# Patient Record
Sex: FEMALE | Race: WHITE | NOT HISPANIC OR LATINO | Employment: OTHER | URBAN - METROPOLITAN AREA
[De-identification: names, ages, dates, MRNs, and addresses within clinical notes are randomized per-mention and may not be internally consistent; named-entity substitution may affect disease eponyms.]

---

## 2017-07-22 ENCOUNTER — OFFICE VISIT (OUTPATIENT)
Dept: URGENT CARE | Facility: CLINIC | Age: 57
End: 2017-07-22
Payer: MEDICARE

## 2017-07-22 VITALS
WEIGHT: 195 LBS | HEIGHT: 64 IN | OXYGEN SATURATION: 96 % | DIASTOLIC BLOOD PRESSURE: 72 MMHG | SYSTOLIC BLOOD PRESSURE: 90 MMHG | TEMPERATURE: 99 F | BODY MASS INDEX: 33.29 KG/M2 | HEART RATE: 89 BPM

## 2017-07-22 DIAGNOSIS — M46.1 SACROILIITIS: Primary | ICD-10-CM

## 2017-07-22 PROCEDURE — 99203 OFFICE O/P NEW LOW 30 MIN: CPT | Mod: 25,S$GLB,, | Performed by: INTERNAL MEDICINE

## 2017-07-22 PROCEDURE — 96372 THER/PROPH/DIAG INJ SC/IM: CPT | Mod: S$GLB,,, | Performed by: INTERNAL MEDICINE

## 2017-07-22 RX ORDER — METOPROLOL SUCCINATE 50 MG/1
50 TABLET, EXTENDED RELEASE ORAL DAILY
COMMUNITY
End: 2019-08-29

## 2017-07-22 RX ORDER — AMLODIPINE BESYLATE 5 MG/1
5 TABLET ORAL DAILY
COMMUNITY
End: 2019-08-29

## 2017-07-22 RX ORDER — KETOROLAC TROMETHAMINE 30 MG/ML
30 INJECTION, SOLUTION INTRAMUSCULAR; INTRAVENOUS
Status: COMPLETED | OUTPATIENT
Start: 2017-07-22 | End: 2017-07-22

## 2017-07-22 RX ORDER — METHYLPREDNISOLONE 4 MG/1
TABLET ORAL
Qty: 1 PACKAGE | Refills: 0 | Status: SHIPPED | OUTPATIENT
Start: 2017-07-22 | End: 2019-08-29

## 2017-07-22 RX ORDER — TRAZODONE HYDROCHLORIDE 50 MG/1
50 TABLET ORAL NIGHTLY
COMMUNITY
End: 2019-08-29

## 2017-07-22 RX ORDER — VENLAFAXINE HYDROCHLORIDE 75 MG/1
75 CAPSULE, EXTENDED RELEASE ORAL DAILY
Status: ON HOLD | COMMUNITY
End: 2022-08-19 | Stop reason: HOSPADM

## 2017-07-22 RX ORDER — HYDROCODONE BITARTRATE AND ACETAMINOPHEN 7.5; 325 MG/1; MG/1
1 TABLET ORAL EVERY 6 HOURS PRN
Qty: 20 TABLET | Refills: 0 | Status: SHIPPED | OUTPATIENT
Start: 2017-07-22 | End: 2019-08-29

## 2017-07-22 RX ORDER — BUPROPION HYDROCHLORIDE 300 MG/1
300 TABLET ORAL DAILY
COMMUNITY
End: 2020-05-14

## 2017-07-22 RX ADMIN — KETOROLAC TROMETHAMINE 30 MG: 30 INJECTION, SOLUTION INTRAMUSCULAR; INTRAVENOUS at 05:07

## 2017-07-22 NOTE — PROGRESS NOTES
"Subjective:       Patient ID: Vale Gutierrez is a 56 y.o. female.    Vitals:  height is 5' 4" (1.626 m) and weight is 88.5 kg (195 lb). Her temperature is 98.7 °F (37.1 °C). Her blood pressure is 90/72 and her pulse is 89. Her oxygen saturation is 96%.     Chief Complaint: Back Pain (radiating into rt foot and ankle. ) and Sciatica    Back Pain   This is a chronic problem. The current episode started in the past 7 days. The problem occurs constantly. The problem has been gradually worsening since onset. The pain is present in the gluteal and lumbar spine. The quality of the pain is described as shooting, stabbing and burning. The pain radiates to the right foot and left foot. The pain is at a severity of 9/10. The pain is moderate. The pain is the same all the time. The symptoms are aggravated by bending, lying down, sitting, standing and position. Stiffness is present all day. Associated symptoms include leg pain. Pertinent negatives include no abdominal pain, chest pain, fever or headaches. She has tried nothing for the symptoms.     Review of Systems   Constitution: Negative for chills and fever.   HENT: Negative for headaches and sore throat.    Eyes: Negative for blurred vision.   Cardiovascular: Negative for chest pain.   Respiratory: Negative for shortness of breath.    Skin: Negative for rash.   Musculoskeletal: Positive for back pain, joint pain and stiffness.        Lbp radiating into rt buttock, groin rt lower extremity. History of sciatica   Gastrointestinal: Negative for abdominal pain, diarrhea, nausea and vomiting.   Psychiatric/Behavioral: The patient is not nervous/anxious.        Objective:      Physical Exam   Constitutional: She is oriented to person, place, and time.   Neck: Normal range of motion. Neck supple.   Cardiovascular: Intact distal pulses.    Musculoskeletal:   Tenderness R sacroiliiac jt; pain radiates to lat and ant thigh with movement   Neurological: She is alert and oriented to " person, place, and time. She has normal reflexes.       Assessment:       1. Sacroiliitis        Plan:         Sacroiliitis  -     ketorolac injection 30 mg; Inject 30 mg into the muscle one time.  -     methylPREDNISolone (MEDROL DOSEPACK) 4 mg tablet; use as directed  Dispense: 1 Package; Refill: 0  -     hydrocodone-acetaminophen 7.5-325mg (NORCO) 7.5-325 mg per tablet; Take 1 tablet by mouth every 6 (six) hours as needed for Pain.  Dispense: 20 tablet; Refill: 0

## 2017-08-01 ENCOUNTER — OFFICE VISIT (OUTPATIENT)
Dept: URGENT CARE | Facility: CLINIC | Age: 57
End: 2017-08-01
Payer: MEDICARE

## 2017-08-01 VITALS
DIASTOLIC BLOOD PRESSURE: 65 MMHG | WEIGHT: 195 LBS | TEMPERATURE: 97 F | RESPIRATION RATE: 14 BRPM | BODY MASS INDEX: 33.29 KG/M2 | HEIGHT: 64 IN | OXYGEN SATURATION: 95 % | HEART RATE: 95 BPM | SYSTOLIC BLOOD PRESSURE: 85 MMHG

## 2017-08-01 DIAGNOSIS — M79.641 BILATERAL HAND PAIN: Primary | ICD-10-CM

## 2017-08-01 DIAGNOSIS — M54.12 CERVICAL RADICULOPATHY: ICD-10-CM

## 2017-08-01 DIAGNOSIS — M79.642 BILATERAL HAND PAIN: Primary | ICD-10-CM

## 2017-08-01 PROCEDURE — 96372 THER/PROPH/DIAG INJ SC/IM: CPT | Mod: S$GLB,,, | Performed by: EMERGENCY MEDICINE

## 2017-08-01 PROCEDURE — 99214 OFFICE O/P EST MOD 30 MIN: CPT | Mod: 25,S$GLB,, | Performed by: EMERGENCY MEDICINE

## 2017-08-01 RX ORDER — TRAMADOL HYDROCHLORIDE 50 MG/1
50 TABLET ORAL EVERY 8 HOURS PRN
Qty: 30 TABLET | Refills: 0 | Status: SHIPPED | OUTPATIENT
Start: 2017-08-01 | End: 2017-08-11

## 2017-08-01 RX ORDER — BETAMETHASONE SODIUM PHOSPHATE AND BETAMETHASONE ACETATE 3; 3 MG/ML; MG/ML
9 INJECTION, SUSPENSION INTRA-ARTICULAR; INTRALESIONAL; INTRAMUSCULAR; SOFT TISSUE
Status: COMPLETED | OUTPATIENT
Start: 2017-08-01 | End: 2017-08-01

## 2017-08-01 RX ORDER — CYCLOBENZAPRINE HCL 5 MG
5 TABLET ORAL 3 TIMES DAILY
Qty: 21 TABLET | Refills: 0 | Status: SHIPPED | OUTPATIENT
Start: 2017-08-01 | End: 2017-08-08

## 2017-08-01 RX ADMIN — BETAMETHASONE SODIUM PHOSPHATE AND BETAMETHASONE ACETATE 9 MG: 3; 3 INJECTION, SUSPENSION INTRA-ARTICULAR; INTRALESIONAL; INTRAMUSCULAR; SOFT TISSUE at 03:08

## 2017-08-01 NOTE — PROGRESS NOTES
"Subjective:       Patient ID: Vale Gutierrez is a 56 y.o. female.    Vitals:  height is 5' 4" (1.626 m) and weight is 88.5 kg (195 lb). Her temperature is 96.6 °F (35.9 °C). Her blood pressure is 85/65 (abnormal) and her pulse is 95. Her respiration is 14 and oxygen saturation is 95%.     Chief Complaint: Hand Pain    REPORTS SACROILIITIS IMPROVED HOWEVER NOW DEALING WITH ALTERANTING LEFT AND RIGHT HAND PAIN AND FOREARM PAIN WITH ASSOCIATED LEFT NECK PAIN WHEN LEFT HAND AND FOREARM HURTS AND STATES IT SOMETIMES SHIFTS OVER TO THE RIGHT SIDE OF HER NECK AND RADIATES SHARP AND LANCINATING PAINS TO THE RIGHT HAND AND FOREARM. SHE IS OUT OF HER PAIN MEDS AND STATES SHE HAS APPOINTMENT WITH RHEUMATOLOGY, BUT NOT UNTIL LATE September. NO CHEST PAIN, NO FEVERS, NO SOB, NO LEG EDEMA, NO HTN, NO HEADACHE, NO BLURRED VISION. HAS CHRONIC HYPOTENSION ACTUALLY PER LAST FEW BP READS. SHE HAS WRIST BRACES AT HOME FROM SISTER. THERE HAS BEEN NO ACCIDENTS NOR FALLS, HOWEVER HAD RECENT ABDOMINAL SURGERY AND MANIPULATION OF THE NECK DURING INTUBATION AS POSSIBLE INCITING AGENT OF CHRONIC PROBLEM.      Hand Pain    The incident occurred 2 days ago. The pain is present in the right hand, right wrist, left wrist, left hand, left fingers and right fingers. The quality of the pain is described as aching, burning, stabbing, shooting and cramping. Pertinent negatives include no chest pain. She has tried heat and ice for the symptoms. The treatment provided no relief.     Review of Systems   Constitution: Negative for chills and fever.   HENT: Negative for headaches and sore throat.    Eyes: Negative for blurred vision.   Cardiovascular: Negative for chest pain.   Respiratory: Negative for shortness of breath.    Skin: Negative for rash.   Musculoskeletal: Positive for joint pain (ALTERNATING LEFT SIDED NECK AND HAND TO THE RIGHT NECK AND HAND) and joint swelling. Negative for back pain.   Gastrointestinal: Positive for nausea. Negative for " abdominal pain, diarrhea and vomiting.   Psychiatric/Behavioral: The patient is not nervous/anxious.        Objective:      Physical Exam   Constitutional: She is oriented to person, place, and time. She appears well-developed and well-nourished. No distress.   HENT:   Head: Normocephalic and atraumatic.   Right Ear: External ear normal.   Left Ear: External ear normal.   Mouth/Throat: Oropharynx is clear and moist. No oropharyngeal exudate.   Eyes: Conjunctivae and EOM are normal. Pupils are equal, round, and reactive to light.   Neck: Normal range of motion. Neck supple.   Cardiovascular: Normal rate, regular rhythm and normal heart sounds.  Exam reveals no gallop and no friction rub.    No murmur heard.  Pulmonary/Chest: Breath sounds normal. No respiratory distress. She has no wheezes. She has no rales. She exhibits no tenderness.   Abdominal: Soft. Bowel sounds are normal. There is no tenderness. There is no rebound. No hernia.   Musculoskeletal: Normal range of motion. She exhibits tenderness (TTP OF THE RIGHT FOREARM AND HAND, MILDLY RIGHT PARASPINOUS NECK AND TRAPEZIUS REGION, WORSE ON HEAD TILD. MOTOR FIUNCTION NORMAL AND SENSATION INTACT., NORMAL LUE EXAM). She exhibits no edema or deformity.   Lymphadenopathy:     She has no cervical adenopathy.   Neurological: She is alert and oriented to person, place, and time. She has normal reflexes. She displays normal reflexes. No cranial nerve deficit. Coordination normal.   Skin: Skin is warm and dry. Capillary refill takes less than 2 seconds. No rash noted. She is not diaphoretic. No erythema. No pallor.   Psychiatric: She has a normal mood and affect. Her behavior is normal.   Nursing note and vitals reviewed.      Assessment:       1. Bilateral hand pain    2. Cervical radiculopathy        Plan:         Bilateral hand pain  -     betamethasone acetate-betamethasone sodium phosphate injection 9 mg; Inject 1.5 mLs (9 mg total) into the muscle one time.  -      tramadol (ULTRAM) 50 mg tablet; Take 1 tablet (50 mg total) by mouth every 8 (eight) hours as needed for Pain.  Dispense: 30 tablet; Refill: 0    Cervical radiculopathy  -     betamethasone acetate-betamethasone sodium phosphate injection 9 mg; Inject 1.5 mLs (9 mg total) into the muscle one time.  -     cyclobenzaprine (FLEXERIL) 5 MG tablet; Take 1 tablet (5 mg total) by mouth 3 (three) times daily.  Dispense: 21 tablet; Refill: 0  -     Ambulatory referral to Orthopedics      REFERRAL ORTHO AND RHEUMATOLOGY

## 2018-01-15 NOTE — PATIENT INSTRUCTIONS
REST AND ICE SORE AREAS  1.5 CC CELESTONE GIVEN IN CLINIC  ALEVE TWICE DAILY OR MOTRIN 600 MG EVERY 6-8 HOURS FOR PAIN/INFLAMMATION  FLEXERIL RX FOR MUSCLE RELAXANT  TRAMADOL RX FOR PAIN  FOLLOW UP WITH ORTHOPEDICS (REFERRED TODAY) AND RHEUMATOLOGY AS PLANNED  MAY NEED OUTPATIENT MRI OF THE C-SPINE/NECK  SEE CERVICAL RADICULOPATHY AND HAND PAIN INFO    Understanding Cervical Radiculopathy    Cervical radiculopathy is irritation or inflammation of a nerve root in the neck. It causes neck pain and other symptoms that may spread into the chest or down the arm. To understand this condition, it helps to understand the parts of the spine:  · Vertebrae. These are bones that stack to form the spine. The cervical spine contains the 7 vertebrae in the neck.  · Disks. These are soft pads of tissue between the vertebrae. They act as shock absorbers for the spine.  · The spinal canal. This is a tunnel formed within the stacked vertebrae. The spinal cord runs through this canal.  · Nerves. These branch off the spinal cord. As they leave the spinal canal, nerves pass through openings between the vertebrae. The nerve root is the part of the nerve that is closest to the spinal cord.   With cervical radiculopathy, nerve roots in the neck become irritated. This leads to pain and symptoms that can travel to the nerves that go from the spinal cord down the arms and into the torso.  What causes cervical radiculopathy?  Aging, injury, poor posture, and other issues can lead to problems in the neck. These problems may then irritate nerve roots. These include:  · Damage to a disk in the cervical spine. The damaged disk may then press on nearby nerve roots.  · Degeneration from wear and tear, and aging. This can lead to narrowing (stenosis) of the openings between the vertebrae. The narrowed openings press on nerve roots as they leave the spinal canal.  · An unstable spine. This is when a vertebra slips forward. It can then press on a nerve  root.  There are other, less common causes of pressure on nerves in the neck. These include infection, cysts, and tumors.  Symptoms of cervical radiculopathy  These include:  · Neck pain  · Pain, numbness, tingling, or weakness that travels down the arm  · Loss of neck movement  · Muscle spasms  Treatment for cervical radiculopathy  In most cases, your healthcare provider will first try treatments that help relieve symptoms. These may include:  · Prescription or over-the-counter pain medicines. These help relieve pain and swelling.  · Cold packs. These help reduce pain.  · Resting. This involves avoiding positions and activities that increase pain.  · Neck brace (cervical collar). This can help relieve inflammation and pain.  · Physical therapy, including exercises and stretches. This can help decrease pain and increase movement and function.  · Shots of medicinesaround the nerve roots. This is done to help relieve symptoms for a time.  In some cases, your healthcare provider may advise surgery to fix the underlying problem. This depends on the cause, the symptoms, and how long the pain has lasted.  Possible complications  Over time, an irritated and inflamed nerve may become damaged. This may lead to long-lasting (permanent) numbness or weakness. If symptoms change suddenly or get worse, be sure to let your healthcare provider know.     When to call your healthcare provider  Call your healthcare provider right away if you have any of these:  · New pain or pain that gets worse  · New or increasing weakness, numbness, or tingling in your arm or hand  · Bowel or bladder changes   Date Last Reviewed: 3/10/2016  © 9327-8702 [employer]. [address] [place] [address], [place] [address]. All rights reserved. This information is not intended as a substitute for professional medical care. Always follow your healthcare professional's instructions.        Paraesthesias  Paraesthesia is a burning or prickling sensation  "that is sometimes felt in the hands, arms, legs or feet. It can also occur in other parts of the body. It can also feel like tingling or numbness, skin crawling, or itching. The feeling is not comfortable, but it is not painful. (The "pins and needles" feeling that happens when a foot or hand "falls asleep" is a temporary paraesthesia.)  Paraesthesias that last or come and go may be caused by medical issues that need to be treated. These include stroke, a bulging disk pressing on a nerve, a trapped nerve, vitamin deficiencies, or even certain medicines.  Tests are often done. These tests may include blood tests, X-ray, CT (computerized tomography) scan, or a muscle test (electromyography). Depending on the cause, treatment may include physical therapy.  Home care  · Tell the healthcare provider about all medicines you take. This includes prescription and over-the-counter medicines, vitamins, and herbs. Ask if any of the medicines may be causing your problems. Do not make any changes to prescription medicines without talking to your healthcare provider first.  · You may be prescribed medicines to help relieve the tingling feeling or for pain. Take all medicines as directed.  · A numb hand or foot may be more prone to injury. To help protect it:  ¨ Always use oven mitts.  ¨ Test water with an unaffected hand or foot.  ¨ Use caution when trimming nails. File sharp areas.  ¨ Wear shoes that fit well to avoid pressure points, blisters, and ulcers.  ¨ Inspect your hands and feet carefully (including the soles of your feet and between your toes) at least once a week. If you see red areas, sores, or other problems, tell your healthcare provider.  Follow-up care  Follow up with your doctor or as advised by our staff. You may need further testing or evaluation.  When to seek medical advice  Call your healthcare provider right away if any of the following occur:  · Numbness or weakness of the face, one arm, or one " reactive and equally round. Sclerae and conjunctivae clear, normocephalic and atraumatic. RESPIRATORY:  Clear and equal breath sounds with no acute respiratory distress. HEART: Regular rhythm without murmur, rub or gallop. ABDOMEN:  soft, nontender. No masses, guarding or rebound. Normoactive bowel sounds. LOW BACK: No tenderness to palpation of inferior lumbar spine or either sacroiliac joint area. Assessment:     1. Cough  POCT rapid strep A    POCT Influenza A/B   2. Bronchitis  oseltamivir (TAMIFLU) 75 MG capsule    predniSONE (DELTASONE) 10 MG tablet    cefTRIAXone (ROCEPHIN) injection 1 g   3. Subacute maxillary sinusitis  predniSONE (DELTASONE) 10 MG tablet    cefdinir (OMNICEF) 300 MG capsule   4. Essential hypertension     5. Type 2 diabetes mellitus with stage 3 chronic kidney disease, without long-term current use of insulin ([facility]         Plan:      Orders Placed This Encounter   Procedures    POCT rapid strep A    POCT Influenza A/B     Orders Placed This Encounter   Medications    oseltamivir (TAMIFLU) 75 MG capsule     Sig: Take 1 capsule by mouth 2 times daily for 5 days     Dispense:  10 capsule     Refill:  0    predniSONE (DELTASONE) 10 MG tablet     Sig: Take one tablet 4 times a day for 3 days, then one tablet 3 times a day for 3 days, then take one tablet 2 times a day for 3 days, and finally take one tablet 1 times a day for three days. ([id number],5V1,7J9)     Dispense:  30 tablet     Refill:  0    cefTRIAXone (ROCEPHIN) injection 1 g    cefdinir (OMNICEF) 300 MG capsule     Sig: Take 1 capsule by mouth 2 times daily for 10 days     Dispense:  20 capsule     Refill:  0     Symptoms and complaints should be improving over 48 hours and entirely resolved in two weeks, if not, patient should call the office for a follow-up appointment. Controlled Substances Monitoring:      Return in about 2 months (around 3/15/2018). HAYLEY Monreal   , am scribing for and in the presence of Ayesha Whittington MD. Electronically signed by :  Bolivar Flowers. Mary Elias, Ayesha Whittington MD, personally performed the services described in this documentation, as scribed by Bolivar Flowers. HAYLEY Acosta    in my presence, and it is both accurate and complete.  Electronically signed by: Ayesha Whittington MD    1/15/18 12:15 PM    Ayesha Whittington MD leg  · Slurred speech, confusion, trouble speaking, walking, or seeing  · Severe headache, fainting spell, dizziness, or seizure  · Chest, arm, neck, or upper back pain  · Loss of bladder or bowel control  · Open wound with redness, swelling, or pus  Date Last Reviewed: 9/25/2015  © 4554-1429 [employer]. [address] [place] [address]. All rights reserved. This information is not intended as a substitute for professional medical care. Always follow your healthcare professional's instructions.

## 2018-09-30 ENCOUNTER — OFFICE VISIT (OUTPATIENT)
Dept: URGENT CARE | Facility: CLINIC | Age: 58
End: 2018-09-30
Payer: MEDICARE

## 2018-09-30 VITALS
TEMPERATURE: 98 F | HEIGHT: 64 IN | WEIGHT: 195 LBS | SYSTOLIC BLOOD PRESSURE: 94 MMHG | BODY MASS INDEX: 33.29 KG/M2 | HEART RATE: 81 BPM | DIASTOLIC BLOOD PRESSURE: 71 MMHG | OXYGEN SATURATION: 95 %

## 2018-09-30 DIAGNOSIS — M25.511 ACUTE PAIN OF RIGHT SHOULDER: Primary | ICD-10-CM

## 2018-09-30 PROCEDURE — 96372 THER/PROPH/DIAG INJ SC/IM: CPT | Mod: S$GLB,,, | Performed by: NURSE PRACTITIONER

## 2018-09-30 PROCEDURE — 3008F BODY MASS INDEX DOCD: CPT | Mod: CPTII,S$GLB,, | Performed by: NURSE PRACTITIONER

## 2018-09-30 PROCEDURE — 99214 OFFICE O/P EST MOD 30 MIN: CPT | Mod: 25,S$GLB,, | Performed by: NURSE PRACTITIONER

## 2018-09-30 RX ORDER — CYCLOBENZAPRINE HCL 10 MG
10 TABLET ORAL 3 TIMES DAILY PRN
Qty: 30 TABLET | Refills: 0 | Status: SHIPPED | OUTPATIENT
Start: 2018-09-30 | End: 2018-10-10

## 2018-09-30 RX ORDER — BETAMETHASONE SODIUM PHOSPHATE AND BETAMETHASONE ACETATE 3; 3 MG/ML; MG/ML
9 INJECTION, SUSPENSION INTRA-ARTICULAR; INTRALESIONAL; INTRAMUSCULAR; SOFT TISSUE
Status: COMPLETED | OUTPATIENT
Start: 2018-09-30 | End: 2018-09-30

## 2018-09-30 RX ADMIN — BETAMETHASONE SODIUM PHOSPHATE AND BETAMETHASONE ACETATE 9 MG: 3; 3 INJECTION, SUSPENSION INTRA-ARTICULAR; INTRALESIONAL; INTRAMUSCULAR; SOFT TISSUE at 06:09

## 2018-09-30 NOTE — PROGRESS NOTES
"Subjective:       Patient ID: Vale Gutierrez is a 57 y.o. female.    Vitals:  height is 5' 4" (1.626 m) and weight is 88.5 kg (195 lb). Her temperature is 97.8 °F (36.6 °C). Her blood pressure is 94/71 and her pulse is 81. Her oxygen saturation is 95%.     Chief Complaint: Shoulder Pain (right)    Pt is here for right shoulder pain and spasms for 4 days. Pt denies radiculopathy or trauma.       Shoulder Pain    The pain is present in the right shoulder. This is a new problem. Episode onset: 4 days pain started. no trauma. The problem occurs intermittently. The problem has been gradually worsening. The quality of the pain is described as aching. The pain is at a severity of 7/10. The pain is moderate. Associated symptoms include a limited range of motion and stiffness. Pertinent negatives include no fever or headaches. The symptoms are aggravated by activity. She has tried nothing for the symptoms. The treatment provided no relief. Family history includes arthritis.     Review of Systems   Constitution: Negative for chills and fever.   HENT: Negative for sore throat.    Eyes: Negative for blurred vision.   Cardiovascular: Negative for chest pain.   Respiratory: Negative for shortness of breath.    Skin: Negative for rash.   Musculoskeletal: Positive for joint pain and stiffness. Negative for back pain.   Gastrointestinal: Negative for abdominal pain, diarrhea, nausea and vomiting.   Neurological: Negative for headaches.   Psychiatric/Behavioral: The patient is not nervous/anxious.        Objective:      Physical Exam   Constitutional: She is oriented to person, place, and time. Vital signs are normal. She appears well-developed and well-nourished. She is active and cooperative. No distress.   HENT:   Head: Normocephalic and atraumatic.   Nose: Nose normal.   Mouth/Throat: Oropharynx is clear and moist and mucous membranes are normal.   Eyes: Conjunctivae and lids are normal.   Neck: Trachea normal, normal range of " motion, full passive range of motion without pain and phonation normal. Neck supple.   Cardiovascular: Normal rate, regular rhythm, normal heart sounds, intact distal pulses and normal pulses.   Pulmonary/Chest: Effort normal and breath sounds normal.   Abdominal: Soft. Normal appearance and bowel sounds are normal. She exhibits no abdominal bruit, no pulsatile midline mass and no mass.   Musculoskeletal: She exhibits no edema or deformity.        Right shoulder: She exhibits tenderness, pain and spasm. She exhibits normal range of motion, no bony tenderness, no swelling, no effusion, no crepitus, no deformity, no laceration, normal pulse and normal strength.   Neurological: She is alert and oriented to person, place, and time. She has normal strength and normal reflexes. No sensory deficit.   Skin: Skin is warm, dry and intact. She is not diaphoretic.   Psychiatric: She has a normal mood and affect. Her speech is normal and behavior is normal. Judgment and thought content normal. Cognition and memory are normal.   Nursing note and vitals reviewed.      Assessment:       1. Acute pain of right shoulder        Plan:         Acute pain of right shoulder  -     betamethasone acetate-betamethasone sodium phosphate injection 9 mg; Inject 1.5 mLs (9 mg total) into the muscle one time.  -     cyclobenzaprine (FLEXERIL) 10 MG tablet; Take 1 tablet (10 mg total) by mouth 3 (three) times daily as needed for Muscle spasms.  Dispense: 30 tablet; Refill: 0      Patient Instructions       FOLLOW UP WITH PCP IF SYMPTOMS PERSIST    REST    PAIN IS YOUR LIMITATION    Arthralgia    Arthralgia is the term for pain in or around the joint. It is a symptom, not a disease. This pain may involve one or more joints. In some cases, the pain moves from joint to joint.  There are many causes for joint pain. These include:  · Injury  · Osteoarthritis (wearing out of the joint surface)  · Gout (inflammation of the joint due to crystals in the  joint fluid)  · Infection inside the joint    · Bursitis (inflammation of the fluid-filled sacs around the joint)  · Autoimmune disorders such as rheumatoid arthritis or lupus  · Tendonitis (inflammation of chords that attach muscle to bone)  Home care  · Rest the involved joint(s) until your symptoms improve.   · You may be prescribed pain medicine. If none is prescribed, you may use acetaminophen or ibuprofen to control pain and inflammation.  Follow-up care  Follow up with your healthcare provider or as advised.  When to seek medical advice  Contact your healthcare provider right away if any of the following occurs:  · Pain, swelling, or redness of joint increases  · Pain worsens or recurs after a period of improvement  · Pain moves to other joints  · You cannot bear weight on the affected joint   · You cannot move the affected joint  · Joint appears deformed  · New rash appears  · Fever of 100.4ºF (38ºC) or higher, or as directed by your healthcare provider  Date Last Reviewed: 3/1/2017  © 3949-6551 [employer]. [address] [place] [address], [place] [address]. All rights reserved. This information is not intended as a substitute for professional medical care. Always follow your healthcare professional's instructions.

## 2018-09-30 NOTE — PATIENT INSTRUCTIONS
FOLLOW UP WITH PCP IF SYMPTOMS PERSIST    REST    PAIN IS YOUR LIMITATION    Arthralgia    Arthralgia is the term for pain in or around the joint. It is a symptom, not a disease. This pain may involve one or more joints. In some cases, the pain moves from joint to joint.  There are many causes for joint pain. These include:  · Injury  · Osteoarthritis (wearing out of the joint surface)  · Gout (inflammation of the joint due to crystals in the joint fluid)  · Infection inside the joint    · Bursitis (inflammation of the fluid-filled sacs around the joint)  · Autoimmune disorders such as rheumatoid arthritis or lupus  · Tendonitis (inflammation of chords that attach muscle to bone)  Home care  · Rest the involved joint(s) until your symptoms improve.   · You may be prescribed pain medicine. If none is prescribed, you may use acetaminophen or ibuprofen to control pain and inflammation.  Follow-up care  Follow up with your healthcare provider or as advised.  When to seek medical advice  Contact your healthcare provider right away if any of the following occurs:  · Pain, swelling, or redness of joint increases  · Pain worsens or recurs after a period of improvement  · Pain moves to other joints  · You cannot bear weight on the affected joint   · You cannot move the affected joint  · Joint appears deformed  · New rash appears  · Fever of 100.4ºF (38ºC) or higher, or as directed by your healthcare provider  Date Last Reviewed: 3/1/2017  © 4110-7344 [employer]. [address] [place] [address]. All rights reserved. This information is not intended as a substitute for professional medical care. Always follow your healthcare professional's instructions.

## 2019-05-26 ENCOUNTER — OFFICE VISIT (OUTPATIENT)
Dept: URGENT CARE | Facility: CLINIC | Age: 59
End: 2019-05-26
Payer: MEDICARE

## 2019-05-26 VITALS
DIASTOLIC BLOOD PRESSURE: 86 MMHG | TEMPERATURE: 97 F | HEIGHT: 64 IN | HEART RATE: 81 BPM | SYSTOLIC BLOOD PRESSURE: 112 MMHG | OXYGEN SATURATION: 95 % | BODY MASS INDEX: 33.29 KG/M2 | RESPIRATION RATE: 19 BRPM | WEIGHT: 195 LBS

## 2019-05-26 DIAGNOSIS — M25.541 PAIN IN JOINTS OF RIGHT HAND: Primary | ICD-10-CM

## 2019-05-26 PROCEDURE — 96372 THER/PROPH/DIAG INJ SC/IM: CPT | Mod: S$GLB,,, | Performed by: FAMILY MEDICINE

## 2019-05-26 PROCEDURE — 99214 PR OFFICE/OUTPT VISIT, EST, LEVL IV, 30-39 MIN: ICD-10-PCS | Mod: 25,S$GLB,, | Performed by: FAMILY MEDICINE

## 2019-05-26 PROCEDURE — 3008F BODY MASS INDEX DOCD: CPT | Mod: CPTII,S$GLB,, | Performed by: FAMILY MEDICINE

## 2019-05-26 PROCEDURE — 96372 PR INJECTION,THERAP/PROPH/DIAG2ST, IM OR SUBCUT: ICD-10-PCS | Mod: S$GLB,,, | Performed by: FAMILY MEDICINE

## 2019-05-26 PROCEDURE — 99214 OFFICE O/P EST MOD 30 MIN: CPT | Mod: 25,S$GLB,, | Performed by: FAMILY MEDICINE

## 2019-05-26 PROCEDURE — 3008F PR BODY MASS INDEX (BMI) DOCUMENTED: ICD-10-PCS | Mod: CPTII,S$GLB,, | Performed by: FAMILY MEDICINE

## 2019-05-26 RX ORDER — DICLOFENAC SODIUM 75 MG/1
75 TABLET, DELAYED RELEASE ORAL 2 TIMES DAILY
Qty: 30 TABLET | Refills: 0 | Status: SHIPPED | OUTPATIENT
Start: 2019-05-26 | End: 2019-06-10

## 2019-05-26 RX ORDER — KETOROLAC TROMETHAMINE 30 MG/ML
30 INJECTION, SOLUTION INTRAMUSCULAR; INTRAVENOUS
Status: COMPLETED | OUTPATIENT
Start: 2019-05-26 | End: 2019-05-26

## 2019-05-26 RX ORDER — BETAMETHASONE SODIUM PHOSPHATE AND BETAMETHASONE ACETATE 3; 3 MG/ML; MG/ML
9 INJECTION, SUSPENSION INTRA-ARTICULAR; INTRALESIONAL; INTRAMUSCULAR; SOFT TISSUE
Status: COMPLETED | OUTPATIENT
Start: 2019-05-26 | End: 2019-05-26

## 2019-05-26 RX ORDER — METHYLPREDNISOLONE 4 MG/1
TABLET ORAL
Qty: 1 PACKAGE | Refills: 0 | Status: SHIPPED | OUTPATIENT
Start: 2019-05-26 | End: 2019-06-01

## 2019-05-26 RX ADMIN — BETAMETHASONE SODIUM PHOSPHATE AND BETAMETHASONE ACETATE 9 MG: 3; 3 INJECTION, SUSPENSION INTRA-ARTICULAR; INTRALESIONAL; INTRAMUSCULAR; SOFT TISSUE at 06:05

## 2019-05-26 RX ADMIN — KETOROLAC TROMETHAMINE 30 MG: 30 INJECTION, SOLUTION INTRAMUSCULAR; INTRAVENOUS at 06:05

## 2019-05-26 NOTE — PATIENT INSTRUCTIONS
Follow up with your rheumatologist in a few days.  Return to the urgent care or go to the ER if symptoms get worse.    Mike Lowery MD

## 2019-05-26 NOTE — PROGRESS NOTES
"Subjective:       Patient ID: Vale Gutierrez is a 58 y.o. female.    Vitals:  height is 5' 4" (1.626 m) and weight is 88.5 kg (195 lb). Her oral temperature is 96.9 °F (36.1 °C). Her blood pressure is 112/86 and her pulse is 81. Her respiration is 19 and oxygen saturation is 95%.     Chief Complaint: Edema (right hand)    Edema   This is a new problem. The current episode started in the past 7 days (3 days ). The problem occurs constantly. The problem has been gradually worsening. Pertinent negatives include no abdominal pain, anorexia, arthralgias, change in bowel habit, chest pain, chills, congestion, coughing, diaphoresis, fatigue, fever, headaches, joint swelling, myalgias, nausea, neck pain, numbness, rash, sore throat, swollen glands, urinary symptoms, vertigo, visual change, vomiting or weakness. Exacerbated by: movement. She has tried NSAIDs for the symptoms. The treatment provided mild relief.       Constitution: Negative for chills, sweating, fatigue and fever.   HENT: Negative for congestion and sore throat.    Neck: Negative for neck pain and painful lymph nodes.   Cardiovascular: Negative for chest pain and leg swelling.   Eyes: Negative for double vision and blurred vision.   Respiratory: Negative for cough and shortness of breath.    Gastrointestinal: Negative for abdominal pain, nausea, vomiting and diarrhea.   Genitourinary: Negative for dysuria, frequency, urgency and history of kidney stones.   Musculoskeletal: Negative for joint pain, joint swelling, muscle cramps and muscle ache.   Skin: Negative for color change, pale, rash, erythema and bruising.   Allergic/Immunologic: Negative for seasonal allergies.   Neurological: Negative for dizziness, history of vertigo, light-headedness, passing out, headaches and numbness.   Hematologic/Lymphatic: Negative for swollen lymph nodes.   Psychiatric/Behavioral: Negative for nervous/anxious, sleep disturbance and depression. The patient is not " nervous/anxious.        Objective:      Physical Exam   Constitutional: She is oriented to person, place, and time. She appears well-developed and well-nourished.   HENT:   Head: Normocephalic and atraumatic.   Eyes: Pupils are equal, round, and reactive to light. EOM are normal.   Neck: Normal range of motion. Neck supple.   Cardiovascular: Normal rate, regular rhythm and normal heart sounds.   No murmur heard.  Pulmonary/Chest: Breath sounds normal. No respiratory distress. She has no wheezes. She has no rales.   Abdominal: Soft. Bowel sounds are normal. She exhibits no distension. There is no tenderness.   Musculoskeletal:        Hands:  Right hand on the dorsal side has multiple joints tenderness, swelling, redness and warmth.   Lymphadenopathy:     She has no cervical adenopathy.   Neurological: She is alert and oriented to person, place, and time. No cranial nerve deficit. She exhibits normal muscle tone. Coordination normal.   Skin: Skin is warm. Capillary refill takes less than 2 seconds. No rash noted. No erythema. No pallor.   Psychiatric: She has a normal mood and affect. Her behavior is normal. Judgment and thought content normal.   Vitals reviewed.      Assessment:       1. Pain in joints of right hand        Plan:         Pain in joints of right hand  -     ketorolac injection 30 mg  -     betamethasone acetate-betamethasone sodium phosphate injection 9 mg  -     methylPREDNISolone (MEDROL DOSEPACK) 4 mg tablet; use as directed  Dispense: 1 Package; Refill: 0  -     diclofenac (VOLTAREN) 75 MG EC tablet; Take 1 tablet (75 mg total) by mouth 2 (two) times daily. for 15 days  Dispense: 30 tablet; Refill: 0          Patient Instructions   Follow up with your rheumatologist in a few days.  Return to the urgent care or go to the ER if symptoms get worse.    Mike Lowery MD

## 2019-07-12 ENCOUNTER — NURSE TRIAGE (OUTPATIENT)
Dept: ADMINISTRATIVE | Facility: CLINIC | Age: 59
End: 2019-07-12

## 2019-07-12 NOTE — TELEPHONE ENCOUNTER
Reason for Disposition   General information question, no triage required and triager able to answer question    Protocols used: INFORMATION ONLY CALL-A-AH    Patient was calling to establish care. Not a triage call. Call transferred to the appt desk for assistance.

## 2019-08-16 ENCOUNTER — TELEPHONE (OUTPATIENT)
Dept: INTERNAL MEDICINE | Facility: CLINIC | Age: 59
End: 2019-08-16

## 2019-08-16 NOTE — TELEPHONE ENCOUNTER
----- Message from Cyndee Laguerre sent at 8/16/2019  9:34 AM CDT -----  Contact: Self  call  [phone]  Patient called with compliant of weakness and leg weakness to the point she can walk. Called On  Call but was not able to get through.

## 2019-08-16 NOTE — TELEPHONE ENCOUNTER
9:40 am Received call from phone staff, who stated they were unable to reach the on call nurse.  Spoke with patient. Patient reports being at home in bed and feeling weak. States she cannot walk to kitchen because me legs feel so weak. Stated she started feeling bad a few days ago with stomach issues. The last 2 weeks, she reports weakness in her legs only. Today she states fi she tries to walk her legs will give out. Instructed patient to call 911 to take her to ED for evaluation. Patient unsure she wants to call. Informed patient the need to call 911 due to being unable to walk safely, that she could fall and injure herself. Patient states she did fall and hit her head. Stressed calling 911 now to seek help and treatment. Patient verbalized understanding, states she will call now and will go to [facility] ED because that is where all of her medical records are.

## 2019-08-29 ENCOUNTER — OFFICE VISIT (OUTPATIENT)
Dept: INTERNAL MEDICINE | Facility: CLINIC | Age: 59
End: 2019-08-29
Payer: MEDICARE

## 2019-08-29 ENCOUNTER — LAB VISIT (OUTPATIENT)
Dept: LAB | Facility: HOSPITAL | Age: 59
End: 2019-08-29
Attending: HOSPITALIST
Payer: MEDICARE

## 2019-08-29 VITALS
TEMPERATURE: 98 F | HEIGHT: 64 IN | SYSTOLIC BLOOD PRESSURE: 108 MMHG | RESPIRATION RATE: 16 BRPM | WEIGHT: 150.56 LBS | HEART RATE: 89 BPM | DIASTOLIC BLOOD PRESSURE: 84 MMHG | BODY MASS INDEX: 25.7 KG/M2

## 2019-08-29 DIAGNOSIS — R41.3 MEMORY DEFICITS: ICD-10-CM

## 2019-08-29 DIAGNOSIS — R73.9 ELEVATED BLOOD SUGAR: ICD-10-CM

## 2019-08-29 DIAGNOSIS — F10.29 ALCOHOL DEPENDENCE WITH UNSPECIFIED ALCOHOL-INDUCED DISORDER: ICD-10-CM

## 2019-08-29 DIAGNOSIS — R42 ORTHOSTATIC DIZZINESS: ICD-10-CM

## 2019-08-29 DIAGNOSIS — M48.00 SPINAL STENOSIS, UNSPECIFIED SPINAL REGION: ICD-10-CM

## 2019-08-29 DIAGNOSIS — Z00.00 ENCOUNTER FOR MEDICAL EXAMINATION TO ESTABLISH CARE: Primary | ICD-10-CM

## 2019-08-29 DIAGNOSIS — R29.6 FALLS FREQUENTLY: ICD-10-CM

## 2019-08-29 DIAGNOSIS — I10 ESSENTIAL HYPERTENSION: ICD-10-CM

## 2019-08-29 DIAGNOSIS — Z12.39 BREAST CANCER SCREENING: ICD-10-CM

## 2019-08-29 DIAGNOSIS — R63.4 UNINTENTIONAL WEIGHT LOSS: ICD-10-CM

## 2019-08-29 PROBLEM — F32.A DEPRESSION: Status: ACTIVE | Noted: 2019-08-29

## 2019-08-29 LAB
ALBUMIN SERPL BCP-MCNC: 3.4 G/DL (ref 3.5–5.2)
ALP SERPL-CCNC: 394 U/L (ref 55–135)
ALT SERPL W/O P-5'-P-CCNC: 51 U/L (ref 10–44)
ANION GAP SERPL CALC-SCNC: 13 MMOL/L (ref 8–16)
AST SERPL-CCNC: 41 U/L (ref 10–40)
BASOPHILS # BLD AUTO: 0.09 K/UL (ref 0–0.2)
BASOPHILS NFR BLD: 1.2 % (ref 0–1.9)
BILIRUB SERPL-MCNC: 0.4 MG/DL (ref 0.1–1)
BUN SERPL-MCNC: 12 MG/DL (ref 6–20)
CALCIUM SERPL-MCNC: 9.9 MG/DL (ref 8.7–10.5)
CHLORIDE SERPL-SCNC: 96 MMOL/L (ref 95–110)
CO2 SERPL-SCNC: 33 MMOL/L (ref 23–29)
CREAT SERPL-MCNC: 1 MG/DL (ref 0.5–1.4)
DIFFERENTIAL METHOD: ABNORMAL
EOSINOPHIL # BLD AUTO: 0.1 K/UL (ref 0–0.5)
EOSINOPHIL NFR BLD: 0.9 % (ref 0–8)
ERYTHROCYTE [DISTWIDTH] IN BLOOD BY AUTOMATED COUNT: 14.2 % (ref 11.5–14.5)
EST. GFR  (AFRICAN AMERICAN): >60 ML/MIN/1.73 M^2
EST. GFR  (NON AFRICAN AMERICAN): >60 ML/MIN/1.73 M^2
GLUCOSE SERPL-MCNC: 125 MG/DL (ref 70–110)
HCT VFR BLD AUTO: 44 % (ref 37–48.5)
HGB BLD-MCNC: 14.1 G/DL (ref 12–16)
IMM GRANULOCYTES # BLD AUTO: 0.02 K/UL (ref 0–0.04)
IMM GRANULOCYTES NFR BLD AUTO: 0.3 % (ref 0–0.5)
LYMPHOCYTES # BLD AUTO: 1.4 K/UL (ref 1–4.8)
LYMPHOCYTES NFR BLD: 17.4 % (ref 18–48)
MCH RBC QN AUTO: 31 PG (ref 27–31)
MCHC RBC AUTO-ENTMCNC: 32 G/DL (ref 32–36)
MCV RBC AUTO: 97 FL (ref 82–98)
MONOCYTES # BLD AUTO: 0.6 K/UL (ref 0.3–1)
MONOCYTES NFR BLD: 7 % (ref 4–15)
NEUTROPHILS # BLD AUTO: 5.7 K/UL (ref 1.8–7.7)
NEUTROPHILS NFR BLD: 73.2 % (ref 38–73)
NRBC BLD-RTO: 0 /100 WBC
PLATELET # BLD AUTO: 318 K/UL (ref 150–350)
PMV BLD AUTO: 10.4 FL (ref 9.2–12.9)
POTASSIUM SERPL-SCNC: 3.1 MMOL/L (ref 3.5–5.1)
PROT SERPL-MCNC: 7 G/DL (ref 6–8.4)
RBC # BLD AUTO: 4.55 M/UL (ref 4–5.4)
SODIUM SERPL-SCNC: 142 MMOL/L (ref 136–145)
TSH SERPL DL<=0.005 MIU/L-ACNC: 1.7 UIU/ML (ref 0.4–4)
WBC # BLD AUTO: 7.82 K/UL (ref 3.9–12.7)

## 2019-08-29 PROCEDURE — 82607 VITAMIN B-12: CPT

## 2019-08-29 PROCEDURE — 84443 ASSAY THYROID STIM HORMONE: CPT

## 2019-08-29 PROCEDURE — 80053 COMPREHEN METABOLIC PANEL: CPT

## 2019-08-29 PROCEDURE — 36415 COLL VENOUS BLD VENIPUNCTURE: CPT | Mod: PO

## 2019-08-29 PROCEDURE — 99999 PR PBB SHADOW E&M-EST. PATIENT-LVL IV: CPT | Mod: PBBFAC,,, | Performed by: HOSPITALIST

## 2019-08-29 PROCEDURE — 99204 PR OFFICE/OUTPT VISIT, NEW, LEVL IV, 45-59 MIN: ICD-10-PCS | Mod: S$GLB,,, | Performed by: HOSPITALIST

## 2019-08-29 PROCEDURE — 99204 OFFICE O/P NEW MOD 45 MIN: CPT | Mod: S$GLB,,, | Performed by: HOSPITALIST

## 2019-08-29 PROCEDURE — 3008F PR BODY MASS INDEX (BMI) DOCUMENTED: ICD-10-PCS | Mod: CPTII,S$GLB,, | Performed by: HOSPITALIST

## 2019-08-29 PROCEDURE — 99999 PR PBB SHADOW E&M-EST. PATIENT-LVL IV: ICD-10-PCS | Mod: PBBFAC,,, | Performed by: HOSPITALIST

## 2019-08-29 PROCEDURE — 85025 COMPLETE CBC W/AUTO DIFF WBC: CPT

## 2019-08-29 PROCEDURE — 84425 ASSAY OF VITAMIN B-1: CPT

## 2019-08-29 PROCEDURE — 83036 HEMOGLOBIN GLYCOSYLATED A1C: CPT

## 2019-08-29 PROCEDURE — 82746 ASSAY OF FOLIC ACID SERUM: CPT

## 2019-08-29 PROCEDURE — 3008F BODY MASS INDEX DOCD: CPT | Mod: CPTII,S$GLB,, | Performed by: HOSPITALIST

## 2019-08-29 RX ORDER — LANOLIN ALCOHOL/MO/W.PET/CERES
100 CREAM (GRAM) TOPICAL DAILY
COMMUNITY
Start: 2019-08-29 | End: 2020-01-16

## 2019-08-29 RX ORDER — CHOLECALCIFEROL (VITAMIN D3) 25 MCG
5000 TABLET ORAL
Status: ON HOLD | COMMUNITY
Start: 2017-06-11 | End: 2022-09-07 | Stop reason: HOSPADM

## 2019-08-29 RX ORDER — FOLIC ACID 1 MG/1
1 TABLET ORAL DAILY
Qty: 30 TABLET | Refills: 3 | Status: SHIPPED | OUTPATIENT
Start: 2019-08-29 | End: 2019-08-30

## 2019-08-29 RX ORDER — METOPROLOL TARTRATE 50 MG/1
25 TABLET ORAL DAILY
Refills: 0 | COMMUNITY
Start: 2019-08-15 | End: 2019-11-19 | Stop reason: SDUPTHER

## 2019-08-29 NOTE — PROGRESS NOTES
Subjective:     @Patient ID: Vale Gutierrez is a 58 y.o. female.    Chief Complaint: Establish Care    HPI    59 yo F presents to establish care.  Patient is new to me.  Patient is accompanied by her sister. Pt's former PCP is Dr Charanjit Fatima.  Patient reports heavy alcohol use.  Per her sister she drinks at least 3 12-14 oz cocktail drinks daily and has been doing this for a long time.  Patient reports that she has only been doing it for a few years.  She reports last rehab was at [facility] for alcohol abuse.  She states that she would like to quit.  Denies history of DUI.  Patient and family reports that she has been having memory issues. States worse since cholecystectomy. Has lost 30-40 lbs? In the past few months per family.  Family reports that she has an outside psychiatrist and neurologist.  However they report that they would like more information on how to do with addiction.  Patient also reports that she has been feeling weak and tired and having multiple falls lately.  Reports sometimes she feels dizzy when standing.  She has now been using a rolling walker for stability due to her falls.    Dr Juan Daniel Kennedy - psychiatrist   Dr Marvel Crenshaw- neurologist    Review of Systems   Constitutional: Positive for fatigue and unexpected weight change. Negative for chills and fever.   HENT: Negative for congestion and sore throat.    Eyes: Negative for pain and visual disturbance.   Respiratory: Negative for cough and shortness of breath.    Cardiovascular: Negative for chest pain and leg swelling.   Gastrointestinal: Negative for abdominal pain, nausea and vomiting.   Endocrine: Negative for polydipsia and polyuria.   Genitourinary: Negative for difficulty urinating and dysuria.   Musculoskeletal: Negative for arthralgias and back pain.   Skin: Negative for rash and wound.   Neurological: Positive for tremors and weakness. Negative for dizziness and headaches.   Psychiatric/Behavioral: Negative for  "agitation and confusion.        + memory impairment      Past medical history, surgical history, and family medical history reviewed and updated as appropriate.    Medications and allergies reviewed.     Objective:     Vitals:    08/29/19 1344   BP: 108/84   Pulse: 89   Resp: 16   Temp: 97.9 °F (36.6 °C)   TempSrc: Oral   Weight: 68.3 kg (150 lb 9.2 oz)   Height: 5' 4" (1.626 m)     Body mass index is 25.85 kg/m².  Physical Exam   Constitutional: She is oriented to person, place, and time. She appears well-developed and well-nourished. No distress.   HENT:   Head: Normocephalic and atraumatic.   Mouth/Throat: Oropharynx is clear and moist. No oropharyngeal exudate.   Eyes: Conjunctivae are normal. Right eye exhibits no discharge. Left eye exhibits no discharge.   Neck: Normal range of motion. Neck supple.   Cardiovascular: Normal rate, regular rhythm and intact distal pulses. Exam reveals no friction rub.   No murmur heard.  Pulmonary/Chest: Effort normal and breath sounds normal.   Abdominal: Soft. Bowel sounds are normal. She exhibits no distension. There is no tenderness. There is no guarding.   Musculoskeletal: She exhibits no edema.   Neurological: She is alert and oriented to person, place, and time.   Skin: Skin is warm and dry.   Psychiatric: Her speech is delayed. She is slowed.   Vitals reviewed.    Orthostatic BP  Laying /72 and HR 70  Sitting BP   94/74 and HR 79  Standing- pt unable to stand long enough for BP check    No results found for: WBC, HGB, HCT, PLT, CHOL, TRIG, HDL, LDLDIRECT, ALT, AST, NA, K, CL, CREATININE, BUN, CO2, TSH, PSA, INR, GLUF, HGBA1C, MICROALBUR    Assessment:     1. Encounter for medical examination to establish care    2. Alcohol dependence with unspecified alcohol-induced disorder    3. Orthostatic dizziness    4. Elevated blood sugar    5. Memory deficits    6. Falls frequently    7. Essential hypertension    8. Breast cancer screening    9. Unintentional weight loss  "     Plan:   Vale was seen today for establish care.    Diagnoses and all orders for this visit:    Encounter for medical examination to establish care    Alcohol dependence with unspecified alcohol-induced disorder  - Counseled pt on alcohol cessation. Recommend to patient not to abruptly quit ETOH as can cause DTs. Recommend supervision in a facility when ready  -     Ambulatory Referral to Addiction Specialist  -     Vitamin B12; Future  -     VITAMIN B1; Future  -     Folate; Future  -     thiamine 100 MG tablet; Take 1 tablet (100 mg total) by mouth once daily.  -     folic acid (FOLVITE) 1 MG tablet; Take 1 tablet (1 mg total) by mouth once daily.    Orthostatic dizziness       - Suspect pt is orthostatic. Recommend to stop amlodipine for now. Ok to continue metoprolol.  Orthostatic vitals partially done in clinic as pt unable to stand for long. Also encouraged oral hydration    Elevated blood sugar  -     HEMOGLOBIN A1C; Future    Memory deficits  - Likely due to patient's alcohol use.   -     Vitamin B12; Future  -     VITAMIN B1; Future  -     Folate; Future    Falls frequently       - Likely due to alcohol use. Possibly has neuropathy     Essential hypertension  -     Comprehensive metabolic panel; Future  -     CBC auto differential; Future  -     TSH; Future    Breast cancer screening  -     Mammo Digital Screening Bilat w/ [facility] De Jesus; Future    Unintentional weight loss       - Suspect due to alcohol use. Will continue to monitor at next visit         Follow up in about 3 months (around 11/29/2019).    Estela Mcdaniel MD  Internal Medicine    8/29/2019

## 2019-08-30 DIAGNOSIS — R79.89 ELEVATED LFTS: ICD-10-CM

## 2019-08-30 LAB
ESTIMATED AVG GLUCOSE: 97 MG/DL (ref 68–131)
FOLATE SERPL-MCNC: >40 NG/ML (ref 4–24)
HBA1C MFR BLD HPLC: 5 % (ref 4–5.6)
VIT B12 SERPL-MCNC: 782 PG/ML (ref 210–950)

## 2019-08-30 RX ORDER — POTASSIUM CHLORIDE 750 MG/1
10 CAPSULE, EXTENDED RELEASE ORAL DAILY
Qty: 30 CAPSULE | Refills: 0 | Status: SHIPPED | OUTPATIENT
Start: 2019-08-30 | End: 2019-10-14 | Stop reason: SDUPTHER

## 2019-08-30 RX ORDER — POTASSIUM CHLORIDE 750 MG/1
10 CAPSULE, EXTENDED RELEASE ORAL DAILY
Qty: 30 CAPSULE | Refills: 0 | Status: SHIPPED | OUTPATIENT
Start: 2019-08-30 | End: 2019-08-30 | Stop reason: SDUPTHER

## 2019-08-30 NOTE — TELEPHONE ENCOUNTER
Patient informed of results and verbalized understanding.  Please resend potassium to [facility].

## 2019-08-30 NOTE — TELEPHONE ENCOUNTER
----- Message from Estela Mcdaniel MD sent at 8/30/2019 12:41 PM CDT -----  Please notify pt:     - Potasium is low. To eat foods rich with potassium also sent a supplement for potassium to [facility]. Liver tests are elevated. Likely due to alcohol use. Will continue to monitor this at next visit.   - renal function is normal   - B12 is normal  - folate level is high. Can stop taking prescription folic acid I sent yesterday  - CBC is normal/acceptable range; no signs of anemia  - TSH (thyroid) level is normal  - Hemoglobin A1c is normal. No signs of diabetes

## 2019-09-05 ENCOUNTER — TELEPHONE (OUTPATIENT)
Dept: INTERNAL MEDICINE | Facility: CLINIC | Age: 59
End: 2019-09-05

## 2019-09-05 NOTE — TELEPHONE ENCOUNTER
----- Message from Roslyn Sherron sent at 9/5/2019  3:43 PM CDT -----  Contact: Pt [phone]  Telephone consult    She wants a call back to clarify if she should stop taking these medicines because her paperwork is stating something different from what she remembers you telling her:  metoprolol tartrate (LOPRESSOR) 50 MG tablet, amlodipine (NORVASC) 5 MG tablet and folic acid (FOLVITE) 1 MG tablet.    Thank you

## 2019-09-05 NOTE — TELEPHONE ENCOUNTER
Patient informed that looking at the notes and the AVS she is to stop b/p meds and monitor blood pressure.  Patient will call back next week with update or sooner if needed. Dr. Young in agreement.

## 2019-09-09 LAB — VIT B1 BLD-MCNC: 148 UG/L (ref 38–122)

## 2019-09-10 ENCOUNTER — TELEPHONE (OUTPATIENT)
Dept: INTERNAL MEDICINE | Facility: CLINIC | Age: 59
End: 2019-09-10

## 2019-09-10 DIAGNOSIS — K29.70 GASTRITIS, PRESENCE OF BLEEDING UNSPECIFIED, UNSPECIFIED CHRONICITY, UNSPECIFIED GASTRITIS TYPE: Primary | ICD-10-CM

## 2019-09-10 NOTE — TELEPHONE ENCOUNTER
Patient informed of results and verbalized understanding.  Patient informed to continue the Metoprolol.

## 2019-09-10 NOTE — TELEPHONE ENCOUNTER
----- Message from Estela Mcdaniel MD sent at 9/10/2019 12:44 PM CDT -----  Please notify pt that thiamine level is mildly high. Ok to still continue thiamine supplement

## 2019-09-13 ENCOUNTER — TELEPHONE (OUTPATIENT)
Dept: INTERNAL MEDICINE | Facility: CLINIC | Age: 59
End: 2019-09-13

## 2019-09-13 NOTE — TELEPHONE ENCOUNTER
----- Message from Cyndee Laguerre sent at 9/13/2019  3:20 PM CDT -----  Contact: Self Call  [phone]  Patient want to speak with Vale about her medications and changes.

## 2019-09-13 NOTE — TELEPHONE ENCOUNTER
Patient called but she was busy. Patient requesting a call back in 10 minutes. I will try to call back at that time.

## 2019-09-16 NOTE — TELEPHONE ENCOUNTER
Patient don't have any more questions regarding medication.  Patient encouraged to call [facility] to schedule an appointment.

## 2019-09-25 NOTE — TELEPHONE ENCOUNTER
----- Message from Cyndee Laguerre sent at 9/25/2019  2:02 PM CDT -----  Contact:  Self Call  [phone]  Patient would like you to start refilling an Rx she got from . Omeprazole  20 mg for her stomach issues. Take one by mouth twice daily. Vale please call her one way or another.        [facility] [phone]

## 2019-09-26 RX ORDER — OMEPRAZOLE 20 MG/1
20 CAPSULE, DELAYED RELEASE ORAL DAILY
Qty: 30 CAPSULE | Refills: 11 | Status: SHIPPED | OUTPATIENT
Start: 2019-09-26 | End: 2019-09-26

## 2019-09-26 RX ORDER — OMEPRAZOLE 20 MG/1
20 CAPSULE, DELAYED RELEASE ORAL 2 TIMES DAILY
Qty: 60 CAPSULE | Refills: 11 | Status: SHIPPED | OUTPATIENT
Start: 2019-09-26 | End: 2019-09-26 | Stop reason: SDUPTHER

## 2019-09-26 RX ORDER — OMEPRAZOLE 20 MG/1
20 CAPSULE, DELAYED RELEASE ORAL 2 TIMES DAILY
Qty: 60 CAPSULE | Refills: 11 | Status: SHIPPED | OUTPATIENT
Start: 2019-09-26 | End: 2020-10-09 | Stop reason: SDUPTHER

## 2019-09-30 ENCOUNTER — TELEPHONE (OUTPATIENT)
Dept: INTERNAL MEDICINE | Facility: CLINIC | Age: 59
End: 2019-09-30

## 2019-09-30 NOTE — TELEPHONE ENCOUNTER
----- Message from Lena S Alessandro sent at 9/27/2019  4:49 PM CDT -----  Contact: Pt self Mobile/Home [phone]   Patient would like to get a referral.  Referral to what specialty:    Does the patient want the referral with a specific physician:  Psychiatry  Is the specialist an [facility]:    Reason (be specific):  Patient said that she have a very bad drinking problem, and she's been having a lot of falls.  Does the patient already have the specialty clinic appointment scheduled:  No  If yes, what date is the appointment scheduled:   N/A  Is the insurance listed in Epic correct? Yes  Comments:  Patient would like to get a referral for a in patient psychiatry she said that she's a heavy drinker and she also have a lot of other issues.

## 2019-10-01 ENCOUNTER — TELEPHONE (OUTPATIENT)
Dept: INTERNAL MEDICINE | Facility: CLINIC | Age: 59
End: 2019-10-01

## 2019-10-01 NOTE — TELEPHONE ENCOUNTER
----- Message from Lena Francois sent at 9/30/2019  2:16 PM CDT -----  Contact: Pt self Mobile/Home [phone]    Patient is returning a phone call.  Who left a message for the patient: Vale Cmapos LPN    Does patient know what this is regarding: A message from  Vale Campos LPN

## 2019-10-14 RX ORDER — POTASSIUM CHLORIDE 750 MG/1
10 CAPSULE, EXTENDED RELEASE ORAL DAILY
Qty: 30 CAPSULE | Refills: 6 | Status: SHIPPED | OUTPATIENT
Start: 2019-10-14 | End: 2020-07-16 | Stop reason: SDUPTHER

## 2019-10-18 DIAGNOSIS — Z12.11 COLON CANCER SCREENING: ICD-10-CM

## 2019-10-29 ENCOUNTER — OFFICE VISIT (OUTPATIENT)
Dept: OPTOMETRY | Facility: CLINIC | Age: 59
End: 2019-10-29
Payer: MEDICARE

## 2019-10-29 ENCOUNTER — TELEPHONE (OUTPATIENT)
Dept: INTERNAL MEDICINE | Facility: CLINIC | Age: 59
End: 2019-10-29

## 2019-10-29 DIAGNOSIS — H52.203 MYOPIA OF BOTH EYES WITH ASTIGMATISM AND PRESBYOPIA: ICD-10-CM

## 2019-10-29 DIAGNOSIS — H25.13 NUCLEAR SCLEROSIS, BILATERAL: Primary | ICD-10-CM

## 2019-10-29 DIAGNOSIS — H52.4 MYOPIA OF BOTH EYES WITH ASTIGMATISM AND PRESBYOPIA: ICD-10-CM

## 2019-10-29 DIAGNOSIS — H52.13 MYOPIA OF BOTH EYES WITH ASTIGMATISM AND PRESBYOPIA: ICD-10-CM

## 2019-10-29 DIAGNOSIS — Z13.5 GLAUCOMA SCREENING: ICD-10-CM

## 2019-10-29 PROCEDURE — 92004 COMPRE OPH EXAM NEW PT 1/>: CPT | Mod: S$GLB,,, | Performed by: OPTOMETRIST

## 2019-10-29 PROCEDURE — 99999 PR PBB SHADOW E&M-EST. PATIENT-LVL II: ICD-10-PCS | Mod: PBBFAC,,, | Performed by: OPTOMETRIST

## 2019-10-29 PROCEDURE — 92004 PR EYE EXAM, NEW PATIENT,COMPREHESV: ICD-10-PCS | Mod: S$GLB,,, | Performed by: OPTOMETRIST

## 2019-10-29 PROCEDURE — 92015 PR REFRACTION: ICD-10-PCS | Mod: S$GLB,,, | Performed by: OPTOMETRIST

## 2019-10-29 PROCEDURE — 92015 DETERMINE REFRACTIVE STATE: CPT | Mod: S$GLB,,, | Performed by: OPTOMETRIST

## 2019-10-29 PROCEDURE — 99999 PR PBB SHADOW E&M-EST. PATIENT-LVL II: CPT | Mod: PBBFAC,,, | Performed by: OPTOMETRIST

## 2019-10-29 NOTE — TELEPHONE ENCOUNTER
----- Message from Lynn Payan sent at 10/29/2019  4:56 PM CDT -----  Contact: Patient [phone]  Patient is requesting a call from the office. Because she fell and lightly hit her head. The patient is refusing to go to the ER, and refused to speak with the ON Call Nurse. The next available appt was 11/04/2019. Requesting a call to speak with you on what she can do.    Please call and advise.    Thank You

## 2019-10-30 ENCOUNTER — HOSPITAL ENCOUNTER (EMERGENCY)
Facility: HOSPITAL | Age: 59
Discharge: HOME | End: 2019-10-31
Attending: EMERGENCY MEDICINE
Payer: MEDICARE

## 2019-10-30 VITALS
TEMPERATURE: 97 F | WEIGHT: 145 LBS | SYSTOLIC BLOOD PRESSURE: 125 MMHG | OXYGEN SATURATION: 95 % | RESPIRATION RATE: 18 BRPM | DIASTOLIC BLOOD PRESSURE: 78 MMHG | HEART RATE: 84 BPM | BODY MASS INDEX: 24.75 KG/M2 | HEIGHT: 64 IN

## 2019-10-30 DIAGNOSIS — S09.90XA INJURY OF HEAD, INITIAL ENCOUNTER: ICD-10-CM

## 2019-10-30 DIAGNOSIS — R42 POSITIONAL LIGHTHEADEDNESS: Primary | ICD-10-CM

## 2019-10-30 LAB
ALBUMIN SERPL BCP-MCNC: 4.1 G/DL (ref 3.5–5.2)
ALP SERPL-CCNC: 250 U/L (ref 55–135)
ALT SERPL W/O P-5'-P-CCNC: 40 U/L (ref 10–44)
ANION GAP SERPL CALC-SCNC: 11 MMOL/L (ref 8–16)
AST SERPL-CCNC: 18 U/L (ref 10–40)
BASOPHILS # BLD AUTO: 0.07 K/UL (ref 0–0.2)
BASOPHILS NFR BLD: 0.7 % (ref 0–1.9)
BILIRUB SERPL-MCNC: 0.3 MG/DL (ref 0.1–1)
BUN SERPL-MCNC: 28 MG/DL (ref 6–20)
CALCIUM SERPL-MCNC: 9.9 MG/DL (ref 8.7–10.5)
CHLORIDE SERPL-SCNC: 98 MMOL/L (ref 95–110)
CO2 SERPL-SCNC: 25 MMOL/L (ref 23–29)
CREAT SERPL-MCNC: 1.3 MG/DL (ref 0.5–1.4)
DIFFERENTIAL METHOD: ABNORMAL
EOSINOPHIL # BLD AUTO: 0.1 K/UL (ref 0–0.5)
EOSINOPHIL NFR BLD: 1.2 % (ref 0–8)
ERYTHROCYTE [DISTWIDTH] IN BLOOD BY AUTOMATED COUNT: 13.6 % (ref 11.5–14.5)
EST. GFR  (AFRICAN AMERICAN): 51.5 ML/MIN/1.73 M^2
EST. GFR  (NON AFRICAN AMERICAN): 44.7 ML/MIN/1.73 M^2
GLUCOSE SERPL-MCNC: 86 MG/DL (ref 70–110)
HCT VFR BLD AUTO: 41.5 % (ref 37–48.5)
HGB BLD-MCNC: 13.3 G/DL (ref 12–16)
IMM GRANULOCYTES # BLD AUTO: 0.03 K/UL (ref 0–0.04)
IMM GRANULOCYTES NFR BLD AUTO: 0.3 % (ref 0–0.5)
LYMPHOCYTES # BLD AUTO: 2.5 K/UL (ref 1–4.8)
LYMPHOCYTES NFR BLD: 25 % (ref 18–48)
MCH RBC QN AUTO: 32 PG (ref 27–31)
MCHC RBC AUTO-ENTMCNC: 32 G/DL (ref 32–36)
MCV RBC AUTO: 100 FL (ref 82–98)
MONOCYTES # BLD AUTO: 0.8 K/UL (ref 0.3–1)
MONOCYTES NFR BLD: 7.8 % (ref 4–15)
NEUTROPHILS # BLD AUTO: 6.4 K/UL (ref 1.8–7.7)
NEUTROPHILS NFR BLD: 65 % (ref 38–73)
NRBC BLD-RTO: 0 /100 WBC
PLATELET # BLD AUTO: 324 K/UL (ref 150–350)
PMV BLD AUTO: 9.8 FL (ref 9.2–12.9)
POTASSIUM SERPL-SCNC: 4.9 MMOL/L (ref 3.5–5.1)
PROT SERPL-MCNC: 7.6 G/DL (ref 6–8.4)
RBC # BLD AUTO: 4.15 M/UL (ref 4–5.4)
SODIUM SERPL-SCNC: 134 MMOL/L (ref 136–145)
WBC # BLD AUTO: 9.89 K/UL (ref 3.9–12.7)

## 2019-10-30 PROCEDURE — 85025 COMPLETE CBC W/AUTO DIFF WBC: CPT

## 2019-10-30 PROCEDURE — 96361 HYDRATE IV INFUSION ADD-ON: CPT

## 2019-10-30 PROCEDURE — 80053 COMPREHEN METABOLIC PANEL: CPT

## 2019-10-30 PROCEDURE — 63600175 PHARM REV CODE 636 W HCPCS: Performed by: PHYSICIAN ASSISTANT

## 2019-10-30 PROCEDURE — 99284 PR EMERGENCY DEPT VISIT,LEVEL IV: ICD-10-PCS | Mod: ,,, | Performed by: PHYSICIAN ASSISTANT

## 2019-10-30 PROCEDURE — 96360 HYDRATION IV INFUSION INIT: CPT

## 2019-10-30 PROCEDURE — 99284 EMERGENCY DEPT VISIT MOD MDM: CPT | Mod: 25

## 2019-10-30 PROCEDURE — 99284 EMERGENCY DEPT VISIT MOD MDM: CPT | Mod: ,,, | Performed by: PHYSICIAN ASSISTANT

## 2019-10-30 RX ADMIN — SODIUM CHLORIDE 1000 ML: 0.9 INJECTION, SOLUTION INTRAVENOUS at 09:10

## 2019-10-31 NOTE — ED TRIAGE NOTES
Vale Gutierrez, a 58 y.o. female presents to the ED w/ complaint of fall s/t having a few drinks x 6 days ago. Pt states she fell and hit her left back side of her head. Denies LOC/blood thinners. Pt only complaint is getting dizzy when she wakes up. No other complaints at this time. No swelling noted.    Triage note:  Chief Complaint   Patient presents with    Fall     slip and fell about a week ago; states she gets dizzy when she stand up     Review of patient's allergies indicates:  No Known Allergies  Past Medical History:   Diagnosis Date    Anxiety     Depression     Hyperlipidemia     Hypertension

## 2019-10-31 NOTE — ED PROVIDER NOTES
Encounter Date: 10/30/2019       History     Chief Complaint   Patient presents with    Fall     slip and fell about a week ago; states she gets dizzy when she stand up     58-year-old female with a history of alcohol dependence, HTN, HLD, depression, anxiety presents to the ED for evaluation of a fall with dizziness.  Patient states that she was drinking alcohol and fell about 6 days ago.  Patient is unsure if she hit her head on the floor or on wooden furniture.  She does not believe that she lost consciousness.  Since that, she reports lightheadedness with position changes specifically from sitting to standing.  Patient has been using a cane and a walker to get around her home because she feels unsteady.  She denies significant headache, vomiting, neck pain, dark or bloody stool, diarrhea.  She does feel mildly generally weak.         Review of patient's allergies indicates:  No Known Allergies  Past Medical History:   Diagnosis Date    Anxiety     Depression     Hyperlipidemia     Hypertension      Past Surgical History:   Procedure Laterality Date    CHOLECYSTECTOMY      complicated with bile leak, sepsis     Family History   Problem Relation Age of Onset    Atrial fibrillation Mother     Heart failure Mother     Arthritis Mother     Macular degeneration Mother     Stroke Father     Cancer Maternal Uncle         lung    Cataracts Paternal Grandfather     Glaucoma Paternal Grandfather      Social History     Tobacco Use    Smoking status: Former Smoker    Smokeless tobacco: Never Used   Substance Use Topics    Alcohol use: Yes     Alcohol/week: 12.0 standard drinks     Types: 12 Shots of liquor per week     Comment: three 12-14 oz cocktail drinks.     Drug use: No     Review of Systems   Constitutional: Negative for fever.   HENT: Negative for sore throat.    Respiratory: Negative for shortness of breath.    Cardiovascular: Negative for chest pain.   Gastrointestinal: Negative for blood in  stool, diarrhea, nausea and vomiting.   Genitourinary: Negative for dysuria.   Musculoskeletal: Negative for back pain.   Skin: Negative for rash.   Neurological: Positive for weakness and light-headedness. Negative for headaches.   Hematological: Does not bruise/bleed easily.       Physical Exam     Initial Vitals [10/30/19 1745]   BP Pulse Resp Temp SpO2   (!) 87/65 84 20 97.6 °F (36.4 °C) 97 %      MAP       --         Physical Exam    Nursing note and vitals reviewed.  Constitutional: She appears well-developed and well-nourished. She is not diaphoretic.  Non-toxic appearance. She does not appear ill. No distress.   HENT:   Head: Normocephalic and atraumatic.   Eyes: Conjunctivae and EOM are normal. Pupils are equal, round, and reactive to light.   Neck: Neck supple.   Cardiovascular: Normal rate and regular rhythm. Exam reveals no gallop and no friction rub.    No murmur heard.  Pulmonary/Chest: Effort normal and breath sounds normal. No accessory muscle usage. No tachypnea. No respiratory distress. She has no decreased breath sounds. She has no wheezes. She has no rhonchi. She has no rales.   Abdominal: She exhibits no distension.   Neurological: She is alert.   4/5 strength to all 4 extremities. No facial asymmetry.  Speech is clear.   Skin: No rash noted.   Psychiatric: She has a normal mood and affect. Her behavior is normal.         ED Course   Procedures  Labs Reviewed   COMPREHENSIVE METABOLIC PANEL - Abnormal; Notable for the following components:       Result Value    Sodium 134 (*)     BUN, Bld 28 (*)     Alkaline Phosphatase 250 (*)     eGFR if  51.5 (*)     eGFR if non  44.7 (*)     All other components within normal limits   CBC W/ AUTO DIFFERENTIAL - Abnormal; Notable for the following components:    Mean Corpuscular Volume 100 (*)     Mean Corpuscular Hemoglobin 32.0 (*)     All other components within normal limits          Imaging Results          CT Head Without  Contrast (Final result)  Result time 10/30/19 23:45:39    Final result by Doug Allen MD (10/30/19 23:45:39)                 Impression:      No evidence of major vascular distribution infarct or hemorrhage.  No acute fracture.    Marked age advanced cerebral volume loss.    Electronically signed by resident: Paty Blackmon  Date:    10/30/2019  Time:    22:47    Electronically signed by: Doug Allen MD  Date:    10/30/2019  Time:    23:45             Narrative:    EXAMINATION:  CT HEAD WITHOUT CONTRAST    CLINICAL HISTORY:  Head trauma, minor, GCS>=13, NOC/NEXUS/CCR positive, first study;    TECHNIQUE:  Low dose axial CT images obtained throughout the head without intravenous contrast. Sagittal and coronal reconstructions were performed.    COMPARISON:  None.    FINDINGS:  Intracranial compartment:    Scattered periventricular white matter hypoattenuation, compatible with chronic microvascular ischemic changes.    Marked cerebral volume loss, advanced for age, with compensatory enlargement of ventricles and sulci without evidence of hydrocephalus.No extra-axial blood or fluid collections.    Skull/extracranial contents (limited evaluation): No fracture. Mastoid air cells and paranasal sinuses are essentially clear.  The calvarium is intact.  There is an incomplete posterior arch of C1.                                 Medical Decision Making:   History:   Old Medical Records: I decided to obtain old medical records.  Differential Diagnosis:   My differential diagnosis includes but is not limited to:  Orthostatic hypotension, anemia, dehydration, LYLE, contusion, concussion, SDH  Clinical Tests:   Lab Tests: Ordered  Radiological Study: Ordered  ED Management:  58-year-old female with a history of alcohol dependence presents to the ED for evaluation of a fall with head trauma 6 days ago while drinking and subsequent persistent lightheadedness.  BP is low at 91/64.  It appears that patient's BP typically runs low and  she has had orthostatic hypotension in the past.     Orthostatic vital signs were negative. CT head negative. No acute abnormalities noted on blood work.    I will discharge patient in stable condition.  Advised close PCP follow-up if her symptoms continue. I have reviewed the patient's records and discussed this case with my supervising physician.    Please be advised that this text was dictated with [employer]enyo software and may contain dictation errors.                         Clinical Impression:       ICD-10-CM ICD-9-CM   1. Positional lightheadedness R42 780.4   2. Injury of head, initial encounter S09.90XA 959.01         Disposition:   Disposition: Discharged  Condition: Stable                        Brianna Summers PA-C  10/31/19 0136

## 2019-11-01 ENCOUNTER — TELEPHONE (OUTPATIENT)
Dept: INTERNAL MEDICINE | Facility: CLINIC | Age: 59
End: 2019-11-01

## 2019-11-01 NOTE — TELEPHONE ENCOUNTER
----- Message from Belkys Caballero sent at 11/1/2019  1:50 PM CDT -----  Contact: self/[phone]  Patient called in regards needing to talk with Dr Mcdaniel nurse about patient blood pressure, and her visit to the emergency on 10/31/19. Please call and advise. thank you

## 2019-11-01 NOTE — TELEPHONE ENCOUNTER
Patient b/p was low in ER on 10/30. Which may have been the cause of her feeling dizzy.  Patient will monitor b/p over the weekend and will call use back with the readings.

## 2019-11-07 ENCOUNTER — TELEPHONE (OUTPATIENT)
Dept: INTERNAL MEDICINE | Facility: CLINIC | Age: 59
End: 2019-11-07

## 2019-11-07 NOTE — TELEPHONE ENCOUNTER
Patient still with c/o dizziness when standing.  11/3/19----B/P 83/63 and 116/84.  11/5/19----B/P 108/83  11/7/19----B/P 116/86  Please advise.

## 2019-11-07 NOTE — TELEPHONE ENCOUNTER
----- Message from Tameka Piper sent at 11/7/2019  4:28 PM CST -----  Contact: self   Patient states the nurse is expecting a call from her. Patient states this is in regards to keeping records of blood pressure readings.   Please call and advise

## 2019-11-12 ENCOUNTER — TELEPHONE (OUTPATIENT)
Dept: INTERNAL MEDICINE | Facility: CLINIC | Age: 59
End: 2019-11-12

## 2019-11-12 DIAGNOSIS — M54.2 NECK PAIN: Primary | ICD-10-CM

## 2019-11-12 NOTE — TELEPHONE ENCOUNTER
----- Message from Cyndee Laguerre sent at 11/12/2019  2:42 PM CST -----  Contact: Self [phone]  David patient want to speak with you again.

## 2019-11-12 NOTE — TELEPHONE ENCOUNTER
Patient with c/o neck pain starting a few days ago.  Can we place an xray?  Patient scheduled to be seen Monday.

## 2019-11-12 NOTE — TELEPHONE ENCOUNTER
----- Message from Ana Griffin sent at 11/12/2019  2:03 PM CST -----  Contact: suleimanlatriciane [phone]  Patient asked to speak with Vale about what she should do about neck pain. She said that you are aware of the situation with her falling a couple of weeks ago. The neck pain just started a few days ago and also is radiating into her shoulder. Patient would like advice from Vale.    Should she go straight to an orthopedist or see you first ?

## 2019-11-15 ENCOUNTER — HOSPITAL ENCOUNTER (OUTPATIENT)
Dept: RADIOLOGY | Facility: HOSPITAL | Age: 59
Discharge: HOME | End: 2019-11-15
Attending: HOSPITALIST
Payer: MEDICARE

## 2019-11-15 DIAGNOSIS — M54.2 NECK PAIN: ICD-10-CM

## 2019-11-15 PROCEDURE — 72050 X-RAY EXAM NECK SPINE 4/5VWS: CPT | Mod: 26,,, | Performed by: RADIOLOGY

## 2019-11-15 PROCEDURE — 72050 X-RAY EXAM NECK SPINE 4/5VWS: CPT | Mod: TC,PO

## 2019-11-15 PROCEDURE — 72050 XR CERVICAL SPINE COMPLETE 5 VIEW: ICD-10-PCS | Mod: 26,,, | Performed by: RADIOLOGY

## 2019-11-18 ENCOUNTER — OFFICE VISIT (OUTPATIENT)
Dept: INTERNAL MEDICINE | Facility: CLINIC | Age: 59
End: 2019-11-18
Payer: MEDICARE

## 2019-11-18 ENCOUNTER — TELEPHONE (OUTPATIENT)
Dept: INTERNAL MEDICINE | Facility: CLINIC | Age: 59
End: 2019-11-18

## 2019-11-18 VITALS
DIASTOLIC BLOOD PRESSURE: 88 MMHG | WEIGHT: 156.75 LBS | OXYGEN SATURATION: 97 % | HEART RATE: 82 BPM | SYSTOLIC BLOOD PRESSURE: 136 MMHG | HEIGHT: 64 IN | BODY MASS INDEX: 26.76 KG/M2

## 2019-11-18 DIAGNOSIS — I10 ESSENTIAL HYPERTENSION: Primary | ICD-10-CM

## 2019-11-18 DIAGNOSIS — R41.3 MEMORY DEFICITS: ICD-10-CM

## 2019-11-18 DIAGNOSIS — F10.29 ALCOHOL DEPENDENCE WITH UNSPECIFIED ALCOHOL-INDUCED DISORDER: ICD-10-CM

## 2019-11-18 DIAGNOSIS — Z23 NEED FOR STREPTOCOCCUS PNEUMONIAE VACCINATION: ICD-10-CM

## 2019-11-18 DIAGNOSIS — R42 DIZZINESS: ICD-10-CM

## 2019-11-18 DIAGNOSIS — Z23 NEEDS FLU SHOT: ICD-10-CM

## 2019-11-18 DIAGNOSIS — M47.812 OSTEOARTHRITIS OF CERVICAL SPINE, UNSPECIFIED SPINAL OSTEOARTHRITIS COMPLICATION STATUS: ICD-10-CM

## 2019-11-18 PROCEDURE — 90732 PNEUMOCOCCAL POLYSACCHARIDE VACCINE 23-VALENT =>2YO SQ IM: ICD-10-PCS | Mod: S$GLB,,, | Performed by: HOSPITALIST

## 2019-11-18 PROCEDURE — G0009 PNEUMOCOCCAL POLYSACCHARIDE VACCINE 23-VALENT =>2YO SQ IM: ICD-10-PCS | Mod: S$GLB,,, | Performed by: HOSPITALIST

## 2019-11-18 PROCEDURE — G0008 ADMIN INFLUENZA VIRUS VAC: HCPCS | Mod: S$GLB,,, | Performed by: HOSPITALIST

## 2019-11-18 PROCEDURE — 99999 PR PBB SHADOW E&M-EST. PATIENT-LVL IV: ICD-10-PCS | Mod: PBBFAC,,, | Performed by: HOSPITALIST

## 2019-11-18 PROCEDURE — 99999 PR PBB SHADOW E&M-EST. PATIENT-LVL IV: CPT | Mod: PBBFAC,,, | Performed by: HOSPITALIST

## 2019-11-18 PROCEDURE — 99499 RISK ADDL DX/OHS AUDIT: ICD-10-PCS | Mod: S$GLB,,, | Performed by: HOSPITALIST

## 2019-11-18 PROCEDURE — G0008 FLU VACCINE (QUAD) GREATER THAN OR EQUAL TO 3YO PRESERVATIVE FREE IM: ICD-10-PCS | Mod: S$GLB,,, | Performed by: HOSPITALIST

## 2019-11-18 PROCEDURE — 99214 OFFICE O/P EST MOD 30 MIN: CPT | Mod: 25,S$GLB,, | Performed by: HOSPITALIST

## 2019-11-18 PROCEDURE — 99499 UNLISTED E&M SERVICE: CPT | Mod: S$GLB,,, | Performed by: HOSPITALIST

## 2019-11-18 PROCEDURE — 99214 PR OFFICE/OUTPT VISIT, EST, LEVL IV, 30-39 MIN: ICD-10-PCS | Mod: 25,S$GLB,, | Performed by: HOSPITALIST

## 2019-11-18 PROCEDURE — 90686 IIV4 VACC NO PRSV 0.5 ML IM: CPT | Mod: S$GLB,,, | Performed by: HOSPITALIST

## 2019-11-18 PROCEDURE — 90732 PPSV23 VACC 2 YRS+ SUBQ/IM: CPT | Mod: S$GLB,,, | Performed by: HOSPITALIST

## 2019-11-18 PROCEDURE — G0009 ADMIN PNEUMOCOCCAL VACCINE: HCPCS | Mod: S$GLB,,, | Performed by: HOSPITALIST

## 2019-11-18 PROCEDURE — 3008F BODY MASS INDEX DOCD: CPT | Mod: CPTII,S$GLB,, | Performed by: HOSPITALIST

## 2019-11-18 PROCEDURE — 3008F PR BODY MASS INDEX (BMI) DOCUMENTED: ICD-10-PCS | Mod: CPTII,S$GLB,, | Performed by: HOSPITALIST

## 2019-11-18 PROCEDURE — 90686 FLU VACCINE (QUAD) GREATER THAN OR EQUAL TO 3YO PRESERVATIVE FREE IM: ICD-10-PCS | Mod: S$GLB,,, | Performed by: HOSPITALIST

## 2019-11-18 RX ORDER — MECLIZINE HCL 12.5 MG 12.5 MG/1
12.5 TABLET ORAL 3 TIMES DAILY PRN
Qty: 30 TABLET | Refills: 1 | Status: SHIPPED | OUTPATIENT
Start: 2019-11-18 | End: 2020-01-16

## 2019-11-18 NOTE — TELEPHONE ENCOUNTER
----- Message from Estela Mcdaniel MD sent at 11/16/2019 11:21 AM CST -----  Please notify pt that xray of neck confirms arthritis and narrowing of the nerve canal at multiple levels

## 2019-11-18 NOTE — PROGRESS NOTES
"Subjective:     @Patient ID: Vale Gutierrez is a 59 y.o. female.    Chief Complaint: Follow-up    HPI  58 yo F presents for f/u of chronic conditions. Has had continued dizziness, alcohol use. Recently had fall 1 month ago and seen in the ER. CT head negative but did show microvascular disease and brain atrophy. She reports she continues to have dizziness with certain movements, leaning back makes her symptoms worse. Her sister is present reports pt continues to drink ETOH. Not sure if that is also contributing to symptoms.       Psychiatrist: Dr Juan Daniel Ely in [facility] [place], [place] treating her for Depression. Was started on abilify but has not started as she is concerned. She will ask her psychiatrist about this medication.   Old Neurologist: Dr Marvel Crenshaw at [facility]    Review of Systems   Constitutional: Negative for chills and fever.   HENT: Negative for congestion and sore throat.    Eyes: Negative for pain and visual disturbance.   Respiratory: Negative for cough and shortness of breath.    Cardiovascular: Negative for chest pain and leg swelling.   Gastrointestinal: Negative for abdominal pain, nausea and vomiting.   Endocrine: Negative for polydipsia and polyuria.   Genitourinary: Negative for difficulty urinating and dysuria.   Musculoskeletal: Positive for arthralgias and gait problem.   Skin: Negative for rash and wound.   Neurological: Positive for dizziness and weakness. Negative for headaches.   Psychiatric/Behavioral: Positive for confusion. Negative for agitation.     Past medical history, surgical history, and family medical history reviewed and updated as appropriate.    Medications and allergies reviewed.     Objective:     Vitals:    11/18/19 1409   BP: 136/88   BP Location: Right arm   Patient Position: Sitting   BP Method: Medium (Manual)   Pulse: 82   SpO2: 97%   Weight: 71.1 kg (156 lb 12 oz)   Height: 5' 4" (1.626 m)     Body mass index is 26.91 " kg/m².  Physical Exam   Constitutional: She is oriented to person, place, and time. She appears well-developed and well-nourished. No distress.   HENT:   Head: Normocephalic and atraumatic.   Mouth/Throat: Oropharynx is clear and moist. No oropharyngeal exudate.   Eyes: Conjunctivae are normal. Right eye exhibits no discharge. Left eye exhibits no discharge.   Neck: Normal range of motion. Neck supple.   Cardiovascular: Normal rate, regular rhythm and intact distal pulses. Exam reveals no friction rub.   No murmur heard.  Pulmonary/Chest: Effort normal and breath sounds normal.   Abdominal: Soft. Bowel sounds are normal. She exhibits no distension. There is no tenderness. There is no guarding.   Musculoskeletal: She exhibits no edema.   Decreased ROM of lower extremities   Neurological: She is alert and oriented to person, place, and time.   Skin: Skin is warm and dry.   Psychiatric: Thought content normal.   Vitals reviewed.      Lab Results   Component Value Date    WBC 9.89 10/30/2019    HGB 13.3 10/30/2019    HCT 41.5 10/30/2019     10/30/2019    ALT 40 10/30/2019    AST 18 10/30/2019     (L) 10/30/2019    K 4.9 10/30/2019    CL 98 10/30/2019    CREATININE 1.3 10/30/2019    BUN 28 (H) 10/30/2019    CO2 25 10/30/2019    TSH 1.703 08/29/2019    HGBA1C 5.0 08/29/2019       Assessment:     1. Essential hypertension    2. Osteoarthritis of cervical spine, unspecified spinal osteoarthritis complication status    3. Alcohol dependence with unspecified alcohol-induced disorder    4. Needs flu shot    5. Need for Streptococcus pneumoniae vaccination    6. Memory deficits    7. Encounter for hepatitis C screening test for low risk patient    8. Dizziness      Plan:   Vale was seen today for follow-up.    Diagnoses and all orders for this visit:    Essential hypertension       - BP controlled. Continue toprol-xl 25 mg qday.      Osteoarthritis of cervical spine, unspecified spinal osteoarthritis complication  status       - Supportive care prn. Seen on neck xray    Alcohol dependence with unspecified alcohol-induced disorder  - Encourage alcohol cessation. Pt would like referral to addiction psych   -     Ambulatory Referral to Addiction Specialist; Future  -     Ambulatory Referral to Neurology    Needs flu shot  -     Influenza - Quadrivalent (PF)    Need for Streptococcus pneumoniae vaccination  -     (In Office Administered) Pneumococcal Polysaccharide Vaccine (23 Valent) (SQ/IM)    Memory deficits  -     Ambulatory Referral to Neurology    Dizziness  - May possibly have BPPV. Will give trial of meclizine  -     meclizine (ANTIVERT) 12.5 mg tablet; Take 1 tablet (12.5 mg total) by mouth 3 (three) times daily as needed for Dizziness.  -     Ambulatory Referral to ENT      Follow up in about 3 months (around 2/18/2020), or if symptoms worsen or fail to improve.    Estela Mcdaneil MD  Internal Medicine    11/18/2019

## 2019-11-19 RX ORDER — VENLAFAXINE HYDROCHLORIDE 150 MG/1
150 CAPSULE, EXTENDED RELEASE ORAL DAILY
Status: ON HOLD | COMMUNITY
End: 2022-09-07 | Stop reason: HOSPADM

## 2019-11-19 RX ORDER — METOPROLOL TARTRATE 50 MG/1
25 TABLET ORAL DAILY
Qty: 15 TABLET | Refills: 5 | Status: SHIPPED | OUTPATIENT
Start: 2019-11-19 | End: 2019-11-22 | Stop reason: SDUPTHER

## 2019-11-19 NOTE — TELEPHONE ENCOUNTER
Pt just wanted to add venlafaxine 150 to her medication chart in addition to venlafaxine 75. States her psychiatrist has her on both just FYI

## 2019-11-19 NOTE — TELEPHONE ENCOUNTER
----- Message from Cyndee Shade sent at 11/19/2019  3:52 PM CST -----  Contact: Self Call [phone]  Calling to add something to her Medication List and she want to speak doctor about it.      Venlafaxine (EFFEXOR-XR) 150 mg.  Please call her

## 2019-11-19 NOTE — TELEPHONE ENCOUNTER
----- Message from Jaylene Marroquin sent at 11/19/2019  2:59 PM CST -----  Contact: self/ [phone]  Patient is calling for an RX refill or new RX.  Is this a refill or new RX:    RX name and strength: metoprolol tartrate (LOPRESSOR) 25 MG tablet  Directions (copy/paste from chart): Take 25 mg by mouth once daily   Is this a 30 day or 90 day RX:  90  Local pharmacy or mail order pharmacy:    Pharmacy name and phone # (copy/paste from chart):   [facility] [place] [facility] [address] - [address] [place] [phone] (Phone)  [phone] (Fax)  Comments:

## 2019-11-21 ENCOUNTER — HOSPITAL ENCOUNTER (OUTPATIENT)
Dept: RADIOLOGY | Facility: HOSPITAL | Age: 59
Discharge: HOME | End: 2019-11-21
Attending: HOSPITALIST
Payer: MEDICARE

## 2019-11-21 DIAGNOSIS — Z12.39 BREAST CANCER SCREENING: ICD-10-CM

## 2019-11-21 PROCEDURE — 77063 MAMMO DIGITAL SCREENING BILAT WITH TOMOSYNTHESIS_CAD: ICD-10-PCS | Mod: 26,,, | Performed by: RADIOLOGY

## 2019-11-21 PROCEDURE — 77067 SCR MAMMO BI INCL CAD: CPT | Mod: TC,PO

## 2019-11-21 PROCEDURE — 77063 BREAST TOMOSYNTHESIS BI: CPT | Mod: 26,,, | Performed by: RADIOLOGY

## 2019-11-21 PROCEDURE — 77067 SCR MAMMO BI INCL CAD: CPT | Mod: 26,,, | Performed by: RADIOLOGY

## 2019-11-21 PROCEDURE — 77067 MAMMO DIGITAL SCREENING BILAT WITH TOMOSYNTHESIS_CAD: ICD-10-PCS | Mod: 26,,, | Performed by: RADIOLOGY

## 2019-11-22 RX ORDER — METOPROLOL TARTRATE 50 MG/1
25 TABLET ORAL DAILY
Qty: 45 TABLET | Refills: 3 | Status: SHIPPED | OUTPATIENT
Start: 2019-11-22 | End: 2020-03-30

## 2019-11-25 ENCOUNTER — PATIENT OUTREACH (OUTPATIENT)
Dept: ADMINISTRATIVE | Facility: OTHER | Age: 59
End: 2019-11-25

## 2019-11-26 ENCOUNTER — OFFICE VISIT (OUTPATIENT)
Dept: NEUROLOGY | Facility: CLINIC | Age: 59
End: 2019-11-26
Payer: MEDICARE

## 2019-11-26 ENCOUNTER — LAB VISIT (OUTPATIENT)
Dept: LAB | Facility: HOSPITAL | Age: 59
End: 2019-11-26
Attending: PSYCHIATRY & NEUROLOGY
Payer: MEDICARE

## 2019-11-26 VITALS
HEART RATE: 85 BPM | BODY MASS INDEX: 26.57 KG/M2 | WEIGHT: 155.63 LBS | DIASTOLIC BLOOD PRESSURE: 97 MMHG | HEIGHT: 64 IN | SYSTOLIC BLOOD PRESSURE: 133 MMHG

## 2019-11-26 DIAGNOSIS — R29.2 HYPERREFLEXIA: ICD-10-CM

## 2019-11-26 DIAGNOSIS — M54.2 NECK PAIN: Primary | ICD-10-CM

## 2019-11-26 DIAGNOSIS — G62.9 PERIPHERAL POLYNEUROPATHY: ICD-10-CM

## 2019-11-26 DIAGNOSIS — F10.20 ALCOHOL USE DISORDER, SEVERE, DEPENDENCE: ICD-10-CM

## 2019-11-26 DIAGNOSIS — R41.3 MEMORY PROBLEM: ICD-10-CM

## 2019-11-26 PROCEDURE — 99999 PR PBB SHADOW E&M-EST. PATIENT-LVL V: CPT | Mod: PBBFAC,,, | Performed by: PSYCHIATRY & NEUROLOGY

## 2019-11-26 PROCEDURE — 99204 OFFICE O/P NEW MOD 45 MIN: CPT | Mod: S$GLB,,, | Performed by: PSYCHIATRY & NEUROLOGY

## 2019-11-26 PROCEDURE — 3008F PR BODY MASS INDEX (BMI) DOCUMENTED: ICD-10-PCS | Mod: CPTII,S$GLB,, | Performed by: PSYCHIATRY & NEUROLOGY

## 2019-11-26 PROCEDURE — 99999 PR PBB SHADOW E&M-EST. PATIENT-LVL V: ICD-10-PCS | Mod: PBBFAC,,, | Performed by: PSYCHIATRY & NEUROLOGY

## 2019-11-26 PROCEDURE — 86334 IMMUNOFIX E-PHORESIS SERUM: CPT

## 2019-11-26 PROCEDURE — 3008F BODY MASS INDEX DOCD: CPT | Mod: CPTII,S$GLB,, | Performed by: PSYCHIATRY & NEUROLOGY

## 2019-11-26 PROCEDURE — 84165 PROTEIN E-PHORESIS SERUM: CPT | Mod: 26,,, | Performed by: PATHOLOGY

## 2019-11-26 PROCEDURE — 99204 PR OFFICE/OUTPT VISIT, NEW, LEVL IV, 45-59 MIN: ICD-10-PCS | Mod: S$GLB,,, | Performed by: PSYCHIATRY & NEUROLOGY

## 2019-11-26 PROCEDURE — 86334 IMMUNOFIX E-PHORESIS SERUM: CPT | Mod: 26,,, | Performed by: PATHOLOGY

## 2019-11-26 PROCEDURE — 36415 COLL VENOUS BLD VENIPUNCTURE: CPT

## 2019-11-26 PROCEDURE — 86334 PATHOLOGIST INTERPRETATION IFE: ICD-10-PCS | Mod: 26,,, | Performed by: PATHOLOGY

## 2019-11-26 PROCEDURE — 84165 PROTEIN E-PHORESIS SERUM: CPT

## 2019-11-26 PROCEDURE — 84165 PATHOLOGIST INTERPRETATION SPE: ICD-10-PCS | Mod: 26,,, | Performed by: PATHOLOGY

## 2019-11-26 NOTE — LETTER
December 10, 2019      Estela Mcdaniel MD  [address] [place] [address]           [facility]  [address], [address]  [facility] [address]  Phone: [phone]  Fax: [phone]          Patient: Vale Gutierrez   MR Number: [id number]   YOB: 1960   Date of Visit: 11/26/2019       Dear Dr. Estela Mcdaniel:    Thank you for referring Vale Gutierrez to me for evaluation. Attached you will find relevant portions of my assessment and plan of care.    If you have questions, please do not hesitate to call me. I look forward to following Vale Gutierrez along with you.    Sincerely,    Casey Diop MD    Enclosure  CC:  No Recipients    If you would like to receive this communication electronically, please contact [email or web address] or [phone] to request more information on [facility] Link access.    For providers and/or their staff who would like to refer a patient to [facility], please contact us through our one-stop-shop provider referral line, [facility], at [phone].    If you feel you have received this communication in error or would no longer like to receive these types of communications, please e-mail [email or web address]

## 2019-11-27 LAB
ALBUMIN SERPL ELPH-MCNC: 4.34 G/DL (ref 3.35–5.55)
ALPHA1 GLOB SERPL ELPH-MCNC: 0.36 G/DL (ref 0.17–0.41)
ALPHA2 GLOB SERPL ELPH-MCNC: 0.79 G/DL (ref 0.43–0.99)
B-GLOBULIN SERPL ELPH-MCNC: 1.02 G/DL (ref 0.5–1.1)
GAMMA GLOB SERPL ELPH-MCNC: 0.9 G/DL (ref 0.67–1.58)
INTERPRETATION SERPL IFE-IMP: NORMAL
PATHOLOGIST INTERPRETATION IFE: NORMAL
PATHOLOGIST INTERPRETATION SPE: NORMAL
PROT SERPL-MCNC: 7.4 G/DL (ref 6–8.4)

## 2019-12-02 ENCOUNTER — TELEPHONE (OUTPATIENT)
Dept: INTERNAL MEDICINE | Facility: CLINIC | Age: 59
End: 2019-12-02

## 2019-12-02 NOTE — TELEPHONE ENCOUNTER
----- Message from Liu Loredo sent at 12/2/2019  2:49 PM CST -----  Contact: [phone]  Patient requesting a call from the office in regards to her most recent lab she had done . Please call and advise, Thanks

## 2019-12-10 ENCOUNTER — HOSPITAL ENCOUNTER (OUTPATIENT)
Dept: RADIOLOGY | Facility: HOSPITAL | Age: 59
Discharge: HOME | End: 2019-12-10
Attending: PSYCHIATRY & NEUROLOGY
Payer: MEDICARE

## 2019-12-10 DIAGNOSIS — R41.3 MEMORY PROBLEM: ICD-10-CM

## 2019-12-10 DIAGNOSIS — M54.2 NECK PAIN: ICD-10-CM

## 2019-12-10 DIAGNOSIS — R29.2 HYPERREFLEXIA: ICD-10-CM

## 2019-12-10 PROCEDURE — 70551 MRI BRAIN WITHOUT CONTRAST: ICD-10-PCS | Mod: 26,,, | Performed by: RADIOLOGY

## 2019-12-10 PROCEDURE — 70551 MRI BRAIN STEM W/O DYE: CPT | Mod: TC

## 2019-12-10 PROCEDURE — 70551 MRI BRAIN STEM W/O DYE: CPT | Mod: 26,,, | Performed by: RADIOLOGY

## 2019-12-10 PROCEDURE — 72141 MRI NECK SPINE W/O DYE: CPT | Mod: TC

## 2019-12-10 PROCEDURE — 72141 MRI NECK SPINE W/O DYE: CPT | Mod: 26,,, | Performed by: RADIOLOGY

## 2019-12-10 PROCEDURE — 72141 MRI CERVICAL SPINE WITHOUT CONTRAST: ICD-10-PCS | Mod: 26,,, | Performed by: RADIOLOGY

## 2019-12-10 NOTE — PROGRESS NOTES
Neurology Clinic Visit  Primary Care Provider: Estela Mcdaniel MD   Referring Provider: Estela Mcdaniel MD   Date of Visit:11/26/2019     chief complaint:   Chief Complaint   Patient presents with    Memory Loss       History of Present Illness  Vale Gutierrez is a 59 y.o. right handed female I have been asked to consult for evaluation of dizziness.   the patient reports dizziness for the past 2-3 months.  She reports dizziness primarily when she is on her feet when she initially stands up.  She does report lower back pain since 1996 however she denies any tingling or numbness in her lower extremities associated with lower back pain.  She reports chronic neck pain for the tap for the past 3 months.  She does report a long history of alcohol abuse.  She currently drinks 20 oz of vodka daily.  She reports she has been drinking heavily since a teenager.  She previously had opioid use disorder.  She also has noticed difficulty with her memory since 2017.  On her sister manages her medications.  The patient does not drive.  She is able to cook but she does very limited cooking.  On the sister reports that she forgets conversations that they recently had.  She denies history of diabetes.  She Reports a long history of uncontrolled depression.        Patient Active Problem List    Diagnosis Date Noted    Osteoarthritis of cervical spine 11/18/2019    Elevated LFTs 08/30/2019    Alcohol dependence with unspecified alcohol-induced disorder 08/29/2019    Depression 08/29/2019    Memory deficits 08/29/2019    Falls frequently 08/29/2019    Essential hypertension 08/29/2019    Spinal stenosis 08/29/2019     Past Medical History:   Diagnosis Date    Alcohol abuse     Anxiety     Depression     Hyperlipidemia     Hypertension      Past Surgical History:   Procedure Laterality Date    BREAST CYST ASPIRATION      CHOLECYSTECTOMY      complicated with bile leak, sepsis     Family History   Problem Relation Age  of Onset    Atrial fibrillation Mother     Heart failure Mother     Arthritis Mother     Macular degeneration Mother     Stroke Father     Cancer Maternal Uncle         lung    Cataracts Paternal Grandfather     Glaucoma Paternal Grandfather          Current Outpatient Medications   Medication Sig    buPROPion (WELLBUTRIN XL) 300 MG 24 hr tablet Take 300 mg by mouth once daily.    meclizine (ANTIVERT) 12.5 mg tablet Take 1 tablet (12.5 mg total) by mouth 3 (three) times daily as needed for Dizziness.    metoprolol tartrate (LOPRESSOR) 50 MG tablet Take 0.5 tablets (25 mg total) by mouth once daily.    multivit-min-ferrous gluconate (MULTIVITAMIN WITH MINERALS) 9 mg iron/15 mL Liqd Take by mouth.    omeprazole (PRILOSEC) 20 MG capsule Take 1 capsule (20 mg total) by mouth 2 (two) times daily.    potassium chloride (MICRO-K) 10 MEQ CpSR Take 1 capsule (10 mEq total) by mouth once daily.    thiamine 100 MG tablet Take 1 tablet (100 mg total) by mouth once daily.    venlafaxine (EFFEXOR-XR) 150 MG Cp24 Take 150 mg by mouth once daily.    venlafaxine (EFFEXOR-XR) 75 MG 24 hr capsule Take 75 mg by mouth once daily.    vitamin D (VITAMIN D3) 1000 units Tab Take 5,000 Int'l Units by mouth.     No current facility-administered medications for this visit.          Review of patient's allergies indicates:  No Known Allergies  Social History     Socioeconomic History    Marital status: Single     Spouse name: Not on file    Number of children: Not on file    Years of education: Not on file    Highest education level: Not on file   Occupational History    Not on file   Social Needs    Financial resource strain: Not on file    Food insecurity:     Worry: Not on file     Inability: Not on file    Transportation needs:     Medical: Not on file     Non-medical: Not on file   Tobacco Use    Smoking status: Former Smoker    Smokeless tobacco: Never Used   Substance and Sexual Activity    Alcohol use: Yes  "    Alcohol/week: 12.0 standard drinks     Types: 12 Shots of liquor per week     Comment: three 12-14 oz cocktail drinks.     Drug use: No    Sexual activity: Never   Lifestyle    Physical activity:     Days per week: Not on file     Minutes per session: Not on file    Stress: Not on file   Relationships    Social connections:     Talks on phone: Not on file     Gets together: Not on file     Attends [employer] service: Not on file     Active member of club or organization: Not on file     Attends meetings of clubs or organizations: Not on file     Relationship status: Not on file   Other Topics Concern    Not on file   Social History Narrative    Not on file       Review of Systems    Constitutional: negative  Eyes: negative  Ears, nose, mouth, throat, and face: negative  Respiratory: negative  Cardiovascular: negative  Gastrointestinal: negative  Genitourinary:negative  Integument/breast: negative  Hematologic/lymphatic: negative  Musculoskeletal:negative  Neurological: negative  Behavioral/Psych: negative  Endocrine: negative  Allergic/Immunologic: negative    Objective:  Vital signs in last 24 hours:    Vitals:    11/26/19 1407   BP: (!) 133/97   Pulse: 85   Weight: 70.6 kg (155 lb 10.3 oz)   Height: 5' 4" (1.626 m)       Body mass index is 26.72 kg/m².     General: no acute distress, well nourished, well-groomed  CVS: RRR, no murmur, gallops or rubs  Respiratory: Clear to ausculation  Extremities: no edema    Neurological Examination:    MMSE 27/30:  -1 for season, minus 2 for delayed recall.    HIGHER INTEGRATIVE FUNCTIONS:  -Normal attention span and concentration; immediately responds to questions and commands  -Oriented to time, place and person  -Recent and remote memory intact  -Language normal (no aphasia or dysarthria)  -Normal fund of knowledge    CN:  -PERRLA, visual fields full, unable to visualize optic discs due to small pupils on fundus exam   -EOMI with normal saccades and smooth " pursuit  -Facial sensation intact bilaterally  -Facial strength/movement intact bilaterally  -Hearing intact to voice  -Palate elevates symmetrically  -Normal shoulder shrug and head turn  -Tongue protrudes midline    MOTOR: (left/right graded 1-5)  -UE: 5/5 deltoids; 5/5 biceps, triceps; 5/5 wrist flexors, extensors; 5/5 interosseous; 5/5   -LEs: 5/5 hip flexion, extension; 5/5 knee flexion, extension; 5/5 ankle flexion, extension  -Tone: normal  -No pronator drift, no orbiting    SENSORY:  -Light touch, temperature sensation intact bilaterally; decreased vibration sense in the feet distally.    REFLEXES:  -3+ upper and lower bilaterally  -Flexor plantar reflex bilaterally  -No clonus    COORDINATION:  -FNF, HKS, JULIO intact bilaterally    GAIT:  -Normal casual gait       Assessment/Plan:    1.  Dizziness:  She describes a sense of disequilibrium.  I suspect this is secondary to peripheral neuropathy likely from prior alcohol use.  2.  Hyper reflexia  3.  Neck pain:  Rule out cervical myelopathy  4. Memory problems:  Suspect secondary to alcohol use.  5.  Alcohol use disorder, severe, dependence.  6.  Peripheral neuropathy:  Suspect secondary to alcohol    Plan:  Obtain mRi brain, MRI cervical spine  Obtain neuropathy labs  Referral to addiction Medicine.  Referral to Neurology has been placed to transition care.  Referral to neuropsychology for detailed      Side effects discussed with patient. He understood.  I discussed assessment and plan with patient and answered the questions that he had.     Part of patient note might have been created using Dragon Dictation.  Any errors in syntax or even information may not have been identified and edited on initial review prior to signing this note.

## 2019-12-13 ENCOUNTER — NURSE TRIAGE (OUTPATIENT)
Dept: ADMINISTRATIVE | Facility: CLINIC | Age: 59
End: 2019-12-13

## 2019-12-13 NOTE — TELEPHONE ENCOUNTER
Reason for Disposition   [1] Other NON-URGENT information for PCP AND [2] does not require PCP response    Protocols used: PCP CALL - NO TRIAGE-A-    Patient states her bp is running low lately. Right now 119/93 HR 87. No symptoms. Yesterday she states she was running low. She was advised to monitor and call back if she has more questions.

## 2019-12-15 ENCOUNTER — PATIENT OUTREACH (OUTPATIENT)
Dept: ADMINISTRATIVE | Facility: OTHER | Age: 59
End: 2019-12-15

## 2019-12-16 ENCOUNTER — TELEPHONE (OUTPATIENT)
Dept: INTERNAL MEDICINE | Facility: CLINIC | Age: 59
End: 2019-12-16

## 2019-12-16 NOTE — TELEPHONE ENCOUNTER
----- Message from Lynn Payan sent at 12/16/2019 12:38 PM CST -----  Contact: Patient [phone]  Patient is returning a phone call.  Who left a message for the patient: Vale  Does patient know what this is regarding:    Comments:

## 2019-12-23 ENCOUNTER — TELEPHONE (OUTPATIENT)
Dept: INTERNAL MEDICINE | Facility: CLINIC | Age: 59
End: 2019-12-23

## 2019-12-23 NOTE — TELEPHONE ENCOUNTER
----- Message from Roslyn Siu sent at 12/23/2019  2:13 PM CST -----  Contact: Pt [phone]  She wants to talk to you about her blood pressure, it's been up and down. She also wants to talk to you more about her dizziness when she stands.    Thank you

## 2019-12-24 ENCOUNTER — TELEPHONE (OUTPATIENT)
Dept: NEUROLOGY | Facility: CLINIC | Age: 59
End: 2019-12-24

## 2019-12-24 NOTE — TELEPHONE ENCOUNTER
I called patient in regards to scheduling an appointment with the neurology department. I asked her if she remembered that Dr. Diop was no longer seeing patients. She stated she did. Dr. Santana does not have any available appointment. I will send a message to other clinics to see if there is an opening so she can get with someone to establish care. She understood.

## 2020-01-03 ENCOUNTER — TELEPHONE (OUTPATIENT)
Dept: NEUROLOGY | Facility: CLINIC | Age: 60
End: 2020-01-03

## 2020-01-06 ENCOUNTER — TELEPHONE (OUTPATIENT)
Dept: NEUROLOGY | Facility: CLINIC | Age: 60
End: 2020-01-06

## 2020-01-06 ENCOUNTER — PATIENT OUTREACH (OUTPATIENT)
Dept: ADMINISTRATIVE | Facility: OTHER | Age: 60
End: 2020-01-06

## 2020-01-06 DIAGNOSIS — Z12.11 ENCOUNTER FOR FIT (FECAL IMMUNOCHEMICAL TEST) SCREENING: Primary | ICD-10-CM

## 2020-01-06 NOTE — TELEPHONE ENCOUNTER
----- Message from [facility] sent at 1/3/2020  4:43 PM CST -----  Contact: MICAH MANCINI  Type:  Patient Returning Call    Who Called: MICAH MANCINI    Who Left Message for Patient: Lucia    Does the patient know what this is regarding?: Yes    Best Call Back Number: [phone]    Additional Information:

## 2020-01-14 ENCOUNTER — PATIENT OUTREACH (OUTPATIENT)
Dept: ADMINISTRATIVE | Facility: OTHER | Age: 60
End: 2020-01-14

## 2020-01-16 ENCOUNTER — OFFICE VISIT (OUTPATIENT)
Dept: NEUROLOGY | Facility: CLINIC | Age: 60
End: 2020-01-16
Payer: MEDICARE

## 2020-01-16 VITALS
BODY MASS INDEX: 25.95 KG/M2 | TEMPERATURE: 98 F | DIASTOLIC BLOOD PRESSURE: 91 MMHG | HEART RATE: 97 BPM | HEIGHT: 64 IN | SYSTOLIC BLOOD PRESSURE: 124 MMHG | WEIGHT: 152 LBS

## 2020-01-16 DIAGNOSIS — R27.0 ATAXIA: ICD-10-CM

## 2020-01-16 DIAGNOSIS — G60.3 IDIOPATHIC PROGRESSIVE POLYNEUROPATHY: ICD-10-CM

## 2020-01-16 DIAGNOSIS — H81.10 BENIGN PAROXYSMAL POSITIONAL VERTIGO, UNSPECIFIED LATERALITY: ICD-10-CM

## 2020-01-16 DIAGNOSIS — R42 DIZZINESS ON STANDING: Primary | ICD-10-CM

## 2020-01-16 PROCEDURE — 99999 PR PBB SHADOW E&M-EST. PATIENT-LVL V: ICD-10-PCS | Mod: PBBFAC,,, | Performed by: PSYCHIATRY & NEUROLOGY

## 2020-01-16 PROCEDURE — 99999 PR PBB SHADOW E&M-EST. PATIENT-LVL V: CPT | Mod: PBBFAC,,, | Performed by: PSYCHIATRY & NEUROLOGY

## 2020-01-16 PROCEDURE — 3008F BODY MASS INDEX DOCD: CPT | Mod: CPTII,S$GLB,, | Performed by: PSYCHIATRY & NEUROLOGY

## 2020-01-16 PROCEDURE — 99214 PR OFFICE/OUTPT VISIT, EST, LEVL IV, 30-39 MIN: ICD-10-PCS | Mod: S$GLB,,, | Performed by: PSYCHIATRY & NEUROLOGY

## 2020-01-16 PROCEDURE — 3080F DIAST BP >= 90 MM HG: CPT | Mod: CPTII,S$GLB,, | Performed by: PSYCHIATRY & NEUROLOGY

## 2020-01-16 PROCEDURE — 3008F PR BODY MASS INDEX (BMI) DOCUMENTED: ICD-10-PCS | Mod: CPTII,S$GLB,, | Performed by: PSYCHIATRY & NEUROLOGY

## 2020-01-16 PROCEDURE — 3074F SYST BP LT 130 MM HG: CPT | Mod: CPTII,S$GLB,, | Performed by: PSYCHIATRY & NEUROLOGY

## 2020-01-16 PROCEDURE — 3080F PR MOST RECENT DIASTOLIC BLOOD PRESSURE >= 90 MM HG: ICD-10-PCS | Mod: CPTII,S$GLB,, | Performed by: PSYCHIATRY & NEUROLOGY

## 2020-01-16 PROCEDURE — 3074F PR MOST RECENT SYSTOLIC BLOOD PRESSURE < 130 MM HG: ICD-10-PCS | Mod: CPTII,S$GLB,, | Performed by: PSYCHIATRY & NEUROLOGY

## 2020-01-16 PROCEDURE — 99214 OFFICE O/P EST MOD 30 MIN: CPT | Mod: S$GLB,,, | Performed by: PSYCHIATRY & NEUROLOGY

## 2020-01-16 NOTE — LETTER
January 17, 2020      Casey Diop MD  [address]  [facility] [address]           [facility] [place] [address]  [place] [address]  Phone: [phone]  Fax: [phone]          Patient: Vale Gutierrez   MR Number: [id number]   YOB: 1960   Date of Visit: 1/16/2020       Dear Dr. Casey Diop:    Thank you for referring Vale Gutierrez to me for evaluation. Attached you will find relevant portions of my assessment and plan of care.    If you have questions, please do not hesitate to call me. I look forward to following Vale Gutierrez along with you.    Sincerely,    Hemalatha Oliver MD    Enclosure  CC:  No Recipients    If you would like to receive this communication electronically, please contact [email or web address] or [phone] to request more information on [facility] Link access.    For providers and/or their staff who would like to refer a patient to [facility], please contact us through our one-stop-shop provider referral line, [facility], at [phone].    If you feel you have received this communication in error or would no longer like to receive these types of communications, please e-mail [email or web address]

## 2020-01-16 NOTE — PATIENT INSTRUCTIONS
Thank you for coming:  - Dizziness - check BP  Lying down and standing up, take time sitting up and standing before you walking  - Gait and safety evaluation   - Memory loss - Neuropsychological testing, MOCA score -28/30  - Neuropathy - 2/2 alcohol intake, addiction medicine referral given previously given   -Follow up in 6 months

## 2020-01-16 NOTE — PROGRESS NOTES
"  NEUROLOGY  Outpatient Follow Up    [address] [address]  [phone] (office) / [phone] ( (fax)    Patient Name:  Vale Gutierrez  :  1960  MR #:  [id number]  Acct #:  [id number]    Date of Neurology Visit: 2020  Name of Neurologist: Hemalatha Oliver MD    Other Physicians:  Estela Mcdaniel MD (Primary Care Physician); Casey Diop MD (Referring)      Chief Complaint: "Dizziness" and memory loss    History of Present Illness (HPI):  Vale Gutierrez is a 59 y.o. female presents for evaluation of "dizziness" and "memory loss.    The patient was evaluated by Casey Diop on 12/10/2019 for the above mentioned complaints. He suspected that the "dizziness" described as a sense of disequilibrium is likely secondary to peripheral neuropathy related to her long standing alcohol use. He recommended a MRI brain, C spine, referral to addiction medicine and referral to Neuropsychology. MMSE- 27    MRI C spine revealed mild spinal canal stenosis at C5-6 and C6-7 with moderate to severe bilateral neural formaminal narrowing. MRI brain - Ventriculomegaly.     The patient had a fall in 2019 when she walked by her closet, she fell backwards, hit her head on the door frame, she had a hallucination just after the fall which she describes as seeing a rehab facility and she was not talked to, she is unsure how long this lasted. She had been drinking that day. Has neck pain which stays in her neck, sometimes this is very pain, 8-9/10.     She states since that fall she feels "dizzy" when she stands up and this lasts about a minute.    She walks using a cane, she started doing this 2 years ago. She had sepsis at that time. She had weaned herself off the cane but she started using the cane since the fall, Oct 2019    Memory loss started about 2 years ago, it was mild then and since 2019 the symptoms got worse.     She has significant memory loss per " "sister and this is worse in the evenings. She forgets conversations. She forgets that her sister is at home sometimes and starts calling out for her. It is worse when she drinks. She does not drive. Has not forgotten names of people she knows well.     She drinks 3 [place] Gras cups of Vodka. She does not drink when she gets up but starts drinking about an hour after she wakes up. She states she can go into withdrawal if she does not drink about 24 hours after she has a drink. 2 bottles of Vodka lasts her a week or so. Has not tried being a member of Alcoholics Anonymous.     Duration: 3 months  Location: "dizziness"   Intensity: severe   Aggravating factors: standing up   Relieving factors:sitting down   Frequency: several times/ week   Timing: when awake and when she stands   Associated symptoms: falls         Treatment to date:  None for "dizziness"         Review of Systems:    General: Weight gain: Yes, Weight Loss: No, Fatigue: Yes,   Fever: No, Chills: No, Night Sweats: Yes, Insomnia: Yes, Excessive sleeping: Yes   Respiratory:  Cough: No, Shortness of Breath: Yes,   Wheezing: No, Excessive Snoring: No, Coughing up blood: No  Endocrine: Heat Intolerance: No, Cold Intolerance: No,   Excessive Thirst: No, Excessive Hunger: No,   Eyes:  Blurred Vision: No, Double Vision: No,   Light Sensitivity: No, Eye pain: No  Musculoskeletal: Muscle Aches/Pain: Yes, Joint Pain/Swelling: Yes, Muscle Cramps: No, Muscle Weakness: Yes, Neck Pain: Yes, Back Pain: Yes   Neurological: Difficulty Walking/Falls: Yes, Headache Migraine: Yes, Dizziness/Vertigo: Yes, Fainting: No, Difficulty with Speech: No, Weakness: Yes, Tingling/Numbness: No, Tremors: Yes, Memory Problems: Yes, Seizures: No, Difficulty Swallowing: No, Altered Taste: No.  Cardiovascular: Chest Pain: No, Shortness of Breath: Yes,   Palpitations: No,  Gastrointestinal: Nausea/Vomiting: No, Constipation: No, Diarrhea: No, Bloody Stools: No   Psych/Cog:  Depression: Yes, " "Anxiety: Yes, Hallucinations: No, Problems Concentrating: Yes  : Frequent Urination: No, Incontinence: No, Blood of Urine: No, Urinary Infections: No, Changes in Sex Drive: No   ENT:Hearing Loss: No, Earache: No, Ringing in Ears: No,   Facial Pain: No, Chronic Congestion: No   Immune: Seasonal Allergies: Yes, Hives and/or Rashes: No  The remainder of the review of twelve body systems was reviewed and normal.    Past Medical, Surgical, Family & Social History:   Reviewed and updated.    Home Medications:     Current Outpatient Medications:     buPROPion (WELLBUTRIN XL) 300 MG 24 hr tablet, Take 300 mg by mouth once daily., Disp: , Rfl:     metoprolol tartrate (LOPRESSOR) 50 MG tablet, Take 0.5 tablets (25 mg total) by mouth once daily., Disp: 45 tablet, Rfl: 3    omeprazole (PRILOSEC) 20 MG capsule, Take 1 capsule (20 mg total) by mouth 2 (two) times daily., Disp: 60 capsule, Rfl: 11    potassium chloride (MICRO-K) 10 MEQ CpSR, Take 1 capsule (10 mEq total) by mouth once daily., Disp: 30 capsule, Rfl: 6    venlafaxine (EFFEXOR-XR) 150 MG Cp24, Take 150 mg by mouth once daily., Disp: , Rfl:     venlafaxine (EFFEXOR-XR) 75 MG 24 hr capsule, Take 75 mg by mouth once daily., Disp: , Rfl:     vitamin D (VITAMIN D3) 1000 units Tab, Take 5,000 Int'l Units by mouth., Disp: , Rfl:     Physical Examination:  BP (!) 124/91   Pulse 97   Temp 98 °F (36.7 °C)   Ht 5' 4" (1.626 m)   Wt 68.9 kg (152 lb)   BMI 26.09 kg/m²     GENERAL:  General appearance: Well, non-toxic appearing.  No apparent distress.  Fundi exam: normal.  Neck: supple.  Extremities: normal.    MENTAL STATUS:  Alertness, attention span & concentration: normal.  Language: normal.  Orientation to self, place & time:  normal.  Memory, recent & remote: normal.  Fund of knowledge: normal.  MOCA: 28/30     SPEECH:  Clear and fluent.  Follows complex commands.    CRANIAL NERVES:  Cranial Nerves II-XII were examined.  II - Visual fields: normal.  III, IV, " VI: PERRL, EOMI, No ptosis, No nystagmus.  V - Facial sensation: normal.  VII - Face symmetry & mobility: normal.  VIII - Hearing: normal.  IX, X - Palate: mobile & midline.  XI - Shoulder shrug: normal.  XII - Tongue protrusion: normal.    GROSS MOTOR:  Gait & station: Ataxic wide based gait   Tone: Increased tone in left lower extremity   Abnormal movements: none.  Finger-nose & Heel-knee-shin: normal.  Romberg: present   MUSCLE STRENGTH:     Fascics Atrophy RIGHT    LEFT Atrophy Fascics     5 Neck Ext. 5       5 Neck Flex 5       5 Deltoids 5       5 Sh.Ext.Rot. 5       5 Sh.Int.Rot. 5       5 Biceps 5       5 Triceps 5       5 Forearm.Pr. 5       5 Wrist Ext. 5       5 Wrist Flex 5       5 Finger Ext. 5       5 Finger Flex 5       5 FPL 5       5 Inteross. 5                         5 Iliopsoas 5       5 Hip Abduct 5       5 Hip Adduct 5       5 Quads 5       5 Hams 5       5 Dorsiflex 5       5 Plantar Flex 5       5 Ankle Larry 5       5 Ankle Invert 5       5 Toe Ext. 5       5 Toe Flex 5                         REFLEXES:    RIGHT Reflex   LEFT   2+ Biceps 2+   3+ Brachiorad. 3+   2+ Triceps 2+        3+ Patellar 3+   2 beat clonus Ankle 2 beat clonus         Down PLANTAR Down     SENSORY:  Light touch: Normal throughout.  Sharp touch: Gradient to level of knees   Vibration:  6 seconds at the great toe MTPs   Temperature: Gradient to level of knees   Joint Position: Normal throughout.    Diagnostic Data Reviewed:    MRI C spine 12/10/2019:    Impression       1. Cervical degenerative changes most pronounced at C5-C6 and C6-C7 noting mild spinal canal stenosis and moderate to severe bilateral neural foraminal narrowing.     MRI brain without contrast:    Impression       Ventriculomegaly out of proportion of degree of sulcal enlargement.  Favor central predominant cerebral volume loss, but configuration does raise the possibility of normal pressure hydrocephalus.  Clinical correlation required.    Mild chronic  "microvascular ischemic disease.     8/29/2019:    Vitamin B12- 782  Hemoglobin A1c- 5  Vitamin B1-148      Assessment and Plan:    Vale Gutierrez is a 59 y.o. female presents for evaluation of "dizziness" and "memory loss.    The patient was evaluated by Casey Diop on 12/10/2019 for the above mentioned complaints. He suspected that the "dizziness" described as a sense of disequilibrium is likely secondary to peripheral neuropathy related to her long standing alcohol use. He recommended a MRI brain, C spine, referral to addiction medicine and referral to Neuropsychology. MMSE- 27.    Today she presents for follow-up and reports that she has persistent dizziness on standing. She had subjective vertigo and loss of memory.  On [place] Hallpike testing of the left ear but no nystagmus and no vertigo when tested on the left. She has hyperreflexia but normal strength throughout.  Symptoms of dizziness are s/o orthostatic changes ( BP checked 120/80 --> 103/70) and drinking excessive alcohol.     Plan:  - Check orthostatic vital signs during every visit  -Patient asked to wear compression stockings  - recommended that she join an alcohol de-addiction program, phone number given  - Gait and safety evaluation from PT for recurrent falls   - Neuropsychological testing, MOCA score today -28/30  - Previously checked Neuropathy labs, wnl, would additionally check light chains. If negative would attribute symptoms to chronic alcohol intake. Denies numbness, tingling and burning.   -Vestibular rehab given subjective feeling of vertigo with Ko Hallpike testing on the left ear   - The MRI brain was read as possible NPH. Patient has an ataxic gait as opposed to narrow magnetic gait seen in NPH. No incontinence. Continue to monitor. Discussed that this is an unlikely possibility given the current examination.       Problem List Items Addressed This Visit     None      Visit Diagnoses     Dizziness on standing    -  Primary    Ataxia   "      Idiopathic progressive polyneuropathy        Benign paroxysmal positional vertigo, unspecified laterality                The patient will return to clinic in  6 months.    Important to note, also  has a past medical history of Alcohol abuse, Anxiety, Depression, Hyperlipidemia, and Hypertension.    Time Spent: 60 minutes spent face-to-face, >50% spent advising about: counseling and/or coordination of care    This note was created with voice recognition software.  Grammatical, syntax and spelling errors may be inevitable.

## 2020-01-17 PROBLEM — R42 DIZZINESS ON STANDING: Status: ACTIVE | Noted: 2020-01-17

## 2020-01-17 PROBLEM — G60.3 IDIOPATHIC PROGRESSIVE POLYNEUROPATHY: Status: ACTIVE | Noted: 2020-01-17

## 2020-01-17 PROBLEM — H81.10 BENIGN PAROXYSMAL POSITIONAL VERTIGO: Status: ACTIVE | Noted: 2020-01-17

## 2020-01-17 PROBLEM — R27.0 ATAXIA: Status: ACTIVE | Noted: 2020-01-17

## 2020-01-22 ENCOUNTER — DOCUMENTATION ONLY (OUTPATIENT)
Dept: PSYCHIATRY | Facility: HOSPITAL | Age: 60
End: 2020-01-22

## 2020-01-22 NOTE — PROGRESS NOTES
Мария received a referral from Dr. Newman in regards to ABU program. Мария placed a referral to the  to verify ins benefits. Мария placed a call to pt to discuss program requirements and provide benefit info. No answer sw left a vm for immediate call back.

## 2020-01-22 NOTE — PROGRESS NOTES
"Мария received a call back from pt in regards to ABU program. Мария explained program requirements to pt. Pt voiced verbal understanding and reported "I have been told by several 's I need an inpt program. I cant drive bc of dizziness and don't have reliable transportation on a daily basis. I would like to detox where im monitored all day bc I am home alone most of the time." Мария voiced verbal understanding and provided pt with resources for inpt.   "

## 2020-02-14 ENCOUNTER — TELEPHONE (OUTPATIENT)
Dept: OTOLARYNGOLOGY | Facility: CLINIC | Age: 60
End: 2020-02-14

## 2020-02-17 ENCOUNTER — TELEPHONE (OUTPATIENT)
Dept: INTERNAL MEDICINE | Facility: CLINIC | Age: 60
End: 2020-02-17

## 2020-02-17 ENCOUNTER — TELEPHONE (OUTPATIENT)
Dept: OTOLARYNGOLOGY | Facility: CLINIC | Age: 60
End: 2020-02-17

## 2020-02-17 NOTE — TELEPHONE ENCOUNTER
"----- Message from Hyun Valentin sent at 2/17/2020  9:42 AM CST -----  Contact: Sister Aggie [phone]  Patient's sister would like to speak to the nurse in regards to "Skilled nursing unit".Please call and advise.  "

## 2020-02-17 NOTE — TELEPHONE ENCOUNTER
Spoke to the patient's sister Magda. Magda states that the patient went through detox and will be discharged today home. The family states that the patient can barely walk. The family is requesting orders for the patient to go directly into a rehab facility. Magda informed to talk with the physician at the detox center and see if they can give orders. If not after discharge they may need to take the patient to the ER for evaluation. The family informed that if neither option works we can write for home health PT. The sister, Magda, will call back to update us.

## 2020-02-17 NOTE — TELEPHONE ENCOUNTER
----- Message from Sonya Massey sent at 2/17/2020  8:11 AM CST -----  Contact:  [phone]  Requesting call back. Patient is being release from rehab today and would like to discuss next steps because she is unable to go to [facility] since she is unable to walk.  Requesting referral to Rehab placement.

## 2020-02-19 ENCOUNTER — TELEPHONE (OUTPATIENT)
Dept: INTERNAL MEDICINE | Facility: CLINIC | Age: 60
End: 2020-02-19

## 2020-02-19 NOTE — TELEPHONE ENCOUNTER
----- Message from Soledad Fischer sent at 2/19/2020 11:15 AM CST -----  Contact: Aggie(sister) [phone]  Pt's sister was requesting call from nurse to discuss pt's well being. She states that after detox pt is having troubles. Please call and advise.

## 2020-02-20 ENCOUNTER — TELEPHONE (OUTPATIENT)
Dept: INTERNAL MEDICINE | Facility: CLINIC | Age: 60
End: 2020-02-20

## 2020-02-20 PROCEDURE — G0180 PR HOME HEALTH MD CERTIFICATION: ICD-10-PCS | Mod: ,,, | Performed by: HOSPITALIST

## 2020-02-20 PROCEDURE — G0180 MD CERTIFICATION HHA PATIENT: HCPCS | Mod: ,,, | Performed by: HOSPITALIST

## 2020-02-20 NOTE — TELEPHONE ENCOUNTER
----- Message from Lynn Payan sent at 2/20/2020 12:52 PM CST -----  Contact: Pilar/ [phone]  Caller is requesting an earlier appt than we can schedule.  Caller declined first available appointment listed below. Caller will not accept being placed on the wait list and is requesting a message be sent to the provider.  When is the next available appointment:  03/03/2020  Did you offer to schedule the next available appt and put the patient on the wait list?:   Yes/no  What visit type: ep  Symptoms:    Patient preference of timeframe to be scheduled:  02/26/2020  What is the reason the patient is requesting a sooner appointment? (insurance terminating, changing jobs):    Would the patient rather a call back or a response via [facility]?:  Call back  Comments:

## 2020-02-20 NOTE — TELEPHONE ENCOUNTER
I spoke to the patient's sister, Pilar, and she is requesting an anti anxiety medication for the patient. Please advise.    The patient is scheduled to be seen 3/11/20.    Gabapentin 100mg TID,  Mobic 15mg QD, and Metoprolol 50mg QD have been prescribed for the patient.

## 2020-02-20 NOTE — TELEPHONE ENCOUNTER
I spoke to the patient's sister , Magda, on yesterday and she updated me the the patient's progress. Home health PT will come for patient evaluation.

## 2020-02-20 NOTE — TELEPHONE ENCOUNTER
----- Message from Soledad Fischer sent at 2/20/2020  1:35 PM CST -----  Contact: Lindsay wilcox/ [facility]   Lindsay is requesting a call back from nurse regarding home health for pt. Please call and advise.

## 2020-02-27 ENCOUNTER — TELEPHONE (OUTPATIENT)
Dept: INTERNAL MEDICINE | Facility: CLINIC | Age: 60
End: 2020-02-27

## 2020-02-27 NOTE — TELEPHONE ENCOUNTER
The patient called to see if we had received any results from her stay at [facility]. Patient informed that we did not. Informed that she can sign a release with them to get her records sent over. The patient verbalized understanding.

## 2020-02-27 NOTE — TELEPHONE ENCOUNTER
----- Message from Belkys Caballero sent at 2/27/2020 10:47 AM CST -----  Contact: self/[phone]  Patient called in regards needing to talk with Dr Mcdaniel medical assistant about if records. Please call and advise. Thank you   minimum assist (75% patients effort)

## 2020-03-03 ENCOUNTER — TELEPHONE (OUTPATIENT)
Dept: HOME HEALTH SERVICES | Facility: HOSPITAL | Age: 60
End: 2020-03-03

## 2020-03-11 ENCOUNTER — HOSPITAL ENCOUNTER (OUTPATIENT)
Dept: RADIOLOGY | Facility: HOSPITAL | Age: 60
Discharge: HOME | End: 2020-03-11
Attending: HOSPITALIST
Payer: MEDICARE

## 2020-03-11 ENCOUNTER — OFFICE VISIT (OUTPATIENT)
Dept: INTERNAL MEDICINE | Facility: CLINIC | Age: 60
End: 2020-03-11
Payer: MEDICARE

## 2020-03-11 VITALS
RESPIRATION RATE: 16 BRPM | SYSTOLIC BLOOD PRESSURE: 92 MMHG | HEIGHT: 64 IN | TEMPERATURE: 98 F | HEART RATE: 88 BPM | DIASTOLIC BLOOD PRESSURE: 64 MMHG | BODY MASS INDEX: 27.7 KG/M2 | WEIGHT: 162.25 LBS

## 2020-03-11 DIAGNOSIS — Z11.59 ENCOUNTER FOR HEPATITIS C SCREENING TEST FOR LOW RISK PATIENT: ICD-10-CM

## 2020-03-11 DIAGNOSIS — Z12.4 CERVICAL CANCER SCREENING: ICD-10-CM

## 2020-03-11 DIAGNOSIS — Z12.11 SCREENING FOR COLORECTAL CANCER: ICD-10-CM

## 2020-03-11 DIAGNOSIS — Z12.12 SCREENING FOR COLORECTAL CANCER: ICD-10-CM

## 2020-03-11 DIAGNOSIS — M54.50 CHRONIC MIDLINE LOW BACK PAIN, UNSPECIFIED WHETHER SCIATICA PRESENT: ICD-10-CM

## 2020-03-11 DIAGNOSIS — G89.29 CHRONIC MIDLINE LOW BACK PAIN, UNSPECIFIED WHETHER SCIATICA PRESENT: ICD-10-CM

## 2020-03-11 DIAGNOSIS — K52.9 CHRONIC DIARRHEA: ICD-10-CM

## 2020-03-11 DIAGNOSIS — I10 ESSENTIAL HYPERTENSION: Primary | ICD-10-CM

## 2020-03-11 PROCEDURE — 99999 PR PBB SHADOW E&M-EST. PATIENT-LVL V: ICD-10-PCS | Mod: PBBFAC,,, | Performed by: HOSPITALIST

## 2020-03-11 PROCEDURE — 3074F PR MOST RECENT SYSTOLIC BLOOD PRESSURE < 130 MM HG: ICD-10-PCS | Mod: CPTII,S$GLB,, | Performed by: HOSPITALIST

## 2020-03-11 PROCEDURE — 3074F SYST BP LT 130 MM HG: CPT | Mod: CPTII,S$GLB,, | Performed by: HOSPITALIST

## 2020-03-11 PROCEDURE — 72110 XR LUMBAR SPINE COMPLETE 5 VIEW: ICD-10-PCS | Mod: 26,,, | Performed by: RADIOLOGY

## 2020-03-11 PROCEDURE — 72110 X-RAY EXAM L-2 SPINE 4/>VWS: CPT | Mod: 26,,, | Performed by: RADIOLOGY

## 2020-03-11 PROCEDURE — 99214 PR OFFICE/OUTPT VISIT, EST, LEVL IV, 30-39 MIN: ICD-10-PCS | Mod: S$GLB,,, | Performed by: HOSPITALIST

## 2020-03-11 PROCEDURE — 99214 OFFICE O/P EST MOD 30 MIN: CPT | Mod: S$GLB,,, | Performed by: HOSPITALIST

## 2020-03-11 PROCEDURE — 99999 PR PBB SHADOW E&M-EST. PATIENT-LVL V: CPT | Mod: PBBFAC,,, | Performed by: HOSPITALIST

## 2020-03-11 PROCEDURE — 3078F PR MOST RECENT DIASTOLIC BLOOD PRESSURE < 80 MM HG: ICD-10-PCS | Mod: CPTII,S$GLB,, | Performed by: HOSPITALIST

## 2020-03-11 PROCEDURE — 3008F PR BODY MASS INDEX (BMI) DOCUMENTED: ICD-10-PCS | Mod: CPTII,S$GLB,, | Performed by: HOSPITALIST

## 2020-03-11 PROCEDURE — 72110 X-RAY EXAM L-2 SPINE 4/>VWS: CPT | Mod: TC,PO

## 2020-03-11 PROCEDURE — 3078F DIAST BP <80 MM HG: CPT | Mod: CPTII,S$GLB,, | Performed by: HOSPITALIST

## 2020-03-11 PROCEDURE — 3008F BODY MASS INDEX DOCD: CPT | Mod: CPTII,S$GLB,, | Performed by: HOSPITALIST

## 2020-03-11 RX ORDER — MELOXICAM 15 MG/1
15 TABLET ORAL DAILY
COMMUNITY
Start: 2020-02-17 | End: 2020-03-11 | Stop reason: SDUPTHER

## 2020-03-11 RX ORDER — MELOXICAM 15 MG/1
15 TABLET ORAL DAILY
Qty: 30 TABLET | Refills: 5 | Status: SHIPPED | OUTPATIENT
Start: 2020-03-11 | End: 2020-04-21

## 2020-03-11 RX ORDER — GABAPENTIN 100 MG/1
100 CAPSULE ORAL 3 TIMES DAILY
Qty: 90 CAPSULE | Refills: 5 | Status: SHIPPED | OUTPATIENT
Start: 2020-03-11 | End: 2020-05-02 | Stop reason: ALTCHOICE

## 2020-03-11 RX ORDER — GABAPENTIN 100 MG/1
100 CAPSULE ORAL 3 TIMES DAILY
COMMUNITY
Start: 2020-02-17 | End: 2020-03-11 | Stop reason: SDUPTHER

## 2020-03-11 NOTE — PROGRESS NOTES
Subjective:     @Patient ID: Vale Gutierrez is a 59 y.o. female.    Chief Complaint: Follow-up (detox)    HPI    60 yo F presents for f/u:   Pt was in alcohol rehab/detox over the past few months. States she has been going to [facility] currently doing an 8 week program. Has completely quit ETOH. Reports feeling better. She reports she does not miss it.   Plans to go AA meetings when she gets done with the program. She is currently living with her older sister in [facility] currently.  Completed home health pt/ot.   Still using the cane.   Reports she has been having chronic diarrhea which concerns her. Describes as loose, watery. States certain foods ie spicy make it worse.   She also reports of lower back pain and would like referral to spine clinic. Would like refill of her mobic and gabapentin      Review of Systems   Constitutional: Negative for chills and fever.   HENT: Negative for congestion and sore throat.    Eyes: Negative for pain and visual disturbance.   Respiratory: Negative for cough and shortness of breath.    Cardiovascular: Negative for chest pain and leg swelling.   Gastrointestinal: Positive for diarrhea. Negative for abdominal pain, nausea and vomiting.   Endocrine: Negative for polydipsia and polyuria.   Genitourinary: Negative for difficulty urinating and dysuria.   Musculoskeletal: Positive for arthralgias, back pain and gait problem.   Skin: Negative for rash and wound.   Neurological: Negative for dizziness, weakness and headaches.   Psychiatric/Behavioral: Negative for agitation and confusion.     Past medical history, surgical history, and family medical history reviewed and updated as appropriate.    Medications and allergies reviewed.     Objective:     Vitals:    03/11/20 1333   BP: 92/64   BP Location: Right arm   Patient Position: Sitting   BP Method: Medium (Manual)   Pulse: 88   Resp: 16   Temp: 98 °F (36.7 °C)   TempSrc: Oral   Weight: 73.6 kg (162 lb 4.1 oz)  "  Height: 5' 4" (1.626 m)     Body mass index is 27.85 kg/m².     Physical Exam   Constitutional: She is oriented to person, place, and time. She appears well-developed and well-nourished. No distress.   HENT:   Head: Normocephalic and atraumatic.   Mouth/Throat: Oropharynx is clear and moist. No oropharyngeal exudate.   Eyes: Conjunctivae are normal. Right eye exhibits no discharge. Left eye exhibits no discharge.   Neck: Normal range of motion. Neck supple.   Cardiovascular: Normal rate, regular rhythm and intact distal pulses. Exam reveals no friction rub.   No murmur heard.  Pulmonary/Chest: Effort normal and breath sounds normal.   Abdominal: Soft. Bowel sounds are normal. She exhibits no distension. There is no tenderness. There is no guarding.   Musculoskeletal: She exhibits no edema.   Decreased ROM of lower extremities   Neurological: She is alert and oriented to person, place, and time.   Skin: Skin is warm and dry.   Psychiatric: She has a normal mood and affect. Thought content normal.   Vitals reviewed.      Lab Results   Component Value Date    WBC 7.64 02/18/2020    HGB 12.4 02/18/2020    HCT 38.5 02/18/2020     02/18/2020    ALT 22 02/18/2020    AST 37 (H) 02/18/2020     02/18/2020    K 5.1 02/18/2020    CL 99 02/18/2020    CREATININE 0.86 02/18/2020    BUN 20 (H) 02/18/2020    CO2 31 02/18/2020    TSH 1.703 08/29/2019    HGBA1C 5.0 08/29/2019       Assessment:     1. Essential hypertension    2. Encounter for hepatitis C screening test for low risk patient    3. Screening for colorectal cancer    4. Chronic diarrhea    5. Cervical cancer screening    6. Chronic midline low back pain, unspecified whether sciatica present      Plan:   Vale was seen today for follow-up.    Diagnoses and all orders for this visit:    Essential hypertension  -     CBC W/ AUTO DIFFERENTIAL; Future  -     Comprehensive metabolic panel; Future    Encounter for hepatitis C screening test for low risk patient  - "     Hepatitis C Antibody; Future    Screening for colorectal cancer  -     Ambulatory referral/consult to Gastroenterology; Future    Chronic diarrhea  -     Ambulatory referral/consult to Gastroenterology; Future    Cervical cancer screening  -     Ambulatory referral/consult to Obstetrics / Gynecology; Future    Chronic midline low back pain, unspecified whether sciatica present  -     X-Ray Lumbar Spine Complete 5 View; Future  -     Ambulatory referral/consult to Back & Spine Clinic; Future    Other orders  -     meloxicam (MOBIC) 15 MG tablet; Take 1 tablet (15 mg total) by mouth once daily.  -     gabapentin (NEURONTIN) 100 MG capsule; Take 1 capsule (100 mg total) by mouth 3 (three) times daily.          No follow-ups on file.    Estela Mcdaniel MD  Internal Medicine    3/11/2020

## 2020-03-13 ENCOUNTER — TELEPHONE (OUTPATIENT)
Dept: INTERNAL MEDICINE | Facility: CLINIC | Age: 60
End: 2020-03-13

## 2020-03-13 NOTE — TELEPHONE ENCOUNTER
----- Message from Estela Mcdaniel MD sent at 3/12/2020  5:37 PM CDT -----  Please notify pt that electrolytes and liver tests are normal. Kidney function is stable. Recommend hydration. Blood count is normal

## 2020-03-13 NOTE — TELEPHONE ENCOUNTER
----- Message from Estela Mcdaniel MD sent at 3/12/2020  5:38 PM CDT -----  Please notify pt that xray of back shows mild to moderate arthritis

## 2020-03-23 ENCOUNTER — TELEPHONE (OUTPATIENT)
Dept: INTERNAL MEDICINE | Facility: CLINIC | Age: 60
End: 2020-03-23

## 2020-03-23 NOTE — TELEPHONE ENCOUNTER
----- Message from Belkys Caballero sent at 3/23/2020 12:14 PM CDT -----  Contact: self/[phone]  Patient called in regards having questions about xray that patient had done in 02/20. Please call. Thank you

## 2020-03-23 NOTE — TELEPHONE ENCOUNTER
Spoke to pt and she  Would like you to look at her xray of her foot from 2/18, because she still having pain in her foot

## 2020-03-23 NOTE — TELEPHONE ENCOUNTER
Yes she would like you to look at it and see if their  is any reason that would explain why she still have pain in her foot

## 2020-03-24 NOTE — TELEPHONE ENCOUNTER
I discussed the xray results with the patient . I encouraged her to stay off the foot as much as possible and to elevate until feeling better. The patient will call back if symptoms worsens or does not get better.

## 2020-03-29 ENCOUNTER — NURSE TRIAGE (OUTPATIENT)
Dept: ADMINISTRATIVE | Facility: CLINIC | Age: 60
End: 2020-03-29

## 2020-03-29 NOTE — TELEPHONE ENCOUNTER
Reason for Disposition   Message left on identified voice mail    Additional Information   Negative: Caller is angry or rude (e.g., hangs up, verbally abusive, yelling)   Negative: Caller hangs up   Negative: Caller has already spoken with the PCP and has no further questions.   Negative: Caller has already spoken with another triager and has no further questions.   Negative: Caller has already spoken with another triager or PCP AND has further questions AND triager able to answer questions.   Negative: No answer.  First attempt to contact caller.  Follow-up call scheduled within 15 minutes.   Negative: Busy signal.  First attempt to contact caller.  Follow-up call scheduled within 15 minutes.    Protocols used: NO CONTACT OR DUPLICATE CONTACT CALL-APOLLO-  LM x2 at 454pm at [phone]

## 2020-03-30 ENCOUNTER — TELEPHONE (OUTPATIENT)
Dept: INTERNAL MEDICINE | Facility: CLINIC | Age: 60
End: 2020-03-30

## 2020-03-30 RX ORDER — METOPROLOL TARTRATE 50 MG/1
50 TABLET ORAL DAILY
Qty: 90 TABLET | Refills: 3 | Status: SHIPPED | OUTPATIENT
Start: 2020-03-30 | End: 2020-04-29 | Stop reason: SDUPTHER

## 2020-03-30 NOTE — TELEPHONE ENCOUNTER
----- Message from Roslyn Siu sent at 3/30/2020  9:43 AM CDT -----  Contact: Pt [phone]  Is this a refill or new RX:  New/ Change of the milligrams    RX name and strength: metoprolol tartrate (LOPRESSOR) 50 MG tablet     Is this a 30 day or 90 day RX:  90     Pharmacy name and phone # [facility] - [place], [place] - [address] [place] [phone] (Phone) [phone] (Fax)    Comments:  She said you change her dosage from 1/2 a pill to a whole pill therefore, she ran out early.

## 2020-03-30 NOTE — TELEPHONE ENCOUNTER
Pt c/o diarrhea x 1 day. Pt denies n/v. Pt states symptoms have improved. Pt advise per protocol and verbalized understanding.     Reason for Disposition   [1] SEVERE diarrhea (e.g., 7 or more times / day more than normal) AND [2] present > 24 hours (1 day)    Additional Information   Negative: Shock suspected (e.g., cold/pale/clammy skin, too weak to stand, low BP, rapid pulse)   Negative: Difficult to awaken or acting confused (e.g., disoriented, slurred speech)   Negative: Sounds like a life-threatening emergency to the triager   Negative: [1] SEVERE abdominal pain (e.g., excruciating) AND [2] present > 1 hour   Negative: [1] SEVERE abdominal pain AND [2] age > 60   Negative: [1] Blood in the stool AND [2] moderate or large amount of blood   Negative: Black or tarry bowel movements  (Exception: chronic-unchanged  black-grey bowel movements AND is taking iron pills or Pepto-bismol)   Negative: [1] Drinking very little AND [2] dehydration suspected (e.g., no urine > 12 hours, very dry mouth, very lightheaded)   Negative: Patient sounds very sick or weak to the triager   Negative: [1] SEVERE diarrhea (e.g., 7 or more times / day more than normal) AND [2]  age > 60 years   Negative: [1] Constant abdominal pain AND [2] present > 2 hours   Negative: [1] Fever > 103 F (39.4 C) AND [2] not able to get the fever down using Fever Care Advice    Protocols used: DIARRHEA-A-AH

## 2020-04-20 ENCOUNTER — TELEPHONE (OUTPATIENT)
Dept: INTERNAL MEDICINE | Facility: CLINIC | Age: 60
End: 2020-04-20

## 2020-04-20 NOTE — TELEPHONE ENCOUNTER
"----- Message from Omar Carter sent at 4/20/2020 11:26 AM CDT -----  Contact: Patient [phone]  Patient would like to get medical advice.    Comments: Patient calling has concerns about the bottom number on BP reading, call to discuss further. Last reading "130/100" hour ago    Please call an advise  Thank you      "

## 2020-04-20 NOTE — TELEPHONE ENCOUNTER
----- Message from Roslyn Sui sent at 4/20/2020 12:59 PM CDT -----  Contact: Pt [phone]  Telephone consult    To add to the previous she said she has a sharp pain in her upper back. It's not hurting right now but, it's a level 8 if she moves a certain way and it goes away when she changes position.    Thank you

## 2020-04-21 ENCOUNTER — TELEPHONE (OUTPATIENT)
Dept: NEUROLOGY | Facility: CLINIC | Age: 60
End: 2020-04-21

## 2020-04-21 ENCOUNTER — OFFICE VISIT (OUTPATIENT)
Dept: INTERNAL MEDICINE | Facility: CLINIC | Age: 60
End: 2020-04-21
Payer: MEDICARE

## 2020-04-21 DIAGNOSIS — I10 ESSENTIAL HYPERTENSION: Primary | ICD-10-CM

## 2020-04-21 DIAGNOSIS — M47.812 OSTEOARTHRITIS OF CERVICAL SPINE, UNSPECIFIED SPINAL OSTEOARTHRITIS COMPLICATION STATUS: ICD-10-CM

## 2020-04-21 DIAGNOSIS — M25.512 ACUTE PAIN OF LEFT SHOULDER: ICD-10-CM

## 2020-04-21 PROCEDURE — 99213 PR OFFICE/OUTPT VISIT, EST, LEVL III, 20-29 MIN: ICD-10-PCS | Mod: 95,,, | Performed by: HOSPITALIST

## 2020-04-21 PROCEDURE — 99213 OFFICE O/P EST LOW 20 MIN: CPT | Mod: 95,,, | Performed by: HOSPITALIST

## 2020-04-21 RX ORDER — METHYLPREDNISOLONE 4 MG/1
TABLET ORAL
Qty: 1 PACKAGE | Refills: 0 | Status: SHIPPED | OUTPATIENT
Start: 2020-04-21 | End: 2020-05-05

## 2020-04-21 NOTE — PROGRESS NOTES
Virtual Visit  The patient location is: [place]   The chief complaint leading to consultation is: HTN,  Visit type: Virtual visit with synchronous audio and video  Total time spent with patient: 15 min  Each patient to whom he or she provides medical services by telemedicine is:  (1) informed of the relationship between the physician and patient and the respective role of any other health care provider with respect to management of the patient; and (2) notified that he or she may decline to receive medical services by telemedicine and may withdraw from such care at any time.    Subjective:     @Patient ID: Vale Gutierrez is a 59 y.o. female.    Chief Complaint: Hypertension    HPI    58 yo F presents for virtual visit     1. HTN: On lopressor 50 mg daily. Reports -130/  just yesterday. Reports concern about DBP.  Has not checked it prior to that regularly. Reports since pandemic she is more thirsty and hungry as she is home all day Has been drinking more coke and gabby D. Has been eating a lot of salty and sweet snacks. States she knows she has put on more weight.   2. OA: on meloxicam and gabapentin for neuropathy. Reports does not feel like medication is helping.   3. L shoulder pain: Reports having L shoulder pain around scalpular area. States started a few days ago. Feels like a pinched nerve. Reports medrol dose pack helepd in the past for this.     Review of Systems   Constitutional: Negative for chills and fever.   Respiratory: Negative for cough and shortness of breath.    Cardiovascular: Negative for chest pain.   Gastrointestinal: Negative for nausea and vomiting.   Musculoskeletal: Positive for back pain and gait problem.        Chronic   Neurological: Negative for dizziness and headaches.   Psychiatric/Behavioral: Negative for agitation and behavioral problems.     Past medical history, surgical history, and family medical history reviewed and updated as appropriate.    Medications and  allergies reviewed.     Objective:     No vitals and limited physical exam due to virtual visit     Physical Exam   Constitutional: She is oriented to person, place, and time. She appears well-developed. No distress.   HENT:   Head: Normocephalic and atraumatic.   Eyes: Right eye exhibits no discharge. Left eye exhibits no discharge.   Neck: Normal range of motion. Neck supple.   Pulmonary/Chest: Effort normal.   Neurological: She is alert and oriented to person, place, and time.   Psychiatric: She has a normal mood and affect.       Lab Results   Component Value Date    WBC 8.43 03/11/2020    HGB 13.7 03/11/2020    HCT 44.4 03/11/2020     03/11/2020    ALT 14 03/11/2020    AST 16 03/11/2020     03/11/2020    K 4.7 03/11/2020     03/11/2020    CREATININE 1.2 03/11/2020    BUN 23 03/11/2020    CO2 21 (L) 03/11/2020    TSH 1.703 08/29/2019    HGBA1C 5.0 08/29/2019       Assessment:     1. Essential hypertension    2. Osteoarthritis of cervical spine, unspecified spinal osteoarthritis complication status    3. Acute pain of left shoulder      Plan:   Diagnoses and all orders for this visit:    Essential hypertension  - counseled pt on low salt diet. Increase fresh fruits/veggies. Recommend to keep BP log x 2 weeks. Will f/u in 2 weeks prior to adjusting bp regimen. For now, continue lopressor 50 mg qday    Osteoarthritis of cervical spine, unspecified spinal osteoarthritis complication status  - pt prefers to stop meloxicam and will finish off gabapentin.     Acute pain of L shoulder  - trial of medrol       Follow up in about 2 weeks (around 5/5/2020). bp check    Estela Mcdaniel MD  Internal Medicine    4/21/2020

## 2020-04-27 ENCOUNTER — TELEPHONE (OUTPATIENT)
Dept: INTERNAL MEDICINE | Facility: CLINIC | Age: 60
End: 2020-04-27

## 2020-04-27 ENCOUNTER — OFFICE VISIT (OUTPATIENT)
Dept: NEUROLOGY | Facility: CLINIC | Age: 60
End: 2020-04-27
Payer: MEDICARE

## 2020-04-27 DIAGNOSIS — M48.02 NEURAL FORAMINAL STENOSIS OF CERVICAL SPINE: ICD-10-CM

## 2020-04-27 DIAGNOSIS — M47.812 OSTEOARTHRITIS OF CERVICAL SPINE, UNSPECIFIED SPINAL OSTEOARTHRITIS COMPLICATION STATUS: ICD-10-CM

## 2020-04-27 DIAGNOSIS — I10 ESSENTIAL HYPERTENSION: ICD-10-CM

## 2020-04-27 DIAGNOSIS — F10.29 ALCOHOL DEPENDENCE WITH UNSPECIFIED ALCOHOL-INDUCED DISORDER: Primary | ICD-10-CM

## 2020-04-27 DIAGNOSIS — F32.A DEPRESSION, UNSPECIFIED DEPRESSION TYPE: ICD-10-CM

## 2020-04-27 DIAGNOSIS — I10 ESSENTIAL HYPERTENSION: Primary | ICD-10-CM

## 2020-04-27 PROCEDURE — 90791 PSYCH DIAGNOSTIC EVALUATION: CPT | Mod: 95,,, | Performed by: PSYCHIATRY & NEUROLOGY

## 2020-04-27 PROCEDURE — 90791 PR PSYCHIATRIC DIAGNOSTIC EVALUATION: ICD-10-PCS | Mod: 95,,, | Performed by: PSYCHIATRY & NEUROLOGY

## 2020-04-27 NOTE — TELEPHONE ENCOUNTER
I spoke to pt, she is till having back and shoulder pain.  She finished the steriod Rx. She have taken 2 doses of gabapentin. The pain is waking her every hour at night.  She asking for pain injection or steroid injection.    Please advise    Thanks

## 2020-04-27 NOTE — TELEPHONE ENCOUNTER
----- Message from Ana Griffin sent at 4/27/2020  8:40 AM CDT -----  Contact: patient [phone]  Patient spoke with you last week about back pain and it is still hurting. Patient would like advice about this. She finished the steroid pack . Patient states the pain keeps her up all night.     [facility] - [place] - [address] [phone]
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
## 2020-04-28 ENCOUNTER — TELEPHONE (OUTPATIENT)
Dept: INTERNAL MEDICINE | Facility: CLINIC | Age: 60
End: 2020-04-28

## 2020-04-28 RX ORDER — BUPRENORPHINE HYDROCHLORIDE AND NALOXONE HYDROCHLORIDE DIHYDRATE 8; 2 MG/1; MG/1
TABLET SUBLINGUAL
Status: ON HOLD | COMMUNITY
Start: 2020-04-07 | End: 2020-06-03 | Stop reason: CLARIF

## 2020-04-28 RX ORDER — NAPROXEN 500 MG/1
500 TABLET ORAL 2 TIMES DAILY PRN
Qty: 20 TABLET | Refills: 1 | Status: SHIPPED | OUTPATIENT
Start: 2020-04-28 | End: 2020-04-28

## 2020-04-28 RX ORDER — DICLOFENAC SODIUM 50 MG/1
50 TABLET, DELAYED RELEASE ORAL 3 TIMES DAILY PRN
Qty: 30 TABLET | Refills: 1 | Status: SHIPPED | OUTPATIENT
Start: 2020-04-28 | End: 2020-07-28

## 2020-04-28 NOTE — TELEPHONE ENCOUNTER
----- Message from Barbie Brewster sent at 4/28/2020  2:41 PM CDT -----  Contact: Self [phone]  Patient is returning a phone call.  Who left a message for the patient: Vale  Does patient know what this is regarding:  Missed CAll  Comments:

## 2020-04-28 NOTE — TELEPHONE ENCOUNTER
The patient informed about the medication change . The patient stated that she is no longer taking the Suboxone.

## 2020-04-28 NOTE — PATIENT INSTRUCTIONS
Anxiety Coping Strategies: Try these when you're feeling anxious or stressed:   Stress management: Limit potential triggers by managing stress levels. Keep an eye on pressures and deadlines, organize daunting tasks in to-do lists, and take enough time off from professional or educational obligations.   Take a time-out. Practice yoga, listen to music, meditate, get a massage, or learn relaxation techniques. Stepping back from the problem helps clear your head.   Eat well-balanced meals. Do not skip any meals. Do keep healthful, energy-boosting snacks on hand.   Limit alcohol and caffeine, which can aggravate anxiety and trigger panic attacks.   Get enough sleep. When stressed, your body needs additional sleep and rest.   Exercise daily: Physical exertion and an active lifestyle can improve self-image and trigger the release of chemicals in the brain that stimulate positive emotions.   Welcome humor. A good laugh goes a long way.   Maintain a positive attitude. Make an effort to replace negative thoughts with positive ones.   Get involved. Volunteer or find another way to be active in your community, which creates a support network and gives you a break from everyday stress.   Learn what triggers your anxiety. Is it work, family, school, or something else you can identify? Write in a journal when youre feeling stressed or anxious, and look for a pattern.   Support network: Talk to a person who is supportive, such as a family member or friend. Avoid storing up and suppressing anxious feelings as this can worsen anxiety disorders.   Relaxation techniques: Certain measures can help reduce signs of anxiety, including deep-breathing exercises, long baths, meditation, yoga, and resting in the dark.   Exercises to replace negative thoughts with positive ones: Write down a list of any negative thoughts, and make another list of positive thoughts to replace them. Picturing yourself successfully facing and  conquering a specific fear can also provide benefits if the anxiety symptoms link to a specific stressor.   Slow breathing. When youre anxious, your breathing becomes faster and shallower. Try deliberately slowing down your breathing. Count to three as you breathe in slowly - then count to three as you breathe out slowly.   Progressive muscle relaxation. Find a quiet location. Close your eyes and slowly tense and then relax each of your muscle groups from your toes to your head. Hold the tension for three seconds and then release quickly. This can help reduce the feelings of muscle tension that often comes with anxiety.   Stay in the present moment. Anxiety can make your thoughts live in a terrible future that hasnt happened yet. Try to bring yourself back to where you are. Practice a grounding technique: Name 5 things you can see, 4 things you can hear, 3 things you can feel, 2 things you can smell, and 1 thing you can taste.   Healthy lifestyle. Keeping active, eating well, going out into nature, spending time with family and friends, reducing stress and doing the activities you enjoy are all effective in reducing anxiety and improving your wellbeing.      Take small acts of bravery. Avoiding what makes you anxious provides some relief in the short term, but can make you more anxious in the long term. Try approaching something that makes you anxious - even in a small way. The way through anxiety is by learning that what you fear isnt likely to happen - and if it does, youll be able to cope with it.   Challenge your self-talk. How you think affects how you feel. Anxiety can make you overestimate the danger in a situation and underestimate your ability to handle it. Try to think of different interpretations to a situation thats making you anxious, rather than jumping to the worst-case scenario. Look at the facts for and against your thought being true.   Plan worry time. Its hard to stop worrying  entirely so set aside some time to indulge your worries. Even 10 minutes each evening to write them down or go over them in your head can help stop your worries from taking over at other times.   Get to know your anxiety. Keep a diary of when its at its best - and worst. Find the patterns and plan your week - or day - to proactively manage your anxiety.   Learn from others. Talking with others who also experience anxiety - or are going through something similar - can help you feel less alone.    Be kind to yourself. Remember that you are not your anxiety. You are not weak. You are not inferior. You have a mental health condition. Its called anxiety.   Cognitive Recommendations:  · Attention: Remember that inattention and lack of focus are major culprits to forgetting information so be sure and practice paying attention for adequate learning of information. If you rely on passive attention to remembering something (e.g., yeah, uh-huh approach), youll find you cannot recall it later. I recommend the following to improve attention, which may aid in later recall:   · Reduce distractions in the area as much as possible.  · Look at the person as they are speaking to you.  · Paraphrase as they are speaking  · Write down important pieces of information   · Ask them to repeat if you zone out.   · Have them simplify and reduce information that you need to attend to during conversation.   · Have visual cues to remind you if you need to do something later.  · Processing Speed:   · Using multiple modalities (e.g., listening, writing notes, asking questions, recording) to learn new information is likely to allow additional time for processing, thus improving memory for the material.   · Allowing sufficient time to complete tasks will reduce frustration and help to ensure completion.  · Executive Functioning:  · Dont attempt to multi-task.  Separate tasks so that each can be completed one at a time.  · Consider using a  calendar/day planner, as that may be effective to help you plan and stay on track.  Color-coding specific tasks by importance may add additional benefit to your planner.  · Break down large projects into smaller tasks and write down the steps to completing the task.  Taking notes while reading can help with recall.  · Storing Information: Use the below strategies to help you further enhance how information is stored.  · Rehearse - Immediately after seeing/hearing something, try to recall it.  Wait a few minutes, then check again.  Gradually lengthen the intervals between rehearsals.  · Repetition of learned material is critical to ensure storage of information to be learned. Self-test at home to ensure learning.  · Write down important information to improve your attention and focus and to have something to look back on when you need to recall it.  · Make sure the person doesnt rattle off, but presents in a clear, logical, and unhurried manner.   · Recalling Information:  · Jog your memory - Lose something?  Think back to when you last had it.  What did you do next?  And after that?  Mentally walk yourself through each activity that followed.  Prodding your memory this way may enable you to recall the location of the missing item.  · Use a cue - Symbolic reminders (the proverbial string around the finger) are helpful.  So too are memos, timers, calendar notes, etc.--keep them in visible, appropriate places.  · Get organized - Have fixed locations for all important papers, key phone numbers, medications, keys, wallet, glasses, tools, etc.  · Develop routines - Routines can anchor memories so they do not drift away.    · Sleep Hygiene: Poor sleep has a negative effect on cognition. Several strategies have been shown to improve sleep:   · Caffeine intake in the afternoon and evening, as well as stuffing oneself at supper, can decrease the quality of restful sleep throughout the night.   · Bedtime and wake-up times  should be consistent every night and morning so the body becomes used to a single routine, even on the weekends.  · Engage in daily physical activity, but not 2-3 hours before bedtime.   · No technology use (television, computer, iPad) 1-2 hours before bed.   · Have a wind down routine (e.g., soft lights in the house, bath before bed, reduced fluid intake, songs, reading, less noise) to promote sleep readiness.   · Visit the www.sleepfoundation.org for more strategies.   · Practice good cognitive hygiene:  · Engage in regular exercise, which increases alertness and arousal and can improve attention and focus.  Consider lower impact exercises, such as yoga or light walking.  · Get a good nights sleep, as this can enhance alertness and cognition.  · Eat healthy foods and balanced meals. It is notable that research indicates certain nutrients may aid in brain function, such as B vitamins (especially B6, B12, and folic acid), antioxidants (such as vitamins C and E, and beta carotene), and Omega-3 fatty acids. Talk with your physician or nutritionist about whats right for you.   · Keep your brain active. Find activities to stay mentally active, such as reading, games (cards, checkers), puzzles (crosswords, Sudoku, jig saw), crafts (models, woodworking), gardening, or participating in activities in the community.  · Stay socially engaged. Continue staying active with your family and friends.  · Monitor your mood:  You reported symptoms of depression and anxiety; implementing the recommendations above may also help to improve mood.  If you or your loved ones notice increased symptoms, I recommend psychiatric or psychological treatment to help you more effectively manage symptoms.

## 2020-04-28 NOTE — TELEPHONE ENCOUNTER
----- Message from Marco Bustamante sent at 4/28/2020 12:29 PM CDT -----  Contact: [phone]  Would like to receive medical advice.  Symptoms:  Back and shoulder pain    Would they like a call back or a response via [facility]:  call back   Additional information:  Pt is still experiencing bad back pain please call to advise.

## 2020-04-28 NOTE — TELEPHONE ENCOUNTER
The patient stated that she took Naproxen before and it aggravated her stomach. The patient is requesting Tramadol if possible?

## 2020-04-29 ENCOUNTER — TELEPHONE (OUTPATIENT)
Dept: SPINE | Facility: CLINIC | Age: 60
End: 2020-04-29

## 2020-04-29 ENCOUNTER — PATIENT MESSAGE (OUTPATIENT)
Dept: SPINE | Facility: CLINIC | Age: 60
End: 2020-04-29

## 2020-04-29 ENCOUNTER — TELEPHONE (OUTPATIENT)
Dept: INTERNAL MEDICINE | Facility: CLINIC | Age: 60
End: 2020-04-29

## 2020-04-29 NOTE — TELEPHONE ENCOUNTER
The patient is stating that the diclofenac is not helping.  She is requesting Tramadol. The patient states that she has not been taking the Suboxone. That it never renewed once she got out of rehab.

## 2020-04-29 NOTE — TELEPHONE ENCOUNTER
Patient sent [facility] message regarding an appointment in [facility].    JEFF Cortez, PA-C  Neurosurgery  [facility]  [facility]

## 2020-04-29 NOTE — TELEPHONE ENCOUNTER
----- Message from Gasper Samuel sent at 4/28/2020  3:54 PM CDT -----  Contact: Patient   Good Afternoon,      placed an order for Ms.Matt to consult with a Back [facility] Sp[facility] provider, Im just unsure if the providers at this time are taking new patients via Telemedicine. If you can assist with scheduling it would be greatly appreciated. Thank you in advanded

## 2020-04-29 NOTE — TELEPHONE ENCOUNTER
I spoke to pt, she did not  metoprolol Rx from  [facility].    Pt also requesting pain med (tramadol) for back pain.  She's requesting refills at [facility]    thanks

## 2020-04-29 NOTE — TELEPHONE ENCOUNTER
----- Message from Ary Durant sent at 4/29/2020  9:45 AM CDT -----  Contact: Patient [phone]  Good Morning  Patient would like Ms Collazo or  to call her due to her Medicine  Thank you

## 2020-04-30 RX ORDER — METOPROLOL TARTRATE 50 MG/1
50 TABLET ORAL DAILY
Qty: 90 TABLET | Refills: 3 | Status: SHIPPED | OUTPATIENT
Start: 2020-04-30 | End: 2021-05-10

## 2020-04-30 NOTE — TELEPHONE ENCOUNTER
The patient informed. The patient states that she may go to urgent care to get some immediate relief.

## 2020-04-30 NOTE — TELEPHONE ENCOUNTER
I spoke tp pt and she was notified the Rx below was sent to her pharm.    metoprolol tartrate (LOPRESSOR) 50 MG tablet          Sig: Take 1 tablet (50 mg total) by mouth once daily.    Disp:  90 tablet    Refills:  3    Start: 4/30/2020    Class: Normal    Authorized by: Estela Mcdaniel MD        To be filled at: [facility] - [place], [place] - [id number]

## 2020-05-01 ENCOUNTER — OFFICE VISIT (OUTPATIENT)
Dept: SPINE | Facility: CLINIC | Age: 60
End: 2020-05-01
Payer: MEDICARE

## 2020-05-01 ENCOUNTER — PATIENT OUTREACH (OUTPATIENT)
Dept: ADMINISTRATIVE | Facility: OTHER | Age: 60
End: 2020-05-01

## 2020-05-01 DIAGNOSIS — M47.816 SPONDYLOSIS OF LUMBAR REGION WITHOUT MYELOPATHY OR RADICULOPATHY: ICD-10-CM

## 2020-05-01 DIAGNOSIS — G89.29 CHRONIC BILATERAL LOW BACK PAIN WITHOUT SCIATICA: ICD-10-CM

## 2020-05-01 DIAGNOSIS — M54.2 NECK PAIN: Primary | ICD-10-CM

## 2020-05-01 DIAGNOSIS — M54.12 CERVICAL RADICULOPATHY: ICD-10-CM

## 2020-05-01 DIAGNOSIS — M47.812 CERVICAL SPONDYLOSIS WITHOUT MYELOPATHY: ICD-10-CM

## 2020-05-01 DIAGNOSIS — M43.16 SPONDYLOLISTHESIS, LUMBAR REGION: ICD-10-CM

## 2020-05-01 DIAGNOSIS — M51.36 DDD (DEGENERATIVE DISC DISEASE), LUMBAR: ICD-10-CM

## 2020-05-01 DIAGNOSIS — M50.20 HNP (HERNIATED NUCLEUS PULPOSUS), CERVICAL: ICD-10-CM

## 2020-05-01 DIAGNOSIS — M54.50 CHRONIC BILATERAL LOW BACK PAIN WITHOUT SCIATICA: ICD-10-CM

## 2020-05-01 DIAGNOSIS — G89.29 CHRONIC MIDLINE LOW BACK PAIN, UNSPECIFIED WHETHER SCIATICA PRESENT: ICD-10-CM

## 2020-05-01 DIAGNOSIS — M50.30 DDD (DEGENERATIVE DISC DISEASE), CERVICAL: ICD-10-CM

## 2020-05-01 DIAGNOSIS — M54.50 CHRONIC MIDLINE LOW BACK PAIN, UNSPECIFIED WHETHER SCIATICA PRESENT: ICD-10-CM

## 2020-05-01 PROCEDURE — 99203 OFFICE O/P NEW LOW 30 MIN: CPT | Mod: 95,,, | Performed by: PHYSICIAN ASSISTANT

## 2020-05-01 PROCEDURE — 99203 PR OFFICE/OUTPT VISIT, NEW, LEVL III, 30-44 MIN: ICD-10-PCS | Mod: 95,,, | Performed by: PHYSICIAN ASSISTANT

## 2020-05-01 NOTE — PROGRESS NOTES
Chart reviewed.   Immunizations: Triggered Imm Registry     Orders placed: n/a  Upcoming appts to satisfy DONNIE topics: n/a

## 2020-05-02 RX ORDER — GABAPENTIN 300 MG/1
CAPSULE ORAL
Qty: 90 CAPSULE | Refills: 2 | Status: SHIPPED | OUTPATIENT
Start: 2020-05-02 | End: 2020-07-28

## 2020-05-04 NOTE — PROGRESS NOTES
The patient location is: [place]  The chief complaint leading to consultation is: Neck and left arm pain.  Back pain  Visit type: audiovisual  Total time spent with patient: 30 MINUTES    More than half of the time was spent counseling or coordinating care including prognosis, differential diagnosis, risks and benefits of treatment, instructions, compliance risk reductions    Each patient to whom he or she provides medical services by telemedicine is:  (1) informed of the relationship between the physician and patient and the respective role of any other health care provider with respect to management of the patient; and (2) notified that he or she may decline to receive medical services by telemedicine and may withdraw from such care at any time.      Subjective:     Patient ID:  Vale Gutierrez is a 59 y.o. female.    [place]    Chief Complaint:  Neck and left arm pain.  Back pain    HPI    Vale Gutierrez is a 59 y.o. female who presents with the above CC.    For the past two weeks she has has pain in the left side of her neck to the left shoulder blade region with radiation down the left arm to the elbow.  No position of her neck or left arm makes it worse or better.  She has had this in the past but not this bad.  It has eased up some today.    Low back pain has been a chronic ongoing issue.  Pain is across the low back and worse when standing and doing housework and better with sitting and laying down.  No leg pain or paresthesias.    Patient has not had PT.  About 15 ESIs in the lumbar spine around 15-20 years ago that helped some.  No spine surgery.  Patient is currently taking tylenol, diclofenac.  Medrol pack did not help.  She had been on suboxone in the past for narcotic abuse.  She recently has become sober from alcohol abuse.    Patient denies any recent accidents or trauma, no saddle anesthesias, and no bowel or bladder incontinence.    Patient has some difficulty with balance or gait, no difficulty tying  shoes or buttoning clothes, is not dropping things, does not have difficulty opening containers, and has had no change in handwriting.      Review of Systems:    Review of Systems   Constitutional: Negative for chills, diaphoresis, fever, malaise/fatigue and weight loss.   HENT: Negative for congestion, ear discharge, ear pain, hearing loss, nosebleeds, sinus pain, sore throat and tinnitus.    Eyes: Negative for blurred vision, double vision, photophobia, pain, discharge and redness.   Respiratory: Negative for cough, hemoptysis, sputum production, shortness of breath, wheezing and stridor.    Cardiovascular: Negative for chest pain, palpitations, orthopnea, leg swelling and PND.   Gastrointestinal: Negative for abdominal pain, blood in stool, constipation, diarrhea, heartburn, melena, nausea and vomiting.   Genitourinary: Negative for dysuria, flank pain, frequency, hematuria and urgency.   Musculoskeletal: Positive for back pain, myalgias and neck pain. Negative for falls and joint pain.   Skin: Negative for itching and rash.   Neurological: Negative for dizziness, tingling, tremors, sensory change, speech change, seizures, loss of consciousness, weakness and headaches.   Endo/Heme/Allergies: Negative for environmental allergies and polydipsia. Does not bruise/bleed easily.   Psychiatric/Behavioral: Negative for depression, hallucinations, memory loss and substance abuse. The patient is not nervous/anxious and does not have insomnia.          Past Medical History:   Diagnosis Date    Alcohol abuse     Anxiety     Depression     Hyperlipidemia     Hypertension      Past Surgical History:   Procedure Laterality Date    BREAST CYST ASPIRATION      CHOLECYSTECTOMY      complicated with bile leak, sepsis     Current Outpatient Medications on File Prior to Visit   Medication Sig Dispense Refill    buPROPion (WELLBUTRIN XL) 300 MG 24 hr tablet Take 300 mg by mouth once daily.      diclofenac (VOLTAREN) 50 MG EC  tablet Take 1 tablet (50 mg total) by mouth 3 (three) times daily as needed (pain). 30 tablet 1    metoprolol tartrate (LOPRESSOR) 50 MG tablet Take 1 tablet (50 mg total) by mouth once daily. 90 tablet 3    omeprazole (PRILOSEC) 20 MG capsule Take 1 capsule (20 mg total) by mouth 2 (two) times daily. 60 capsule 11    potassium chloride (MICRO-K) 10 MEQ CpSR Take 1 capsule (10 mEq total) by mouth once daily. 30 capsule 6    venlafaxine (EFFEXOR-XR) 150 MG Cp24 Take 150 mg by mouth once daily.      venlafaxine (EFFEXOR-XR) 75 MG 24 hr capsule Take 75 mg by mouth once daily.      vitamin D (VITAMIN D3) 1000 units Tab Take 5,000 Int'l Units by mouth.      buprenorphine-naloxone 8-2 mg (SUBOXONE) 8-2 mg Subl TAKE 1 TABLET UNDER TONGUE DAILY      methylPREDNISolone (MEDROL DOSEPACK) 4 mg tablet use as directed (Patient not taking: Reported on 5/1/2020) 1 Package 0     No current facility-administered medications on file prior to visit.      Review of patient's allergies indicates:  No Known Allergies  Social History     Socioeconomic History    Marital status: Single     Spouse name: Not on file    Number of children: Not on file    Years of education: Not on file    Highest education level: Not on file   Occupational History    Not on file   Social Needs    Financial resource strain: Not on file    Food insecurity:     Worry: Not on file     Inability: Not on file    Transportation needs:     Medical: Not on file     Non-medical: Not on file   Tobacco Use    Smoking status: Former Smoker    Smokeless tobacco: Never Used   Substance and Sexual Activity    Alcohol use: Yes     Alcohol/week: 12.0 standard drinks     Types: 12 Shots of liquor per week     Comment: three 12-14 oz cocktail drinks.     Drug use: No    Sexual activity: Never   Lifestyle    Physical activity:     Days per week: Not on file     Minutes per session: Not on file    Stress: Not on file   Relationships    Social connections:      Talks on phone: Not on file     Gets together: Not on file     Attends Hoahaoism service: Not on file     Active member of club or organization: Not on file     Attends meetings of clubs or organizations: Not on file     Relationship status: Not on file   Other Topics Concern    Not on file   Social History Narrative    Not on file     Family History   Problem Relation Age of Onset    Atrial fibrillation Mother     Heart failure Mother     Arthritis Mother     Macular degeneration Mother     Stroke Father     Cancer Maternal Uncle         lung    Cataracts Paternal Grandfather     Glaucoma Paternal Grandfather        Objective:      There were no vitals filed for this visit.      Physical Exam:    General:  Vale Gutierrez is well-developed, well-nourished, appears stated age, in no acute distress, alert and oriented to person, place, and time.    XRAY Interpretation:     Lumbar spine ap/lateral xrays were personally reviewed today.  No fractures.  DDD and spondylosis at L3-4, L4-5, and L5-S1.  Grade one spondylolisthesis at L4-5.    Cervical spine MRI was personally reviewed today.  Multilevel DDD and spondylosis and NFS.  Severe left C4-5 and C5-6 NFS from DOC.    Assessment:          1. Neck pain    2. Cervical spondylosis without myelopathy    3. DDD (degenerative disc disease), cervical    4. Cervical radiculopathy    5. HNP (herniated nucleus pulposus), cervical    6. Chronic bilateral low back pain without sciatica    7. DDD (degenerative disc disease), lumbar    8. Spondylosis of lumbar region without myelopathy or radiculopathy    9. Spondylolisthesis, lumbar region            Plan:          Orders Placed This Encounter    Ambulatory referral/consult to Physical/Occupational Therapy    gabapentin (NEURONTIN) 300 MG capsule     Left C4-5 and C5-6 NFS causing left cervical radiculopathy    L3-4, L4-5, L5-S1 DDD/spondylosis and grade one spondylolisthesis L4-5    -Continue diclofenac from  another provider  -Gabapentin 300mg TID  -PT through [facility]  -FU in the spine center in 3 months with another provider while I am out on leave      Follow-Up:  Follow up in about 3 months (around 8/1/2020), or if symptoms worsen or fail to improve. If there are any questions prior to this, the patient was instructed to contact the office.       JEFF Cortez, PA-C  Neurosurgery  [facility]  [facility]

## 2020-05-05 ENCOUNTER — OFFICE VISIT (OUTPATIENT)
Dept: INTERNAL MEDICINE | Facility: CLINIC | Age: 60
End: 2020-05-05
Payer: MEDICARE

## 2020-05-05 DIAGNOSIS — I10 ESSENTIAL HYPERTENSION: Primary | ICD-10-CM

## 2020-05-05 DIAGNOSIS — Z12.12 SCREENING FOR COLORECTAL CANCER: ICD-10-CM

## 2020-05-05 DIAGNOSIS — K52.9 CHRONIC DIARRHEA: ICD-10-CM

## 2020-05-05 DIAGNOSIS — Z12.11 SCREENING FOR COLORECTAL CANCER: ICD-10-CM

## 2020-05-05 PROCEDURE — 99212 OFFICE O/P EST SF 10 MIN: CPT | Mod: 95,,, | Performed by: HOSPITALIST

## 2020-05-05 PROCEDURE — 99212 PR OFFICE/OUTPT VISIT, EST, LEVL II, 10-19 MIN: ICD-10-PCS | Mod: 95,,, | Performed by: HOSPITALIST

## 2020-05-05 RX ORDER — ASPIRIN 81 MG/1
81 TABLET ORAL DAILY
COMMUNITY
End: 2020-09-18 | Stop reason: CLARIF

## 2020-05-05 NOTE — PROGRESS NOTES
Virtual Visit  The patient location is: [place]   The chief complaint leading to consultation is: htn, diarrhea   Visit type: Virtual visit with synchronous audio and video  Total time spent with patient: 10 min  Each patient to whom he or she provides medical services by telemedicine is:  (1) informed of the relationship between the physician and patient and the respective role of any other health care provider with respect to management of the patient; and (2) notified that he or she may decline to receive medical services by telemedicine and may withdraw from such care at any time.    Subjective:       @Patient ID: Vale Gutierrez is a 59 y.o. female.    Chief Complaint: Hypertension and Diarrhea    HPI     60 yo F presents for f/u :   1. HTN: Reports /79, 113/89, 104/71 readings over the past week. Reports BP better. Remains on metoprolol  2. Diarrhea: Reports having diarrhea consistently 3 months. Sometimes 1-2x/day and othertimes 7-8x/day. Has been taking lomotil. No mag supplements. Reports started after she stopped drinking. States it concerns her     Review of Systems   Constitutional: Negative for chills and fever.   HENT: Negative for congestion and sore throat.    Respiratory: Negative for cough and shortness of breath.    Cardiovascular: Negative for chest pain.   Gastrointestinal: Positive for diarrhea. Negative for nausea and vomiting.   Musculoskeletal: Positive for gait problem. Negative for back pain.        Chronic   Neurological: Negative for dizziness and headaches.   Psychiatric/Behavioral: Negative for agitation and behavioral problems.     Past medical history, surgical history, and family medical history reviewed and updated as appropriate.    Medications and allergies reviewed.     Objective:   No vitals and limited physical exam due to virtual visit   Physical Exam   Constitutional: She is oriented to person, place, and time. She appears well-developed. No distress.   HENT:   Head:  Normocephalic and atraumatic.   Neck: Normal range of motion. Neck supple.   Pulmonary/Chest: Effort normal.   Neurological: She is alert and oriented to person, place, and time.   Psychiatric: She has a normal mood and affect. Her behavior is normal.       Lab Results   Component Value Date    WBC 8.43 03/11/2020    HGB 13.7 03/11/2020    HCT 44.4 03/11/2020     03/11/2020    ALT 14 03/11/2020    AST 16 03/11/2020     03/11/2020    K 4.7 03/11/2020     03/11/2020    CREATININE 1.2 03/11/2020    BUN 23 03/11/2020    CO2 21 (L) 03/11/2020    TSH 1.703 08/29/2019    HGBA1C 5.0 08/29/2019       Assessment:     1. Essential hypertension    2. Chronic diarrhea    3. Screening for colorectal cancer      Plan:   Vale was seen today for hypertension and diarrhea.    Diagnoses and all orders for this visit:    Essential hypertension        - BP appears controlled. Continue current medication with lopressor 50 mg qday    Chronic diarrhea  -     Ambulatory referral/consult to Gastroenterology; Future    Screening for colorectal cancer  -     Ambulatory referral/consult to Gastroenterology; Future          Follow up if symptoms worsen or fail to improve.    Estela Mcdaniel MD  Internal Medicine    5/5/2020

## 2020-05-12 ENCOUNTER — PATIENT OUTREACH (OUTPATIENT)
Dept: ADMINISTRATIVE | Facility: OTHER | Age: 60
End: 2020-05-12

## 2020-05-13 ENCOUNTER — TELEPHONE (OUTPATIENT)
Dept: GASTROENTEROLOGY | Facility: CLINIC | Age: 60
End: 2020-05-13

## 2020-05-13 NOTE — TELEPHONE ENCOUNTER
----- Message from Jennifer Farrell sent at 5/13/2020 11:08 AM CDT -----  Contact: Pt  Pt requesting to reschedule appt for a Virtual Visit    Please call and advise    Phone [phone]

## 2020-05-14 ENCOUNTER — OFFICE VISIT (OUTPATIENT)
Dept: GASTROENTEROLOGY | Facility: CLINIC | Age: 60
End: 2020-05-14
Payer: MEDICARE

## 2020-05-14 ENCOUNTER — TELEPHONE (OUTPATIENT)
Dept: GASTROENTEROLOGY | Facility: CLINIC | Age: 60
End: 2020-05-14

## 2020-05-14 DIAGNOSIS — Z12.12 SCREENING FOR COLORECTAL CANCER: ICD-10-CM

## 2020-05-14 DIAGNOSIS — K21.9 GASTROESOPHAGEAL REFLUX DISEASE, ESOPHAGITIS PRESENCE NOT SPECIFIED: ICD-10-CM

## 2020-05-14 DIAGNOSIS — K52.9 CHRONIC DIARRHEA: Primary | ICD-10-CM

## 2020-05-14 DIAGNOSIS — Z12.11 SCREENING FOR COLORECTAL CANCER: ICD-10-CM

## 2020-05-14 PROCEDURE — 99204 PR OFFICE/OUTPT VISIT, NEW, LEVL IV, 45-59 MIN: ICD-10-PCS | Mod: 95,,, | Performed by: INTERNAL MEDICINE

## 2020-05-14 PROCEDURE — 99204 OFFICE O/P NEW MOD 45 MIN: CPT | Mod: 95,,, | Performed by: INTERNAL MEDICINE

## 2020-05-14 RX ORDER — POLYETHYLENE GLYCOL 3350, SODIUM SULFATE ANHYDROUS, SODIUM BICARBONATE, SODIUM CHLORIDE, POTASSIUM CHLORIDE 236; 22.74; 6.74; 5.86; 2.97 G/4L; G/4L; G/4L; G/4L; G/4L
4 POWDER, FOR SOLUTION ORAL ONCE
Qty: 1 BOTTLE | Refills: 0 | Status: SHIPPED | OUTPATIENT
Start: 2020-05-14 | End: 2020-05-14

## 2020-05-14 NOTE — TELEPHONE ENCOUNTER
Labs scheduled on 5/18/20 in [place] at 2:00pm. Patient has been told to  a stool kit at that location.       EGD/Colonoscopy scheduled on 6/3/20. Golytely instructions explained to pt and mailed to home address also sent via [facility].         Patient has been told to proceed to the [facility] in [place] on 6/1/20 between 9am-9pm for COVID testing.

## 2020-05-14 NOTE — H&P (VIEW-ONLY)
The patient location is: [place], [place]   The chief complaint leading to consultation is: diarrhea  Visit type: audiovisual  Total time spent with patient: 16 min  Each patient to whom he or she provides medical services by telemedicine is:  (1) informed of the relationship between the physician and patient and the respective role of any other health care provider with respect to management of the patient; and (2) notified that he or she may decline to receive medical services by telemedicine and may withdraw from such care at any time.             GASTROENTEROLOGY CLINIC NOTE    Reason for visit: The primary encounter diagnosis was Chronic diarrhea. Diagnoses of Screening for colorectal cancer and Gastroesophageal reflux disease, esophagitis presence not specified were also pertinent to this visit.  Referring provider/PCP: Estela Mcdaniel MD    HPI:  Vale Gutierrez is a 59 y.o. female virtual visit , new patient, for referral for diarrhea.  Hx of dustin in 2017 (had bile leak and complications)     Reports having diarrhea consistently about 3 months. Sometimes 1-2x/day and othertimes 7-8x/day. Very rare nocturnal stools.  Took some lomotil which helped.  Reports started after she stopped drinking, although she isnt quite sure of exact timing. She mentions terrible diet. Eats lots of ice cream. Assoc incontinence , with urge. sometimes appears oily, sometimes cramps. Has memory issues. Taking omeprazole for gerd and this helps.    Treatments tried: lomotil    New medications: diclofenac since 4/28 (tried naprosyn prior)  Antibiotics: none  Travel: no  Sick contacts: no  Blood / mucus: none  Gallbladder: removed  Prior GI surgeries: none  Imaging:none        Prior Endoscopy:  EGD: many years ago, unsure results.  Colon: about 10 years ago, told to come back 5 years    (Portions of this note were dictated using voice recognition software and may contain dictation related errors in spelling/grammar/syntax not found on text  review)    Review of Systems   Constitutional: Negative for fever and weight loss.   HENT: Negative for nosebleeds and sore throat.    Eyes: Negative for double vision and photophobia.   Respiratory: Negative for cough and shortness of breath.    Cardiovascular: Negative for chest pain and leg swelling.   Gastrointestinal: Positive for abdominal pain and diarrhea. Negative for blood in stool.   Genitourinary: Negative for dysuria and hematuria.   Musculoskeletal: Negative for joint pain and neck pain.   Skin: Negative for itching and rash.   Neurological: Negative for dizziness and headaches.   Psychiatric/Behavioral: Positive for memory loss. Negative for hallucinations. The patient does not have insomnia.        Past Medical History: has a past medical history of Alcohol abuse, Anxiety, Depression, Hyperlipidemia, and Hypertension.    Past Surgical History: has a past surgical history that includes Cholecystectomy and Breast cyst aspiration.    Family History:family history includes Arthritis in her mother; Atrial fibrillation in her mother; Cancer in her maternal uncle; Cataracts in her paternal grandfather; Glaucoma in her paternal grandfather; Heart failure in her mother; Macular degeneration in her mother; Stroke in her father.    Allergies: Review of patient's allergies indicates:  No Known Allergies    Social History: reports that she has quit smoking. She has never used smokeless tobacco. She reports that she drinks about 12.0 standard drinks of alcohol per week. She reports that she does not use drugs.    Home medications:   Current Outpatient Medications on File Prior to Visit   Medication Sig Dispense Refill    aspirin (ECOTRIN) 81 MG EC tablet Take 81 mg by mouth once daily.      buprenorphine-naloxone 8-2 mg (SUBOXONE) 8-2 mg Subl TAKE 1 TABLET UNDER TONGUE DAILY      diclofenac (VOLTAREN) 50 MG EC tablet Take 1 tablet (50 mg total) by mouth 3 (three) times daily as needed (pain). 30 tablet 1     gabapentin (NEURONTIN) 300 MG capsule Take one cap PO QHS for 3 days, then one cap PO BID for the next 3 days, and then take one cap PO TID and continue 90 capsule 2    metoprolol tartrate (LOPRESSOR) 50 MG tablet Take 1 tablet (50 mg total) by mouth once daily. 90 tablet 3    omeprazole (PRILOSEC) 20 MG capsule Take 1 capsule (20 mg total) by mouth 2 (two) times daily. 60 capsule 11    potassium chloride (MICRO-K) 10 MEQ CpSR Take 1 capsule (10 mEq total) by mouth once daily. 30 capsule 6    venlafaxine (EFFEXOR-XR) 150 MG Cp24 Take 150 mg by mouth once daily.      venlafaxine (EFFEXOR-XR) 75 MG 24 hr capsule Take 75 mg by mouth once daily.      vitamin D (VITAMIN D3) 1000 units Tab Take 5,000 Int'l Units by mouth.      [DISCONTINUED] buPROPion (WELLBUTRIN XL) 300 MG 24 hr tablet Take 300 mg by mouth once daily.       No current facility-administered medications on file prior to visit.        Vital signs:  There were no vitals taken for this visit.    Physical Exam    NO PHYSICAL EXAM PERFORMED GIVEN VIRTUAL VISIT      Routine labs:  Lab Results   Component Value Date    WBC 8.43 03/11/2020    HGB 13.7 03/11/2020    HCT 44.4 03/11/2020     (H) 03/11/2020     03/11/2020     No results found for: INR  No results found for: IRON, FERRITIN, TIBC, FESATURATED  Lab Results   Component Value Date     03/11/2020    K 4.7 03/11/2020     03/11/2020    CO2 21 (L) 03/11/2020    BUN 23 03/11/2020    CREATININE 1.2 03/11/2020     Lab Results   Component Value Date    ALBUMIN 3.7 03/11/2020    ALT 14 03/11/2020    AST 16 03/11/2020    ALKPHOS 89 03/11/2020    BILITOT 0.2 03/11/2020     No results found for: GLUCOSE    I have reviewed prior labs, imaging, notes from last month      Assessment:  1. Chronic diarrhea    2. Screening for colorectal cancer    3. Gastroesophageal reflux disease, esophagitis presence not specified      Unclear etiology, will start with labs and stool and egd / colon.  Can  consider trial of bentyl or questran down the road.    Plan:  Orders Placed This Encounter    Stool culture    Clostridium difficile EIA    Pancreatic elastase, fecal    Fecal fat, qualitative    WBC, Stool    Calprotectin    Giardia / Cryptosporidum, EIA    Stool Exam-Ova,Cysts,Parasites    C-Reactive Protein    Tissue transglutaminase, IgA    IgA    COVID-19 Routine Screening    polyethylene glycol (GOLYTELY,NULYTELY) 236-22.74-6.74 -5.86 gram suspension    Case request GI: EGD (ESOPHAGOGASTRODUODENOSCOPY), COLONOSCOPY       RTC after studies complete      Tino Neil MD  [facility]

## 2020-05-14 NOTE — TELEPHONE ENCOUNTER
----- Message from Tino Neil MD sent at 5/14/2020 12:01 PM CDT -----  Needs labs / stool tests  / and schedule for egd and colon. I ordered golytlety prep and ordered covid test.

## 2020-05-14 NOTE — LETTER
May 14, 2020      Estela Mcdaniel MD  [address] [facility] [address]           [place] [facility]  [address], [address]  [facility] [address]  Phone: [phone]          Patient: Vale Gutierrez   MR Number: [id number]   YOB: 1960   Date of Visit: 5/14/2020       Dear Dr. Estela Mcdaniel:    Thank you for referring Vale Gutierrez to me for evaluation. Attached you will find relevant portions of my assessment and plan of care.    If you have questions, please do not hesitate to call me. I look forward to following Vale Gutierrez along with you.    Sincerely,    Tino Neil MD    Enclosure  CC:  No Recipients    If you would like to receive this communication electronically, please contact [email or web address] or [phone] to request more information on [email or web address] Link access.    For providers and/or their staff who would like to refer a patient to [facility], please contact us through our one-stop-shop provider referral line, [facility], at [phone].    If you feel you have received this communication in error or would no longer like to receive these types of communications, please e-mail [email or web address]

## 2020-05-14 NOTE — Clinical Note
Needs labs / stool tests  / and schedule for egd and colon. I ordered golytlety prep and ordered covid test.

## 2020-05-14 NOTE — PROGRESS NOTES
The patient location is: [place], [place]   The chief complaint leading to consultation is: diarrhea  Visit type: audiovisual  Total time spent with patient: 16 min  Each patient to whom he or she provides medical services by telemedicine is:  (1) informed of the relationship between the physician and patient and the respective role of any other health care provider with respect to management of the patient; and (2) notified that he or she may decline to receive medical services by telemedicine and may withdraw from such care at any time.             GASTROENTEROLOGY CLINIC NOTE    Reason for visit: The primary encounter diagnosis was Chronic diarrhea. Diagnoses of Screening for colorectal cancer and Gastroesophageal reflux disease, esophagitis presence not specified were also pertinent to this visit.  Referring provider/PCP: Estela Mcdaniel MD    HPI:  Vale Gutierrez is a 59 y.o. female virtual visit , new patient, for referral for diarrhea.  Hx of dustin in 2017 (had bile leak and complications)     Reports having diarrhea consistently about 3 months. Sometimes 1-2x/day and othertimes 7-8x/day. Very rare nocturnal stools.  Took some lomotil which helped.  Reports started after she stopped drinking, although she isnt quite sure of exact timing. She mentions terrible diet. Eats lots of ice cream. Assoc incontinence , with urge. sometimes appears oily, sometimes cramps. Has memory issues. Taking omeprazole for gerd and this helps.    Treatments tried: lomotil    New medications: diclofenac since 4/28 (tried naprosyn prior)  Antibiotics: none  Travel: no  Sick contacts: no  Blood / mucus: none  Gallbladder: removed  Prior GI surgeries: none  Imaging:none        Prior Endoscopy:  EGD: many years ago, unsure results.  Colon: about 10 years ago, told to come back 5 years    (Portions of this note were dictated using voice recognition software and may contain dictation related errors in spelling/grammar/syntax not found on text  review)    Review of Systems   Constitutional: Negative for fever and weight loss.   HENT: Negative for nosebleeds and sore throat.    Eyes: Negative for double vision and photophobia.   Respiratory: Negative for cough and shortness of breath.    Cardiovascular: Negative for chest pain and leg swelling.   Gastrointestinal: Positive for abdominal pain and diarrhea. Negative for blood in stool.   Genitourinary: Negative for dysuria and hematuria.   Musculoskeletal: Negative for joint pain and neck pain.   Skin: Negative for itching and rash.   Neurological: Negative for dizziness and headaches.   Psychiatric/Behavioral: Positive for memory loss. Negative for hallucinations. The patient does not have insomnia.        Past Medical History: has a past medical history of Alcohol abuse, Anxiety, Depression, Hyperlipidemia, and Hypertension.    Past Surgical History: has a past surgical history that includes Cholecystectomy and Breast cyst aspiration.    Family History:family history includes Arthritis in her mother; Atrial fibrillation in her mother; Cancer in her maternal uncle; Cataracts in her paternal grandfather; Glaucoma in her paternal grandfather; Heart failure in her mother; Macular degeneration in her mother; Stroke in her father.    Allergies: Review of patient's allergies indicates:  No Known Allergies    Social History: reports that she has quit smoking. She has never used smokeless tobacco. She reports that she drinks about 12.0 standard drinks of alcohol per week. She reports that she does not use drugs.    Home medications:   Current Outpatient Medications on File Prior to Visit   Medication Sig Dispense Refill    aspirin (ECOTRIN) 81 MG EC tablet Take 81 mg by mouth once daily.      buprenorphine-naloxone 8-2 mg (SUBOXONE) 8-2 mg Subl TAKE 1 TABLET UNDER TONGUE DAILY      diclofenac (VOLTAREN) 50 MG EC tablet Take 1 tablet (50 mg total) by mouth 3 (three) times daily as needed (pain). 30 tablet 1     gabapentin (NEURONTIN) 300 MG capsule Take one cap PO QHS for 3 days, then one cap PO BID for the next 3 days, and then take one cap PO TID and continue 90 capsule 2    metoprolol tartrate (LOPRESSOR) 50 MG tablet Take 1 tablet (50 mg total) by mouth once daily. 90 tablet 3    omeprazole (PRILOSEC) 20 MG capsule Take 1 capsule (20 mg total) by mouth 2 (two) times daily. 60 capsule 11    potassium chloride (MICRO-K) 10 MEQ CpSR Take 1 capsule (10 mEq total) by mouth once daily. 30 capsule 6    venlafaxine (EFFEXOR-XR) 150 MG Cp24 Take 150 mg by mouth once daily.      venlafaxine (EFFEXOR-XR) 75 MG 24 hr capsule Take 75 mg by mouth once daily.      vitamin D (VITAMIN D3) 1000 units Tab Take 5,000 Int'l Units by mouth.      [DISCONTINUED] buPROPion (WELLBUTRIN XL) 300 MG 24 hr tablet Take 300 mg by mouth once daily.       No current facility-administered medications on file prior to visit.        Vital signs:  There were no vitals taken for this visit.    Physical Exam    NO PHYSICAL EXAM PERFORMED GIVEN VIRTUAL VISIT      Routine labs:  Lab Results   Component Value Date    WBC 8.43 03/11/2020    HGB 13.7 03/11/2020    HCT 44.4 03/11/2020     (H) 03/11/2020     03/11/2020     No results found for: INR  No results found for: IRON, FERRITIN, TIBC, FESATURATED  Lab Results   Component Value Date     03/11/2020    K 4.7 03/11/2020     03/11/2020    CO2 21 (L) 03/11/2020    BUN 23 03/11/2020    CREATININE 1.2 03/11/2020     Lab Results   Component Value Date    ALBUMIN 3.7 03/11/2020    ALT 14 03/11/2020    AST 16 03/11/2020    ALKPHOS 89 03/11/2020    BILITOT 0.2 03/11/2020     No results found for: GLUCOSE    I have reviewed prior labs, imaging, notes from last month      Assessment:  1. Chronic diarrhea    2. Screening for colorectal cancer    3. Gastroesophageal reflux disease, esophagitis presence not specified      Unclear etiology, will start with labs and stool and egd / colon.  Can  consider trial of bentyl or questran down the road.    Plan:  Orders Placed This Encounter    Stool culture    Clostridium difficile EIA    Pancreatic elastase, fecal    Fecal fat, qualitative    WBC, Stool    Calprotectin    Giardia / Cryptosporidum, EIA    Stool Exam-Ova,Cysts,Parasites    C-Reactive Protein    Tissue transglutaminase, IgA    IgA    COVID-19 Routine Screening    polyethylene glycol (GOLYTELY,NULYTELY) 236-22.74-6.74 -5.86 gram suspension    Case request GI: EGD (ESOPHAGOGASTRODUODENOSCOPY), COLONOSCOPY       RTC after studies complete      Tino Neil MD  [facility]

## 2020-05-18 ENCOUNTER — LAB VISIT (OUTPATIENT)
Dept: LAB | Facility: HOSPITAL | Age: 60
End: 2020-05-18
Attending: INTERNAL MEDICINE
Payer: MEDICARE

## 2020-05-18 ENCOUNTER — TELEPHONE (OUTPATIENT)
Dept: GASTROENTEROLOGY | Facility: CLINIC | Age: 60
End: 2020-05-18

## 2020-05-18 DIAGNOSIS — K52.9 CHRONIC DIARRHEA: ICD-10-CM

## 2020-05-18 DIAGNOSIS — Z12.11 SCREENING FOR COLORECTAL CANCER: ICD-10-CM

## 2020-05-18 DIAGNOSIS — Z12.12 SCREENING FOR COLORECTAL CANCER: ICD-10-CM

## 2020-05-18 LAB
CRP SERPL-MCNC: 4 MG/L (ref 0–8.2)
IGA SERPL-MCNC: 268 MG/DL (ref 40–350)

## 2020-05-18 PROCEDURE — 82784 ASSAY IGA/IGD/IGG/IGM EACH: CPT

## 2020-05-18 PROCEDURE — 86140 C-REACTIVE PROTEIN: CPT

## 2020-05-18 PROCEDURE — 36415 COLL VENOUS BLD VENIPUNCTURE: CPT | Mod: PO

## 2020-05-18 PROCEDURE — 83516 IMMUNOASSAY NONANTIBODY: CPT

## 2020-05-18 NOTE — TELEPHONE ENCOUNTER
Patient stated that they did not have a stool kit at the [facility] in [place]. She will come to the [facility] to pick one up.

## 2020-05-19 ENCOUNTER — CLINICAL SUPPORT (OUTPATIENT)
Dept: REHABILITATION | Facility: HOSPITAL | Age: 60
End: 2020-05-19
Payer: MEDICARE

## 2020-05-19 DIAGNOSIS — Z74.09 IMPAIRED FUNCTIONAL MOBILITY, BALANCE, GAIT, AND ENDURANCE: ICD-10-CM

## 2020-05-19 PROCEDURE — 97162 PT EVAL MOD COMPLEX 30 MIN: CPT | Mod: PO

## 2020-05-22 PROBLEM — Z74.09 IMPAIRED FUNCTIONAL MOBILITY, BALANCE, GAIT, AND ENDURANCE: Status: ACTIVE | Noted: 2020-05-22

## 2020-05-22 LAB — TTG IGA SER-ACNC: 7 UNITS

## 2020-05-22 NOTE — PLAN OF CARE
[facility]  Physical Therapy Initial Evaluation    Date: 5/19/2020   Name: Vale Gutierrez  Clinic Number: [id number]    Therapy Diagnosis:   Encounter Diagnosis   Name Primary?    Impaired functional mobility, balance, gait, and endurance      Physician: Kylie Lewis, *    Physician Orders: PT Eval and Treat   Medical Diagnosis from Referral:   M54.2 (ICD-10-CM) - Neck pain   M47.812 (ICD-10-CM) - Cervical spondylosis without myelopathy   M50.30 (ICD-10-CM) - DDD (degenerative disc disease), cervical   M54.12 (ICD-10-CM) - Cervical radiculopathy   M50.20 (ICD-10-CM) - HNP (herniated nucleus pulposus), cervical   M54.5,G89.29 (ICD-10-CM) - Chronic bilateral low back pain without sciatica   M51.36 (ICD-10-CM) - DDD (degenerative disc disease), lumbar   M47.816 (ICD-10-CM) - Spondylosis of lumbar region without myelopathy or radiculopathy   M43.16 (ICD-10-CM) - Spondylolisthesis, lumbar region       Evaluation Date: 5/19/2020  Authorization Period Expiration: 5/21/2020  Plan of Care Expiration: 8/19/2020  Visit # / Visits authorized: 1/ 6    Time In: 233pm  Time Out: 315pm  Total Appointment Time (timed & untimed codes): 42 minutes (eval only)    Precautions: Fall, standard     Subjective   Date of onset: years and years and years   History of current condition - Vale reports: that she was very active when she was younger; she refers to a pop when she was 16; it would only bother her a little bit here and there. In her early 40s she re injured it. That was the beginning of the bad bad pain. She was on alcohol and opioids. She quit drinking 3 months ago, and she has noticed the pain. She admits to being sedentary. She was feeling pretty good after her last bout of PT, but then it ended      Medical History:   Past Medical History:   Diagnosis Date    Alcohol abuse     Anxiety     Depression     Hyperlipidemia     Hypertension        Surgical History:   Vale Gutierrez  has a  "past surgical history that includes Cholecystectomy and Breast cyst aspiration.    Medications:   Vale has a current medication list which includes the following prescription(s): aspirin, buprenorphine-naloxone 8-2 mg, diclofenac, gabapentin, metoprolol tartrate, omeprazole, potassium chloride, venlafaxine, venlafaxine, and vitamin d.    Allergies:   Review of patient's allergies indicates:  No Known Allergies     Imaging, CT scan films: "Five views: There is grade 1 anterolisthesis of L4 on L5.  There is mild-moderate DJD at L5-S1 and L3-L4.  There is mild DJD elsewhere.  No fracture dislocation bone destruction seen.  No pars defects are seen."    Prior Therapy: yes  Social History: lives her sister, no stairs, all flat  Occupation: retired  Prior Level of Function: alcohol dependence, falling a lot   Current Level of Function: after rehab, better, hasnt fallen in 6 months    Pain:  Current 3/10, worst 9/10, best 1/10   Location: bilateral low back    Description: Throbbing and Shooting  No numbness or tingling  Aggravating Factors: Walking  Easing Factors: rest and seated     Pts goals: to be able to do more for herself     Objective     LUMBAR SPINE AROM: all causing pain, worse with extension  Flexion: 50%   Extension: 50%   Left Sidebend: 50%   Right Sidebend: 50%   Left Rotation: 50%   Right Rotation: 50%     SEGMENTAL MOBILITY: unable to tolerate prone positioning        LOWER EXTREMITY STRENGTH:   Left Right   Quadriceps 4/5 4/5   Hamstrings 4-/5 4-/5     Iliopsoas 4-/5 4-/5   PGM NT NT   Hip IR 4-/5 4-/5   Hip ER 4-/5 4-/5   Hip Ext NT NT         Dermatomes: Sensation: Light Touch: Intact  Flexibility:SEVERE restriction bilateral hamstrings, bilateral calves     Special Tests:   Left Right   Slump + +   SLR - -     GAIT: Vale ambulates with no assistive device (reports use of cane at times) with independently.     GAIT DEVIATIONS: Vale displays occasional unsteady gait, flexed posture, slow gait speed, " hip hike on right side, decreased pelvic rotation    FUNCTIONAL:     5 Time Sit to Stand: 20.06 seconds       Limitation/Restriction for FOTO Lumbar Survey NOT PERFORMED     Therapist reviewed FOTO scores for Vale Gutierrez on 5/19/2020.   FOTO documents entered into [employer] - see Media section.  Based on Clinical Assessment:   Limitation Score: 75%         Home Exercises and Patient Education Provided    Education provided:   - HEP    Written Home Exercises Provided: yes.  Exercises were reviewed and Vale was able to demonstrate them prior to the end of the session.  Vale demonstrated good  understanding of the education provided.     See EMR under Patient Instructions for exercises provided 5/19/2020.    Assessment   Vale is a 59 y.o. female referred to outpatient Physical Therapy with a medical diagnosis of cervical spondylosis and spondylolisthesis. Pt presents with main complaint being low back pain and due to time constraints, low back was evaluated today. Pt showed gait abnormalities (multiple), decreased functional mobility and endurance, pain with lumbar active range of motion, increased tightness of hips and lower extremity musculature, decreased strength, all consistent with diagnosis.     Pt prognosis is Fair.   Pt will benefit from skilled outpatient Physical Therapy to address the deficits stated above and in the chart below, provide pt/family education, and to maximize pt's level of independence.     Plan of care discussed with patient: Yes  Pt's spiritual, cultural and educational needs considered and patient is agreeable to the plan of care and goals as stated below:     Anticipated Barriers for therapy: chronicity of condition    Medical Necessity is demonstrated by the following  History  Co-morbidities and personal factors that may impact the plan of care Co-morbidities:   osteoarthritis , fall risk, past alcohol abuse and dependence, depression    Personal Factors:   coping  style  lifestyle  character     high   Examination  Body Structures and Functions, activity limitations and participation restrictions that may impact the plan of care Body Regions:   neck  back  lower extremities  upper extremities  trunk    Body Systems:    gross symmetry  ROM  strength  gross coordinated movement  balance  gait  transfers  transitions    Participation Restrictions:   none    Activity limitations:   Learning and applying knowledge  no deficits    General Tasks and Commands  no deficits    Communication  no deficits    Mobility  lifting and carrying objects  walking  driving (bike, car, motorcycle)    Self care  washing oneself (bathing, drying, washing hands)  dressing    Domestic Life  shopping  cooking  doing house work (cleaning house, washing dishes, laundry)    Interactions/Relationships  no deficits    Life Areas  no deficits    Community and Social Life  no deficits         high   Clinical Presentation evolving clinical presentation with changing clinical characteristics moderate   Decision Making/ Complexity Score: moderate     Short Term Goals (12 Weeks):  1. Pt will be compliant with HEP to supplement PT in decreasing pain with functional mobility.  2. Pt will perform pallof press with good control to demonstrate improved core strength.  3. Pt will improve lumbar ROM to 75% in all planes to improve functional mobility.  4. Pt will improve impaired LE MMTs to >/=4/5 to improve strength for functional tasks.  Long Term Goals (24 Weeks):      1. Pt will perform dead bug with good control and TA activation to demonstrate improved core strength.  2. Pt will improve impaired LE MMTs to >/=4+/5 to improve strength for functional tasks.  3. Pt will report no pain during lumbar ROM to promote functional mobility.    Plan   Plan of care Certification: 5/19/2020 to 8/19/2020.    Outpatient Physical Therapy 2 times weekly for 12 weeks to include the following interventions: Cervical/Lumbar Traction,  Gait Training, Manual Therapy, Moist Heat/ Ice, Neuromuscular Re-ed, Patient Education, Therapeutic Activites and Therapeutic Exercise.     Rich Canseco, PT

## 2020-05-26 ENCOUNTER — TELEPHONE (OUTPATIENT)
Dept: GASTROENTEROLOGY | Facility: CLINIC | Age: 60
End: 2020-05-26

## 2020-05-26 NOTE — TELEPHONE ENCOUNTER
Spoke with pt and she stated that she could not  a stool kit. She will attempt to find a ride here to  one. If she can not she will get a cup from the lab and return the specimen to the [place] lab.

## 2020-05-26 NOTE — PROGRESS NOTES
"  Physical Therapy Daily Treatment Note     Name: Vale Gutierrez  Clinic Number: [id number]    Therapy Diagnosis:   Encounter Diagnosis   Name Primary?    Impaired functional mobility, balance, gait, and endurance      Physician: Kylie Lewis, *    Visit Date: 5/27/2020    Physician Orders: PT Eval and Treat   Medical Diagnosis:   M54.2 (ICD-10-CM) - Neck pain   M47.812 (ICD-10-CM) - Cervical spondylosis without myelopathy   M50.30 (ICD-10-CM) - DDD (degenerative disc disease), cervical   M54.12 (ICD-10-CM) - Cervical radiculopathy   M50.20 (ICD-10-CM) - HNP (herniated nucleus pulposus), cervical   M54.5,G89.29 (ICD-10-CM) - Chronic bilateral low back pain without sciatica   M51.36 (ICD-10-CM) - DDD (degenerative disc disease), lumbar   M47.816 (ICD-10-CM) - Spondylosis of lumbar region without myelopathy or radiculopathy   M43.16 (ICD-10-CM) - Spondylolisthesis, lumbar region     Evaluation Date: 5/19/2020  Authorization Period Expiration: 5/21/2020  Plan of Care Certification Period: 8/19/2020  Visit #/Visits authorized: 2/6     Time In: 3:00  Time Out: 3:45  Total Billable Time: 45 minutes    Precautions: Standard and Fall    Subjective     Pt reports: she is feeling bad today.   She was compliant with home exercise program.  Response to previous treatment: ongoing  Functional change: ongoing     Pain: 6/10  Location: bilateral back      Objective     Vale received hot pack for 10 minutes to low back.    Vale received therapeutic exercises to develop strength, endurance, ROM, flexibility, posture and core stabilization for 35 minutes including:    Seated EOM:  HS stretch 2 x 30"  Sitting Figure 4 stretch 2 x 30"  Hip abduction 2 x 10   Hip adduction with yellow ball 1 x 10     Supine:  Pelvic tilts 2 x 10   LTR x 15   TA draw 2 x 10       Home Exercises Provided and Patient Education Provided     Education provided:   - HEP    Written Home Exercises Provided: yes.  Exercises were reviewed and Vale was " able to demonstrate them prior to the end of the session.  Vale demonstrated good  understanding of the education provided.     See EMR under Patient Instructions for exercises provided prior visit.    Assessment     Vale tolerated tx well with no increase in pain or discomfort. Patient limited with postural endurance, strength, gait deviations noted, and weakness in bilateral lower extremities.     Vale is progressing well towards her goals.   Pt prognosis is Fair.     Pt will continue to benefit from skilled outpatient physical therapy to address the deficits listed in the problem list box on initial evaluation, provide pt/family education and to maximize pt's level of independence in the home and community environment.     Pt's spiritual, cultural and educational needs considered and pt agreeable to plan of care and goals.     Anticipated barriers to physical therapy: chronicity of condition    Goals:  Short Term Goals (12 Weeks):  1. Pt will be compliant with HEP to supplement PT in decreasing pain with functional mobility.  2. Pt will perform pallof press with good control to demonstrate improved core strength.  3. Pt will improve lumbar ROM to 75% in all planes to improve functional mobility.  4. Pt will improve impaired LE MMTs to >/=4/5 to improve strength for functional tasks.  Long Term Goals (24 Weeks):   1. Pt will perform dead bug with good control and TA activation to demonstrate improved core strength.  2. Pt will improve impaired LE MMTs to >/=4+/5 to improve strength for functional tasks.  3. Pt will report no pain during lumbar ROM to promote functional mobility.    Plan     Continue per POC.     Haley Gusman, PTA

## 2020-05-27 ENCOUNTER — TELEPHONE (OUTPATIENT)
Dept: GASTROENTEROLOGY | Facility: CLINIC | Age: 60
End: 2020-05-27

## 2020-05-27 ENCOUNTER — CLINICAL SUPPORT (OUTPATIENT)
Dept: REHABILITATION | Facility: HOSPITAL | Age: 60
End: 2020-05-27
Payer: MEDICARE

## 2020-05-27 DIAGNOSIS — Z74.09 IMPAIRED FUNCTIONAL MOBILITY, BALANCE, GAIT, AND ENDURANCE: ICD-10-CM

## 2020-05-27 PROCEDURE — 97110 THERAPEUTIC EXERCISES: CPT | Mod: PO,CQ

## 2020-05-27 NOTE — TELEPHONE ENCOUNTER
----- Message from Janeth CARMENMoe English sent at 5/27/2020  2:21 PM CDT -----  Contact: [phone] self   Pt is requesting to speak with you re: stool kit. Pt states that she is currently at [place] location.  Please advise

## 2020-05-28 ENCOUNTER — TELEPHONE (OUTPATIENT)
Dept: GASTROENTEROLOGY | Facility: CLINIC | Age: 60
End: 2020-05-28

## 2020-05-28 ENCOUNTER — PATIENT MESSAGE (OUTPATIENT)
Dept: GASTROENTEROLOGY | Facility: CLINIC | Age: 60
End: 2020-05-28

## 2020-05-28 NOTE — TELEPHONE ENCOUNTER
----- Message from Deonna Frias sent at 5/28/2020  2:15 PM CDT -----  Contact: Self [phone]  Patient is calling to talk to nurse in regards to her instruction sheet if it was sent for her procedures.

## 2020-06-01 ENCOUNTER — TELEPHONE (OUTPATIENT)
Dept: ENDOSCOPY | Facility: HOSPITAL | Age: 60
End: 2020-06-01

## 2020-06-01 ENCOUNTER — CLINICAL SUPPORT (OUTPATIENT)
Dept: URGENT CARE | Facility: CLINIC | Age: 60
End: 2020-06-01
Payer: MEDICARE

## 2020-06-01 VITALS — TEMPERATURE: 99 F | HEART RATE: 87 BPM | OXYGEN SATURATION: 96 %

## 2020-06-01 DIAGNOSIS — K52.9 CHRONIC DIARRHEA: ICD-10-CM

## 2020-06-01 DIAGNOSIS — Z12.12 SCREENING FOR COLORECTAL CANCER: ICD-10-CM

## 2020-06-01 DIAGNOSIS — Z12.11 SCREENING FOR COLORECTAL CANCER: ICD-10-CM

## 2020-06-01 PROCEDURE — U0003 INFECTIOUS AGENT DETECTION BY NUCLEIC ACID (DNA OR RNA); SEVERE ACUTE RESPIRATORY SYNDROME CORONAVIRUS 2 (SARS-COV-2) (CORONAVIRUS DISEASE [COVID-19]), AMPLIFIED PROBE TECHNIQUE, MAKING USE OF HIGH THROUGHPUT TECHNOLOGIES AS DESCRIBED BY CMS-2020-01-R: HCPCS

## 2020-06-01 NOTE — TELEPHONE ENCOUNTER
Left message instructing patient to call dept @ [phone] between 8am-4pm.    Arrival time to be given @ 1420.

## 2020-06-01 NOTE — TELEPHONE ENCOUNTER
Spoke with patient about arrival time @1330.     Prep instructions reviewed: the day before the procedure, follow a clear liquid diet all day, then start the first 1/2 of prep at 5pm and take 2nd 1/2 of prep @0830.  Pt must be completely NPO when prep completed @1030.              Medications: Do not take Insulin or oral diabetic medications the day of the procedure.  Take as prescribed: heart, seizure and blood pressure medication in the morning with a sip of water (less than an ounce).  Take any breathing medications and bring inhalers to hospital with you Leave all valuables and jewelry at home.     Wear comfortable clothes to procedure to change into hospital gown You cannot drive for 24 hours after your procedure because you will receive sedation for your procedure to make you comfortable.  A ride must be provided at discharge.

## 2020-06-01 NOTE — PROGRESS NOTES
"  Physical Therapy Daily Treatment Note     Name: Vale Gutierrez  Clinic Number: [id number]    Therapy Diagnosis:   Encounter Diagnosis   Name Primary?    Impaired functional mobility, balance, gait, and endurance      Physician: Kylie Lewis, *    Visit Date: 6/2/2020    Physician Orders: PT Eval and Treat   Medical Diagnosis:   M54.2 (ICD-10-CM) - Neck pain   M47.812 (ICD-10-CM) - Cervical spondylosis without myelopathy   M50.30 (ICD-10-CM) - DDD (degenerative disc disease), cervical   M54.12 (ICD-10-CM) - Cervical radiculopathy   M50.20 (ICD-10-CM) - HNP (herniated nucleus pulposus), cervical   M54.5,G89.29 (ICD-10-CM) - Chronic bilateral low back pain without sciatica   M51.36 (ICD-10-CM) - DDD (degenerative disc disease), lumbar   M47.816 (ICD-10-CM) - Spondylosis of lumbar region without myelopathy or radiculopathy   M43.16 (ICD-10-CM) - Spondylolisthesis, lumbar region     Evaluation Date: 5/19/2020  Authorization Period Expiration: 5/21/2020  Plan of Care Certification Period: 8/19/2020  Visit #/Visits authorized: 3/6     Time In: 230pm  Time Out: 313pm  Total Billable Time: 33 minutes (2TE)    Precautions: Standard and Fall    Subjective     Pt reports: that she is hurting on the back side from head to toe   She was compliant with home exercise program.  Response to previous treatment: ongoing  Functional change: ongoing     Pain: 6/10  Location: bilateral back      Objective     Vale received hot pack for 10 minutes to low back.    Vale received therapeutic exercises to develop strength, endurance, ROM, flexibility, posture and core stabilization for 33 minutes including:    Seated EOM:  HS stretch 2 x 30"  Sitting Figure 4 stretch 2 x 30"  Hip abduction 2 x 10   Hip adduction with yellow ball 3 x 10     Supine:  Pelvic tilts 3 x 10   LTR 5minutes green ball   Pilates ring TA activation 3x10  TA draw 2 x 10   Piriformis stretch 3x30 seconds each       Home Exercises Provided and Patient " Education Provided     Education provided:   - HEP    Written Home Exercises Provided: yes.  Exercises were reviewed and Vale was able to demonstrate them prior to the end of the session.  Vale demonstrated good  understanding of the education provided.     See EMR under Patient Instructions for exercises provided prior visit.    Assessment     Vale presents to follow up with no change in symptoms. She responds well to moist hot pack with some relief in symptoms. Was introduced to bridging combined with pelvic tilt that decreased the symptoms. Pt was challenged appropriately.     Vale is progressing well towards her goals.   Pt prognosis is Fair.     Pt will continue to benefit from skilled outpatient physical therapy to address the deficits listed in the problem list box on initial evaluation, provide pt/family education and to maximize pt's level of independence in the home and community environment.     Pt's spiritual, cultural and educational needs considered and pt agreeable to plan of care and goals.     Anticipated barriers to physical therapy: chronicity of condition    Goals:  Short Term Goals (12 Weeks):  1. Pt will be compliant with HEP to supplement PT in decreasing pain with functional mobility.  2. Pt will perform pallof press with good control to demonstrate improved core strength.  3. Pt will improve lumbar ROM to 75% in all planes to improve functional mobility.  4. Pt will improve impaired LE MMTs to >/=4/5 to improve strength for functional tasks.  Long Term Goals (24 Weeks):   1. Pt will perform dead bug with good control and TA activation to demonstrate improved core strength.  2. Pt will improve impaired LE MMTs to >/=4+/5 to improve strength for functional tasks.  3. Pt will report no pain during lumbar ROM to promote functional mobility.    Plan     Continue per POC.     Rich Canseco, PT

## 2020-06-02 ENCOUNTER — TELEPHONE (OUTPATIENT)
Dept: ENDOSCOPY | Facility: HOSPITAL | Age: 60
End: 2020-06-02

## 2020-06-02 ENCOUNTER — LAB VISIT (OUTPATIENT)
Dept: LAB | Facility: HOSPITAL | Age: 60
End: 2020-06-02
Attending: INTERNAL MEDICINE
Payer: MEDICARE

## 2020-06-02 ENCOUNTER — CLINICAL SUPPORT (OUTPATIENT)
Dept: REHABILITATION | Facility: HOSPITAL | Age: 60
End: 2020-06-02
Payer: MEDICARE

## 2020-06-02 DIAGNOSIS — Z12.11 SCREENING FOR COLORECTAL CANCER: ICD-10-CM

## 2020-06-02 DIAGNOSIS — Z74.09 IMPAIRED FUNCTIONAL MOBILITY, BALANCE, GAIT, AND ENDURANCE: ICD-10-CM

## 2020-06-02 DIAGNOSIS — Z12.12 SCREENING FOR COLORECTAL CANCER: ICD-10-CM

## 2020-06-02 DIAGNOSIS — K52.9 CHRONIC DIARRHEA: ICD-10-CM

## 2020-06-02 LAB — SARS-COV-2 RNA RESP QL NAA+PROBE: NOT DETECTED

## 2020-06-02 PROCEDURE — 97110 THERAPEUTIC EXERCISES: CPT | Mod: PO

## 2020-06-02 PROCEDURE — 87427 SHIGA-LIKE TOXIN AG IA: CPT | Mod: 59

## 2020-06-02 PROCEDURE — 87209 SMEAR COMPLEX STAIN: CPT

## 2020-06-02 PROCEDURE — 89125 SPECIMEN FAT STAIN: CPT

## 2020-06-02 PROCEDURE — 87329 GIARDIA AG IA: CPT

## 2020-06-02 PROCEDURE — 87045 FECES CULTURE AEROBIC BACT: CPT

## 2020-06-02 PROCEDURE — 89055 LEUKOCYTE ASSESSMENT FECAL: CPT

## 2020-06-02 PROCEDURE — 83993 ASSAY FOR CALPROTECTIN FECAL: CPT

## 2020-06-02 PROCEDURE — 82656 EL-1 FECAL QUAL/SEMIQ: CPT

## 2020-06-02 PROCEDURE — 87046 STOOL CULTR AEROBIC BACT EA: CPT

## 2020-06-03 ENCOUNTER — ANESTHESIA EVENT (OUTPATIENT)
Dept: ENDOSCOPY | Facility: HOSPITAL | Age: 60
End: 2020-06-03
Payer: MEDICARE

## 2020-06-03 ENCOUNTER — HOSPITAL ENCOUNTER (OUTPATIENT)
Facility: HOSPITAL | Age: 60
Discharge: HOME | End: 2020-06-03
Attending: INTERNAL MEDICINE | Admitting: INTERNAL MEDICINE
Payer: MEDICARE

## 2020-06-03 ENCOUNTER — ANESTHESIA (OUTPATIENT)
Dept: ENDOSCOPY | Facility: HOSPITAL | Age: 60
End: 2020-06-03
Payer: MEDICARE

## 2020-06-03 VITALS
WEIGHT: 181 LBS | SYSTOLIC BLOOD PRESSURE: 97 MMHG | RESPIRATION RATE: 17 BRPM | BODY MASS INDEX: 30.9 KG/M2 | TEMPERATURE: 98 F | HEART RATE: 74 BPM | HEIGHT: 64 IN | OXYGEN SATURATION: 98 % | DIASTOLIC BLOOD PRESSURE: 80 MMHG

## 2020-06-03 DIAGNOSIS — Z12.11 SCREEN FOR COLON CANCER: Primary | ICD-10-CM

## 2020-06-03 DIAGNOSIS — Z12.11 COLON CANCER SCREENING: ICD-10-CM

## 2020-06-03 LAB
CRYPTOSP AG STL QL IA: NEGATIVE
G LAMBLIA AG STL QL IA: NEGATIVE
WBC #/AREA STL HPF: NORMAL /[HPF]

## 2020-06-03 PROCEDURE — 45380 COLONOSCOPY AND BIOPSY: CPT | Mod: 59,,, | Performed by: INTERNAL MEDICINE

## 2020-06-03 PROCEDURE — 25000003 PHARM REV CODE 250: Performed by: NURSE ANESTHETIST, CERTIFIED REGISTERED

## 2020-06-03 PROCEDURE — 88305 TISSUE EXAM BY PATHOLOGIST: CPT | Performed by: PATHOLOGY

## 2020-06-03 PROCEDURE — 37000009 HC ANESTHESIA EA ADD 15 MINS: Performed by: INTERNAL MEDICINE

## 2020-06-03 PROCEDURE — 63600175 PHARM REV CODE 636 W HCPCS: Performed by: NURSE ANESTHETIST, CERTIFIED REGISTERED

## 2020-06-03 PROCEDURE — 88305 TISSUE EXAM BY PATHOLOGIST: ICD-10-PCS | Mod: 26,,, | Performed by: PATHOLOGY

## 2020-06-03 PROCEDURE — 43239 EGD BIOPSY SINGLE/MULTIPLE: CPT | Performed by: INTERNAL MEDICINE

## 2020-06-03 PROCEDURE — 43239 PR EGD, FLEX, W/BIOPSY, SGL/MULTI: ICD-10-PCS | Mod: 51,,, | Performed by: INTERNAL MEDICINE

## 2020-06-03 PROCEDURE — 45385 PR COLONOSCOPY,REMV LESN,SNARE: ICD-10-PCS | Mod: PT,,, | Performed by: INTERNAL MEDICINE

## 2020-06-03 PROCEDURE — 45380 PR COLONOSCOPY,BIOPSY: ICD-10-PCS | Mod: 59,,, | Performed by: INTERNAL MEDICINE

## 2020-06-03 PROCEDURE — 27201089 HC SNARE, DISP (ANY): Performed by: INTERNAL MEDICINE

## 2020-06-03 PROCEDURE — 45385 COLONOSCOPY W/LESION REMOVAL: CPT | Performed by: INTERNAL MEDICINE

## 2020-06-03 PROCEDURE — 37000008 HC ANESTHESIA 1ST 15 MINUTES: Performed by: INTERNAL MEDICINE

## 2020-06-03 PROCEDURE — 45380 COLONOSCOPY AND BIOPSY: CPT | Performed by: INTERNAL MEDICINE

## 2020-06-03 PROCEDURE — 25000003 PHARM REV CODE 250: Performed by: INTERNAL MEDICINE

## 2020-06-03 PROCEDURE — 88305 TISSUE EXAM BY PATHOLOGIST: CPT | Mod: 26,,, | Performed by: PATHOLOGY

## 2020-06-03 PROCEDURE — 43239 EGD BIOPSY SINGLE/MULTIPLE: CPT | Mod: 51,,, | Performed by: INTERNAL MEDICINE

## 2020-06-03 PROCEDURE — 27201012 HC FORCEPS, HOT/COLD, DISP: Performed by: INTERNAL MEDICINE

## 2020-06-03 PROCEDURE — 45385 COLONOSCOPY W/LESION REMOVAL: CPT | Mod: PT,,, | Performed by: INTERNAL MEDICINE

## 2020-06-03 RX ORDER — VASOPRESSIN 20 [USP'U]/ML
INJECTION, SOLUTION INTRAMUSCULAR; SUBCUTANEOUS
Status: DISCONTINUED | OUTPATIENT
Start: 2020-06-03 | End: 2020-06-03

## 2020-06-03 RX ORDER — PHENYLEPHRINE HYDROCHLORIDE 10 MG/ML
INJECTION INTRAVENOUS
Status: DISCONTINUED | OUTPATIENT
Start: 2020-06-03 | End: 2020-06-03

## 2020-06-03 RX ORDER — PROPOFOL 10 MG/ML
INJECTION, EMULSION INTRAVENOUS
Status: DISCONTINUED | OUTPATIENT
Start: 2020-06-03 | End: 2020-06-03

## 2020-06-03 RX ORDER — GLYCOPYRROLATE 0.2 MG/ML
INJECTION INTRAMUSCULAR; INTRAVENOUS
Status: DISCONTINUED | OUTPATIENT
Start: 2020-06-03 | End: 2020-06-03

## 2020-06-03 RX ORDER — MIDAZOLAM HYDROCHLORIDE 1 MG/ML
INJECTION, SOLUTION INTRAMUSCULAR; INTRAVENOUS
Status: DISCONTINUED | OUTPATIENT
Start: 2020-06-03 | End: 2020-06-03

## 2020-06-03 RX ORDER — SODIUM CHLORIDE 9 MG/ML
INJECTION, SOLUTION INTRAVENOUS CONTINUOUS
Status: DISCONTINUED | OUTPATIENT
Start: 2020-06-03 | End: 2020-06-03 | Stop reason: HOSPADM

## 2020-06-03 RX ORDER — DEXTROMETHORPHAN/PSEUDOEPHED 2.5-7.5/.8
DROPS ORAL
Status: COMPLETED | OUTPATIENT
Start: 2020-06-03 | End: 2020-06-03

## 2020-06-03 RX ORDER — PROPOFOL 10 MG/ML
INJECTION, EMULSION INTRAVENOUS CONTINUOUS PRN
Status: DISCONTINUED | OUTPATIENT
Start: 2020-06-03 | End: 2020-06-03

## 2020-06-03 RX ORDER — LIDOCAINE HCL/PF 100 MG/5ML
SYRINGE (ML) INTRAVENOUS
Status: DISCONTINUED | OUTPATIENT
Start: 2020-06-03 | End: 2020-06-03

## 2020-06-03 RX ORDER — SODIUM CHLORIDE 0.9 % (FLUSH) 0.9 %
10 SYRINGE (ML) INJECTION
Status: DISCONTINUED | OUTPATIENT
Start: 2020-06-03 | End: 2020-06-03 | Stop reason: HOSPADM

## 2020-06-03 RX ADMIN — LIDOCAINE HYDROCHLORIDE 50 MG: 20 INJECTION, SOLUTION INTRAVENOUS at 02:06

## 2020-06-03 RX ADMIN — VASOPRESSIN 2 UNITS: 20 INJECTION INTRAVENOUS at 02:06

## 2020-06-03 RX ADMIN — GLYCOPYRROLATE 0.2 MG: 0.2 INJECTION, SOLUTION INTRAMUSCULAR; INTRAVENOUS at 02:06

## 2020-06-03 RX ADMIN — VASOPRESSIN 2 UNITS: 20 INJECTION INTRAVENOUS at 03:06

## 2020-06-03 RX ADMIN — SIMETHICONE 40 MG: 20 SUSPENSION/ DROPS ORAL at 02:06

## 2020-06-03 RX ADMIN — PROPOFOL 80 MG: 10 INJECTION, EMULSION INTRAVENOUS at 02:06

## 2020-06-03 RX ADMIN — PROPOFOL 150 MCG/KG/MIN: 10 INJECTION, EMULSION INTRAVENOUS at 02:06

## 2020-06-03 RX ADMIN — MIDAZOLAM 2 MG: 1 INJECTION INTRAMUSCULAR; INTRAVENOUS at 02:06

## 2020-06-03 RX ADMIN — PHENYLEPHRINE HYDROCHLORIDE 100 MCG: 10 INJECTION INTRAVENOUS at 02:06

## 2020-06-03 RX ADMIN — SODIUM CHLORIDE: 0.9 INJECTION, SOLUTION INTRAVENOUS at 01:06

## 2020-06-03 NOTE — ANESTHESIA POSTPROCEDURE EVALUATION
Anesthesia Post Evaluation    Patient: Vale Gutierrez    Procedure(s) Performed: Procedure(s) (LRB):  EGD (ESOPHAGOGASTRODUODENOSCOPY) (N/A)  COLONOSCOPY Golytely (N/A)    Final Anesthesia Type: MAC    Patient location during evaluation: GI PACU  Patient participation: Yes- Able to Participate  Level of consciousness: awake and alert and oriented  Post-procedure vital signs: reviewed and stable  Pain management: adequate  Airway patency: patent    PONV status at discharge: No PONV  Anesthetic complications: no      Cardiovascular status: blood pressure returned to baseline, hemodynamically stable and stable  Respiratory status: unassisted, spontaneous ventilation and room air  Hydration status: euvolemic  Follow-up not needed.          Vitals Value Taken Time   BP 92/56 6/3/2020  3:05 PM   Temp 36.6 °C (97.8 °F) 6/3/2020  3:05 PM   Pulse 80 6/3/2020  3:05 PM   Resp 15 6/3/2020  3:05 PM   SpO2 95 % 6/3/2020  3:05 PM         No case tracking events are documented in the log.      Pain/Antonio Score: Antonio Score: 8 (6/3/2020  3:11 PM)

## 2020-06-03 NOTE — TRANSFER OF CARE
"Anesthesia Transfer of Care Note    Patient: Vale Gutierrez    Procedure(s) Performed: Procedure(s) (LRB):  EGD (ESOPHAGOGASTRODUODENOSCOPY) (N/A)  COLONOSCOPY Golytely (N/A)    Patient location: GI    Anesthesia Type: MAC    Transport from OR: Transported from OR on room air with adequate spontaneous ventilation    Post pain: adequate analgesia    Post assessment: no apparent anesthetic complications and tolerated procedure well    Post vital signs: stable    Level of consciousness: awake, alert and oriented    Nausea/Vomiting: no nausea/vomiting    Complications: none    Transfer of care protocol was followed      Last vitals:   Visit Vitals  BP (!) 92/56 (Patient Position: Lying)   Pulse 80   Temp 36.6 °C (97.8 °F) (Temporal)   Resp 15   Ht 5' 4" (1.626 m)   Wt 82.1 kg (181 lb)   SpO2 95%   Breastfeeding? No   BMI 31.07 kg/m²     "

## 2020-06-03 NOTE — [employer] PATIENT INSTRUCTIONS
Discharge Summary/Instructions after an Endoscopic Procedure  Patient Name: Vale Gutierrez  Patient MRN: [id number]  Patient YOB: 1960  Wednesday, Scarlett 3, 2020  Tino Neil MD  Your health is very important to us during the Covid Crisis. Following your   procedure today, you will receive a daily text for 2 weeks asking about   signs or symptoms of Covid 19.  Please respond to this text when you   receive it so we can follow up and keep you as safe as possible.   RESTRICTIONS:  During your procedure today, you received medications for sedation.  These   medications may affect your judgment, balance and coordination.  Therefore,   for 24 hours, you have the following restrictions:   - DO NOT drive a car, operate machinery, make legal/financial decisions,   sign important papers or drink alcohol.    ACTIVITY:  Today: no heavy lifting, straining or running due to procedural   sedation/anesthesia.  The following day: return to full activity including work.  DIET:  Eat and drink normally unless instructed otherwise.     TREATMENT FOR COMMON SIDE EFFECTS:  - Mild abdominal pain, nausea, belching, bloating or excessive gas:  rest,   eat lightly and use a heating pad.  - Sore Throat: treat with throat lozenges and/or gargle with warm salt   water.  - Because air was used during the procedure, expelling large amounts of air   from your rectum or belching is normal.  - If a bowel prep was taken, you may not have a bowel movement for 1-3 days.    This is normal.  SYMPTOMS TO WATCH FOR AND REPORT TO YOUR PHYSICIAN:  1. Abdominal pain or bloating, other than gas cramps.  2. Chest pain.  3. Back pain.  4. Signs of infection such as: chills or fever occurring within 24 hours   after the procedure.  5. Rectal bleeding, which would show as bright red, maroon, or black stools.   (A tablespoon of blood from the rectum is not serious, especially if   hemorrhoids are present.)  6. Vomiting.  7. Weakness or dizziness.  GO  DIRECTLY TO THE NEAREST EMERGENCY ROOM IF YOU HAVE ANY OF THE FOLLOWING:      Difficulty breathing              Chills and/or fever over 101 F   Persistent vomiting and/or vomiting blood   Severe abdominal pain   Severe chest pain   Black, tarry stools   Bleeding- more than one tablespoon   Any other symptom or condition that you feel may need urgent attention  Your doctor recommends these additional instructions:  If any biopsies were taken, your doctors clinic will contact you in 1 to 2   weeks with any results.  - Discharge patient to home.   - Patient has a contact number available for emergencies.  The signs and   symptoms of potential delayed complications were discussed with the   patient.  Return to normal activities tomorrow.  Written discharge   instructions were provided to the patient.   - Resume previous diet.   - Continue present medications.   - Await pathology results.   - Repeat colonoscopy in 5 years for surveillance.   - Return to GI clinic as previously scheduled.  For questions, problems or results please call your physician - Tino Neil MD at Work:  [phone].  EMERGENCY PHONE NUMBER: [phone],  LAB RESULTS: [phone]  IF A COMPLICATION OR EMERGENCY SITUATION ARISES AND YOU ARE UNABLE TO REACH   YOUR PHYSICIAN - GO DIRECTLY TO THE EMERGENCY ROOM.  Tino Neil MD  6/3/2020 3:05:39 PM  This report has been verified and signed electronically.  [employer]

## 2020-06-03 NOTE — [employer]ATION PATIENT INSTRUCTIONS
Discharge Summary/Instructions after an Endoscopic Procedure  Patient Name: Vale Gutierrez  Patient MRN: [id number]  Patient YOB: 1960  Wednesday, Scarlett 3, 2020  Tino Neil MD  Your health is very important to us during the Covid Crisis. Following your   procedure today, you will receive a daily text for 2 weeks asking about   signs or symptoms of Covid 19.  Please respond to this text when you   receive it so we can follow up and keep you as safe as possible.   RESTRICTIONS:  During your procedure today, you received medications for sedation.  These   medications may affect your judgment, balance and coordination.  Therefore,   for 24 hours, you have the following restrictions:   - DO NOT drive a car, operate machinery, make legal/financial decisions,   sign important papers or drink alcohol.    ACTIVITY:  Today: no heavy lifting, straining or running due to procedural   sedation/anesthesia.  The following day: return to full activity including work.  DIET:  Eat and drink normally unless instructed otherwise.     TREATMENT FOR COMMON SIDE EFFECTS:  - Mild abdominal pain, nausea, belching, bloating or excessive gas:  rest,   eat lightly and use a heating pad.  - Sore Throat: treat with throat lozenges and/or gargle with warm salt   water.  - Because air was used during the procedure, expelling large amounts of air   from your rectum or belching is normal.  - If a bowel prep was taken, you may not have a bowel movement for 1-3 days.    This is normal.  SYMPTOMS TO WATCH FOR AND REPORT TO YOUR PHYSICIAN:  1. Abdominal pain or bloating, other than gas cramps.  2. Chest pain.  3. Back pain.  4. Signs of infection such as: chills or fever occurring within 24 hours   after the procedure.  5. Rectal bleeding, which would show as bright red, maroon, or black stools.   (A tablespoon of blood from the rectum is not serious, especially if   hemorrhoids are present.)  6. Vomiting.  7. Weakness or dizziness.  GO  DIRECTLY TO THE NEAREST EMERGENCY ROOM IF YOU HAVE ANY OF THE FOLLOWING:      Difficulty breathing              Chills and/or fever over 101 F   Persistent vomiting and/or vomiting blood   Severe abdominal pain   Severe chest pain   Black, tarry stools   Bleeding- more than one tablespoon   Any other symptom or condition that you feel may need urgent attention  Your doctor recommends these additional instructions:  If any biopsies were taken, your doctors clinic will contact you in 1 to 2   weeks with any results.  - Discharge patient to home.   - Patient has a contact number available for emergencies.  The signs and   symptoms of potential delayed complications were discussed with the   patient.  Return to normal activities tomorrow.  Written discharge   instructions were provided to the patient.   - Resume previous diet.   - Continue present medications.   - Await pathology results.   - Perform a colonoscopy today.  For questions, problems or results please call your physician - Tino Neil MD at Work:  [phone].  EMERGENCY PHONE NUMBER: [phone],  LAB RESULTS: [phone]  IF A COMPLICATION OR EMERGENCY SITUATION ARISES AND YOU ARE UNABLE TO REACH   YOUR PHYSICIAN - GO DIRECTLY TO THE EMERGENCY ROOM.  Tino Neil MD  6/3/2020 2:15:06 PM  This report has been verified and signed electronically.  [employer]

## 2020-06-03 NOTE — ANESTHESIA PREPROCEDURE EVALUATION
06/03/2020  Vale Gutierrez is a 59 y.o., female.    Anesthesia Evaluation    I have reviewed the Patient Summary Reports.    I have reviewed the Nursing Notes. I have reviewed the NPO Status.      Review of Systems  Anesthesia Hx:  No problems with previous Anesthesia    Cardiovascular:   Hypertension    Pulmonary:  Pulmonary Normal    Hepatic/GI:  Hepatic/GI Normal    Musculoskeletal:   Arthritis     Psych:   Psychiatric History          Physical Exam  General:  Well nourished    Airway/Jaw/Neck:  Airway Findings: Mouth Opening: Normal Tongue: Normal  General Airway Assessment: Adult  Mallampati: II  TM Distance: Normal, at least 6 cm       Chest/Lungs:  Chest/Lungs Findings: Clear to auscultation, Normal Respiratory Rate     Heart/Vascular:  Heart Findings: Rate: Normal  Rhythm: Regular Rhythm  Sounds: Normal        Mental Status:  Mental Status Findings:  Cooperative, Alert and Oriented         Anesthesia Plan  Type of Anesthesia, risks & benefits discussed:  Anesthesia Type:  MAC  Patient's Preference:   Intra-op Monitoring Plan:   Intra-op Monitoring Plan Comments:   Post Op Pain Control Plan: multimodal analgesia  Post Op Pain Control Plan Comments:   Induction:   IV  Beta Blocker:         Informed Consent: Patient understands risks and agrees with Anesthesia plan.  Questions answered. Anesthesia consent signed with patient.  ASA Score: 2     Day of Surgery Review of History & Physical:  There are no significant changes.          Ready For Surgery From Anesthesia Perspective.

## 2020-06-04 ENCOUNTER — PATIENT OUTREACH (OUTPATIENT)
Dept: ADMINISTRATIVE | Facility: OTHER | Age: 60
End: 2020-06-04

## 2020-06-04 ENCOUNTER — CLINICAL SUPPORT (OUTPATIENT)
Dept: REHABILITATION | Facility: HOSPITAL | Age: 60
End: 2020-06-04
Payer: MEDICARE

## 2020-06-04 DIAGNOSIS — Z74.09 IMPAIRED FUNCTIONAL MOBILITY, BALANCE, GAIT, AND ENDURANCE: ICD-10-CM

## 2020-06-04 LAB
E COLI SXT1 STL QL IA: NEGATIVE
E COLI SXT2 STL QL IA: NEGATIVE

## 2020-06-04 PROCEDURE — 97110 THERAPEUTIC EXERCISES: CPT | Mod: PO

## 2020-06-04 NOTE — PROGRESS NOTES
"  Physical Therapy Daily Treatment Note     Name: Vale Gutierrez  Clinic Number: [id number]    Therapy Diagnosis:   Encounter Diagnosis   Name Primary?    Impaired functional mobility, balance, gait, and endurance      Physician: Kylie Lewis, *    Visit Date: 6/4/2020    Physician Orders: PT Eval and Treat   Medical Diagnosis:   M54.2 (ICD-10-CM) - Neck pain   M47.812 (ICD-10-CM) - Cervical spondylosis without myelopathy   M50.30 (ICD-10-CM) - DDD (degenerative disc disease), cervical   M54.12 (ICD-10-CM) - Cervical radiculopathy   M50.20 (ICD-10-CM) - HNP (herniated nucleus pulposus), cervical   M54.5,G89.29 (ICD-10-CM) - Chronic bilateral low back pain without sciatica   M51.36 (ICD-10-CM) - DDD (degenerative disc disease), lumbar   M47.816 (ICD-10-CM) - Spondylosis of lumbar region without myelopathy or radiculopathy   M43.16 (ICD-10-CM) - Spondylolisthesis, lumbar region     Evaluation Date: 5/19/2020  Authorization Period Expiration: 5/21/2020  Plan of Care Certification Period: 8/19/2020  Visit #/Visits authorized: 4/6     Time In: 230pm  Time Out: 310pm  Total Billable Time: 30 minutes (2TE)    Precautions: Standard and Fall    Subjective     Pt reports: that she feels a little bit better today, "looser"  She was compliant with home exercise program.  Response to previous treatment: ongoing  Functional change: ongoing     Pain: 6/10  Location: bilateral back      Objective     Vale received hot pack for 10 minutes to low back.    Vale received therapeutic exercises to develop strength, endurance, ROM, flexibility, posture and core stabilization for 30 minutes including:    Seated EOM:  HS stretch 2 x 30"  Sitting Figure 4 stretch 2 x 30"  Hip abduction 2 x 10   Hip adduction with yellow ball 3 x 10     Supine:  Pelvic tilts 3 x 10   LTR 5minutes green ball   Knees to chest x30 green ball   Pilates ring TA activation 3x10   Piriformis stretch 3x30 seconds each     Standing:   Hip abduction " 2x10 each   Mini squats: 2x10      Home Exercises Provided and Patient Education Provided     Education provided:   - HEP    Written Home Exercises Provided: yes.  Exercises were reviewed and Vale was able to demonstrate them prior to the end of the session.  Vale demonstrated good  understanding of the education provided.     See EMR under Patient Instructions for exercises provided prior visit.    Assessment     Vale tolerated tx session well and was introduced to more closed chain exercises. Pt challenged appropriately, and had moderate relief by end of the session.     Vale is progressing well towards her goals.   Pt prognosis is Fair.     Pt will continue to benefit from skilled outpatient physical therapy to address the deficits listed in the problem list box on initial evaluation, provide pt/family education and to maximize pt's level of independence in the home and community environment.     Pt's spiritual, cultural and educational needs considered and pt agreeable to plan of care and goals.     Anticipated barriers to physical therapy: chronicity of condition    Goals:  Short Term Goals (12 Weeks): progressing 06/04/2020    1. Pt will be compliant with HEP to supplement PT in decreasing pain with functional mobility.  2. Pt will perform pallof press with good control to demonstrate improved core strength.  3. Pt will improve lumbar ROM to 75% in all planes to improve functional mobility.  4. Pt will improve impaired LE MMTs to >/=4/5 to improve strength for functional tasks.  Long Term Goals (24 Weeks): progressing 06/04/2020    1. Pt will perform dead bug with good control and TA activation to demonstrate improved core strength.  2. Pt will improve impaired LE MMTs to >/=4+/5 to improve strength for functional tasks.  3. Pt will report no pain during lumbar ROM to promote functional mobility.    Plan     Continue per POC.     Rich Canseco, PT

## 2020-06-05 ENCOUNTER — OFFICE VISIT (OUTPATIENT)
Dept: OBSTETRICS AND GYNECOLOGY | Facility: CLINIC | Age: 60
End: 2020-06-05
Attending: OBSTETRICS & GYNECOLOGY
Payer: MEDICARE

## 2020-06-05 VITALS
HEIGHT: 64 IN | BODY MASS INDEX: 32.6 KG/M2 | WEIGHT: 190.94 LBS | DIASTOLIC BLOOD PRESSURE: 74 MMHG | SYSTOLIC BLOOD PRESSURE: 122 MMHG

## 2020-06-05 DIAGNOSIS — Z12.4 CERVICAL CANCER SCREENING: ICD-10-CM

## 2020-06-05 DIAGNOSIS — Z01.419 WELL FEMALE EXAM WITH ROUTINE GYNECOLOGICAL EXAM: Primary | ICD-10-CM

## 2020-06-05 PROBLEM — Z12.11 COLON CANCER SCREENING: Status: RESOLVED | Noted: 2020-06-03 | Resolved: 2020-06-05

## 2020-06-05 LAB
FINAL PATHOLOGIC DIAGNOSIS: NORMAL
GROSS: NORMAL
O+P STL MICRO: NORMAL

## 2020-06-05 PROCEDURE — 99999 PR PBB SHADOW E&M-EST. PATIENT-LVL III: CPT | Mod: PBBFAC,,, | Performed by: OBSTETRICS & GYNECOLOGY

## 2020-06-05 PROCEDURE — 99999 PR PBB SHADOW E&M-EST. PATIENT-LVL III: ICD-10-PCS | Mod: PBBFAC,,, | Performed by: OBSTETRICS & GYNECOLOGY

## 2020-06-05 PROCEDURE — 88175 CYTOPATH C/V AUTO FLUID REDO: CPT

## 2020-06-05 PROCEDURE — G0101 PR CA SCREEN;PELVIC/BREAST EXAM: ICD-10-PCS | Mod: S$GLB,,, | Performed by: OBSTETRICS & GYNECOLOGY

## 2020-06-05 PROCEDURE — G0101 CA SCREEN;PELVIC/BREAST EXAM: HCPCS | Mod: S$GLB,,, | Performed by: OBSTETRICS & GYNECOLOGY

## 2020-06-05 RX ORDER — BUPROPION HYDROCHLORIDE 300 MG/1
300 TABLET ORAL EVERY MORNING
COMMUNITY
Start: 2020-05-27 | End: 2021-12-06

## 2020-06-05 RX ORDER — DICLOFENAC SODIUM 50 MG/1
TABLET, DELAYED RELEASE ORAL
COMMUNITY
Start: 2020-04-28 | End: 2020-07-28 | Stop reason: SDUPTHER

## 2020-06-05 NOTE — LETTER
June 5, 2020      Estela Mcdaniel MD  [address]  [place] [address]           [facility] [address]  [address] [facility] [address]  [facility] [address]  Phone: [phone]  Fax: [phone]          Patient: Vale Gutierrez   MR Number: [id number]   YOB: 1960   Date of Visit: 6/5/2020       Dear Dr. Estela Mcdaniel:    Thank you for referring Vale Gutierrez to me for evaluation. Attached you will find relevant portions of my assessment and plan of care.    If you have questions, please do not hesitate to call me. I look forward to following Vale Gutierrez along with you.    Sincerely,    Liza Arizmendi MD    Enclosure  CC:  No Recipients    If you would like to receive this communication electronically, please contact [email or web address] or [phone] to request more information on [facility] access.    For providers and/or their staff who would like to refer a patient to [facility], please contact us through our one-stop-shop provider referral line, [facility], at [phone].    If you feel you have received this communication in error or would no longer like to receive these types of communications, please e-mail [email or web address]

## 2020-06-05 NOTE — PROGRESS NOTES
SUBJECTIVE:   59 y.o. female   for routine gyn exam. No LMP recorded. Patient is postmenopausal..  She has no unusual complaints.  No PMB.  No HRT.         Past Medical History:   Diagnosis Date    Alcohol abuse     Anxiety     Depression     Hyperlipidemia     Hypertension      Past Surgical History:   Procedure Laterality Date    BREAST CYST ASPIRATION      CHOLECYSTECTOMY      complicated with bile leak, sepsis    COLONOSCOPY N/A 6/3/2020    Procedure: COLONOSCOPY Golytely;  Surgeon: Tino Neil MD;  Location: [facility] ENDO;  Service: Colon and Rectal;  Laterality: N/A;    ESOPHAGOGASTRODUODENOSCOPY N/A 6/3/2020    Procedure: EGD (ESOPHAGOGASTRODUODENOSCOPY);  Surgeon: Tino Neil MD;  Location: [facility] ENDO;  Service: Colon and Rectal;  Laterality: N/A;     Social History     Socioeconomic History    Marital status: Single     Spouse name: Not on file    Number of children: Not on file    Years of education: Not on file    Highest education level: Not on file   Occupational History    Not on file   Social Needs    Financial resource strain: Not on file    Food insecurity:     Worry: Not on file     Inability: Not on file    Transportation needs:     Medical: Not on file     Non-medical: Not on file   Tobacco Use    Smoking status: Former Smoker    Smokeless tobacco: Never Used   Substance and Sexual Activity    Alcohol use: Yes     Alcohol/week: 12.0 standard drinks     Types: 12 Shots of liquor per week     Comment: three 12-14 oz cocktail drinks.     Drug use: No    Sexual activity: Never   Lifestyle    Physical activity:     Days per week: Not on file     Minutes per session: Not on file    Stress: Not on file   Relationships    Social connections:     Talks on phone: Not on file     Gets together: Not on file     Attends [employer] service: Not on file     Active member of club or organization: Not on file     Attends meetings of clubs or organizations: Not on file      Relationship status: Not on file   Other Topics Concern    Not on file   Social History Narrative    Not on file     Family History   Problem Relation Age of Onset    Atrial fibrillation Mother     Heart failure Mother     Arthritis Mother     Macular degeneration Mother     Stroke Father     Cancer Maternal Uncle         lung    Cataracts Paternal Grandfather     Glaucoma Paternal Grandfather      OB History    Para Term  AB Living   0 0 0 0 0 0   SAB TAB Ectopic Multiple Live Births   0 0 0 0 0         Current Outpatient Medications   Medication Sig Dispense Refill    aspirin (ECOTRIN) 81 MG EC tablet Take 81 mg by mouth once daily.      buPROPion (WELLBUTRIN XL) 300 MG 24 hr tablet Take 300 mg by mouth every morning.      diclofenac (VOLTAREN) 50 MG EC tablet Take 1 tablet (50 mg total) by mouth 3 (three) times daily as needed (pain). 30 tablet 1    diclofenac (VOLTAREN) 50 MG EC tablet       gabapentin (NEURONTIN) 300 MG capsule Take one cap PO QHS for 3 days, then one cap PO BID for the next 3 days, and then take one cap PO TID and continue 90 capsule 2    metoprolol tartrate (LOPRESSOR) 50 MG tablet Take 1 tablet (50 mg total) by mouth once daily. 90 tablet 3    omeprazole (PRILOSEC) 20 MG capsule Take 1 capsule (20 mg total) by mouth 2 (two) times daily. 60 capsule 11    potassium chloride (MICRO-K) 10 MEQ CpSR Take 1 capsule (10 mEq total) by mouth once daily. 30 capsule 6    venlafaxine (EFFEXOR-XR) 150 MG Cp24 Take 150 mg by mouth once daily.      venlafaxine (EFFEXOR-XR) 75 MG 24 hr capsule Take 75 mg by mouth once daily.      vitamin D (VITAMIN D3) 1000 units Tab Take 5,000 Int'l Units by mouth.       No current facility-administered medications for this visit.      Allergies: Patient has no known allergies.     ROS:  Constitutional: no weight loss, weight gain, fever, fatigue  Eyes:  No vision changes, glasses/contacts  ENT/Mouth: No ulcers, sinus problems, ears  "ringing, headache  Cardiovascular: No inability to lie flat, chest pain, exercise intolerance, swelling, heart palpitations  Respiratory: No wheezing, coughing blood, shortness of breath, or cough  Gastrointestinal: No diarrhea, bloody stool, nausea/vomiting, constipation, gas, hemorrhoids  Genitourinary: No blood in urine, painful urination, urgency of urination, frequency of urination, incomplete emptying, incontinence, abnormal bleeding, painful periods, heavy periods, vaginal discharge, vaginal odor, painful intercourse, sexual problems, bleeding after intercourse.  Musculoskeletal: No muscle weakness  Skin/Breast: No painful breasts, nipple discharge, masses, rash, ulcers  Neurological: No passing out, seizures, numbness, headache  Endocrine: No diabetes, hypothyroid, hyperthyroid, hot flashes, hair loss, abnormal hair growth, acne  Psychiatric: No depression, crying  Hematologic: No bruises, bleeding, swollen lymph nodes, anemia.      OBJECTIVE:   The patient appears well, alert, oriented x 3, in no distress.  /74   Ht 5' 4" (1.626 m)   Wt 86.6 kg (190 lb 14.7 oz)   BMI 32.77 kg/m²   NECK: no thyromegaly, trachea midline  SKIN: no acne, striae, hirsutism  CHEST: CTAB  CV: RRR  BREAST EXAM: breasts appear normal, no suspicious masses, no skin or nipple changes or axillary nodes  ABDOMEN: no hernias, masses, or hepatosplenomegaly  GENITALIA: normal external genitalia, no erythema, no discharge  URETHRA: normal urethra, normal urethral meatus  VAGINA: Normal  CERVIX: no lesions or cervical motion tenderness  UTERUS: normal size, contour, position, consistency, mobility, non-tender  ADNEXA: no mass, fullness, tenderness      ASSESSMENT:   1. Well female exam with routine gynecological exam  Liquid-Based Pap Smear, Screening   2. Cervical cancer screening  Ambulatory referral/consult to Obstetrics / Gynecology    Liquid-Based Pap Smear, Screening       PLAN:   Orders Placed This Encounter    Liquid-Based " Pap Smear, Screening     Discussed healthy lifestyle including regular exercise, healthy eating, etc.  Return to clinic in 1 year

## 2020-06-06 LAB
BACTERIA STL CULT: NORMAL
FAT STL SUDAN IV STN: NORMAL

## 2020-06-09 LAB — CALPROTECTIN STL-MCNT: 61.9 MCG/G

## 2020-06-11 ENCOUNTER — CLINICAL SUPPORT (OUTPATIENT)
Dept: REHABILITATION | Facility: HOSPITAL | Age: 60
End: 2020-06-11
Payer: MEDICARE

## 2020-06-11 DIAGNOSIS — Z74.09 IMPAIRED FUNCTIONAL MOBILITY, BALANCE, GAIT, AND ENDURANCE: ICD-10-CM

## 2020-06-11 LAB
ELASTASE 1, FECAL: 227 MCG/G
FINAL PATHOLOGIC DIAGNOSIS: NORMAL
Lab: NORMAL

## 2020-06-11 PROCEDURE — 97110 THERAPEUTIC EXERCISES: CPT | Mod: PO

## 2020-06-11 NOTE — PROGRESS NOTES
Physical Therapy Daily Treatment Note     Name: Vale Gutierrez  Clinic Number: [id number]    Therapy Diagnosis:   Encounter Diagnosis   Name Primary?    Impaired functional mobility, balance, gait, and endurance      Physician: Kylie Lewis, *    Visit Date: 6/11/2020    Physician Orders: PT Eval and Treat   Medical Diagnosis:   M54.2 (ICD-10-CM) - Neck pain   M47.812 (ICD-10-CM) - Cervical spondylosis without myelopathy   M50.30 (ICD-10-CM) - DDD (degenerative disc disease), cervical   M54.12 (ICD-10-CM) - Cervical radiculopathy   M50.20 (ICD-10-CM) - HNP (herniated nucleus pulposus), cervical   M54.5,G89.29 (ICD-10-CM) - Chronic bilateral low back pain without sciatica   M51.36 (ICD-10-CM) - DDD (degenerative disc disease), lumbar   M47.816 (ICD-10-CM) - Spondylosis of lumbar region without myelopathy or radiculopathy   M43.16 (ICD-10-CM) - Spondylolisthesis, lumbar region     Evaluation Date: 5/19/2020  Authorization Period Expiration: 5/21/2020  Plan of Care Certification Period: 8/19/2020  Visit #/Visits authorized: 5/6     Time In: 247pm  Time Out: 330pm  Total Billable Time: 30minutes (2TE)    Precautions: Standard and Fall    Subjective     Pt reports: has soreness in mid back. Pt states she's been having more tightness in her calf muscles  She was not compliant with home exercise program.  Response to previous treatment: ongoing  Functional change: ongoing     Pain: 3/10  Location: bilateral back      Objective     Vale received hot pack for 8 minutes to low back.    Vale received therapeutic exercises to develop strength, endurance, ROM, flexibility, posture and core stabilization for 30minutes including:  Bold performed    Supine:  Pelvic tilts 3 x 10   Hamstring stretch c/ strap, 2 x 30s/LE  Hip ABD with orange band, 2 x 10 reps, 3s holds  Hip adduction with yellow ball 3 x 10   Pilates ring TA activation 2x10, 3s holds    Piriformis stretch 3x30 seconds each     Seated EOM:  Gastroc  "stretch on RLE, but she does not feel it  Sitting Figure 4 stretch 2 x 30"  Hip abduction 2 x 10     Standing:   gastroc stretch, 30s/side, but pt feels unsteady after pt seated and then PT takes BP: 114/91mmHg    Hip abduction 2x10 each   Mini squats: 2x10      Home Exercises Provided and Patient Education Provided     Education provided:   - HEP, exercise technique    Written Home Exercises Provided: yes.  Exercises were reviewed and Vale was able to demonstrate them prior to the end of the session.  Vale demonstrated good  understanding of the education provided.     See EMR under Patient Instructions for exercises provided prior visit.    Assessment     Vale tolerated tx session fairly well and added stretches due to c/o posterior leg tightness. Pt endorses feeling unsteady after gastroc stretches at wall. Pt denies dizziness but requests seated rest break. Vitals: 114/91mmHg, HR =77bpm. Pt reports she feels better after sitting and left in NAD. Will progress as tolerated.    Vale is progressing well towards her goals.   Pt prognosis is Fair.     Pt will continue to benefit from skilled outpatient physical therapy to address the deficits listed in the problem list box on initial evaluation, provide pt/family education and to maximize pt's level of independence in the home and community environment.     Pt's spiritual, cultural and educational needs considered and pt agreeable to plan of care and goals.     Anticipated barriers to physical therapy: chronicity of condition    Goals:  Short Term Goals (12 Weeks): progressing 06/11/2020    1. Pt will be compliant with HEP to supplement PT in decreasing pain with functional mobility.  2. Pt will perform pallof press with good control to demonstrate improved core strength.  3. Pt will improve lumbar ROM to 75% in all planes to improve functional mobility.  4. Pt will improve impaired LE MMTs to >/=4/5 to improve strength for functional tasks.  Long Term " Goals (24 Weeks): progressing 06/11/2020    1. Pt will perform dead bug with good control and TA activation to demonstrate improved core strength.  2. Pt will improve impaired LE MMTs to >/=4+/5 to improve strength for functional tasks.  3. Pt will report no pain during lumbar ROM to promote functional mobility.    Plan     Continue per POC.     Sybil Crockett, PT

## 2020-06-12 RX ORDER — POTASSIUM CHLORIDE 750 MG/1
10 CAPSULE, EXTENDED RELEASE ORAL DAILY
Qty: 30 CAPSULE | Refills: 6 | OUTPATIENT
Start: 2020-06-12

## 2020-06-17 ENCOUNTER — NURSE TRIAGE (OUTPATIENT)
Dept: ADMINISTRATIVE | Facility: CLINIC | Age: 60
End: 2020-06-17

## 2020-06-17 NOTE — TELEPHONE ENCOUNTER
RN contacted pt through the Post Procedural Symptom Tracker System for Day 14.  Pt asymptomatic and doing well.      Additional Information   Negative: [1] Caller is not with the adult (patient) AND [2] reporting urgent symptoms   Negative: Lab result questions   Negative: Medication questions   Negative: Caller can't be reached by phone   Negative: Caller has already spoken to PCP or another triager   Negative: RN needs further essential information from caller in order to complete triage   Negative: Requesting regular office appointment   Negative: [1] Caller requesting NON-URGENT health information AND [2] PCP's office is the best resource   Negative: Health Information question, no triage required and triager able to answer question   Negative: General information question, no triage required and triager able to answer question   Negative: Question about upcoming scheduled test, no triage required and triager able to answer question   Negative: [1] Caller is not with the adult (patient) AND [2] probable NON-URGENT symptoms   Negative: [1] Follow-up call to recent contact AND [2] information only call, no triage required    Protocols used: INFORMATION ONLY CALL-A-

## 2020-06-25 ENCOUNTER — TELEPHONE (OUTPATIENT)
Dept: INTERNAL MEDICINE | Facility: CLINIC | Age: 60
End: 2020-06-25

## 2020-06-25 NOTE — TELEPHONE ENCOUNTER
----- Message from Tameka Piper sent at 6/25/2020 10:45 AM CDT -----  Contact: self [phone]  Would like to get medical advice.  Symptoms (please be specific):  pain in both lower legs starts mostly in the morning  How long has patient had these symptoms:  3+weeks  Pharmacy name and phone #:  [facility] - [place], [place] - [address] [place] [phone] (Phone)  [phone] (Fax)  Any drug allergies (copy from chart):    see chart  Would the patient rather a call back or a response via [facility]?:  call back  Comments:

## 2020-06-26 ENCOUNTER — TELEPHONE (OUTPATIENT)
Dept: INTERNAL MEDICINE | Facility: CLINIC | Age: 60
End: 2020-06-26

## 2020-06-26 NOTE — TELEPHONE ENCOUNTER
----- Message from Soledad Fischer sent at 6/26/2020 12:36 PM CDT -----  Contact: Self   Pt is requesting a call regarding upcoming appt. Please advise

## 2020-06-26 NOTE — TELEPHONE ENCOUNTER
----- Message from Omar Carter sent at 6/26/2020  2:01 PM CDT -----  Regarding: Advice  Contact: Patient [phone]  Type: Referral to a Specialist    Where would you like a referral to? Back pain specialist    Have you previously requested? No     Is the physician within the [facility]? Yes     Name and phone number of specialist:    Reason for appointment: back pain when walking, getting worse    Is an appointment scheduled with specialist? When?    Comments: patient requesting to speak with the PCP prior to put the referral, has additional questions.  Would like a call back prior to end of the day today. Requesting pain Rx    [facility] - [address] [phone] (Phone)  [phone] (Fax)      Please call an advise  Thank you

## 2020-06-29 ENCOUNTER — PATIENT MESSAGE (OUTPATIENT)
Dept: INTERNAL MEDICINE | Facility: CLINIC | Age: 60
End: 2020-06-29

## 2020-06-29 NOTE — TELEPHONE ENCOUNTER
The patient was unable to keep her appointment last week.  I offered to schedule an appointment but the patient does not have a ride. The patient encouraged to go to ER. The patient is willing to try an urgent care but not the ER.

## 2020-07-04 ENCOUNTER — OFFICE VISIT (OUTPATIENT)
Dept: URGENT CARE | Facility: CLINIC | Age: 60
End: 2020-07-04
Payer: MEDICARE

## 2020-07-04 VITALS
SYSTOLIC BLOOD PRESSURE: 131 MMHG | BODY MASS INDEX: 32.44 KG/M2 | RESPIRATION RATE: 16 BRPM | HEIGHT: 64 IN | HEART RATE: 90 BPM | WEIGHT: 190 LBS | OXYGEN SATURATION: 96 % | DIASTOLIC BLOOD PRESSURE: 91 MMHG | TEMPERATURE: 97 F

## 2020-07-04 DIAGNOSIS — G95.19 INTERMITTENT SPINAL CLAUDICATION: Primary | ICD-10-CM

## 2020-07-04 PROCEDURE — 99214 PR OFFICE/OUTPT VISIT, EST, LEVL IV, 30-39 MIN: ICD-10-PCS | Mod: 25,S$GLB,, | Performed by: INTERNAL MEDICINE

## 2020-07-04 PROCEDURE — 96372 THER/PROPH/DIAG INJ SC/IM: CPT | Mod: S$GLB,,, | Performed by: INTERNAL MEDICINE

## 2020-07-04 PROCEDURE — 99499 RISK ADDL DX/OHS AUDIT: ICD-10-PCS | Mod: S$GLB,,, | Performed by: INTERNAL MEDICINE

## 2020-07-04 PROCEDURE — 99214 OFFICE O/P EST MOD 30 MIN: CPT | Mod: 25,S$GLB,, | Performed by: INTERNAL MEDICINE

## 2020-07-04 PROCEDURE — 96372 PR INJECTION,THERAP/PROPH/DIAG2ST, IM OR SUBCUT: ICD-10-PCS | Mod: S$GLB,,, | Performed by: INTERNAL MEDICINE

## 2020-07-04 PROCEDURE — 99499 UNLISTED E&M SERVICE: CPT | Mod: S$GLB,,, | Performed by: INTERNAL MEDICINE

## 2020-07-04 RX ORDER — TRAMADOL HYDROCHLORIDE 50 MG/1
50 TABLET ORAL EVERY 6 HOURS PRN
Qty: 20 TABLET | Refills: 0 | Status: SHIPPED | OUTPATIENT
Start: 2020-07-04 | End: 2020-09-03 | Stop reason: SDUPTHER

## 2020-07-04 RX ORDER — BETAMETHASONE SODIUM PHOSPHATE AND BETAMETHASONE ACETATE 3; 3 MG/ML; MG/ML
9 INJECTION, SUSPENSION INTRA-ARTICULAR; INTRALESIONAL; INTRAMUSCULAR; SOFT TISSUE ONCE
Status: COMPLETED | OUTPATIENT
Start: 2020-07-04 | End: 2020-07-04

## 2020-07-04 RX ADMIN — BETAMETHASONE SODIUM PHOSPHATE AND BETAMETHASONE ACETATE 9 MG: 3; 3 INJECTION, SUSPENSION INTRA-ARTICULAR; INTRALESIONAL; INTRAMUSCULAR; SOFT TISSUE at 06:07

## 2020-07-04 NOTE — PROGRESS NOTES
"Subjective:       Patient ID: Vale Gutierrez is a 59 y.o. female.    Vitals:  height is 5' 4" (1.626 m) and weight is 86.2 kg (190 lb). Her temperature is 97.3 °F (36.3 °C). Her blood pressure is 131/91 (abnormal) and her pulse is 90. Her respiration is 16 and oxygen saturation is 96%.     Chief Complaint: Leg Pain    Pt states leg pain that has been going on for a few weeks. Pt has sharp, shooting pain that lasts only a few seconds in both legs. Pt thinks it may be related to her back       Constitution: Negative for chills, fatigue and fever.   HENT: Negative for congestion and sore throat.    Neck: Negative for painful lymph nodes.   Cardiovascular: Negative for chest pain and leg swelling.   Eyes: Negative for double vision and blurred vision.   Respiratory: Negative for cough and shortness of breath.    Gastrointestinal: Negative for nausea, vomiting and diarrhea.   Genitourinary: Negative for dysuria, frequency, urgency and history of kidney stones.   Musculoskeletal: Positive for pain. Negative for joint pain, joint swelling, muscle cramps and muscle ache.   Skin: Negative for color change, pale, rash and bruising.   Allergic/Immunologic: Negative for seasonal allergies.   Neurological: Negative for dizziness, history of vertigo, light-headedness, passing out and headaches.   Hematologic/Lymphatic: Negative for swollen lymph nodes.   Psychiatric/Behavioral: Negative for nervous/anxious, sleep disturbance and depression. The patient is not nervous/anxious.        Objective:      Physical Exam   HENT:   Head: Normocephalic and atraumatic.   Neck: Normal range of motion. Neck supple.   Abdominal: Normal appearance.   Musculoskeletal:      Comments: No pain now but intermittent bilat calf pain with rom and walking; non tender calfs   Neurological: She is alert. She displays no weakness. Gait (slight "bicycle" position posture provides some relief) abnormal.   Nursing note and vitals reviewed.      "   Assessment:       1. Intermittent spinal claudication        Plan:         Intermittent spinal claudication  -     Ambulatory referral/consult to Spine Surgery

## 2020-07-09 ENCOUNTER — TELEPHONE (OUTPATIENT)
Dept: ORTHOPEDICS | Facility: CLINIC | Age: 60
End: 2020-07-09

## 2020-07-09 NOTE — TELEPHONE ENCOUNTER
----- Message from Billie Truong LPN sent at 7/8/2020  4:04 PM CDT -----  Regarding: FW: appointment access    ----- Message -----  From: Konrad Lee  Sent: 7/8/2020   3:55 PM CDT  To: [facility] Clinical Staff  Subject: appointment access                                called in to speak with someone at the office regarding a referral. She would like a call back from the office and can be reached at    [phone]

## 2020-07-09 NOTE — TELEPHONE ENCOUNTER
Ortho Telephone Triage Message  [phone]  Pt requests soonest available Ortho appt for intermittent claudication per Dr. Diop/aVni  referral. Advised pt that appts are presently filled into August and appt may be available at [facility]. Pt states understanding. Appt scheduled with Dr. Rico/[facility] on 7/13/20 at 2:00pm with arrival at 1:45pm. Pt confirms time and location of appt.[facility] contact number provided to pt for any questions/concerns regarding appt.  Appt slip mailed.

## 2020-07-12 ENCOUNTER — PATIENT OUTREACH (OUTPATIENT)
Dept: ADMINISTRATIVE | Facility: OTHER | Age: 60
End: 2020-07-12

## 2020-07-13 ENCOUNTER — OFFICE VISIT (OUTPATIENT)
Dept: PAIN MEDICINE | Facility: CLINIC | Age: 60
End: 2020-07-13
Payer: MEDICARE

## 2020-07-13 VITALS
HEART RATE: 75 BPM | HEIGHT: 64 IN | DIASTOLIC BLOOD PRESSURE: 72 MMHG | WEIGHT: 195 LBS | SYSTOLIC BLOOD PRESSURE: 113 MMHG | BODY MASS INDEX: 33.29 KG/M2

## 2020-07-13 DIAGNOSIS — M54.16 LUMBAR RADICULOPATHY: ICD-10-CM

## 2020-07-13 DIAGNOSIS — M51.36 DDD (DEGENERATIVE DISC DISEASE), LUMBAR: Primary | ICD-10-CM

## 2020-07-13 DIAGNOSIS — M54.9 DORSALGIA, UNSPECIFIED: ICD-10-CM

## 2020-07-13 DIAGNOSIS — M47.816 LUMBAR SPONDYLOSIS: ICD-10-CM

## 2020-07-13 PROCEDURE — 3008F PR BODY MASS INDEX (BMI) DOCUMENTED: ICD-10-PCS | Mod: CPTII,S$GLB,, | Performed by: ANESTHESIOLOGY

## 2020-07-13 PROCEDURE — 99204 OFFICE O/P NEW MOD 45 MIN: CPT | Mod: S$GLB,,, | Performed by: ANESTHESIOLOGY

## 2020-07-13 PROCEDURE — 99204 PR OFFICE/OUTPT VISIT, NEW, LEVL IV, 45-59 MIN: ICD-10-PCS | Mod: S$GLB,,, | Performed by: ANESTHESIOLOGY

## 2020-07-13 PROCEDURE — 3078F DIAST BP <80 MM HG: CPT | Mod: CPTII,S$GLB,, | Performed by: ANESTHESIOLOGY

## 2020-07-13 PROCEDURE — 3074F SYST BP LT 130 MM HG: CPT | Mod: CPTII,S$GLB,, | Performed by: ANESTHESIOLOGY

## 2020-07-13 PROCEDURE — 99999 PR PBB SHADOW E&M-EST. PATIENT-LVL IV: ICD-10-PCS | Mod: PBBFAC,,, | Performed by: ANESTHESIOLOGY

## 2020-07-13 PROCEDURE — 99999 PR PBB SHADOW E&M-EST. PATIENT-LVL IV: CPT | Mod: PBBFAC,,, | Performed by: ANESTHESIOLOGY

## 2020-07-13 PROCEDURE — 3008F BODY MASS INDEX DOCD: CPT | Mod: CPTII,S$GLB,, | Performed by: ANESTHESIOLOGY

## 2020-07-13 PROCEDURE — 3074F PR MOST RECENT SYSTOLIC BLOOD PRESSURE < 130 MM HG: ICD-10-PCS | Mod: CPTII,S$GLB,, | Performed by: ANESTHESIOLOGY

## 2020-07-13 PROCEDURE — 3078F PR MOST RECENT DIASTOLIC BLOOD PRESSURE < 80 MM HG: ICD-10-PCS | Mod: CPTII,S$GLB,, | Performed by: ANESTHESIOLOGY

## 2020-07-13 RX ORDER — CYCLOBENZAPRINE HCL 5 MG
5 TABLET ORAL 3 TIMES DAILY PRN
Qty: 90 TABLET | Refills: 0 | Status: SHIPPED | OUTPATIENT
Start: 2020-07-13 | End: 2020-08-12

## 2020-07-13 RX ORDER — ALPRAZOLAM 0.5 MG/1
0.5 TABLET ORAL ONCE
Qty: 1 TABLET | Refills: 0 | Status: SHIPPED | OUTPATIENT
Start: 2020-07-13 | End: 2020-09-03 | Stop reason: ALTCHOICE

## 2020-07-13 NOTE — PROGRESS NOTES
"Chronic Pain - New Consult    Referring Physician: Estela Mcdaniel MD    Chief Complaint:   Chief Complaint   Patient presents with    Low-back Pain        SUBJECTIVE:    Vale Gutierrez presents to the clinic for the evaluation of low back pain and bilateral calf pain. The calf pain started 5-6 weeks ago following non related injury and symptoms have been worsening.The pain is located in the low back area and radiates to the legs. She has suffered from lower back pain "for years."  The pain is described as aching and burning with intermittent numbness and tingling in the lower extremities. The pain is rated as 9/10. The pain is rated with a score of  3/10 on the BEST day and a score of 10/10 on the WORST day.  Symptoms interfere with daily activity. The pain is exacerbated by Standing, Walking, and Lifting.  The pain is mitigated by massage, medications and rest. She reports spending 8 hours per day reclining. The patient reports 3-4 hours of uninterrupted sleep per night. She presents for referral from Urgent Care where she was evaluated and believed to have intermittent neurogenic claudication.     Patient reports loss of sensations.  Patient denies night fever/night sweats, urinary incontinence, bowel incontinence, significant weight loss and significant motor weakness.    Physical Therapy/Home Exercise: yes      Pain Disability Index Review:  Last 3 PDI Scores 7/13/2020   Pain Disability Index (PDI) 47       Pain Medications:    - Opioids: Has a history of narcotic abuse, on saboxone  - Adjuvant Medications: Neurontin (Gabapentin) and PO Diclofenac and Effexor  - She has tried Tramadol in the past   report:  Reviewed and consistent with medication use as prescribed.    Pain Procedures: Patient says she has had 9 ESIs 15-20 years ago with minimal pain relief    Imaging:   Narrative & Impression     EXAMINATION:  XR LUMBAR SPINE COMPLETE 5 VIEW     CLINICAL HISTORY:  Low back pain, >6wks conservative " tx, persistent-progressive sx, surgical candidate;  Low back pain     FINDINGS:  Five views: There is grade 1 anterolisthesis of L4 on L5.  There is mild-moderate DJD at L5-S1 and L3-L4.  There is mild DJD elsewhere.  No fracture dislocation bone destruction seen.  No pars defects are seen.     Impression:     No acute process seen.     EXAMINATION:  MRI CERVICAL SPINE WITHOUT CONTRAST     CLINICAL HISTORY:  neck pain;.  Cervicalgia     TECHNIQUE:  Multiplanar, multisequence MR images of the cervical spine were acquired without the administration of contrast.     COMPARISON:  Radiograph 11/15/2019.     FINDINGS:  There is reversal of the normal cervical lordosis with grade 1 anterolisthesis of C3 on C4.  Vertebral body heights are well maintained without evidence for fracture.  There is multilevel disc space narrowing with associated chronic degenerative endplate changes, most pronounced at C4-C5, C5-C6 and C6-C7.  There is moderate right facet edema at C4-C5, likely degenerative in nature.  No marrow signal abnormality to suggest an infiltrative process.     Cervical spinal cord demonstrates normal contour and signal intensity.  Cerebellar tonsils are in their expected location.  Visualized brainstem is normal.     Vertebral artery flow voids are present.  Prevertebral soft tissues are normal.  Visualized parotid, submandibular and thyroid glands are within normal limits.  No cervical lymphadenopathy.  There is a medialized course of the carotid arteries.  Paraspinal musculature is grossly unremarkable.     C2-C3: No spinal canal stenosis or neural foraminal narrowing.     C3-C4: Right facet hypertrophy and bilateral uncovertebral joint spurring contributes to mild to moderate bilateral neural foraminal narrowing, not well evaluated due to patient motion artifact.  No spinal canal stenosis.     C4-C5: Posterior disc osteophyte complex, right facet hypertrophy and bilateral uncovertebral joint spurring contributes  to severe right and moderate left neural foraminal narrowing.  No spinal canal stenosis.     C5-C6: Posterior disc osteophyte complex and bilateral uncovertebral joint spurring contributes to mild spinal canal stenosis and severe left and moderate right neural foraminal narrowing.     C6-C7: Posterior disc osteophyte complex and bilateral uncovertebral joint spurring contributes to mild spinal canal stenosis and severe bilateral neural foraminal narrowing.     C7-T1: No spinal canal stenosis or neural foraminal narrowing.     Impression:     1. Cervical degenerative changes most pronounced at C5-C6 and C6-C7 noting mild spinal canal stenosis and moderate to severe bilateral neural foraminal narrowing.    Past Medical History:   Diagnosis Date    Alcohol abuse     Anxiety     Depression     Hyperlipidemia     Hypertension      Past Surgical History:   Procedure Laterality Date    BREAST CYST ASPIRATION      CHOLECYSTECTOMY      complicated with bile leak, sepsis    COLONOSCOPY N/A 6/3/2020    Procedure: COLONOSCOPY Golytely;  Surgeon: Tino Neil MD;  Location: [facility];  Service: Colon and Rectal;  Laterality: N/A;    ESOPHAGOGASTRODUODENOSCOPY N/A 6/3/2020    Procedure: EGD (ESOPHAGOGASTRODUODENOSCOPY);  Surgeon: Tino Neil MD;  Location: [facility];  Service: Colon and Rectal;  Laterality: N/A;     Social History     Socioeconomic History    Marital status: Single     Spouse name: Not on file    Number of children: Not on file    Years of education: Not on file    Highest education level: Not on file   Occupational History    Not on file   Social Needs    Financial resource strain: Not hard at all    Food insecurity     Worry: Never true     Inability: Never true    Transportation needs     Medical: No     Non-medical: No   Tobacco Use    Smoking status: Former Smoker    Smokeless tobacco: Never Used   Substance and Sexual Activity    Alcohol use: Yes     Alcohol/week: 12.0 standard  drinks     Types: 12 Shots of liquor per week     Frequency: Never     Binge frequency: Never     Comment: three 12-14 oz cocktail drinks.     Drug use: No    Sexual activity: Never   Lifestyle    Physical activity     Days per week: 0 days     Minutes per session: 0 min    Stress: Only a little   Relationships    Social connections     Talks on phone: Three times a week     Gets together: Never     Attends Yazidism service: Not on file     Active member of club or organization: No     Attends meetings of clubs or organizations: Never     Relationship status: Never    Other Topics Concern    Not on file   Social History Narrative    Not on file     Family History   Problem Relation Age of Onset    Atrial fibrillation Mother     Heart failure Mother     Arthritis Mother     Macular degeneration Mother     Stroke Father     Cancer Maternal Uncle         lung    Cataracts Paternal Grandfather     Glaucoma Paternal Grandfather        Review of patient's allergies indicates:  No Known Allergies    Current Outpatient Medications   Medication Sig    aspirin (ECOTRIN) 81 MG EC tablet Take 81 mg by mouth once daily.    buPROPion (WELLBUTRIN XL) 300 MG 24 hr tablet Take 300 mg by mouth every morning.    diclofenac (VOLTAREN) 50 MG EC tablet Take 1 tablet (50 mg total) by mouth 3 (three) times daily as needed (pain).    gabapentin (NEURONTIN) 300 MG capsule Take one cap PO QHS for 3 days, then one cap PO BID for the next 3 days, and then take one cap PO TID and continue    metoprolol tartrate (LOPRESSOR) 50 MG tablet Take 1 tablet (50 mg total) by mouth once daily.    omeprazole (PRILOSEC) 20 MG capsule Take 1 capsule (20 mg total) by mouth 2 (two) times daily.    potassium chloride (MICRO-K) 10 MEQ CpSR Take 1 capsule (10 mEq total) by mouth once daily.    traMADoL (ULTRAM) 50 mg tablet Take 1 tablet (50 mg total) by mouth every 6 (six) hours as needed for Pain.    venlafaxine (EFFEXOR-XR) 150  "MG Cp24 Take 150 mg by mouth once daily.    venlafaxine (EFFEXOR-XR) 75 MG 24 hr capsule Take 75 mg by mouth once daily.    vitamin D (VITAMIN D3) 1000 units Tab Take 5,000 Int'l Units by mouth.    cyclobenzaprine (FLEXERIL) 5 MG tablet Take 1 tablet (5 mg total) by mouth 3 (three) times daily as needed for Muscle spasms.    diclofenac (VOLTAREN) 50 MG EC tablet      No current facility-administered medications for this visit.        REVIEW OF SYSTEMS:    GENERAL:  No weight loss, malaise or fevers.  HEENT:  Negative for frequent or significant headaches.  NECK:  Negative for lumps, goiter, pain and significant neck swelling.  RESPIRATORY:  Negative for cough, wheezing or shortness of breath.  CARDIOVASCULAR:  Negative for chest pain, leg swelling or palpitations. +Hypertension, +Hyperlipidemia  GI:  Negative for abdominal discomfort, blood in stools or black stools or change in bowel habits.  MUSCULOSKELETAL:  Low back pain, bilateral calf pain  SKIN:  Negative for lesions, rash, and itching.  PSYCH:  Negative for sleep disturbance and recent psychosocial stressors. +History of anxiety and depression  HEMATOLOGY/LYMPHOLOGY:  Negative for prolonged bleeding, bruising easily or swollen nodes.  NEURO:   No history of headaches, syncope, paralysis, seizures or tremors.  All other reviewed and negative other than HPI.    OBJECTIVE:    /72   Pulse 75   Ht 5' 4" (1.626 m)   Wt 88.5 kg (195 lb)   BMI 33.47 kg/m²     PHYSICAL EXAMINATION:    General appearance: Well appearing, in no acute distress, alert and oriented x3. Seated in wheelchair  Psych:  Mood and affect appropriate.  Skin: Skin color, texture, turgor normal, no rashes or lesions, in both upper and lower body.  Head/face:  Normocephalic, atraumatic. No palpable lymph nodes.  Neck: No pain to palpation over the cervical paraspinous muscles. Spurling Negative. No pain with neck flexion, extension, or lateral flexion.   Cor: RRR  Pulm: CTA  GI:  Soft " and non-tender.  Back: Straight leg raising in the sitting positions is positive to radicular pain bilateralky. Pain to palpation over the medial lumbar spine. Limited range of motion due to pain reproduction.  Extremities: Peripheral joint ROM is full and pain free without obvious instability or laxity in all four extremities. No deformities, edema, or skin discoloration. Good capillary refill.  Musculoskeletal: Shoulder, hip, sacroiliac and knee provocative maneuvers are negative. Bilateral upper and lower extremity strength is normal and symmetric.  No atrophy or tone abnormalities are noted.  Neuro: Bilateral upper and lower extremity coordination and muscle stretch reflexes are physiologic and symmetric.  Plantar response are downgoing. No loss of sensation is noted.  Gait: normal.    ASSESSMENT: 59 y.o. year old female with lower back and calf pain, consistent with lumbar radiculopathy or lumbar spondylosis. Patient will need MRI-lumbar spine to further narrow the diagnosis. On physical exam, she does display some radicular signs as well as pain to palpation over the lumbar spine. Importantly, the patient does have a history of narcotic abuse and has been on saboxone. Opioid sparing modalities are indicated for her. She may benefit from epidural spinal injections however an MRI is first needed. She would benefit from continuing physical therapy as well as starting a muscle relaxant for her spasming calf pain.     1. DDD (degenerative disc disease), lumbar     2. Dorsalgia, unspecified  MRI Lumbar Spine Without Contrast   3. Lumbar spondylosis     4. Lumbar radiculopathy           PLAN:     - I have stressed the importance of physical activity and a home exercise plan to help with pain and improve health.  - Continue Physical therapy for Lumbar stabilization, core strengthening, and a home exercise program.  - Order MRI of the Lumbar Spine to help evaluate possible source of pain.  - Continue Neurontin 300mg  three times a day to help with radicular pain but take 2 tablets at night.  -Start Flexeril 5 mg TID as needed.  - I do not feel this patient is a candidate for long term opioids for this non-cancer pain at this time.  - Patient can continue with medications for now since they are providing benefits, using them appropriately, and without side effects.  - I advised the patient to add extra strength OTC tylenol to her medication regimen but to not exceed 4 per day.  - Follow up in 2 weeks via virtual visit  - Counseled patient regarding the importance of activity modification, alcohol cessation, constant sleeping habits and physical therapy.    The above plan and management options were discussed at length with patient. Patient is in agreement with the above and verbalized understanding. It will be communicated with the referring physician via electronic record, fax, or mail.    Murtaza Colin, MS4  07/13/2020     I have reviewed and concur with the resident's history, physical, assessment, and plan.  I have personally interviewed and examined the patient at bedside.  See below addendum for my evaluation and additional findings.  59-year-old female with chronic lower back pain consistent with lumbar radiculopathy.  We will refer to physical therapy and start her on Flexeril 5 mg t.i.d. as needed.  We will increase the dose of gabapentin to 300/300/600 mg.  We will order lumbar spine MRI to further evaluate the etiology of her pain.  We will follow up with her in 2 weeks.  We will consider lumbar epidural steroid injection pending MRI results.    Rubén Rico MD

## 2020-07-13 NOTE — PROGRESS NOTES
"Chronic Pain - New Consult    Referring Physician: Estela Mcdaniel MD    Chief Complaint:   Chief Complaint   Patient presents with    Low-back Pain        SUBJECTIVE:    Vale Gutierrez presents to the clinic for the evaluation of low back pain and bilateral calf pain. The calf pain started 5-6 weeks ago following non related injury and symptoms have been worsening.The pain is located in the low back area and radiates to the legs. She has suffered from lower back pain "for years."  The pain is described as aching and burning with intermittent numbness and tingling in the lower extremities. The pain is rated as 9/10. The pain is rated with a score of  3/10 on the BEST day and a score of 10/10 on the WORST day.  Symptoms interfere with daily activity. The pain is exacerbated by Standing, Walking, and Lifting.  The pain is mitigated by massage, medications and rest. She reports spending 8 hours per day reclining. The patient reports 3-4 hours of uninterrupted sleep per night. She presents for referral from Urgent Care where she was evaluated and believed to have intermittent neurogenic claudication.     Patient reports loss of sensations.  Patient denies night fever/night sweats, urinary incontinence, bowel incontinence, significant weight loss and significant motor weakness.    Physical Therapy/Home Exercise: yes      Pain Disability Index Review:  Last 3 PDI Scores 7/13/2020   Pain Disability Index (PDI) 47       Pain Medications:    - Opioids: Has a history of narcotic abuse, on saboxone  - Adjuvant Medications: Neurontin (Gabapentin) and PO Diclofenac and Effexor  - She has tried Tramadol in the past   report:  Reviewed and consistent with medication use as prescribed.    Pain Procedures: Patient says she has had 9 ESIs 15-20 years ago with minimal pain relief    Imaging:   Narrative & Impression     EXAMINATION:  XR LUMBAR SPINE COMPLETE 5 VIEW     CLINICAL HISTORY:  Low back pain, >6wks conservative " tx, persistent-progressive sx, surgical candidate;  Low back pain     FINDINGS:  Five views: There is grade 1 anterolisthesis of L4 on L5.  There is mild-moderate DJD at L5-S1 and L3-L4.  There is mild DJD elsewhere.  No fracture dislocation bone destruction seen.  No pars defects are seen.     Impression:     No acute process seen.     EXAMINATION:  MRI CERVICAL SPINE WITHOUT CONTRAST     CLINICAL HISTORY:  neck pain;.  Cervicalgia     TECHNIQUE:  Multiplanar, multisequence MR images of the cervical spine were acquired without the administration of contrast.     COMPARISON:  Radiograph 11/15/2019.     FINDINGS:  There is reversal of the normal cervical lordosis with grade 1 anterolisthesis of C3 on C4.  Vertebral body heights are well maintained without evidence for fracture.  There is multilevel disc space narrowing with associated chronic degenerative endplate changes, most pronounced at C4-C5, C5-C6 and C6-C7.  There is moderate right facet edema at C4-C5, likely degenerative in nature.  No marrow signal abnormality to suggest an infiltrative process.     Cervical spinal cord demonstrates normal contour and signal intensity.  Cerebellar tonsils are in their expected location.  Visualized brainstem is normal.     Vertebral artery flow voids are present.  Prevertebral soft tissues are normal.  Visualized parotid, submandibular and thyroid glands are within normal limits.  No cervical lymphadenopathy.  There is a medialized course of the carotid arteries.  Paraspinal musculature is grossly unremarkable.     C2-C3: No spinal canal stenosis or neural foraminal narrowing.     C3-C4: Right facet hypertrophy and bilateral uncovertebral joint spurring contributes to mild to moderate bilateral neural foraminal narrowing, not well evaluated due to patient motion artifact.  No spinal canal stenosis.     C4-C5: Posterior disc osteophyte complex, right facet hypertrophy and bilateral uncovertebral joint spurring contributes  to severe right and moderate left neural foraminal narrowing.  No spinal canal stenosis.     C5-C6: Posterior disc osteophyte complex and bilateral uncovertebral joint spurring contributes to mild spinal canal stenosis and severe left and moderate right neural foraminal narrowing.     C6-C7: Posterior disc osteophyte complex and bilateral uncovertebral joint spurring contributes to mild spinal canal stenosis and severe bilateral neural foraminal narrowing.     C7-T1: No spinal canal stenosis or neural foraminal narrowing.     Impression:     1. Cervical degenerative changes most pronounced at C5-C6 and C6-C7 noting mild spinal canal stenosis and moderate to severe bilateral neural foraminal narrowing.    Past Medical History:   Diagnosis Date    Alcohol abuse     Anxiety     Depression     Hyperlipidemia     Hypertension      Past Surgical History:   Procedure Laterality Date    BREAST CYST ASPIRATION      CHOLECYSTECTOMY      complicated with bile leak, sepsis    COLONOSCOPY N/A 6/3/2020    Procedure: COLONOSCOPY Golytely;  Surgeon: Tino Neil MD;  Location: [facility];  Service: Colon and Rectal;  Laterality: N/A;    ESOPHAGOGASTRODUODENOSCOPY N/A 6/3/2020    Procedure: EGD (ESOPHAGOGASTRODUODENOSCOPY);  Surgeon: Tino Neil MD;  Location: [facility];  Service: Colon and Rectal;  Laterality: N/A;     Social History     Socioeconomic History    Marital status: Single     Spouse name: Not on file    Number of children: Not on file    Years of education: Not on file    Highest education level: Not on file   Occupational History    Not on file   Social Needs    Financial resource strain: Not hard at all    Food insecurity     Worry: Never true     Inability: Never true    Transportation needs     Medical: No     Non-medical: No   Tobacco Use    Smoking status: Former Smoker    Smokeless tobacco: Never Used   Substance and Sexual Activity    Alcohol use: Yes     Alcohol/week: 12.0 standard  drinks     Types: 12 Shots of liquor per week     Frequency: Never     Binge frequency: Never     Comment: three 12-14 oz cocktail drinks.     Drug use: No    Sexual activity: Never   Lifestyle    Physical activity     Days per week: 0 days     Minutes per session: 0 min    Stress: Only a little   Relationships    Social connections     Talks on phone: Three times a week     Gets together: Never     Attends [employer] service: Not on file     Active member of club or organization: No     Attends meetings of clubs or organizations: Never     Relationship status: Never    Other Topics Concern    Not on file   Social History Narrative    Not on file     Family History   Problem Relation Age of Onset    Atrial fibrillation Mother     Heart failure Mother     Arthritis Mother     Macular degeneration Mother     Stroke Father     Cancer Maternal Uncle         lung    Cataracts Paternal Grandfather     Glaucoma Paternal Grandfather        Review of patient's allergies indicates:  No Known Allergies    Current Outpatient Medications   Medication Sig    aspirin (ECOTRIN) 81 MG EC tablet Take 81 mg by mouth once daily.    buPROPion (WELLBUTRIN XL) 300 MG 24 hr tablet Take 300 mg by mouth every morning.    diclofenac (VOLTAREN) 50 MG EC tablet Take 1 tablet (50 mg total) by mouth 3 (three) times daily as needed (pain).    gabapentin (NEURONTIN) 300 MG capsule Take one cap PO QHS for 3 days, then one cap PO BID for the next 3 days, and then take one cap PO TID and continue    metoprolol tartrate (LOPRESSOR) 50 MG tablet Take 1 tablet (50 mg total) by mouth once daily.    omeprazole (PRILOSEC) 20 MG capsule Take 1 capsule (20 mg total) by mouth 2 (two) times daily.    potassium chloride (MICRO-K) 10 MEQ CpSR Take 1 capsule (10 mEq total) by mouth once daily.    traMADoL (ULTRAM) 50 mg tablet Take 1 tablet (50 mg total) by mouth every 6 (six) hours as needed for Pain.    venlafaxine (EFFEXOR-XR) 150  "MG Cp24 Take 150 mg by mouth once daily.    venlafaxine (EFFEXOR-XR) 75 MG 24 hr capsule Take 75 mg by mouth once daily.    vitamin D (VITAMIN D3) 1000 units Tab Take 5,000 Int'l Units by mouth.    cyclobenzaprine (FLEXERIL) 5 MG tablet Take 1 tablet (5 mg total) by mouth 3 (three) times daily as needed for Muscle spasms.    diclofenac (VOLTAREN) 50 MG EC tablet      No current facility-administered medications for this visit.        REVIEW OF SYSTEMS:    GENERAL:  No weight loss, malaise or fevers.  HEENT:  Negative for frequent or significant headaches.  NECK:  Negative for lumps, goiter, pain and significant neck swelling.  RESPIRATORY:  Negative for cough, wheezing or shortness of breath.  CARDIOVASCULAR:  Negative for chest pain, leg swelling or palpitations. +Hypertension, +Hyperlipidemia  GI:  Negative for abdominal discomfort, blood in stools or black stools or change in bowel habits.  MUSCULOSKELETAL:  Low back pain, bilateral calf pain  SKIN:  Negative for lesions, rash, and itching.  PSYCH:  Negative for sleep disturbance and recent psychosocial stressors. +History of anxiety and depression  HEMATOLOGY/LYMPHOLOGY:  Negative for prolonged bleeding, bruising easily or swollen nodes.  NEURO:   No history of headaches, syncope, paralysis, seizures or tremors.  All other reviewed and negative other than HPI.    OBJECTIVE:    /72   Pulse 75   Ht 5' 4" (1.626 m)   Wt 88.5 kg (195 lb)   BMI 33.47 kg/m²     PHYSICAL EXAMINATION:    General appearance: Well appearing, in no acute distress, alert and oriented x3. Seated in wheelchair  Psych:  Mood and affect appropriate.  Skin: Skin color, texture, turgor normal, no rashes or lesions, in both upper and lower body.  Head/face:  Normocephalic, atraumatic. No palpable lymph nodes.  Neck: No pain to palpation over the cervical paraspinous muscles. Spurling Negative. No pain with neck flexion, extension, or lateral flexion.   Cor: RRR  Pulm: CTA  GI:  Soft " and non-tender.  Back: Straight leg raising in the sitting positions is positive to radicular pain bilateralky. Pain to palpation over the medial lumbar spine. Limited range of motion due to pain reproduction.  Extremities: Peripheral joint ROM is full and pain free without obvious instability or laxity in all four extremities. No deformities, edema, or skin discoloration. Good capillary refill.  Musculoskeletal: Shoulder, hip, sacroiliac and knee provocative maneuvers are negative. Bilateral upper and lower extremity strength is normal and symmetric.  No atrophy or tone abnormalities are noted.  Neuro: Bilateral upper and lower extremity coordination and muscle stretch reflexes are physiologic and symmetric.  Plantar response are downgoing. No loss of sensation is noted.  Gait: normal.    ASSESSMENT: 59 y.o. year old female with lower back and calf pain, consistent with lumbar radiculopathy or lumbar spondylosis. Patient will need MRI-lumbar spine to further narrow the diagnosis. On physical exam, she does display some radicular signs as well as pain to palpation over the lumbar spine. Importantly, the patient does have a history of narcotic abuse and has been on saboxone. Opioid sparing modalities are indicated for her. She may benefit from epidural spinal injections however an MRI is first needed. She would benefit from continuing physical therapy as well as starting a muscle relaxant for her spasming calf pain.     1. DDD (degenerative disc disease), lumbar     2. Dorsalgia, unspecified  MRI Lumbar Spine Without Contrast   3. Lumbar spondylosis     4. Lumbar radiculopathy           PLAN:     - I have stressed the importance of physical activity and a home exercise plan to help with pain and improve health.  - Continue Physical therapy for Lumbar stabilization, core strengthening, and a home exercise program.  - Order MRI of the Lumbar Spine to help evaluate possible source of pain.  - Continue Neurontin 300mg  three times a day to help with radicular pain but take 2 tablets at night.  -Start Flexeril 5 mg TID as needed.  - I do not feel this patient is a candidate for long term opioids for this non-cancer pain at this time.  - Patient can continue with medications for now since they are providing benefits, using them appropriately, and without side effects.  - I advised the patient to add extra strength OTC tylenol to her medication regimen but to not exceed 4 per day.  - Follow up in 2 weeks via virtual visit  - Counseled patient regarding the importance of activity modification, alcohol cessation, constant sleeping habits and physical therapy.    The above plan and management options were discussed at length with patient. Patient is in agreement with the above and verbalized understanding. It will be communicated with the referring physician via electronic record, fax, or mail.    Murtaza Colin, MS4  07/13/2020     I have reviewed and concur with the resident's history, physical, assessment, and plan.  I have personally interviewed and examined the patient at bedside.  See below addendum for my evaluation and additional findings.  59-year-old female with chronic lower back pain consistent with lumbar radiculopathy.  We will refer to physical therapy and start her on Flexeril 5 mg t.i.d. as needed.  We will increase the dose of gabapentin to 300/300/600 mg.  We will order lumbar spine MRI to further evaluate the etiology of her pain.  We will follow up with her in 2 weeks.  We will consider lumbar epidural steroid injection pending MRI results.    Rubén Rico MD

## 2020-07-13 NOTE — LETTER
July 13, 2020      Estela Mcdaniel MD  [address]  [place] [address]           [facility]  [address]  [place] [address]  Phone: [phone]  Fax: [phone]          Patient: Vale Gutierrez   MR Number: [id number]   YOB: 1960   Date of Visit: 7/13/2020       Dear Dr. Estela Mcdaniel:    Thank you for referring Vale Gutierrez to me for evaluation. Attached you will find relevant portions of my assessment and plan of care.    If you have questions, please do not hesitate to call me. I look forward to following Vale Gutierrez along with you.    Sincerely,    Rubén Rico MD    Enclosure  CC:  No Recipients    If you would like to receive this communication electronically, please contact [email or web address] or [phone] to request more information on [employer] access.    For providers and/or their staff who would like to refer a patient to [facility], please contact us through our one-stop-shop provider referral line, [facility], at [phone].    If you feel you have received this communication in error or would no longer like to receive these types of communications, please e-mail [email or web address]

## 2020-07-17 ENCOUNTER — EXTERNAL HOME HEALTH (OUTPATIENT)
Dept: HOME HEALTH SERVICES | Facility: HOSPITAL | Age: 60
End: 2020-07-17
Payer: MEDICARE

## 2020-07-22 ENCOUNTER — HOSPITAL ENCOUNTER (OUTPATIENT)
Dept: RADIOLOGY | Facility: HOSPITAL | Age: 60
Discharge: HOME | End: 2020-07-22
Attending: ANESTHESIOLOGY
Payer: MEDICARE

## 2020-07-22 DIAGNOSIS — M54.9 DORSALGIA, UNSPECIFIED: ICD-10-CM

## 2020-07-22 PROCEDURE — 72148 MRI LUMBAR SPINE WITHOUT CONTRAST: ICD-10-PCS | Mod: 26,,, | Performed by: RADIOLOGY

## 2020-07-22 PROCEDURE — 72148 MRI LUMBAR SPINE W/O DYE: CPT | Mod: 26,,, | Performed by: RADIOLOGY

## 2020-07-22 PROCEDURE — 72148 MRI LUMBAR SPINE W/O DYE: CPT | Mod: TC

## 2020-07-23 ENCOUNTER — TELEPHONE (OUTPATIENT)
Dept: PAIN MEDICINE | Facility: CLINIC | Age: 60
End: 2020-07-23

## 2020-07-23 NOTE — TELEPHONE ENCOUNTER
Contacted/spoke to patient regarding rescheduling virtual appointment on 08/03/2020 with Dr. Rico due to provider in surgery.    Staff offered to reschedule to 08/04/2020, patient stated she had completed the MRI and requested a virtual appointment sooner.    Staff then offered to r/s to next available virtual visit on 07/28 at 1:15pm.  Patient agreed, appointment scheduled.    Patient expressed understanding.

## 2020-07-27 ENCOUNTER — PATIENT OUTREACH (OUTPATIENT)
Dept: ADMINISTRATIVE | Facility: OTHER | Age: 60
End: 2020-07-27

## 2020-07-28 ENCOUNTER — OFFICE VISIT (OUTPATIENT)
Dept: PAIN MEDICINE | Facility: CLINIC | Age: 60
End: 2020-07-28
Payer: MEDICARE

## 2020-07-28 DIAGNOSIS — M54.17 LUMBOSACRAL RADICULOPATHY: Primary | ICD-10-CM

## 2020-07-28 DIAGNOSIS — M47.816 LUMBAR SPONDYLOSIS: ICD-10-CM

## 2020-07-28 DIAGNOSIS — G89.4 CHRONIC PAIN DISORDER: ICD-10-CM

## 2020-07-28 PROCEDURE — 99214 PR OFFICE/OUTPT VISIT, EST, LEVL IV, 30-39 MIN: ICD-10-PCS | Mod: 95,,, | Performed by: ANESTHESIOLOGY

## 2020-07-28 PROCEDURE — 99214 OFFICE O/P EST MOD 30 MIN: CPT | Mod: 95,,, | Performed by: ANESTHESIOLOGY

## 2020-07-28 RX ORDER — GABAPENTIN 300 MG/1
300 CAPSULE ORAL 3 TIMES DAILY
Qty: 90 CAPSULE | Refills: 11 | Status: SHIPPED | OUTPATIENT
Start: 2020-07-28 | End: 2020-09-03

## 2020-07-28 NOTE — H&P (VIEW-ONLY)
"  Chronic Pain-Tele-Medicine-Established Note (Follow up visit)      The patient location is: home  The chief complaint leading to consultation is: low back pain radiating to LE R> L  Visit type: Virtual visit with synchronous audio and video  Total time spent with patient: 20 mins  Each patient to whom he or she provides medical services by telemedicine is:  (1) informed of the relationship between the physician and patient and the respective role of any other health care provider with respect to management of the patient; and (2) notified that he or she may decline to receive medical services by telemedicine and may withdraw from such care at any time.    Notes:     SUBJECTIVE:    Interval History 07/28/2020:  Vale Gutierrez presents tele-medicine appointment for a follow-up appointment for low back pain radiating to lower extremitiy R>L. Since the last visit, Vale Gutierrez states the pain has been persistant. Current pain intensity is 0/10.  Pain is worst when standing and moving and relieved with sitting, laying, and stretched out.  At the worst she says it is a 10/10.  The pain radiates to back of thighs and calves.  She describes the pain as sharp, burning in nature.  She states that the flexeril 5mg prn helps somewhat but not much. She started diclofenac yesterday (prescribed by her PCP) which helps with the pain, decreases frequency and intensity. She recently had an MRI lumbar spine done and would like to review the results.     Interval History 07/13/2020  Vale Gutierrez presents to the clinic for the evaluation of low back pain and bilateral calf pain. The calf pain started 5-6 weeks ago following non related injury and symptoms have been worsening.The pain is located in the low back area and radiates to the legs. She has suffered from lower back pain "for years."  The pain is described as aching and burning with intermittent numbness and tingling in the lower extremities. The pain " is rated as 9/10. The pain is rated with a score of  3/10 on the BEST day and a score of 10/10 on the WORST day.  Symptoms interfere with daily activity. The pain is exacerbated by Standing, Walking, and Lifting.  The pain is mitigated by massage, medications and rest. She reports spending 8 hours per day reclining. The patient reports 3-4 hours of uninterrupted sleep per night. She presents for referral from Urgent Care where she was evaluated and believed to have intermittent neurogenic claudication.     Patient reports loss of sensations.  Patient denies night fever/night sweats, urinary incontinence, bowel incontinence, significant weight loss and significant motor weakness.     Physical Therapy/Home Exercise: yes, has not gone lately because of how to get there because of pain.       Pain Disability Index Review:  Last 3 PDI Scores 7/13/2020   Pain Disability Index (PDI) 47        Pain Medications:     - Opioids: Has a history of narcotic abuse, on saboxone  - Adjuvant Medications: Neurontin (Gabapentin) and PO Diclofenac and Effexor  - She has tried Tramadol in the past     report:  Reviewed and consistent with medication use as prescribed.     Pain Procedures: Patient says she has had 9 ESIs 15-20 years ago with minimal pain relief    Narrative & Impression     EXAMINATION:  MRI LUMBAR SPINE WITHOUT CONTRAST 07/22/2020     CLINICAL HISTORY:  Back pain or radiculopathy, > 6 wks; Dorsalgia, unspecified     TECHNIQUE:  Multiplanar, multisequence MR images were acquired from the thoracolumbar junction to the sacrum without contrast.     COMPARISON:  No prior MRI available for comparison.  Correlation is made to prior radiographs of the lumbar spine from 03/11/2020.     FINDINGS:  Alignment: Grade 1 anterolisthesis of L4 on L5.     Vertebrae: There are endplate degenerative changes at L5-S1.  Vertebral body heights are maintained.  No acute fracture.     Discs: Mild disc height and disc degeneration at L3-L4  and L5-S1.     Cord: Normal.  Conus terminates at L1.     Degenerative findings:     T12-L1 and L1-L2: No spinal canal stenosis or neural foraminal narrowing.     L2-L3: Circumferential disc bulge with moderate bilateral facet arthropathy and ligamentum flavum hypertrophy resulting in mild spinal canal stenosis.  No neural foraminal narrowing.     L3-L4: Circumferential disc bulge with moderate bilateral facet arthropathy resulting in mild spinal canal stenosis and mild bilateral neural foraminal narrowing.     L4-L5: Grade 1 anterolisthesis of L4 on L5 with a posterior disc bulge and severe bilateral facet arthropathy and ligamentum flavum hypertrophy resulting in moderate spinal canal stenosis and mild bilateral neural foraminal narrowing.     L5-S1: Posterior disc bulge with moderate bilateral facet arthropathy.  No spinal canal stenosis or neural foraminal narrowing.     Paraspinal muscles & soft tissues: There is moderate fatty atrophy of the paraspinal musculature.     Impression:     Multilevel degenerative changes of the lumbar spine, most significant at L4-L5 where there is moderate spinal canal stenosis.        Electronically signed by: Thuan Newman  Date:                                            07/22/2020  Time:                                           12:57          Imaging:   Narrative & Impression       EXAMINATION:  XR LUMBAR SPINE COMPLETE 5 VIEW     CLINICAL HISTORY:  Low back pain, >6wks conservative tx, persistent-progressive sx, surgical candidate;  Low back pain     FINDINGS:  Five views: There is grade 1 anterolisthesis of L4 on L5.  There is mild-moderate DJD at L5-S1 and L3-L4.  There is mild DJD elsewhere.  No fracture dislocation bone destruction seen.  No pars defects are seen.     Impression:     No acute process seen.      EXAMINATION:  MRI CERVICAL SPINE WITHOUT CONTRAST     CLINICAL HISTORY:  neck pain;.  Cervicalgia     TECHNIQUE:  Multiplanar, multisequence MR images of the  cervical spine were acquired without the administration of contrast.     COMPARISON:  Radiograph 11/15/2019.     FINDINGS:  There is reversal of the normal cervical lordosis with grade 1 anterolisthesis of C3 on C4.  Vertebral body heights are well maintained without evidence for fracture.  There is multilevel disc space narrowing with associated chronic degenerative endplate changes, most pronounced at C4-C5, C5-C6 and C6-C7.  There is moderate right facet edema at C4-C5, likely degenerative in nature.  No marrow signal abnormality to suggest an infiltrative process.     Cervical spinal cord demonstrates normal contour and signal intensity.  Cerebellar tonsils are in their expected location.  Visualized brainstem is normal.     Vertebral artery flow voids are present.  Prevertebral soft tissues are normal.  Visualized parotid, submandibular and thyroid glands are within normal limits.  No cervical lymphadenopathy.  There is a medialized course of the carotid arteries.  Paraspinal musculature is grossly unremarkable.     C2-C3: No spinal canal stenosis or neural foraminal narrowing.     C3-C4: Right facet hypertrophy and bilateral uncovertebral joint spurring contributes to mild to moderate bilateral neural foraminal narrowing, not well evaluated due to patient motion artifact.  No spinal canal stenosis.     C4-C5: Posterior disc osteophyte complex, right facet hypertrophy and bilateral uncovertebral joint spurring contributes to severe right and moderate left neural foraminal narrowing.  No spinal canal stenosis.     C5-C6: Posterior disc osteophyte complex and bilateral uncovertebral joint spurring contributes to mild spinal canal stenosis and severe left and moderate right neural foraminal narrowing.     C6-C7: Posterior disc osteophyte complex and bilateral uncovertebral joint spurring contributes to mild spinal canal stenosis and severe bilateral neural foraminal narrowing.     C7-T1: No spinal canal stenosis  or neural foraminal narrowing.     Impression:     1. Cervical degenerative changes most pronounced at C5-C6 and C6-C7 noting mild spinal canal stenosis and moderate to severe bilateral neural foraminal narrowing.    Past Medical History:   Diagnosis Date    Alcohol abuse     Anxiety     Depression     Hyperlipidemia     Hypertension      Past Surgical History:   Procedure Laterality Date    BREAST CYST ASPIRATION      CHOLECYSTECTOMY      complicated with bile leak, sepsis    COLONOSCOPY N/A 6/3/2020    Procedure: COLONOSCOPY Golytely;  Surgeon: Tino Neil MD;  Location: [facility];  Service: Colon and Rectal;  Laterality: N/A;    ESOPHAGOGASTRODUODENOSCOPY N/A 6/3/2020    Procedure: EGD (ESOPHAGOGASTRODUODENOSCOPY);  Surgeon: Tino Neil MD;  Location: [facility] END[facility];  Service: Colon and Rectal;  Laterality: N/A;     Social History     Socioeconomic History    Marital status: Single     Spouse name: Not on file    Number of children: Not on file    Years of education: Not on file    Highest education level: Not on file   Occupational History    Not on file   Social Needs    Financial resource strain: Not hard at all    Food insecurity     Worry: Never true     Inability: Never true    Transportation needs     Medical: No     Non-medical: No   Tobacco Use    Smoking status: Former Smoker    Smokeless tobacco: Never Used   Substance and Sexual Activity    Alcohol use: Yes     Alcohol/week: 12.0 standard drinks     Types: 12 Shots of liquor per week     Frequency: Never     Binge frequency: Never     Comment: three 12-14 oz cocktail drinks.     Drug use: No    Sexual activity: Never   Lifestyle    Physical activity     Days per week: 0 days     Minutes per session: 0 min    Stress: Only a little   Relationships    Social connections     Talks on phone: Three times a week     Gets together: Never     Attends Buddhism service: Not on file     Active member of club or organization: No      Attends meetings of clubs or organizations: Never     Relationship status: Never    Other Topics Concern    Not on file   Social History Narrative    Not on file     Family History   Problem Relation Age of Onset    Atrial fibrillation Mother     Heart failure Mother     Arthritis Mother     Macular degeneration Mother     Stroke Father     Cancer Maternal Uncle         lung    Cataracts Paternal Grandfather     Glaucoma Paternal Grandfather        Review of patient's allergies indicates:  No Known Allergies    Current Outpatient Medications   Medication Sig    ALPRAZolam (XANAX) 0.5 MG tablet Take 1 tablet (0.5 mg total) by mouth once. for 1 dose    aspirin (ECOTRIN) 81 MG EC tablet Take 81 mg by mouth once daily.    buPROPion (WELLBUTRIN XL) 300 MG 24 hr tablet Take 300 mg by mouth every morning.    cyclobenzaprine (FLEXERIL) 5 MG tablet Take 1 tablet (5 mg total) by mouth 3 (three) times daily as needed for Muscle spasms.    diclofenac (VOLTAREN) 50 MG EC tablet Take 1 tablet (50 mg total) by mouth 3 (three) times daily as needed (pain).    gabapentin (NEURONTIN) 300 MG capsule Take one cap PO QHS for 3 days, then one cap PO BID for the next 3 days, and then take one cap PO TID and continue    metoprolol tartrate (LOPRESSOR) 50 MG tablet Take 1 tablet (50 mg total) by mouth once daily.    omeprazole (PRILOSEC) 20 MG capsule Take 1 capsule (20 mg total) by mouth 2 (two) times daily.    potassium chloride (MICRO-K) 10 MEQ CpSR Take 1 capsule (10 mEq total) by mouth once daily.    traMADoL (ULTRAM) 50 mg tablet Take 1 tablet (50 mg total) by mouth every 6 (six) hours as needed for Pain.    venlafaxine (EFFEXOR-XR) 150 MG Cp24 Take 150 mg by mouth once daily.    venlafaxine (EFFEXOR-XR) 75 MG 24 hr capsule Take 75 mg by mouth once daily.    vitamin D (VITAMIN D3) 1000 units Tab Take 5,000 Int'l Units by mouth.     No current facility-administered medications for this visit.        REVIEW  OF SYSTEMS:    GENERAL:  No weight loss, malaise or fevers.  HEENT:   No recent changes in vision or hearing  NECK:  Negative for lumps, no difficulty with swallowing.  RESPIRATORY:  Negative for cough, wheezing or shortness of breath, patient denies any recent URI.  CARDIOVASCULAR:  Negative for chest pain, leg swelling or palpitations. + HTN  GI:  Negative for abdominal discomfort, blood in stools or black stools or change in bowel habits. +GERD.  MUSCULOSKELETAL:  See HPI.  SKIN:  Negative for lesions, rash, and itching.  PSYCH:  + depression.  HEMATOLOGY/LYMPHOLOGY:  Negative for prolonged bleeding, bruising easily or swollen nodes.  Patient is not currently taking any anti-coagulants  NEURO:   No history of headaches, syncope, paralysis, seizures or tremors.  All other reviewed and negative other than HPI.    OBJECTIVE:    General appearance: Well appearing, in no acute distress, alert and oriented x3.  Psych:  Mood and affect appropriate.      ASSESSMENT: 59 y.o. year old female with low back pain radiating to bilateral lower extremities R>L, consistent with     Encounter Diagnoses   Name Primary?    Lumbosacral radiculopathy Yes    Lumbar spondylosis     Chronic pain disorder          PLAN:     - I have stressed the importance of physical activity and a home exercise plan to help with pain and improve health.    - Patient can continue with medications for now since they are providing benefits, using them appropriately, and without side effects.    - Increase gabapentin to 600mg TID as tolerated.    - Continue diclofenac as prescribed by PCP as it is providing relief.    - Continue flexeril 5mg prn.    - Will schedule patient for bilateral L4/L5 transforaminal WERO.    - Can consider lumbar medial branch block in the future in setting of facet arthropathy.    - RTC 2 weeks after procedure with nurse practitioner.    - Counseled patient regarding the importance of activity modification, constant sleeping  habits and physical therapy.    The above plan and management options were discussed at length with patient. Patient is in agreement with the above and verbalized understanding. It will be communicated with the referring physician via electronic record, fax, or mail.    Lizzy Guerra  07/28/2020     I have reviewed and concur with the resident's history, physical, assessment, and plan.  I have personally interviewed and examined the patient at bedside.  See below addendum for my evaluation and additional findings. 59 year old female with chronic lower back pain consistent ith lumbar radiculopathy and lumbar spondylosis. We encouraged her to start physical therapy, schedule for bilateral lumbar TFESi at L4/L5 levels. We will follow up with her in 2 weeks after the procedure. We will consider lumbar MBB in the future.     Rubén Rico MD

## 2020-07-28 NOTE — PROGRESS NOTES
"  Chronic Pain-Tele-Medicine-Established Note (Follow up visit)      The patient location is: home  The chief complaint leading to consultation is: low back pain radiating to LE R> L  Visit type: Virtual visit with synchronous audio and video  Total time spent with patient: 20 mins  Each patient to whom he or she provides medical services by telemedicine is:  (1) informed of the relationship between the physician and patient and the respective role of any other health care provider with respect to management of the patient; and (2) notified that he or she may decline to receive medical services by telemedicine and may withdraw from such care at any time.    Notes:     SUBJECTIVE:    Interval History 07/28/2020:  Vale Gutierrez presents tele-medicine appointment for a follow-up appointment for low back pain radiating to lower extremitiy R>L. Since the last visit, Vale Gutierrez states the pain has been persistant. Current pain intensity is 0/10.  Pain is worst when standing and moving and relieved with sitting, laying, and stretched out.  At the worst she says it is a 10/10.  The pain radiates to back of thighs and calves.  She describes the pain as sharp, burning in nature.  She states that the flexeril 5mg prn helps somewhat but not much. She started diclofenac yesterday (prescribed by her PCP) which helps with the pain, decreases frequency and intensity. She recently had an MRI lumbar spine done and would like to review the results.     Interval History 07/13/2020  Vale Gutierrez presents to the clinic for the evaluation of low back pain and bilateral calf pain. The calf pain started 5-6 weeks ago following non related injury and symptoms have been worsening.The pain is located in the low back area and radiates to the legs. She has suffered from lower back pain "for years."  The pain is described as aching and burning with intermittent numbness and tingling in the lower extremities. The pain " is rated as 9/10. The pain is rated with a score of  3/10 on the BEST day and a score of 10/10 on the WORST day.  Symptoms interfere with daily activity. The pain is exacerbated by Standing, Walking, and Lifting.  The pain is mitigated by massage, medications and rest. She reports spending 8 hours per day reclining. The patient reports 3-4 hours of uninterrupted sleep per night. She presents for referral from Urgent Care where she was evaluated and believed to have intermittent neurogenic claudication.     Patient reports loss of sensations.  Patient denies night fever/night sweats, urinary incontinence, bowel incontinence, significant weight loss and significant motor weakness.     Physical Therapy/Home Exercise: yes, has not gone lately because of how to get there because of pain.       Pain Disability Index Review:  Last 3 PDI Scores 7/13/2020   Pain Disability Index (PDI) 47        Pain Medications:     - Opioids: Has a history of narcotic abuse, on saboxone  - Adjuvant Medications: Neurontin (Gabapentin) and PO Diclofenac and Effexor  - She has tried Tramadol in the past     report:  Reviewed and consistent with medication use as prescribed.     Pain Procedures: Patient says she has had 9 ESIs 15-20 years ago with minimal pain relief    Narrative & Impression     EXAMINATION:  MRI LUMBAR SPINE WITHOUT CONTRAST 07/22/2020     CLINICAL HISTORY:  Back pain or radiculopathy, > 6 wks; Dorsalgia, unspecified     TECHNIQUE:  Multiplanar, multisequence MR images were acquired from the thoracolumbar junction to the sacrum without contrast.     COMPARISON:  No prior MRI available for comparison.  Correlation is made to prior radiographs of the lumbar spine from 03/11/2020.     FINDINGS:  Alignment: Grade 1 anterolisthesis of L4 on L5.     Vertebrae: There are endplate degenerative changes at L5-S1.  Vertebral body heights are maintained.  No acute fracture.     Discs: Mild disc height and disc degeneration at L3-L4  and L5-S1.     Cord: Normal.  Conus terminates at L1.     Degenerative findings:     T12-L1 and L1-L2: No spinal canal stenosis or neural foraminal narrowing.     L2-L3: Circumferential disc bulge with moderate bilateral facet arthropathy and ligamentum flavum hypertrophy resulting in mild spinal canal stenosis.  No neural foraminal narrowing.     L3-L4: Circumferential disc bulge with moderate bilateral facet arthropathy resulting in mild spinal canal stenosis and mild bilateral neural foraminal narrowing.     L4-L5: Grade 1 anterolisthesis of L4 on L5 with a posterior disc bulge and severe bilateral facet arthropathy and ligamentum flavum hypertrophy resulting in moderate spinal canal stenosis and mild bilateral neural foraminal narrowing.     L5-S1: Posterior disc bulge with moderate bilateral facet arthropathy.  No spinal canal stenosis or neural foraminal narrowing.     Paraspinal muscles & soft tissues: There is moderate fatty atrophy of the paraspinal musculature.     Impression:     Multilevel degenerative changes of the lumbar spine, most significant at L4-L5 where there is moderate spinal canal stenosis.        Electronically signed by: Thuan Newman  Date:                                            07/22/2020  Time:                                           12:57          Imaging:   Narrative & Impression       EXAMINATION:  XR LUMBAR SPINE COMPLETE 5 VIEW     CLINICAL HISTORY:  Low back pain, >6wks conservative tx, persistent-progressive sx, surgical candidate;  Low back pain     FINDINGS:  Five views: There is grade 1 anterolisthesis of L4 on L5.  There is mild-moderate DJD at L5-S1 and L3-L4.  There is mild DJD elsewhere.  No fracture dislocation bone destruction seen.  No pars defects are seen.     Impression:     No acute process seen.      EXAMINATION:  MRI CERVICAL SPINE WITHOUT CONTRAST     CLINICAL HISTORY:  neck pain;.  Cervicalgia     TECHNIQUE:  Multiplanar, multisequence MR images of the  cervical spine were acquired without the administration of contrast.     COMPARISON:  Radiograph 11/15/2019.     FINDINGS:  There is reversal of the normal cervical lordosis with grade 1 anterolisthesis of C3 on C4.  Vertebral body heights are well maintained without evidence for fracture.  There is multilevel disc space narrowing with associated chronic degenerative endplate changes, most pronounced at C4-C5, C5-C6 and C6-C7.  There is moderate right facet edema at C4-C5, likely degenerative in nature.  No marrow signal abnormality to suggest an infiltrative process.     Cervical spinal cord demonstrates normal contour and signal intensity.  Cerebellar tonsils are in their expected location.  Visualized brainstem is normal.     Vertebral artery flow voids are present.  Prevertebral soft tissues are normal.  Visualized parotid, submandibular and thyroid glands are within normal limits.  No cervical lymphadenopathy.  There is a medialized course of the carotid arteries.  Paraspinal musculature is grossly unremarkable.     C2-C3: No spinal canal stenosis or neural foraminal narrowing.     C3-C4: Right facet hypertrophy and bilateral uncovertebral joint spurring contributes to mild to moderate bilateral neural foraminal narrowing, not well evaluated due to patient motion artifact.  No spinal canal stenosis.     C4-C5: Posterior disc osteophyte complex, right facet hypertrophy and bilateral uncovertebral joint spurring contributes to severe right and moderate left neural foraminal narrowing.  No spinal canal stenosis.     C5-C6: Posterior disc osteophyte complex and bilateral uncovertebral joint spurring contributes to mild spinal canal stenosis and severe left and moderate right neural foraminal narrowing.     C6-C7: Posterior disc osteophyte complex and bilateral uncovertebral joint spurring contributes to mild spinal canal stenosis and severe bilateral neural foraminal narrowing.     C7-T1: No spinal canal stenosis  or neural foraminal narrowing.     Impression:     1. Cervical degenerative changes most pronounced at C5-C6 and C6-C7 noting mild spinal canal stenosis and moderate to severe bilateral neural foraminal narrowing.    Past Medical History:   Diagnosis Date    Alcohol abuse     Anxiety     Depression     Hyperlipidemia     Hypertension      Past Surgical History:   Procedure Laterality Date    BREAST CYST ASPIRATION      CHOLECYSTECTOMY      complicated with bile leak, sepsis    COLONOSCOPY N/A 6/3/2020    Procedure: COLONOSCOPY Golytely;  Surgeon: Tion Neil MD;  Location: [facility] ENDO;  Service: Colon and Rectal;  Laterality: N/A;    ESOPHAGOGASTRODUODENOSCOPY N/A 6/3/2020    Procedure: EGD (ESOPHAGOGASTRODUODENOSCOPY);  Surgeon: Tino Neil MD;  Location: [facility] ENDO;  Service: Colon and Rectal;  Laterality: N/A;     Social History     Socioeconomic History    Marital status: Single     Spouse name: Not on file    Number of children: Not on file    Years of education: Not on file    Highest education level: Not on file   Occupational History    Not on file   Social Needs    Financial resource strain: Not hard at all    Food insecurity     Worry: Never true     Inability: Never true    Transportation needs     Medical: No     Non-medical: No   Tobacco Use    Smoking status: Former Smoker    Smokeless tobacco: Never Used   Substance and Sexual Activity    Alcohol use: Yes     Alcohol/week: 12.0 standard drinks     Types: 12 Shots of liquor per week     Frequency: Never     Binge frequency: Never     Comment: three 12-14 oz cocktail drinks.     Drug use: No    Sexual activity: Never   Lifestyle    Physical activity     Days per week: 0 days     Minutes per session: 0 min    Stress: Only a little   Relationships    Social connections     Talks on phone: Three times a week     Gets together: Never     Attends Congregation service: Not on file     Active member of club or organization: No      Attends meetings of clubs or organizations: Never     Relationship status: Never    Other Topics Concern    Not on file   Social History Narrative    Not on file     Family History   Problem Relation Age of Onset    Atrial fibrillation Mother     Heart failure Mother     Arthritis Mother     Macular degeneration Mother     Stroke Father     Cancer Maternal Uncle         lung    Cataracts Paternal Grandfather     Glaucoma Paternal Grandfather        Review of patient's allergies indicates:  No Known Allergies    Current Outpatient Medications   Medication Sig    ALPRAZolam (XANAX) 0.5 MG tablet Take 1 tablet (0.5 mg total) by mouth once. for 1 dose    aspirin (ECOTRIN) 81 MG EC tablet Take 81 mg by mouth once daily.    buPROPion (WELLBUTRIN XL) 300 MG 24 hr tablet Take 300 mg by mouth every morning.    cyclobenzaprine (FLEXERIL) 5 MG tablet Take 1 tablet (5 mg total) by mouth 3 (three) times daily as needed for Muscle spasms.    diclofenac (VOLTAREN) 50 MG EC tablet Take 1 tablet (50 mg total) by mouth 3 (three) times daily as needed (pain).    gabapentin (NEURONTIN) 300 MG capsule Take one cap PO QHS for 3 days, then one cap PO BID for the next 3 days, and then take one cap PO TID and continue    metoprolol tartrate (LOPRESSOR) 50 MG tablet Take 1 tablet (50 mg total) by mouth once daily.    omeprazole (PRILOSEC) 20 MG capsule Take 1 capsule (20 mg total) by mouth 2 (two) times daily.    potassium chloride (MICRO-K) 10 MEQ CpSR Take 1 capsule (10 mEq total) by mouth once daily.    traMADoL (ULTRAM) 50 mg tablet Take 1 tablet (50 mg total) by mouth every 6 (six) hours as needed for Pain.    venlafaxine (EFFEXOR-XR) 150 MG Cp24 Take 150 mg by mouth once daily.    venlafaxine (EFFEXOR-XR) 75 MG 24 hr capsule Take 75 mg by mouth once daily.    vitamin D (VITAMIN D3) 1000 units Tab Take 5,000 Int'l Units by mouth.     No current facility-administered medications for this visit.        REVIEW  OF SYSTEMS:    GENERAL:  No weight loss, malaise or fevers.  HEENT:   No recent changes in vision or hearing  NECK:  Negative for lumps, no difficulty with swallowing.  RESPIRATORY:  Negative for cough, wheezing or shortness of breath, patient denies any recent URI.  CARDIOVASCULAR:  Negative for chest pain, leg swelling or palpitations. + HTN  GI:  Negative for abdominal discomfort, blood in stools or black stools or change in bowel habits. +GERD.  MUSCULOSKELETAL:  See HPI.  SKIN:  Negative for lesions, rash, and itching.  PSYCH:  + depression.  HEMATOLOGY/LYMPHOLOGY:  Negative for prolonged bleeding, bruising easily or swollen nodes.  Patient is not currently taking any anti-coagulants  NEURO:   No history of headaches, syncope, paralysis, seizures or tremors.  All other reviewed and negative other than HPI.    OBJECTIVE:    General appearance: Well appearing, in no acute distress, alert and oriented x3.  Psych:  Mood and affect appropriate.      ASSESSMENT: 59 y.o. year old female with low back pain radiating to bilateral lower extremities R>L, consistent with     Encounter Diagnoses   Name Primary?    Lumbosacral radiculopathy Yes    Lumbar spondylosis     Chronic pain disorder          PLAN:     - I have stressed the importance of physical activity and a home exercise plan to help with pain and improve health.    - Patient can continue with medications for now since they are providing benefits, using them appropriately, and without side effects.    - Increase gabapentin to 600mg TID as tolerated.    - Continue diclofenac as prescribed by PCP as it is providing relief.    - Continue flexeril 5mg prn.    - Will schedule patient for bilateral L4/L5 transforaminal WERO.    - Can consider lumbar medial branch block in the future in setting of facet arthropathy.    - RTC 2 weeks after procedure with nurse practitioner.    - Counseled patient regarding the importance of activity modification, constant sleeping  habits and physical therapy.    The above plan and management options were discussed at length with patient. Patient is in agreement with the above and verbalized understanding. It will be communicated with the referring physician via electronic record, fax, or mail.    Lizzy Guerra  07/28/2020     I have reviewed and concur with the resident's history, physical, assessment, and plan.  I have personally interviewed and examined the patient at bedside.  See below addendum for my evaluation and additional findings. 59 year old female with chronic lower back pain consistent ith lumbar radiculopathy and lumbar spondylosis. We encouraged her to start physical therapy, schedule for bilateral lumbar TFESi at L4/L5 levels. We will follow up with her in 2 weeks after the procedure. We will consider lumbar MBB in the future.     Rubén Rico MD

## 2020-07-29 ENCOUNTER — TELEPHONE (OUTPATIENT)
Dept: ADMINISTRATIVE | Facility: OTHER | Age: 60
End: 2020-07-29

## 2020-07-29 NOTE — TELEPHONE ENCOUNTER
----- Message from Rubén Rico MD sent at 7/28/2020  1:36 PM CDT -----  Please schedule for bilateral L4/L5 TFESI and follow 2 weeks after the procedure

## 2020-07-31 ENCOUNTER — PATIENT MESSAGE (OUTPATIENT)
Dept: PAIN MEDICINE | Facility: OTHER | Age: 60
End: 2020-07-31

## 2020-07-31 RX ORDER — TRAMADOL HYDROCHLORIDE 50 MG/1
50 TABLET ORAL EVERY 8 HOURS PRN
Qty: 45 TABLET | Refills: 0 | Status: SHIPPED | OUTPATIENT
Start: 2020-07-31 | End: 2020-08-13 | Stop reason: SDUPTHER

## 2020-08-03 ENCOUNTER — PATIENT MESSAGE (OUTPATIENT)
Dept: PAIN MEDICINE | Facility: OTHER | Age: 60
End: 2020-08-03

## 2020-08-03 ENCOUNTER — TELEPHONE (OUTPATIENT)
Dept: PAIN MEDICINE | Facility: CLINIC | Age: 60
End: 2020-08-03

## 2020-08-03 RX ORDER — DICLOFENAC SODIUM 75 MG/1
75 TABLET, DELAYED RELEASE ORAL 2 TIMES DAILY
Qty: 60 TABLET | Refills: 0 | Status: SHIPPED | OUTPATIENT
Start: 2020-08-03 | End: 2020-09-18 | Stop reason: SDUPTHER

## 2020-08-03 RX ORDER — PREGABALIN 100 MG/1
100 CAPSULE ORAL 3 TIMES DAILY
Qty: 90 CAPSULE | Refills: 6 | Status: SHIPPED | OUTPATIENT
Start: 2020-08-03 | End: 2020-11-03

## 2020-08-03 RX ORDER — TIZANIDINE 4 MG/1
4 TABLET ORAL EVERY 6 HOURS PRN
Qty: 90 TABLET | Refills: 0 | Status: SHIPPED | OUTPATIENT
Start: 2020-08-03 | End: 2020-08-13

## 2020-08-03 NOTE — TELEPHONE ENCOUNTER
Staff informed pt of the below information    Pt verbalized understanding      Pt wants to know if she can take two Tramadol    Staff informed pt we will follow up upon response from DR. May

## 2020-08-03 NOTE — TELEPHONE ENCOUNTER
----- Message from Rubén Rico MD sent at 8/3/2020  3:45 PM CDT -----  I will also change him to lyrica 100 mg TID. He can stop Gabapentin after starting Lyrica

## 2020-08-04 NOTE — TELEPHONE ENCOUNTER
Staff informed pt of the following    MD Paulette Chen MA   Caller: Unspecified (Yesterday,  4:06 PM)             He can take it 4 times a day. More than 4 times a day is not safe. We made a lot of changes on her pain regimen yesterday and she will get her procedure done earlier. I don't want to increase his opioid dose more that what it is and he needs to understand it. There are a lot of other things to do other than opioids to improve pain. If opioid is the only thing she is looking for, we don't do it.      Pt verbalized understanding

## 2020-08-07 ENCOUNTER — PATIENT MESSAGE (OUTPATIENT)
Dept: PAIN MEDICINE | Facility: OTHER | Age: 60
End: 2020-08-07

## 2020-08-07 ENCOUNTER — HOSPITAL ENCOUNTER (OUTPATIENT)
Facility: OTHER | Age: 60
Discharge: HOME | End: 2020-08-07
Attending: ANESTHESIOLOGY | Admitting: ANESTHESIOLOGY
Payer: MEDICARE

## 2020-08-07 VITALS
HEART RATE: 76 BPM | WEIGHT: 195 LBS | SYSTOLIC BLOOD PRESSURE: 147 MMHG | DIASTOLIC BLOOD PRESSURE: 99 MMHG | OXYGEN SATURATION: 94 % | TEMPERATURE: 99 F | RESPIRATION RATE: 16 BRPM | BODY MASS INDEX: 33.29 KG/M2 | HEIGHT: 64 IN

## 2020-08-07 DIAGNOSIS — G89.29 CHRONIC PAIN: ICD-10-CM

## 2020-08-07 DIAGNOSIS — M54.17 LUMBOSACRAL RADICULOPATHY: Primary | ICD-10-CM

## 2020-08-07 PROCEDURE — 25000003 PHARM REV CODE 250: Performed by: ANESTHESIOLOGY

## 2020-08-07 PROCEDURE — 99152 PR MOD CONSCIOUS SEDATION, SAME PHYS, 5+ YRS, FIRST 15 MIN: ICD-10-PCS | Mod: ,,, | Performed by: ANESTHESIOLOGY

## 2020-08-07 PROCEDURE — 64483 PR EPIDURAL INJ, ANES/STEROID, TRANSFORAMINAL, LUMB/SACR, SNGL LEVL: ICD-10-PCS | Mod: 50,,, | Performed by: ANESTHESIOLOGY

## 2020-08-07 PROCEDURE — 99152 MOD SED SAME PHYS/QHP 5/>YRS: CPT | Performed by: ANESTHESIOLOGY

## 2020-08-07 PROCEDURE — 63600175 PHARM REV CODE 636 W HCPCS: Performed by: ANESTHESIOLOGY

## 2020-08-07 PROCEDURE — 99152 MOD SED SAME PHYS/QHP 5/>YRS: CPT | Mod: ,,, | Performed by: ANESTHESIOLOGY

## 2020-08-07 PROCEDURE — 64483 NJX AA&/STRD TFRM EPI L/S 1: CPT | Mod: 50,,, | Performed by: ANESTHESIOLOGY

## 2020-08-07 PROCEDURE — 25500020 PHARM REV CODE 255: Performed by: ANESTHESIOLOGY

## 2020-08-07 PROCEDURE — 64483 NJX AA&/STRD TFRM EPI L/S 1: CPT | Mod: 50 | Performed by: ANESTHESIOLOGY

## 2020-08-07 RX ORDER — MIDAZOLAM HYDROCHLORIDE 1 MG/ML
INJECTION INTRAMUSCULAR; INTRAVENOUS
Status: DISCONTINUED | OUTPATIENT
Start: 2020-08-07 | End: 2020-08-07 | Stop reason: HOSPADM

## 2020-08-07 RX ORDER — FENTANYL CITRATE 50 UG/ML
INJECTION, SOLUTION INTRAMUSCULAR; INTRAVENOUS
Status: DISCONTINUED | OUTPATIENT
Start: 2020-08-07 | End: 2020-08-07 | Stop reason: HOSPADM

## 2020-08-07 RX ORDER — DEXAMETHASONE SODIUM PHOSPHATE 10 MG/ML
INJECTION INTRAMUSCULAR; INTRAVENOUS
Status: DISCONTINUED | OUTPATIENT
Start: 2020-08-07 | End: 2020-08-07 | Stop reason: HOSPADM

## 2020-08-07 RX ORDER — SODIUM CHLORIDE 9 MG/ML
500 INJECTION, SOLUTION INTRAVENOUS CONTINUOUS
Status: DISCONTINUED | OUTPATIENT
Start: 2020-08-07 | End: 2020-08-07 | Stop reason: HOSPADM

## 2020-08-07 RX ORDER — LIDOCAINE HYDROCHLORIDE 10 MG/ML
INJECTION INFILTRATION; PERINEURAL
Status: DISCONTINUED | OUTPATIENT
Start: 2020-08-07 | End: 2020-08-07 | Stop reason: HOSPADM

## 2020-08-07 RX ADMIN — SODIUM CHLORIDE 500 ML: 0.9 INJECTION, SOLUTION INTRAVENOUS at 01:08

## 2020-08-07 NOTE — DISCHARGE SUMMARY
Discharge Note  Short Stay      SUMMARY     Admit Date: 8/7/2020    Attending Physician: Rubén Rico      Discharge Physician: Rubén Rico      Discharge Date: 8/7/2020 1:58 PM    Procedure(s) (LRB):  INJECTION, STEROID, EPIDURAL, TRANSFORAMINAL APPROACH, L4-L5 need consent (Bilateral)    Final Diagnosis: Lumbar radiculopathy [M54.16]    Disposition: Home or self care    Patient Instructions:   Current Discharge Medication List      CONTINUE these medications which have NOT CHANGED    Details   ALPRAZolam (XANAX) 0.5 MG tablet Take 1 tablet (0.5 mg total) by mouth once. for 1 dose  Qty: 1 tablet, Refills: 0      aspirin (ECOTRIN) 81 MG EC tablet Take 81 mg by mouth once daily.      buPROPion (WELLBUTRIN XL) 300 MG 24 hr tablet Take 300 mg by mouth every morning.      cyclobenzaprine (FLEXERIL) 5 MG tablet Take 1 tablet (5 mg total) by mouth 3 (three) times daily as needed for Muscle spasms.  Qty: 90 tablet, Refills: 0      !! diclofenac (VOLTAREN) 50 MG EC tablet Take 1 tablet (50 mg total) by mouth 3 (three) times daily as needed (pain).  Qty: 30 tablet, Refills: 1      !! diclofenac (VOLTAREN) 75 MG EC tablet Take 1 tablet (75 mg total) by mouth 2 (two) times daily.  Qty: 60 tablet, Refills: 0      !! gabapentin (NEURONTIN) 300 MG capsule Take 1 capsule (300 mg total) by mouth 3 (three) times daily.  Qty: 90 capsule, Refills: 11      !! gabapentin (NEURONTIN) 300 MG capsule Take 1 capsule (300 mg total) by mouth 3 (three) times daily.  Qty: 90 capsule, Refills: 11      metoprolol tartrate (LOPRESSOR) 50 MG tablet Take 1 tablet (50 mg total) by mouth once daily.  Qty: 90 tablet, Refills: 3      omeprazole (PRILOSEC) 20 MG capsule Take 1 capsule (20 mg total) by mouth 2 (two) times daily.  Qty: 60 capsule, Refills: 11      potassium chloride (MICRO-K) 10 MEQ CpSR Take 1 capsule (10 mEq total) by mouth once daily.  Qty: 30 capsule, Refills: 2      pregabalin (LYRICA) 100 MG capsule Take 1 capsule (100 mg  total) by mouth 3 (three) times daily.  Qty: 90 capsule, Refills: 6      tiZANidine (ZANAFLEX) 4 MG tablet Take 1 tablet (4 mg total) by mouth every 6 (six) hours as needed.  Qty: 90 tablet, Refills: 0      !! traMADoL (ULTRAM) 50 mg tablet Take 1 tablet (50 mg total) by mouth every 6 (six) hours as needed for Pain.  Qty: 20 tablet, Refills: 0    Comments: Quantity prescribed more than 7 day supply? No  Associated Diagnoses: Intermittent spinal claudication      !! traMADoL (ULTRAM) 50 mg tablet Take 1 tablet (50 mg total) by mouth every 8 (eight) hours as needed for Pain.  Qty: 45 tablet, Refills: 0    Comments: Quantity prescribed more than 7 day supply? Yes, quantity medically necessary      !! venlafaxine (EFFEXOR-XR) 150 MG Cp24 Take 150 mg by mouth once daily.      !! venlafaxine (EFFEXOR-XR) 75 MG 24 hr capsule Take 75 mg by mouth once daily.      vitamin D (VITAMIN D3) 1000 units Tab Take 5,000 Int'l Units by mouth.       !! - Potential duplicate medications found. Please discuss with provider.              Discharge Diagnosis: Lumbar radiculopathy [M54.16]  Condition on Discharge: Stable with no complications to procedure   Diet on Discharge: Same as before.  Activity: as per instruction sheet.  Discharge to: Home with a responsible adult.  Follow up: 2-4 weeks

## 2020-08-07 NOTE — OP NOTE
Patient Name: Vale Gutierrez  MRN: [id number]    INFORMED CONSENT: The procedure, risks, benefits and options were discussed with patient. There are no contraindications to the procedure. The patient expressed understanding and agreed to proceed. The personnel performing the procedure was discussed. I verify that I personally obtained Vale's consent prior to the start of the procedure and the signed consent can be found on the patient's chart.    Procedure Date: 08/07/2020    Anesthesia: Topical    Pre Procedure diagnosis: Lumbar radiculopathy [M54.16]  1. Lumbosacral radiculopathy    2. Chronic pain      Post-Procedure diagnosis: SAME      Sedation: Yes - Fentanyl 50 mcg and Midazolam 2 mg  Sedation time: Less than 15 minutes    PROCEDURE:Bilateral L4/L5   TRANSFORAMINAL EPIDURAL STEROID INJECTION        DESCRIPTION OF PROCEDURE: The patient was brought to the procedure room. After performing time out IV access was obtained prior to the procedure. The patient was positioned prone on the fluoroscopy table. Continuous hemodynamic monitoring was initiated including blood pressure, EKG, and pulse oximetry. . The skin was prepped with chlorhexidine three times and draped in a sterile fashion. Skin anesthesia was achieved using 3 mL of lidocaine 1% over the respective injection site.     An oblique fluoroscopic view was obtained, with the superior articular process of the inferior vertebral body aligned with the pedicle. The tip of a 25-gauge 3.5-inch Quincke-type spinal needle was advanced toward the 6 oclock position of the L4 pedicle under intermittent fluoroscopic guidance. Confirmation of proper needle position was made with AP, oblique, and lateral fluoroscopic views. Negative aspiration for blood or CSF was confirmed. 2 mL of Omnipaque 300 was injected. Live fluoroscopic imaging revealed a clear outline of the spinal nerve with proximal spread of agent through the neural foramen into the anterior epidural  space. A total combination of 3 mL of Bupivacaine 0.25% and 10 mg decadron was injected at each level. Contrast spread was noted from L4 to L5 level. There was no pain on injection. The needle was removed and bleeding was nil.  A sterile dressing was applied. Vale was taken back to the recovery room for further observation.     Blood Loss: Nill  Specimen: None    Rubén Rico MD

## 2020-08-07 NOTE — DISCHARGE INSTRUCTIONS

## 2020-08-11 ENCOUNTER — PATIENT MESSAGE (OUTPATIENT)
Dept: PAIN MEDICINE | Facility: OTHER | Age: 60
End: 2020-08-11

## 2020-08-11 DIAGNOSIS — M54.17 LUMBOSACRAL RADICULOPATHY: Primary | ICD-10-CM

## 2020-08-13 DIAGNOSIS — G89.4 CHRONIC PAIN SYNDROME: Primary | ICD-10-CM

## 2020-08-13 RX ORDER — TRAMADOL HYDROCHLORIDE 50 MG/1
50 TABLET ORAL EVERY 8 HOURS PRN
Qty: 45 TABLET | Refills: 0 | Status: SHIPPED | OUTPATIENT
Start: 2020-08-13 | End: 2020-08-27

## 2020-08-18 ENCOUNTER — TELEPHONE (OUTPATIENT)
Dept: INTERNAL MEDICINE | Facility: CLINIC | Age: 60
End: 2020-08-18

## 2020-08-18 ENCOUNTER — PATIENT OUTREACH (OUTPATIENT)
Dept: ADMINISTRATIVE | Facility: HOSPITAL | Age: 60
End: 2020-08-18

## 2020-08-18 NOTE — TELEPHONE ENCOUNTER
----- Message from Stephanie Lucas sent at 8/18/2020  1:00 PM CDT -----  Contact: Patient [phone]  Patient is experiencing pain in her legs believes it is due to her back. Having issues scheduling with Ortho.    Please call and advise.    Thank you

## 2020-08-18 NOTE — TELEPHONE ENCOUNTER
The patient had an epidural on 8/7/20. She is still in extreme pain. A referral to back and spine was placed. Her appointment is not until 9/23/20. I sent a message to our appointment coordinators to see if they can get her in sooner.

## 2020-08-19 ENCOUNTER — OFFICE VISIT (OUTPATIENT)
Dept: ORTHOPEDICS | Facility: CLINIC | Age: 60
End: 2020-08-19
Payer: MEDICARE

## 2020-08-19 VITALS — HEIGHT: 64 IN | WEIGHT: 196 LBS | BODY MASS INDEX: 33.46 KG/M2

## 2020-08-19 DIAGNOSIS — M54.17 LUMBOSACRAL RADICULOPATHY: ICD-10-CM

## 2020-08-19 DIAGNOSIS — M43.16 SPONDYLOLISTHESIS OF LUMBAR REGION: Primary | ICD-10-CM

## 2020-08-19 DIAGNOSIS — M48.062 SPINAL STENOSIS OF LUMBAR REGION WITH NEUROGENIC CLAUDICATION: ICD-10-CM

## 2020-08-19 PROCEDURE — 3008F PR BODY MASS INDEX (BMI) DOCUMENTED: ICD-10-PCS | Mod: CPTII,S$GLB,, | Performed by: PHYSICIAN ASSISTANT

## 2020-08-19 PROCEDURE — 3008F BODY MASS INDEX DOCD: CPT | Mod: CPTII,S$GLB,, | Performed by: PHYSICIAN ASSISTANT

## 2020-08-19 PROCEDURE — 99999 PR PBB SHADOW E&M-EST. PATIENT-LVL V: CPT | Mod: PBBFAC,,, | Performed by: PHYSICIAN ASSISTANT

## 2020-08-19 PROCEDURE — 99204 PR OFFICE/OUTPT VISIT, NEW, LEVL IV, 45-59 MIN: ICD-10-PCS | Mod: S$GLB,,, | Performed by: PHYSICIAN ASSISTANT

## 2020-08-19 PROCEDURE — 99204 OFFICE O/P NEW MOD 45 MIN: CPT | Mod: S$GLB,,, | Performed by: PHYSICIAN ASSISTANT

## 2020-08-19 PROCEDURE — 99999 PR PBB SHADOW E&M-EST. PATIENT-LVL V: ICD-10-PCS | Mod: PBBFAC,,, | Performed by: PHYSICIAN ASSISTANT

## 2020-08-19 NOTE — PROGRESS NOTES
DATE: 8/20/2020  PATIENT: Vale Gutierrez    Supervising Physician: Patrick Rivas M.D.    CHIEF COMPLAINT: low back and bilateral leg pain    HISTORY:  Vale Gutierrez is a 59 y.o. female previously seen by Dr. Rico here for initial evaluation of low back and bilateral, R>L, leg pain (Back - 6, Leg - 6).  The pain in the leg is what bothers her most.  The pain has been present for about 4 months. The patient describes the pain as aching and sharp.  The pain is worse with standing and walking and improved by sitting or laying down. There is associated numbness and tingling. There is subjective weakness.  She says she is having difficulty walking due to pain.  Prior treatments have included voltaren, gabapentin, tramadol, and an WERO about a week ago, but no surgery.  She says the injection may have helped some but she is having a lot of pain still.     The patient denies myelopathic symptoms such as handwriting changes or difficulty with buttons/coins/keys. Denies perineal paresthesias, bowel/bladder dysfunction.    PAST MEDICAL/SURGICAL HISTORY:  Past Medical History:   Diagnosis Date    Alcohol abuse     Anxiety     Depression     Hyperlipidemia     Hypertension      Past Surgical History:   Procedure Laterality Date    BREAST CYST ASPIRATION      CHOLECYSTECTOMY      complicated with bile leak, sepsis    COLONOSCOPY N/A 6/3/2020    Procedure: COLONOSCOPY Golytely;  Surgeon: Tino Neil MD;  Location: [facility];  Service: Colon and Rectal;  Laterality: N/A;    ESOPHAGOGASTRODUODENOSCOPY N/A 6/3/2020    Procedure: EGD (ESOPHAGOGASTRODUODENOSCOPY);  Surgeon: Tino Neil MD;  Location: [facility];  Service: Colon and Rectal;  Laterality: N/A;    TRANSFORAMINAL EPIDURAL INJECTION OF STEROID Bilateral 8/7/2020    Procedure: INJECTION, STEROID, EPIDURAL, TRANSFORAMINAL APPROACH, L4-L5 need consent;  Surgeon: Rubén Rico MD;  Location: [facility];  Service: Pain Management;   Laterality: Bilateral;       Medications:   Current Outpatient Medications on File Prior to Visit   Medication Sig Dispense Refill    ALPRAZolam (XANAX) 0.5 MG tablet Take 1 tablet (0.5 mg total) by mouth once. for 1 dose 1 tablet 0    aspirin (ECOTRIN) 81 MG EC tablet Take 81 mg by mouth once daily.      buPROPion (WELLBUTRIN XL) 300 MG 24 hr tablet Take 300 mg by mouth every morning.      diclofenac (VOLTAREN) 50 MG EC tablet Take 1 tablet (50 mg total) by mouth 3 (three) times daily as needed (pain). 30 tablet 1    diclofenac (VOLTAREN) 75 MG EC tablet Take 1 tablet (75 mg total) by mouth 2 (two) times daily. 60 tablet 0    gabapentin (NEURONTIN) 300 MG capsule Take 1 capsule (300 mg total) by mouth 3 (three) times daily. 90 capsule 11    gabapentin (NEURONTIN) 300 MG capsule Take 1 capsule (300 mg total) by mouth 3 (three) times daily. 90 capsule 11    metoprolol tartrate (LOPRESSOR) 50 MG tablet Take 1 tablet (50 mg total) by mouth once daily. 90 tablet 3    omeprazole (PRILOSEC) 20 MG capsule Take 1 capsule (20 mg total) by mouth 2 (two) times daily. 60 capsule 11    potassium chloride (MICRO-K) 10 MEQ CpSR Take 1 capsule (10 mEq total) by mouth once daily. 30 capsule 2    pregabalin (LYRICA) 100 MG capsule Take 1 capsule (100 mg total) by mouth 3 (three) times daily. 90 capsule 6    traMADoL (ULTRAM) 50 mg tablet Take 1 tablet (50 mg total) by mouth every 6 (six) hours as needed for Pain. 20 tablet 0    traMADoL (ULTRAM) 50 mg tablet Take 1 tablet (50 mg total) by mouth every 8 (eight) hours as needed for Pain. 45 tablet 0    venlafaxine (EFFEXOR-XR) 150 MG Cp24 Take 150 mg by mouth once daily.      venlafaxine (EFFEXOR-XR) 75 MG 24 hr capsule Take 75 mg by mouth once daily.      vitamin D (VITAMIN D3) 1000 units Tab Take 5,000 Int'l Units by mouth.       No current facility-administered medications on file prior to visit.        Social History:   Social History     Socioeconomic History     Marital status: Single     Spouse name: Not on file    Number of children: Not on file    Years of education: Not on file    Highest education level: Not on file   Occupational History    Not on file   Social Needs    Financial resource strain: Not hard at all    Food insecurity     Worry: Never true     Inability: Never true    Transportation needs     Medical: No     Non-medical: No   Tobacco Use    Smoking status: Former Smoker    Smokeless tobacco: Never Used   Substance and Sexual Activity    Alcohol use: Yes     Alcohol/week: 12.0 standard drinks     Types: 12 Shots of liquor per week     Frequency: Never     Binge frequency: Never     Comment: three 12-14 oz cocktail drinks.     Drug use: No    Sexual activity: Never   Lifestyle    Physical activity     Days per week: 0 days     Minutes per session: 0 min    Stress: Only a little   Relationships    Social connections     Talks on phone: Three times a week     Gets together: Never     Attends Scientologist service: Not on file     Active member of club or organization: No     Attends meetings of clubs or organizations: Never     Relationship status: Never    Other Topics Concern    Not on file   Social History Narrative    Not on file       REVIEW OF SYSTEMS:  Constitution: Negative. Negative for chills, fever and night sweats.   Cardiovascular: Negative for chest pain and syncope.   Respiratory: Negative for cough and shortness of breath.   Gastrointestinal: See HPI. Negative for nausea/vomiting. Negative for abdominal pain.  Genitourinary: See HPI. Negative for discoloration or dysuria.  Skin: Negative for dry skin, itching and rash.   Hematologic/Lymphatic: Negative for bleeding problem. Does not bruise/bleed easily.   Musculoskeletal: Negative for falls and muscle weakness.   Neurological: See HPI. No seizures.   Endocrine: Negative for polydipsia, polyphagia and polyuria.   Allergic/Immunologic: Negative for hives and persistent  "infections.     EXAM:  Ht 5' 4" (1.626 m)   Wt 88.9 kg (196 lb)   BMI 33.64 kg/m²     General: The patient is a very pleasant 59 y.o. female in no apparent distress, the patient is oriented to person, place and time.  Psych: Normal mood and affect  HEENT: Vision grossly intact, hearing intact to the spoken word.  Lungs: Respirations unlabored.  Gait: Antalgic station and gait, unable to perform toe or heel walk.   Skin: Dorsal lumbar skin negative for rashes, lesions, hairy patches and surgical scars. There is mild lumbar tenderness to palpation.  Range of motion: Lumbar range of motion is acceptable.  Spinal Balance: Global saggital and coronal spinal balance acceptable, not significant for scoliosis and kyphosis.  Musculoskeletal: No pain with the range of motion of the bilateral hips. No trochanteric tenderness to palpation.  Vascular: Bilateral lower extremities warm and well perfused, dorsalis pedis pulses 2+ bilaterally.  Neurological: Diffusely decreased strength and tone in all major motor groups in the bilateral lower extremities. Normal sensation to light touch in the L2-S1 dermatomes bilaterally.  Deep tendon reflexes symmetric 2+ in the bilateral lower extremities.  Negative Babinski bilaterally. Straight leg raise negative bilaterally.    IMAGING:      Today I personally reviewed AP, Lat and Flex/Ex  upright L-spine films that demonstrate grade I anterolisthesis at L4/5.    MRI lumbar spine demonstrates moderate L4/5 stenosis.     Body mass index is 33.64 kg/m².    Hemoglobin A1C   Date Value Ref Range Status   08/29/2019 5.0 4.0 - 5.6 % Final     Comment:     ADA Screening Guidelines:  5.7-6.4%  Consistent with prediabetes  >or=6.5%  Consistent with diabetes  High levels of fetal hemoglobin interfere with the HbA1C  assay. Heterozygous hemoglobin variants (HbS, HgC, etc)do  not significantly interfere with this assay.   However, presence of multiple variants may affect accuracy.   "           ASSESSMENT/PLAN:    Vale was seen today for low-back pain and leg pain.    Diagnoses and all orders for this visit:    Spondylolisthesis of lumbar region  -     Procedure Order to Pain Management; Future    Lumbosacral radiculopathy  -     Ambulatory referral/consult to Spine Surgery    Spinal stenosis of lumbar region with neurogenic claudication  -     Procedure Order to Pain Management; Future      Today we discussed at length all of the different treatment options including anti-inflammatories, acetaminophen, rest, ice, heat, physical therapy including strengthening and stretching exercises, home exercises, ROM, aerobic conditioning, aqua therapy, other modalities including ultrasound, massage, and dry needling, epidural steroid injections and finally surgical intervention.      The patient would like to try a second injection.  She would also like to book a date for surgery.  We will schedule her for October 1, though we may cancel this if she gets good relief with the injection.

## 2020-08-19 NOTE — LETTER
August 21, 2020      Jerry Diop MD  [address] [facility] [address]           [facility] [address]  [address]  [place] [address]  Phone: [phone]          Patient: Vale Gutierrez   MR Number: [id number]   YOB: 1960   Date of Visit: 8/19/2020       Dear Dr. Jerry Diop:    Thank you for referring Vale Gutierrez to me for evaluation. Attached you will find relevant portions of my assessment and plan of care.    If you have questions, please do not hesitate to call me. I look forward to following Vale Gutierrez along with you.    Sincerely,    Shena Lock PA-C    Enclosure  CC:  No Recipients    If you would like to receive this communication electronically, please contact [email or web address] or [phone] to request more information on [employer] access.    For providers and/or their staff who would like to refer a patient to [facility], please contact us through our one-stop-shop provider referral line, [facility], at [phone].    If you feel you have received this communication in error or would no longer like to receive these types of communications, please e-mail [email or web address]

## 2020-08-19 NOTE — H&P (VIEW-ONLY)
DATE: 8/20/2020  PATIENT: Vale Gutierrez    Supervising Physician: Patrick Rivas M.D.    CHIEF COMPLAINT: low back and bilateral leg pain    HISTORY:  Vale Gutierrez is a 59 y.o. female previously seen by Dr. Rico here for initial evaluation of low back and bilateral, R>L, leg pain (Back - 6, Leg - 6).  The pain in the leg is what bothers her most.  The pain has been present for about 4 months. The patient describes the pain as aching and sharp.  The pain is worse with standing and walking and improved by sitting or laying down. There is associated numbness and tingling. There is subjective weakness.  She says she is having difficulty walking due to pain.  Prior treatments have included voltaren, gabapentin, tramadol, and an WERO about a week ago, but no surgery.  She says the injection may have helped some but she is having a lot of pain still.     The patient denies myelopathic symptoms such as handwriting changes or difficulty with buttons/coins/keys. Denies perineal paresthesias, bowel/bladder dysfunction.    PAST MEDICAL/SURGICAL HISTORY:  Past Medical History:   Diagnosis Date    Alcohol abuse     Anxiety     Depression     Hyperlipidemia     Hypertension      Past Surgical History:   Procedure Laterality Date    BREAST CYST ASPIRATION      CHOLECYSTECTOMY      complicated with bile leak, sepsis    COLONOSCOPY N/A 6/3/2020    Procedure: COLONOSCOPY Golytely;  Surgeon: Tino Neil MD;  Location: [facility];  Service: Colon and Rectal;  Laterality: N/A;    ESOPHAGOGASTRODUODENOSCOPY N/A 6/3/2020    Procedure: EGD (ESOPHAGOGASTRODUODENOSCOPY);  Surgeon: Tino Neil MD;  Location: [facility];  Service: Colon and Rectal;  Laterality: N/A;    TRANSFORAMINAL EPIDURAL INJECTION OF STEROID Bilateral 8/7/2020    Procedure: INJECTION, STEROID, EPIDURAL, TRANSFORAMINAL APPROACH, L4-L5 need consent;  Surgeon: Rubén Rico MD;  Location: [facility];  Service: Pain Management;   Laterality: Bilateral;       Medications:   Current Outpatient Medications on File Prior to Visit   Medication Sig Dispense Refill    ALPRAZolam (XANAX) 0.5 MG tablet Take 1 tablet (0.5 mg total) by mouth once. for 1 dose 1 tablet 0    aspirin (ECOTRIN) 81 MG EC tablet Take 81 mg by mouth once daily.      buPROPion (WELLBUTRIN XL) 300 MG 24 hr tablet Take 300 mg by mouth every morning.      diclofenac (VOLTAREN) 50 MG EC tablet Take 1 tablet (50 mg total) by mouth 3 (three) times daily as needed (pain). 30 tablet 1    diclofenac (VOLTAREN) 75 MG EC tablet Take 1 tablet (75 mg total) by mouth 2 (two) times daily. 60 tablet 0    gabapentin (NEURONTIN) 300 MG capsule Take 1 capsule (300 mg total) by mouth 3 (three) times daily. 90 capsule 11    gabapentin (NEURONTIN) 300 MG capsule Take 1 capsule (300 mg total) by mouth 3 (three) times daily. 90 capsule 11    metoprolol tartrate (LOPRESSOR) 50 MG tablet Take 1 tablet (50 mg total) by mouth once daily. 90 tablet 3    omeprazole (PRILOSEC) 20 MG capsule Take 1 capsule (20 mg total) by mouth 2 (two) times daily. 60 capsule 11    potassium chloride (MICRO-K) 10 MEQ CpSR Take 1 capsule (10 mEq total) by mouth once daily. 30 capsule 2    pregabalin (LYRICA) 100 MG capsule Take 1 capsule (100 mg total) by mouth 3 (three) times daily. 90 capsule 6    traMADoL (ULTRAM) 50 mg tablet Take 1 tablet (50 mg total) by mouth every 6 (six) hours as needed for Pain. 20 tablet 0    traMADoL (ULTRAM) 50 mg tablet Take 1 tablet (50 mg total) by mouth every 8 (eight) hours as needed for Pain. 45 tablet 0    venlafaxine (EFFEXOR-XR) 150 MG Cp24 Take 150 mg by mouth once daily.      venlafaxine (EFFEXOR-XR) 75 MG 24 hr capsule Take 75 mg by mouth once daily.      vitamin D (VITAMIN D3) 1000 units Tab Take 5,000 Int'l Units by mouth.       No current facility-administered medications on file prior to visit.        Social History:   Social History     Socioeconomic History     Marital status: Single     Spouse name: Not on file    Number of children: Not on file    Years of education: Not on file    Highest education level: Not on file   Occupational History    Not on file   Social Needs    Financial resource strain: Not hard at all    Food insecurity     Worry: Never true     Inability: Never true    Transportation needs     Medical: No     Non-medical: No   Tobacco Use    Smoking status: Former Smoker    Smokeless tobacco: Never Used   Substance and Sexual Activity    Alcohol use: Yes     Alcohol/week: 12.0 standard drinks     Types: 12 Shots of liquor per week     Frequency: Never     Binge frequency: Never     Comment: three 12-14 oz cocktail drinks.     Drug use: No    Sexual activity: Never   Lifestyle    Physical activity     Days per week: 0 days     Minutes per session: 0 min    Stress: Only a little   Relationships    Social connections     Talks on phone: Three times a week     Gets together: Never     Attends Mosque service: Not on file     Active member of club or organization: No     Attends meetings of clubs or organizations: Never     Relationship status: Never    Other Topics Concern    Not on file   Social History Narrative    Not on file       REVIEW OF SYSTEMS:  Constitution: Negative. Negative for chills, fever and night sweats.   Cardiovascular: Negative for chest pain and syncope.   Respiratory: Negative for cough and shortness of breath.   Gastrointestinal: See HPI. Negative for nausea/vomiting. Negative for abdominal pain.  Genitourinary: See HPI. Negative for discoloration or dysuria.  Skin: Negative for dry skin, itching and rash.   Hematologic/Lymphatic: Negative for bleeding problem. Does not bruise/bleed easily.   Musculoskeletal: Negative for falls and muscle weakness.   Neurological: See HPI. No seizures.   Endocrine: Negative for polydipsia, polyphagia and polyuria.   Allergic/Immunologic: Negative for hives and persistent  "infections.     EXAM:  Ht 5' 4" (1.626 m)   Wt 88.9 kg (196 lb)   BMI 33.64 kg/m²     General: The patient is a very pleasant 59 y.o. female in no apparent distress, the patient is oriented to person, place and time.  Psych: Normal mood and affect  HEENT: Vision grossly intact, hearing intact to the spoken word.  Lungs: Respirations unlabored.  Gait: Antalgic station and gait, unable to perform toe or heel walk.   Skin: Dorsal lumbar skin negative for rashes, lesions, hairy patches and surgical scars. There is mild lumbar tenderness to palpation.  Range of motion: Lumbar range of motion is acceptable.  Spinal Balance: Global saggital and coronal spinal balance acceptable, not significant for scoliosis and kyphosis.  Musculoskeletal: No pain with the range of motion of the bilateral hips. No trochanteric tenderness to palpation.  Vascular: Bilateral lower extremities warm and well perfused, dorsalis pedis pulses 2+ bilaterally.  Neurological: Diffusely decreased strength and tone in all major motor groups in the bilateral lower extremities. Normal sensation to light touch in the L2-S1 dermatomes bilaterally.  Deep tendon reflexes symmetric 2+ in the bilateral lower extremities.  Negative Babinski bilaterally. Straight leg raise negative bilaterally.    IMAGING:      Today I personally reviewed AP, Lat and Flex/Ex  upright L-spine films that demonstrate grade I anterolisthesis at L4/5.    MRI lumbar spine demonstrates moderate L4/5 stenosis.     Body mass index is 33.64 kg/m².    Hemoglobin A1C   Date Value Ref Range Status   08/29/2019 5.0 4.0 - 5.6 % Final     Comment:     ADA Screening Guidelines:  5.7-6.4%  Consistent with prediabetes  >or=6.5%  Consistent with diabetes  High levels of fetal hemoglobin interfere with the HbA1C  assay. Heterozygous hemoglobin variants (HbS, HgC, etc)do  not significantly interfere with this assay.   However, presence of multiple variants may affect accuracy.   "           ASSESSMENT/PLAN:    Vale was seen today for low-back pain and leg pain.    Diagnoses and all orders for this visit:    Spondylolisthesis of lumbar region  -     Procedure Order to Pain Management; Future    Lumbosacral radiculopathy  -     Ambulatory referral/consult to Spine Surgery    Spinal stenosis of lumbar region with neurogenic claudication  -     Procedure Order to Pain Management; Future      Today we discussed at length all of the different treatment options including anti-inflammatories, acetaminophen, rest, ice, heat, physical therapy including strengthening and stretching exercises, home exercises, ROM, aerobic conditioning, aqua therapy, other modalities including ultrasound, massage, and dry needling, epidural steroid injections and finally surgical intervention.      The patient would like to try a second injection.  She would also like to book a date for surgery.  We will schedule her for October 1, though we may cancel this if she gets good relief with the injection.

## 2020-08-20 ENCOUNTER — TELEPHONE (OUTPATIENT)
Dept: PAIN MEDICINE | Facility: CLINIC | Age: 60
End: 2020-08-20

## 2020-08-20 DIAGNOSIS — M43.16 SPONDYLOLISTHESIS OF LUMBAR REGION: Primary | ICD-10-CM

## 2020-08-20 NOTE — TELEPHONE ENCOUNTER
Spoke to patient to schedule procedure. Date, time, and instructions given. Patient verbalized understanding.

## 2020-09-01 ENCOUNTER — LAB VISIT (OUTPATIENT)
Dept: URGENT CARE | Facility: CLINIC | Age: 60
End: 2020-09-01
Payer: MEDICARE

## 2020-09-01 VITALS — TEMPERATURE: 98 F

## 2020-09-01 DIAGNOSIS — Z01.818 PREOP TESTING: ICD-10-CM

## 2020-09-01 PROCEDURE — U0003 INFECTIOUS AGENT DETECTION BY NUCLEIC ACID (DNA OR RNA); SEVERE ACUTE RESPIRATORY SYNDROME CORONAVIRUS 2 (SARS-COV-2) (CORONAVIRUS DISEASE [COVID-19]), AMPLIFIED PROBE TECHNIQUE, MAKING USE OF HIGH THROUGHPUT TECHNOLOGIES AS DESCRIBED BY CMS-2020-01-R: HCPCS

## 2020-09-02 ENCOUNTER — TELEPHONE (OUTPATIENT)
Dept: PAIN MEDICINE | Facility: CLINIC | Age: 60
End: 2020-09-02

## 2020-09-02 LAB — SARS-COV-2 RNA RESP QL NAA+PROBE: NOT DETECTED

## 2020-09-02 NOTE — TELEPHONE ENCOUNTER
Staff informed patient a Nurse from the procedure department will contact her with procedure instructions        Patient verbalized understanding and confirmed

## 2020-09-02 NOTE — TELEPHONE ENCOUNTER
----- Message from Ambika Oneill sent at 9/2/2020 12:35 PM CDT -----  Name of Who is Calling: MICAH MANCINI [[id number]]      What is the request in detail: Pt is calling to speak to staff in regards to questions about her upcoming procedure .... Please call to further assist .       Can the clinic reply by [facility]: N      What Number to Call Back if not in [facility]: [phone]

## 2020-09-03 ENCOUNTER — OFFICE VISIT (OUTPATIENT)
Dept: INTERNAL MEDICINE | Facility: CLINIC | Age: 60
End: 2020-09-03
Payer: MEDICARE

## 2020-09-03 DIAGNOSIS — G95.19 INTERMITTENT SPINAL CLAUDICATION: ICD-10-CM

## 2020-09-03 DIAGNOSIS — F32.A ANXIETY AND DEPRESSION: ICD-10-CM

## 2020-09-03 DIAGNOSIS — F41.9 ANXIETY AND DEPRESSION: ICD-10-CM

## 2020-09-03 DIAGNOSIS — G89.29 CHRONIC MIDLINE LOW BACK PAIN, UNSPECIFIED WHETHER SCIATICA PRESENT: ICD-10-CM

## 2020-09-03 DIAGNOSIS — R73.9 ELEVATED BLOOD SUGAR: ICD-10-CM

## 2020-09-03 DIAGNOSIS — G89.4 CHRONIC PAIN SYNDROME: Primary | ICD-10-CM

## 2020-09-03 DIAGNOSIS — M48.062 SPINAL STENOSIS OF LUMBAR REGION WITH NEUROGENIC CLAUDICATION: ICD-10-CM

## 2020-09-03 DIAGNOSIS — M54.50 CHRONIC MIDLINE LOW BACK PAIN, UNSPECIFIED WHETHER SCIATICA PRESENT: ICD-10-CM

## 2020-09-03 DIAGNOSIS — I10 ESSENTIAL HYPERTENSION: Primary | ICD-10-CM

## 2020-09-03 DIAGNOSIS — M47.816 LUMBAR SPONDYLOSIS: ICD-10-CM

## 2020-09-03 PROCEDURE — 99442 PR PHYSICIAN TELEPHONE EVALUATION 11-20 MIN: ICD-10-PCS | Mod: 95,,, | Performed by: HOSPITALIST

## 2020-09-03 PROCEDURE — 99442 PR PHYSICIAN TELEPHONE EVALUATION 11-20 MIN: CPT | Mod: 95,,, | Performed by: HOSPITALIST

## 2020-09-03 RX ORDER — TIZANIDINE 4 MG/1
4 TABLET ORAL EVERY 6 HOURS PRN
Qty: 90 TABLET | Refills: 0 | Status: SHIPPED | OUTPATIENT
Start: 2020-09-03 | End: 2020-09-13

## 2020-09-03 RX ORDER — TRAMADOL HYDROCHLORIDE 50 MG/1
50 TABLET ORAL EVERY 6 HOURS PRN
Qty: 20 TABLET | Refills: 0 | Status: ON HOLD | OUTPATIENT
Start: 2020-09-03 | End: 2020-10-01 | Stop reason: HOSPADM

## 2020-09-03 NOTE — TELEPHONE ENCOUNTER
Patient requesting refill on tramadol  Last office visit 07/28/20   shows last refill on 08/13/20  Patient does not have a pain contract on file with [facility] department

## 2020-09-03 NOTE — TELEPHONE ENCOUNTER
----- Message from Betzy Hay sent at 9/3/2020 11:24 AM CDT -----  Regarding: Refill request  Who Called: MICAH MANCINI [[id number]]    RX Name and Strength: traMADoL (ULTRAM) 50 mg tablet  tiZANidine (ZANAFLEX) 4 MG tablet    Preferred Pharmacy with phone number: [facility] - [place], [place] - [address] Call Back Number: [phone]    Additional Information: N/A

## 2020-09-03 NOTE — PROGRESS NOTES
Established Patient - Audio Only Telehealth Visit     The patient location is: [place]   The chief complaint leading to consultation is: f/u   Visit type: Virtual visit with audio only (telephone)  Total time spent with patient: 15 josiane       The reason for the audio only service rather than synchronous audio and video virtual visit was related to technical difficulties or patient preference/necessity.     Each patient to whom I provide medical services by telemedicine is:  (1) informed of the relationship between the physician and patient and the respective role of any other health care provider with respect to management of the patient; and (2) notified that they may decline to receive medical services by telemedicine and may withdraw from such care at any time. Patient verbally consented to receive this service via voice-only telephone call.       Subjective:     @Patient ID: Vale Gutierrez is a 59 y.o. female.    Chief Complaint: Follow-up    HPI    60 yo F presents for f/u:   1. Spinal stenosis of lumbar region w/ neurogenic claudication and OA: seeing pain management and spine clinic. Receiving WERO with  Pain management. May have surgery.     Reports pain is chronic issue. Her meds are being adjusted with pain management. Stopped gabapentin as she is now on lyrica.     2. Anxiety and depression: effexor and wellbutrin. F/u with Dr Ely. Reports stable       Review of Systems   Constitutional: Negative for chills and fever.   HENT: Negative for congestion and sore throat.    Eyes: Negative for pain and visual disturbance.   Respiratory: Negative for cough and shortness of breath.    Cardiovascular: Negative for chest pain and leg swelling.   Gastrointestinal: Negative for abdominal pain, nausea and vomiting.   Endocrine: Negative for polydipsia and polyuria.   Genitourinary: Negative for difficulty urinating and dysuria.   Musculoskeletal: Positive for arthralgias and gait problem. Negative for back  pain.        Chronic   Skin: Negative for rash and wound.   Neurological: Negative for dizziness, weakness and headaches.   Psychiatric/Behavioral: Negative for agitation and confusion.     Past medical history, surgical history, and family medical history reviewed and updated as appropriate.    Medications and allergies reviewed.     Objective:     There were no vitals filed for this visit.  There is no height or weight on file to calculate BMI.  Physical Exam    Lab Results   Component Value Date    WBC 8.43 03/11/2020    HGB 13.7 03/11/2020    HCT 44.4 03/11/2020     03/11/2020    ALT 14 03/11/2020    AST 16 03/11/2020     03/11/2020    K 4.7 03/11/2020     03/11/2020    CREATININE 1.2 03/11/2020    BUN 23 03/11/2020    CO2 21 (L) 03/11/2020    TSH 1.703 08/29/2019    HGBA1C 5.0 08/29/2019       Assessment:     1. Essential hypertension    2. Spinal stenosis of lumbar region with neurogenic claudication    3. Chronic midline low back pain, unspecified whether sciatica present    4. Anxiety and depression      Plan:   Vale was seen today for follow-up.    Diagnoses and all orders for this visit:    Essential hypertension  - continue metoprolol    Spinal stenosis of lumbar region with neurogenic claudication  Chronic midline low back pain, unspecified whether sciatica present  - stable. F/u pain mgmt    Anxiety and depression  - stable. [facility]    RTC 2 months for annual    No follow-ups on file.    Estela Mcdaniel MD  Internal Medicine    9/3/2020                          This service was not originating from a related E/M service provided within the previous 7 days nor will  to an E/M service or procedure within the next 24 hours or my soonest available appointment.  Prevailing standard of care was able to be met in this audio-only visit.

## 2020-09-04 ENCOUNTER — TELEPHONE (OUTPATIENT)
Dept: PREADMISSION TESTING | Facility: HOSPITAL | Age: 60
End: 2020-09-04

## 2020-09-04 ENCOUNTER — TELEPHONE (OUTPATIENT)
Dept: INTERNAL MEDICINE | Facility: CLINIC | Age: 60
End: 2020-09-04

## 2020-09-04 ENCOUNTER — HOSPITAL ENCOUNTER (OUTPATIENT)
Facility: OTHER | Age: 60
Discharge: HOME | End: 2020-09-04
Attending: ANESTHESIOLOGY | Admitting: ANESTHESIOLOGY
Payer: MEDICARE

## 2020-09-04 VITALS
WEIGHT: 195 LBS | BODY MASS INDEX: 33.29 KG/M2 | DIASTOLIC BLOOD PRESSURE: 65 MMHG | HEIGHT: 64 IN | TEMPERATURE: 99 F | SYSTOLIC BLOOD PRESSURE: 118 MMHG | OXYGEN SATURATION: 95 % | RESPIRATION RATE: 18 BRPM | HEART RATE: 72 BPM

## 2020-09-04 DIAGNOSIS — M79.604 PAIN IN BOTH LOWER EXTREMITIES: ICD-10-CM

## 2020-09-04 DIAGNOSIS — M79.605 PAIN IN BOTH LOWER EXTREMITIES: ICD-10-CM

## 2020-09-04 DIAGNOSIS — Z01.818 PREOPERATIVE TESTING: Primary | ICD-10-CM

## 2020-09-04 DIAGNOSIS — M54.17 LUMBOSACRAL RADICULOPATHY: Primary | ICD-10-CM

## 2020-09-04 DIAGNOSIS — G89.29 CHRONIC PAIN: ICD-10-CM

## 2020-09-04 PROCEDURE — 25500020 PHARM REV CODE 255: Performed by: ANESTHESIOLOGY

## 2020-09-04 PROCEDURE — 25000003 PHARM REV CODE 250: Performed by: ANESTHESIOLOGY

## 2020-09-04 PROCEDURE — 64483 NJX AA&/STRD TFRM EPI L/S 1: CPT | Mod: 50 | Performed by: ANESTHESIOLOGY

## 2020-09-04 PROCEDURE — 64483 NJX AA&/STRD TFRM EPI L/S 1: CPT | Mod: 50,,, | Performed by: ANESTHESIOLOGY

## 2020-09-04 PROCEDURE — 63600175 PHARM REV CODE 636 W HCPCS: Performed by: ANESTHESIOLOGY

## 2020-09-04 PROCEDURE — 64483 PR EPIDURAL INJ, ANES/STEROID, TRANSFORAMINAL, LUMB/SACR, SNGL LEVL: ICD-10-PCS | Mod: 50,,, | Performed by: ANESTHESIOLOGY

## 2020-09-04 RX ORDER — LIDOCAINE HYDROCHLORIDE 5 MG/ML
INJECTION, SOLUTION INFILTRATION; INTRAVENOUS
Status: DISCONTINUED | OUTPATIENT
Start: 2020-09-04 | End: 2020-09-04 | Stop reason: HOSPADM

## 2020-09-04 RX ORDER — MIDAZOLAM HYDROCHLORIDE 1 MG/ML
INJECTION INTRAMUSCULAR; INTRAVENOUS
Status: DISCONTINUED | OUTPATIENT
Start: 2020-09-04 | End: 2020-09-04 | Stop reason: HOSPADM

## 2020-09-04 RX ORDER — DEXAMETHASONE SODIUM PHOSPHATE 10 MG/ML
INJECTION INTRAMUSCULAR; INTRAVENOUS
Status: DISCONTINUED | OUTPATIENT
Start: 2020-09-04 | End: 2020-09-04 | Stop reason: HOSPADM

## 2020-09-04 RX ORDER — LIDOCAINE HYDROCHLORIDE 10 MG/ML
INJECTION INFILTRATION; PERINEURAL
Status: DISCONTINUED | OUTPATIENT
Start: 2020-09-04 | End: 2020-09-04 | Stop reason: HOSPADM

## 2020-09-04 RX ORDER — SODIUM CHLORIDE 9 MG/ML
500 INJECTION, SOLUTION INTRAVENOUS CONTINUOUS
Status: DISCONTINUED | OUTPATIENT
Start: 2020-09-05 | End: 2020-09-04 | Stop reason: HOSPADM

## 2020-09-04 RX ADMIN — SODIUM CHLORIDE 500 ML: 0.9 INJECTION, SOLUTION INTRAVENOUS at 02:09

## 2020-09-04 NOTE — OP NOTE
Patient Name: Vale Gutierrez  MRN: [id number]    INFORMED CONSENT: The procedure, risks, benefits and options were discussed with patient. There are no contraindications to the procedure. The patient expressed understanding and agreed to proceed. The personnel performing the procedure was discussed. I verify that I personally obtained Vale's consent prior to the start of the procedure and the signed consent can be found on the patient's chart.    Procedure Date: 09/04/2020    Anesthesia: Topical    Pre Procedure diagnosis: Spondylolisthesis of lumbar region [M43.16]  1. Lumbosacral radiculopathy    2. Chronic pain      Post-Procedure diagnosis: SAME      Sedation: Yes - Midazolam 2 mg  Sedation time: Less than 15 minutes    PROCEDURE:Bilateral L4/L5   TRANSFORAMINAL EPIDURAL STEROID INJECTION        DESCRIPTION OF PROCEDURE: The patient was brought to the procedure room. After performing time out IV access was obtained prior to the procedure. The patient was positioned prone on the fluoroscopy table. Continuous hemodynamic monitoring was initiated including blood pressure, EKG, and pulse oximetry. . The skin was prepped with chlorhexidine three times and draped in a sterile fashion. Skin anesthesia was achieved using 3 mL of lidocaine 1% over the respective injection site.     An oblique fluoroscopic view was obtained, with the superior articular process of the inferior vertebral body aligned with the pedicle. The tip of a 22-gauge 3.5-inch Quincke-type spinal needle was advanced toward the 6 oclock position of the L4 pedicle under intermittent fluoroscopic guidance. Confirmation of proper needle position was made with AP, oblique, and lateral fluoroscopic views. Negative aspiration for blood or CSF was confirmed. 2 mL of Omnipaque 300 was injected. Live fluoroscopic imaging revealed a clear outline of the spinal nerve with proximal spread of agent through the neural foramen into the anterior epidural space. A  total combination of 3 mL of Bupivacaine 0.25% and 10 mg decadron was injected at each level. Contrast spread was noted from L4 to L3 level. There was no pain on injection. The needle was removed and bleeding was nil.  A sterile dressing was applied. Vale was taken back to the recovery room for further observation.     Blood Loss: Nill  Specimen: None    Pierre Cast MD     I have reviewed the notes, assessments, and/or procedures performed by fellow, I concur with her/his documentation of Vale Kaylee Gutierrez.    Rubén Rico MD

## 2020-09-04 NOTE — DISCHARGE INSTRUCTIONS
Thank you for allowing us to care for you today. You may receive a survey about the care we provided. Your feedback is valuable and helps us provide excellent care throughout the community.     Home Care Instructions for Pain Management:    1. DIET:   You may resume your normal diet today.   2. BATHING:   You may shower with luke warm water. No tub baths or anything that will soak injection sites under water for the next 24 hours.  3. DRESSING:   You may remove your bandage today.   4. ACTIVITY LEVEL:   You may resume your normal activities 24 hrs after your procedure. Nothing strenuous today.  5. MEDICATIONS:   You may resume your normal medications today. To restart blood thinners, ask your doctor.  6. DRIVING    If you have received any sedatives by mouth today, you may not drive for 12 hours.    If you have received any sedation through your IV, you may not drive for 24 hrs.   7. SPECIAL INSTRUCTIONS:   No heat to the injection site for 24 hrs including, hot bath or shower, heating pad, moist heat, or hot tubs.    Use ice pack to injection site for any pain or discomfort.  Apply ice packs for 20 minute intervals as needed.    IF you have diabetes, be sure to monitor your blood sugar more closely. IF your injection contained steroids your blood sugar levels may become higher than normal.    If you are still having pain upon discharge:  Your pain may improve over the next 48 hours. The anesthetic (numbing medication) works immediately to 48 hours. IF your injection contained a steroid (anti-inflammatory medication), it takes approximately 3 days to start feeling relief and 7-10 days to see your greatest results from the medication. It is possible you may need subsequent injections. This would be discussed at your follow up appointment with pain management or your referring doctor.    Please call the PAIN MANAGEMENT office at [phone] or ON CALL pager at [phone] if you experienced any:   -Weakness or  loss of sensation  -Fever > 101.5  -Pain uncontrolled with oral medications   -Persistent nausea, vomiting, or diarrhea  -Redness or drainage from the injection sites, or any other worrisome concerns.   If physician on call was not reached or could not communicate with our office for any reason please go to the nearest emergency department.  Adult Procedural Sedation Instructions    Recovery After Procedural Sedation (Adult)  You have been given medicine by vein to make you sleep during your surgery. This may have included both a pain medicine and sleeping medicine. Most of the effects have worn off. But you may still have some drowsiness for the next 6 to 8 hours.  Home care  Follow these guidelines when you get home:  · For the next 8 hours, you should be watched by a responsible adult. This person should make sure your condition is not getting worse.  · Don't drink any alcohol for the next 24 hours.  · Don't drive, operate dangerous machinery, or make important business or personal decisions during the next 24 hours.  Note: Your healthcare provider may tell you not to take any medicine by mouth for pain or sleep in the next 4 hours. These medicines may react with the medicines you were given in the hospital. This could cause a much stronger response than usual.  Follow-up care  Follow up with your healthcare provider if you are not alert and back to your usual level of activity within 12 hours.  When to seek medical advice  Call your healthcare provider right away if any of these occur:  · Drowsiness gets worse  · Weakness or dizziness gets worse  · Repeated vomiting  · You can't be awakened   Date Last Reviewed: 10/18/2016  © 5876-8353 [employer]. [address] [place] [address], [place] [address]. All rights reserved. This information is not intended as a substitute for professional medical care. Always follow your healthcare professional's instructions.

## 2020-09-04 NOTE — TELEPHONE ENCOUNTER
----- Message from Wendi Fields RN sent at 9/4/2020 10:55 AM CDT -----  Patient is scheduled for lumbar spine fusion, TLIF, posterior approach L4-5 on 10/1 with Dr. Rivas. ( approximately 230 minutes of general anesthesia). She will need medical clearance for this surgery. Please schedule a preop appt.   Thanks!

## 2020-09-04 NOTE — PRE-PROCEDURE INSTRUCTIONS
Patient stated has not had any problem with anesthesia in the past. Will need medical clearance from your PCP, Dr. Estela Mcdaniel. for this surgery. She will make an appt. Will also need poc appt, labs and ua. Our  will call to set up these appts.   Preop instructions given. Hold aspirin, aspirin containing products, nsaids(aleve, advil, motrin, ibuprofen, naprosyn, naproxen, voltaren, diclofenac), vitamins and supplements one week prior to surgery.(sent to my chart)  Verbalizes understanding.

## 2020-09-04 NOTE — DISCHARGE SUMMARY
Discharge Note  Short Stay      SUMMARY     Admit Date: 9/4/2020    Attending Physician: Pierre Cast      Discharge Physician: Pierre Cast      Discharge Date: 9/4/2020 3:12 PM    Procedure(s) (LRB):  LUMBAR TRANSFORAMINAL BILATERAL L4/5 DIRECT REFERRAL (Bilateral)    Final Diagnosis: Spondylolisthesis of lumbar region [M43.16]    Disposition: Home or self care    Patient Instructions:   Current Discharge Medication List      CONTINUE these medications which have NOT CHANGED    Details   aspirin (ECOTRIN) 81 MG EC tablet Take 81 mg by mouth once daily.      buPROPion (WELLBUTRIN XL) 300 MG 24 hr tablet Take 300 mg by mouth every morning.      diclofenac (VOLTAREN) 75 MG EC tablet Take 1 tablet (75 mg total) by mouth 2 (two) times daily.  Qty: 60 tablet, Refills: 0      metoprolol tartrate (LOPRESSOR) 50 MG tablet Take 1 tablet (50 mg total) by mouth once daily.  Qty: 90 tablet, Refills: 3      omeprazole (PRILOSEC) 20 MG capsule Take 1 capsule (20 mg total) by mouth 2 (two) times daily.  Qty: 60 capsule, Refills: 11      potassium chloride (MICRO-K) 10 MEQ CpSR Take 1 capsule (10 mEq total) by mouth once daily.  Qty: 30 capsule, Refills: 2      pregabalin (LYRICA) 100 MG capsule Take 1 capsule (100 mg total) by mouth 3 (three) times daily.  Qty: 90 capsule, Refills: 6      tiZANidine (ZANAFLEX) 4 MG tablet Take 1 tablet (4 mg total) by mouth every 6 (six) hours as needed.  Qty: 90 tablet, Refills: 0    Comments: Quantity greater than 7 day supply Medically Necessary  Associated Diagnoses: Intermittent spinal claudication; Chronic pain syndrome; Lumbar spondylosis      traMADoL (ULTRAM) 50 mg tablet Take 1 tablet (50 mg total) by mouth every 6 (six) hours as needed for Pain.  Qty: 20 tablet, Refills: 0    Comments: Quantity prescribed more than 7 day supply? No  Associated Diagnoses: Intermittent spinal claudication; Chronic pain syndrome; Lumbar spondylosis      !! venlafaxine (EFFEXOR-XR) 150 MG  Cp24 Take 150 mg by mouth once daily.      !! venlafaxine (EFFEXOR-XR) 75 MG 24 hr capsule Take 75 mg by mouth once daily.      vitamin D (VITAMIN D3) 1000 units Tab Take 5,000 Int'l Units by mouth.       !! - Potential duplicate medications found. Please discuss with provider.              Discharge Diagnosis: Spondylolisthesis of lumbar region [M43.16]  Condition on Discharge: Stable with no complications to procedure   Diet on Discharge: Same as before.  Activity: as per instruction sheet.  Discharge to: Home with a responsible adult.  Follow up: 2-4 weeks       Please call my office or pager at [phone] if experienced any weakness or loss of sensation, fever > 101.5, pain uncontrolled with oral medications, persistent nausea/vomiting/or diarrhea, redness or drainage from the incisions, or any other worrisome concerns. If physician on call was not reached or could not communicate with our office for any reason please go to the nearest emergency department

## 2020-09-04 NOTE — ANESTHESIA PAT ROS NOTE
09/04/2020  Vale Gutierrez is a 59 y.o., female.      Pre-op Assessment          Review of Systems  Anesthesia Hx:  No problems with previous Anesthesia  History of prior surgery of interest to airway management or planning: Previous anesthesia: MAC  6/3/20-EGD with MAC.  Procedure performed at an [facility]. Denies Family Hx of Anesthesia complications.   Denies Personal Hx of Anesthesia complications.   Social:  Smoker, No Alcohol Use  Alcohol Use:   Alcohol Abuse: alcohol use is in recovery   Hematology/Oncology:  Hematology Normal   Oncology Normal     EENT/Dental:EENT/Dental Normal   Cardiovascular:   Hypertension  Denies Angina. hyperlipidemia  Functional Capacity 2 METS, WHEELCHAIR/WALKER    Pulmonary:  Pulmonary Normal  Denies Shortness of breath.  Denies Recent URI.    Renal/:  Renal/ Normal     Hepatic/GI:   GERD    Musculoskeletal:   LUMBAR SPONDYLOSIS.   O.A. CERVICAL SPINE.  SPINAL STENOSIS OF LUMBAR REGION WITH NEUROGENIC CLAUDICATION. Joint Disease:  Arthritis, Osteoarthritis    Neurological:   LUMBOSACRAL RADICULOPATHY.  IDIOPATHIC PROGRESSIVE POLYNEUROPATHY.  SPINAL STENOSIS OF LUMBAR REGION WITH NEUROGENIC CLAUDICATION. Osteoarthritis    Endocrine:  Metabolic Disorders, Obesity / BMI > 30  Psych:  Depression and Chronic Depression, Treated.          Physical Exam  General:  Obesity, Well nourished    Airway/Jaw/Neck:  Airway Findings: Mouth Opening: Normal Tongue: Large  Jaw/Neck Findings:  Neck ROM: Normal ROM  Neck Findings:  Girth Increased      Dental:  Dental Findings: In tact   Chest/Lungs:  Chest/Lungs Findings: Clear to auscultation     Heart/Vascular:  Heart Findings: Rate: Normal  Rhythm: Regular Rhythm  Sounds: Normal        Mental Status:  Mental Status Findings:  Cooperative, Alert and Oriented         Anesthesia Assessment: Preoperative EQUATION    Planned  Procedure: Procedure(s) (LRB):  FUSION, SPINE, LUMBAR, TLIF, POSTERIOR APPROACH, USING PEDICLE SCREW L4/5 Spine wave SNS:SSEP/EMG (N/A)  Requested Anesthesia Type:General  Surgeon: Patrick Rivas MD  Service: Neurosurgery  Known or anticipated Date of Surgery:10/1/2020    Surgeon notes: reviewed    Electronic QUestionnaire Assessment completed via nurse interview with patient.        Triage considerations:       Previous anesthesia records:MAC and No problems   6/3/2020 EGD (ESOPHAGOGASTRODUODENOSCOPY) (N/A Abdomen) COLONOSCOPY Golytely (N/A Abdomen)  Procedures  Airway/Jaw/Neck:  Airway Findings: Mouth Opening: Normal Tongue: Normal  General Airway Assessment: Adult  Mallampati: II  TM Distance: Normal, at least 6 cm       Last PCP note: within 1 month , within [facility]   Subspecialty notes: Gastroenterology, Neurology, Ortho, Pain Management, Psychiatry, Spine Service, OPTOMETRY, & OB/GYN    Other important co-morbidities:PER Epic;  HLD, HTN, Obesity and H/O ELEVATED LFT'S. H/O ETOH DEPENDENCE, POLYNEUROPATHY,&  OSTEOARTHRITIS      Tests already available:  Available tests,  within 3 months , 6-12 months ago , within [facility] .7/22/2020 MRI LUMBAR SPINE W/O CONTRAST, 3/11/2020 XRAY LUMBAR SPINE COMPLETE 5 VIEW, 2/18/2020 EKG             Instructions given. (See in Nurse's note)    Optimization:  Anesthesia Preop Clinic Assessment  Indicated    Medical Opinion Indicated       Plan:    Testing:  CMP, Hematology Profile, PT/INR, PTT, T&S and UA   Pre-anesthesia  visit       Visit focus: concerns in complex and/or prolonged anesthesia, position other than supine, COMORBIDITIES     Consultation:Patient's PCP for re-evaluation     Patient  has previously scheduled Medical Appointment:9/18 DR. RIVAS    Navigation: Tests Scheduled. TBD             Consults scheduled.TBD             Results will be tracked by Preop Clinic.  9/16  Medical clearance given by Dr.Richard Pacheco on 9/15.   Wendi Fields RN BSN

## 2020-09-09 ENCOUNTER — TELEPHONE (OUTPATIENT)
Dept: INTERNAL MEDICINE | Facility: CLINIC | Age: 60
End: 2020-09-09

## 2020-09-09 NOTE — TELEPHONE ENCOUNTER
----- Message from Estela Mcdaniel MD sent at 9/9/2020 10:09 AM CDT -----  Hi she has an appt scheduled on 9/15 with a colleague for preop clearance  ----- Message -----  From: Wendi Fields RN  Sent: 9/9/2020   8:11 AM CDT  To: Wendi Fields RN, Estela Mcdaniel MD, #    Second request:   Patient is scheduled for lumbar spine fusion, TLIF, posterior approach L4-5 on 10/1 with Dr. Rivas. ( approximately 230 minutes of general anesthesia). She will need medical clearance for this surgery. Please schedule a preop appt.   Thanks!

## 2020-09-09 NOTE — TELEPHONE ENCOUNTER
----- Message from Wendi Fields RN sent at 9/9/2020  8:11 AM CDT -----  Second request:   Patient is scheduled for lumbar spine fusion, TLIF, posterior approach L4-5 on 10/1 with Dr. Rivas. ( approximately 230 minutes of general anesthesia). She will need medical clearance for this surgery. Please schedule a preop appt.   Thanks!

## 2020-09-09 NOTE — TELEPHONE ENCOUNTER
I called the patient again and a message left to call the office to schedule a pre-op appointment.

## 2020-09-15 ENCOUNTER — OFFICE VISIT (OUTPATIENT)
Dept: INTERNAL MEDICINE | Facility: CLINIC | Age: 60
End: 2020-09-15
Payer: MEDICARE

## 2020-09-15 VITALS
TEMPERATURE: 97 F | BODY MASS INDEX: 34.44 KG/M2 | HEIGHT: 64 IN | RESPIRATION RATE: 16 BRPM | SYSTOLIC BLOOD PRESSURE: 126 MMHG | WEIGHT: 201.75 LBS | DIASTOLIC BLOOD PRESSURE: 80 MMHG | HEART RATE: 74 BPM

## 2020-09-15 DIAGNOSIS — G89.4 CHRONIC PAIN DISORDER: ICD-10-CM

## 2020-09-15 DIAGNOSIS — M54.17 LUMBOSACRAL RADICULOPATHY: ICD-10-CM

## 2020-09-15 DIAGNOSIS — M47.816 LUMBAR SPONDYLOSIS: ICD-10-CM

## 2020-09-15 DIAGNOSIS — Z01.818 PRE-OP EXAM: Primary | ICD-10-CM

## 2020-09-15 DIAGNOSIS — M48.062 SPINAL STENOSIS OF LUMBAR REGION WITH NEUROGENIC CLAUDICATION: ICD-10-CM

## 2020-09-15 DIAGNOSIS — F32.A DEPRESSION, UNSPECIFIED DEPRESSION TYPE: ICD-10-CM

## 2020-09-15 DIAGNOSIS — I10 ESSENTIAL HYPERTENSION: ICD-10-CM

## 2020-09-15 PROCEDURE — 99214 PR OFFICE/OUTPT VISIT, EST, LEVL IV, 30-39 MIN: ICD-10-PCS | Mod: S$GLB,,, | Performed by: FAMILY MEDICINE

## 2020-09-15 PROCEDURE — 3074F SYST BP LT 130 MM HG: CPT | Mod: CPTII,S$GLB,, | Performed by: FAMILY MEDICINE

## 2020-09-15 PROCEDURE — 93005 EKG 12-LEAD: ICD-10-PCS | Mod: S$GLB,,, | Performed by: FAMILY MEDICINE

## 2020-09-15 PROCEDURE — 93010 ELECTROCARDIOGRAM REPORT: CPT | Mod: S$GLB,,, | Performed by: INTERNAL MEDICINE

## 2020-09-15 PROCEDURE — 3079F PR MOST RECENT DIASTOLIC BLOOD PRESSURE 80-89 MM HG: ICD-10-PCS | Mod: CPTII,S$GLB,, | Performed by: FAMILY MEDICINE

## 2020-09-15 PROCEDURE — 99999 PR PBB SHADOW E&M-EST. PATIENT-LVL IV: CPT | Mod: PBBFAC,,, | Performed by: FAMILY MEDICINE

## 2020-09-15 PROCEDURE — 3074F PR MOST RECENT SYSTOLIC BLOOD PRESSURE < 130 MM HG: ICD-10-PCS | Mod: CPTII,S$GLB,, | Performed by: FAMILY MEDICINE

## 2020-09-15 PROCEDURE — 3008F BODY MASS INDEX DOCD: CPT | Mod: CPTII,S$GLB,, | Performed by: FAMILY MEDICINE

## 2020-09-15 PROCEDURE — 93010 EKG 12-LEAD: ICD-10-PCS | Mod: S$GLB,,, | Performed by: INTERNAL MEDICINE

## 2020-09-15 PROCEDURE — 93005 ELECTROCARDIOGRAM TRACING: CPT | Mod: S$GLB,,, | Performed by: FAMILY MEDICINE

## 2020-09-15 PROCEDURE — 99999 PR PBB SHADOW E&M-EST. PATIENT-LVL IV: ICD-10-PCS | Mod: PBBFAC,,, | Performed by: FAMILY MEDICINE

## 2020-09-15 PROCEDURE — 3008F PR BODY MASS INDEX (BMI) DOCUMENTED: ICD-10-PCS | Mod: CPTII,S$GLB,, | Performed by: FAMILY MEDICINE

## 2020-09-15 PROCEDURE — 3079F DIAST BP 80-89 MM HG: CPT | Mod: CPTII,S$GLB,, | Performed by: FAMILY MEDICINE

## 2020-09-15 PROCEDURE — 99214 OFFICE O/P EST MOD 30 MIN: CPT | Mod: S$GLB,,, | Performed by: FAMILY MEDICINE

## 2020-09-15 NOTE — PROGRESS NOTES
Subjective:       Patient ID: Vale Gutierrez is a 59 y.o. female.    Chief Complaint: Back Pain (PRE OP clearance)    HPI 59-year-old white female patient of Dr. Mcdaniel presents to clinic today accompanied by her sister for preoperative exam in order to have spinal fusion performed on October 1st by Dr. Rivas.  He continues to be followed by pain management secondary to chronic back pain from lumbar radiculopathy.  She continues to be treated for hypertension which remains controlled on metoprolol 50 mg daily.  She continues to be followed by Psychiatry for treatment of depression.  Currently, her only complaint is bilateral leg pain and weakness.  She denies any chest pain, shortness of breath, headaches, or dizziness.  Review of Systems   Constitutional: Negative for appetite change, chills, fatigue and fever.   HENT: Negative for nasal congestion, ear pain, hearing loss, postnasal drip, rhinorrhea, sinus pressure/congestion, sore throat and tinnitus.    Eyes: Negative for redness, itching and visual disturbance.   Respiratory: Negative for cough, chest tightness and shortness of breath.    Cardiovascular: Negative for chest pain and palpitations.   Gastrointestinal: Negative for abdominal pain, constipation, diarrhea, nausea and vomiting.   Genitourinary: Negative for decreased urine volume, difficulty urinating, dysuria, frequency, hematuria and urgency.   Musculoskeletal: Positive for back pain, leg pain and myalgias. Negative for neck pain and neck stiffness.   Integumentary:  Negative for rash.   Neurological: Negative for dizziness, light-headedness and headaches.   Psychiatric/Behavioral: Negative.          Objective:      Physical Exam  Vitals signs and nursing note reviewed.   Constitutional:       General: She is not in acute distress.     Appearance: She is well-developed. She is not diaphoretic.   HENT:      Head: Normocephalic and atraumatic.      Right Ear: External ear normal.      Left  Ear: External ear normal.      Nose: Nose normal.      Mouth/Throat:      Pharynx: No oropharyngeal exudate.   Eyes:      General: No scleral icterus.        Right eye: No discharge.         Left eye: No discharge.      Conjunctiva/sclera: Conjunctivae normal.      Pupils: Pupils are equal, round, and reactive to light.   Neck:      Musculoskeletal: Normal range of motion and neck supple.      Thyroid: No thyromegaly.      Vascular: No JVD.      Trachea: No tracheal deviation.   Cardiovascular:      Rate and Rhythm: Normal rate and regular rhythm.      Heart sounds: Normal heart sounds. No murmur. No friction rub. No gallop.    Pulmonary:      Effort: Pulmonary effort is normal. No respiratory distress.      Breath sounds: Normal breath sounds. No stridor. No wheezing or rales.   Abdominal:      General: Bowel sounds are normal. There is no distension.      Palpations: Abdomen is soft. There is no mass.      Tenderness: There is no abdominal tenderness. There is no guarding or rebound.   Musculoskeletal: Normal range of motion.         General: No tenderness.      Comments: Currently wheelchair-bound secondary to lower extremity pain and back pain.   Lymphadenopathy:      Cervical: No cervical adenopathy.   Skin:     General: Skin is warm and dry.      Coloration: Skin is not pale.      Findings: No erythema or rash.   Neurological:      Mental Status: She is alert and oriented to person, place, and time.   Psychiatric:         Behavior: Behavior normal.         Thought Content: Thought content normal.         Judgment: Judgment normal.       EKG:  Normal sinus rhythm with incomplete right bundle branch block.  Ventricular rate 70 beats per minute.  Assessment:       1. Pre-op exam    2. Lumbosacral radiculopathy    3. Lumbar spondylosis    4. Chronic pain disorder    5. Spinal stenosis of lumbar region with neurogenic claudication    6. Essential hypertension    7. Depression, unspecified depression type         Plan:       1.  EKG now.  2.  Preop labs will be performed on Friday during anesthesia preop.  3.  Continue follow-up with pain management as scheduled.  4.  Continue metoprolol 50 mg daily.  Hypertension is well controlled.  5.  Continue follow-up with psychiatry as scheduled.  Depression is stable.  6.  The patient is okay to proceed with surgery as scheduled.  7.  Return to clinic as needed or as previously scheduled with PCP.

## 2020-09-16 ENCOUNTER — TELEPHONE (OUTPATIENT)
Dept: INTERNAL MEDICINE | Facility: CLINIC | Age: 60
End: 2020-09-16

## 2020-09-16 NOTE — TELEPHONE ENCOUNTER
----- Message from Shannan Fisher sent at 9/16/2020 10:40 AM CDT -----  Contact: self   Pt states she received her test results (EKG) but would like to speak with the dr to go over them. Please call and advise. Thank you

## 2020-09-16 NOTE — TELEPHONE ENCOUNTER
The patient's EKG is stable and unchanged from all previous EKGs.  She is okay to proceed with surgery as scheduled.

## 2020-09-17 ENCOUNTER — PATIENT OUTREACH (OUTPATIENT)
Dept: ADMINISTRATIVE | Facility: OTHER | Age: 60
End: 2020-09-17

## 2020-09-18 ENCOUNTER — OFFICE VISIT (OUTPATIENT)
Dept: ORTHOPEDICS | Facility: CLINIC | Age: 60
End: 2020-09-18
Payer: MEDICARE

## 2020-09-18 ENCOUNTER — ANESTHESIA EVENT (OUTPATIENT)
Dept: SURGERY | Facility: HOSPITAL | Age: 60
DRG: 455 | End: 2020-09-18
Payer: MEDICARE

## 2020-09-18 ENCOUNTER — HOSPITAL ENCOUNTER (OUTPATIENT)
Dept: PREADMISSION TESTING | Facility: HOSPITAL | Age: 60
Discharge: HOME | End: 2020-09-18
Attending: ANESTHESIOLOGY
Payer: MEDICARE

## 2020-09-18 VITALS
HEART RATE: 84 BPM | SYSTOLIC BLOOD PRESSURE: 109 MMHG | TEMPERATURE: 98 F | OXYGEN SATURATION: 96 % | DIASTOLIC BLOOD PRESSURE: 76 MMHG | RESPIRATION RATE: 18 BRPM | BODY MASS INDEX: 35.17 KG/M2 | WEIGHT: 206 LBS | HEIGHT: 64 IN

## 2020-09-18 VITALS — HEIGHT: 64 IN | WEIGHT: 205.94 LBS | BODY MASS INDEX: 35.16 KG/M2

## 2020-09-18 DIAGNOSIS — M43.10 SPONDYLOLISTHESIS, ACQUIRED: Primary | ICD-10-CM

## 2020-09-18 PROCEDURE — 99999 PR PBB SHADOW E&M-EST. PATIENT-LVL III: ICD-10-PCS | Mod: PBBFAC,,, | Performed by: ORTHOPAEDIC SURGERY

## 2020-09-18 PROCEDURE — 99214 PR OFFICE/OUTPT VISIT, EST, LEVL IV, 30-39 MIN: ICD-10-PCS | Mod: S$GLB,,, | Performed by: ORTHOPAEDIC SURGERY

## 2020-09-18 PROCEDURE — 99214 OFFICE O/P EST MOD 30 MIN: CPT | Mod: S$GLB,,, | Performed by: ORTHOPAEDIC SURGERY

## 2020-09-18 PROCEDURE — 3078F PR MOST RECENT DIASTOLIC BLOOD PRESSURE < 80 MM HG: ICD-10-PCS | Mod: CPTII,S$GLB,, | Performed by: ORTHOPAEDIC SURGERY

## 2020-09-18 PROCEDURE — 3008F BODY MASS INDEX DOCD: CPT | Mod: CPTII,S$GLB,, | Performed by: ORTHOPAEDIC SURGERY

## 2020-09-18 PROCEDURE — 3074F PR MOST RECENT SYSTOLIC BLOOD PRESSURE < 130 MM HG: ICD-10-PCS | Mod: CPTII,S$GLB,, | Performed by: ORTHOPAEDIC SURGERY

## 2020-09-18 PROCEDURE — 3074F SYST BP LT 130 MM HG: CPT | Mod: CPTII,S$GLB,, | Performed by: ORTHOPAEDIC SURGERY

## 2020-09-18 PROCEDURE — 3008F PR BODY MASS INDEX (BMI) DOCUMENTED: ICD-10-PCS | Mod: CPTII,S$GLB,, | Performed by: ORTHOPAEDIC SURGERY

## 2020-09-18 PROCEDURE — 3078F DIAST BP <80 MM HG: CPT | Mod: CPTII,S$GLB,, | Performed by: ORTHOPAEDIC SURGERY

## 2020-09-18 PROCEDURE — 99999 PR PBB SHADOW E&M-EST. PATIENT-LVL III: CPT | Mod: PBBFAC,,, | Performed by: ORTHOPAEDIC SURGERY

## 2020-09-18 RX ORDER — DICLOFENAC SODIUM 75 MG/1
75 TABLET, DELAYED RELEASE ORAL 2 TIMES DAILY
Qty: 60 TABLET | Refills: 0 | Status: ON HOLD | OUTPATIENT
Start: 2020-09-18 | End: 2020-10-01 | Stop reason: HOSPADM

## 2020-09-18 NOTE — DISCHARGE INSTRUCTIONS
Your surgery has been scheduled for:__________________________________________    You should report to:  ____[facility], located on the [place] side of the first floor of the           [facility] ([phone])  ____[facility], located on the [facility] side of the            Second floor of the [facility] ([phone])  ____3rd Floor [facility] located on the [facility] side of the [facility] [phone]  Please Note   - Tell your doctor if you take Aspirin, products containing Aspirin, herbal medications  or blood thinners, such as Coumadin, Ticlid, or Plavix.  (Consult your provider regarding holding or stopping before surgery).  - Arrange for someone to drive you home following surgery.  You will not be allowed to leave the surgical facility alone or drive yourself home following sedation and anesthesia.  Before Surgery  - Stop taking all vitamins/ herbal medications 14days prior to surgery  - No Motrin/Advil (Ibuprofen) 1 day before surgery  - No Aleve (Naproxen) 7 days before surgery  - Stop Taking Asprin, products containing Asprin _____days before surgery  - Stop taking blood thinners_______days before surgery  - No Goody's/BC  Powder 7 days before surgery  - Refrain from drinking alcoholic beverages for 24hours before and after surgery  - Stop or limit smoking _________days before surgery  - You may take Tylenol for pain  Night before Surgery   Stop ALL solid food, gum, candy (including vitamins) 8 hours before arrival time.  (Please note: If your surgeon gives you different eating and drinking instructions, please follow surgeon's directions.)   Stop all CLOUDY liquids: coffee with creamer, formula, tube feeds, cloudy juices, non-human milk and breast milk with additives, 6 hours prior to arrival time.   Stop plain breast milk 4 hours prior to arrival time.   The patient should be ENCOURAGED to drink  carbohydrate-rich clear liquids (sports drinks, clear juices) until 2 hours prior to arrival time.   CLEAR liquids include only water, black coffee NO creamer, clear oral rehydration drinks, clear sports drinks or clear fruit juices (no orange juice, no pulpy juices, no apple cider). Advise patients if they can read newsprint through the liquid, it qualifies as clear liquid.    IF IN DOUBT, drink water instead.   - Take a shower or bath (shower is recommended).  Bathe with Hibiclens soap or an antibacterial soap from the neck down.  If not supplied by your surgeon, hibiclens soap will need to be purchased over the counter in pharmacy.  Rinse soap off thoroughly.  - Shampoo your hair with your regular shampoo  The Day of Surgery  · NOTHING TO  DRINK 2 hours before arrival time. If you are told to take medication on the morning of surgery, it may be taken with a sip of water.   - Take another bath or shower with hibiclens or any antibacterial soap, to reduce the chance of infection.  - Take heart and blood pressure medications with a small sip of water, as advised by the perioperative team.  - Do not take fluid pills  - You may brush your teeth and rinse your mouth, but do not swall any additional water.   - Do not apply perfumes, powder, body lotions or deodorant on the day of surgery.  - Nail polish should be removed.  - Do not wear makeup or moisturizer  - Wear comfortable clothes, such as a button front shirt and loose fitting pants.  - Leave all jewelry, including body piercings, and valuables at home.    - Bring any devices you will neeed after surgery such as crutches or canes.  - If you have sleep apnea, please bring your CPAP machine  In the event that your physical condition changes including the onset of a cold or respiratory illness, or if you have to delay or cancel your surgery, please notify your surgeon.    Anesthesia: General Anesthesia     You are watched continuously during your procedure by your  anesthesia provider.     Youre due to have surgery. During surgery, youll be given medicine called anesthesia or anesthetic. This will keep you comfortable and pain-free. Your anesthesia provider will use general anesthesia.  What is general anesthesia?  General anesthesia puts you into a state like deep sleep. It goes into the bloodstream (IV anesthetics), into the lungs (gas anesthetics), or both. You feel nothing during the procedure. You will not remember it. During the procedure, the anesthesia provider monitors you continuously. He or she checks your heart rate and rhythm, blood pressure, breathing, and blood oxygen.  · IV anesthetics. IV anesthetics are given through an IV line in your arm. Theyre often given first. This is so you are asleep before a gas anesthetic is started. Some kinds of IV anesthetics relieve pain. Others relax you. Your doctor will decide which kind is best in your case.  · Gas anesthetics. Gas anesthetics are breathed into the lungs. They are often used to keep you asleep. They can be given through a facemask or a tube placed in your larynx or trachea (breathing tube).  ? If you have a facemask, your anesthesia provider will most likely place it over your nose and mouth while youre still awake. Youll breathe oxygen through the mask as your IV anesthetic is started. Gas anesthetic may be added through the mask.  ? If you have a tube in the larynx or trachea, it will be inserted into your throat after youre asleep.  Anesthesia tools and medicines  You will likely have:  · IV anesthetics. These are put into an IV line into your bloodstream.  · Gas anesthetics. You breathe these anesthetics into your lungs, where they pass into your bloodstream.  · Pulse oximeter. This is a small clip that is attached to the end of your finger. This measures your blood oxygen level.  · Electrocardiography leads (electrodes). These are small sticky pads that are placed on your chest. They record your  heart rate and rhythm.  · Blood pressure cuff. This reads your blood pressure.  Risks and possible complications  General anesthesia has some risks. These include:  · Breathing problems  · Nausea and vomiting  · Sore throat or hoarseness (usually temporary)  · Allergic reaction to the anesthetic  · Irregular heartbeat (rare)  · Cardiac arrest (rare)   Anesthesia safety  · Follow all instructions you are given for how long not to eat or drink before your procedure.  · Be sure your doctor knows what medicines and drugs you take. This includes over-the-counter medicines, herbs, supplements, alcohol or other drugs. You will be asked when those were last taken.  · Have an adult family member or friend drive you home after the procedure.  · For the first 24 hours after your surgery:  ? Do not drive or use heavy equipment.  ? Do not make important decisions or sign legal documents. If important decisions or signing legal documents is necessary during the first 24 hours after surgery, have a trusted family member or spouse act on your behalf.  ? Avoid alcohol.  ? Have a responsible adult stay with you. He or she can watch for problems and help keep you safe.  Date Last Reviewed: 12/1/2016  © 3719-2866 [employer] [address] [place] [address], [place] [address]. All rights reserved. This information is not intended as a substitute for professional medical care. Always follow your healthcare professional's instructions

## 2020-09-18 NOTE — PROGRESS NOTES
DATE: 9/18/2020  PATIENT: Vale Gutierrez    Supervising Physician: Patrick Rivas M.D.    CHIEF COMPLAINT: low back and bilateral leg pain    HISTORY:  Vale Gutierrez is a 59 y.o. female previously seen by Dr. Rico here for initial evaluation of low back and bilateral, R>L, leg pain (Back - 6, Leg - 6).  The pain in the leg is what bothers her most.  The pain has been present for about 4 months. The patient describes the pain as aching and sharp.  The pain is worse with standing and walking and improved by sitting or laying down. There is associated numbness and tingling. There is subjective weakness.  She says she is having difficulty walking due to pain.  Prior treatments have included voltaren, gabapentin, tramadol, and an WERO about a week ago, but no surgery.  She says the injection may have helped some but she is having a lot of pain still.     The patient denies myelopathic symptoms such as handwriting changes or difficulty with buttons/coins/keys. Denies perineal paresthesias, bowel/bladder dysfunction.     interval history 09/18/2020:   Patient presents today for preop evaluation   And to sign consents. No change in exam.    PAST MEDICAL/SURGICAL HISTORY:  Past Medical History:   Diagnosis Date    Alcohol abuse     Anxiety     Depression     Hyperlipidemia     Hypertension      Past Surgical History:   Procedure Laterality Date    BREAST CYST ASPIRATION      CHOLECYSTECTOMY      complicated with bile leak, sepsis    COLONOSCOPY N/A 6/3/2020    Procedure: COLONOSCOPY [place];  Surgeon: Tino Neil MD;  Location: [facility];  Service: Colon and Rectal;  Laterality: N/A;    ESOPHAGOGASTRODUODENOSCOPY N/A 6/3/2020    Procedure: EGD (ESOPHAGOGASTRODUODENOSCOPY);  Surgeon: Tino Neil MD;  Location: [facility] END[facility];  Service: Colon and Rectal;  Laterality: N/A;    TRANSFORAMINAL EPIDURAL INJECTION OF STEROID Bilateral 8/7/2020    Procedure: INJECTION, STEROID, EPIDURAL,  TRANSFORAMINAL APPROACH, L4-L5 need consent;  Surgeon: Rubén Rico MD;  Location: [facility];  Service: Pain Management;  Laterality: Bilateral;    TRANSFORAMINAL EPIDURAL INJECTION OF STEROID Bilateral 9/4/2020    Procedure: LUMBAR TRANSFORAMINAL BILATERAL L4/5 DIRECT REFERRAL;  Surgeon: Rubén Rico MD;  Location: [facility];  Service: Pain Management;  Laterality: Bilateral;  NEEDS CONSENT       Medications:   Current Outpatient Medications on File Prior to Visit   Medication Sig Dispense Refill    buPROPion (WELLBUTRIN XL) 300 MG 24 hr tablet Take 300 mg by mouth every morning. Take am of surgery      metoprolol tartrate (LOPRESSOR) 50 MG tablet Take 1 tablet (50 mg total) by mouth once daily. (Patient taking differently: Take 50 mg by mouth once daily. Take am of surgery) 90 tablet 3    omeprazole (PRILOSEC) 20 MG capsule Take 1 capsule (20 mg total) by mouth 2 (two) times daily. (Patient taking differently: Take 20 mg by mouth 2 (two) times daily. Take am of surgery) 60 capsule 11    potassium chloride (MICRO-K) 10 MEQ CpSR Take 1 capsule (10 mEq total) by mouth once daily. (Patient taking differently: Take 10 mEq by mouth once daily. Hold am of surgery) 30 capsule 2    pregabalin (LYRICA) 100 MG capsule Take 1 capsule (100 mg total) by mouth 3 (three) times daily. (Patient taking differently: Take 100 mg by mouth 3 (three) times daily. Take am of surgery) 90 capsule 6    traMADoL (ULTRAM) 50 mg tablet Take 1 tablet (50 mg total) by mouth every 6 (six) hours as needed for Pain. (Patient taking differently: Take 50 mg by mouth every 6 (six) hours as needed for Pain. Take am of surgery) 20 tablet 0    venlafaxine (EFFEXOR-XR) 150 MG Cp24 Take 150 mg by mouth once daily. Take am of surgery      venlafaxine (EFFEXOR-XR) 75 MG 24 hr capsule Take 75 mg by mouth once daily. Take am of surgery      vitamin D (VITAMIN D3) 1000 units Tab Take 5,000 Int'l Units by mouth. Hold am of surgery       [DISCONTINUED] diclofenac (VOLTAREN) 75 MG EC tablet Take 1 tablet (75 mg total) by mouth 2 (two) times daily. (Patient taking differently: Take 75 mg by mouth 2 (two) times daily. Hold medication one week prior to surgery) 60 tablet 0    [DISCONTINUED] aspirin (ECOTRIN) 81 MG EC tablet Take 81 mg by mouth once daily.       No current facility-administered medications on file prior to visit.        Social History:   Social History     Socioeconomic History    Marital status: Single     Spouse name: Not on file    Number of children: Not on file    Years of education: Not on file    Highest education level: Not on file   Occupational History    Not on file   Social Needs    Financial resource strain: Not hard at all    Food insecurity     Worry: Never true     Inability: Never true    Transportation needs     Medical: No     Non-medical: No   Tobacco Use    Smoking status: Former Smoker    Smokeless tobacco: Never Used   Substance and Sexual Activity    Alcohol use: Yes     Alcohol/week: 12.0 standard drinks     Types: 12 Shots of liquor per week     Frequency: Never     Binge frequency: Never     Comment: three 12-14 oz cocktail drinks.     Drug use: No    Sexual activity: Never   Lifestyle    Physical activity     Days per week: 0 days     Minutes per session: 0 min    Stress: Only a little   Relationships    Social connections     Talks on phone: Three times a week     Gets together: Never     Attends Nondenominational service: Not on file     Active member of club or organization: No     Attends meetings of clubs or organizations: Never     Relationship status: Never    Other Topics Concern    Not on file   Social History Narrative    Not on file       REVIEW OF SYSTEMS:  Constitution: Negative. Negative for chills, fever and night sweats.   Cardiovascular: Negative for chest pain and syncope.   Respiratory: Negative for cough and shortness of breath.   Gastrointestinal: See HPI. Negative for  "nausea/vomiting. Negative for abdominal pain.  Genitourinary: See HPI. Negative for discoloration or dysuria.  Skin: Negative for dry skin, itching and rash.   Hematologic/Lymphatic: Negative for bleeding problem. Does not bruise/bleed easily.   Musculoskeletal: Negative for falls and muscle weakness.   Neurological: See HPI. No seizures.   Endocrine: Negative for polydipsia, polyphagia and polyuria.   Allergic/Immunologic: Negative for hives and persistent infections.     EXAM:  Ht 5' 4" (1.626 m)   Wt 93.4 kg (205 lb 14.6 oz)   BMI 35.34 kg/m²     General: The patient is a very pleasant 59 y.o. female in no apparent distress, the patient is oriented to person, place and time.  Psych: Normal mood and affect  HEENT: Vision grossly intact, hearing intact to the spoken word.  Lungs: Respirations unlabored.  Gait: Antalgic station and gait, unable to perform toe or heel walk.   Skin: Dorsal lumbar skin negative for rashes, lesions, hairy patches and surgical scars. There is mild lumbar tenderness to palpation.  Range of motion: Lumbar range of motion is acceptable.  Spinal Balance: Global saggital and coronal spinal balance acceptable, not significant for scoliosis and kyphosis.  Musculoskeletal: No pain with the range of motion of the bilateral hips. No trochanteric tenderness to palpation.  Vascular: Bilateral lower extremities warm and well perfused, dorsalis pedis pulses 2+ bilaterally.  Neurological: Diffusely decreased strength and tone in all major motor groups in the bilateral lower extremities. Normal sensation to light touch in the L2-S1 dermatomes bilaterally.  Deep tendon reflexes symmetric 2+ in the bilateral lower extremities.  Negative Babinski bilaterally. Straight leg raise negative bilaterally.    IMAGING:      Today I personally reviewed AP, Lat and Flex/Ex  upright L-spine films that demonstrate grade I anterolisthesis at L4/5.    MRI lumbar spine demonstrates moderate L4/5 stenosis.     Body mass " index is 35.34 kg/m².    Hemoglobin A1C   Date Value Ref Range Status   08/29/2019 5.0 4.0 - 5.6 % Final     Comment:     ADA Screening Guidelines:  5.7-6.4%  Consistent with prediabetes  >or=6.5%  Consistent with diabetes  High levels of fetal hemoglobin interfere with the HbA1C  assay. Heterozygous hemoglobin variants (HbS, HgC, etc)do  not significantly interfere with this assay.   However, presence of multiple variants may affect accuracy.             ASSESSMENT/PLAN:    Diagnoses and all orders for this visit:    Spondylolisthesis, acquired    Other orders  -     diclofenac (VOLTAREN) 75 MG EC tablet; Take 1 tablet (75 mg total) by mouth 2 (two) times daily.      Today we discussed surgical and nonsurgical treatment options.  She wishes to pursue surgery at this time.  She was consented in booked for a L4-5 TLIF scheduled for 10/1/20    I had a sit down discussion with the patient regarding the planned procedure. We specifically discussed the risks, benefits, and alternatives to surgery. We discussed the surgical procedure including the skin incision, nerve decompression, bone fusion, allograft, iliac crest bone graft, and surgical implants including pedicle screws and interbody devices as indicated: they understand the risks include but are not limited to death, paralysis, blindness, bleeding, infection, damage to arteries, veins and nerves, spinal fluid leak, continued or worsening pain, no improvement in symptoms, non-union, and the possible need for more surgery in the future, the possible need for perioperative blood transfusion, as well as the possibility other unforseen and unknown complications. We talked about expected hospital stay and recovery period. All questions were answered; they understand and wish to proceed.

## 2020-09-18 NOTE — DISCHARGE INSTRUCTIONS
Your surgery has been scheduled for:__________________________________________    You should report to:  ____[facility], located on the [place] side of the first floor of the           [facility] ([phone])  ____[facility], located on the [facility] side of the            Second floor of the [facility] ([phone])  ____3rd Floor [facility] located on the [facility] side of the [facility] [phone]  Please Note   - Tell your doctor if you take Aspirin, products containing Aspirin, herbal medications  or blood thinners, such as Coumadin, Ticlid, or Plavix.  (Consult your provider regarding holding or stopping before surgery).  - Arrange for someone to drive you home following surgery.  You will not be allowed to leave the surgical facility alone or drive yourself home following sedation and anesthesia.  Before Surgery  - Stop taking all vitamins/ herbal medications 14days prior to surgery  - No Motrin/Advil (Ibuprofen) 1 day before surgery  - No Aleve (Naproxen) 7 days before surgery  - Stop Taking Asprin, products containing Asprin _____days before surgery  - Stop taking blood thinners_______days before surgery  - No Goody's/BC  Powder 7 days before surgery  - Refrain from drinking alcoholic beverages for 24hours before and after surgery  - Stop or limit smoking _________days before surgery  - You may take Tylenol for pain  Night before Surgery   Stop ALL solid food, gum, candy (including vitamins) 8 hours before arrival time.  (Please note: If your surgeon gives you different eating and drinking instructions, please follow surgeon's directions.)   Stop all CLOUDY liquids: coffee with creamer, formula, tube feeds, cloudy juices, non-human milk and breast milk with additives, 6 hours prior to arrival time.   Stop plain breast milk 4 hours prior to arrival time.   The patient should be ENCOURAGED to drink  carbohydrate-rich clear liquids (sports drinks, clear juices) until 2 hours prior to arrival time.   CLEAR liquids include only water, black coffee NO creamer, clear oral rehydration drinks, clear sports drinks or clear fruit juices (no orange juice, no pulpy juices, no apple cider). Advise patients if they can read newsprint through the liquid, it qualifies as clear liquid.    IF IN DOUBT, drink water instead.   - Take a shower or bath (shower is recommended).  Bathe with Hibiclens soap or an antibacterial soap from the neck down.  If not supplied by your surgeon, hibiclens soap will need to be purchased over the counter in pharmacy.  Rinse soap off thoroughly.  - Shampoo your hair with your regular shampoo  The Day of Surgery  · NOTHING TO  DRINK 2 hours before arrival time. If you are told to take medication on the morning of surgery, it may be taken with a sip of water.   - Take another bath or shower with hibiclens or any antibacterial soap, to reduce the chance of infection.  - Take heart and blood pressure medications with a small sip of water, as advised by the perioperative team.  - Do not take fluid pills  - You may brush your teeth and rinse your mouth, but do not swall any additional water.   - Do not apply perfumes, powder, body lotions or deodorant on the day of surgery.  - Nail polish should be removed.  - Do not wear makeup or moisturizer  - Wear comfortable clothes, such as a button front shirt and loose fitting pants.  - Leave all jewelry, including body piercings, and valuables at home.    - Bring any devices you will neeed after surgery such as crutches or canes.  - If you have sleep apnea, please bring your CPAP machine  In the event that your physical condition changes including the onset of a cold or respiratory illness, or if you have to delay or cancel your surgery, please notify your surgeon.    Anesthesia: General Anesthesia     You are watched continuously during your procedure by your  anesthesia provider.     Youre due to have surgery. During surgery, youll be given medicine called anesthesia or anesthetic. This will keep you comfortable and pain-free. Your anesthesia provider will use general anesthesia.  What is general anesthesia?  General anesthesia puts you into a state like deep sleep. It goes into the bloodstream (IV anesthetics), into the lungs (gas anesthetics), or both. You feel nothing during the procedure. You will not remember it. During the procedure, the anesthesia provider monitors you continuously. He or she checks your heart rate and rhythm, blood pressure, breathing, and blood oxygen.  · IV anesthetics. IV anesthetics are given through an IV line in your arm. Theyre often given first. This is so you are asleep before a gas anesthetic is started. Some kinds of IV anesthetics relieve pain. Others relax you. Your doctor will decide which kind is best in your case.  · Gas anesthetics. Gas anesthetics are breathed into the lungs. They are often used to keep you asleep. They can be given through a facemask or a tube placed in your larynx or trachea (breathing tube).  ? If you have a facemask, your anesthesia provider will most likely place it over your nose and mouth while youre still awake. Youll breathe oxygen through the mask as your IV anesthetic is started. Gas anesthetic may be added through the mask.  ? If you have a tube in the larynx or trachea, it will be inserted into your throat after youre asleep.  Anesthesia tools and medicines  You will likely have:  · IV anesthetics. These are put into an IV line into your bloodstream.  · Gas anesthetics. You breathe these anesthetics into your lungs, where they pass into your bloodstream.  · Pulse oximeter. This is a small clip that is attached to the end of your finger. This measures your blood oxygen level.  · Electrocardiography leads (electrodes). These are small sticky pads that are placed on your chest. They record your  heart rate and rhythm.  · Blood pressure cuff. This reads your blood pressure.  Risks and possible complications  General anesthesia has some risks. These include:  · Breathing problems  · Nausea and vomiting  · Sore throat or hoarseness (usually temporary)  · Allergic reaction to the anesthetic  · Irregular heartbeat (rare)  · Cardiac arrest (rare)   Anesthesia safety  · Follow all instructions you are given for how long not to eat or drink before your procedure.  · Be sure your doctor knows what medicines and drugs you take. This includes over-the-counter medicines, herbs, supplements, alcohol or other drugs. You will be asked when those were last taken.  · Have an adult family member or friend drive you home after the procedure.  · For the first 24 hours after your surgery:  ? Do not drive or use heavy equipment.  ? Do not make important decisions or sign legal documents. If important decisions or signing legal documents is necessary during the first 24 hours after surgery, have a trusted family member or spouse act on your behalf.  ? Avoid alcohol.  ? Have a responsible adult stay with you. He or she can watch for problems and help keep you safe.  Date Last Reviewed: 12/1/2016  © 9904-3798 [employer]. [address] [place] [address], [place] [address]. All rights reserved. This information is not intended as a substitute for professional medical care. Always follow your healthcare professional's instructions

## 2020-09-22 ENCOUNTER — TELEPHONE (OUTPATIENT)
Dept: INTERNAL MEDICINE | Facility: CLINIC | Age: 60
End: 2020-09-22

## 2020-09-22 DIAGNOSIS — Z01.818 PRE-OP EVALUATION: Primary | ICD-10-CM

## 2020-09-22 NOTE — TELEPHONE ENCOUNTER
"----- Message from Kristy Garcia RN sent at 9/22/2020 11:33 AM CDT -----  ,          This patient is scheduled for surgery (Lumbar spine fusion) on 10/1/20. She was seen in the pre-op center on 9/18/20. She completed her labs but not U/A. Patient called and said she "cannot urinate on demand." She would like an order to be put in for her to bring urine from home. Please advise.                                                                 Thanks so much,                                                                       Kristy Garcia RN BSN                                                                     [facility]     "

## 2020-09-22 NOTE — TELEPHONE ENCOUNTER
"----- Message from Kristy Garcia RN sent at 9/22/2020 11:33 AM CDT -----  ,          This patient is scheduled for surgery (Lumbar spine fusion) on 10/1/20. She was seen in the pre-op center on 9/18/20. She completed her labs but not U/A. Patient called and said she "cannot urinate on demand." She would like an order to be put in for her to bring urine from home. Please advise.                                                                 Thanks so much,                                                                       Kristy Garcia RN BSN                                                                     [facility]     "

## 2020-09-28 ENCOUNTER — LAB VISIT (OUTPATIENT)
Dept: SURGERY | Facility: CLINIC | Age: 60
DRG: 455 | End: 2020-09-28
Payer: MEDICARE

## 2020-09-28 ENCOUNTER — TELEPHONE (OUTPATIENT)
Dept: ORTHOPEDICS | Facility: CLINIC | Age: 60
End: 2020-09-28

## 2020-09-28 DIAGNOSIS — Z01.818 PRE-OP TESTING: ICD-10-CM

## 2020-09-28 PROCEDURE — U0003 INFECTIOUS AGENT DETECTION BY NUCLEIC ACID (DNA OR RNA); SEVERE ACUTE RESPIRATORY SYNDROME CORONAVIRUS 2 (SARS-COV-2) (CORONAVIRUS DISEASE [COVID-19]), AMPLIFIED PROBE TECHNIQUE, MAKING USE OF HIGH THROUGHPUT TECHNOLOGIES AS DESCRIBED BY CMS-2020-01-R: HCPCS

## 2020-09-29 LAB — SARS-COV-2 RNA RESP QL NAA+PROBE: NOT DETECTED

## 2020-09-30 NOTE — H&P
PATIENT: Vale Gutierrez     Supervising Physician: Patrick Rivas M.D.     CHIEF COMPLAINT: low back and bilateral leg pain     HISTORY:  Vale Gutierrez is a 59 y.o. female previously seen by Dr. Rico here for initial evaluation of low back and bilateral, R>L, leg pain (Back - 6, Leg - 6).  The pain in the leg is what bothers her most.  The pain has been present for about 4 months. The patient describes the pain as aching and sharp.  The pain is worse with standing and walking and improved by sitting or laying down. There is associated numbness and tingling. There is subjective weakness.  She says she is having difficulty walking due to pain.  Prior treatments have included voltaren, gabapentin, tramadol, and an WERO about a week ago, but no surgery.  She says the injection may have helped some but she is having a lot of pain still.      The patient denies myelopathic symptoms such as handwriting changes or difficulty with buttons/coins/keys. Denies perineal paresthesias, bowel/bladder dysfunction.      interval history 09/18/2020:   Patient presents today for preop evaluation   And to sign consents. No change in exam.     PAST MEDICAL/SURGICAL HISTORY:       Past Medical History:   Diagnosis Date    Alcohol abuse      Anxiety      Depression      Hyperlipidemia      Hypertension              Past Surgical History:   Procedure Laterality Date    BREAST CYST ASPIRATION        CHOLECYSTECTOMY         complicated with bile leak, sepsis    COLONOSCOPY N/A 6/3/2020     Procedure: COLONOSCOPY [place];  Surgeon: Tino Neil MD;  Location: [facility];  Service: Colon and Rectal;  Laterality: N/A;    ESOPHAGOGASTRODUODENOSCOPY N/A 6/3/2020     Procedure: EGD (ESOPHAGOGASTRODUODENOSCOPY);  Surgeon: Tino Neil MD;  Location: [facility];  Service: Colon and Rectal;  Laterality: N/A;    TRANSFORAMINAL EPIDURAL INJECTION OF STEROID Bilateral 8/7/2020     Procedure: INJECTION, STEROID,  EPIDURAL, TRANSFORAMINAL APPROACH, L4-L5 need consent;  Surgeon: Rubén Rico MD;  Location: [facility];  Service: Pain Management;  Laterality: Bilateral;    TRANSFORAMINAL EPIDURAL INJECTION OF STEROID Bilateral 9/4/2020     Procedure: LUMBAR TRANSFORAMINAL BILATERAL L4/5 DIRECT REFERRAL;  Surgeon: Rubén Rico MD;  Location: [facility];  Service: Pain Management;  Laterality: Bilateral;  NEEDS CONSENT         Medications:          Current Outpatient Medications on File Prior to Visit   Medication Sig Dispense Refill    buPROPion (WELLBUTRIN XL) 300 MG 24 hr tablet Take 300 mg by mouth every morning. Take am of surgery        metoprolol tartrate (LOPRESSOR) 50 MG tablet Take 1 tablet (50 mg total) by mouth once daily. (Patient taking differently: Take 50 mg by mouth once daily. Take am of surgery) 90 tablet 3    omeprazole (PRILOSEC) 20 MG capsule Take 1 capsule (20 mg total) by mouth 2 (two) times daily. (Patient taking differently: Take 20 mg by mouth 2 (two) times daily. Take am of surgery) 60 capsule 11    potassium chloride (MICRO-K) 10 MEQ CpSR Take 1 capsule (10 mEq total) by mouth once daily. (Patient taking differently: Take 10 mEq by mouth once daily. Hold am of surgery) 30 capsule 2    pregabalin (LYRICA) 100 MG capsule Take 1 capsule (100 mg total) by mouth 3 (three) times daily. (Patient taking differently: Take 100 mg by mouth 3 (three) times daily. Take am of surgery) 90 capsule 6    traMADoL (ULTRAM) 50 mg tablet Take 1 tablet (50 mg total) by mouth every 6 (six) hours as needed for Pain. (Patient taking differently: Take 50 mg by mouth every 6 (six) hours as needed for Pain. Take am of surgery) 20 tablet 0    venlafaxine (EFFEXOR-XR) 150 MG Cp24 Take 150 mg by mouth once daily. Take am of surgery        venlafaxine (EFFEXOR-XR) 75 MG 24 hr capsule Take 75 mg by mouth once daily. Take am of surgery        vitamin D (VITAMIN D3) 1000 units Tab Take 5,000 Int'l Units by mouth.  Hold am of surgery        [DISCONTINUED] diclofenac (VOLTAREN) 75 MG EC tablet Take 1 tablet (75 mg total) by mouth 2 (two) times daily. (Patient taking differently: Take 75 mg by mouth 2 (two) times daily. Hold medication one week prior to surgery) 60 tablet 0    [DISCONTINUED] aspirin (ECOTRIN) 81 MG EC tablet Take 81 mg by mouth once daily.          No current facility-administered medications on file prior to visit.          Social History:   Social History               Socioeconomic History    Marital status: Single       Spouse name: Not on file    Number of children: Not on file    Years of education: Not on file    Highest education level: Not on file   Occupational History    Not on file   Social Needs    Financial resource strain: Not hard at all    Food insecurity       Worry: Never true       Inability: Never true    Transportation needs       Medical: No       Non-medical: No   Tobacco Use    Smoking status: Former Smoker    Smokeless tobacco: Never Used   Substance and Sexual Activity    Alcohol use: Yes       Alcohol/week: 12.0 standard drinks       Types: 12 Shots of liquor per week       Frequency: Never       Binge frequency: Never       Comment: three 12-14 oz cocktail drinks.     Drug use: No    Sexual activity: Never   Lifestyle    Physical activity       Days per week: 0 days       Minutes per session: 0 min    Stress: Only a little   Relationships    Social connections       Talks on phone: Three times a week       Gets together: Never       Attends Synagogue service: Not on file       Active member of club or organization: No       Attends meetings of clubs or organizations: Never       Relationship status: Never    Other Topics Concern    Not on file   Social History Narrative    Not on file           REVIEW OF SYSTEMS:  Constitution: Negative. Negative for chills, fever and night sweats.   Cardiovascular: Negative for chest pain and syncope.   Respiratory:  "Negative for cough and shortness of breath.   Gastrointestinal: See HPI. Negative for nausea/vomiting. Negative for abdominal pain.  Genitourinary: See HPI. Negative for discoloration or dysuria.  Skin: Negative for dry skin, itching and rash.   Hematologic/Lymphatic: Negative for bleeding problem. Does not bruise/bleed easily.   Musculoskeletal: Negative for falls and muscle weakness.   Neurological: See HPI. No seizures.   Endocrine: Negative for polydipsia, polyphagia and polyuria.   Allergic/Immunologic: Negative for hives and persistent infections.     EXAM:  Ht 5' 4" (1.626 m)   Wt 93.4 kg (205 lb 14.6 oz)   BMI 35.34 kg/m²      General: The patient is a very pleasant 59 y.o. female in no apparent distress, the patient is oriented to person, place and time.  Psych: Normal mood and affect  HEENT: Vision grossly intact, hearing intact to the spoken word.  Lungs: Respirations unlabored.  Gait: Antalgic station and gait, unable to perform toe or heel walk.   Skin: Dorsal lumbar skin negative for rashes, lesions, hairy patches and surgical scars. There is mild lumbar tenderness to palpation.  Range of motion: Lumbar range of motion is acceptable.  Spinal Balance: Global saggital and coronal spinal balance acceptable, not significant for scoliosis and kyphosis.  Musculoskeletal: No pain with the range of motion of the bilateral hips. No trochanteric tenderness to palpation.  Vascular: Bilateral lower extremities warm and well perfused, dorsalis pedis pulses 2+ bilaterally.  Neurological: Diffusely decreased strength and tone in all major motor groups in the bilateral lower extremities. Normal sensation to light touch in the L2-S1 dermatomes bilaterally.  Deep tendon reflexes symmetric 2+ in the bilateral lower extremities.  Negative Babinski bilaterally. Straight leg raise negative bilaterally.     IMAGING:      Today I personally reviewed AP, Lat and Flex/Ex  upright L-spine films that demonstrate grade I " anterolisthesis at L4/5.     MRI lumbar spine demonstrates moderate L4/5 stenosis.     Body mass index is 35.34 kg/m².    Hemoglobin A1C   Date Value Ref Range Status   08/29/2019 5.0 4.0 - 5.6 % Final       Comment:       ADA Screening Guidelines:  5.7-6.4%  Consistent with prediabetes  >or=6.5%  Consistent with diabetes  High levels of fetal hemoglobin interfere with the HbA1C  assay. Heterozygous hemoglobin variants (HbS, HgC, etc)do  not significantly interfere with this assay.   However, presence of multiple variants may affect accuracy.                  ASSESSMENT/PLAN:     Diagnoses and all orders for this visit:     Spondylolisthesis, acquired     Other orders  -     diclofenac (VOLTAREN) 75 MG EC tablet; Take 1 tablet (75 mg total) by mouth 2 (two) times daily.        Today we discussed surgical and nonsurgical treatment options.  She wishes to pursue surgery at this time.  She was consented in booked for a L4-5 TLIF scheduled for 10/1/20

## 2020-10-01 ENCOUNTER — ANESTHESIA (OUTPATIENT)
Dept: SURGERY | Facility: HOSPITAL | Age: 60
DRG: 455 | End: 2020-10-01
Payer: MEDICARE

## 2020-10-01 ENCOUNTER — HOSPITAL ENCOUNTER (INPATIENT)
Facility: HOSPITAL | Age: 60
LOS: 3 days | Discharge: HOME | DRG: 455 | End: 2020-10-04
Attending: ORTHOPAEDIC SURGERY | Admitting: ORTHOPAEDIC SURGERY
Payer: MEDICARE

## 2020-10-01 DIAGNOSIS — M48.062 SPINAL STENOSIS OF LUMBAR REGION WITH NEUROGENIC CLAUDICATION: Primary | ICD-10-CM

## 2020-10-01 DIAGNOSIS — Z98.1 S/P LUMBAR FUSION: ICD-10-CM

## 2020-10-01 DIAGNOSIS — Z01.818 PREOPERATIVE TESTING: ICD-10-CM

## 2020-10-01 LAB
ABO + RH BLD: NORMAL
ALBUMIN SERPL BCP-MCNC: 3.3 G/DL (ref 3.5–5.2)
ALP SERPL-CCNC: 125 U/L (ref 55–135)
ALT SERPL W/O P-5'-P-CCNC: 48 U/L (ref 10–44)
ANION GAP SERPL CALC-SCNC: 9 MMOL/L (ref 8–16)
AST SERPL-CCNC: 90 U/L (ref 10–40)
BILIRUB SERPL-MCNC: 0.5 MG/DL (ref 0.1–1)
BLD GP AB SCN CELLS X3 SERPL QL: NORMAL
BUN SERPL-MCNC: 16 MG/DL (ref 8–23)
CALCIUM SERPL-MCNC: 8.4 MG/DL (ref 8.7–10.5)
CHLORIDE SERPL-SCNC: 106 MMOL/L (ref 95–110)
CO2 SERPL-SCNC: 24 MMOL/L (ref 23–29)
CREAT SERPL-MCNC: 1 MG/DL (ref 0.5–1.4)
EST. GFR  (AFRICAN AMERICAN): >60 ML/MIN/1.73 M^2
EST. GFR  (NON AFRICAN AMERICAN): >60 ML/MIN/1.73 M^2
GLUCOSE SERPL-MCNC: 113 MG/DL (ref 70–110)
POTASSIUM SERPL-SCNC: 4.7 MMOL/L (ref 3.5–5.1)
POTASSIUM SERPL-SCNC: 5.5 MMOL/L (ref 3.5–5.1)
PROT SERPL-MCNC: 6.5 G/DL (ref 6–8.4)
SODIUM SERPL-SCNC: 139 MMOL/L (ref 136–145)

## 2020-10-01 PROCEDURE — 22853 INSJ BIOMECHANICAL DEVICE: CPT | Mod: ,,, | Performed by: ORTHOPAEDIC SURGERY

## 2020-10-01 PROCEDURE — 63600175 PHARM REV CODE 636 W HCPCS: Performed by: ORTHOPAEDIC SURGERY

## 2020-10-01 PROCEDURE — 25000003 PHARM REV CODE 250: Performed by: NURSE ANESTHETIST, CERTIFIED REGISTERED

## 2020-10-01 PROCEDURE — 20936 PR AUTOGRAFT SPINE SURGERY LOCAL FROM SAME INCISION: ICD-10-PCS | Mod: ,,, | Performed by: ORTHOPAEDIC SURGERY

## 2020-10-01 PROCEDURE — 84132 ASSAY OF SERUM POTASSIUM: CPT

## 2020-10-01 PROCEDURE — 86901 BLOOD TYPING SEROLOGIC RH(D): CPT

## 2020-10-01 PROCEDURE — 22633 PR ARTHRODESIS, COMBINED TECHN, SNGL INTERSPACE, LUMBAR: ICD-10-PCS | Mod: AS,,, | Performed by: PHYSICIAN ASSISTANT

## 2020-10-01 PROCEDURE — C1713 ANCHOR/SCREW BN/BN,TIS/BN: HCPCS | Performed by: ORTHOPAEDIC SURGERY

## 2020-10-01 PROCEDURE — D9220A PRA ANESTHESIA: Mod: CRNA,,, | Performed by: NURSE ANESTHETIST, CERTIFIED REGISTERED

## 2020-10-01 PROCEDURE — 94761 N-INVAS EAR/PLS OXIMETRY MLT: CPT

## 2020-10-01 PROCEDURE — 22853 INSJ BIOMECHANICAL DEVICE: CPT | Mod: AS,,, | Performed by: PHYSICIAN ASSISTANT

## 2020-10-01 PROCEDURE — 36000710: Performed by: ORTHOPAEDIC SURGERY

## 2020-10-01 PROCEDURE — 63600175 PHARM REV CODE 636 W HCPCS: Performed by: NURSE ANESTHETIST, CERTIFIED REGISTERED

## 2020-10-01 PROCEDURE — 63600175 PHARM REV CODE 636 W HCPCS: Performed by: STUDENT IN AN ORGANIZED HEALTH CARE EDUCATION/TRAINING PROGRAM

## 2020-10-01 PROCEDURE — D9220A PRA ANESTHESIA: Mod: ANES,,, | Performed by: STUDENT IN AN ORGANIZED HEALTH CARE EDUCATION/TRAINING PROGRAM

## 2020-10-01 PROCEDURE — 22853 PR INSERT BIOMECH DEV W/INTERBODY ARTHRODESIS, EA CONTIGUOUS DEFECT: ICD-10-PCS | Mod: ,,, | Performed by: ORTHOPAEDIC SURGERY

## 2020-10-01 PROCEDURE — 25000003 PHARM REV CODE 250: Performed by: PHYSICIAN ASSISTANT

## 2020-10-01 PROCEDURE — 27800903 OPTIME MED/SURG SUP & DEVICES OTHER IMPLANTS: Performed by: ORTHOPAEDIC SURGERY

## 2020-10-01 PROCEDURE — 71000033 HC RECOVERY, INTIAL HOUR: Performed by: ORTHOPAEDIC SURGERY

## 2020-10-01 PROCEDURE — C1729 CATH, DRAINAGE: HCPCS | Performed by: ORTHOPAEDIC SURGERY

## 2020-10-01 PROCEDURE — 25000003 PHARM REV CODE 250: Performed by: STUDENT IN AN ORGANIZED HEALTH CARE EDUCATION/TRAINING PROGRAM

## 2020-10-01 PROCEDURE — 25000003 PHARM REV CODE 250: Performed by: ORTHOPAEDIC SURGERY

## 2020-10-01 PROCEDURE — 22840 PR POSTERIOR NON-SEGMENTAL INSTRUMENTATION: ICD-10-PCS | Mod: AS,,, | Performed by: PHYSICIAN ASSISTANT

## 2020-10-01 PROCEDURE — 63600175 PHARM REV CODE 636 W HCPCS

## 2020-10-01 PROCEDURE — 12000002 HC ACUTE/MED SURGE SEMI-PRIVATE ROOM

## 2020-10-01 PROCEDURE — 25000242 PHARM REV CODE 250 ALT 637 W/ HCPCS: Performed by: NURSE ANESTHETIST, CERTIFIED REGISTERED

## 2020-10-01 PROCEDURE — 22840 INSERT SPINE FIXATION DEVICE: CPT | Mod: ,,, | Performed by: ORTHOPAEDIC SURGERY

## 2020-10-01 PROCEDURE — 36415 COLL VENOUS BLD VENIPUNCTURE: CPT

## 2020-10-01 PROCEDURE — 22840 INSERT SPINE FIXATION DEVICE: CPT | Mod: AS,,, | Performed by: PHYSICIAN ASSISTANT

## 2020-10-01 PROCEDURE — 80053 COMPREHEN METABOLIC PANEL: CPT

## 2020-10-01 PROCEDURE — 37000009 HC ANESTHESIA EA ADD 15 MINS: Performed by: ORTHOPAEDIC SURGERY

## 2020-10-01 PROCEDURE — 22633 ARTHRD CMBN 1NTRSPC LUMBAR: CPT | Mod: ,,, | Performed by: ORTHOPAEDIC SURGERY

## 2020-10-01 PROCEDURE — 22840 PR POSTERIOR NON-SEGMENTAL INSTRUMENTATION: ICD-10-PCS | Mod: ,,, | Performed by: ORTHOPAEDIC SURGERY

## 2020-10-01 PROCEDURE — 36000711: Performed by: ORTHOPAEDIC SURGERY

## 2020-10-01 PROCEDURE — D9220A PRA ANESTHESIA: ICD-10-PCS | Mod: CRNA,,, | Performed by: NURSE ANESTHETIST, CERTIFIED REGISTERED

## 2020-10-01 PROCEDURE — 22633 PR ARTHRODESIS, COMBINED TECHN, SNGL INTERSPACE, LUMBAR: ICD-10-PCS | Mod: ,,, | Performed by: ORTHOPAEDIC SURGERY

## 2020-10-01 PROCEDURE — 71000016 HC POSTOP RECOV ADDL HR: Performed by: ORTHOPAEDIC SURGERY

## 2020-10-01 PROCEDURE — 71000015 HC POSTOP RECOV 1ST HR: Performed by: ORTHOPAEDIC SURGERY

## 2020-10-01 PROCEDURE — 22633 ARTHRD CMBN 1NTRSPC LUMBAR: CPT | Mod: AS,,, | Performed by: PHYSICIAN ASSISTANT

## 2020-10-01 PROCEDURE — 22853 PR INSERT BIOMECH DEV W/INTERBODY ARTHRODESIS, EA CONTIGUOUS DEFECT: ICD-10-PCS | Mod: AS,,, | Performed by: PHYSICIAN ASSISTANT

## 2020-10-01 PROCEDURE — 20936 SP BONE AGRFT LOCAL ADD-ON: CPT | Mod: ,,, | Performed by: ORTHOPAEDIC SURGERY

## 2020-10-01 PROCEDURE — 27201423 OPTIME MED/SURG SUP & DEVICES STERILE SUPPLY: Performed by: ORTHOPAEDIC SURGERY

## 2020-10-01 PROCEDURE — D9220A PRA ANESTHESIA: ICD-10-PCS | Mod: ANES,,, | Performed by: STUDENT IN AN ORGANIZED HEALTH CARE EDUCATION/TRAINING PROGRAM

## 2020-10-01 PROCEDURE — 37000008 HC ANESTHESIA 1ST 15 MINUTES: Performed by: ORTHOPAEDIC SURGERY

## 2020-10-01 DEVICE — MATRIX TEMPEST ALLOGRAFT 5CC: Type: IMPLANTABLE DEVICE | Site: SPINE LUMBAR | Status: FUNCTIONAL

## 2020-10-01 DEVICE — IMPLANTABLE DEVICE: Type: IMPLANTABLE DEVICE | Site: SPINE LUMBAR | Status: FUNCTIONAL

## 2020-10-01 DEVICE — CAGE LEVA 11MM 25X10X10: Type: IMPLANTABLE DEVICE | Site: SPINE LUMBAR | Status: FUNCTIONAL

## 2020-10-01 RX ORDER — SODIUM CHLORIDE 0.9 % (FLUSH) 0.9 %
10 SYRINGE (ML) INJECTION
Status: DISCONTINUED | OUTPATIENT
Start: 2020-10-01 | End: 2020-10-04 | Stop reason: HOSPADM

## 2020-10-01 RX ORDER — ONDANSETRON 2 MG/ML
INJECTION INTRAMUSCULAR; INTRAVENOUS
Status: DISCONTINUED | OUTPATIENT
Start: 2020-10-01 | End: 2020-10-01

## 2020-10-01 RX ORDER — ALBUTEROL SULFATE 90 UG/1
AEROSOL, METERED RESPIRATORY (INHALATION)
Status: DISCONTINUED | OUTPATIENT
Start: 2020-10-01 | End: 2020-10-01

## 2020-10-01 RX ORDER — VENLAFAXINE HYDROCHLORIDE 75 MG/1
75 CAPSULE, EXTENDED RELEASE ORAL DAILY
Status: DISCONTINUED | OUTPATIENT
Start: 2020-10-01 | End: 2020-10-04 | Stop reason: HOSPADM

## 2020-10-01 RX ORDER — PANTOPRAZOLE SODIUM 40 MG/1
40 TABLET, DELAYED RELEASE ORAL DAILY
Status: DISCONTINUED | OUTPATIENT
Start: 2020-10-01 | End: 2020-10-04 | Stop reason: HOSPADM

## 2020-10-01 RX ORDER — ROPIVACAINE/EPI/CLONIDINE/KET 2.46-0.005
SYRINGE (ML) INJECTION ONCE
Status: COMPLETED | OUTPATIENT
Start: 2020-10-01 | End: 2020-10-01

## 2020-10-01 RX ORDER — ONDANSETRON 4 MG/1
4 TABLET, FILM COATED ORAL ONCE
Status: COMPLETED | OUTPATIENT
Start: 2020-10-01 | End: 2020-10-01

## 2020-10-01 RX ORDER — ACETAMINOPHEN 10 MG/ML
INJECTION, SOLUTION INTRAVENOUS
Status: DISCONTINUED | OUTPATIENT
Start: 2020-10-01 | End: 2020-10-01

## 2020-10-01 RX ORDER — DOCUSATE SODIUM 100 MG/1
100 CAPSULE, LIQUID FILLED ORAL 2 TIMES DAILY
Qty: 60 CAPSULE | Refills: 0 | Status: SHIPPED | OUTPATIENT
Start: 2020-10-01 | End: 2020-11-02

## 2020-10-01 RX ORDER — HEPARIN SODIUM 5000 [USP'U]/ML
5000 INJECTION, SOLUTION INTRAVENOUS; SUBCUTANEOUS EVERY 8 HOURS
Status: DISCONTINUED | OUTPATIENT
Start: 2020-10-02 | End: 2020-10-04 | Stop reason: HOSPADM

## 2020-10-01 RX ORDER — MUPIROCIN 20 MG/G
OINTMENT TOPICAL 2 TIMES DAILY
Status: DISCONTINUED | OUTPATIENT
Start: 2020-10-01 | End: 2020-10-04 | Stop reason: HOSPADM

## 2020-10-01 RX ORDER — CEFAZOLIN SODIUM 1 G/3ML
INJECTION, POWDER, FOR SOLUTION INTRAMUSCULAR; INTRAVENOUS
Status: DISCONTINUED | OUTPATIENT
Start: 2020-10-01 | End: 2020-10-01

## 2020-10-01 RX ORDER — POLYETHYLENE GLYCOL 3350 17 G/17G
17 POWDER, FOR SOLUTION ORAL DAILY
Status: DISCONTINUED | OUTPATIENT
Start: 2020-10-01 | End: 2020-10-04 | Stop reason: HOSPADM

## 2020-10-01 RX ORDER — METOPROLOL TARTRATE 50 MG/1
50 TABLET ORAL DAILY
Status: DISCONTINUED | OUTPATIENT
Start: 2020-10-01 | End: 2020-10-04 | Stop reason: HOSPADM

## 2020-10-01 RX ORDER — VENLAFAXINE HYDROCHLORIDE 75 MG/1
150 CAPSULE, EXTENDED RELEASE ORAL DAILY
Status: DISCONTINUED | OUTPATIENT
Start: 2020-10-01 | End: 2020-10-04 | Stop reason: HOSPADM

## 2020-10-01 RX ORDER — PHENYLEPHRINE HYDROCHLORIDE 10 MG/ML
INJECTION INTRAVENOUS
Status: DISCONTINUED | OUTPATIENT
Start: 2020-10-01 | End: 2020-10-01

## 2020-10-01 RX ORDER — SODIUM CHLORIDE 9 MG/ML
INJECTION, SOLUTION INTRAVENOUS CONTINUOUS
Status: DISCONTINUED | OUTPATIENT
Start: 2020-10-01 | End: 2020-10-01

## 2020-10-01 RX ORDER — TIZANIDINE 4 MG/1
4 TABLET ORAL EVERY 6 HOURS PRN
Qty: 40 TABLET | Refills: 0 | Status: SHIPPED | OUTPATIENT
Start: 2020-10-01 | End: 2020-10-12 | Stop reason: SDUPTHER

## 2020-10-01 RX ORDER — VANCOMYCIN HYDROCHLORIDE 1 G/20ML
INJECTION, POWDER, LYOPHILIZED, FOR SOLUTION INTRAVENOUS
Status: DISCONTINUED | OUTPATIENT
Start: 2020-10-01 | End: 2020-10-01 | Stop reason: HOSPADM

## 2020-10-01 RX ORDER — CELECOXIB 100 MG/1
100 CAPSULE ORAL 2 TIMES DAILY
Status: DISCONTINUED | OUTPATIENT
Start: 2020-10-01 | End: 2020-10-04 | Stop reason: HOSPADM

## 2020-10-01 RX ORDER — DEXAMETHASONE SODIUM PHOSPHATE 4 MG/ML
INJECTION, SOLUTION INTRA-ARTICULAR; INTRALESIONAL; INTRAMUSCULAR; INTRAVENOUS; SOFT TISSUE
Status: DISCONTINUED | OUTPATIENT
Start: 2020-10-01 | End: 2020-10-01

## 2020-10-01 RX ORDER — FAMOTIDINE 20 MG/1
20 TABLET, FILM COATED ORAL 2 TIMES DAILY
Status: DISCONTINUED | OUTPATIENT
Start: 2020-10-01 | End: 2020-10-04 | Stop reason: HOSPADM

## 2020-10-01 RX ORDER — LIDOCAINE HYDROCHLORIDE AND EPINEPHRINE 10; 10 MG/ML; UG/ML
INJECTION, SOLUTION INFILTRATION; PERINEURAL
Status: DISCONTINUED | OUTPATIENT
Start: 2020-10-01 | End: 2020-10-01 | Stop reason: HOSPADM

## 2020-10-01 RX ORDER — DOCUSATE SODIUM 100 MG/1
100 CAPSULE, LIQUID FILLED ORAL 2 TIMES DAILY
Status: DISCONTINUED | OUTPATIENT
Start: 2020-10-01 | End: 2020-10-04 | Stop reason: HOSPADM

## 2020-10-01 RX ORDER — POTASSIUM CHLORIDE 750 MG/1
10 CAPSULE, EXTENDED RELEASE ORAL DAILY
Status: DISCONTINUED | OUTPATIENT
Start: 2020-10-01 | End: 2020-10-04 | Stop reason: HOSPADM

## 2020-10-01 RX ORDER — OXYCODONE HYDROCHLORIDE 5 MG/1
5 TABLET ORAL EVERY 4 HOURS PRN
Status: DISCONTINUED | OUTPATIENT
Start: 2020-10-01 | End: 2020-10-04 | Stop reason: HOSPADM

## 2020-10-01 RX ORDER — NEOSTIGMINE METHYLSULFATE 0.5 MG/ML
INJECTION, SOLUTION INTRAVENOUS
Status: DISCONTINUED | OUTPATIENT
Start: 2020-10-01 | End: 2020-10-01

## 2020-10-01 RX ORDER — ACETAMINOPHEN 500 MG
500 TABLET ORAL EVERY 12 HOURS
Qty: 14 TABLET | Refills: 0 | Status: SHIPPED | OUTPATIENT
Start: 2020-10-01 | End: 2020-10-12 | Stop reason: SDUPTHER

## 2020-10-01 RX ORDER — CELECOXIB 100 MG/1
100 CAPSULE ORAL 2 TIMES DAILY
Qty: 14 CAPSULE | Refills: 0 | Status: SHIPPED | OUTPATIENT
Start: 2020-10-01 | End: 2020-10-12 | Stop reason: SDUPTHER

## 2020-10-01 RX ORDER — PREGABALIN 50 MG/1
100 CAPSULE ORAL 3 TIMES DAILY
Status: DISCONTINUED | OUTPATIENT
Start: 2020-10-01 | End: 2020-10-04 | Stop reason: HOSPADM

## 2020-10-01 RX ORDER — LIDOCAINE HYDROCHLORIDE 10 MG/ML
1 INJECTION, SOLUTION EPIDURAL; INFILTRATION; INTRACAUDAL; PERINEURAL ONCE
Status: COMPLETED | OUTPATIENT
Start: 2020-10-01 | End: 2020-10-01

## 2020-10-01 RX ORDER — OXYCODONE HYDROCHLORIDE 10 MG/1
10 TABLET ORAL EVERY 4 HOURS PRN
Status: DISCONTINUED | OUTPATIENT
Start: 2020-10-01 | End: 2020-10-04 | Stop reason: HOSPADM

## 2020-10-01 RX ORDER — FENTANYL CITRATE 50 UG/ML
INJECTION, SOLUTION INTRAMUSCULAR; INTRAVENOUS
Status: DISCONTINUED | OUTPATIENT
Start: 2020-10-01 | End: 2020-10-01

## 2020-10-01 RX ORDER — ONDANSETRON 4 MG/1
4 TABLET, ORALLY DISINTEGRATING ORAL EVERY 8 HOURS PRN
Qty: 21 TABLET | Refills: 0 | Status: ON HOLD | OUTPATIENT
Start: 2020-10-01 | End: 2022-08-12 | Stop reason: ALTCHOICE

## 2020-10-01 RX ORDER — OXYCODONE HYDROCHLORIDE 5 MG/1
5 TABLET ORAL EVERY 4 HOURS PRN
Qty: 30 TABLET | Refills: 0 | Status: SHIPPED | OUTPATIENT
Start: 2020-10-01 | End: 2020-10-06 | Stop reason: SDUPTHER

## 2020-10-01 RX ORDER — HYDROMORPHONE HYDROCHLORIDE 1 MG/ML
0.2 INJECTION, SOLUTION INTRAMUSCULAR; INTRAVENOUS; SUBCUTANEOUS EVERY 5 MIN PRN
Status: COMPLETED | OUTPATIENT
Start: 2020-10-01 | End: 2020-10-01

## 2020-10-01 RX ORDER — BUPROPION HYDROCHLORIDE 150 MG/1
300 TABLET ORAL EVERY MORNING
Status: DISCONTINUED | OUTPATIENT
Start: 2020-10-02 | End: 2020-10-04 | Stop reason: HOSPADM

## 2020-10-01 RX ORDER — HYDROMORPHONE HYDROCHLORIDE 1 MG/ML
INJECTION, SOLUTION INTRAMUSCULAR; INTRAVENOUS; SUBCUTANEOUS
Status: COMPLETED
Start: 2020-10-01 | End: 2020-10-01

## 2020-10-01 RX ORDER — LIDOCAINE HYDROCHLORIDE 20 MG/ML
INJECTION INTRAVENOUS
Status: DISCONTINUED | OUTPATIENT
Start: 2020-10-01 | End: 2020-10-01

## 2020-10-01 RX ORDER — KETAMINE HCL IN 0.9 % NACL 50 MG/5 ML
SYRINGE (ML) INTRAVENOUS
Status: DISCONTINUED | OUTPATIENT
Start: 2020-10-01 | End: 2020-10-01

## 2020-10-01 RX ORDER — HYDROMORPHONE HYDROCHLORIDE 1 MG/ML
0.5 INJECTION, SOLUTION INTRAMUSCULAR; INTRAVENOUS; SUBCUTANEOUS ONCE
Status: COMPLETED | OUTPATIENT
Start: 2020-10-01 | End: 2020-10-01

## 2020-10-01 RX ORDER — ROCURONIUM BROMIDE 10 MG/ML
INJECTION, SOLUTION INTRAVENOUS
Status: DISCONTINUED | OUTPATIENT
Start: 2020-10-01 | End: 2020-10-01

## 2020-10-01 RX ORDER — PROPOFOL 10 MG/ML
VIAL (ML) INTRAVENOUS
Status: DISCONTINUED | OUTPATIENT
Start: 2020-10-01 | End: 2020-10-01

## 2020-10-01 RX ORDER — METHOCARBAMOL 500 MG/1
500 TABLET, FILM COATED ORAL ONCE
Status: COMPLETED | OUTPATIENT
Start: 2020-10-01 | End: 2020-10-01

## 2020-10-01 RX ORDER — MIDAZOLAM HYDROCHLORIDE 1 MG/ML
INJECTION, SOLUTION INTRAMUSCULAR; INTRAVENOUS
Status: DISCONTINUED | OUTPATIENT
Start: 2020-10-01 | End: 2020-10-01

## 2020-10-01 RX ADMIN — MIDAZOLAM HYDROCHLORIDE 2 MG: 1 INJECTION, SOLUTION INTRAMUSCULAR; INTRAVENOUS at 11:10

## 2020-10-01 RX ADMIN — SODIUM CHLORIDE 0.5 MCG/KG/MIN: 9 INJECTION, SOLUTION INTRAVENOUS at 12:10

## 2020-10-01 RX ADMIN — PHENYLEPHRINE HYDROCHLORIDE 200 MCG: 10 INJECTION INTRAVENOUS at 12:10

## 2020-10-01 RX ADMIN — OXYCODONE HYDROCHLORIDE 15 MG: 10 TABLET ORAL at 04:10

## 2020-10-01 RX ADMIN — FENTANYL CITRATE 100 MCG: 50 INJECTION, SOLUTION INTRAMUSCULAR; INTRAVENOUS at 11:10

## 2020-10-01 RX ADMIN — NEOSTIGMINE METHYLSULFATE 3 MG: 0.5 INJECTION INTRAVENOUS at 02:10

## 2020-10-01 RX ADMIN — HYDROMORPHONE HYDROCHLORIDE 0.2 MG: 1 INJECTION, SOLUTION INTRAMUSCULAR; INTRAVENOUS; SUBCUTANEOUS at 03:10

## 2020-10-01 RX ADMIN — HYDROMORPHONE HYDROCHLORIDE 0.2 MG: 1 INJECTION, SOLUTION INTRAMUSCULAR; INTRAVENOUS; SUBCUTANEOUS at 04:10

## 2020-10-01 RX ADMIN — OXYCODONE HYDROCHLORIDE 10 MG: 10 TABLET ORAL at 09:10

## 2020-10-01 RX ADMIN — SODIUM CHLORIDE: 0.9 INJECTION, SOLUTION INTRAVENOUS at 09:10

## 2020-10-01 RX ADMIN — FAMOTIDINE 20 MG: 20 TABLET, FILM COATED ORAL at 09:10

## 2020-10-01 RX ADMIN — ONDANSETRON 4 MG: 2 INJECTION, SOLUTION INTRAMUSCULAR; INTRAVENOUS at 02:10

## 2020-10-01 RX ADMIN — PROPOFOL 50 MG: 10 INJECTION, EMULSION INTRAVENOUS at 01:10

## 2020-10-01 RX ADMIN — Medication 10 MG: at 01:10

## 2020-10-01 RX ADMIN — DEXAMETHASONE SODIUM PHOSPHATE 4 MG: 4 INJECTION, SOLUTION INTRAMUSCULAR; INTRAVENOUS at 01:10

## 2020-10-01 RX ADMIN — HYDROMORPHONE HYDROCHLORIDE 0.5 MG: 1 INJECTION, SOLUTION INTRAMUSCULAR; INTRAVENOUS; SUBCUTANEOUS at 04:10

## 2020-10-01 RX ADMIN — LIDOCAINE HYDROCHLORIDE 80 MG: 20 INJECTION, SOLUTION INTRAVENOUS at 11:10

## 2020-10-01 RX ADMIN — PROPOFOL 150 MG: 10 INJECTION, EMULSION INTRAVENOUS at 11:10

## 2020-10-01 RX ADMIN — DOCUSATE SODIUM 100 MG: 100 CAPSULE, LIQUID FILLED ORAL at 09:10

## 2020-10-01 RX ADMIN — ROCURONIUM BROMIDE 20 MG: 10 INJECTION, SOLUTION INTRAVENOUS at 12:10

## 2020-10-01 RX ADMIN — Medication 50 ML: at 02:10

## 2020-10-01 RX ADMIN — FENTANYL CITRATE 25 MCG: 50 INJECTION, SOLUTION INTRAMUSCULAR; INTRAVENOUS at 03:10

## 2020-10-01 RX ADMIN — ALBUTEROL SULFATE 10 PUFF: 90 AEROSOL, METERED RESPIRATORY (INHALATION) at 02:10

## 2020-10-01 RX ADMIN — LIDOCAINE HYDROCHLORIDE 0.3 MG: 10 INJECTION, SOLUTION EPIDURAL; INFILTRATION; INTRACAUDAL at 09:10

## 2020-10-01 RX ADMIN — MUPIROCIN: 20 OINTMENT TOPICAL at 09:10

## 2020-10-01 RX ADMIN — SODIUM CHLORIDE, SODIUM GLUCONATE, SODIUM ACETATE, POTASSIUM CHLORIDE, MAGNESIUM CHLORIDE, SODIUM PHOSPHATE, DIBASIC, AND POTASSIUM PHOSPHATE: .53; .5; .37; .037; .03; .012; .00082 INJECTION, SOLUTION INTRAVENOUS at 12:10

## 2020-10-01 RX ADMIN — PHENYLEPHRINE HYDROCHLORIDE 100 MCG: 10 INJECTION INTRAVENOUS at 01:10

## 2020-10-01 RX ADMIN — PHENYLEPHRINE HYDROCHLORIDE 50 MCG: 10 INJECTION INTRAVENOUS at 01:10

## 2020-10-01 RX ADMIN — METHOCARBAMOL 500 MG: 500 TABLET ORAL at 03:10

## 2020-10-01 RX ADMIN — SODIUM CHLORIDE, SODIUM GLUCONATE, SODIUM ACETATE, POTASSIUM CHLORIDE, MAGNESIUM CHLORIDE, SODIUM PHOSPHATE, DIBASIC, AND POTASSIUM PHOSPHATE: .53; .5; .37; .037; .03; .012; .00082 INJECTION, SOLUTION INTRAVENOUS at 11:10

## 2020-10-01 RX ADMIN — PREGABALIN 100 MG: 50 CAPSULE ORAL at 03:10

## 2020-10-01 RX ADMIN — ACETAMINOPHEN 1000 MG: 10 INJECTION, SOLUTION INTRAVENOUS at 01:10

## 2020-10-01 RX ADMIN — POLYETHYLENE GLYCOL 3350 17 G: 17 POWDER, FOR SOLUTION ORAL at 04:10

## 2020-10-01 RX ADMIN — PREGABALIN 100 MG: 50 CAPSULE ORAL at 09:10

## 2020-10-01 RX ADMIN — SUGAMMADEX 200 MG: 100 INJECTION, SOLUTION INTRAVENOUS at 02:10

## 2020-10-01 RX ADMIN — ROCURONIUM BROMIDE 30 MG: 10 INJECTION, SOLUTION INTRAVENOUS at 12:10

## 2020-10-01 RX ADMIN — CEFAZOLIN 2 G: 330 INJECTION, POWDER, FOR SOLUTION INTRAMUSCULAR; INTRAVENOUS at 12:10

## 2020-10-01 RX ADMIN — PHENYLEPHRINE HYDROCHLORIDE 100 MCG: 10 INJECTION INTRAVENOUS at 12:10

## 2020-10-01 RX ADMIN — CELECOXIB 100 MG: 100 CAPSULE ORAL at 09:10

## 2020-10-01 RX ADMIN — ONDANSETRON HYDROCHLORIDE 4 MG: 4 TABLET, FILM COATED ORAL at 04:10

## 2020-10-01 RX ADMIN — FENTANYL CITRATE 25 MCG: 50 INJECTION, SOLUTION INTRAMUSCULAR; INTRAVENOUS at 02:10

## 2020-10-01 NOTE — PROGRESS NOTES
Patient assigned to this writer by charge nurse. The patient was  escorted to [facility] room 15. The patient is currently  changing into a hospital gown. This writer is completing a chart review and reviewing MD orders.

## 2020-10-01 NOTE — TRANSFER OF CARE
"Anesthesia Transfer of Care Note    Patient: Vale Gutierrez    Procedure(s) Performed: Procedure(s) (LRB):  FUSION, SPINE, LUMBAR, TLIF, POSTERIOR APPROACH, USING PEDICLE SCREW L4/5  (N/A)    Patient location: PACU    Anesthesia Type: general    Transport from OR: Transported from OR on 6-10 L/min O2 by face mask with adequate spontaneous ventilation    Post pain: adequate analgesia    Post assessment: no apparent anesthetic complications and tolerated procedure well    Post vital signs: stable    Level of consciousness: awake and alert    Nausea/Vomiting: no nausea/vomiting    Complications: none    Transfer of care protocol was followed      Last vitals:   Visit Vitals  /72 (BP Location: Left arm, Patient Position: Lying)   Pulse 75   Temp 37.1 °C (98.7 °F) (Oral)   Resp 16   Ht 5' 4" (1.626 m)   Wt 93 kg (205 lb)   SpO2 97%   Breastfeeding No   BMI 35.19 kg/m²     "

## 2020-10-01 NOTE — OP NOTE
DATE OF PROCEDURE: 10/1/2020     SURGEON:  Patrick Rivas M.D.     FIRST ASSISTANT-SURGEON:  Shena Lock PA-C, note no resident was available.     PREOPERATIVE DIAGNOSIS:     1.   L4-5 spondylolisthesis and symptomatic lumbar radiculopathy     POSTOPERATIVE DIAGNOSIS:   1.   L4-5 spondylolisthesis and symptomatic lumbar kick uropathy     PROCEDURES PERFORMED:  1.  Combined posterior lumbar interbody fusion and posterolateral spinal fusion, L4-5.  2.  Posterior nonsegmental instrumentation, L4-5  3.  L4 for laminectomy for decompression of central as well as bilateral foraminal stenosis   4.  Application of titanium intervertebral spacer device to L4-5 disk defect.     ANESTHESIA:  General endotracheal anesthesia.     ESTIMATED BLOOD LOSS:  250 mL.     IMPLANTS:  Spine Wave Screws and Leva Expandable Cage 9-->11mm     SPECIMENS:  None     FINDINGS:  None     DRAINS:  One superficial     SPONGE AND NEEDLE COUNT:  Correct x2.     NEUROLOGICAL MONITORING NOTES:  Somatosensory evoked potentials, free-running EMG as well as EMG stimulation of all screws.  There were no changes to stable arrival bilateral upper and lower extremity somatosensory potentials throughout the entire procedure  All screws stimulated at or greater than 20 milliamperes with the exception of the right L5 screw that stimulated at 11 milliamps.  The screw tract was extensively explored and.  The  Right L5 nerve root stimulated at 7 milliamps     REASON FOR OPERATION AND BRIEF HISTORY AND PHYSICAL:  The patient is a   59-year-old female with symptomatic low back pain and lumbar radiculopathy secondary to an L4-5 spondylolisthesis.  She has failed conservative may.     DESCRIPTION OF INFORMED CONSENT:  Please see my last clinic note         DESCRIPTION OF PROCEDURE:  The patient was met in the preoperative area where her low back was marked as the operative site.  Subsequently, she was brought to the Operating Room where general endotracheal  anesthesia was induced.  Sequential compression devices were placed on the patient's bilateral calves and run throughout the case.  A [place] catheter was inserted in standard sterile fashion.  At this point, the patient was positioned prone on a Jesus table with four post-pads.  All bony prominences were padded and personally checked by me.  At this point, the patient's lumbar spine was prepped and draped in normal sterile fashion.     A full timeout was then called, identifying the patient, the procedural site and   levels, the availability of all instruments and implants and no specific   nursing, surgical, anesthetic or neurological monitoring concerns.  Finding that   it was safe to proceed with surgery, the patient was given a weight appropriate dose of Ancef by the Anesthesia Service.  I infiltrated the planned incision with 10 mL of 1% lidocaine with epinephrine.     Next,  I made a midline skin incision and performed a preliminary subperiosteal   exposure of what we took to be the L4 and L5 lamina, the  L3-4 and L4-5 facet joints, and the bilateral L4 and L5 transverse processes..  At the   conclusion of our preliminary exposure, I placed a marker in what I took to be   the right L4 pedicle, took a lateral radiograph and thus confirmed my levels.  We   then finalized my exposure taking great care in the area of the prior left sided laminectomy and epidural scar..  Next, we performed bilateral L4-5 facetectomies   with an osteotome.  Next, I placed freehand pedicle screws into the L4 and L5  pedicles using anatomic landmarks and freehand technique.  Correct level as well  as adequate position of the screw was confirmed radiographically.  Next, we used a drill to thin the lamina of L3 at the level of the pars, which was then   removed en bloc.  A forward angle curette was used to release the ligamentum flavum.  We identified the thecal sac and decompressed the central canal as well as bilateral foramen  and lateral recess with a Kerrison punch. I then performed a subtotal L4-5  diskectomy in a standard fashion.  The disk space was thoroughly irrigated, sized to a size 9--->11  mm cage.  The disk space was then thoroughly irrigated, packed with bone graft and inserted in a standard fashion and expanded.  It was confirmed to be in adequate position radiographically.  Rods were measured, cut, contoured and locked into place with  provided set plugs and torque wrench after gentle compression on the right side.  The wound was then thoroughly irrigated, including multiple washes with Betadine infused normal saline.  The bony   surfaces along the transverse processes of L4-5 were decorticated with a   high-speed drill and the patient's bone graft was milled in a bone mill and   mixed with 1 gram vancomycin powder and laid dorsally here.  A superficial drain was   then placed.  Additional 500 mg of vancomycin powder was placed in the deep   space and deep drain was then placed.  The deep fascia was then closed with #1   Vicryl in an interrupted figure-of-eight fashion.  The superficial layer was   then irrigated and closed with 2-0 Vicryl, 3-0 Monocryl, Dermabond   and Steri-Strips.  A soft dressing was then placed.  The patient was then   flipped supine, extubated and transferred to the Recovery Room in stable   Condition.

## 2020-10-01 NOTE — NURSING TRANSFER
Nursing Transfer Note      10/1/2020     Transfer 543 from PACU    Transfer via stretcher    Transfer with 2L nasal canula O2    Transported by Transport    Medicines sent: NA    Chart send with patient: Yes    Notified: Sister    Patient reassessed at: 10/01/20 @9101    Personal glasses sent with patient

## 2020-10-01 NOTE — OPERATIVE NOTE ADDENDUM
Certification of Assistant at Surgery       Surgery Date: 10/1/2020     Participating Surgeons:  Surgeon(s) and Role:     * Patrick Rivas MD - Primary    Procedures:  Procedure(s) (LRB):  FUSION, SPINE, LUMBAR, TLIF, POSTERIOR APPROACH, USING PEDICLE SCREW L4/5  (N/A)    Assistant Surgeon's Certification of Necessity:  I understand that section 1842 (b) (6) (d) of the Social Security Act generally prohibits Medicare Part B reasonable charge payment for the services of assistants at surgery in teaching hospitals when qualified residents are available to furnish such services. I certify that the services for which payment is claimed were medically necessary, and that no qualified resident was available to perform the services. I further understand that these services are subject to post-payment review by the Medicare carrier.      Shena Lock PA-C    10/01/2020  2:53 PM

## 2020-10-01 NOTE — ANESTHESIA PROCEDURE NOTES
Intubation  Performed by: Eva Resendiz CRNA  Authorized by: Ho Maier MD     Intubation:     Induction:  Intravenous    Intubated:  Postinduction    Mask Ventilation:  Easy mask    Attempts:  1    Attempted By:  Student    Method of Intubation:  Direct    Blade:  Ansari 2    Laryngeal View Grade: Grade I - full view of chords      Difficult Airway Encountered?: No      Complications:  None    Airway Device:  Oral endotracheal tube    Airway Device Size:  7.5    Style/Cuff Inflation:  Cuffed (inflated to minimal occlusive pressure)    Tube secured:  22    Secured at:  The teeth    Placement Verified By:  Capnometry    Complicating Factors:  None    Findings Post-Intubation:  BS equal bilateral

## 2020-10-01 NOTE — ANESTHESIA PREPROCEDURE EVALUATION
10/01/2020  Vale Gutierrez is a 59 y.o., female.    Pre-operative evaluation for Procedure(s) (LRB):  FUSION, SPINE, LUMBAR, TLIF, POSTERIOR APPROACH, USING PEDICLE SCREW L4/5 Spine wave SNS:SSEP/EMG (N/A)    Vale Gutierrez is a 59 y.o. female w/ h/o former EtOH abuse (abstinant currently), current smoker, depression, HTN, chronic pain and spinal stenosis going for the above procedure.     Patient Active Problem List   Diagnosis    Alcohol dependence with unspecified alcohol-induced disorder    Depression    Memory deficits    Falls frequently    Essential hypertension    Spinal stenosis    Elevated LFTs    Osteoarthritis of cervical spine    Dizziness on standing    Ataxia    Idiopathic progressive polyneuropathy    Benign paroxysmal positional vertigo    Impaired functional mobility, balance, gait, and endurance    Lumbosacral radiculopathy    Lumbar spondylosis    Chronic pain disorder    Chronic pain    S/P lumbar fusion       Review of patient's allergies indicates:  No Known Allergies     No current facility-administered medications on file prior to encounter.      Current Outpatient Medications on File Prior to Encounter   Medication Sig Dispense Refill    buPROPion (WELLBUTRIN XL) 300 MG 24 hr tablet Take 300 mg by mouth every morning. Take am of surgery      metoprolol tartrate (LOPRESSOR) 50 MG tablet Take 1 tablet (50 mg total) by mouth once daily. (Patient taking differently: Take 50 mg by mouth once daily. Take am of surgery) 90 tablet 3    omeprazole (PRILOSEC) 20 MG capsule Take 1 capsule (20 mg total) by mouth 2 (two) times daily. (Patient taking differently: Take 20 mg by mouth 2 (two) times daily. Take am of surgery) 60 capsule 11    potassium chloride (MICRO-K) 10 MEQ CpSR Take 1 capsule (10 mEq total) by mouth once daily. (Patient taking differently:  Take 10 mEq by mouth once daily. Hold am of surgery) 30 capsule 2    pregabalin (LYRICA) 100 MG capsule Take 1 capsule (100 mg total) by mouth 3 (three) times daily. (Patient taking differently: Take 100 mg by mouth 3 (three) times daily. Take am of surgery) 90 capsule 6    venlafaxine (EFFEXOR-XR) 150 MG Cp24 Take 150 mg by mouth once daily. Take am of surgery      venlafaxine (EFFEXOR-XR) 75 MG 24 hr capsule Take 75 mg by mouth once daily. Take am of surgery      vitamin D (VITAMIN D3) 1000 units Tab Take 5,000 Int'l Units by mouth. Hold am of surgery         Past Surgical History:   Procedure Laterality Date    BREAST CYST ASPIRATION      CHOLECYSTECTOMY      complicated with bile leak, sepsis    COLONOSCOPY N/A 6/3/2020    Procedure: COLONOSCOPY Golytely;  Surgeon: Tino Neil MD;  Location: [facility];  Service: Colon and Rectal;  Laterality: N/A;    ESOPHAGOGASTRODUODENOSCOPY N/A 6/3/2020    Procedure: EGD (ESOPHAGOGASTRODUODENOSCOPY);  Surgeon: Tino Neil MD;  Location: [facility];  Service: Colon and Rectal;  Laterality: N/A;    TRANSFORAMINAL EPIDURAL INJECTION OF STEROID Bilateral 8/7/2020    Procedure: INJECTION, STEROID, EPIDURAL, TRANSFORAMINAL APPROACH, L4-L5 need consent;  Surgeon: Rubén Rico MD;  Location: [facility];  Service: Pain Management;  Laterality: Bilateral;    TRANSFORAMINAL EPIDURAL INJECTION OF STEROID Bilateral 9/4/2020    Procedure: LUMBAR TRANSFORAMINAL BILATERAL L4/5 DIRECT REFERRAL;  Surgeon: Rubén Rico MD;  Location: [facility];  Service: Pain Management;  Laterality: Bilateral;  NEEDS CONSENT       Social History     Socioeconomic History    Marital status: Single     Spouse name: Not on file    Number of children: Not on file    Years of education: Not on file    Highest education level: Not on file   Occupational History    Not on file   Social Needs    Financial resource strain: Not hard at all    Food insecurity     Worry: Never true      Inability: Never true    Transportation needs     Medical: No     Non-medical: No   Tobacco Use    Smoking status: Former Smoker    Smokeless tobacco: Never Used   Substance and Sexual Activity    Alcohol use: Yes     Alcohol/week: 12.0 standard drinks     Types: 12 Shots of liquor per week     Frequency: Never     Binge frequency: Never     Comment: three 12-14 oz cocktail drinks.     Drug use: No    Sexual activity: Never   Lifestyle    Physical activity     Days per week: 0 days     Minutes per session: 0 min    Stress: Only a little   Relationships    Social connections     Talks on phone: Three times a week     Gets together: Never     Attends Congregational service: Not on file     Active member of club or organization: No     Attends meetings of clubs or organizations: Never     Relationship status: Never    Other Topics Concern    Not on file   Social History Narrative    Not on file         Vital Signs Range (Last 24H):         Diagnostic Studies:      EKG:  Vent. Rate : 070 BPM     Atrial Rate : 070 BPM      P-R Int : 150 ms          QRS Dur : 092 ms       QT Int : 402 ms       P-R-T Axes : 041 -40 -07 degrees      QTc Int : 434 ms     Normal sinus rhythm   Possible Left atrial enlargement   Left axis deviation   Incomplete right bundle branch block   ST and T wave abnormality, consider anterior ischemia   Abnormal ECG   When compared with ECG of 18-FEB-2020 13:02,   No significant change was found   Confirmed by LARRY JAMES MD ([id number]) on 9/15/2020 1:22:15 PM           Anesthesia Evaluation          Review of Systems  Anesthesia Hx:  No problems with previous Anesthesia History of prior surgery of interest to airway management or planning: Previous anesthesia: MAC  6/3/20-EGD with MAC.  Procedure performed at an [facility]. Denies Family Hx of Anesthesia complications.   Denies Personal Hx of Anesthesia complications.   Social:  Smoker, No Alcohol Use  Alcohol Use:   Alcohol Abuse:  alcohol use is in recovery   Hematology/Oncology:  Hematology Normal   Oncology Normal     EENT/Dental:EENT/Dental Normal   Cardiovascular:   Hypertension  Denies Angina. hyperlipidemia  Functional Capacity 2 METS, WHEELCHAIR/WALKER    Pulmonary:  Pulmonary Normal  Denies Shortness of breath.  Denies Recent URI.    Renal/:  Renal/ Normal     Hepatic/GI:   GERD    Musculoskeletal:   LUMBAR SPONDYLOSIS.   O.A. CERVICAL SPINE.  SPINAL STENOSIS OF LUMBAR REGION WITH NEUROGENIC CLAUDICATION. Joint Disease:  Arthritis, Osteoarthritis    Neurological:   LUMBOSACRAL RADICULOPATHY.  IDIOPATHIC PROGRESSIVE POLYNEUROPATHY.  SPINAL STENOSIS OF LUMBAR REGION WITH NEUROGENIC CLAUDICATION. Osteoarthritis    Endocrine:  Metabolic Disorders, Obesity / BMI > 30  Psych:  Depression and Chronic Depression, Treated.          Physical Exam  General:  Well nourished    Airway/Jaw/Neck:  Airway Findings: Mouth Opening: Normal Tongue: Normal  General Airway Assessment: Adult  Mallampati: II  TM Distance: Normal, at least 6 cm      Dental:  Dental Findings: In tact   Chest/Lungs:  Chest/Lungs Findings: Clear to auscultation, Normal Respiratory Rate     Heart/Vascular:  Heart Findings: Rate: Normal  Rhythm: Regular Rhythm  Sounds: Normal        Mental Status:  Mental Status Findings:  Cooperative, Alert and Oriented         Anesthesia Plan  Type of Anesthesia, risks & benefits discussed:  Anesthesia Type:  general  Patient's Preference:   Intra-op Monitoring Plan: standard ASA monitors  Intra-op Monitoring Plan Comments:   Post Op Pain Control Plan: per primary service following discharge from PACU, IV/PO Opioids PRN and multimodal analgesia  Post Op Pain Control Plan Comments:   Induction:   IV  Beta Blocker:  Patient is on a Beta-Blocker and has received one dose within the past 24 hours (No further documentation required).       Informed Consent: Patient understands risks and agrees with Anesthesia plan.  Questions answered. Anesthesia  consent signed with patient.  ASA Score: 3     Day of Surgery Review of History & Physical:    H&P update referred to the surgeon.         Ready For Surgery From Anesthesia Perspective.

## 2020-10-02 LAB
BASOPHILS # BLD AUTO: 0.06 K/UL (ref 0–0.2)
BASOPHILS NFR BLD: 0.5 % (ref 0–1.9)
DIFFERENTIAL METHOD: ABNORMAL
EOSINOPHIL # BLD AUTO: 0 K/UL (ref 0–0.5)
EOSINOPHIL NFR BLD: 0.1 % (ref 0–8)
ERYTHROCYTE [DISTWIDTH] IN BLOOD BY AUTOMATED COUNT: 13.8 % (ref 11.5–14.5)
HCT VFR BLD AUTO: 35.5 % (ref 37–48.5)
HGB BLD-MCNC: 10.7 G/DL (ref 12–16)
IMM GRANULOCYTES # BLD AUTO: 0.04 K/UL (ref 0–0.04)
IMM GRANULOCYTES NFR BLD AUTO: 0.4 % (ref 0–0.5)
LYMPHOCYTES # BLD AUTO: 2.2 K/UL (ref 1–4.8)
LYMPHOCYTES NFR BLD: 19.9 % (ref 18–48)
MCH RBC QN AUTO: 27.6 PG (ref 27–31)
MCHC RBC AUTO-ENTMCNC: 30.1 G/DL (ref 32–36)
MCV RBC AUTO: 92 FL (ref 82–98)
MONOCYTES # BLD AUTO: 0.9 K/UL (ref 0.3–1)
MONOCYTES NFR BLD: 7.6 % (ref 4–15)
NEUTROPHILS # BLD AUTO: 8 K/UL (ref 1.8–7.7)
NEUTROPHILS NFR BLD: 71.5 % (ref 38–73)
NRBC BLD-RTO: 0 /100 WBC
PLATELET # BLD AUTO: 268 K/UL (ref 150–350)
PMV BLD AUTO: 10.4 FL (ref 9.2–12.9)
RBC # BLD AUTO: 3.87 M/UL (ref 4–5.4)
WBC # BLD AUTO: 11.23 K/UL (ref 3.9–12.7)

## 2020-10-02 PROCEDURE — 85025 COMPLETE CBC W/AUTO DIFF WBC: CPT

## 2020-10-02 PROCEDURE — 63600175 PHARM REV CODE 636 W HCPCS: Performed by: PHYSICIAN ASSISTANT

## 2020-10-02 PROCEDURE — 25000003 PHARM REV CODE 250: Performed by: PHYSICIAN ASSISTANT

## 2020-10-02 PROCEDURE — 25000003 PHARM REV CODE 250: Performed by: STUDENT IN AN ORGANIZED HEALTH CARE EDUCATION/TRAINING PROGRAM

## 2020-10-02 PROCEDURE — 36415 COLL VENOUS BLD VENIPUNCTURE: CPT

## 2020-10-02 PROCEDURE — 63600175 PHARM REV CODE 636 W HCPCS: Performed by: STUDENT IN AN ORGANIZED HEALTH CARE EDUCATION/TRAINING PROGRAM

## 2020-10-02 PROCEDURE — 12000002 HC ACUTE/MED SURGE SEMI-PRIVATE ROOM

## 2020-10-02 RX ORDER — HYDROMORPHONE HYDROCHLORIDE 1 MG/ML
1 INJECTION, SOLUTION INTRAMUSCULAR; INTRAVENOUS; SUBCUTANEOUS
Status: DISCONTINUED | OUTPATIENT
Start: 2020-10-02 | End: 2020-10-04 | Stop reason: HOSPADM

## 2020-10-02 RX ORDER — ACETAMINOPHEN 325 MG/1
650 TABLET ORAL EVERY 6 HOURS
Status: DISCONTINUED | OUTPATIENT
Start: 2020-10-02 | End: 2020-10-04 | Stop reason: HOSPADM

## 2020-10-02 RX ADMIN — DOCUSATE SODIUM 100 MG: 100 CAPSULE, LIQUID FILLED ORAL at 09:10

## 2020-10-02 RX ADMIN — POLYETHYLENE GLYCOL 3350 17 G: 17 POWDER, FOR SOLUTION ORAL at 09:10

## 2020-10-02 RX ADMIN — BUPROPION HYDROCHLORIDE 300 MG: 150 TABLET, FILM COATED, EXTENDED RELEASE ORAL at 05:10

## 2020-10-02 RX ADMIN — PREGABALIN 100 MG: 50 CAPSULE ORAL at 09:10

## 2020-10-02 RX ADMIN — OXYCODONE HYDROCHLORIDE 10 MG: 10 TABLET ORAL at 05:10

## 2020-10-02 RX ADMIN — OXYCODONE HYDROCHLORIDE 10 MG: 10 TABLET ORAL at 09:10

## 2020-10-02 RX ADMIN — VENLAFAXINE HYDROCHLORIDE 150 MG: 75 CAPSULE, EXTENDED RELEASE ORAL at 09:10

## 2020-10-02 RX ADMIN — HEPARIN SODIUM 5000 UNITS: 5000 INJECTION INTRAVENOUS; SUBCUTANEOUS at 01:10

## 2020-10-02 RX ADMIN — MUPIROCIN: 20 OINTMENT TOPICAL at 09:10

## 2020-10-02 RX ADMIN — METOPROLOL TARTRATE 50 MG: 50 TABLET, FILM COATED ORAL at 09:10

## 2020-10-02 RX ADMIN — CELECOXIB 100 MG: 100 CAPSULE ORAL at 09:10

## 2020-10-02 RX ADMIN — VENLAFAXINE HYDROCHLORIDE 75 MG: 75 CAPSULE, EXTENDED RELEASE ORAL at 09:10

## 2020-10-02 RX ADMIN — PANTOPRAZOLE SODIUM 40 MG: 40 TABLET, DELAYED RELEASE ORAL at 09:10

## 2020-10-02 RX ADMIN — HEPARIN SODIUM 5000 UNITS: 5000 INJECTION INTRAVENOUS; SUBCUTANEOUS at 06:10

## 2020-10-02 RX ADMIN — FAMOTIDINE 20 MG: 20 TABLET, FILM COATED ORAL at 09:10

## 2020-10-02 RX ADMIN — ACETAMINOPHEN 650 MG: 325 TABLET ORAL at 05:10

## 2020-10-02 RX ADMIN — HEPARIN SODIUM 5000 UNITS: 5000 INJECTION INTRAVENOUS; SUBCUTANEOUS at 09:10

## 2020-10-02 RX ADMIN — HYDROMORPHONE HYDROCHLORIDE 1 MG: 1 INJECTION, SOLUTION INTRAMUSCULAR; INTRAVENOUS; SUBCUTANEOUS at 01:10

## 2020-10-02 RX ADMIN — PREGABALIN 100 MG: 50 CAPSULE ORAL at 03:10

## 2020-10-02 RX ADMIN — OXYCODONE HYDROCHLORIDE 10 MG: 10 TABLET ORAL at 03:10

## 2020-10-02 RX ADMIN — BENZOCAINE: 100 GEL TOPICAL at 02:10

## 2020-10-02 NOTE — PLAN OF CARE
[facility]    HOME HEALTH ORDERS  FACE TO FACE ENCOUNTER    Patient Name: Vale Gutierrez  YOB: 1960    PCP: Estela Mcdaniel MD   PCP Address: [address] / [place] [address]  PCP Phone Number: [phone]  PCP Fax: [phone]    Encounter Date: 10/02/2020    Admit to Home Health    Diagnoses:  Active Hospital Problems    Diagnosis  POA    *S/P lumbar fusion [Z98.1]  Not Applicable      Resolved Hospital Problems   No resolved problems to display.       Future Appointments   Date Time Provider Department Center   10/23/2020  8:30 AM LAB, [facility] [place]   10/30/2020  3:30 PM Estela Mcdaniel MD [facility] [place]           I have seen and examined this patient face to face today. My clinical findings that support the need for the home health skilled services and home bound status are the following:  Weakness/numbness causing balance and gait disturbance due to Surgery making it taxing to leave home.    Allergies:Review of patient's allergies indicates:  No Known Allergies    Diet: regular diet    Activities: activity as tolerated,spine precautions    Home Health Admitting Clinician:   SN/PT to complete comprehensive assessment including routine vital signs. Instruct on disease process and s/s of complications to report to MD. Follow specific home health protocol. Review/verify medication list sent home with the patient at time of discharge  and instruct patient/caregiver as needed. If coumadin ordered, coumadin clinic to manage INR with INR draws 2x per week with a goal to maintain INR between 1.8 and 2.2. Frequency may be adjusted depending on start of care date.    Notify MD if SBP > 160 or < 90; DBP > 90 or < 50; HR > 120 or < 50; Temp > 101    Home Medical Equipment:  Walker, 3-1 bedside commode, transfer tub bench    CONSULTS:    Physical Therapy may admit if patient not on coumadin, PT to perform comprehensive assessment if performing admit visit  and generate therapy plan of care. Evaluate for home safety and equipment needs; Establish/upgrade home exercise program. Perform/instruct on therapeutic exercises, gait training, transfer training, and Range of Motion.      OTHER: (only select if patient needs other therapy disciplines)  Occupational Therapy to evaluate and treat. Evaluate home environment for safety and equipment needs. Perform/Instruct on transfers, ADL training, ROM, and therapeutic exercises.   to evaluate for community resources/long-range planning.    WOUND CARE ORDERS:  Wound Care: You may remove your dressing and shower 5 days after surgery. Until then please keep your wound clean and dry. Sponge baths are acceptable. Do not go in a pool or hot tub until seen in clinic. Please leave the small steri-strips covering your wound in place until they fall off naturally (2 weeks). You may notice clear suture ends hanging from the sides of your incision after the steri-strips are removed, it is ok to clip these with scissors.      .    Medications: Review discharge medications with patient and family and provide education.      Current Discharge Medication List      START taking these medications    Details   acetaminophen (TYLENOL) 500 MG tablet Take 1 tablet (500 mg total) by mouth every 12 (twelve) hours. for 7 days  Qty: 14 tablet, Refills: 0      celecoxib (CELEBREX) 100 MG capsule Take 1 capsule (100 mg total) by mouth 2 (two) times daily.  Qty: 14 capsule, Refills: 0      docusate sodium (COLACE) 100 MG capsule Take 1 capsule (100 mg total) by mouth 2 (two) times daily.  Qty: 60 capsule, Refills: 0      ondansetron (ZOFRAN-ODT) 4 MG TbDL Take 1 tablet (4 mg total) by mouth every 8 (eight) hours as needed.  Qty: 21 tablet, Refills: 0      oxyCODONE (ROXICODONE) 5 MG immediate release tablet Take 1 tablet (5 mg total) by mouth every 4 (four) hours as needed for Pain.  Qty: 30 tablet, Refills: 0    Comments: Quantity prescribed  more than 7 day supply? Yes, quantity medically necessary      tiZANidine (ZANAFLEX) 4 MG tablet Take 1 tablet (4 mg total) by mouth every 6 (six) hours as needed.  Qty: 40 tablet, Refills: 0         CONTINUE these medications which have NOT CHANGED    Details   aspirin/salicylamide/caffeine (BC HEADACHE POWDER ORAL) Take 1 Package by mouth once daily. Hold medication one week prior to surgery      buPROPion (WELLBUTRIN XL) 300 MG 24 hr tablet Take 300 mg by mouth every morning. Take am of surgery      metoprolol tartrate (LOPRESSOR) 50 MG tablet Take 1 tablet (50 mg total) by mouth once daily.  Qty: 90 tablet, Refills: 3      omeprazole (PRILOSEC) 20 MG capsule Take 1 capsule (20 mg total) by mouth 2 (two) times daily.  Qty: 60 capsule, Refills: 11      potassium chloride (MICRO-K) 10 MEQ CpSR Take 1 capsule (10 mEq total) by mouth once daily.  Qty: 30 capsule, Refills: 2      pregabalin (LYRICA) 100 MG capsule Take 1 capsule (100 mg total) by mouth 3 (three) times daily.  Qty: 90 capsule, Refills: 6      !! venlafaxine (EFFEXOR-XR) 150 MG Cp24 Take 150 mg by mouth once daily. Take am of surgery      !! venlafaxine (EFFEXOR-XR) 75 MG 24 hr capsule Take 75 mg by mouth once daily. Take am of surgery      vitamin D (VITAMIN D3) 1000 units Tab Take 5,000 Int'l Units by mouth. Hold am of surgery       !! - Potential duplicate medications found. Please discuss with provider.      STOP taking these medications       diclofenac (VOLTAREN) 75 MG EC tablet Comments:   Reason for Stopping:         traMADoL (ULTRAM) 50 mg tablet Comments:   Reason for Stopping:               I certify that this patient is confined to her home and needs intermittent skilled nursing care, physical therapy and occupational therapy.

## 2020-10-02 NOTE — PROGRESS NOTES
"[facility]  Orthopedics  Progress Note    Patient Name: Vale Gutierrez  MRN: [id number]  Admission Date: 10/1/2020  Hospital Length of Stay: 1 days  Attending Provider: Patrick Rivas MD  Primary Care Provider: Estela Mcdaniel MD  Follow-up For: Procedure(s) (LRB):  FUSION, SPINE, LUMBAR, TLIF, POSTERIOR APPROACH, USING PEDICLE SCREW L4/5  (N/A)    Post-Operative Day: 1 Day Post-Op  Subjective:     Principal Problem:S/P lumbar fusion    Principal Orthopedic Problem: same    Interval History: Patient seen and examined at bedside.  No acute events overnight.  Pain controlled.        Review of patient's allergies indicates:  No Known Allergies    Current Facility-Administered Medications   Medication    buPROPion 24 hr tablet 300 mg    celecoxib capsule 100 mg    docusate sodium capsule 100 mg    famotidine tablet 20 mg    heparin (porcine) injection 5,000 Units    metoprolol tartrate (LOPRESSOR) tablet 50 mg    mupirocin 2 % ointment    oxyCODONE immediate release tablet 10 mg    oxyCODONE immediate release tablet 15 mg    oxyCODONE immediate release tablet 5 mg    pantoprazole EC tablet 40 mg    polyethylene glycol packet 17 g    potassium chloride CR capsule 10 mEq    pregabalin capsule 100 mg    sodium chloride 0.9% flush 10 mL    sodium chloride 0.9% flush 10 mL    venlafaxine 24 hr capsule 150 mg    venlafaxine 24 hr capsule 75 mg     Objective:     Vital Signs (Most Recent):  Temp: 97.7 °F (36.5 °C) (10/01/20 1950)  Pulse: 87 (10/01/20 1950)  Resp: 18 (10/02/20 0515)  BP: (!) 107/58 (10/01/20 1950)  SpO2: (!) 87 % (10/01/20 1950) Vital Signs (24h Range):  Temp:  [97.5 °F (36.4 °C)-98.7 °F (37.1 °C)] 97.7 °F (36.5 °C)  Pulse:  [69-94] 87  Resp:  [10-18] 18  SpO2:  [87 %-97 %] 87 %  BP: ()/(54-97) 107/58     Weight: 93 kg (205 lb)  Height: 5' 4" (162.6 cm)  Body mass index is 35.19 kg/m².      Intake/Output Summary (Last 24 hours) at 10/2/2020 0164  Last data " filed at 10/2/2020 0500  Gross per 24 hour   Intake 2300 ml   Output 1500 ml   Net 800 ml       Ortho/SPM Exam  AAOx4  NAD  Reg rate  No increased WOB  Dressing c/d/i  SILT T/SP/DP/Se[place]our/Sa  Motor intact T/SP/DP  WWP extremities  FCDs in place and functioning    Significant Labs:   pending    Significant Imaging: None    Assessment/Plan:     * S/P lumbar fusion  Vale Gutierrez is a 59 y.o. female POD1 s/p L4-5 TLIF 10/2/20    Pain control: multimodal  PT/OT: WBAT spine precautions  DVT PPx: FCDs at all times when not ambulating  Abx: postop Ancef  Labs: pending  Drain: in place, no output recorded overnight. Will discuss with nurse  Ivelisse: LUCY today    Dispo: f/u PT recs, will likely be here until the weekend.           Ho Shahid MD  Orthopedics  [facility]

## 2020-10-02 NOTE — PLAN OF CARE
Patient lives with sister who is primary CG in a ground level apartment with no entry steps. Patient believes she will need  services upon hospital dc and does not have a preference of agency. Patient sister will provide transportation home.       10/02/20 1046   Discharge Assessment   Assessment Type Discharge Planning Assessment   Confirmed/corrected address and phone number on facesheet? Yes   Assessment information obtained from? Patient;Caregiver  (Aggie lemos)   Expected Length of Stay (days) 3   Communicated expected length of stay with patient/caregiver yes   Prior to hospitilization cognitive status: Alert/Oriented   Prior to hospitalization functional status: Independent;Assistive Equipment   Current cognitive status: Alert/Oriented   Current Functional Status: Independent;Needs Assistance   Lives With sibling(s)   Able to Return to Prior Arrangements yes   Is patient able to care for self after discharge? Yes   Who are your caregiver(s) and their phone number(s)? Aggie Adames sister [phone]   Patient's perception of discharge disposition home health   Readmission Within the Last 30 Days no previous admission in last 30 days   Patient currently being followed by outpatient case management? No   Patient currently receives any other outside agency services? No   Equipment Currently Used at Home rollator;walker, rolling;cane, straight   Do you have any problems affording any of your prescribed medications? No   Is the patient taking medications as prescribed? yes   Does the patient have transportation home? Yes   Transportation Anticipated family or friend will provide   Dialysis Name and Scheduled days n/a   Does the patient receive services at the Coumadin Clinic? No   Discharge Plan A Home Health;Home with family   Discharge Plan B Home with family   DME Needed Upon Discharge  bedside commode   Patient/Family in Agreement with Plan yes

## 2020-10-02 NOTE — NURSING
Pt attempted to walk, she could only  place, legs very shaky, and bending forward. She immediately sat down. Now sitting at the side of the bed.

## 2020-10-02 NOTE — ASSESSMENT & PLAN NOTE
Vale Gutierrez is a 59 y.o. female POD1 s/p L4-5 TLIF 10/2/20    Pain control: multimodal  PT/OT: WBAT spine precautions  DVT PPx: FCDs at all times when not ambulating  Abx: postop Ancef  Labs: pending  Drain: in place, no output recorded overnight. Will discuss with nurse  Lila AYALA today    Dispo: f/u PT recs, will likely be here until the weekend.

## 2020-10-02 NOTE — SUBJECTIVE & OBJECTIVE
"Principal Problem:S/P lumbar fusion    Principal Orthopedic Problem: same    Interval History: Patient seen and examined at bedside.  No acute events overnight.  Pain controlled.        Review of patient's allergies indicates:  No Known Allergies    Current Facility-Administered Medications   Medication    buPROPion 24 hr tablet 300 mg    celecoxib capsule 100 mg    docusate sodium capsule 100 mg    famotidine tablet 20 mg    heparin (porcine) injection 5,000 Units    metoprolol tartrate (LOPRESSOR) tablet 50 mg    mupirocin 2 % ointment    oxyCODONE immediate release tablet 10 mg    oxyCODONE immediate release tablet 15 mg    oxyCODONE immediate release tablet 5 mg    pantoprazole EC tablet 40 mg    polyethylene glycol packet 17 g    potassium chloride CR capsule 10 mEq    pregabalin capsule 100 mg    sodium chloride 0.9% flush 10 mL    sodium chloride 0.9% flush 10 mL    venlafaxine 24 hr capsule 150 mg    venlafaxine 24 hr capsule 75 mg     Objective:     Vital Signs (Most Recent):  Temp: 97.7 °F (36.5 °C) (10/01/20 1950)  Pulse: 87 (10/01/20 1950)  Resp: 18 (10/02/20 0515)  BP: (!) 107/58 (10/01/20 1950)  SpO2: (!) 87 % (10/01/20 1950) Vital Signs (24h Range):  Temp:  [97.5 °F (36.4 °C)-98.7 °F (37.1 °C)] 97.7 °F (36.5 °C)  Pulse:  [69-94] 87  Resp:  [10-18] 18  SpO2:  [87 %-97 %] 87 %  BP: ()/(54-97) 107/58     Weight: 93 kg (205 lb)  Height: 5' 4" (162.6 cm)  Body mass index is 35.19 kg/m².      Intake/Output Summary (Last 24 hours) at 10/2/2020 0629  Last data filed at 10/2/2020 0500  Gross per 24 hour   Intake 2300 ml   Output 1500 ml   Net 800 ml       Ortho/SPM Exam  AAOx4  NAD  Reg rate  No increased WOB  Dressing c/d/i  SILT T/SP/DP/[place]/Sa  Motor intact T/SP/DP  WWP extremities  FCDs in place and functioning    Significant Labs:   pending    Significant Imaging: None  "

## 2020-10-02 NOTE — ANESTHESIA POSTPROCEDURE EVALUATION
Anesthesia Post Evaluation    Patient: Vale Gutierrez    Procedure(s) Performed: Procedure(s) (LRB):  FUSION, SPINE, LUMBAR, TLIF, POSTERIOR APPROACH, USING PEDICLE SCREW L4/5  (N/A)    Final Anesthesia Type: general    Patient location during evaluation: floor  Patient participation: Yes- Able to Participate  Level of consciousness: awake and alert and oriented  Post-procedure vital signs: reviewed and stable  Pain management: adequate  Airway patency: patent    PONV status at discharge: No PONV  Anesthetic complications: no      Cardiovascular status: blood pressure returned to baseline and hemodynamically stable  Respiratory status: spontaneous ventilation, unassisted and nasal cannula  Hydration status: euvolemic  Follow-up not needed.          Vitals Value Taken Time   /106 10/02/20 0628   Temp 36.5 °C (97.7 °F) 10/01/20 1950   Pulse 92 10/02/20 0629   Resp 14 10/02/20 0629   SpO2 98 % 10/02/20 0629   Vitals shown include unvalidated device data.      Event Time   Out of Recovery 16:00:00         Pain/Antonio Score: Pain Rating Prior to Med Admin: 5 (10/2/2020  5:15 AM)  Antonio Score: 9 (10/1/2020  6:50 PM)

## 2020-10-02 NOTE — PLAN OF CARE
Pt is AAOx4. VSS. No falls/injury as pt calls for assistance when needed. No s/s of skin breakdown as pt is independent with positioning self. Pt constantly moves in bed. Drain output recorded. Pain being monitored and controlled with PRN and scheduled meds. Up to bedside commode with one assist. Bed in lowest position. Call light within reach. Will continue to monitor.

## 2020-10-02 NOTE — PLAN OF CARE
Problem: Adult Inpatient Plan of Care  Goal: Absence of Hospital-Acquired Illness or Injury  Intervention: Identify and Manage Fall Risk  Flowsheets (Taken 10/2/2020 0427)  Safety Promotion/Fall Prevention:   assistive device/personal item within reach   Fall Risk reviewed with patient/family   lighting adjusted   nonskid shoes/socks when out of bed   instructed to call staff for mobility  Intervention: Prevent VTE (venous thromboembolism)  Flowsheets (Taken 10/2/2020 0427)  VTE Prevention/Management:   remove, assess skin and reapply sequential compression device   ambulation promoted   dorsiflexion/plantar flexion performed   fluids promoted  Goal: Optimal Comfort and Wellbeing  Intervention: Provide Person-Centered Care  Flowsheets (Taken 10/2/2020 0427)  Trust Relationship/Rapport:   care explained   choices provided   reassurance provided   questions answered     Problem: Fall Injury Risk  Goal: Absence of Fall and Fall-Related Injury  Intervention: Identify and Manage Contributors to Fall Injury Risk  Flowsheets (Taken 10/2/2020 0427)  Self-Care Promotion: independence encouraged  Medication Review/Management: medications reviewed  Intervention: Promote Injury-Free Environment  Flowsheets (Taken 10/2/2020 0427)  Safety Promotion/Fall Prevention:   assistive device/personal item within reach   Fall Risk reviewed with patient/family   lighting adjusted   nonskid shoes/socks when out of bed   instructed to call staff for mobility     Problem: Infection  Goal: Infection Symptom Resolution  Intervention: Prevent or Manage Infection  Flowsheets (Taken 10/2/2020 0427)  Infection Management: aseptic technique maintained

## 2020-10-03 PROCEDURE — 25000003 PHARM REV CODE 250: Performed by: PHYSICIAN ASSISTANT

## 2020-10-03 PROCEDURE — 63600175 PHARM REV CODE 636 W HCPCS: Performed by: PHYSICIAN ASSISTANT

## 2020-10-03 PROCEDURE — 97535 SELF CARE MNGMENT TRAINING: CPT

## 2020-10-03 PROCEDURE — 97161 PT EVAL LOW COMPLEX 20 MIN: CPT

## 2020-10-03 PROCEDURE — 99900035 HC TECH TIME PER 15 MIN (STAT)

## 2020-10-03 PROCEDURE — 97165 OT EVAL LOW COMPLEX 30 MIN: CPT

## 2020-10-03 PROCEDURE — 27000221 HC OXYGEN, UP TO 24 HOURS

## 2020-10-03 PROCEDURE — 12000002 HC ACUTE/MED SURGE SEMI-PRIVATE ROOM

## 2020-10-03 PROCEDURE — 97116 GAIT TRAINING THERAPY: CPT

## 2020-10-03 PROCEDURE — 25000003 PHARM REV CODE 250: Performed by: STUDENT IN AN ORGANIZED HEALTH CARE EDUCATION/TRAINING PROGRAM

## 2020-10-03 RX ADMIN — FAMOTIDINE 20 MG: 20 TABLET, FILM COATED ORAL at 08:10

## 2020-10-03 RX ADMIN — MUPIROCIN: 20 OINTMENT TOPICAL at 09:10

## 2020-10-03 RX ADMIN — OXYCODONE HYDROCHLORIDE 15 MG: 10 TABLET ORAL at 12:10

## 2020-10-03 RX ADMIN — ACETAMINOPHEN 650 MG: 325 TABLET ORAL at 05:10

## 2020-10-03 RX ADMIN — PREGABALIN 100 MG: 50 CAPSULE ORAL at 08:10

## 2020-10-03 RX ADMIN — DOCUSATE SODIUM 100 MG: 100 CAPSULE, LIQUID FILLED ORAL at 08:10

## 2020-10-03 RX ADMIN — HEPARIN SODIUM 5000 UNITS: 5000 INJECTION INTRAVENOUS; SUBCUTANEOUS at 10:10

## 2020-10-03 RX ADMIN — CELECOXIB 100 MG: 100 CAPSULE ORAL at 08:10

## 2020-10-03 RX ADMIN — ACETAMINOPHEN 650 MG: 325 TABLET ORAL at 12:10

## 2020-10-03 RX ADMIN — OXYCODONE HYDROCHLORIDE 10 MG: 10 TABLET ORAL at 08:10

## 2020-10-03 RX ADMIN — OXYCODONE HYDROCHLORIDE 10 MG: 10 TABLET ORAL at 05:10

## 2020-10-03 RX ADMIN — BUPROPION HYDROCHLORIDE 300 MG: 150 TABLET, FILM COATED, EXTENDED RELEASE ORAL at 05:10

## 2020-10-03 RX ADMIN — HEPARIN SODIUM 5000 UNITS: 5000 INJECTION INTRAVENOUS; SUBCUTANEOUS at 02:10

## 2020-10-03 RX ADMIN — ACETAMINOPHEN 650 MG: 325 TABLET ORAL at 11:10

## 2020-10-03 RX ADMIN — MUPIROCIN: 20 OINTMENT TOPICAL at 08:10

## 2020-10-03 RX ADMIN — PANTOPRAZOLE SODIUM 40 MG: 40 TABLET, DELAYED RELEASE ORAL at 08:10

## 2020-10-03 RX ADMIN — HEPARIN SODIUM 5000 UNITS: 5000 INJECTION INTRAVENOUS; SUBCUTANEOUS at 05:10

## 2020-10-03 RX ADMIN — VENLAFAXINE HYDROCHLORIDE 150 MG: 75 CAPSULE, EXTENDED RELEASE ORAL at 08:10

## 2020-10-03 RX ADMIN — METOPROLOL TARTRATE 50 MG: 50 TABLET, FILM COATED ORAL at 08:10

## 2020-10-03 RX ADMIN — POLYETHYLENE GLYCOL 3350 17 G: 17 POWDER, FOR SOLUTION ORAL at 08:10

## 2020-10-03 RX ADMIN — VENLAFAXINE HYDROCHLORIDE 75 MG: 75 CAPSULE, EXTENDED RELEASE ORAL at 08:10

## 2020-10-03 RX ADMIN — POTASSIUM CHLORIDE 10 MEQ: 750 CAPSULE, EXTENDED RELEASE ORAL at 08:10

## 2020-10-03 RX ADMIN — PREGABALIN 100 MG: 50 CAPSULE ORAL at 02:10

## 2020-10-03 NOTE — PT/OT/SLP EVAL
"Occupational Therapy   Evaluation    Name: Vale Gutierrez  MRN: [id number]  Admitting Diagnosis:  S/P lumbar fusion 2 Days Post-Op    Recommendations:     Discharge Recommendations: home with home health  Discharge Equipment Recommendations:  walker, rolling  Barriers to discharge:  None    Assessment:     Vale Gutierrez is a 59 y.o. female with a medical diagnosis of S/P lumbar fusion.  She presents with the following performance deficits affecting function: weakness, impaired endurance, impaired self care skills, impaired functional mobilty, gait instability, pain, impaired balance.  Patient participated well with therapy, required supervision and use of 4 wheeled (personal) walker, increased time to complete tasks and benefited from some cues for safety. Patient will continue to benefit from skilled acute services during this admission and HH when deemed medically appropriate.     Rehab Prognosis: Good; patient would benefit from acute skilled OT services to address these deficits and reach maximum level of function.       Plan:     Patient to be seen 4 x/week to address the above listed problems via self-care/home management, therapeutic activities, therapeutic exercises  · Plan of Care Expires: 10/30/20  · Plan of Care Reviewed with: patient    Subjective     Chief Complaint: "Well it helps me walk like that." re: patient not receptive to adjustments to personal rollator  Patient/Family Comments/goals: Return home    Occupational Profile:  Living Environment: Patient lives with her sister, 1st floor apartment, 0STE  Previous level of function: Independent, intermittently mod I with rollator  Roles and Routines: Spending time with family  Equipment Used at Home:  rollator, cane, straight, shower chair  Assistance upon Discharge: Patient's sister and patient's immediate family to be home for a week while sister is at work    Pain/Comfort:  · Pain Rating 1: 8/10  · Location - Orientation 1: " generalized  · Location 1: back  · Pain Addressed 1: Reposition, Distraction, Nurse notified  · Pain Rating Post-Intervention 1: 8/10    Patients cultural, spiritual, Christian conflicts given the current situation: no    Objective:     Communicated with: Nursing prior to session.  Patient found supine with pulse ox (continuous), telemetry, oxygen upon OT entry to room.    General Precautions: Standard, fall   Orthopedic Precautions:spinal precautions   Braces: N/A     Occupational Performance:    Bed Mobility:    · Patient completed Rolling/Turning to Left with  supervision  · Patient completed Rolling/Turning to Right with supervision  · Patient completed Scooting/Bridging with supervision  · Patient completed Supine to Sit with supervision    Functional Mobility/Transfers:  · Patient completed Sit <> Stand Transfer with supervision  with  4 wheeled walker   · Patient completed Bed <> Chair Transfer using Step Transfer technique with stand by assistance with 4 wheeled walker  · Patient completed Toilet Transfer Step Transfer technique with stand by assistance with  4 wheeled walker  · Functional Mobility: Stand by assistance for HH distance, with increased time to complete    Activities of Daily Living:  · Grooming: supervision seated near sink within room  · Upper Body Dressing: supervision seated edge of bed  · Lower Body Dressing: minimum assistance seated edge of bed  · Toileting: stand by assistance with 4 wheeled walker to complete filemon area hygiene in standing    Cognitive/Visual Perceptual:  Cognitive/Psychosocial Skills:     -       Oriented to: Person, Place, Time and Situation   -       Follows Commands/attention:Follows multistep  commands  -       Safety awareness/insight to disability: intact   -       Mood/Affect/Coping skills/emotional control: Appropriate to situation and Cooperative    Physical Exam:  Postural examination/scapula alignment:    -       Rounded shoulders  -       Forward  head  Upper Extremity Range of Motion:     -       Right Upper Extremity: WFL  -       Left Upper Extremity: WFL  Upper Extremity Strength:    -       Right Upper Extremity: WFL  -       Left Upper Extremity: WFL   Strength:    -       Right Upper Extremity: WFL  -       Left Upper Extremity: WFL    AMPAC 6 Click ADL:  AMPAC Total Score: 21    Treatment & Education:  -Evaluation completed, plan of care established.  -Patient and family educated on roles/goals of OT and POC.  -White board updated.  -Therapist provided time for questions and answered within scope of practice.  -Patient educated on importance of EOB/OOB activity to maximize recovery.  Education:    Patient left supine with all lines intact, call button in reach and nursing present    GOALS:   Multidisciplinary Problems     Occupational Therapy Goals        Problem: Occupational Therapy Goal    Goal Priority Disciplines Outcome Interventions   Occupational Therapy Goal     OT, PT/OT Ongoing, Progressing    Description: Goals to be met by: 10/3/20     Patient will increase functional independence with ADLs by performing:    UE Dressing with Set-up Assistance.  LE Dressing with Set-up Assistance.  Grooming while standing at sink with Modified [place].  Toileting from toilet with Modified [place] for hygiene and clothing management.   Supine to sit with Modified [place].  Step transfer with Modified [place].  Toilet transfer to toilet with Modified [place].                     History:     Past Medical History:   Diagnosis Date    Alcohol abuse     Anxiety     Depression     Hyperlipidemia     Hypertension        Past Surgical History:   Procedure Laterality Date    BREAST CYST ASPIRATION      CHOLECYSTECTOMY      complicated with bile leak, sepsis    COLONOSCOPY N/A 6/3/2020    Procedure: COLONOSCOPY [place];  Surgeon: Tino Neil MD;  [facility];  Service: Colon and Rectal;  Laterality: N/A;     ESOPHAGOGASTRODUODENOSCOPY N/A 6/3/2020    Procedure: EGD (ESOPHAGOGASTRODUODENOSCOPY);  Surgeon: Tino Neli MD;  Location: [facility];  Service: Colon and Rectal;  Laterality: N/A;    TRANSFORAMINAL EPIDURAL INJECTION OF STEROID Bilateral 8/7/2020    Procedure: INJECTION, STEROID, EPIDURAL, TRANSFORAMINAL APPROACH, L4-L5 need consent;  Surgeon: Rubén Rico MD;  Location: [facility];  Service: Pain Management;  Laterality: Bilateral;    TRANSFORAMINAL EPIDURAL INJECTION OF STEROID Bilateral 9/4/2020    Procedure: LUMBAR TRANSFORAMINAL BILATERAL L4/5 DIRECT REFERRAL;  Surgeon: Rubén Rico MD;  Location: [facility];  Service: Pain Management;  Laterality: Bilateral;  NEEDS CONSENT       Time Tracking:     OT Date of Treatment: 10/03/20  OT Start Time: 1145  OT Stop Time: 1210  OT Total Time (min): 25 min    Billable Minutes:Evaluation 10  Self Care/Home Management 15    Lillian Ricci OT  10/3/2020

## 2020-10-03 NOTE — PLAN OF CARE
Problem: Adult Inpatient Plan of Care  Goal: Absence of Hospital-Acquired Illness or Injury  Intervention: Identify and Manage Fall Risk  Flowsheets (Taken 10/3/2020 0550)  Safety Promotion/Fall Prevention:   assistive device/personal item within reach   bed alarm set   commode/urinal/bedpan at bedside   nonskid shoes/socks when out of bed   side rails raised x 2   supervised activity   instructed to call staff for mobility  Intervention: Prevent VTE (venous thromboembolism)  Flowsheets (Taken 10/3/2020 0550)  VTE Prevention/Management: remove, assess skin and reapply sequential compression device  Goal: Optimal Comfort and Wellbeing  Intervention: Provide Person-Centered Care  Flowsheets (Taken 10/3/2020 0550)  Trust Relationship/Rapport:   care explained   choices provided   emotional support provided   empathic listening provided   questions answered   questions encouraged   reassurance provided   thoughts/feelings acknowledged     Problem: Fall Injury Risk  Goal: Absence of Fall and Fall-Related Injury  Intervention: Identify and Manage Contributors to Fall Injury Risk  Flowsheets (Taken 10/3/2020 0550)  Self-Care Promotion:   independence encouraged   BADL personal objects within reach  Medication Review/Management: medications reviewed  Intervention: Promote Injury-Free Environment  Flowsheets (Taken 10/3/2020 0550)  Safety Promotion/Fall Prevention:   assistive device/personal item within reach   bed alarm set   commode/urinal/bedpan at bedside   nonskid shoes/socks when out of bed   side rails raised x 2   supervised activity   instructed to call staff for mobility     Problem: Pain Acute  Goal: Optimal Pain Control  Intervention: Develop Pain Management Plan  Flowsheets (Taken 10/3/2020 0550)  Pain Management Interventions:   care clustered   pillow support provided   quiet environment facilitated  Intervention: Prevent or Manage Pain  Flowsheets (Taken 10/3/2020 0550)  Sleep/Rest Enhancement: awakenings  minimized   AAOX4 vitals documented, iv intact and patent, hemovacs in place output documented. Sx dry clean and intact. Pain controlled with pain medication. Bedside commode at bedside. Safety measures in place, call bell with in reach. No falls at this time.

## 2020-10-03 NOTE — PLAN OF CARE
Evaluation completed and plan of care established.  Discharge recommendations: Home with Home Health    Problem: Occupational Therapy Goal  Goal: Occupational Therapy Goal  Description: Goals to be met by: 10/3/20     Patient will increase functional independence with ADLs by performing:    UE Dressing with Set-up Assistance.  LE Dressing with Set-up Assistance.  Grooming while standing at sink with Modified [place].  Toileting from toilet with Modified [place] for hygiene and clothing management.   Supine to sit with Modified [place].  Step transfer with Modified [place].  Toilet transfer to toilet with Modified [place].    Outcome: Ongoing, Progressing

## 2020-10-03 NOTE — PT/OT/SLP EVAL
"Physical Therapy Evaluation    Patient Name:  Vale Gutierrez   MRN:  [id number]    Recommendations:     Discharge Recommendations:  home health PT   Discharge Equipment Recommendations: walker, rolling, bedside commode   Barriers to discharge: Inaccessible home and Decreased caregiver support 2 FERNANDO and her sister works    Assessment:     Vale Gutierrez is a 59 y.o. female admitted with a medical diagnosis of S/P lumbar fusion.  She presents with the following impairments/functional limitations:  gait instability, impaired balance, impaired functional mobilty, weakness, pain, decreased safety awareness . Pt is unsafe with functional mobility at this time due to pt requires supervision assist for bed mobility, SBA for transfers, and SBA for gait. Pt c/o shakiness at times during mobility. Pt is motivated to progress with functional mobility.    Rehab Prognosis: Good; patient would benefit from acute skilled PT services to address these deficits and reach maximum level of function.    Recent Surgery: Procedure(s) (LRB):  FUSION, SPINE, LUMBAR, TLIF, POSTERIOR APPROACH, USING PEDICLE SCREW L4/5  (N/A) 2 Days Post-Op    Plan:     During this hospitalization, patient to be seen 5 x/week to address the identified rehab impairments via gait training, therapeutic activities, therapeutic exercises, neuromuscular re-education and progress toward the following goals:    · Plan of Care Expires:  11/02/20    Subjective   Pt c/o shakiness at times during mobility "I don't know why this is happening"    Pain/Comfort:  · Pain Rating 1: 8/10  · Location - Orientation 1: generalized  · Location 1: back  · Pain Addressed 1: Reposition, Cessation of Activity  · Pain Rating Post-Intervention 1: 8/10    Patients cultural, spiritual, Amish conflicts given the current situation: no    Living Environment:  Pt lives with her sister in a 1st floor apt with no FERNANDO. Pt's sister works during the day.  Prior to admission, patients " level of function was independent, used the rollator at times due to back pain.  Equipment used at home: cane, straight, shower chair, rollator.   Upon discharge, patient will have assistance from sister when she isn't working.    Objective:     Communicated with nurse prior to session.  Patient found supine with pulse ox (continuous), telemetry, oxygen  upon PT entry to room.    General Precautions: Standard, fall   Orthopedic Precautions:spinal precautions   Braces: N/A     Exams:  · Cognitive Exam:  Patient is oriented to Person, Place and Time  · Sensation:    · -       Intact  light/touch B LE  · RLE ROM: WFL  · RLE Strength: WFL except hip flex 3+/5  · LLE ROM: WFL  · LLE Strength: WFL except hip flex 3+/5    Functional Mobility:  · Bed Mobility:     · Rolling Left:  supervision  · Supine to Sit: supervision  · Transfers:     · Sit to Stand:  supervision with 4 wheeled walker  · Transfer bed to/from bedside commode with no AD with SBA  · Gait: 50ft then 40ft with rollator with SBA. Pt had episodes of shaking in her UEs during gait with no instability noted. pt performed gait with flexed trunk, decreased step length, and at slow pace. Pt's own rollator adjusted to pt's height to facilitate more upright posture.     Therapeutic Activities and Exercises:   pt urinated on the commode x 2 trials during treatment; pt able to clean herself with set up assist.     AM-PAC 6 CLICK MOBILITY  Total Score:19     Patient left up in chair with all lines intact, call button in reach and nurse notified.    GOALS:   Multidisciplinary Problems     Physical Therapy Goals        Problem: Physical Therapy Goal    Goal Priority Disciplines Outcome Goal Variances Interventions   Physical Therapy Goal     PT, PT/OT Ongoing, Progressing     Description: PT goals until 10/10/20    1. Pt supine to sit with mod independent-not met  2. Pt sit to supine with mod independent-not met  3. Pt sit to stand with RW with mod independent-not  met  4. Pt to perform gait 150ft with RW with supervision.-not met  5. Pt to go up/down curb step with RW with SBA.-not met  6. Pt to perform B LE exs in sitting or supine x 10 reps to strengthen B LE to improve functional mobility.-not met                     History:     Past Medical History:   Diagnosis Date    Alcohol abuse     Anxiety     Depression     Hyperlipidemia     Hypertension        Past Surgical History:   Procedure Laterality Date    BREAST CYST ASPIRATION      CHOLECYSTECTOMY      complicated with bile leak, sepsis    COLONOSCOPY N/A 6/3/2020    Procedure: COLONOSCOPY [place];  Surgeon: Tino Neil MD;  Location: [facility];  Service: Colon and Rectal;  Laterality: N/A;    ESOPHAGOGASTRODUODENOSCOPY N/A 6/3/2020    Procedure: EGD (ESOPHAGOGASTRODUODENOSCOPY);  Surgeon: Tino Neil MD;  Location: [facility];  Service: Colon and Rectal;  Laterality: N/A;    TRANSFORAMINAL EPIDURAL INJECTION OF STEROID Bilateral 8/7/2020    Procedure: INJECTION, STEROID, EPIDURAL, TRANSFORAMINAL APPROACH, L4-L5 need consent;  Surgeon: Rubén Rico MD;  Location: [facility];  Service: Pain Management;  Laterality: Bilateral;    TRANSFORAMINAL EPIDURAL INJECTION OF STEROID Bilateral 9/4/2020    Procedure: LUMBAR TRANSFORAMINAL BILATERAL L4/5 DIRECT REFERRAL;  Surgeon: Rubén Rico MD;  Location: [facility];  Service: Pain Management;  Laterality: Bilateral;  NEEDS CONSENT       Time Tracking:     PT Received On: 10/03/20  PT Start Time: 1345     PT Stop Time: 1410  PT Total Time (min): 25 min     Billable Minutes: Evaluation 15 and Gait Training 10      Lora Zhao, PT  10/03/2020

## 2020-10-03 NOTE — ASSESSMENT & PLAN NOTE
Vale Gutierrez is a 59 y.o. female POD1 s/p L4-5 TLIF 10/2/20    Pain control: multimodal  PT/OT: WBAT spine precautions  DVT PPx: FCDs at all times when not ambulating  Abx: postop Ancef  Labs: pending  Drain: deep drain: 120cc/24hr; superficial drain 35 cc/24hr  [place]: DC today    Dispo: f/u PT recs

## 2020-10-03 NOTE — PROGRESS NOTES
[facility]  Orthopedics  Progress Note    Patient Name: Vale Gutierrez  MRN: [id number]  Admission Date: 10/1/2020  Hospital Length of Stay: 2 days  Attending Provider: Patrick Rivas MD  Primary Care Provider: Estela Mcdaniel MD  Follow-up For: Procedure(s) (LRB):  FUSION, SPINE, LUMBAR, TLIF, POSTERIOR APPROACH, USING PEDICLE SCREW L4/5  (N/A)    Post-Operative Day: 2 Days Post-Op  Subjective:     Principal Problem:S/P lumbar fusion    Principal Orthopedic Problem: same    Interval History: Patient seen and examined at bedside.  No acute events overnight.  Pain controlled.  Slight tachy overnight, high of 108. Will work with PT/OT today.      Review of patient's allergies indicates:  No Known Allergies    Current Facility-Administered Medications   Medication    acetaminophen tablet 650 mg    benzocaine 10 % mucosal gel    buPROPion 24 hr tablet 300 mg    celecoxib capsule 100 mg    docusate sodium capsule 100 mg    famotidine tablet 20 mg    heparin (porcine) injection 5,000 Units    HYDROmorphone injection 1 mg    metoprolol tartrate (LOPRESSOR) tablet 50 mg    mupirocin 2 % ointment    oxyCODONE immediate release tablet 10 mg    oxyCODONE immediate release tablet 15 mg    oxyCODONE immediate release tablet 5 mg    pantoprazole EC tablet 40 mg    polyethylene glycol packet 17 g    potassium chloride CR capsule 10 mEq    pregabalin capsule 100 mg    sodium chloride 0.9% flush 10 mL    sodium chloride 0.9% flush 10 mL    venlafaxine 24 hr capsule 150 mg    venlafaxine 24 hr capsule 75 mg     Objective:     Vital Signs (Most Recent):  Temp: 97.6 °F (36.4 °C) (10/03/20 0500)  Pulse: 97 (10/03/20 0500)  Resp: 18 (10/03/20 0511)  BP: (!) 105/57 (10/03/20 0827)  SpO2: (!) 94 % (10/03/20 0500) Vital Signs (24h Range):  Temp:  [96.6 °F (35.9 °C)-98.7 °F (37.1 °C)] 97.6 °F (36.4 °C)  Pulse:  [88-97] 97  Resp:  [14-19] 18  SpO2:  [93 %-95 %] 94 %  BP: (103-123)/(56-67)  "105/57     Weight: 93 kg (205 lb)  Height: 5' 4" (162.6 cm)  Body mass index is 35.19 kg/m².      Intake/Output Summary (Last 24 hours) at 10/3/2020 0858  Last data filed at 10/3/2020 0517  Gross per 24 hour   Intake 360 ml   Output 1705 ml   Net -1345 ml       Ortho/SPM Exam  AAOx4  NAD  Reg rate  No increased WOB  Dressing c/d/i  Drainsx2 in place with ss output  SILT T/SP/DP/[place]/Sa  Motor intact T/SP/DP  WWP extremities  FCDs in place and functioning    Significant Labs:   pending    Significant Imaging: None    Assessment/Plan:     * S/P lumbar fusion  Vale Gutierrez is a 59 y.o. female POD1 s/p L4-5 TLIF 10/2/20    Pain control: multimodal  PT/OT: WBAT spine precautions  DVT PPx: FCDs at all times when not ambulating  Abx: postop Ancef  Labs: pending  Drain: deep drain: 120cc/24hr; superficial drain 35 cc/24hr  [place]: LUCY today    Dispo: f/u PT recs          Mariano Villatoro MD  Orthopedics  [facility]  "

## 2020-10-03 NOTE — SUBJECTIVE & OBJECTIVE
"Principal Problem:S/P lumbar fusion    Principal Orthopedic Problem: same    Interval History: Patient seen and examined at bedside.  No acute events overnight.  Pain controlled.  Slight tachy overnight, high of 108. Will work with PT/OT today.      Review of patient's allergies indicates:  No Known Allergies    Current Facility-Administered Medications   Medication    acetaminophen tablet 650 mg    benzocaine 10 % mucosal gel    buPROPion 24 hr tablet 300 mg    celecoxib capsule 100 mg    docusate sodium capsule 100 mg    famotidine tablet 20 mg    heparin (porcine) injection 5,000 Units    HYDROmorphone injection 1 mg    metoprolol tartrate (LOPRESSOR) tablet 50 mg    mupirocin 2 % ointment    oxyCODONE immediate release tablet 10 mg    oxyCODONE immediate release tablet 15 mg    oxyCODONE immediate release tablet 5 mg    pantoprazole EC tablet 40 mg    polyethylene glycol packet 17 g    potassium chloride CR capsule 10 mEq    pregabalin capsule 100 mg    sodium chloride 0.9% flush 10 mL    sodium chloride 0.9% flush 10 mL    venlafaxine 24 hr capsule 150 mg    venlafaxine 24 hr capsule 75 mg     Objective:     Vital Signs (Most Recent):  Temp: 97.6 °F (36.4 °C) (10/03/20 0500)  Pulse: 97 (10/03/20 0500)  Resp: 18 (10/03/20 0511)  BP: (!) 105/57 (10/03/20 0827)  SpO2: (!) 94 % (10/03/20 0500) Vital Signs (24h Range):  Temp:  [96.6 °F (35.9 °C)-98.7 °F (37.1 °C)] 97.6 °F (36.4 °C)  Pulse:  [88-97] 97  Resp:  [14-19] 18  SpO2:  [93 %-95 %] 94 %  BP: (103-123)/(56-67) 105/57     Weight: 93 kg (205 lb)  Height: 5' 4" (162.6 cm)  Body mass index is 35.19 kg/m².      Intake/Output Summary (Last 24 hours) at 10/3/2020 0858  Last data filed at 10/3/2020 0517  Gross per 24 hour   Intake 360 ml   Output 1705 ml   Net -1345 ml       Ortho/SPM Exam  AAOx4  NAD  Reg rate  No increased WOB  Dressing c/d/i  Drainsx2 in place with ss output  SILT T/SP/DP/[place]/Sa  Motor intact T/SP/DP  WWP extremities  FCDs in " place and functioning    Significant Labs:   pending    Significant Imaging: None

## 2020-10-04 VITALS
HEIGHT: 64 IN | DIASTOLIC BLOOD PRESSURE: 55 MMHG | BODY MASS INDEX: 35 KG/M2 | RESPIRATION RATE: 16 BRPM | WEIGHT: 205 LBS | OXYGEN SATURATION: 95 % | HEART RATE: 94 BPM | TEMPERATURE: 98 F | SYSTOLIC BLOOD PRESSURE: 108 MMHG

## 2020-10-04 PROCEDURE — 25000003 PHARM REV CODE 250: Performed by: STUDENT IN AN ORGANIZED HEALTH CARE EDUCATION/TRAINING PROGRAM

## 2020-10-04 PROCEDURE — 94761 N-INVAS EAR/PLS OXIMETRY MLT: CPT

## 2020-10-04 PROCEDURE — 63600175 PHARM REV CODE 636 W HCPCS: Performed by: PHYSICIAN ASSISTANT

## 2020-10-04 PROCEDURE — 25000003 PHARM REV CODE 250: Performed by: PHYSICIAN ASSISTANT

## 2020-10-04 PROCEDURE — 99900035 HC TECH TIME PER 15 MIN (STAT)

## 2020-10-04 PROCEDURE — 63600175 PHARM REV CODE 636 W HCPCS: Performed by: STUDENT IN AN ORGANIZED HEALTH CARE EDUCATION/TRAINING PROGRAM

## 2020-10-04 RX ORDER — SODIUM CHLORIDE 0.9 % (FLUSH) 0.9 %
10 SYRINGE (ML) INJECTION
Status: DISCONTINUED | OUTPATIENT
Start: 2020-10-04 | End: 2020-10-04 | Stop reason: HOSPADM

## 2020-10-04 RX ORDER — MUPIROCIN 20 MG/G
OINTMENT TOPICAL 2 TIMES DAILY
Status: DISCONTINUED | OUTPATIENT
Start: 2020-10-04 | End: 2020-10-04 | Stop reason: HOSPADM

## 2020-10-04 RX ADMIN — ACETAMINOPHEN 650 MG: 325 TABLET ORAL at 06:10

## 2020-10-04 RX ADMIN — OXYCODONE HYDROCHLORIDE 15 MG: 10 TABLET ORAL at 06:10

## 2020-10-04 RX ADMIN — OXYCODONE HYDROCHLORIDE 10 MG: 10 TABLET ORAL at 12:10

## 2020-10-04 RX ADMIN — HYDROMORPHONE HYDROCHLORIDE 1 MG: 1 INJECTION, SOLUTION INTRAMUSCULAR; INTRAVENOUS; SUBCUTANEOUS at 09:10

## 2020-10-04 RX ADMIN — BUPROPION HYDROCHLORIDE 300 MG: 150 TABLET, FILM COATED, EXTENDED RELEASE ORAL at 06:10

## 2020-10-04 RX ADMIN — HEPARIN SODIUM 5000 UNITS: 5000 INJECTION INTRAVENOUS; SUBCUTANEOUS at 06:10

## 2020-10-04 NOTE — ASSESSMENT & PLAN NOTE
Vale Gutierrez is a 59 y.o. female POD1 s/p L4-5 TLIF 10/2/20    Pain control: multimodal  PT/OT: WBAT spine precautions  DVT PPx: FCDs at all times when not ambulating  Abx: postop Ancef  Labs: pending  Drain: deep drain: 102cc/24hr; superficial drain 2 cc/24hr  [place]: DC today    Dispo: f/u PT recs

## 2020-10-04 NOTE — PROGRESS NOTES
[facility]  Orthopedics  Progress Note    Patient Name: Vale Gutierrez  MRN: [id number]  Admission Date: 10/1/2020  Hospital Length of Stay: 3 days  Attending Provider: Patrick Rivas MD  Primary Care Provider: Estela Mcdaniel MD  Follow-up For: Procedure(s) (LRB):  FUSION, SPINE, LUMBAR, TLIF, POSTERIOR APPROACH, USING PEDICLE SCREW L4/5  (N/A)    Post-Operative Day: 3 Days Post-Op  Subjective:     Principal Problem:S/P lumbar fusion    Principal Orthopedic Problem: same    Interval History: Patient seen and examined at bedside.  No acute events overnight.  Pain controlled. Ambulated 90 ft with PT. Deep drain 102cc/24hr, superficial 2.5cc/24hr.        Review of patient's allergies indicates:  No Known Allergies    Current Facility-Administered Medications   Medication    acetaminophen tablet 650 mg    benzocaine 10 % mucosal gel    buPROPion 24 hr tablet 300 mg    celecoxib capsule 100 mg    docusate sodium capsule 100 mg    famotidine tablet 20 mg    heparin (porcine) injection 5,000 Units    HYDROmorphone injection 1 mg    metoprolol tartrate (LOPRESSOR) tablet 50 mg    mupirocin 2 % ointment    oxyCODONE immediate release tablet 10 mg    oxyCODONE immediate release tablet 15 mg    oxyCODONE immediate release tablet 5 mg    pantoprazole EC tablet 40 mg    polyethylene glycol packet 17 g    potassium chloride CR capsule 10 mEq    pregabalin capsule 100 mg    sodium chloride 0.9% flush 10 mL    sodium chloride 0.9% flush 10 mL    venlafaxine 24 hr capsule 150 mg    venlafaxine 24 hr capsule 75 mg     Objective:     Vital Signs (Most Recent):  Temp: 98 °F (36.7 °C) (10/04/20 0411)  Pulse: 94 (10/04/20 0411)  Resp: 17 (10/04/20 0611)  BP: (!) 108/55 (10/04/20 0411)  SpO2: 95 % (10/04/20 0411) Vital Signs (24h Range):  Temp:  [96.4 °F (35.8 °C)-99.4 °F (37.4 °C)] 98 °F (36.7 °C)  Pulse:  [] 94  Resp:  [15-18] 17  SpO2:  [92 %-96 %] 95 %  BP: ()/(55-69)  "108/55     Weight: 93 kg (205 lb)  Height: 5' 4" (162.6 cm)  Body mass index is 35.19 kg/m².      Intake/Output Summary (Last 24 hours) at 10/4/2020 0705  Last data filed at 10/4/2020 0620  Gross per 24 hour   Intake 1250 ml   Output 455 ml   Net 795 ml       Ortho/SPM Exam  AAOx4  NAD  Reg rate  No increased WOB  Dressing c/d/i  Drainsx2 in place with ss output  SILT T/SP/DP/Seymour/Sa  Motor intact T/SP/DP  WWP extremities  FCDs in place and functioning    Significant Labs:   pending    Significant Imaging: None    Assessment/Plan:     * S/P lumbar fusion  Vale Gutierrez is a 59 y.o. female POD1 s/p L4-5 TLIF 10/2/20    Pain control: multimodal  PT/OT: WBAT spine precautions  DVT PPx: FCDs at all times when not ambulating  Abx: postop Ancef  Labs: pending  Drain: deep drain: 102cc/24hr; superficial drain 2 cc/24hr  Ivelisse: LUCY today    Dispo: f/u PT recs          Mariano Villatoro MD  Orthopedics  [facility]  "

## 2020-10-04 NOTE — SUBJECTIVE & OBJECTIVE
"Principal Problem:S/P lumbar fusion    Principal Orthopedic Problem: same    Interval History: Patient seen and examined at bedside.  No acute events overnight.  Pain controlled. Ambulated 90 ft with PT. Deep drain 102cc/24hr, superficial 2.5cc/24hr.        Review of patient's allergies indicates:  No Known Allergies    Current Facility-Administered Medications   Medication    acetaminophen tablet 650 mg    benzocaine 10 % mucosal gel    buPROPion 24 hr tablet 300 mg    celecoxib capsule 100 mg    docusate sodium capsule 100 mg    famotidine tablet 20 mg    heparin (porcine) injection 5,000 Units    HYDROmorphone injection 1 mg    metoprolol tartrate (LOPRESSOR) tablet 50 mg    mupirocin 2 % ointment    oxyCODONE immediate release tablet 10 mg    oxyCODONE immediate release tablet 15 mg    oxyCODONE immediate release tablet 5 mg    pantoprazole EC tablet 40 mg    polyethylene glycol packet 17 g    potassium chloride CR capsule 10 mEq    pregabalin capsule 100 mg    sodium chloride 0.9% flush 10 mL    sodium chloride 0.9% flush 10 mL    venlafaxine 24 hr capsule 150 mg    venlafaxine 24 hr capsule 75 mg     Objective:     Vital Signs (Most Recent):  Temp: 98 °F (36.7 °C) (10/04/20 0411)  Pulse: 94 (10/04/20 0411)  Resp: 17 (10/04/20 0611)  BP: (!) 108/55 (10/04/20 0411)  SpO2: 95 % (10/04/20 0411) Vital Signs (24h Range):  Temp:  [96.4 °F (35.8 °C)-99.4 °F (37.4 °C)] 98 °F (36.7 °C)  Pulse:  [] 94  Resp:  [15-18] 17  SpO2:  [92 %-96 %] 95 %  BP: ()/(55-69) 108/55     Weight: 93 kg (205 lb)  Height: 5' 4" (162.6 cm)  Body mass index is 35.19 kg/m².      Intake/Output Summary (Last 24 hours) at 10/4/2020 0705  Last data filed at 10/4/2020 0620  Gross per 24 hour   Intake 1250 ml   Output 455 ml   Net 795 ml       Ortho/SPM Exam  AAOx4  NAD  Reg rate  No increased WOB  Dressing c/d/i  Drainsx2 in place with ss output  SILT T/SP/DP/Seymour/Sa  Motor intact T/SP/DP  WWP extremities  FCDs in " place and functioning    Significant Labs:   pending    Significant Imaging: None

## 2020-10-04 NOTE — PLAN OF CARE
10/04/20 1155   Post-Acute Status   Post-Acute Authorization Home Health   Home Health Status Referrals Sent        sent referral to [facility] (pt choice). Awaiting auth/assignment from N. Contacted on-call at [facility] to determine assignment. Rep took message to pass to on-call nurse for f/u.     SW will con't to follow.     12:00 PM    PHN returned SW call. Stated HH orders submitted w/out cosign. Stated need for orders with cosign.     IVAN contacted tx team to request updated order with cosign.     Awaiting co-signed orders.       Alicia Peres LMSW  Case Management Social Worker   [facility]

## 2020-10-05 NOTE — TELEPHONE ENCOUNTER
Spoke with pt and advised that per Dr. Rivas, she can take two of the Roxicodone instead of one to healp control the pain. Patient verbalized understanding.

## 2020-10-05 NOTE — TELEPHONE ENCOUNTER
----- Message from Rebecca Marquis sent at 10/5/2020  4:09 PM CDT -----  Pt asking for a callback regarding to pain she's having please contact pt         Contact info [phone]

## 2020-10-05 NOTE — PLAN OF CARE
IVAN faxed Signed HH orders via [facility] to [facility] agency. Patient has been discharged during the weekend.    Kylie Mc LMSW  Case Management   [facility]   Ext. [phone]

## 2020-10-06 ENCOUNTER — TELEPHONE (OUTPATIENT)
Dept: ORTHOPEDICS | Facility: CLINIC | Age: 60
End: 2020-10-06

## 2020-10-06 ENCOUNTER — PATIENT MESSAGE (OUTPATIENT)
Dept: RESEARCH | Facility: OTHER | Age: 60
End: 2020-10-06

## 2020-10-06 DIAGNOSIS — Z98.1 S/P LUMBAR FUSION: Primary | ICD-10-CM

## 2020-10-06 DIAGNOSIS — M43.27 FUSION OF SPINE, LUMBOSACRAL REGION: ICD-10-CM

## 2020-10-06 RX ORDER — OXYCODONE HYDROCHLORIDE 5 MG/1
5 TABLET ORAL EVERY 4 HOURS PRN
Qty: 30 TABLET | Refills: 0 | Status: SHIPPED | OUTPATIENT
Start: 2020-10-06 | End: 2020-10-12 | Stop reason: SDUPTHER

## 2020-10-06 NOTE — DISCHARGE SUMMARY
[facility]  Orthopedics  Discharge Summary      Patient Name: Vale Gutierrez  MRN: [id number]  Admission Date: 10/1/2020  Hospital Length of Stay: 3 days  Discharge Date and Time: 10/4/2020 12:10 PM  Attending Physician: Dr. Patrick Rivas  Discharging Provider: Mariano Villatoro MD  Primary Care Provider: Estela Mcdaniel MD    HPI: Vale Gutierrez is a 59 y.o. female previously seen by Dr. Rico here for initial evaluation of low back and bilateral, R>L, leg pain (Back - 6, Leg - 6).  The pain in the leg is what bothers her most.  The pain has been present for about 4 months. The patient describes the pain as aching and sharp.  The pain is worse with standing and walking and improved by sitting or laying down. There is associated numbness and tingling. There is subjective weakness.  She says she is having difficulty walking due to pain.  Prior treatments have included voltaren, gabapentin, tramadol, and an WERO about a week ago, but no surgery.  She says the injection may have helped some but she is having a lot of pain still.      The patient denies myelopathic symptoms such as handwriting changes or difficulty with buttons/coins/keys. Denies perineal paresthesias, bowel/bladder dysfunction.      interval history 09/18/2020:   Patient presents today for preop evaluation   And to sign consents. No change in exam.    Procedure(s) (LRB):  FUSION, SPINE, LUMBAR, TLIF, POSTERIOR APPROACH, USING PEDICLE SCREW L4/5  (N/A)      Hospital Course: Patient underwent surgery noted above on 10/1/20. The patient was admitted after uncomplicated surgery. Pt pain was well controlled through stay. Patient mobilized well with PT and had drain dced on 10/4/20. Pt will fu in clinic.        Significant Diagnostic Studies: No pertinent studies.    Pending Diagnostic Studies:     None        Final Active Diagnoses:    Diagnosis Date Noted POA    PRINCIPAL PROBLEM:  S/P lumbar fusion [Z98.1] 10/01/2020  Not Applicable      Problems Resolved During this Admission:      Discharged Condition: good    Disposition: Home or Self Care    Follow Up:    Patient Instructions:      Diet general     Call MD for:  temperature >100.4     Call MD for:  persistent nausea and vomiting     Call MD for:  severe uncontrolled pain     Call MD for:  difficulty breathing, headache or visual disturbances     Call MD for:  redness, tenderness, or signs of infection (pain, swelling, redness, odor or green/yellow discharge around incision site)     Call MD for:  hives     Call MD for:  persistent dizziness or light-headedness     Call MD for:  extreme fatigue     Medications:  Reconciled Home Medications:      Medication List      START taking these medications    celecoxib 100 MG capsule  Commonly known as: CeleBREX  Take 1 capsule (100 mg total) by mouth 2 (two) times daily.     ondansetron 4 MG Tbdl  Commonly known as: ZOFRAN-ODT  Take 1 tablet (4 mg total) by mouth every 8 (eight) hours as needed.     oxyCODONE 5 MG immediate release tablet  Commonly known as: ROXICODONE  Take 1 tablet (5 mg total) by mouth every 4 (four) hours as needed for Pain.     PAIN RELIEF EXTRA STRENGTH 500 MG tablet  Generic drug: acetaminophen  Take 1 tablet (500 mg total) by mouth every 12 (twelve) hours. for 7 days     STOOL SOFTENER 100 MG capsule  Generic drug: docusate sodium  Take 1 capsule (100 mg total) by mouth 2 (two) times daily.     tiZANidine 4 MG tablet  Commonly known as: ZANAFLEX  Take 1 tablet (4 mg total) by mouth every 6 (six) hours as needed.        CHANGE how you take these medications    metoprolol tartrate 50 MG tablet  Commonly known as: LOPRESSOR  Take 1 tablet (50 mg total) by mouth once daily.  What changed: additional instructions     omeprazole 20 MG capsule  Commonly known as: PRILOSEC  Take 1 capsule (20 mg total) by mouth 2 (two) times daily.  What changed: additional instructions     potassium chloride 10 MEQ Cpsr  Commonly  known as: MICRO-K  Take 1 capsule (10 mEq total) by mouth once daily.  What changed: additional instructions     pregabalin 100 MG capsule  Commonly known as: LYRICA  Take 1 capsule (100 mg total) by mouth 3 (three) times daily.  What changed: additional instructions        CONTINUE taking these medications    BC HEADACHE POWDER ORAL  Take 1 Package by mouth once daily. Hold medication one week prior to surgery     buPROPion 300 MG 24 hr tablet  Commonly known as: WELLBUTRIN XL  Take 300 mg by mouth every morning. Take am of surgery     * venlafaxine 75 MG 24 hr capsule  Commonly known as: EFFEXOR-XR  Take 75 mg by mouth once daily. Take am of surgery     * venlafaxine 150 MG Cp24  Commonly known as: EFFEXOR-XR  Take 150 mg by mouth once daily. Take am of surgery     VITAMIN D3 1000 units Tab  Generic drug: vitamin D  Take 5,000 Int'l Units by mouth. Hold am of surgery         * This list has 2 medication(s) that are the same as other medications prescribed for you. Read the directions carefully, and ask your doctor or other care provider to review them with you.            STOP taking these medications    diclofenac 75 MG EC tablet  Commonly known as: VOLTAREN     traMADoL 50 mg tablet  Commonly known as: ELISHA Villatoro MD  Orthopedics  [facility]

## 2020-10-06 NOTE — TELEPHONE ENCOUNTER
----- Message from Tomeka Loredo sent at 10/6/2020  8:51 AM CDT -----  Regarding: REFILL  Contact: SISTER - TU  Rx Refill/Request     Is this a Refill or New Rx:  REFILL    Rx Name and Strength:  oxyCODONE (ROXICODONE) 5 MG immediate release tablet    Preferred Pharmacy with phone number: [facility] - [place], [place] - [address] [phone] (Phone)   [phone] (Fax)      Communication Preference:  [phone]    Additional Information: Sister ask for a call

## 2020-10-08 PROCEDURE — G0180 MD CERTIFICATION HHA PATIENT: HCPCS | Mod: ,,, | Performed by: ORTHOPAEDIC SURGERY

## 2020-10-08 PROCEDURE — G0180 PR HOME HEALTH MD CERTIFICATION: ICD-10-PCS | Mod: ,,, | Performed by: ORTHOPAEDIC SURGERY

## 2020-10-09 ENCOUNTER — TELEPHONE (OUTPATIENT)
Dept: INTERNAL MEDICINE | Facility: CLINIC | Age: 60
End: 2020-10-09

## 2020-10-09 RX ORDER — OMEPRAZOLE 20 MG/1
20 CAPSULE, DELAYED RELEASE ORAL 2 TIMES DAILY
Qty: 60 CAPSULE | Refills: 3 | Status: SHIPPED | OUTPATIENT
Start: 2020-10-09 | End: 2021-07-26

## 2020-10-09 NOTE — TELEPHONE ENCOUNTER
The patient had a cbc and cmp drawn on 10/1.  She is scheduled for annual labs on 10/23.  Do we need to repeat these? Blood work was ordered on 9/3/20.    Potassium level 5.5 -- should she hold potassium?

## 2020-10-09 NOTE — TELEPHONE ENCOUNTER
----- Message from Tameka Piper sent at 10/9/2020 10:36 AM CDT -----  Contact: self [phone]  Pt states she had blood work done in the hospital and would like to know if the lab results can be used for her upcoming 10/30 physical appt. Pt also states she has questions about continuing to take potassium based on the lab results.  Please call and advise.

## 2020-10-12 DIAGNOSIS — Z98.890 S/P SPINAL SURGERY: Primary | ICD-10-CM

## 2020-10-12 DIAGNOSIS — Z98.890 S/P SPINAL SURGERY: ICD-10-CM

## 2020-10-12 RX ORDER — GABAPENTIN 300 MG/1
300 CAPSULE ORAL 3 TIMES DAILY
Qty: 90 CAPSULE | Refills: 11 | Status: CANCELLED | OUTPATIENT
Start: 2020-10-12 | End: 2021-10-12

## 2020-10-12 RX ORDER — TIZANIDINE 4 MG/1
4 TABLET ORAL EVERY 6 HOURS PRN
Qty: 40 TABLET | Refills: 0 | Status: SHIPPED | OUTPATIENT
Start: 2020-10-12 | End: 2020-10-22

## 2020-10-12 RX ORDER — TIZANIDINE 4 MG/1
4 TABLET ORAL EVERY 6 HOURS PRN
Qty: 40 TABLET | Refills: 0 | Status: SHIPPED | OUTPATIENT
Start: 2020-10-12 | End: 2020-10-12 | Stop reason: SDUPTHER

## 2020-10-12 RX ORDER — CELECOXIB 100 MG/1
100 CAPSULE ORAL 2 TIMES DAILY
Qty: 14 CAPSULE | Refills: 0 | Status: SHIPPED | OUTPATIENT
Start: 2020-10-12 | End: 2020-11-02

## 2020-10-12 RX ORDER — ACETAMINOPHEN 500 MG
500 TABLET ORAL EVERY 12 HOURS
Qty: 14 TABLET | Refills: 0 | Status: SHIPPED | OUTPATIENT
Start: 2020-10-12 | End: 2020-10-19

## 2020-10-12 RX ORDER — OXYCODONE HYDROCHLORIDE 5 MG/1
5 TABLET ORAL EVERY 4 HOURS PRN
Qty: 30 TABLET | Refills: 0 | Status: CANCELLED | OUTPATIENT
Start: 2020-10-12

## 2020-10-16 DIAGNOSIS — Z98.890 S/P SPINAL SURGERY: ICD-10-CM

## 2020-10-16 RX ORDER — OXYCODONE HYDROCHLORIDE 5 MG/1
5 TABLET ORAL EVERY 4 HOURS PRN
Qty: 24 TABLET | Refills: 0 | Status: SHIPPED | OUTPATIENT
Start: 2020-10-16 | End: 2020-11-02

## 2020-10-16 NOTE — TELEPHONE ENCOUNTER
----- Message from Mello Whitehead sent at 10/16/2020  1:07 PM CDT -----  Pt just had surgery, pt has some questions please call back. She left a message yesterday, no one called.       Contact Info [phone] (home)

## 2020-10-21 DIAGNOSIS — Z98.890 S/P SPINAL SURGERY: ICD-10-CM

## 2020-10-21 RX ORDER — OXYCODONE HYDROCHLORIDE 5 MG/1
5 TABLET ORAL EVERY 4 HOURS PRN
Qty: 24 TABLET | Refills: 0 | Status: CANCELLED | OUTPATIENT
Start: 2020-10-21

## 2020-10-21 RX ORDER — HYDROCODONE BITARTRATE AND ACETAMINOPHEN 5; 325 MG/1; MG/1
1 TABLET ORAL EVERY 6 HOURS PRN
Qty: 28 TABLET | Refills: 0 | Status: SHIPPED | OUTPATIENT
Start: 2020-10-21 | End: 2020-10-26 | Stop reason: SDUPTHER

## 2020-10-21 NOTE — TELEPHONE ENCOUNTER
----- Message from Giancarlo Lee sent at 10/21/2020  1:37 PM CDT -----  Contact: self  Pt is asking for a call in regards to questions about her refill   oxyCODONE (ROXICODONE) 5 MG immediate release tablet 24 tablet     Contact info- [phone]

## 2020-10-23 ENCOUNTER — LAB VISIT (OUTPATIENT)
Dept: LAB | Facility: HOSPITAL | Age: 60
End: 2020-10-23
Attending: HOSPITALIST
Payer: MEDICARE

## 2020-10-23 DIAGNOSIS — R73.9 ELEVATED BLOOD SUGAR: ICD-10-CM

## 2020-10-23 DIAGNOSIS — I10 ESSENTIAL HYPERTENSION: ICD-10-CM

## 2020-10-23 LAB
25(OH)D3+25(OH)D2 SERPL-MCNC: 99 NG/ML (ref 30–96)
CHOLEST SERPL-MCNC: 207 MG/DL (ref 120–199)
CHOLEST/HDLC SERPL: 3.7 {RATIO} (ref 2–5)
ESTIMATED AVG GLUCOSE: 100 MG/DL (ref 68–131)
HBA1C MFR BLD HPLC: 5.1 % (ref 4–5.6)
HDLC SERPL-MCNC: 56 MG/DL (ref 40–75)
HDLC SERPL: 27.1 % (ref 20–50)
LDLC SERPL CALC-MCNC: 130 MG/DL (ref 63–159)
NONHDLC SERPL-MCNC: 151 MG/DL
TRIGL SERPL-MCNC: 105 MG/DL (ref 30–150)
TSH SERPL DL<=0.005 MIU/L-ACNC: 1 UIU/ML (ref 0.4–4)

## 2020-10-23 PROCEDURE — 36415 COLL VENOUS BLD VENIPUNCTURE: CPT | Mod: PO

## 2020-10-23 PROCEDURE — 84443 ASSAY THYROID STIM HORMONE: CPT

## 2020-10-23 PROCEDURE — 80061 LIPID PANEL: CPT

## 2020-10-23 PROCEDURE — 83036 HEMOGLOBIN GLYCOSYLATED A1C: CPT

## 2020-10-23 PROCEDURE — 82306 VITAMIN D 25 HYDROXY: CPT

## 2020-10-24 ENCOUNTER — LAB VISIT (OUTPATIENT)
Dept: LAB | Facility: HOSPITAL | Age: 60
End: 2020-10-24
Attending: HOSPITALIST
Payer: MEDICARE

## 2020-10-24 DIAGNOSIS — I10 ESSENTIAL HYPERTENSION: ICD-10-CM

## 2020-10-24 LAB
BILIRUB UR QL STRIP: NEGATIVE
CLARITY UR REFRACT.AUTO: CLEAR
COLOR UR AUTO: NORMAL
GLUCOSE UR QL STRIP: NEGATIVE
HGB UR QL STRIP: NEGATIVE
KETONES UR QL STRIP: NEGATIVE
LEUKOCYTE ESTERASE UR QL STRIP: NEGATIVE
NITRITE UR QL STRIP: NEGATIVE
PH UR STRIP: 5 [PH] (ref 5–8)
PROT UR QL STRIP: NEGATIVE
SP GR UR STRIP: 1.01 (ref 1–1.03)
URN SPEC COLLECT METH UR: NORMAL

## 2020-10-24 PROCEDURE — 81003 URINALYSIS AUTO W/O SCOPE: CPT

## 2020-11-02 DIAGNOSIS — Z98.890 S/P SPINAL SURGERY: ICD-10-CM

## 2020-11-02 RX ORDER — HYDROCODONE BITARTRATE AND ACETAMINOPHEN 5; 325 MG/1; MG/1
1 TABLET ORAL EVERY 6 HOURS PRN
Qty: 28 TABLET | Refills: 0 | Status: SHIPPED | OUTPATIENT
Start: 2020-11-02 | End: 2020-11-06 | Stop reason: SDUPTHER

## 2020-11-03 ENCOUNTER — OFFICE VISIT (OUTPATIENT)
Dept: ORTHOPEDICS | Facility: CLINIC | Age: 60
End: 2020-11-03
Payer: MEDICARE

## 2020-11-03 ENCOUNTER — HOSPITAL ENCOUNTER (OUTPATIENT)
Dept: RADIOLOGY | Facility: HOSPITAL | Age: 60
Discharge: HOME | End: 2020-11-03
Attending: PHYSICIAN ASSISTANT
Payer: MEDICARE

## 2020-11-03 VITALS — HEIGHT: 64 IN | BODY MASS INDEX: 35 KG/M2 | WEIGHT: 205 LBS

## 2020-11-03 DIAGNOSIS — M43.27 FUSION OF SPINE, LUMBOSACRAL REGION: ICD-10-CM

## 2020-11-03 DIAGNOSIS — Z98.1 S/P LUMBAR FUSION: Primary | ICD-10-CM

## 2020-11-03 PROCEDURE — 99999 PR PBB SHADOW E&M-EST. PATIENT-LVL III: CPT | Mod: PBBFAC,,, | Performed by: PHYSICIAN ASSISTANT

## 2020-11-03 PROCEDURE — 99024 PR POST-OP FOLLOW-UP VISIT: ICD-10-PCS | Mod: S$GLB,,, | Performed by: PHYSICIAN ASSISTANT

## 2020-11-03 PROCEDURE — 72100 X-RAY EXAM L-S SPINE 2/3 VWS: CPT | Mod: 26,,, | Performed by: RADIOLOGY

## 2020-11-03 PROCEDURE — 72100 X-RAY EXAM L-S SPINE 2/3 VWS: CPT | Mod: TC

## 2020-11-03 PROCEDURE — 72100 XR LUMBAR SPINE AP AND LATERAL: ICD-10-PCS | Mod: 26,,, | Performed by: RADIOLOGY

## 2020-11-03 PROCEDURE — 99024 POSTOP FOLLOW-UP VISIT: CPT | Mod: S$GLB,,, | Performed by: PHYSICIAN ASSISTANT

## 2020-11-03 PROCEDURE — 99999 PR PBB SHADOW E&M-EST. PATIENT-LVL III: ICD-10-PCS | Mod: PBBFAC,,, | Performed by: PHYSICIAN ASSISTANT

## 2020-11-03 RX ORDER — DICLOFENAC SODIUM 75 MG/1
75 TABLET, DELAYED RELEASE ORAL 2 TIMES DAILY
Qty: 60 TABLET | Refills: 0 | Status: SHIPPED | OUTPATIENT
Start: 2020-11-03 | End: 2020-12-03

## 2020-11-03 RX ORDER — TIZANIDINE 4 MG/1
4 TABLET ORAL EVERY 6 HOURS PRN
Qty: 60 TABLET | Refills: 0 | Status: SHIPPED | OUTPATIENT
Start: 2020-11-03 | End: 2020-11-06 | Stop reason: SDUPTHER

## 2020-11-03 NOTE — PROGRESS NOTES
Date: 11/03/2020    Supervising Physician: Patrick Rivas M.D.    Date of Surgery: 10/1/2020    Procedure: L4/5 TLIF    History: Vale Gutierrez is seen today for follow-up following the above listed procedure. Overall the patient is doing well but today notes the pain in her legs is significantly improved but she still has some back pain.   Pain is well controlled with current pain medication.  She is taking zanaflex and norco for pain.  she denies fever, chills, and sweats since the time of the surgery.       Exam: Post op dressing taken down.  Incision is healing well, clean, dry and intact.   There is no sign of infection. Neuro exam is stable. No signs of DVT.    Radiographs: imaging today shows hardware in place, no evidence of failure.     Assessment/Plan: 4 weeks post op.    Doing well postoperatively.  reviewed.  No refills needed.  No lifting over 10lbs.     I will plan to see the patient back for the next postop visit in 3 months with imaging.     Thank you for the opportunity to participate in this patient's care. Please give me a call if there are any concerns or questions.

## 2020-11-06 DIAGNOSIS — Z98.890 S/P SPINAL SURGERY: ICD-10-CM

## 2020-11-06 RX ORDER — TIZANIDINE 4 MG/1
4 TABLET ORAL EVERY 6 HOURS PRN
Qty: 60 TABLET | Refills: 0 | Status: SHIPPED | OUTPATIENT
Start: 2020-11-06 | End: 2020-11-16

## 2020-11-06 RX ORDER — HYDROCODONE BITARTRATE AND ACETAMINOPHEN 5; 325 MG/1; MG/1
1 TABLET ORAL EVERY 8 HOURS PRN
Qty: 21 TABLET | Refills: 0 | Status: SHIPPED | OUTPATIENT
Start: 2020-11-06 | End: 2020-11-12 | Stop reason: SDUPTHER

## 2020-11-10 ENCOUNTER — EXTERNAL HOME HEALTH (OUTPATIENT)
Dept: HOME HEALTH SERVICES | Facility: HOSPITAL | Age: 60
End: 2020-11-10
Payer: MEDICARE

## 2020-11-11 ENCOUNTER — DOCUMENT SCAN (OUTPATIENT)
Dept: HOME HEALTH SERVICES | Facility: HOSPITAL | Age: 60
End: 2020-11-11
Payer: MEDICARE

## 2020-11-12 DIAGNOSIS — Z98.890 S/P SPINAL SURGERY: ICD-10-CM

## 2020-11-13 RX ORDER — HYDROCODONE BITARTRATE AND ACETAMINOPHEN 5; 325 MG/1; MG/1
1 TABLET ORAL EVERY 12 HOURS PRN
Qty: 14 TABLET | Refills: 0 | Status: SHIPPED | OUTPATIENT
Start: 2020-11-13 | End: 2020-11-18 | Stop reason: SDUPTHER

## 2020-11-18 DIAGNOSIS — Z98.890 S/P SPINAL SURGERY: ICD-10-CM

## 2020-11-19 RX ORDER — HYDROCODONE BITARTRATE AND ACETAMINOPHEN 5; 325 MG/1; MG/1
1 TABLET ORAL
Qty: 7 TABLET | Refills: 0 | Status: SHIPPED | OUTPATIENT
Start: 2020-11-19 | End: 2020-11-25 | Stop reason: SDUPTHER

## 2020-11-25 DIAGNOSIS — Z98.890 S/P SPINAL SURGERY: ICD-10-CM

## 2020-11-25 RX ORDER — HYDROCODONE BITARTRATE AND ACETAMINOPHEN 5; 325 MG/1; MG/1
1 TABLET ORAL
Qty: 7 TABLET | Refills: 0 | Status: SHIPPED | OUTPATIENT
Start: 2020-11-25 | End: 2021-12-06 | Stop reason: ALTCHOICE

## 2020-11-25 NOTE — TELEPHONE ENCOUNTER
Last refill of pain meds per Dr Rivas. Patient verbalized understanding.    ----- Message from Bandar Oneill, Patient Care Assistant sent at 11/25/2020  1:17 PM CST -----  Can the clinic reply in [facility]: No    Please refill the medication(s) listed below. The patient can be reached at this phone number once it is called into the pharmacy.[phone]    Medication #1HYDROcodone-acetaminophen (NORCO) 5-325 mg per tablet    Medication #2    Preferred Pharmacy:   [facility]  [place] [address] [address]

## 2020-12-06 ENCOUNTER — CLINICAL SUPPORT (OUTPATIENT)
Dept: URGENT CARE | Facility: CLINIC | Age: 60
End: 2020-12-06
Payer: MEDICARE

## 2020-12-06 DIAGNOSIS — Z03.818 ENCOUNTER FOR OBSERVATION FOR SUSPECTED EXPOSURE TO OTHER BIOLOGICAL AGENTS RULED OUT: Primary | ICD-10-CM

## 2020-12-06 LAB
CTP QC/QA: YES
SARS-COV-2 RDRP RESP QL NAA+PROBE: NEGATIVE

## 2020-12-06 PROCEDURE — U0002: ICD-10-PCS | Mod: QW,S$GLB,, | Performed by: PHYSICIAN ASSISTANT

## 2020-12-06 PROCEDURE — U0002 COVID-19 LAB TEST NON-CDC: HCPCS | Mod: QW,S$GLB,, | Performed by: PHYSICIAN ASSISTANT

## 2020-12-14 ENCOUNTER — TELEPHONE (OUTPATIENT)
Dept: ORTHOPEDICS | Facility: CLINIC | Age: 60
End: 2020-12-14

## 2020-12-14 DIAGNOSIS — M43.27 FUSION OF SPINE, LUMBOSACRAL REGION: ICD-10-CM

## 2020-12-15 ENCOUNTER — PATIENT OUTREACH (OUTPATIENT)
Dept: ADMINISTRATIVE | Facility: OTHER | Age: 60
End: 2020-12-15

## 2020-12-15 ENCOUNTER — OFFICE VISIT (OUTPATIENT)
Dept: ORTHOPEDICS | Facility: CLINIC | Age: 60
End: 2020-12-15
Payer: MEDICARE

## 2020-12-15 ENCOUNTER — HOSPITAL ENCOUNTER (OUTPATIENT)
Dept: RADIOLOGY | Facility: HOSPITAL | Age: 60
Discharge: HOME | End: 2020-12-15
Attending: PHYSICIAN ASSISTANT
Payer: MEDICARE

## 2020-12-15 VITALS — WEIGHT: 205 LBS | BODY MASS INDEX: 35 KG/M2 | HEIGHT: 64 IN

## 2020-12-15 DIAGNOSIS — Z98.1 S/P LUMBAR FUSION: Primary | ICD-10-CM

## 2020-12-15 DIAGNOSIS — M43.27 FUSION OF SPINE, LUMBOSACRAL REGION: ICD-10-CM

## 2020-12-15 PROCEDURE — 3008F BODY MASS INDEX DOCD: CPT | Mod: CPTII,S$GLB,, | Performed by: PHYSICIAN ASSISTANT

## 2020-12-15 PROCEDURE — 72100 X-RAY EXAM L-S SPINE 2/3 VWS: CPT | Mod: TC

## 2020-12-15 PROCEDURE — 3008F PR BODY MASS INDEX (BMI) DOCUMENTED: ICD-10-PCS | Mod: CPTII,S$GLB,, | Performed by: PHYSICIAN ASSISTANT

## 2020-12-15 PROCEDURE — 99024 POSTOP FOLLOW-UP VISIT: CPT | Mod: S$GLB,,, | Performed by: PHYSICIAN ASSISTANT

## 2020-12-15 PROCEDURE — 1125F AMNT PAIN NOTED PAIN PRSNT: CPT | Mod: S$GLB,,, | Performed by: PHYSICIAN ASSISTANT

## 2020-12-15 PROCEDURE — 72100 XR LUMBAR SPINE AP AND LATERAL: ICD-10-PCS | Mod: 26,,, | Performed by: RADIOLOGY

## 2020-12-15 PROCEDURE — 99999 PR PBB SHADOW E&M-EST. PATIENT-LVL III: CPT | Mod: PBBFAC,,, | Performed by: PHYSICIAN ASSISTANT

## 2020-12-15 PROCEDURE — 99999 PR PBB SHADOW E&M-EST. PATIENT-LVL III: ICD-10-PCS | Mod: PBBFAC,,, | Performed by: PHYSICIAN ASSISTANT

## 2020-12-15 PROCEDURE — 72100 X-RAY EXAM L-S SPINE 2/3 VWS: CPT | Mod: 26,,, | Performed by: RADIOLOGY

## 2020-12-15 PROCEDURE — 99024 PR POST-OP FOLLOW-UP VISIT: ICD-10-PCS | Mod: S$GLB,,, | Performed by: PHYSICIAN ASSISTANT

## 2020-12-15 PROCEDURE — 1125F PR PAIN SEVERITY QUANTIFIED, PAIN PRESENT: ICD-10-PCS | Mod: S$GLB,,, | Performed by: PHYSICIAN ASSISTANT

## 2020-12-15 RX ORDER — CELECOXIB 100 MG/1
100 CAPSULE ORAL 2 TIMES DAILY
Qty: 60 CAPSULE | Refills: 0 | Status: SHIPPED | OUTPATIENT
Start: 2020-12-15 | End: 2021-12-06

## 2020-12-15 RX ORDER — TRAMADOL HYDROCHLORIDE 50 MG/1
50 TABLET ORAL EVERY 4 HOURS PRN
Qty: 42 TABLET | Refills: 0 | Status: SHIPPED | OUTPATIENT
Start: 2020-12-15 | End: 2020-12-23 | Stop reason: SDUPTHER

## 2020-12-15 NOTE — PROGRESS NOTES
Date: 12/15/2020    Supervising Physician: Patrick Rivas M.D.    Date of Surgery: 10/1/2020    Procedure: L4/5 TLIF    History: Vale Gutierrez is seen today for follow-up following the above listed procedure. Overall the patient is doing well but today notes the pain in her legs is significantly improved but she still has some back pain.   She is not taking anything for pain right now.  She is at home.  She has not had any therapy since HH therapy.      Exam:  Incision is healing well.   There is no sign of infection. Neuro exam is stable. No signs of DVT.    Radiographs: imaging today shows hardware in place, no evidence of failure.     Assessment/Plan: 8 weeks post op.    Doing well postoperatively.  reviewed.  I will give a prescription for tramadol.  We discussed the department policy regarding pain medications and we will not refill this.  Referral for PT placed today.      I will plan to see the patient back for the next postop visit in 2 months with imaging.     Thank you for the opportunity to participate in this patient's care. Please give me a call if there are any concerns or questions.

## 2020-12-23 DIAGNOSIS — Z98.1 S/P LUMBAR FUSION: Primary | ICD-10-CM

## 2020-12-23 RX ORDER — TRAMADOL HYDROCHLORIDE 50 MG/1
50 TABLET ORAL EVERY 4 HOURS PRN
Qty: 42 TABLET | Refills: 0 | Status: SHIPPED | OUTPATIENT
Start: 2020-12-23 | End: 2020-12-30 | Stop reason: SDUPTHER

## 2020-12-30 RX ORDER — TIZANIDINE 4 MG/1
4 TABLET ORAL EVERY 6 HOURS PRN
Qty: 40 TABLET | Refills: 0 | Status: SHIPPED | OUTPATIENT
Start: 2020-12-30 | End: 2021-01-09

## 2020-12-30 RX ORDER — TRAMADOL HYDROCHLORIDE 50 MG/1
50 TABLET ORAL EVERY 4 HOURS PRN
Qty: 42 TABLET | Refills: 0 | Status: SHIPPED | OUTPATIENT
Start: 2020-12-30 | End: 2021-01-09

## 2021-01-04 ENCOUNTER — HOSPITAL ENCOUNTER (OUTPATIENT)
Dept: RADIOLOGY | Facility: HOSPITAL | Age: 61
Discharge: HOME | End: 2021-01-04
Attending: PHYSICIAN ASSISTANT
Payer: MEDICARE

## 2021-01-04 DIAGNOSIS — Z98.1 S/P LUMBAR FUSION: ICD-10-CM

## 2021-01-04 PROCEDURE — 25500020 PHARM REV CODE 255: Performed by: PHYSICIAN ASSISTANT

## 2021-01-04 PROCEDURE — 72158 MRI LUMBAR SPINE W/O & W/DYE: CPT | Mod: TC

## 2021-01-04 PROCEDURE — 72158 MRI LUMBAR SPINE W WO CONTRAST: ICD-10-PCS | Mod: 26,,, | Performed by: RADIOLOGY

## 2021-01-04 PROCEDURE — 72158 MRI LUMBAR SPINE W/O & W/DYE: CPT | Mod: 26,,, | Performed by: RADIOLOGY

## 2021-01-04 PROCEDURE — A9585 GADOBUTROL INJECTION: HCPCS | Performed by: PHYSICIAN ASSISTANT

## 2021-01-04 RX ORDER — GADOBUTROL 604.72 MG/ML
10 INJECTION INTRAVENOUS
Status: COMPLETED | OUTPATIENT
Start: 2021-01-04 | End: 2021-01-04

## 2021-01-04 RX ADMIN — GADOBUTROL 10 ML: 604.72 INJECTION INTRAVENOUS at 07:01

## 2021-01-06 ENCOUNTER — PATIENT MESSAGE (OUTPATIENT)
Dept: ORTHOPEDICS | Facility: CLINIC | Age: 61
End: 2021-01-06

## 2021-01-06 LAB
CREAT SERPL-MCNC: 1 MG/DL (ref 0.5–1.4)
SAMPLE: NORMAL

## 2021-01-26 ENCOUNTER — CLINICAL SUPPORT (OUTPATIENT)
Dept: REHABILITATION | Facility: HOSPITAL | Age: 61
End: 2021-01-26
Payer: MEDICARE

## 2021-01-26 DIAGNOSIS — Z98.1 S/P LUMBAR FUSION: ICD-10-CM

## 2021-01-26 DIAGNOSIS — M54.50 LOW BACK PAIN, UNSPECIFIED BACK PAIN LATERALITY, UNSPECIFIED CHRONICITY, UNSPECIFIED WHETHER SCIATICA PRESENT: ICD-10-CM

## 2021-01-26 PROCEDURE — 97162 PT EVAL MOD COMPLEX 30 MIN: CPT | Mod: PO

## 2021-01-26 PROCEDURE — 97110 THERAPEUTIC EXERCISES: CPT | Mod: PO

## 2021-02-01 RX ORDER — TIZANIDINE 4 MG/1
4 TABLET ORAL EVERY 6 HOURS PRN
Qty: 60 TABLET | Refills: 0 | Status: SHIPPED | OUTPATIENT
Start: 2021-02-01 | End: 2021-02-11

## 2021-02-02 ENCOUNTER — CLINICAL SUPPORT (OUTPATIENT)
Dept: REHABILITATION | Facility: HOSPITAL | Age: 61
End: 2021-02-02
Payer: MEDICARE

## 2021-02-02 DIAGNOSIS — M54.40 ACUTE RIGHT-SIDED LOW BACK PAIN WITH SCIATICA, SCIATICA LATERALITY UNSPECIFIED: ICD-10-CM

## 2021-02-02 PROCEDURE — 97110 THERAPEUTIC EXERCISES: CPT | Mod: PO,CQ

## 2021-03-04 ENCOUNTER — TELEPHONE (OUTPATIENT)
Dept: ORTHOPEDICS | Facility: CLINIC | Age: 61
End: 2021-03-04

## 2021-03-04 DIAGNOSIS — M43.27 FUSION OF SPINE, LUMBOSACRAL REGION: ICD-10-CM

## 2021-03-12 ENCOUNTER — PATIENT OUTREACH (OUTPATIENT)
Dept: ADMINISTRATIVE | Facility: OTHER | Age: 61
End: 2021-03-12

## 2021-03-12 DIAGNOSIS — Z12.31 ENCOUNTER FOR SCREENING MAMMOGRAM FOR MALIGNANT NEOPLASM OF BREAST: Primary | ICD-10-CM

## 2021-03-15 ENCOUNTER — OFFICE VISIT (OUTPATIENT)
Dept: ORTHOPEDICS | Facility: CLINIC | Age: 61
End: 2021-03-15
Payer: MEDICARE

## 2021-03-15 ENCOUNTER — HOSPITAL ENCOUNTER (OUTPATIENT)
Dept: RADIOLOGY | Facility: HOSPITAL | Age: 61
Discharge: HOME | End: 2021-03-15
Attending: PHYSICIAN ASSISTANT
Payer: MEDICARE

## 2021-03-15 DIAGNOSIS — M43.27 FUSION OF SPINE, LUMBOSACRAL REGION: ICD-10-CM

## 2021-03-15 DIAGNOSIS — Z98.1 STATUS POST LUMBAR SPINAL FUSION: Primary | ICD-10-CM

## 2021-03-15 PROCEDURE — 99213 PR OFFICE/OUTPT VISIT, EST, LEVL III, 20-29 MIN: ICD-10-PCS | Mod: S$GLB,,, | Performed by: ORTHOPAEDIC SURGERY

## 2021-03-15 PROCEDURE — 99213 OFFICE O/P EST LOW 20 MIN: CPT | Mod: S$GLB,,, | Performed by: ORTHOPAEDIC SURGERY

## 2021-03-15 PROCEDURE — 99999 PR PBB SHADOW E&M-EST. PATIENT-LVL III: CPT | Mod: PBBFAC,,, | Performed by: ORTHOPAEDIC SURGERY

## 2021-03-15 PROCEDURE — 99999 PR PBB SHADOW E&M-EST. PATIENT-LVL III: ICD-10-PCS | Mod: PBBFAC,,, | Performed by: ORTHOPAEDIC SURGERY

## 2021-03-15 PROCEDURE — 72100 X-RAY EXAM L-S SPINE 2/3 VWS: CPT | Mod: TC

## 2021-03-15 PROCEDURE — 1125F PR PAIN SEVERITY QUANTIFIED, PAIN PRESENT: ICD-10-PCS | Mod: S$GLB,,, | Performed by: ORTHOPAEDIC SURGERY

## 2021-03-15 PROCEDURE — 72100 XR LUMBAR SPINE AP AND LATERAL: ICD-10-PCS | Mod: 26,,, | Performed by: RADIOLOGY

## 2021-03-15 PROCEDURE — 72100 X-RAY EXAM L-S SPINE 2/3 VWS: CPT | Mod: 26,,, | Performed by: RADIOLOGY

## 2021-03-15 PROCEDURE — 1125F AMNT PAIN NOTED PAIN PRSNT: CPT | Mod: S$GLB,,, | Performed by: ORTHOPAEDIC SURGERY

## 2021-03-15 RX ORDER — TIZANIDINE HYDROCHLORIDE 6 MG/1
6 CAPSULE, GELATIN COATED ORAL 3 TIMES DAILY
Qty: 30 CAPSULE | Refills: 0 | Status: SHIPPED | OUTPATIENT
Start: 2021-03-15 | End: 2021-03-25

## 2021-03-18 DIAGNOSIS — Z98.1 S/P LUMBAR FUSION: Primary | ICD-10-CM

## 2021-03-18 RX ORDER — TIZANIDINE 4 MG/1
4 TABLET ORAL EVERY 6 HOURS PRN
Qty: 60 TABLET | Refills: 0 | Status: SHIPPED | OUTPATIENT
Start: 2021-03-18 | End: 2021-07-26

## 2021-03-18 NOTE — TELEPHONE ENCOUNTER
----- Message from Gloria Morley MA sent at 3/18/2021  1:28 PM CDT -----  Contact: [phone] Yuko with [facility]  tiZANidine (ZANAFLEX) 6 mg capsules are not covered by pt's insurance    only the 4mg tablets  are covered  which she said pt was getting before.       [phone] Yuko with [facility]

## 2021-03-29 ENCOUNTER — OFFICE VISIT (OUTPATIENT)
Dept: ORTHOPEDICS | Facility: CLINIC | Age: 61
End: 2021-03-29
Payer: MEDICARE

## 2021-03-29 ENCOUNTER — HOSPITAL ENCOUNTER (OUTPATIENT)
Dept: RADIOLOGY | Facility: HOSPITAL | Age: 61
Discharge: HOME | End: 2021-03-29
Attending: ORTHOPAEDIC SURGERY
Payer: MEDICARE

## 2021-03-29 DIAGNOSIS — M43.27 FUSION OF SPINE, LUMBOSACRAL REGION: ICD-10-CM

## 2021-03-29 DIAGNOSIS — Z98.1 STATUS POST LUMBAR SPINAL FUSION: Primary | ICD-10-CM

## 2021-03-29 PROCEDURE — 72131 CT LUMBAR SPINE WITHOUT CONTRAST: ICD-10-PCS | Mod: 26,,, | Performed by: INTERNAL MEDICINE

## 2021-03-29 PROCEDURE — 99999 PR PBB SHADOW E&M-EST. PATIENT-LVL III: CPT | Mod: PBBFAC,,, | Performed by: ORTHOPAEDIC SURGERY

## 2021-03-29 PROCEDURE — 99213 PR OFFICE/OUTPT VISIT, EST, LEVL III, 20-29 MIN: ICD-10-PCS | Mod: S$GLB,,, | Performed by: ORTHOPAEDIC SURGERY

## 2021-03-29 PROCEDURE — 99213 OFFICE O/P EST LOW 20 MIN: CPT | Mod: S$GLB,,, | Performed by: ORTHOPAEDIC SURGERY

## 2021-03-29 PROCEDURE — 1125F AMNT PAIN NOTED PAIN PRSNT: CPT | Mod: S$GLB,,, | Performed by: ORTHOPAEDIC SURGERY

## 2021-03-29 PROCEDURE — 1125F PR PAIN SEVERITY QUANTIFIED, PAIN PRESENT: ICD-10-PCS | Mod: S$GLB,,, | Performed by: ORTHOPAEDIC SURGERY

## 2021-03-29 PROCEDURE — 99999 PR PBB SHADOW E&M-EST. PATIENT-LVL III: ICD-10-PCS | Mod: PBBFAC,,, | Performed by: ORTHOPAEDIC SURGERY

## 2021-03-29 PROCEDURE — 72131 CT LUMBAR SPINE W/O DYE: CPT | Mod: 26,,, | Performed by: INTERNAL MEDICINE

## 2021-03-29 PROCEDURE — 72131 CT LUMBAR SPINE W/O DYE: CPT | Mod: TC

## 2021-04-05 ENCOUNTER — PATIENT MESSAGE (OUTPATIENT)
Dept: ADMINISTRATIVE | Facility: HOSPITAL | Age: 61
End: 2021-04-05

## 2021-04-16 ENCOUNTER — PATIENT MESSAGE (OUTPATIENT)
Dept: RESEARCH | Facility: HOSPITAL | Age: 61
End: 2021-04-16

## 2021-06-09 ENCOUNTER — TELEPHONE (OUTPATIENT)
Dept: ORTHOPEDICS | Facility: CLINIC | Age: 61
End: 2021-06-09

## 2021-06-22 ENCOUNTER — PATIENT OUTREACH (OUTPATIENT)
Dept: ADMINISTRATIVE | Facility: OTHER | Age: 61
End: 2021-06-22

## 2021-07-06 ENCOUNTER — PATIENT MESSAGE (OUTPATIENT)
Dept: ADMINISTRATIVE | Facility: HOSPITAL | Age: 61
End: 2021-07-06

## 2021-07-21 ENCOUNTER — PATIENT MESSAGE (OUTPATIENT)
Dept: ORTHOPEDICS | Facility: CLINIC | Age: 61
End: 2021-07-21

## 2021-08-04 NOTE — TELEPHONE ENCOUNTER
Last refill 10/14/19  Last visit 5/5/20  
MD Violeta German Staff 1 hour ago (12:42 PM)       Pt does not need to be on potassium pills at this time         
negative...

## 2021-08-18 ENCOUNTER — PATIENT OUTREACH (OUTPATIENT)
Dept: ADMINISTRATIVE | Facility: OTHER | Age: 61
End: 2021-08-18

## 2021-09-17 ENCOUNTER — TELEPHONE (OUTPATIENT)
Dept: INTERNAL MEDICINE | Facility: CLINIC | Age: 61
End: 2021-09-17

## 2021-10-04 ENCOUNTER — PATIENT MESSAGE (OUTPATIENT)
Dept: ADMINISTRATIVE | Facility: HOSPITAL | Age: 61
End: 2021-10-04

## 2021-10-20 ENCOUNTER — HOSPITAL ENCOUNTER (EMERGENCY)
Facility: HOSPITAL | Age: 61
Discharge: HOME | End: 2021-10-21
Attending: EMERGENCY MEDICINE
Payer: MEDICARE

## 2021-10-20 DIAGNOSIS — R06.02 SOB (SHORTNESS OF BREATH): ICD-10-CM

## 2021-10-20 PROCEDURE — 99284 EMERGENCY DEPT VISIT MOD MDM: CPT | Mod: CS,,, | Performed by: EMERGENCY MEDICINE

## 2021-10-20 PROCEDURE — 99285 EMERGENCY DEPT VISIT HI MDM: CPT | Mod: 25

## 2021-10-20 PROCEDURE — 96365 THER/PROPH/DIAG IV INF INIT: CPT

## 2021-10-20 PROCEDURE — 80047 BASIC METABLC PNL IONIZED CA: CPT

## 2021-10-20 PROCEDURE — 99284 PR EMERGENCY DEPT VISIT,LEVEL IV: ICD-10-PCS | Mod: CS,,, | Performed by: EMERGENCY MEDICINE

## 2021-10-21 VITALS
RESPIRATION RATE: 18 BRPM | HEART RATE: 97 BPM | SYSTOLIC BLOOD PRESSURE: 129 MMHG | TEMPERATURE: 98 F | OXYGEN SATURATION: 97 % | DIASTOLIC BLOOD PRESSURE: 73 MMHG

## 2021-10-21 LAB
ALBUMIN SERPL BCP-MCNC: 2.8 G/DL (ref 3.5–5.2)
ALP SERPL-CCNC: 191 U/L (ref 55–135)
ALT SERPL W/O P-5'-P-CCNC: 16 U/L (ref 10–44)
ANION GAP SERPL CALC-SCNC: 16 MMOL/L (ref 8–16)
AST SERPL-CCNC: 24 U/L (ref 10–40)
BACTERIA #/AREA URNS AUTO: ABNORMAL /HPF
BASOPHILS # BLD AUTO: 0.07 K/UL (ref 0–0.2)
BASOPHILS NFR BLD: 1.3 % (ref 0–1.9)
BILIRUB SERPL-MCNC: 0.2 MG/DL (ref 0.1–1)
BILIRUB UR QL STRIP: NEGATIVE
BUN SERPL-MCNC: 9 MG/DL (ref 6–30)
BUN SERPL-MCNC: 9 MG/DL (ref 8–23)
CALCIUM SERPL-MCNC: 9.2 MG/DL (ref 8.7–10.5)
CHLORIDE SERPL-SCNC: 93 MMOL/L (ref 95–110)
CHLORIDE SERPL-SCNC: 96 MMOL/L (ref 95–110)
CLARITY UR REFRACT.AUTO: ABNORMAL
CO2 SERPL-SCNC: 33 MMOL/L (ref 23–29)
COLOR UR AUTO: YELLOW
CREAT SERPL-MCNC: 1 MG/DL (ref 0.5–1.4)
CREAT SERPL-MCNC: 1.4 MG/DL (ref 0.5–1.4)
CTP QC/QA: YES
DIFFERENTIAL METHOD: ABNORMAL
EOSINOPHIL # BLD AUTO: 0.1 K/UL (ref 0–0.5)
EOSINOPHIL NFR BLD: 0.9 % (ref 0–8)
ERYTHROCYTE [DISTWIDTH] IN BLOOD BY AUTOMATED COUNT: 15.8 % (ref 11.5–14.5)
EST. GFR  (AFRICAN AMERICAN): >60 ML/MIN/1.73 M^2
EST. GFR  (NON AFRICAN AMERICAN): >60 ML/MIN/1.73 M^2
ETHANOL SERPL-MCNC: 149 MG/DL
GLUCOSE SERPL-MCNC: 87 MG/DL (ref 70–110)
GLUCOSE SERPL-MCNC: 88 MG/DL (ref 70–110)
GLUCOSE UR QL STRIP: NEGATIVE
HCT VFR BLD AUTO: 37.7 % (ref 37–48.5)
HCT VFR BLD CALC: 38 %PCV (ref 36–54)
HGB BLD-MCNC: 11.9 G/DL (ref 12–16)
HGB UR QL STRIP: NEGATIVE
IMM GRANULOCYTES # BLD AUTO: 0.02 K/UL (ref 0–0.04)
IMM GRANULOCYTES NFR BLD AUTO: 0.4 % (ref 0–0.5)
KETONES UR QL STRIP: NEGATIVE
LEUKOCYTE ESTERASE UR QL STRIP: ABNORMAL
LIPASE SERPL-CCNC: 29 U/L (ref 4–60)
LYMPHOCYTES # BLD AUTO: 1.4 K/UL (ref 1–4.8)
LYMPHOCYTES NFR BLD: 25.7 % (ref 18–48)
MAGNESIUM SERPL-MCNC: 0.9 MG/DL (ref 1.6–2.6)
MCH RBC QN AUTO: 30.3 PG (ref 27–31)
MCHC RBC AUTO-ENTMCNC: 31.6 G/DL (ref 32–36)
MCV RBC AUTO: 96 FL (ref 82–98)
MICROSCOPIC COMMENT: ABNORMAL
MONOCYTES # BLD AUTO: 0.5 K/UL (ref 0.3–1)
MONOCYTES NFR BLD: 8.6 % (ref 4–15)
NEUTROPHILS # BLD AUTO: 3.4 K/UL (ref 1.8–7.7)
NEUTROPHILS NFR BLD: 63.1 % (ref 38–73)
NITRITE UR QL STRIP: NEGATIVE
NRBC BLD-RTO: 0 /100 WBC
PH UR STRIP: 5 [PH] (ref 5–8)
PLATELET # BLD AUTO: 290 K/UL (ref 150–450)
PMV BLD AUTO: 9.3 FL (ref 9.2–12.9)
POC IONIZED CALCIUM: 1.01 MMOL/L (ref 1.06–1.42)
POC TCO2 (MEASURED): 33 MMOL/L (ref 23–29)
POTASSIUM BLD-SCNC: 3 MMOL/L (ref 3.5–5.1)
POTASSIUM SERPL-SCNC: 3 MMOL/L (ref 3.5–5.1)
PROT SERPL-MCNC: 6.2 G/DL (ref 6–8.4)
PROT UR QL STRIP: NEGATIVE
RBC # BLD AUTO: 3.93 M/UL (ref 4–5.4)
RBC #/AREA URNS AUTO: 11 /HPF (ref 0–4)
SAMPLE: ABNORMAL
SARS-COV-2 RDRP RESP QL NAA+PROBE: NEGATIVE
SODIUM BLD-SCNC: 140 MMOL/L (ref 136–145)
SODIUM SERPL-SCNC: 145 MMOL/L (ref 136–145)
SP GR UR STRIP: 1.01 (ref 1–1.03)
SQUAMOUS #/AREA URNS AUTO: 4 /HPF
URN SPEC COLLECT METH UR: ABNORMAL
WBC # BLD AUTO: 5.34 K/UL (ref 3.9–12.7)
WBC #/AREA URNS AUTO: 8 /HPF (ref 0–5)
YEAST UR QL AUTO: ABNORMAL

## 2021-10-21 PROCEDURE — 83735 ASSAY OF MAGNESIUM: CPT | Performed by: EMERGENCY MEDICINE

## 2021-10-21 PROCEDURE — U0002 COVID-19 LAB TEST NON-CDC: HCPCS | Performed by: EMERGENCY MEDICINE

## 2021-10-21 PROCEDURE — 81001 URINALYSIS AUTO W/SCOPE: CPT | Performed by: EMERGENCY MEDICINE

## 2021-10-21 PROCEDURE — 25000003 PHARM REV CODE 250

## 2021-10-21 PROCEDURE — 82077 ASSAY SPEC XCP UR&BREATH IA: CPT

## 2021-10-21 PROCEDURE — 80053 COMPREHEN METABOLIC PANEL: CPT | Performed by: EMERGENCY MEDICINE

## 2021-10-21 PROCEDURE — 63600175 PHARM REV CODE 636 W HCPCS: Performed by: EMERGENCY MEDICINE

## 2021-10-21 PROCEDURE — 85025 COMPLETE CBC W/AUTO DIFF WBC: CPT | Performed by: EMERGENCY MEDICINE

## 2021-10-21 PROCEDURE — 25000003 PHARM REV CODE 250: Performed by: EMERGENCY MEDICINE

## 2021-10-21 PROCEDURE — 83690 ASSAY OF LIPASE: CPT | Performed by: EMERGENCY MEDICINE

## 2021-10-21 RX ORDER — CEPHALEXIN 500 MG/1
500 CAPSULE ORAL EVERY 12 HOURS
Qty: 14 CAPSULE | Refills: 0 | Status: SHIPPED | OUTPATIENT
Start: 2021-10-21 | End: 2021-10-28

## 2021-10-21 RX ORDER — CEPHALEXIN 500 MG/1
500 CAPSULE ORAL
Status: COMPLETED | OUTPATIENT
Start: 2021-10-21 | End: 2021-10-21

## 2021-10-21 RX ORDER — MAGNESIUM SULFATE HEPTAHYDRATE 40 MG/ML
2 INJECTION, SOLUTION INTRAVENOUS
Status: COMPLETED | OUTPATIENT
Start: 2021-10-21 | End: 2021-10-21

## 2021-10-21 RX ORDER — POTASSIUM CHLORIDE 20 MEQ/1
40 TABLET, EXTENDED RELEASE ORAL
Status: COMPLETED | OUTPATIENT
Start: 2021-10-21 | End: 2021-10-21

## 2021-10-21 RX ADMIN — MAGNESIUM SULFATE 2 G: 2 INJECTION INTRAVENOUS at 03:10

## 2021-10-21 RX ADMIN — POTASSIUM CHLORIDE 40 MEQ: 1500 TABLET, EXTENDED RELEASE ORAL at 02:10

## 2021-10-21 RX ADMIN — CEPHALEXIN 500 MG: 500 CAPSULE ORAL at 04:10

## 2021-10-28 ENCOUNTER — TELEPHONE (OUTPATIENT)
Dept: INTERNAL MEDICINE | Facility: CLINIC | Age: 61
End: 2021-10-28
Payer: MEDICARE

## 2021-11-12 ENCOUNTER — PATIENT OUTREACH (OUTPATIENT)
Dept: ADMINISTRATIVE | Facility: CLINIC | Age: 61
End: 2021-11-12
Payer: MEDICARE

## 2021-11-12 ENCOUNTER — EXTERNAL HOSPITAL ADMISSION (OUTPATIENT)
Dept: ADMINISTRATIVE | Facility: CLINIC | Age: 61
End: 2021-11-12
Payer: MEDICARE

## 2021-11-13 ENCOUNTER — PATIENT MESSAGE (OUTPATIENT)
Dept: HOME HEALTH SERVICES | Facility: HOSPITAL | Age: 61
End: 2021-11-13
Payer: MEDICARE

## 2021-11-15 ENCOUNTER — TELEPHONE (OUTPATIENT)
Dept: INTERNAL MEDICINE | Facility: CLINIC | Age: 61
End: 2021-11-15
Payer: MEDICARE

## 2021-11-15 DIAGNOSIS — E55.9 VITAMIN D DEFICIENCY: ICD-10-CM

## 2021-11-15 DIAGNOSIS — Z00.00 ANNUAL PHYSICAL EXAM: ICD-10-CM

## 2021-11-15 DIAGNOSIS — I10 ESSENTIAL HYPERTENSION: Primary | ICD-10-CM

## 2021-11-15 DIAGNOSIS — R73.9 ELEVATED BLOOD SUGAR: ICD-10-CM

## 2021-11-18 ENCOUNTER — PATIENT OUTREACH (OUTPATIENT)
Dept: ADMINISTRATIVE | Facility: HOSPITAL | Age: 61
End: 2021-11-18
Payer: MEDICARE

## 2021-11-23 ENCOUNTER — PATIENT OUTREACH (OUTPATIENT)
Dept: ADMINISTRATIVE | Facility: CLINIC | Age: 61
End: 2021-11-23
Payer: MEDICARE

## 2021-11-23 ENCOUNTER — EXTERNAL HOSPITAL ADMISSION (OUTPATIENT)
Dept: ADMINISTRATIVE | Facility: CLINIC | Age: 61
End: 2021-11-23
Payer: MEDICARE

## 2021-11-23 DIAGNOSIS — J18.0 BRONCHOPNEUMONIA, UNSPECIFIED ORGANISM: Primary | ICD-10-CM

## 2021-11-24 ENCOUNTER — PES CALL (OUTPATIENT)
Dept: HOME HEALTH SERVICES | Facility: CLINIC | Age: 61
End: 2021-11-24
Payer: MEDICARE

## 2021-11-26 ENCOUNTER — TELEPHONE (OUTPATIENT)
Dept: INTERNAL MEDICINE | Facility: CLINIC | Age: 61
End: 2021-11-26
Payer: MEDICARE

## 2021-12-01 ENCOUNTER — OFFICE VISIT (OUTPATIENT)
Dept: HOME HEALTH SERVICES | Facility: CLINIC | Age: 61
End: 2021-12-01
Payer: MEDICARE

## 2021-12-01 DIAGNOSIS — J18.0 BRONCHOPNEUMONIA, UNSPECIFIED ORGANISM: ICD-10-CM

## 2021-12-01 DIAGNOSIS — M54.17 LUMBOSACRAL RADICULOPATHY: Primary | ICD-10-CM

## 2021-12-01 PROCEDURE — 99497 PR ADVNCD CARE PLAN 30 MIN: ICD-10-PCS | Mod: S$GLB,,, | Performed by: NURSE PRACTITIONER

## 2021-12-01 PROCEDURE — 99350 PR HOME VISIT,ESTAB PATIENT,LEVEL IV: ICD-10-PCS | Mod: S$GLB,,, | Performed by: NURSE PRACTITIONER

## 2021-12-01 PROCEDURE — 99350 HOME/RES VST EST HIGH MDM 60: CPT | Mod: S$GLB,,, | Performed by: NURSE PRACTITIONER

## 2021-12-01 PROCEDURE — 99499 UNLISTED E&M SERVICE: CPT | Mod: S$GLB,,, | Performed by: NURSE PRACTITIONER

## 2021-12-01 PROCEDURE — 99497 ADVNCD CARE PLAN 30 MIN: CPT | Mod: S$GLB,,, | Performed by: NURSE PRACTITIONER

## 2021-12-01 PROCEDURE — 99499 RISK ADDL DX/OHS AUDIT: ICD-10-PCS | Mod: S$GLB,,, | Performed by: NURSE PRACTITIONER

## 2021-12-04 ENCOUNTER — PATIENT MESSAGE (OUTPATIENT)
Dept: INTERNAL MEDICINE | Facility: CLINIC | Age: 61
End: 2021-12-04
Payer: MEDICARE

## 2021-12-06 ENCOUNTER — OFFICE VISIT (OUTPATIENT)
Dept: INTERNAL MEDICINE | Facility: CLINIC | Age: 61
End: 2021-12-06
Payer: MEDICARE

## 2021-12-06 VITALS
WEIGHT: 180.56 LBS | BODY MASS INDEX: 30.83 KG/M2 | TEMPERATURE: 98 F | DIASTOLIC BLOOD PRESSURE: 60 MMHG | HEART RATE: 64 BPM | RESPIRATION RATE: 16 BRPM | SYSTOLIC BLOOD PRESSURE: 90 MMHG | HEIGHT: 64 IN

## 2021-12-06 DIAGNOSIS — F32.A DEPRESSION, UNSPECIFIED DEPRESSION TYPE: ICD-10-CM

## 2021-12-06 DIAGNOSIS — Z98.1 S/P LUMBAR FUSION: ICD-10-CM

## 2021-12-06 DIAGNOSIS — Z09 HOSPITAL DISCHARGE FOLLOW-UP: Primary | ICD-10-CM

## 2021-12-06 DIAGNOSIS — G95.19 INTERMITTENT SPINAL CLAUDICATION: ICD-10-CM

## 2021-12-06 DIAGNOSIS — F10.29 ALCOHOL DEPENDENCE WITH UNSPECIFIED ALCOHOL-INDUCED DISORDER: ICD-10-CM

## 2021-12-06 DIAGNOSIS — G60.3 IDIOPATHIC PROGRESSIVE POLYNEUROPATHY: ICD-10-CM

## 2021-12-06 DIAGNOSIS — I10 ESSENTIAL HYPERTENSION: ICD-10-CM

## 2021-12-06 DIAGNOSIS — G89.4 CHRONIC PAIN DISORDER: ICD-10-CM

## 2021-12-06 PROBLEM — G31.9 DEGENERATIVE DISEASE OF NERVOUS SYSTEM, UNSPECIFIED: Status: ACTIVE | Noted: 2021-12-06

## 2021-12-06 PROBLEM — G31.9 DEGENERATIVE DISEASE OF NERVOUS SYSTEM, UNSPECIFIED: Status: RESOLVED | Noted: 2021-12-06 | Resolved: 2021-12-06

## 2021-12-06 PROCEDURE — 99499 UNLISTED E&M SERVICE: CPT | Mod: S$GLB,,, | Performed by: HOSPITALIST

## 2021-12-06 PROCEDURE — 99495 TCM SERVICES (MODERATE COMPLEXITY): ICD-10-PCS | Mod: S$GLB,,, | Performed by: HOSPITALIST

## 2021-12-06 PROCEDURE — 99999 PR PBB SHADOW E&M-EST. PATIENT-LVL IV: ICD-10-PCS | Mod: PBBFAC,,, | Performed by: HOSPITALIST

## 2021-12-06 PROCEDURE — 90686 IIV4 VACC NO PRSV 0.5 ML IM: CPT | Mod: S$GLB,,, | Performed by: HOSPITALIST

## 2021-12-06 PROCEDURE — 99495 TRANSJ CARE MGMT MOD F2F 14D: CPT | Mod: S$GLB,,, | Performed by: HOSPITALIST

## 2021-12-06 PROCEDURE — 90686 FLU VACCINE (QUAD) GREATER THAN OR EQUAL TO 3YO PRESERVATIVE FREE IM: ICD-10-PCS | Mod: S$GLB,,, | Performed by: HOSPITALIST

## 2021-12-06 PROCEDURE — G0008 FLU VACCINE (QUAD) GREATER THAN OR EQUAL TO 3YO PRESERVATIVE FREE IM: ICD-10-PCS | Mod: S$GLB,,, | Performed by: HOSPITALIST

## 2021-12-06 PROCEDURE — G0008 ADMIN INFLUENZA VIRUS VAC: HCPCS | Mod: S$GLB,,, | Performed by: HOSPITALIST

## 2021-12-06 PROCEDURE — 99999 PR PBB SHADOW E&M-EST. PATIENT-LVL IV: CPT | Mod: PBBFAC,,, | Performed by: HOSPITALIST

## 2021-12-06 PROCEDURE — 99499 RISK ADDL DX/OHS AUDIT: ICD-10-PCS | Mod: S$GLB,,, | Performed by: HOSPITALIST

## 2021-12-06 RX ORDER — LANOLIN ALCOHOL/MO/W.PET/CERES
100 CREAM (GRAM) TOPICAL DAILY
COMMUNITY

## 2021-12-06 RX ORDER — MAGNESIUM 30 MG
TABLET ORAL ONCE
Status: ON HOLD | COMMUNITY
End: 2022-08-19 | Stop reason: HOSPADM

## 2021-12-06 RX ORDER — BUTYROSPERMUM PARKII(SHEA BUTTER), SIMMONDSIA CHINENSIS (JOJOBA) SEED OIL, ALOE BARBADENSIS LEAF EXTRACT .01; 1; 3.5 G/100G; G/100G; G/100G
250 LIQUID TOPICAL 2 TIMES DAILY
COMMUNITY
End: 2022-08-02

## 2021-12-06 RX ORDER — GUAIFENESIN 600 MG/1
1200 TABLET, EXTENDED RELEASE ORAL 2 TIMES DAILY
COMMUNITY
End: 2022-08-02

## 2021-12-09 ENCOUNTER — TELEPHONE (OUTPATIENT)
Dept: INTERNAL MEDICINE | Facility: CLINIC | Age: 61
End: 2021-12-09
Payer: MEDICARE

## 2021-12-13 ENCOUNTER — TELEPHONE (OUTPATIENT)
Dept: INTERNAL MEDICINE | Facility: CLINIC | Age: 61
End: 2021-12-13
Payer: MEDICARE

## 2021-12-13 VITALS
RESPIRATION RATE: 18 BRPM | OXYGEN SATURATION: 98 % | HEIGHT: 64 IN | SYSTOLIC BLOOD PRESSURE: 116 MMHG | WEIGHT: 180 LBS | HEART RATE: 73 BPM | DIASTOLIC BLOOD PRESSURE: 77 MMHG | TEMPERATURE: 98 F | BODY MASS INDEX: 30.73 KG/M2

## 2021-12-13 PROBLEM — K76.0 HEPATIC STEATOSIS: Status: ACTIVE | Noted: 2021-12-13

## 2021-12-13 PROBLEM — Q24.8 LEFT VENTRICULAR OUTFLOW OBSTRUCTION: Status: ACTIVE | Noted: 2021-12-13

## 2021-12-21 ENCOUNTER — PATIENT MESSAGE (OUTPATIENT)
Dept: PAIN MEDICINE | Facility: CLINIC | Age: 61
End: 2021-12-21
Payer: MEDICARE

## 2021-12-23 ENCOUNTER — CARE AT HOME (OUTPATIENT)
Dept: HOME HEALTH SERVICES | Facility: CLINIC | Age: 61
End: 2021-12-23
Payer: MEDICARE

## 2021-12-23 DIAGNOSIS — M54.17 LUMBOSACRAL RADICULOPATHY: Primary | ICD-10-CM

## 2021-12-23 PROCEDURE — 99443 PR PHYSICIAN TELEPHONE EVALUATION 21-30 MIN: CPT | Mod: 95,,, | Performed by: NURSE PRACTITIONER

## 2021-12-23 PROCEDURE — 99443 PR PHYSICIAN TELEPHONE EVALUATION 21-30 MIN: ICD-10-PCS | Mod: 95,,, | Performed by: NURSE PRACTITIONER

## 2022-01-01 ENCOUNTER — OFFICE VISIT (OUTPATIENT)
Dept: INTERNAL MEDICINE | Facility: CLINIC | Age: 62
End: 2022-01-01
Payer: MEDICARE

## 2022-01-01 ENCOUNTER — TELEPHONE (OUTPATIENT)
Dept: INTERNAL MEDICINE | Facility: CLINIC | Age: 62
End: 2022-01-01
Payer: MEDICARE

## 2022-01-01 ENCOUNTER — PATIENT MESSAGE (OUTPATIENT)
Dept: INTERNAL MEDICINE | Facility: CLINIC | Age: 62
End: 2022-01-01
Payer: MEDICARE

## 2022-01-01 ENCOUNTER — PATIENT MESSAGE (OUTPATIENT)
Dept: PAIN MEDICINE | Facility: CLINIC | Age: 62
End: 2022-01-01
Payer: MEDICARE

## 2022-01-01 ENCOUNTER — PES CALL (OUTPATIENT)
Dept: ADMINISTRATIVE | Facility: CLINIC | Age: 62
End: 2022-01-01
Payer: MEDICARE

## 2022-01-01 ENCOUNTER — TELEPHONE (OUTPATIENT)
Dept: PAIN MEDICINE | Facility: CLINIC | Age: 62
End: 2022-01-01
Payer: MEDICARE

## 2022-01-01 ENCOUNTER — OFFICE VISIT (OUTPATIENT)
Dept: PAIN MEDICINE | Facility: CLINIC | Age: 62
End: 2022-01-01
Payer: MEDICARE

## 2022-01-01 DIAGNOSIS — F11.90 CHRONIC, CONTINUOUS USE OF OPIOIDS: ICD-10-CM

## 2022-01-01 DIAGNOSIS — M54.50 CHRONIC LOW BACK PAIN, UNSPECIFIED BACK PAIN LATERALITY, UNSPECIFIED WHETHER SCIATICA PRESENT: Primary | ICD-10-CM

## 2022-01-01 DIAGNOSIS — M96.1 POSTLAMINECTOMY SYNDROME OF LUMBAR REGION: Primary | ICD-10-CM

## 2022-01-01 DIAGNOSIS — G89.29 CHRONIC LOW BACK PAIN, UNSPECIFIED BACK PAIN LATERALITY, UNSPECIFIED WHETHER SCIATICA PRESENT: Primary | ICD-10-CM

## 2022-01-01 DIAGNOSIS — G89.4 CHRONIC PAIN DISORDER: ICD-10-CM

## 2022-01-01 DIAGNOSIS — M96.1 POSTLAMINECTOMY SYNDROME OF LUMBAR REGION: ICD-10-CM

## 2022-01-01 DIAGNOSIS — M48.062 SPINAL STENOSIS OF LUMBAR REGION WITH NEUROGENIC CLAUDICATION: ICD-10-CM

## 2022-01-01 DIAGNOSIS — R11.0 NAUSEA: ICD-10-CM

## 2022-01-01 DIAGNOSIS — Z12.31 ENCOUNTER FOR SCREENING MAMMOGRAM FOR BREAST CANCER: ICD-10-CM

## 2022-01-01 DIAGNOSIS — M96.1 POST LAMINECTOMY SYNDROME: Primary | ICD-10-CM

## 2022-01-01 DIAGNOSIS — M54.16 LUMBAR RADICULOPATHY: ICD-10-CM

## 2022-01-01 PROCEDURE — 3044F PR MOST RECENT HEMOGLOBIN A1C LEVEL <7.0%: ICD-10-PCS | Mod: CPTII,95,, | Performed by: ANESTHESIOLOGY

## 2022-01-01 PROCEDURE — 1159F MED LIST DOCD IN RCRD: CPT | Mod: CPTII,95,, | Performed by: HOSPITALIST

## 2022-01-01 PROCEDURE — 1159F PR MEDICATION LIST DOCUMENTED IN MEDICAL RECORD: ICD-10-PCS | Mod: CPTII,95,, | Performed by: HOSPITALIST

## 2022-01-01 PROCEDURE — 1160F PR REVIEW ALL MEDS BY PRESCRIBER/CLIN PHARMACIST DOCUMENTED: ICD-10-PCS | Mod: CPTII,95,, | Performed by: ANESTHESIOLOGY

## 2022-01-01 PROCEDURE — 1160F RVW MEDS BY RX/DR IN RCRD: CPT | Mod: CPTII,95,, | Performed by: HOSPITALIST

## 2022-01-01 PROCEDURE — 99213 PR OFFICE/OUTPT VISIT, EST, LEVL III, 20-29 MIN: ICD-10-PCS | Mod: 95,,, | Performed by: HOSPITALIST

## 2022-01-01 PROCEDURE — 1159F PR MEDICATION LIST DOCUMENTED IN MEDICAL RECORD: ICD-10-PCS | Mod: CPTII,95,, | Performed by: ANESTHESIOLOGY

## 2022-01-01 PROCEDURE — 99213 OFFICE O/P EST LOW 20 MIN: CPT | Mod: 95,,, | Performed by: HOSPITALIST

## 2022-01-01 PROCEDURE — 3044F HG A1C LEVEL LT 7.0%: CPT | Mod: CPTII,95,, | Performed by: ANESTHESIOLOGY

## 2022-01-01 PROCEDURE — 1159F MED LIST DOCD IN RCRD: CPT | Mod: CPTII,95,, | Performed by: ANESTHESIOLOGY

## 2022-01-01 PROCEDURE — 1160F PR REVIEW ALL MEDS BY PRESCRIBER/CLIN PHARMACIST DOCUMENTED: ICD-10-PCS | Mod: CPTII,95,, | Performed by: HOSPITALIST

## 2022-01-01 PROCEDURE — 99213 PR OFFICE/OUTPT VISIT, EST, LEVL III, 20-29 MIN: ICD-10-PCS | Mod: 95,GC,, | Performed by: ANESTHESIOLOGY

## 2022-01-01 PROCEDURE — 99213 OFFICE O/P EST LOW 20 MIN: CPT | Mod: 95,GC,, | Performed by: ANESTHESIOLOGY

## 2022-01-01 PROCEDURE — 1160F RVW MEDS BY RX/DR IN RCRD: CPT | Mod: CPTII,95,, | Performed by: ANESTHESIOLOGY

## 2022-01-01 RX ORDER — METHYLPREDNISOLONE 4 MG/1
TABLET ORAL
Qty: 21 TABLET | Refills: 0 | Status: SHIPPED | OUTPATIENT
Start: 2022-01-01 | End: 2023-01-01

## 2022-01-01 RX ORDER — OXYCODONE AND ACETAMINOPHEN 5; 325 MG/1; MG/1
1 TABLET ORAL EVERY 6 HOURS PRN
Qty: 120 EACH | Refills: 0 | Status: SHIPPED | OUTPATIENT
Start: 2022-01-01 | End: 2023-01-01 | Stop reason: SDUPTHER

## 2022-01-01 RX ORDER — OXYCODONE AND ACETAMINOPHEN 5; 325 MG/1; MG/1
1 TABLET ORAL EVERY 6 HOURS PRN
Qty: 120 EACH | Refills: 0 | Status: SHIPPED | OUTPATIENT
Start: 2022-01-01 | End: 2022-01-01 | Stop reason: SDUPTHER

## 2022-01-05 NOTE — PROGRESS NOTES
Established Patient - Audio Only Telehealth Visit     The patient location is: Home  The chief complaint leading to consultation is: Home  Visit type: Virtual visit with audio only (telephone)  Total time spent with patient: 20       The reason for the audio only service rather than synchronous audio and video virtual visit was related to technical difficulties or patient preference/necessity.     Each patient to whom I provide medical services by telemedicine is:  (1) informed of the relationship between the physician and patient and the respective role of any other health care provider with respect to management of the patient; and (2) notified that they may decline to receive medical services by telemedicine and may withdraw from such care at any time. Patient verbally consented to receive this service via voice-only telephone call.       HPI: Vale Gutierrez is a 61 year old female with a past medical history of Hypertension, Depression, and Alcohol Dependence. Dr. Mcdaniel as her PCP.She endorses eating x 3 small meals per day . She endorses regular BMs. Denies fever, chest pain, shortness of breath, nausea, vomiting, diarrhea. Risks of environmental exposure to coronavirus discussed including: social distancing, hand hygiene, and limiting departures from the home for necessities only.  Reports understanding and willingness to comply.  All hospital discharge orders reviewed and being followed, all medications reconciled and reviewed, patient and family verbalized understanding. No other needs identified at this time.     Assessment and plan:     Lumbosacral radiculopathy  Chronic pain disorder  Intermittent spinal claudication  S/P lumbar fusion  Home PT  Pain management outpatient     Bronchopneumonia, unspecified organism  -     Ambulatory referral/consult to [facility]  resolved     Alcohol dependence with unspecified alcohol-induced disorder  - currently abstaining. Pt counseled and encouraged  to avoid etoh use     Depression, unspecified depression type  - stable. Continue effexor                      This service was not originating from a related E/M service provided within the previous 7 days nor will  to an E/M service or procedure within the next 24 hours or my soonest available appointment.  Prevailing standard of care was able to be met in this audio-only visit.

## 2022-01-08 VITALS — BODY MASS INDEX: 30.73 KG/M2 | WEIGHT: 180 LBS | HEIGHT: 64 IN

## 2022-01-09 NOTE — PATIENT INSTRUCTIONS
Instructions:  - [facility] Nurse Practitioner to schedule home follow-up visit with patient as needed.  - Continue all medications, treatments and therapies as ordered.   - Follow all instructions, recommendations as discussed.  - Maintain Safety Precautions at all times.  - Attend all medical appointments as scheduled.  - For worsening symptoms: call Primary Care Physician or Nurse Practitioner.  - For emergencies, call 911 or immediately report to the nearest emergency room.  - Limit Risks of environmental exposure to coronavirus/COVID-19 as discussed including: social distancing, hand hygiene, and limiting departures from the home for necessities only.

## 2022-01-11 RX ORDER — OMEPRAZOLE 20 MG/1
20 CAPSULE, DELAYED RELEASE ORAL 2 TIMES DAILY
Qty: 60 CAPSULE | Refills: 3 | Status: ON HOLD | OUTPATIENT
Start: 2022-01-11 | End: 2022-09-07 | Stop reason: HOSPADM

## 2022-01-11 NOTE — TELEPHONE ENCOUNTER
No new care gaps identified.  Powered by [employer] by Alerts. Reference number: [id number].   1/11/2022 11:27:40 AM CST

## 2022-01-11 NOTE — TELEPHONE ENCOUNTER
This Rx Request does not qualify for assessment with the ORC.  Please review protocol details and the Care Due Message for extra clinical information    Reasons Rx Request may be deferred:  Labs/Vitals overdue  Labs/Vitals abnormal  Patient has been seen in the ED/Hospital since the last PCP visit  Medication or dx is non-delegated (no ORC protocol indication on problem list)  Provider is a non-participating provider

## 2022-01-13 ENCOUNTER — HOSPITAL ENCOUNTER (OUTPATIENT)
Dept: RADIOLOGY | Facility: HOSPITAL | Age: 62
Discharge: HOME | End: 2022-01-13
Attending: HOSPITALIST
Payer: MEDICARE

## 2022-01-13 DIAGNOSIS — Z12.31 ENCOUNTER FOR SCREENING MAMMOGRAM FOR MALIGNANT NEOPLASM OF BREAST: ICD-10-CM

## 2022-01-13 PROCEDURE — 77063 BREAST TOMOSYNTHESIS BI: CPT | Mod: 26,,, | Performed by: RADIOLOGY

## 2022-01-13 PROCEDURE — 77063 BREAST TOMOSYNTHESIS BI: CPT | Mod: TC,PO

## 2022-01-13 PROCEDURE — 77067 SCR MAMMO BI INCL CAD: CPT | Mod: 26,,, | Performed by: RADIOLOGY

## 2022-01-13 PROCEDURE — 77063 MAMMO DIGITAL SCREENING BILAT WITH TOMO: ICD-10-PCS | Mod: 26,,, | Performed by: RADIOLOGY

## 2022-01-13 PROCEDURE — 77067 MAMMO DIGITAL SCREENING BILAT WITH TOMO: ICD-10-PCS | Mod: 26,,, | Performed by: RADIOLOGY

## 2022-01-24 NOTE — TELEPHONE ENCOUNTER
----- Message from Yariel Madrid sent at 1/24/2022  4:51 PM CST -----  Regarding: Call Back  Who Called: pt         What is the reason for the call: needing to speak with nurse Collazo in regards to medication for metoprolol .. Please contact to further assist         Can patient be contacted on [employer]: n/a         Call back number: [phone]

## 2022-01-25 RX ORDER — METOPROLOL TARTRATE 100 MG/1
100 TABLET ORAL DAILY
Qty: 60 TABLET | Refills: 5 | Status: SHIPPED | OUTPATIENT
Start: 2022-01-25 | End: 2022-03-08 | Stop reason: SDUPTHER

## 2022-01-26 ENCOUNTER — OFFICE VISIT (OUTPATIENT)
Dept: ORTHOPEDICS | Facility: CLINIC | Age: 62
End: 2022-01-26
Payer: MEDICARE

## 2022-01-26 ENCOUNTER — PES CALL (OUTPATIENT)
Dept: ADMINISTRATIVE | Facility: CLINIC | Age: 62
End: 2022-01-26
Payer: MEDICARE

## 2022-01-26 ENCOUNTER — HOSPITAL ENCOUNTER (OUTPATIENT)
Dept: RADIOLOGY | Facility: HOSPITAL | Age: 62
Discharge: HOME | End: 2022-01-26
Attending: ORTHOPAEDIC SURGERY
Payer: MEDICARE

## 2022-01-26 VITALS — BODY MASS INDEX: 30.71 KG/M2 | WEIGHT: 179.88 LBS | HEIGHT: 64 IN

## 2022-01-26 DIAGNOSIS — M51.36 DDD (DEGENERATIVE DISC DISEASE), LUMBAR: Primary | ICD-10-CM

## 2022-01-26 DIAGNOSIS — Z98.1 S/P SPINAL FUSION: ICD-10-CM

## 2022-01-26 DIAGNOSIS — G89.18 OTHER ACUTE POSTPROCEDURAL PAIN: ICD-10-CM

## 2022-01-26 PROCEDURE — 3008F PR BODY MASS INDEX (BMI) DOCUMENTED: ICD-10-PCS | Mod: CPTII,S$GLB,, | Performed by: ORTHOPAEDIC SURGERY

## 2022-01-26 PROCEDURE — 99999 PR PBB SHADOW E&M-EST. PATIENT-LVL III: ICD-10-PCS | Mod: PBBFAC,,, | Performed by: ORTHOPAEDIC SURGERY

## 2022-01-26 PROCEDURE — 72100 XR LUMBAR SPINE AP AND LATERAL: ICD-10-PCS | Mod: 26,,, | Performed by: INTERNAL MEDICINE

## 2022-01-26 PROCEDURE — 72100 X-RAY EXAM L-S SPINE 2/3 VWS: CPT | Mod: TC

## 2022-01-26 PROCEDURE — 1159F MED LIST DOCD IN RCRD: CPT | Mod: CPTII,S$GLB,, | Performed by: ORTHOPAEDIC SURGERY

## 2022-01-26 PROCEDURE — 3044F HG A1C LEVEL LT 7.0%: CPT | Mod: CPTII,S$GLB,, | Performed by: ORTHOPAEDIC SURGERY

## 2022-01-26 PROCEDURE — 3044F PR MOST RECENT HEMOGLOBIN A1C LEVEL <7.0%: ICD-10-PCS | Mod: CPTII,S$GLB,, | Performed by: ORTHOPAEDIC SURGERY

## 2022-01-26 PROCEDURE — 72100 X-RAY EXAM L-S SPINE 2/3 VWS: CPT | Mod: 26,,, | Performed by: INTERNAL MEDICINE

## 2022-01-26 PROCEDURE — 99213 PR OFFICE/OUTPT VISIT, EST, LEVL III, 20-29 MIN: ICD-10-PCS | Mod: S$GLB,,, | Performed by: ORTHOPAEDIC SURGERY

## 2022-01-26 PROCEDURE — 99213 OFFICE O/P EST LOW 20 MIN: CPT | Mod: S$GLB,,, | Performed by: ORTHOPAEDIC SURGERY

## 2022-01-26 PROCEDURE — 99999 PR PBB SHADOW E&M-EST. PATIENT-LVL III: CPT | Mod: PBBFAC,,, | Performed by: ORTHOPAEDIC SURGERY

## 2022-01-26 PROCEDURE — 3008F BODY MASS INDEX DOCD: CPT | Mod: CPTII,S$GLB,, | Performed by: ORTHOPAEDIC SURGERY

## 2022-01-26 PROCEDURE — 1159F PR MEDICATION LIST DOCUMENTED IN MEDICAL RECORD: ICD-10-PCS | Mod: CPTII,S$GLB,, | Performed by: ORTHOPAEDIC SURGERY

## 2022-01-26 NOTE — PROGRESS NOTES
The patient returns for follow-up.  She has low back and left lower extremity pain following an L4-5 T lift in October of 2020.    At a previous visit we ordered a CT scan to evaluate for possible loosening.  The CT scan demonstrated the following:  There is trace L5 loosening, the right L5 screw is long.     At last visit we advised her to wear bone stimulator, use her LSO brace, and planned see her back in 3 months.    Today she reports continued significant pain in her low back. Reports radiating leg pain is resolved since surgery but low back pain persists.    There is also notable vacuum disc phenomenon at L3-4 and L5-S1.    Today we discussed options, I do not recommend repeat intervention at this time. Will attempt lumbar WERO. Pain management referral placed. F/u after injection.

## 2022-01-27 ENCOUNTER — PATIENT MESSAGE (OUTPATIENT)
Dept: GASTROENTEROLOGY | Facility: CLINIC | Age: 62
End: 2022-01-27
Payer: MEDICARE

## 2022-01-27 NOTE — TELEPHONE ENCOUNTER
Spoke with patient and she has c/o diarrhea. Patient stated that this has been going on for a while. Patient had back surgery and can not leave the house because she does not drive. Virtual visit scheduled with Dr. Neil on 2/21/22 at 8:20am.

## 2022-02-08 ENCOUNTER — TELEPHONE (OUTPATIENT)
Dept: ADMINISTRATIVE | Facility: OTHER | Age: 62
End: 2022-02-08
Payer: MEDICARE

## 2022-02-08 DIAGNOSIS — Z98.1 S/P SPINAL FUSION: Primary | ICD-10-CM

## 2022-02-17 ENCOUNTER — PATIENT OUTREACH (OUTPATIENT)
Dept: ADMINISTRATIVE | Facility: OTHER | Age: 62
End: 2022-02-17
Payer: MEDICARE

## 2022-02-17 NOTE — PROGRESS NOTES
Health Maintenance Due   Topic Date Due    HIV Screening  Never done    TETANUS VACCINE  Never done    High Dose Statin  Never done    Shingles Vaccine (1 of 2) Never done    COVID-19 Vaccine (2 - Moderna 3-dose series) 04/15/2021     Updates were requested from care everywhere.  Chart was reviewed for overdue Proactive [facility] ([facility]) topics (CRS, Breast Cancer Screening, Eye exam)  Health Maintenance has been updated.  LINKS immunization registry triggered.  Immunizations were reconciled.

## 2022-02-21 ENCOUNTER — OFFICE VISIT (OUTPATIENT)
Dept: GASTROENTEROLOGY | Facility: CLINIC | Age: 62
End: 2022-02-21
Payer: MEDICARE

## 2022-02-21 ENCOUNTER — PATIENT MESSAGE (OUTPATIENT)
Dept: GASTROENTEROLOGY | Facility: CLINIC | Age: 62
End: 2022-02-21

## 2022-02-21 ENCOUNTER — TELEPHONE (OUTPATIENT)
Dept: GASTROENTEROLOGY | Facility: CLINIC | Age: 62
End: 2022-02-21

## 2022-02-21 DIAGNOSIS — K52.9 CHRONIC DIARRHEA: Primary | ICD-10-CM

## 2022-02-21 DIAGNOSIS — K58.9 IRRITABLE BOWEL SYNDROME, UNSPECIFIED TYPE: ICD-10-CM

## 2022-02-21 PROCEDURE — 3044F HG A1C LEVEL LT 7.0%: CPT | Mod: CPTII,95,, | Performed by: INTERNAL MEDICINE

## 2022-02-21 PROCEDURE — 99214 OFFICE O/P EST MOD 30 MIN: CPT | Mod: 95,,, | Performed by: INTERNAL MEDICINE

## 2022-02-21 PROCEDURE — 3044F PR MOST RECENT HEMOGLOBIN A1C LEVEL <7.0%: ICD-10-PCS | Mod: CPTII,95,, | Performed by: INTERNAL MEDICINE

## 2022-02-21 PROCEDURE — 99214 PR OFFICE/OUTPT VISIT, EST, LEVL IV, 30-39 MIN: ICD-10-PCS | Mod: 95,,, | Performed by: INTERNAL MEDICINE

## 2022-02-21 RX ORDER — DICYCLOMINE HYDROCHLORIDE 10 MG/1
10 CAPSULE ORAL
Qty: 60 CAPSULE | Refills: 1 | Status: ON HOLD | OUTPATIENT
Start: 2022-02-21 | End: 2022-09-07 | Stop reason: HOSPADM

## 2022-02-21 NOTE — TELEPHONE ENCOUNTER
----- Message from Tino Neil MD sent at 2/21/2022  9:30 AM CST -----  Can we get a abd xray scheduled the day she has her pcp appt I think 3/8?

## 2022-02-21 NOTE — PROGRESS NOTES
The patient location is: home, [place]   The chief complaint leading to consultation is: diarrhea  Visit type: audiovisual  Total time spent with patient: 16 min  Each patient to whom he or she provides medical services by telemedicine is:  (1) informed of the relationship between the physician and patient and the respective role of any other health care provider with respect to management of the patient; and (2) notified that he or she may decline to receive medical services by telemedicine and may withdraw from such care at any time.             GASTROENTEROLOGY CLINIC NOTE    Reason for visit: The primary encounter diagnosis was Chronic diarrhea. A diagnosis of Irritable bowel syndrome, unspecified type was also pertinent to this visit.  Referring provider/PCP: Estela Mcdaniel MD    HPI:  Vale Gutierrez is a 61 y.o. female virtual visit follow up  Hx of dustin in 2017 (had bile leak and complications)     Interval  Virtual follow up today  Diarrhea ongoing. Intermittent. Thinks she may be lactose intolerant.   She has had etoh issues in past but now abstinent and still having issues.  Will go a few days wihtout BM and then will have urgent stool. Has incontinence issues. Very urgent stools at times. Sometimes can look oily at times. No blood  No vomiting. Having back issues after her back surgery.      =================================  Initial clinic visit 5/2020  Reports having diarrhea consistently about 3 months. Sometimes 1-2x/day and othertimes 7-8x/day. Very rare nocturnal stools.  Took some lomotil which helped.  Reports started after she stopped drinking, although she isnt quite sure of exact timing. She mentions terrible diet. Eats lots of ice cream. Assoc incontinence , with urge. sometimes appears oily, sometimes cramps. Has memory issues. Taking omeprazole for gerd and this helps.    Treatments tried: lomotil    New medications: diclofenac since 4/28 (tried naprosyn prior)  Antibiotics:  none  Travel: no  Sick contacts: no  Blood / mucus: none  Gallbladder: removed  Prior GI surgeries: none  Imaging:none        Prior Endoscopy:  EGD:   6/2020 with me  Impression:           - Normal esophagus.                         - Enlarged gastric folds. Biopsied.                         - Normal examined duodenum. Biopsied.   PATH - TA  Chronic gastritis  Negative micro colitis  Negative sprue like changes    Colon:   6/2020 with me  Impression:           - The examined portion of the ileum was normal.                         - One 6 mm polyp in the descending colon, removed                         with a cold snare. Resected and retrieved.                         - The entire examined colon is normal. Biopsied.                         - The examination was otherwise normal on direct                         and retrof                        - Resume previous diet.                         - Continue present medications.                         - Await pathology results.                         - Repeat colonoscopy in 5 years for surveillance.    (Portions of this note were dictated using voice recognition software and may contain dictation related errors in spelling/grammar/syntax not found on text review)    ROS    Past Medical History: has a past medical history of Alcohol abuse, Anxiety, Depression, Hyperlipidemia, and Hypertension.    Past Surgical History: has a past surgical history that includes Cholecystectomy; Breast cyst aspiration; Esophagogastroduodenoscopy (N/A, 6/3/2020); Colonoscopy (N/A, 6/3/2020); Transforaminal epidural injection of steroid (Bilateral, 8/7/2020); and Transforaminal epidural injection of steroid (Bilateral, 9/4/2020).    Family History:family history includes Arthritis in her mother; Atrial fibrillation in her mother; Cancer in her maternal uncle; Cataracts in her paternal grandfather; Glaucoma in her paternal grandfather; Heart failure in her mother; Macular degeneration in her  mother; Stroke in her father.    Allergies: Review of patient's allergies indicates:  No Known Allergies    Social History: reports that she has quit smoking. She has never used smokeless tobacco. She reports current alcohol use of about 12.0 standard drinks of alcohol per week. She reports that she does not use drugs.    Home medications:   Current Outpatient Medications on File Prior to Visit   Medication Sig Dispense Refill    aspirin/salicylamide/caffeine (BC HEADACHE POWDER ORAL) Take 1 Package by mouth once daily. Hold medication one week prior to surgery      guaiFENesin (MUCINEX) 600 mg 12 hr tablet Take 1,200 mg by mouth 2 (two) times daily.      magnesium 30 mg Tab Take by mouth once.      metoprolol tartrate (LOPRESSOR) 100 MG tablet Take 1 tablet (100 mg total) by mouth once daily. 60 tablet 5    omeprazole (PRILOSEC) 20 MG capsule Take 1 capsule (20 mg total) by mouth 2 (two) times daily. 60 capsule 3    ondansetron (ZOFRAN-ODT) 4 MG TbDL Take 1 tablet (4 mg total) by mouth every 8 (eight) hours as needed. 21 tablet 0    Saccharomyces boulardii (FLORASTOR) 250 mg capsule Take 250 mg by mouth 2 (two) times daily.      thiamine (VITAMIN B-1) 100 MG tablet Take 100 mg by mouth once daily.      tiZANidine (ZANAFLEX) 4 MG tablet TAKE 1 TABLET BY MOUTH EVERY 6 HOURS AS NEEDED FOR muscle spasms 60 tablet 0    venlafaxine (EFFEXOR-XR) 150 MG Cp24 Take 150 mg by mouth once daily. Take am of surgery      venlafaxine (EFFEXOR-XR) 75 MG 24 hr capsule Take 75 mg by mouth once daily. Take am of surgery      vitamin D (VITAMIN D3) 1000 units Tab Take 5,000 Int'l Units by mouth. Hold am of surgery       No current facility-administered medications on file prior to visit.       Vital signs:  There were no vitals taken for this visit.    Physical Exam    NO PHYSICAL EXAM PERFORMED GIVEN VIRTUAL VISIT      Routine labs:  Lab Results   Component Value Date    WBC 9.84 01/13/2022    HGB 12.3 01/13/2022    HCT  41.0 01/13/2022    MCV 92 01/13/2022     (H) 01/13/2022     Lab Results   Component Value Date    INR 0.9 09/18/2020     No results found for: IRON, FERRITIN, TIBC, FESATURATED  Lab Results   Component Value Date     01/13/2022    K 5.2 (H) 01/13/2022     01/13/2022    CO2 28 01/13/2022    BUN 21 01/13/2022    CREATININE 1.0 01/13/2022     Lab Results   Component Value Date    ALBUMIN 3.5 01/13/2022    ALT 14 01/13/2022    AST 14 01/13/2022    ALKPHOS 140 (H) 01/13/2022    BILITOT 0.3 01/13/2022     No results found for: GLUCOSE    I have reviewed prior labs, imaging, notes from last month      Assessment:  1. Chronic diarrhea    2. Irritable bowel syndrome, unspecified type      Fluctuating bowel habits  Negative egd / colon    Plan:  Orders Placed This Encounter    X-Ray Abdomen AP 1 View    dicyclomine (BENTYL) 10 MG capsule     Trial of bentyl given    Use metamucil at least once a day  Try using lactaid pills.    RTC , give us update on progress in 4 weeks      Tino Neil MD  [facility] [place]

## 2022-02-21 NOTE — PATIENT INSTRUCTIONS
Lactaid - use before any heavy / lactose containing meals    Metamucil - start with 1 tablespoon every AM, can increase as see fit.    Can the belly xray

## 2022-02-22 ENCOUNTER — PATIENT MESSAGE (OUTPATIENT)
Dept: PAIN MEDICINE | Facility: OTHER | Age: 62
End: 2022-02-22
Payer: MEDICARE

## 2022-02-22 ENCOUNTER — TELEPHONE (OUTPATIENT)
Dept: PAIN MEDICINE | Facility: OTHER | Age: 62
End: 2022-02-22
Payer: MEDICARE

## 2022-02-22 DIAGNOSIS — Z98.1 S/P SPINAL FUSION: Primary | ICD-10-CM

## 2022-02-23 ENCOUNTER — DOCUMENT SCAN (OUTPATIENT)
Dept: HOME HEALTH SERVICES | Facility: HOSPITAL | Age: 62
End: 2022-02-23
Payer: MEDICARE

## 2022-03-03 ENCOUNTER — PATIENT MESSAGE (OUTPATIENT)
Dept: PAIN MEDICINE | Facility: OTHER | Age: 62
End: 2022-03-03
Payer: MEDICARE

## 2022-03-04 ENCOUNTER — HOSPITAL ENCOUNTER (OUTPATIENT)
Facility: OTHER | Age: 62
Discharge: HOME | End: 2022-03-04
Attending: ANESTHESIOLOGY | Admitting: ANESTHESIOLOGY
Payer: MEDICARE

## 2022-03-04 VITALS
TEMPERATURE: 98 F | RESPIRATION RATE: 16 BRPM | HEIGHT: 64 IN | WEIGHT: 200 LBS | DIASTOLIC BLOOD PRESSURE: 76 MMHG | BODY MASS INDEX: 34.15 KG/M2 | SYSTOLIC BLOOD PRESSURE: 112 MMHG | HEART RATE: 62 BPM | OXYGEN SATURATION: 96 %

## 2022-03-04 DIAGNOSIS — M51.36 DDD (DEGENERATIVE DISC DISEASE), LUMBAR: Primary | ICD-10-CM

## 2022-03-04 DIAGNOSIS — M54.17 LUMBOSACRAL RADICULOPATHY: ICD-10-CM

## 2022-03-04 DIAGNOSIS — G89.29 CHRONIC PAIN: ICD-10-CM

## 2022-03-04 PROCEDURE — 25500020 PHARM REV CODE 255: Performed by: ANESTHESIOLOGY

## 2022-03-04 PROCEDURE — 63600175 PHARM REV CODE 636 W HCPCS: Performed by: ANESTHESIOLOGY

## 2022-03-04 PROCEDURE — 25000003 PHARM REV CODE 250: Performed by: ANESTHESIOLOGY

## 2022-03-04 PROCEDURE — 64483 NJX AA&/STRD TFRM EPI L/S 1: CPT | Mod: 50,,, | Performed by: ANESTHESIOLOGY

## 2022-03-04 PROCEDURE — 64483 NJX AA&/STRD TFRM EPI L/S 1: CPT | Mod: 50 | Performed by: ANESTHESIOLOGY

## 2022-03-04 PROCEDURE — 99152 PR MOD CONSCIOUS SEDATION, SAME PHYS, 5+ YRS, FIRST 15 MIN: ICD-10-PCS | Mod: ,,, | Performed by: ANESTHESIOLOGY

## 2022-03-04 PROCEDURE — 99152 MOD SED SAME PHYS/QHP 5/>YRS: CPT | Performed by: ANESTHESIOLOGY

## 2022-03-04 PROCEDURE — 99152 MOD SED SAME PHYS/QHP 5/>YRS: CPT | Mod: ,,, | Performed by: ANESTHESIOLOGY

## 2022-03-04 PROCEDURE — 64483 PR EPIDURAL INJ, ANES/STEROID, TRANSFORAMINAL, LUMB/SACR, SNGL LEVL: ICD-10-PCS | Mod: 50,,, | Performed by: ANESTHESIOLOGY

## 2022-03-04 RX ORDER — DEXAMETHASONE SODIUM PHOSPHATE 10 MG/ML
INJECTION INTRAMUSCULAR; INTRAVENOUS
Status: DISCONTINUED | OUTPATIENT
Start: 2022-03-04 | End: 2022-03-04 | Stop reason: HOSPADM

## 2022-03-04 RX ORDER — LIDOCAINE HYDROCHLORIDE 20 MG/ML
INJECTION, SOLUTION INFILTRATION; PERINEURAL
Status: DISCONTINUED | OUTPATIENT
Start: 2022-03-04 | End: 2022-03-04 | Stop reason: HOSPADM

## 2022-03-04 RX ORDER — FENTANYL CITRATE 50 UG/ML
INJECTION, SOLUTION INTRAMUSCULAR; INTRAVENOUS
Status: DISCONTINUED | OUTPATIENT
Start: 2022-03-04 | End: 2022-03-04 | Stop reason: HOSPADM

## 2022-03-04 RX ORDER — LIDOCAINE HYDROCHLORIDE 10 MG/ML
INJECTION, SOLUTION EPIDURAL; INFILTRATION; INTRACAUDAL; PERINEURAL
Status: DISCONTINUED | OUTPATIENT
Start: 2022-03-04 | End: 2022-03-04 | Stop reason: HOSPADM

## 2022-03-04 RX ORDER — SODIUM CHLORIDE 9 MG/ML
500 INJECTION, SOLUTION INTRAVENOUS CONTINUOUS
Status: DISCONTINUED | OUTPATIENT
Start: 2022-03-04 | End: 2022-03-04 | Stop reason: HOSPADM

## 2022-03-04 RX ORDER — MIDAZOLAM HYDROCHLORIDE 1 MG/ML
INJECTION INTRAMUSCULAR; INTRAVENOUS
Status: DISCONTINUED | OUTPATIENT
Start: 2022-03-04 | End: 2022-03-04 | Stop reason: HOSPADM

## 2022-03-04 NOTE — H&P
"HPI  61 year old female with chronic lower back pain.        Past Medical History:   Diagnosis Date    Alcohol abuse     Anxiety     Depression     Hyperlipidemia     Hypertension      Past Surgical History:   Procedure Laterality Date    BREAST CYST ASPIRATION      CHOLECYSTECTOMY      complicated with bile leak, sepsis    COLONOSCOPY N/A 6/3/2020    Procedure: COLONOSCOPY Golytely;  Surgeon: Tino Neil MD;  Location: [facility] ENDO;  Service: Colon and Rectal;  Laterality: N/A;    ESOPHAGOGASTRODUODENOSCOPY N/A 6/3/2020    Procedure: EGD (ESOPHAGOGASTRODUODENOSCOPY);  Surgeon: Tino Neil MD;  Location: [facility] ENDO;  Service: Colon and Rectal;  Laterality: N/A;    TRANSFORAMINAL EPIDURAL INJECTION OF STEROID Bilateral 8/7/2020    Procedure: INJECTION, STEROID, EPIDURAL, TRANSFORAMINAL APPROACH, L4-L5 need consent;  Surgeon: Rubén Rico MD;  Location: [facility] PAIN MGT;  Service: Pain Management;  Laterality: Bilateral;    TRANSFORAMINAL EPIDURAL INJECTION OF STEROID Bilateral 9/4/2020    Procedure: LUMBAR TRANSFORAMINAL BILATERAL L4/5 DIRECT REFERRAL;  Surgeon: Rubén Rico MD;  Location: [facility] PAIN MGT;  Service: Pain Management;  Laterality: Bilateral;  NEEDS CONSENT     Review of patient's allergies indicates:  No Known Allergies     PMHx, PSHx, Allergies, Medications reviewed in epic      ROS negative except pain complaints in HPI    OBJECTIVE:    /81 (BP Location: Right arm, Patient Position: Lying)   Pulse 60   Temp 98.3 °F (36.8 °C) (Oral)   Resp 16   Ht 5' 4" (1.626 m)   Wt 90.7 kg (200 lb)   SpO2 95%   BMI 34.33 kg/m²     PHYSICAL EXAMINATION:    GENERAL: Well appearing, in no acute distress, alert and oriented x3.  PSYCH:  Mood and affect appropriate.  SKIN: Skin color, texture, turgor normal, no rashes or lesions.  CV: RRR with palpation of the radial artery.  PULM: No evidence of respiratory difficulty, symmetric chest rise. Clear to auscultation.  NEURO: Cranial nerves " grossly intact.    Plan:    Proceed with procedure as planned    Rubén Rico  03/04/2022

## 2022-03-04 NOTE — DISCHARGE INSTRUCTIONS

## 2022-03-04 NOTE — OP NOTE
Lumbar Transforaminal Epidural Steroid Injection under Fluoroscopic Guidance    The procedure, risks, benefits, and options were discussed with the patient. There are no contraindications to the procedure. The patent expressed understanding and agreed to the procedure. Informed written consent was obtained prior to the start of the procedure and can be found in the patient's chart.    PATIENT NAME: Vale Gutierrez   MRN: [id number]     DATE OF PROCEDURE: 03/04/2022    PROCEDURE:  Bilateral  L3/4 Lumbar Transforaminal Epidural Steroid Injection under Fluoroscopic Guidance    PRE-OP DIAGNOSIS: Lumbar radiculopathy  S/P spinal fusion [Z98.1]    POST-OP DIAGNOSIS: Same    PHYSICIAN: Rubén Rico MD    ASSISTANTS: Dr. Pineda     MEDICATIONS INJECTED: Preservative-free Decadron 10mg with 5cc of Lidocaine 1% MPF     LOCAL ANESTHETIC INJECTED: Xylocaine 2%     SEDATION:   Versed 1mg and Fentanyl 50mcg                                                                                                                                                                                     Conscious sedation ordered by M.D. Patient re-evaluation prior to administration of conscious sedation. No changes noted in patient's status from initial evaluation. The patient's vital signs were monitored by RN and patient remained hemodynamically stable throughout the procedure.    Event Time In   Sedation Start 1406   Sedation End 1416       ESTIMATED BLOOD LOSS: None    COMPLICATIONS: None    TECHNIQUE: Time-out was performed to identify the patient and procedure to be performed. With the patient laying in a prone position, the surgical area was prepped and draped in the usual sterile fashion using ChloraPrep and a fenestrated drape.The levels were determined under fluoroscopy guidance. Skin anesthesia was achieved by injecting Lidocaine 2% over the injection sites. The transforaminal spaces were then approached with a 22 gauge, 5 inch spinal  quinke needle that was introduced under fluoroscopic guidance in the AP and Lateral views. Once the needle tip was in the area of the transforaminal space, and there was no blood, CSF or paraesthesias, contrast dye Omnipaque (300mg/ml) was injected to confirm placement and there was no vascular runoff. Fluoroscopic imaging in the AP and lateral views revealed a clear outline of the spinal nerve with proximal spread of agent through the neural foramen into the epidural space. 3 mL of the medication mixture listed above was injected slowly at each site. Displacement of the radio opaque contrast after injection of the medication confirmed that the medication went into the area of the transforaminal spaces. The needles were removed and bleeding was nil. A sterile dressing was applied. No specimens collected. The patient tolerated the procedure well.       The patient was monitored after the procedure in the recovery area. They were given post-procedure and discharge instructions to follow at home. The patient was discharged in a stable condition.    I reviewed and edited the fellow's note. I conducted my own interview and physical examination. I agree with the findings. I was present and supervising all critical portions of the procedure.    Rubén Rico MD

## 2022-03-04 NOTE — DISCHARGE SUMMARY
Discharge Note  Short Stay      SUMMARY     Admit Date: 3/4/2022    Attending Physician: Rubén Rico      Discharge Physician: Rubén Rico      Discharge Date: 3/4/2022 2:17 PM    Procedure(s) (LRB):  Injection,steroid,epidural,transforaminal approach BILATERAL L3/4 DIRECT REFERRAL (Bilateral)    Final Diagnosis: Lumbar Radiculopathy  S/P spinal fusion [Z98.1]    Disposition: Home or self care    Patient Instructions:   Current Discharge Medication List      CONTINUE these medications which have NOT CHANGED    Details   aspirin/salicylamide/caffeine (BC HEADACHE POWDER ORAL) Take 1 Package by mouth once daily. Hold medication one week prior to surgery      dicyclomine (BENTYL) 10 MG capsule Take 1 capsule (10 mg total) by mouth before meals as needed.  Qty: 60 capsule, Refills: 1    Associated Diagnoses: Chronic diarrhea      guaiFENesin (MUCINEX) 600 mg 12 hr tablet Take 1,200 mg by mouth 2 (two) times daily.      magnesium 30 mg Tab Take by mouth once.      metoprolol tartrate (LOPRESSOR) 100 MG tablet Take 1 tablet (100 mg total) by mouth once daily.  Qty: 60 tablet, Refills: 5      omeprazole (PRILOSEC) 20 MG capsule Take 1 capsule (20 mg total) by mouth 2 (two) times daily.  Qty: 60 capsule, Refills: 3      ondansetron (ZOFRAN-ODT) 4 MG TbDL Take 1 tablet (4 mg total) by mouth every 8 (eight) hours as needed.  Qty: 21 tablet, Refills: 0      Saccharomyces boulardii (FLORASTOR) 250 mg capsule Take 250 mg by mouth 2 (two) times daily.      thiamine (VITAMIN B-1) 100 MG tablet Take 100 mg by mouth once daily.      tiZANidine (ZANAFLEX) 4 MG tablet TAKE 1 TABLET BY MOUTH EVERY 6 HOURS AS NEEDED FOR muscle spasms  Qty: 60 tablet, Refills: 0    Associated Diagnoses: S/P lumbar fusion      !! venlafaxine (EFFEXOR-XR) 150 MG Cp24 Take 150 mg by mouth once daily. Take am of surgery      !! venlafaxine (EFFEXOR-XR) 75 MG 24 hr capsule Take 75 mg by mouth once daily. Take am of surgery      vitamin D (VITAMIN D3)  1000 units Tab Take 5,000 Int'l Units by mouth. Hold am of surgery       !! - Potential duplicate medications found. Please discuss with provider.              Discharge Diagnosis: S/P spinal fusion [Z98.1]  Condition on Discharge: Stable with no complications to procedure   Diet on Discharge: Same as before.  Activity: as per instruction sheet.  Discharge to: Home with a responsible adult.  Follow up: 2-4 weeks

## 2022-03-08 ENCOUNTER — HOSPITAL ENCOUNTER (OUTPATIENT)
Dept: RADIOLOGY | Facility: HOSPITAL | Age: 62
Discharge: HOME | End: 2022-03-08
Attending: INTERNAL MEDICINE
Payer: MEDICARE

## 2022-03-08 ENCOUNTER — OFFICE VISIT (OUTPATIENT)
Dept: INTERNAL MEDICINE | Facility: CLINIC | Age: 62
End: 2022-03-08
Payer: MEDICARE

## 2022-03-08 VITALS
WEIGHT: 216.94 LBS | DIASTOLIC BLOOD PRESSURE: 54 MMHG | TEMPERATURE: 98 F | HEIGHT: 64 IN | BODY MASS INDEX: 37.04 KG/M2 | HEART RATE: 65 BPM | RESPIRATION RATE: 16 BRPM | SYSTOLIC BLOOD PRESSURE: 90 MMHG | OXYGEN SATURATION: 94 %

## 2022-03-08 DIAGNOSIS — F10.29 ALCOHOL DEPENDENCE WITH UNSPECIFIED ALCOHOL-INDUCED DISORDER: ICD-10-CM

## 2022-03-08 DIAGNOSIS — G60.3 IDIOPATHIC PROGRESSIVE POLYNEUROPATHY: ICD-10-CM

## 2022-03-08 DIAGNOSIS — R41.3 MEMORY DEFICITS: ICD-10-CM

## 2022-03-08 DIAGNOSIS — G47.00 INSOMNIA, UNSPECIFIED TYPE: ICD-10-CM

## 2022-03-08 DIAGNOSIS — Q24.8 LEFT VENTRICULAR OUTFLOW OBSTRUCTION: ICD-10-CM

## 2022-03-08 DIAGNOSIS — G95.19 INTERMITTENT SPINAL CLAUDICATION: ICD-10-CM

## 2022-03-08 DIAGNOSIS — G89.4 CHRONIC PAIN DISORDER: ICD-10-CM

## 2022-03-08 DIAGNOSIS — I10 ESSENTIAL HYPERTENSION: Primary | ICD-10-CM

## 2022-03-08 DIAGNOSIS — K52.9 CHRONIC DIARRHEA: ICD-10-CM

## 2022-03-08 DIAGNOSIS — F41.9 ANXIETY: ICD-10-CM

## 2022-03-08 DIAGNOSIS — K76.0 HEPATIC STEATOSIS: ICD-10-CM

## 2022-03-08 DIAGNOSIS — M47.816 LUMBAR SPONDYLOSIS: ICD-10-CM

## 2022-03-08 DIAGNOSIS — E67.3 HYPERVITAMINOSIS D: ICD-10-CM

## 2022-03-08 DIAGNOSIS — Z23 NEED FOR SHINGLES VACCINE: ICD-10-CM

## 2022-03-08 PROCEDURE — 1159F MED LIST DOCD IN RCRD: CPT | Mod: CPTII,S$GLB,, | Performed by: HOSPITALIST

## 2022-03-08 PROCEDURE — 99215 OFFICE O/P EST HI 40 MIN: CPT | Mod: S$GLB,,, | Performed by: HOSPITALIST

## 2022-03-08 PROCEDURE — 3074F SYST BP LT 130 MM HG: CPT | Mod: CPTII,S$GLB,, | Performed by: HOSPITALIST

## 2022-03-08 PROCEDURE — 99999 PR PBB SHADOW E&M-EST. PATIENT-LVL III: ICD-10-PCS | Mod: PBBFAC,,, | Performed by: HOSPITALIST

## 2022-03-08 PROCEDURE — 1159F PR MEDICATION LIST DOCUMENTED IN MEDICAL RECORD: ICD-10-PCS | Mod: CPTII,S$GLB,, | Performed by: HOSPITALIST

## 2022-03-08 PROCEDURE — 74018 XR ABDOMEN AP 1 VIEW: ICD-10-PCS | Mod: 26,,, | Performed by: RADIOLOGY

## 2022-03-08 PROCEDURE — 99499 RISK ADDL DX/OHS AUDIT: ICD-10-PCS | Mod: S$GLB,,, | Performed by: HOSPITALIST

## 2022-03-08 PROCEDURE — 3078F DIAST BP <80 MM HG: CPT | Mod: CPTII,S$GLB,, | Performed by: HOSPITALIST

## 2022-03-08 PROCEDURE — 74018 RADEX ABDOMEN 1 VIEW: CPT | Mod: TC,PO

## 2022-03-08 PROCEDURE — 99215 PR OFFICE/OUTPT VISIT, EST, LEVL V, 40-54 MIN: ICD-10-PCS | Mod: S$GLB,,, | Performed by: HOSPITALIST

## 2022-03-08 PROCEDURE — 3044F PR MOST RECENT HEMOGLOBIN A1C LEVEL <7.0%: ICD-10-PCS | Mod: CPTII,S$GLB,, | Performed by: HOSPITALIST

## 2022-03-08 PROCEDURE — 3074F PR MOST RECENT SYSTOLIC BLOOD PRESSURE < 130 MM HG: ICD-10-PCS | Mod: CPTII,S$GLB,, | Performed by: HOSPITALIST

## 2022-03-08 PROCEDURE — 3078F PR MOST RECENT DIASTOLIC BLOOD PRESSURE < 80 MM HG: ICD-10-PCS | Mod: CPTII,S$GLB,, | Performed by: HOSPITALIST

## 2022-03-08 PROCEDURE — 3008F PR BODY MASS INDEX (BMI) DOCUMENTED: ICD-10-PCS | Mod: CPTII,S$GLB,, | Performed by: HOSPITALIST

## 2022-03-08 PROCEDURE — 99999 PR PBB SHADOW E&M-EST. PATIENT-LVL III: CPT | Mod: PBBFAC,,, | Performed by: HOSPITALIST

## 2022-03-08 PROCEDURE — 74018 RADEX ABDOMEN 1 VIEW: CPT | Mod: 26,,, | Performed by: RADIOLOGY

## 2022-03-08 PROCEDURE — 3008F BODY MASS INDEX DOCD: CPT | Mod: CPTII,S$GLB,, | Performed by: HOSPITALIST

## 2022-03-08 PROCEDURE — 3044F HG A1C LEVEL LT 7.0%: CPT | Mod: CPTII,S$GLB,, | Performed by: HOSPITALIST

## 2022-03-08 PROCEDURE — 99499 UNLISTED E&M SERVICE: CPT | Mod: S$GLB,,, | Performed by: HOSPITALIST

## 2022-03-08 PROCEDURE — 1160F RVW MEDS BY RX/DR IN RCRD: CPT | Mod: CPTII,S$GLB,, | Performed by: HOSPITALIST

## 2022-03-08 PROCEDURE — 1160F PR REVIEW ALL MEDS BY PRESCRIBER/CLIN PHARMACIST DOCUMENTED: ICD-10-PCS | Mod: CPTII,S$GLB,, | Performed by: HOSPITALIST

## 2022-03-08 RX ORDER — METOPROLOL TARTRATE 50 MG/1
50 TABLET ORAL DAILY
Qty: 30 TABLET | Refills: 11 | Status: ON HOLD | OUTPATIENT
Start: 2022-03-08 | End: 2022-08-19 | Stop reason: SDUPTHER

## 2022-03-08 RX ORDER — BUSPIRONE HYDROCHLORIDE 5 MG/1
5 TABLET ORAL 2 TIMES DAILY PRN
Qty: 60 TABLET | Refills: 11 | Status: SHIPPED | OUTPATIENT
Start: 2022-03-08 | End: 2023-01-01

## 2022-03-08 RX ORDER — ZOSTER VACCINE RECOMBINANT, ADJUVANTED 50 MCG/0.5
0.5 KIT INTRAMUSCULAR ONCE
Qty: 1 EACH | Refills: 1 | Status: SHIPPED | OUTPATIENT
Start: 2022-03-08 | End: 2022-03-08

## 2022-03-08 RX ORDER — MIRTAZAPINE 7.5 MG/1
7.5 TABLET, FILM COATED ORAL NIGHTLY
Qty: 30 TABLET | Refills: 11 | Status: SHIPPED | OUTPATIENT
Start: 2022-03-08 | End: 2023-01-01 | Stop reason: SDUPTHER

## 2022-03-08 NOTE — PROGRESS NOTES
Subjective:     @Patient ID: Vale Gutierrez is a 61 y.o. female.    Chief Complaint: Follow-up (3 mo)    HPI    60 yo F with pmhx of htn, etoh abuse, memory deficits, idiopathic progressive polyneuropathy, lumbar spondylosis, chronic pain presents for f/u. Pt reports she continues to abstain from etoh. She is living with one of her sisters. Reports has been having anxiety and insomnia. Lays awake at night and difficulty falling asleep. Does see her psychiatrist Dr Juan Daniel Ely but has not seen him in awhile. Also reports would like to decrease dose of metoprolol. Sometimes feels dizzy, and bp is low normal today. Did not f/u with [facility] cardiology for eval of LVOT. Recently had [facility] with pain management for her back. Reports pain has improved     Review of Systems   Constitutional: Negative for chills and fever.   HENT: Negative for congestion and sore throat.    Eyes: Negative for pain and visual disturbance.   Respiratory: Negative for cough and shortness of breath.    Cardiovascular: Negative for chest pain and leg swelling.   Gastrointestinal: Negative for abdominal pain, nausea and vomiting.   Endocrine: Negative for polydipsia and polyuria.   Genitourinary: Negative for difficulty urinating and dysuria.   Musculoskeletal: Negative for arthralgias and back pain.   Skin: Negative for rash and wound.   Neurological: Negative for dizziness, weakness and headaches.   Psychiatric/Behavioral: Positive for sleep disturbance. Negative for agitation and confusion. The patient is nervous/anxious.      Past medical history, surgical history, and family medical history reviewed and updated as appropriate.    Medications and allergies reviewed.     Objective:     Vitals:    03/08/22 1337   BP: (!) 90/54   BP Location: Left arm   Patient Position: Sitting   BP Method: Large (Manual)   Pulse: 65   Resp: 16   Temp: 97.9 °F (36.6 °C)   TempSrc: Temporal   SpO2: (!) 94%   Weight: 98.4 kg (216 lb 14.9 oz)   Height:  "5' 4" (1.626 m)     Body mass index is 37.24 kg/m².  Physical Exam  Constitutional:       Appearance: Normal appearance.   HENT:      Head: Normocephalic and atraumatic.   Eyes:      General:         Right eye: No discharge.         Left eye: No discharge.      Conjunctiva/sclera: Conjunctivae normal.   Cardiovascular:      Rate and Rhythm: Normal rate and regular rhythm.      Heart sounds:     No friction rub.   Pulmonary:      Effort: Pulmonary effort is normal.      Breath sounds: Normal breath sounds.   Musculoskeletal:      Cervical back: Normal range of motion and neck supple.      Right lower leg: No edema.      Left lower leg: No edema.   Skin:     General: Skin is warm and dry.   Neurological:      Mental Status: She is alert and oriented to person, place, and time.   Psychiatric:         Mood and Affect: Mood normal.         Behavior: Behavior normal.         Lab Results   Component Value Date    WBC 9.84 01/13/2022    HGB 12.3 01/13/2022    HCT 41.0 01/13/2022     (H) 01/13/2022    CHOL 208 (H) 01/13/2022    TRIG 81 01/13/2022    HDL 63 01/13/2022    ALT 14 01/13/2022    AST 14 01/13/2022     01/13/2022    K 5.2 (H) 01/13/2022     01/13/2022    CREATININE 1.0 01/13/2022    BUN 21 01/13/2022    CO2 28 01/13/2022    TSH 2.754 01/13/2022    INR 0.9 09/18/2020    HGBA1C 5.3 01/13/2022       Assessment:     1. Essential hypertension    2. Alcohol dependence with unspecified alcohol-induced disorder    3. Memory deficits    4. Idiopathic progressive polyneuropathy    5. Lumbar spondylosis    6. Chronic pain disorder    7. Intermittent spinal claudication    8. Hepatic steatosis    9. Left ventricular outflow obstruction    10. Hypervitaminosis D    11. Need for shingles vaccine    12. Anxiety    13. Insomnia, unspecified type      Plan:   Vale was seen today for follow-up.    Diagnoses and all orders for this visit:    Essential hypertension  - bp low normal. Pt possibly symptomatic. Will " reduce dose of metoprolol to 50 mg qday    Alcohol dependence with unspecified alcohol-induced disorder  - stable. Currently pt is abstaining from etoh    Memory deficits  - stable     Idiopathic progressive polyneuropathy  - stable     Lumbar spondylosis  Chronic pain disorder  Intermittent spinal claudication  - stable. Continue f/u with pain management    Hepatic steatosis  - Last LFTs stable     Left ventricular outflow obstruction  -     Echo; Future    Hypervitaminosis D  -     Vitamin D; Future    Need for shingles vaccine  -     varicella-zoster [id number], PF, (SHINGRIX, PF,) 50 mcg/0.5 mL injection; Inject 0.5 mLs into the muscle once. for 1 dose    Anxiety  - Continue effexor. Will give buspar prn   -     busPIRone (BUSPAR) 5 MG Tab; Take 1 tablet (5 mg total) by mouth 2 (two) times daily as needed (anxiety).    Insomnia  -    mirtazapine (REMERON) 7.5 MG Tab; Take 1 tablet (7.5 mg total) by mouth every evening. For insomnia, mood    Other orders  -     metoprolol tartrate (LOPRESSOR) 50 MG tablet; Take 1 tablet (50 mg total) by mouth once daily.  -     mirtazapine (REMERON) 7.5 MG Tab; Take 1 tablet (7.5 mg total) by mouth every evening. For insomnia, mood  -     busPIRone (BUSPAR) 5 MG Tab; Take 1 tablet (5 mg total) by mouth 2 (two) times daily as needed (anxiety).        Visit time 40 min. Includes pre-charting, face-to-face encounter, medical decision making and documentation.     rtc 6 months     Estela Mcdaniel MD  Internal Medicine    3/8/2022

## 2022-03-10 ENCOUNTER — TELEPHONE (OUTPATIENT)
Dept: PAIN MEDICINE | Facility: CLINIC | Age: 62
End: 2022-03-10
Payer: MEDICARE

## 2022-03-11 ENCOUNTER — TELEPHONE (OUTPATIENT)
Dept: ORTHOPEDICS | Facility: CLINIC | Age: 62
End: 2022-03-11
Payer: MEDICARE

## 2022-03-11 NOTE — TELEPHONE ENCOUNTER
Returned call, spoke to patient at length about the pain she was having. Made suggestions for relief and offered an appointment. She is going to call back next week to schedule the appointment.

## 2022-03-14 ENCOUNTER — PATIENT MESSAGE (OUTPATIENT)
Dept: ORTHOPEDICS | Facility: CLINIC | Age: 62
End: 2022-03-14
Payer: MEDICARE

## 2022-03-14 ENCOUNTER — TELEPHONE (OUTPATIENT)
Dept: INTERNAL MEDICINE | Facility: CLINIC | Age: 62
End: 2022-03-14
Payer: MEDICARE

## 2022-03-14 NOTE — TELEPHONE ENCOUNTER
----- Message from Michelle Gomez sent at 3/14/2022  4:06 PM CDT -----  Contact: Vale   Patient would like to get medical advice.  Symptoms (please be specific): Severe  back  pain   How long have you had these symptoms:since Thursday   Would you like a call back, or a response through your [facility] portal?: Call back   Pharmacy name and phone # (copy from chart): [facility] in [place]    Comments: Vale wants to see if she can get pain medication for her back pain.

## 2022-03-14 NOTE — TELEPHONE ENCOUNTER
The patient had an epidural about 10 days ago. She is still having very bad pains. Are you able to send her a Rx for a pain pill?   Quality 110: Preventive Care And Screening: Influenza Immunization: Influenza immunization was not ordered or administered, reason not given Detail Level: Detailed

## 2022-03-15 ENCOUNTER — PATIENT MESSAGE (OUTPATIENT)
Dept: ORTHOPEDICS | Facility: CLINIC | Age: 62
End: 2022-03-15
Payer: MEDICARE

## 2022-03-15 ENCOUNTER — OFFICE VISIT (OUTPATIENT)
Dept: PAIN MEDICINE | Facility: CLINIC | Age: 62
End: 2022-03-15
Payer: MEDICARE

## 2022-03-15 DIAGNOSIS — M96.1 LUMBAR POST-LAMINECTOMY SYNDROME: ICD-10-CM

## 2022-03-15 DIAGNOSIS — M47.816 LUMBAR SPONDYLOSIS: Primary | ICD-10-CM

## 2022-03-15 DIAGNOSIS — G89.4 CHRONIC PAIN DISORDER: ICD-10-CM

## 2022-03-15 DIAGNOSIS — M51.36 DDD (DEGENERATIVE DISC DISEASE), LUMBAR: ICD-10-CM

## 2022-03-15 DIAGNOSIS — Z98.1 S/P LUMBAR FUSION: ICD-10-CM

## 2022-03-15 PROCEDURE — 1160F RVW MEDS BY RX/DR IN RCRD: CPT | Mod: CPTII,95,, | Performed by: ANESTHESIOLOGY

## 2022-03-15 PROCEDURE — 3044F HG A1C LEVEL LT 7.0%: CPT | Mod: CPTII,95,, | Performed by: ANESTHESIOLOGY

## 2022-03-15 PROCEDURE — 1159F MED LIST DOCD IN RCRD: CPT | Mod: CPTII,95,, | Performed by: ANESTHESIOLOGY

## 2022-03-15 PROCEDURE — 99213 PR OFFICE/OUTPT VISIT, EST, LEVL III, 20-29 MIN: ICD-10-PCS | Mod: 95,,, | Performed by: ANESTHESIOLOGY

## 2022-03-15 PROCEDURE — 1160F PR REVIEW ALL MEDS BY PRESCRIBER/CLIN PHARMACIST DOCUMENTED: ICD-10-PCS | Mod: CPTII,95,, | Performed by: ANESTHESIOLOGY

## 2022-03-15 PROCEDURE — 1159F PR MEDICATION LIST DOCUMENTED IN MEDICAL RECORD: ICD-10-PCS | Mod: CPTII,95,, | Performed by: ANESTHESIOLOGY

## 2022-03-15 PROCEDURE — 99213 OFFICE O/P EST LOW 20 MIN: CPT | Mod: 95,,, | Performed by: ANESTHESIOLOGY

## 2022-03-15 PROCEDURE — 3044F PR MOST RECENT HEMOGLOBIN A1C LEVEL <7.0%: ICD-10-PCS | Mod: CPTII,95,, | Performed by: ANESTHESIOLOGY

## 2022-03-15 RX ORDER — HYDROCODONE BITARTRATE AND ACETAMINOPHEN 5; 325 MG/1; MG/1
1 TABLET ORAL EVERY 12 HOURS PRN
Qty: 60 TABLET | Refills: 0 | Status: SHIPPED | OUTPATIENT
Start: 2022-03-15 | End: 2022-03-31 | Stop reason: SDUPTHER

## 2022-03-15 NOTE — PROGRESS NOTES
Chronic Pain-Tele-Medicine-Established Note (Follow up visit)      The patient location is: home  The chief complaint leading to consultation is: low back pain  Visit type: Virtual visit with synchronous audio and video  Total time spent with patient: 15 mins  Each patient to whom he or she provides medical services by telemedicine is:  (1) informed of the relationship between the physician and patient and the respective role of any other health care provider with respect to management of the patient; and (2) notified that he or she may decline to receive medical services by telemedicine and may withdraw from such care at any time.    Notes:     SUBJECTIVE:    Interval History 3/15/2022:    Vale Gutierrez presents tele-medicine appointment for a follow-up appointment for low back pain radiating to lower extremitiy R>L.  She is S/P Bilateral  L3/4 Lumbar Transforaminal Epidural Steroid Injection under Fluoroscopic Guidance on 3/4/2022 with only 2 days of relief.     Interval History 07/28/2020:  Vale Gutierrez presents tele-medicine appointment for a follow-up appointment for low back pain radiating to lower extremitiy R>L. Since the last visit, Vale Gutierrez states the pain has been persistant. Current pain intensity is 0/10.  Pain is worst when standing and moving and relieved with sitting, laying, and stretched out.  At the worst she says it is a 10/10.  The pain radiates to back of thighs and calves.  She describes the pain as sharp, burning in nature.  She states that the flexeril 5mg prn helps somewhat but not much. She started diclofenac yesterday (prescribed by her PCP) which helps with the pain, decreases frequency and intensity. She recently had an MRI lumbar spine done and would like to review the results.     Interval History 07/13/2020  Vale Gutierrez presents to the clinic for the evaluation of low back pain and bilateral calf pain. The calf pain started 5-6 weeks ago  "following non related injury and symptoms have been worsening.The pain is located in the low back area and radiates to the legs. She has suffered from lower back pain "for years."  The pain is described as aching and burning with intermittent numbness and tingling in the lower extremities. The pain is rated as 9/10. The pain is rated with a score of  3/10 on the BEST day and a score of 10/10 on the WORST day.  Symptoms interfere with daily activity. The pain is exacerbated by Standing, Walking, and Lifting.  The pain is mitigated by massage, medications and rest. She reports spending 8 hours per day reclining. The patient reports 3-4 hours of uninterrupted sleep per night. She presents for referral from Urgent Care where she was evaluated and believed to have intermittent neurogenic claudication.     Patient reports loss of sensations.  Patient denies night fever/night sweats, urinary incontinence, bowel incontinence, significant weight loss and significant motor weakness.     Physical Therapy/Home Exercise: yes, has not gone lately because of how to get there because of pain.       Pain Disability Index Review:  Last 3 PDI Scores 7/13/2020   Pain Disability Index (PDI) 47        Pain Medications:     - Opioids: Has a history of narcotic abuse, on saboxone  - Adjuvant Medications: Neurontin (Gabapentin) and PO Diclofenac and Effexor  - She has tried Tramadol in the past     report:  Reviewed and consistent with medication use as prescribed.     Pain Procedures: Patient says she has had 9 ESIs 15-20 years ago with minimal pain relief    Narrative & Impression     EXAMINATION:  MRI LUMBAR SPINE WITHOUT CONTRAST 07/22/2020     CLINICAL HISTORY:  Back pain or radiculopathy, > 6 wks; Dorsalgia, unspecified     TECHNIQUE:  Multiplanar, multisequence MR images were acquired from the thoracolumbar junction to the sacrum without contrast.     COMPARISON:  No prior MRI available for comparison.  Correlation is made to " prior radiographs of the lumbar spine from 03/11/2020.     FINDINGS:  Alignment: Grade 1 anterolisthesis of L4 on L5.     Vertebrae: There are endplate degenerative changes at L5-S1.  Vertebral body heights are maintained.  No acute fracture.     Discs: Mild disc height and disc degeneration at L3-L4 and L5-S1.     Cord: Normal.  Conus terminates at L1.     Degenerative findings:     T12-L1 and L1-L2: No spinal canal stenosis or neural foraminal narrowing.     L2-L3: Circumferential disc bulge with moderate bilateral facet arthropathy and ligamentum flavum hypertrophy resulting in mild spinal canal stenosis.  No neural foraminal narrowing.     L3-L4: Circumferential disc bulge with moderate bilateral facet arthropathy resulting in mild spinal canal stenosis and mild bilateral neural foraminal narrowing.     L4-L5: Grade 1 anterolisthesis of L4 on L5 with a posterior disc bulge and severe bilateral facet arthropathy and ligamentum flavum hypertrophy resulting in moderate spinal canal stenosis and mild bilateral neural foraminal narrowing.     L5-S1: Posterior disc bulge with moderate bilateral facet arthropathy.  No spinal canal stenosis or neural foraminal narrowing.     Paraspinal muscles & soft tissues: There is moderate fatty atrophy of the paraspinal musculature.     Impression:     Multilevel degenerative changes of the lumbar spine, most significant at L4-L5 where there is moderate spinal canal stenosis.        Electronically signed by: Thuan Newman  Date:                                            07/22/2020  Time:                                           12:57          Imaging:   Narrative & Impression       EXAMINATION:  XR LUMBAR SPINE COMPLETE 5 VIEW     CLINICAL HISTORY:  Low back pain, >6wks conservative tx, persistent-progressive sx, surgical candidate;  Low back pain     FINDINGS:  Five views: There is grade 1 anterolisthesis of L4 on L5.  There is mild-moderate DJD at L5-S1 and L3-L4.  There is  mild DJD elsewhere.  No fracture dislocation bone destruction seen.  No pars defects are seen.     Impression:     No acute process seen.      EXAMINATION:  MRI CERVICAL SPINE WITHOUT CONTRAST     CLINICAL HISTORY:  neck pain;.  Cervicalgia     TECHNIQUE:  Multiplanar, multisequence MR images of the cervical spine were acquired without the administration of contrast.     COMPARISON:  Radiograph 11/15/2019.     FINDINGS:  There is reversal of the normal cervical lordosis with grade 1 anterolisthesis of C3 on C4.  Vertebral body heights are well maintained without evidence for fracture.  There is multilevel disc space narrowing with associated chronic degenerative endplate changes, most pronounced at C4-C5, C5-C6 and C6-C7.  There is moderate right facet edema at C4-C5, likely degenerative in nature.  No marrow signal abnormality to suggest an infiltrative process.     Cervical spinal cord demonstrates normal contour and signal intensity.  Cerebellar tonsils are in their expected location.  Visualized brainstem is normal.     Vertebral artery flow voids are present.  Prevertebral soft tissues are normal.  Visualized parotid, submandibular and thyroid glands are within normal limits.  No cervical lymphadenopathy.  There is a medialized course of the carotid arteries.  Paraspinal musculature is grossly unremarkable.     C2-C3: No spinal canal stenosis or neural foraminal narrowing.     C3-C4: Right facet hypertrophy and bilateral uncovertebral joint spurring contributes to mild to moderate bilateral neural foraminal narrowing, not well evaluated due to patient motion artifact.  No spinal canal stenosis.     C4-C5: Posterior disc osteophyte complex, right facet hypertrophy and bilateral uncovertebral joint spurring contributes to severe right and moderate left neural foraminal narrowing.  No spinal canal stenosis.     C5-C6: Posterior disc osteophyte complex and bilateral uncovertebral joint spurring contributes to mild  spinal canal stenosis and severe left and moderate right neural foraminal narrowing.     C6-C7: Posterior disc osteophyte complex and bilateral uncovertebral joint spurring contributes to mild spinal canal stenosis and severe bilateral neural foraminal narrowing.     C7-T1: No spinal canal stenosis or neural foraminal narrowing.     Impression:     1. Cervical degenerative changes most pronounced at C5-C6 and C6-C7 noting mild spinal canal stenosis and moderate to severe bilateral neural foraminal narrowing.    Past Medical History:   Diagnosis Date    Alcohol abuse     Anxiety     Depression     Hyperlipidemia     Hypertension      Past Surgical History:   Procedure Laterality Date    BREAST CYST ASPIRATION      CHOLECYSTECTOMY      complicated with bile leak, sepsis    COLONOSCOPY N/A 6/3/2020    Procedure: COLONOSCOPY Golytely;  Surgeon: Tino Neil MD;  Location: [facility];  Service: Colon and Rectal;  Laterality: N/A;    ESOPHAGOGASTRODUODENOSCOPY N/A 6/3/2020    Procedure: EGD (ESOPHAGOGASTRODUODENOSCOPY);  Surgeon: Tino Neil MD;  Location: [facility];  Service: Colon and Rectal;  Laterality: N/A;    TRANSFORAMINAL EPIDURAL INJECTION OF STEROID Bilateral 8/7/2020    Procedure: INJECTION, STEROID, EPIDURAL, TRANSFORAMINAL APPROACH, L4-L5 need consent;  Surgeon: Rubén Rico MD;  Location: [facility];  Service: Pain Management;  Laterality: Bilateral;    TRANSFORAMINAL EPIDURAL INJECTION OF STEROID Bilateral 9/4/2020    Procedure: LUMBAR TRANSFORAMINAL BILATERAL L4/5 DIRECT REFERRAL;  Surgeon: Rubén Rico MD;  Location: [facility];  Service: Pain Management;  Laterality: Bilateral;  NEEDS CONSENT    TRANSFORAMINAL EPIDURAL INJECTION OF STEROID Bilateral 3/4/2022    Procedure: Injection,steroid,epidural,transforaminal approach BILATERAL L3/4 DIRECT REFERRAL;  Surgeon: Rubén Rico MD;  Location: [facility];  Service: Pain Management;  Laterality: Bilateral;     Social  History     Socioeconomic History    Marital status: Single   Tobacco Use    Smoking status: Former Smoker    Smokeless tobacco: Never Used   Substance and Sexual Activity    Alcohol use: Yes     Alcohol/week: 12.0 standard drinks     Types: 12 Shots of liquor per week     Comment: three 12-14 oz cocktail drinks.     Drug use: No    Sexual activity: Never     Social Determinants of Health     Financial Resource Strain: Medium Risk    Difficulty of Paying Living Expenses: Somewhat hard   Food Insecurity: Unknown    Worried About Running Out of Food in the Last Year: Patient refused    Ran Out of Food in the Last Year: Patient refused   Transportation Needs: Unmet Transportation Needs    Lack of Transportation (Medical): Yes    Lack of Transportation (Non-Medical): No   Physical Activity: Inactive    Days of Exercise per Week: 0 days    Minutes of Exercise per Session: 0 min   Stress: Stress Concern Present    Feeling of Stress : Rather much   Social Connections: Unknown    Frequency of Communication with Friends and Family: Three times a week    Frequency of Social Gatherings with Friends and Family: Once a week    Active Member of Clubs or Organizations: No    Attends Club or Organization Meetings: Never    Marital Status: Never    Housing Stability: Low Risk     Unable to Pay for Housing in the Last Year: No    Number of Places Lived in the Last Year: 1    Unstable Housing in the Last Year: No     Family History   Problem Relation Age of Onset    Atrial fibrillation Mother     Heart failure Mother     Arthritis Mother     Macular degeneration Mother     Stroke Father     Cancer Maternal Uncle         lung    Cataracts Paternal Grandfather     Glaucoma Paternal Grandfather        Review of patient's allergies indicates:  No Known Allergies    Current Outpatient Medications   Medication Sig    aspirin/salicylamide/caffeine (BC HEADACHE POWDER ORAL) Take 1 Package by mouth once  daily. Hold medication one week prior to surgery    busPIRone (BUSPAR) 5 MG Tab Take 1 tablet (5 mg total) by mouth 2 (two) times daily as needed (anxiety).    dicyclomine (BENTYL) 10 MG capsule Take 1 capsule (10 mg total) by mouth before meals as needed.    guaiFENesin (MUCINEX) 600 mg 12 hr tablet Take 1,200 mg by mouth 2 (two) times daily.    HYDROcodone-acetaminophen (NORCO) 5-325 mg per tablet Take 1 tablet by mouth every 12 (twelve) hours as needed for Pain.    magnesium 30 mg Tab Take by mouth once.    metoprolol tartrate (LOPRESSOR) 50 MG tablet Take 1 tablet (50 mg total) by mouth once daily.    mirtazapine (REMERON) 7.5 MG Tab Take 1 tablet (7.5 mg total) by mouth every evening. For insomnia, mood    omeprazole (PRILOSEC) 20 MG capsule Take 1 capsule (20 mg total) by mouth 2 (two) times daily.    ondansetron (ZOFRAN-ODT) 4 MG TbDL Take 1 tablet (4 mg total) by mouth every 8 (eight) hours as needed.    Saccharomyces boulardii (FLORASTOR) 250 mg capsule Take 250 mg by mouth 2 (two) times daily.    thiamine 100 MG tablet Take 100 mg by mouth once daily.    tiZANidine 4 mg Cap Take 1 capsule by mouth every 6 (six) hours. for 10 days    venlafaxine (EFFEXOR-XR) 150 MG Cp24 Take 150 mg by mouth once daily. Take am of surgery    venlafaxine (EFFEXOR-XR) 75 MG 24 hr capsule Take 75 mg by mouth once daily. Take am of surgery    vitamin D (VITAMIN D3) 1000 units Tab Take 5,000 Int'l Units by mouth. Hold am of surgery     No current facility-administered medications for this visit.       REVIEW OF SYSTEMS:    GENERAL:  No weight loss, malaise or fevers.  HEENT:   No recent changes in vision or hearing  NECK:  Negative for lumps, no difficulty with swallowing.  RESPIRATORY:  Negative for cough, wheezing or shortness of breath, patient denies any recent URI.  CARDIOVASCULAR:  Negative for chest pain, leg swelling or palpitations. + HTN  GI:  Negative for abdominal discomfort, blood in stools or black  stools or change in bowel habits. +GERD.  MUSCULOSKELETAL:  See HPI.  SKIN:  Negative for lesions, rash, and itching.  PSYCH:  + depression.  HEMATOLOGY/LYMPHOLOGY:  Negative for prolonged bleeding, bruising easily or swollen nodes.  Patient is not currently taking any anti-coagulants  NEURO:   No history of headaches, syncope, paralysis, seizures or tremors.  All other reviewed and negative other than HPI.    OBJECTIVE:    General appearance: Well appearing, in no acute distress, alert and oriented x3.  Psych:  Mood and affect appropriate.      ASSESSMENT: 61 y.o. year old female with low back pain radiating to bilateral lower extremities R>L, consistent with     Encounter Diagnoses   Name Primary?    Lumbar spondylosis Yes    Chronic pain disorder     DDD (degenerative disc disease), lumbar     S/P lumbar fusion     Lumbar post-laminectomy syndrome          PLAN:     - I have stressed the importance of physical activity and a home exercise plan to help with pain and improve health.    - Patient can continue with medications for now since they are providing benefits, using them appropriately, and without side effects.    - Continue gabapentin 600mg TID as tolerated.    - Continue diclofenac as prescribed by PCP as it is providing relief.    - Continue flexeril 5mg prn.    - Schedule for bilateral L3, L4 and L5 lumbar medial branch block x2 and possible RFA.    -  We will consider SCS in the future.    - Prescribed Norco 5/325 mg BID as needed, #60.     - We will need to obtain UDS and sign opioid agreement at the next visit.    - RTC 2 weeks after procedure with nurse practitioner.    - Counseled patient regarding the importance of activity modification, constant sleeping habits and physical therapy.    Rubén Rico MD

## 2022-03-15 NOTE — H&P (VIEW-ONLY)
Chronic Pain-Tele-Medicine-Established Note (Follow up visit)      The patient location is: home  The chief complaint leading to consultation is: low back pain  Visit type: Virtual visit with synchronous audio and video  Total time spent with patient: 15 mins  Each patient to whom he or she provides medical services by telemedicine is:  (1) informed of the relationship between the physician and patient and the respective role of any other health care provider with respect to management of the patient; and (2) notified that he or she may decline to receive medical services by telemedicine and may withdraw from such care at any time.    Notes:     SUBJECTIVE:    Interval History 3/15/2022:    Vale Gutierrez presents tele-medicine appointment for a follow-up appointment for low back pain radiating to lower extremitiy R>L.  She is S/P Bilateral  L3/4 Lumbar Transforaminal Epidural Steroid Injection under Fluoroscopic Guidance on 3/4/2022 with only 2 days of relief.     Interval History 07/28/2020:  Vale Gutierrez presents tele-medicine appointment for a follow-up appointment for low back pain radiating to lower extremitiy R>L. Since the last visit, Vale Gutierrez states the pain has been persistant. Current pain intensity is 0/10.  Pain is worst when standing and moving and relieved with sitting, laying, and stretched out.  At the worst she says it is a 10/10.  The pain radiates to back of thighs and calves.  She describes the pain as sharp, burning in nature.  She states that the flexeril 5mg prn helps somewhat but not much. She started diclofenac yesterday (prescribed by her PCP) which helps with the pain, decreases frequency and intensity. She recently had an MRI lumbar spine done and would like to review the results.     Interval History 07/13/2020  Vale Gutierrez presents to the clinic for the evaluation of low back pain and bilateral calf pain. The calf pain started 5-6 weeks ago  "following non related injury and symptoms have been worsening.The pain is located in the low back area and radiates to the legs. She has suffered from lower back pain "for years."  The pain is described as aching and burning with intermittent numbness and tingling in the lower extremities. The pain is rated as 9/10. The pain is rated with a score of  3/10 on the BEST day and a score of 10/10 on the WORST day.  Symptoms interfere with daily activity. The pain is exacerbated by Standing, Walking, and Lifting.  The pain is mitigated by massage, medications and rest. She reports spending 8 hours per day reclining. The patient reports 3-4 hours of uninterrupted sleep per night. She presents for referral from Urgent Care where she was evaluated and believed to have intermittent neurogenic claudication.     Patient reports loss of sensations.  Patient denies night fever/night sweats, urinary incontinence, bowel incontinence, significant weight loss and significant motor weakness.     Physical Therapy/Home Exercise: yes, has not gone lately because of how to get there because of pain.       Pain Disability Index Review:  Last 3 PDI Scores 7/13/2020   Pain Disability Index (PDI) 47        Pain Medications:     - Opioids: Has a history of narcotic abuse, on saboxone  - Adjuvant Medications: Neurontin (Gabapentin) and PO Diclofenac and Effexor  - She has tried Tramadol in the past     report:  Reviewed and consistent with medication use as prescribed.     Pain Procedures: Patient says she has had 9 ESIs 15-20 years ago with minimal pain relief    Narrative & Impression     EXAMINATION:  MRI LUMBAR SPINE WITHOUT CONTRAST 07/22/2020     CLINICAL HISTORY:  Back pain or radiculopathy, > 6 wks; Dorsalgia, unspecified     TECHNIQUE:  Multiplanar, multisequence MR images were acquired from the thoracolumbar junction to the sacrum without contrast.     COMPARISON:  No prior MRI available for comparison.  Correlation is made to " prior radiographs of the lumbar spine from 03/11/2020.     FINDINGS:  Alignment: Grade 1 anterolisthesis of L4 on L5.     Vertebrae: There are endplate degenerative changes at L5-S1.  Vertebral body heights are maintained.  No acute fracture.     Discs: Mild disc height and disc degeneration at L3-L4 and L5-S1.     Cord: Normal.  Conus terminates at L1.     Degenerative findings:     T12-L1 and L1-L2: No spinal canal stenosis or neural foraminal narrowing.     L2-L3: Circumferential disc bulge with moderate bilateral facet arthropathy and ligamentum flavum hypertrophy resulting in mild spinal canal stenosis.  No neural foraminal narrowing.     L3-L4: Circumferential disc bulge with moderate bilateral facet arthropathy resulting in mild spinal canal stenosis and mild bilateral neural foraminal narrowing.     L4-L5: Grade 1 anterolisthesis of L4 on L5 with a posterior disc bulge and severe bilateral facet arthropathy and ligamentum flavum hypertrophy resulting in moderate spinal canal stenosis and mild bilateral neural foraminal narrowing.     L5-S1: Posterior disc bulge with moderate bilateral facet arthropathy.  No spinal canal stenosis or neural foraminal narrowing.     Paraspinal muscles & soft tissues: There is moderate fatty atrophy of the paraspinal musculature.     Impression:     Multilevel degenerative changes of the lumbar spine, most significant at L4-L5 where there is moderate spinal canal stenosis.        Electronically signed by: Thuan Newman  Date:                                            07/22/2020  Time:                                           12:57          Imaging:   Narrative & Impression       EXAMINATION:  XR LUMBAR SPINE COMPLETE 5 VIEW     CLINICAL HISTORY:  Low back pain, >6wks conservative tx, persistent-progressive sx, surgical candidate;  Low back pain     FINDINGS:  Five views: There is grade 1 anterolisthesis of L4 on L5.  There is mild-moderate DJD at L5-S1 and L3-L4.  There is  mild DJD elsewhere.  No fracture dislocation bone destruction seen.  No pars defects are seen.     Impression:     No acute process seen.      EXAMINATION:  MRI CERVICAL SPINE WITHOUT CONTRAST     CLINICAL HISTORY:  neck pain;.  Cervicalgia     TECHNIQUE:  Multiplanar, multisequence MR images of the cervical spine were acquired without the administration of contrast.     COMPARISON:  Radiograph 11/15/2019.     FINDINGS:  There is reversal of the normal cervical lordosis with grade 1 anterolisthesis of C3 on C4.  Vertebral body heights are well maintained without evidence for fracture.  There is multilevel disc space narrowing with associated chronic degenerative endplate changes, most pronounced at C4-C5, C5-C6 and C6-C7.  There is moderate right facet edema at C4-C5, likely degenerative in nature.  No marrow signal abnormality to suggest an infiltrative process.     Cervical spinal cord demonstrates normal contour and signal intensity.  Cerebellar tonsils are in their expected location.  Visualized brainstem is normal.     Vertebral artery flow voids are present.  Prevertebral soft tissues are normal.  Visualized parotid, submandibular and thyroid glands are within normal limits.  No cervical lymphadenopathy.  There is a medialized course of the carotid arteries.  Paraspinal musculature is grossly unremarkable.     C2-C3: No spinal canal stenosis or neural foraminal narrowing.     C3-C4: Right facet hypertrophy and bilateral uncovertebral joint spurring contributes to mild to moderate bilateral neural foraminal narrowing, not well evaluated due to patient motion artifact.  No spinal canal stenosis.     C4-C5: Posterior disc osteophyte complex, right facet hypertrophy and bilateral uncovertebral joint spurring contributes to severe right and moderate left neural foraminal narrowing.  No spinal canal stenosis.     C5-C6: Posterior disc osteophyte complex and bilateral uncovertebral joint spurring contributes to mild  spinal canal stenosis and severe left and moderate right neural foraminal narrowing.     C6-C7: Posterior disc osteophyte complex and bilateral uncovertebral joint spurring contributes to mild spinal canal stenosis and severe bilateral neural foraminal narrowing.     C7-T1: No spinal canal stenosis or neural foraminal narrowing.     Impression:     1. Cervical degenerative changes most pronounced at C5-C6 and C6-C7 noting mild spinal canal stenosis and moderate to severe bilateral neural foraminal narrowing.    Past Medical History:   Diagnosis Date    Alcohol abuse     Anxiety     Depression     Hyperlipidemia     Hypertension      Past Surgical History:   Procedure Laterality Date    BREAST CYST ASPIRATION      CHOLECYSTECTOMY      complicated with bile leak, sepsis    COLONOSCOPY N/A 6/3/2020    Procedure: COLONOSCOPY Golytely;  Surgeon: Tino Neil MD;  Location: [facility];  Service: Colon and Rectal;  Laterality: N/A;    ESOPHAGOGASTRODUODENOSCOPY N/A 6/3/2020    Procedure: EGD (ESOPHAGOGASTRODUODENOSCOPY);  Surgeon: Tino Neil MD;  Location: [facility];  Service: Colon and Rectal;  Laterality: N/A;    TRANSFORAMINAL EPIDURAL INJECTION OF STEROID Bilateral 8/7/2020    Procedure: INJECTION, STEROID, EPIDURAL, TRANSFORAMINAL APPROACH, L4-L5 need consent;  Surgeon: Rubén Rico MD;  Location: [facility];  Service: Pain Management;  Laterality: Bilateral;    TRANSFORAMINAL EPIDURAL INJECTION OF STEROID Bilateral 9/4/2020    Procedure: LUMBAR TRANSFORAMINAL BILATERAL L4/5 DIRECT REFERRAL;  Surgeon: Rubén Rico MD;  Location: [facility];  Service: Pain Management;  Laterality: Bilateral;  NEEDS CONSENT    TRANSFORAMINAL EPIDURAL INJECTION OF STEROID Bilateral 3/4/2022    Procedure: Injection,steroid,epidural,transforaminal approach BILATERAL L3/4 DIRECT REFERRAL;  Surgeon: Rubén Rico MD;  Location: [facility];  Service: Pain Management;  Laterality: Bilateral;     Social  History     Socioeconomic History    Marital status: Single   Tobacco Use    Smoking status: Former Smoker    Smokeless tobacco: Never Used   Substance and Sexual Activity    Alcohol use: Yes     Alcohol/week: 12.0 standard drinks     Types: 12 Shots of liquor per week     Comment: three 12-14 oz cocktail drinks.     Drug use: No    Sexual activity: Never     Social Determinants of Health     Financial Resource Strain: Medium Risk    Difficulty of Paying Living Expenses: Somewhat hard   Food Insecurity: Unknown    Worried About Running Out of Food in the Last Year: Patient refused    Ran Out of Food in the Last Year: Patient refused   Transportation Needs: Unmet Transportation Needs    Lack of Transportation (Medical): Yes    Lack of Transportation (Non-Medical): No   Physical Activity: Inactive    Days of Exercise per Week: 0 days    Minutes of Exercise per Session: 0 min   Stress: Stress Concern Present    Feeling of Stress : Rather much   Social Connections: Unknown    Frequency of Communication with Friends and Family: Three times a week    Frequency of Social Gatherings with Friends and Family: Once a week    Active Member of Clubs or Organizations: No    Attends Club or Organization Meetings: Never    Marital Status: Never    Housing Stability: Low Risk     Unable to Pay for Housing in the Last Year: No    Number of Places Lived in the Last Year: 1    Unstable Housing in the Last Year: No     Family History   Problem Relation Age of Onset    Atrial fibrillation Mother     Heart failure Mother     Arthritis Mother     Macular degeneration Mother     Stroke Father     Cancer Maternal Uncle         lung    Cataracts Paternal Grandfather     Glaucoma Paternal Grandfather        Review of patient's allergies indicates:  No Known Allergies    Current Outpatient Medications   Medication Sig    aspirin/salicylamide/caffeine (BC HEADACHE POWDER ORAL) Take 1 Package by mouth once  daily. Hold medication one week prior to surgery    busPIRone (BUSPAR) 5 MG Tab Take 1 tablet (5 mg total) by mouth 2 (two) times daily as needed (anxiety).    dicyclomine (BENTYL) 10 MG capsule Take 1 capsule (10 mg total) by mouth before meals as needed.    guaiFENesin (MUCINEX) 600 mg 12 hr tablet Take 1,200 mg by mouth 2 (two) times daily.    HYDROcodone-acetaminophen (NORCO) 5-325 mg per tablet Take 1 tablet by mouth every 12 (twelve) hours as needed for Pain.    magnesium 30 mg Tab Take by mouth once.    metoprolol tartrate (LOPRESSOR) 50 MG tablet Take 1 tablet (50 mg total) by mouth once daily.    mirtazapine (REMERON) 7.5 MG Tab Take 1 tablet (7.5 mg total) by mouth every evening. For insomnia, mood    omeprazole (PRILOSEC) 20 MG capsule Take 1 capsule (20 mg total) by mouth 2 (two) times daily.    ondansetron (ZOFRAN-ODT) 4 MG TbDL Take 1 tablet (4 mg total) by mouth every 8 (eight) hours as needed.    Saccharomyces boulardii (FLORASTOR) 250 mg capsule Take 250 mg by mouth 2 (two) times daily.    thiamine 100 MG tablet Take 100 mg by mouth once daily.    tiZANidine 4 mg Cap Take 1 capsule by mouth every 6 (six) hours. for 10 days    venlafaxine (EFFEXOR-XR) 150 MG Cp24 Take 150 mg by mouth once daily. Take am of surgery    venlafaxine (EFFEXOR-XR) 75 MG 24 hr capsule Take 75 mg by mouth once daily. Take am of surgery    vitamin D (VITAMIN D3) 1000 units Tab Take 5,000 Int'l Units by mouth. Hold am of surgery     No current facility-administered medications for this visit.       REVIEW OF SYSTEMS:    GENERAL:  No weight loss, malaise or fevers.  HEENT:   No recent changes in vision or hearing  NECK:  Negative for lumps, no difficulty with swallowing.  RESPIRATORY:  Negative for cough, wheezing or shortness of breath, patient denies any recent URI.  CARDIOVASCULAR:  Negative for chest pain, leg swelling or palpitations. + HTN  GI:  Negative for abdominal discomfort, blood in stools or black  stools or change in bowel habits. +GERD.  MUSCULOSKELETAL:  See HPI.  SKIN:  Negative for lesions, rash, and itching.  PSYCH:  + depression.  HEMATOLOGY/LYMPHOLOGY:  Negative for prolonged bleeding, bruising easily or swollen nodes.  Patient is not currently taking any anti-coagulants  NEURO:   No history of headaches, syncope, paralysis, seizures or tremors.  All other reviewed and negative other than HPI.    OBJECTIVE:    General appearance: Well appearing, in no acute distress, alert and oriented x3.  Psych:  Mood and affect appropriate.      ASSESSMENT: 61 y.o. year old female with low back pain radiating to bilateral lower extremities R>L, consistent with     Encounter Diagnoses   Name Primary?    Lumbar spondylosis Yes    Chronic pain disorder     DDD (degenerative disc disease), lumbar     S/P lumbar fusion     Lumbar post-laminectomy syndrome          PLAN:     - I have stressed the importance of physical activity and a home exercise plan to help with pain and improve health.    - Patient can continue with medications for now since they are providing benefits, using them appropriately, and without side effects.    - Continue gabapentin 600mg TID as tolerated.    - Continue diclofenac as prescribed by PCP as it is providing relief.    - Continue flexeril 5mg prn.    - Schedule for bilateral L3, L4 and L5 lumbar medial branch block x2 and possible RFA.    -  We will consider SCS in the future.    - Prescribed Norco 5/325 mg BID as needed, #60.     - We will need to obtain UDS and sign opioid agreement at the next visit.    - RTC 2 weeks after procedure with nurse practitioner.    - Counseled patient regarding the importance of activity modification, constant sleeping habits and physical therapy.    Rubén Rico MD

## 2022-03-16 ENCOUNTER — TELEPHONE (OUTPATIENT)
Dept: PAIN MEDICINE | Facility: CLINIC | Age: 62
End: 2022-03-16
Payer: MEDICARE

## 2022-03-17 ENCOUNTER — TELEPHONE (OUTPATIENT)
Dept: PAIN MEDICINE | Facility: CLINIC | Age: 62
End: 2022-03-17
Payer: MEDICARE

## 2022-03-17 NOTE — TELEPHONE ENCOUNTER
----- Message from Lora Wilson sent at 3/17/2022  3:24 PM CDT -----  Contact: MICAH MANCINI [[id number]]  Type: Call Back    Who called:MICAH MANCINI [[id number]]    What is the request in detail: Patient is requesting a call back from Isabela in regards to rescheduling her procedure. Please advise.     [facility]    Would the patient rather a call back or a response via [facility]? Call back     Best call back number: [phone] (mobile)    Additional Information:

## 2022-03-18 ENCOUNTER — TELEPHONE (OUTPATIENT)
Dept: PAIN MEDICINE | Facility: OTHER | Age: 62
End: 2022-03-18
Payer: MEDICARE

## 2022-03-18 ENCOUNTER — TELEPHONE (OUTPATIENT)
Dept: ADMINISTRATIVE | Facility: OTHER | Age: 62
End: 2022-03-18
Payer: MEDICARE

## 2022-03-24 ENCOUNTER — HOSPITAL ENCOUNTER (OUTPATIENT)
Facility: OTHER | Age: 62
Discharge: HOME | End: 2022-03-24
Attending: ANESTHESIOLOGY | Admitting: ANESTHESIOLOGY
Payer: MEDICARE

## 2022-03-24 VITALS
SYSTOLIC BLOOD PRESSURE: 138 MMHG | DIASTOLIC BLOOD PRESSURE: 73 MMHG | OXYGEN SATURATION: 94 % | HEART RATE: 71 BPM | WEIGHT: 210 LBS | BODY MASS INDEX: 35.85 KG/M2 | HEIGHT: 64 IN | TEMPERATURE: 98 F | RESPIRATION RATE: 16 BRPM

## 2022-03-24 DIAGNOSIS — M47.816 SPONDYLOSIS WITHOUT MYELOPATHY OR RADICULOPATHY, LUMBAR REGION: Primary | ICD-10-CM

## 2022-03-24 PROCEDURE — 64494 PR INJ DX/THER AGNT PARAVERT FACET JOINT,IMG GUIDE,LUMBAR/SAC, 2ND LEVEL: ICD-10-PCS | Mod: 50,KX,, | Performed by: ANESTHESIOLOGY

## 2022-03-24 PROCEDURE — 64495 PR INJ DX/THER AGNT PARAVERT FACET JOINT,IMG GUIDE,LUMBAR/SAC, ADD LEVEL: ICD-10-PCS | Mod: 50,KX,, | Performed by: ANESTHESIOLOGY

## 2022-03-24 PROCEDURE — 64493 PR INJ DX/THER AGNT PARAVERT FACET JOINT,IMG GUIDE,LUMBAR/SAC,1ST LVL: ICD-10-PCS | Mod: 50,KX,, | Performed by: ANESTHESIOLOGY

## 2022-03-24 PROCEDURE — 64493 INJ PARAVERT F JNT L/S 1 LEV: CPT | Mod: 50,KX | Performed by: ANESTHESIOLOGY

## 2022-03-24 PROCEDURE — 64495 INJ PARAVERT F JNT L/S 3 LEV: CPT | Mod: 50,KX,, | Performed by: ANESTHESIOLOGY

## 2022-03-24 PROCEDURE — 64493 INJ PARAVERT F JNT L/S 1 LEV: CPT | Mod: 50,KX,, | Performed by: ANESTHESIOLOGY

## 2022-03-24 PROCEDURE — 64495 INJ PARAVERT F JNT L/S 3 LEV: CPT | Mod: 50,KX | Performed by: ANESTHESIOLOGY

## 2022-03-24 PROCEDURE — 64494 INJ PARAVERT F JNT L/S 2 LEV: CPT | Mod: 50,KX | Performed by: ANESTHESIOLOGY

## 2022-03-24 PROCEDURE — 64494 INJ PARAVERT F JNT L/S 2 LEV: CPT | Mod: 50,KX,, | Performed by: ANESTHESIOLOGY

## 2022-03-24 PROCEDURE — 25000003 PHARM REV CODE 250: Performed by: ANESTHESIOLOGY

## 2022-03-24 RX ORDER — LIDOCAINE HYDROCHLORIDE 20 MG/ML
INJECTION, SOLUTION INFILTRATION; PERINEURAL
Status: DISCONTINUED | OUTPATIENT
Start: 2022-03-24 | End: 2022-03-24 | Stop reason: HOSPADM

## 2022-03-24 RX ORDER — SODIUM CHLORIDE 9 MG/ML
500 INJECTION, SOLUTION INTRAVENOUS CONTINUOUS
Status: DISCONTINUED | OUTPATIENT
Start: 2022-03-24 | End: 2022-03-24 | Stop reason: HOSPADM

## 2022-03-24 RX ORDER — BUPIVACAINE HYDROCHLORIDE 2.5 MG/ML
INJECTION, SOLUTION EPIDURAL; INFILTRATION; INTRACAUDAL
Status: DISCONTINUED | OUTPATIENT
Start: 2022-03-24 | End: 2022-03-24 | Stop reason: HOSPADM

## 2022-03-24 NOTE — DISCHARGE INSTRUCTIONS
Lumbar/Cervical/Joint Medial Branch Block Pain Diary    Patient Name:  :    Pre-procedure Pain Score: _____/10    Time of injection:     Please fill out the chart below to the best of your ability. We need to know how much percentage of relief in your pain that you have while the numbing medication is active. Please be mindful that this is a diagnostic test and may not last for a long time. If you are asleep during one of the times listed below, you do not have to record that time.     Time After the   Procedure % of pain relief   achieved Pain Score (0-10)   1 hour post-procedure     2 hours post-procedure     3 hours post-procedure     4 hours post-procedure     5 hours post-procedure     6 hours post-procedure     12 hours post-procedure     24 hours post-procedure       Please make sure to return this form or information to the clinic. This information is needed to proceed with your plan of care. There are several options that you can use to send this back to us.    Form can be faxed to us at [phone]  Call us to provide the information at [phone]  Send the information to us through your [employer] Chart    If you have any questions about completing this diary, please call us at [phone].    Thank you for allowing us to care for you today. You may receive a survey about the care we provided. Your feedback is valuable and helps us provide excellent care throughout the community.     Home Care Instructions for Pain Management:    1. DIET:   You may resume your normal diet today.   2. BATHING:   You may shower with luke warm water. No tub baths or anything that will soak injection sites under water for the next 24 hours.  3. DRESSING:   You may remove your bandage today.   4. ACTIVITY LEVEL:   You may resume your normal activities 24 hrs after your procedure. Nothing strenuous today.  5. MEDICATIONS:   You may resume your normal medications today. To restart blood thinners, ask your doctor.  6.  DRIVING    If you have received any sedatives by mouth today, you may not drive for 12 hours.    If you have received any sedation through your IV, you may not drive for 24 hrs.   7. SPECIAL INSTRUCTIONS:   No heat to the injection site for 24 hrs including, hot bath or shower, heating pad, moist heat, or hot tubs.    Use ice pack to injection site for any pain or discomfort.  Apply ice packs for 20 minute intervals as needed.    IF you have diabetes, be sure to monitor your blood sugar more closely. IF your injection contained steroids your blood sugar levels may become higher than normal.    If you are still having pain upon discharge:  Your pain may improve over the next 48 hours. The anesthetic (numbing medication) works immediately to 48 hours. IF your injection contained a steroid (anti-inflammatory medication), it takes approximately 3 days to start feeling relief and 7-10 days to see your greatest results from the medication. It is possible you may need subsequent injections. This would be discussed at your follow up appointment with pain management or your referring doctor.    Please call the PAIN MANAGEMENT office at [phone] or ON CALL pager at [phone] if you experienced any:   -Weakness or loss of sensation  -Fever > 101.5  -Pain uncontrolled with oral medications   -Persistent nausea, vomiting, or diarrhea  -Redness or drainage from the injection sites, or any other worrisome concerns.   If physician on call was not reached or could not communicate with our office for any reason please go to the nearest emergency department.

## 2022-03-24 NOTE — OP NOTE
Diagnostic Lumbar Medial Branch Block Under Fluoroscopy    The procedure, risks, benefits, and options were discussed with the patient. There are no contraindications to the procedure. The patent expressed understanding and agreed to the procedure. Informed written consent was obtained prior to the start of the procedure and can be found in the patient's chart.    PATIENT NAME: Vlae Gutierrez   MRN: [id number]     DATE OF PROCEDURE: 03/24/2022                                           PROCEDURE:  Diagnostic Bilateral L2, L3, L4 and L5 Lumbar Medial Branch Block under Fluoroscopy    PRE-OP DIAGNOSIS: Lumbar spondylosis [M47.816]    POST-OP DIAGNOSIS: Same    PHYSICIAN: Rubén Rico MD    ASSISTANTS: Dr. Diaz    MEDICATIONS INJECTED:  Bupivicaine 0.25%    LOCAL ANESTHETIC INJECTED:   Xylocaine 2%    SEDATION: None    ESTIMATED BLOOD LOSS:  None    COMPLICATIONS:  None.    INTERVAL HISTORY: Patient has clinical and imaging findings suggestive of facet mediated pain.    TECHNIQUE: Time-out was performed to identify the patient and procedure to be performed. With the patient laying in a prone position, the surgical area was prepped and draped in the usual sterile fashion using ChloraPrep and fenestrated drape. The levels were determined under fluoroscopic guidance. Skin anesthesia was achieved by injecting Lidocaine 2% over the injection sites. A 22 gauge, 5 inch needle was introduced into the medial branch nerves at the junctions of the superior articular process and the transverse processes of the targeted sites using AP, lateral and/or contralateral oblique fluoroscopic imaging. After negative aspiration for blood or CSF was confirmed, 1 mL of the anesthetic listed above was then slowly injected at each site. The needles were removed and bleeding was nil. A sterile dressing was applied. No specimens collected. The patient tolerated the procedure well.     The patient was monitored after the procedure in the  recovery area. They were given post-procedure and discharge instructions to follow at home. The patient was discharged in a stable condition.    I reviewed and edited the fellow's note. I conducted my own interview and physical examination. I agree with the findings. I was present and supervising all critical portions of the procedure.    Rubén Rico MD

## 2022-03-24 NOTE — DISCHARGE SUMMARY
Discharge Note  Short Stay      SUMMARY     Admit Date: 3/24/2022    Attending Physician: Rubén Rico      Discharge Physician: Rubén Rico      Discharge Date: 3/24/2022 3:46 PM    Procedure(s) (LRB):  BLOCK, NERVE MEDIAL BRANCH BLOCK BL L2, L3, L4 and L5  1st, needs consent (Bilateral)    Final Diagnosis: Lumbar spondylosis [M47.816]    Disposition: Home or self care    Patient Instructions:   Current Discharge Medication List      CONTINUE these medications which have NOT CHANGED    Details   aspirin/salicylamide/caffeine (BC HEADACHE POWDER ORAL) Take 1 Package by mouth once daily. Hold medication one week prior to surgery      busPIRone (BUSPAR) 5 MG Tab Take 1 tablet (5 mg total) by mouth 2 (two) times daily as needed (anxiety).  Qty: 60 tablet, Refills: 11      dicyclomine (BENTYL) 10 MG capsule Take 1 capsule (10 mg total) by mouth before meals as needed.  Qty: 60 capsule, Refills: 1    Associated Diagnoses: Chronic diarrhea      guaiFENesin (MUCINEX) 600 mg 12 hr tablet Take 1,200 mg by mouth 2 (two) times daily.      HYDROcodone-acetaminophen (NORCO) 5-325 mg per tablet Take 1 tablet by mouth every 12 (twelve) hours as needed for Pain.  Qty: 60 tablet, Refills: 0    Comments: Quantity prescribed more than 7 day supply? Yes, quantity medically necessary      magnesium 30 mg Tab Take by mouth once.      metoprolol tartrate (LOPRESSOR) 50 MG tablet Take 1 tablet (50 mg total) by mouth once daily.  Qty: 30 tablet, Refills: 11    Comments: .      mirtazapine (REMERON) 7.5 MG Tab Take 1 tablet (7.5 mg total) by mouth every evening. For insomnia, mood  Qty: 30 tablet, Refills: 11      omeprazole (PRILOSEC) 20 MG capsule Take 1 capsule (20 mg total) by mouth 2 (two) times daily.  Qty: 60 capsule, Refills: 3      ondansetron (ZOFRAN-ODT) 4 MG TbDL Take 1 tablet (4 mg total) by mouth every 8 (eight) hours as needed.  Qty: 21 tablet, Refills: 0      Saccharomyces boulardii (FLORASTOR) 250 mg capsule Take 250  mg by mouth 2 (two) times daily.      thiamine 100 MG tablet Take 100 mg by mouth once daily.      tiZANidine 4 mg Cap Take 1 capsule by mouth every 6 (six) hours. for 10 days  Qty: 60 capsule, Refills: 0    Associated Diagnoses: S/P spinal fusion      !! venlafaxine (EFFEXOR-XR) 150 MG Cp24 Take 150 mg by mouth once daily. Take am of surgery      !! venlafaxine (EFFEXOR-XR) 75 MG 24 hr capsule Take 75 mg by mouth once daily. Take am of surgery      vitamin D (VITAMIN D3) 1000 units Tab Take 5,000 Int'l Units by mouth. Hold am of surgery       !! - Potential duplicate medications found. Please discuss with provider.              Discharge Diagnosis: Lumbar spondylosis [M47.816]  Condition on Discharge: Stable with no complications to procedure   Diet on Discharge: Same as before.  Activity: as per instruction sheet.  Discharge to: Home with a responsible adult.  Follow up: 2-4 weeks

## 2022-03-25 ENCOUNTER — PATIENT MESSAGE (OUTPATIENT)
Dept: PAIN MEDICINE | Facility: OTHER | Age: 62
End: 2022-03-25
Payer: MEDICARE

## 2022-03-25 ENCOUNTER — TELEPHONE (OUTPATIENT)
Dept: PAIN MEDICINE | Facility: CLINIC | Age: 62
End: 2022-03-25
Payer: MEDICARE

## 2022-03-25 NOTE — TELEPHONE ENCOUNTER
Rubén Rico MD  You 14 minutes ago (4:04 PM)         20% is not enough to proceed with second block. Please schedule for evaluation.    Message text       You  Rubén Rico MD 1 hour ago (3:12 PM)     AW    See chart note    Routing comment      You 1 hour ago (3:12 PM)     AW       Spoke with patient to follow up from yesterday injection with relief.              Pt stated she received 20% out of 100% relief BLOCK, NERVE MEDIAL BRANCH BLOCK BL L2, L3, L4 and L5             1st Block(pt is schedule for 2nd block for 3/31/220 which made be cancel and have to follow up with provider.          Documentation      You  Vale Gutierrez 1 hour ago (3:10 PM)     You 1 hour ago (3:10 PM)     AW       ----- Message from Brianna Narvaez sent at 3/24/2022 11:56 AM CDT -----  Hi     I know Ms Gutierrez just had her mbb on today just a reminder if someone can contact her on tomorrow to get the relief as I will need it to get approval on the mbb she is scheduled for on 3/31. Thanks

## 2022-03-25 NOTE — TELEPHONE ENCOUNTER
Spoke with patient to follow up from yesterday injection with relief.          Pt stated she received 20% out of 100% relief BLOCK, NERVE MEDIAL BRANCH BLOCK BL L2, L3, L4 and L5          1st Block(pt is schedule for 2nd block for 3/31/220 which made be cancel and have to follow up with provider.

## 2022-03-25 NOTE — TELEPHONE ENCOUNTER
----- Message from Brianna Narvaez sent at 3/24/2022 11:56 AM CDT -----  Hi    I know Ms Gutierrez just had her mbb on today just a reminder if someone can contact her on tomorrow to get the relief as I will need it to get approval on the mbb she is scheduled for on 3/31. Thanks

## 2022-03-28 ENCOUNTER — OFFICE VISIT (OUTPATIENT)
Dept: PAIN MEDICINE | Facility: CLINIC | Age: 62
End: 2022-03-28
Payer: MEDICARE

## 2022-03-28 VITALS
DIASTOLIC BLOOD PRESSURE: 81 MMHG | HEART RATE: 81 BPM | RESPIRATION RATE: 18 BRPM | WEIGHT: 196 LBS | SYSTOLIC BLOOD PRESSURE: 113 MMHG | TEMPERATURE: 98 F | BODY MASS INDEX: 33.46 KG/M2 | HEIGHT: 64 IN

## 2022-03-28 DIAGNOSIS — G89.4 CHRONIC PAIN DISORDER: ICD-10-CM

## 2022-03-28 DIAGNOSIS — M50.30 DDD (DEGENERATIVE DISC DISEASE), CERVICAL: ICD-10-CM

## 2022-03-28 DIAGNOSIS — M54.9 DORSALGIA, UNSPECIFIED: ICD-10-CM

## 2022-03-28 DIAGNOSIS — M96.1 POSTLAMINECTOMY SYNDROME OF LUMBAR REGION: Primary | ICD-10-CM

## 2022-03-28 PROCEDURE — 3074F PR MOST RECENT SYSTOLIC BLOOD PRESSURE < 130 MM HG: ICD-10-PCS | Mod: CPTII,S$GLB,, | Performed by: ANESTHESIOLOGY

## 2022-03-28 PROCEDURE — 99999 PR PBB SHADOW E&M-EST. PATIENT-LVL V: ICD-10-PCS | Mod: PBBFAC,,, | Performed by: ANESTHESIOLOGY

## 2022-03-28 PROCEDURE — 1160F RVW MEDS BY RX/DR IN RCRD: CPT | Mod: CPTII,S$GLB,, | Performed by: ANESTHESIOLOGY

## 2022-03-28 PROCEDURE — 99214 OFFICE O/P EST MOD 30 MIN: CPT | Mod: S$GLB,,, | Performed by: ANESTHESIOLOGY

## 2022-03-28 PROCEDURE — 1159F MED LIST DOCD IN RCRD: CPT | Mod: CPTII,S$GLB,, | Performed by: ANESTHESIOLOGY

## 2022-03-28 PROCEDURE — 3079F DIAST BP 80-89 MM HG: CPT | Mod: CPTII,S$GLB,, | Performed by: ANESTHESIOLOGY

## 2022-03-28 PROCEDURE — 3008F BODY MASS INDEX DOCD: CPT | Mod: CPTII,S$GLB,, | Performed by: ANESTHESIOLOGY

## 2022-03-28 PROCEDURE — 99999 PR PBB SHADOW E&M-EST. PATIENT-LVL V: CPT | Mod: PBBFAC,,, | Performed by: ANESTHESIOLOGY

## 2022-03-28 PROCEDURE — 1159F PR MEDICATION LIST DOCUMENTED IN MEDICAL RECORD: ICD-10-PCS | Mod: CPTII,S$GLB,, | Performed by: ANESTHESIOLOGY

## 2022-03-28 PROCEDURE — 99214 PR OFFICE/OUTPT VISIT, EST, LEVL IV, 30-39 MIN: ICD-10-PCS | Mod: S$GLB,,, | Performed by: ANESTHESIOLOGY

## 2022-03-28 PROCEDURE — 3079F PR MOST RECENT DIASTOLIC BLOOD PRESSURE 80-89 MM HG: ICD-10-PCS | Mod: CPTII,S$GLB,, | Performed by: ANESTHESIOLOGY

## 2022-03-28 PROCEDURE — 3074F SYST BP LT 130 MM HG: CPT | Mod: CPTII,S$GLB,, | Performed by: ANESTHESIOLOGY

## 2022-03-28 PROCEDURE — 1160F PR REVIEW ALL MEDS BY PRESCRIBER/CLIN PHARMACIST DOCUMENTED: ICD-10-PCS | Mod: CPTII,S$GLB,, | Performed by: ANESTHESIOLOGY

## 2022-03-28 PROCEDURE — 3008F PR BODY MASS INDEX (BMI) DOCUMENTED: ICD-10-PCS | Mod: CPTII,S$GLB,, | Performed by: ANESTHESIOLOGY

## 2022-03-28 PROCEDURE — 3044F PR MOST RECENT HEMOGLOBIN A1C LEVEL <7.0%: ICD-10-PCS | Mod: CPTII,S$GLB,, | Performed by: ANESTHESIOLOGY

## 2022-03-28 PROCEDURE — 3044F HG A1C LEVEL LT 7.0%: CPT | Mod: CPTII,S$GLB,, | Performed by: ANESTHESIOLOGY

## 2022-03-28 NOTE — PROGRESS NOTES
Chronic Pain-Established Note (Follow up visit)      SUBJECTIVE:    Interval History 3/28/22:  Patient with a history of L4-5 fusion presents in follow-up of low back pain s/p bilateral L3/4 TFESI on 3/4/22 with 2 days of relief then bilateral L2,3,4,5 MBB on 3/24/22 with 20% relief. Today, she reports persistent chronic low back pain with occasional radiation down the posterior legs to the knees. Back pain is more bothersome than the legs. Pain is worse with any activity including prolonged standing, sitting, bending, and coughing/sneezing. Pain is improved with lying down.  Takes Norco 5-325 bid, last filled 3/15/22. Takes tizanidine every 6 hours.    Interval History 3/15/2022:  Vale Gutierrez presents tele-medicine appointment for a follow-up appointment for low back pain radiating to lower extremitiy R>L.  She is S/P Bilateral  L3/4 Lumbar Transforaminal Epidural Steroid Injection under Fluoroscopic Guidance on 3/4/2022 with only 2 days of relief.     Interval History 07/28/2020:  Vale Gutierrez presents tele-medicine appointment for a follow-up appointment for low back pain radiating to lower extremitiy R>L. Since the last visit, Vale Gutierrez states the pain has been persistant. Current pain intensity is 0/10.  Pain is worst when standing and moving and relieved with sitting, laying, and stretched out.  At the worst she says it is a 10/10.  The pain radiates to back of thighs and calves.  She describes the pain as sharp, burning in nature.  She states that the flexeril 5mg prn helps somewhat but not much. She started diclofenac yesterday (prescribed by her PCP) which helps with the pain, decreases frequency and intensity. She recently had an MRI lumbar spine done and would like to review the results.     Interval History 07/13/2020  Vale Gutierrez presents to the clinic for the evaluation of low back pain and bilateral calf pain. The calf pain started 5-6 weeks ago following non  "related injury and symptoms have been worsening.The pain is located in the low back area and radiates to the legs. She has suffered from lower back pain "for years."  The pain is described as aching and burning with intermittent numbness and tingling in the lower extremities. The pain is rated as 9/10. The pain is rated with a score of  3/10 on the BEST day and a score of 10/10 on the WORST day.  Symptoms interfere with daily activity. The pain is exacerbated by Standing, Walking, and Lifting.  The pain is mitigated by massage, medications and rest. She reports spending 8 hours per day reclining. The patient reports 3-4 hours of uninterrupted sleep per night. She presents for referral from Urgent Care where she was evaluated and believed to have intermittent neurogenic claudication.     Patient reports loss of sensations.  Patient denies night fever/night sweats, urinary incontinence, bowel incontinence, significant weight loss and significant motor weakness.     Physical Therapy/Home Exercise: yes, has not gone lately because of how to get there because of pain.       Pain Disability Index Review:  Last 3 PDI Scores 7/13/2020   Pain Disability Index (PDI) 47        Pain Medications:     - Opioids: Has a history of narcotic abuse, on saboxone  - Adjuvant Medications: Neurontin (Gabapentin) and PO Diclofenac and Effexor  - She has tried Tramadol in the past     report:  Reviewed and consistent with medication use as prescribed.     Pain Procedures:   Patient says she has had 9 ESIs 15-20 years ago with minimal pain relief  3/4/22 Bilateral L3/4 TFESI 3/4 - 2 days of relief  3/24/22 Bl L2,3,4,5 MBB - 20% relief      Narrative & Impression     EXAMINATION:  MRI LUMBAR SPINE WITHOUT CONTRAST 07/22/2020     CLINICAL HISTORY:  Back pain or radiculopathy, > 6 wks; Dorsalgia, unspecified     TECHNIQUE:  Multiplanar, multisequence MR images were acquired from the thoracolumbar junction to the sacrum without " contrast.     COMPARISON:  No prior MRI available for comparison.  Correlation is made to prior radiographs of the lumbar spine from 03/11/2020.     FINDINGS:  Alignment: Grade 1 anterolisthesis of L4 on L5.     Vertebrae: There are endplate degenerative changes at L5-S1.  Vertebral body heights are maintained.  No acute fracture.     Discs: Mild disc height and disc degeneration at L3-L4 and L5-S1.     Cord: Normal.  Conus terminates at L1.     Degenerative findings:     T12-L1 and L1-L2: No spinal canal stenosis or neural foraminal narrowing.     L2-L3: Circumferential disc bulge with moderate bilateral facet arthropathy and ligamentum flavum hypertrophy resulting in mild spinal canal stenosis.  No neural foraminal narrowing.     L3-L4: Circumferential disc bulge with moderate bilateral facet arthropathy resulting in mild spinal canal stenosis and mild bilateral neural foraminal narrowing.     L4-L5: Grade 1 anterolisthesis of L4 on L5 with a posterior disc bulge and severe bilateral facet arthropathy and ligamentum flavum hypertrophy resulting in moderate spinal canal stenosis and mild bilateral neural foraminal narrowing.     L5-S1: Posterior disc bulge with moderate bilateral facet arthropathy.  No spinal canal stenosis or neural foraminal narrowing.     Paraspinal muscles & soft tissues: There is moderate fatty atrophy of the paraspinal musculature.     Impression:     Multilevel degenerative changes of the lumbar spine, most significant at L4-L5 where there is moderate spinal canal stenosis.        Electronically signed by: Thuan Newman  Date:                                            07/22/2020  Time:                                           12:57          Imaging:   Narrative & Impression       EXAMINATION:  XR LUMBAR SPINE COMPLETE 5 VIEW     CLINICAL HISTORY:  Low back pain, >6wks conservative tx, persistent-progressive sx, surgical candidate;  Low back pain     FINDINGS:  Five views: There is grade 1  anterolisthesis of L4 on L5.  There is mild-moderate DJD at L5-S1 and L3-L4.  There is mild DJD elsewhere.  No fracture dislocation bone destruction seen.  No pars defects are seen.     Impression:     No acute process seen.      EXAMINATION:  MRI CERVICAL SPINE WITHOUT CONTRAST     CLINICAL HISTORY:  neck pain;.  Cervicalgia     TECHNIQUE:  Multiplanar, multisequence MR images of the cervical spine were acquired without the administration of contrast.     COMPARISON:  Radiograph 11/15/2019.     FINDINGS:  There is reversal of the normal cervical lordosis with grade 1 anterolisthesis of C3 on C4.  Vertebral body heights are well maintained without evidence for fracture.  There is multilevel disc space narrowing with associated chronic degenerative endplate changes, most pronounced at C4-C5, C5-C6 and C6-C7.  There is moderate right facet edema at C4-C5, likely degenerative in nature.  No marrow signal abnormality to suggest an infiltrative process.     Cervical spinal cord demonstrates normal contour and signal intensity.  Cerebellar tonsils are in their expected location.  Visualized brainstem is normal.     Vertebral artery flow voids are present.  Prevertebral soft tissues are normal.  Visualized parotid, submandibular and thyroid glands are within normal limits.  No cervical lymphadenopathy.  There is a medialized course of the carotid arteries.  Paraspinal musculature is grossly unremarkable.     C2-C3: No spinal canal stenosis or neural foraminal narrowing.     C3-C4: Right facet hypertrophy and bilateral uncovertebral joint spurring contributes to mild to moderate bilateral neural foraminal narrowing, not well evaluated due to patient motion artifact.  No spinal canal stenosis.     C4-C5: Posterior disc osteophyte complex, right facet hypertrophy and bilateral uncovertebral joint spurring contributes to severe right and moderate left neural foraminal narrowing.  No spinal canal stenosis.     C5-C6: Posterior  disc osteophyte complex and bilateral uncovertebral joint spurring contributes to mild spinal canal stenosis and severe left and moderate right neural foraminal narrowing.     C6-C7: Posterior disc osteophyte complex and bilateral uncovertebral joint spurring contributes to mild spinal canal stenosis and severe bilateral neural foraminal narrowing.     C7-T1: No spinal canal stenosis or neural foraminal narrowing.     Impression:     1. Cervical degenerative changes most pronounced at C5-C6 and C6-C7 noting mild spinal canal stenosis and moderate to severe bilateral neural foraminal narrowing.    Past Medical History:   Diagnosis Date    Alcohol abuse     Anxiety     Depression     Hyperlipidemia     Hypertension      Past Surgical History:   Procedure Laterality Date    BREAST CYST ASPIRATION      CHOLECYSTECTOMY      complicated with bile leak, sepsis    COLONOSCOPY N/A 6/3/2020    Procedure: COLONOSCOPY Golytely;  Surgeon: Tino Neil MD;  Location: [facility];  Service: Colon and Rectal;  Laterality: N/A;    ESOPHAGOGASTRODUODENOSCOPY N/A 6/3/2020    Procedure: EGD (ESOPHAGOGASTRODUODENOSCOPY);  Surgeon: Tino Neil MD;  Location: [facility];  Service: Colon and Rectal;  Laterality: N/A;    INJECTION OF ANESTHETIC AGENT AROUND NERVE Bilateral 3/24/2022    Procedure: BLOCK, NERVE MEDIAL BRANCH BLOCK BL L2, L3, L4 and L5  1st, needs consent;  Surgeon: Rubén Rico MD;  Location: [facility];  Service: Pain Management;  Laterality: Bilateral;    TRANSFORAMINAL EPIDURAL INJECTION OF STEROID Bilateral 8/7/2020    Procedure: INJECTION, STEROID, EPIDURAL, TRANSFORAMINAL APPROACH, L4-L5 need consent;  Surgeon: Rubén Rico MD;  Location: [facility];  Service: Pain Management;  Laterality: Bilateral;    TRANSFORAMINAL EPIDURAL INJECTION OF STEROID Bilateral 9/4/2020    Procedure: LUMBAR TRANSFORAMINAL BILATERAL L4/5 DIRECT REFERRAL;  Surgeon: Rubén Rico MD;  Location: [facility];   Service: Pain Management;  Laterality: Bilateral;  NEEDS CONSENT    TRANSFORAMINAL EPIDURAL INJECTION OF STEROID Bilateral 3/4/2022    Procedure: Injection,steroid,epidural,transforaminal approach BILATERAL L3/4 DIRECT REFERRAL;  Surgeon: Rubén Rico MD;  Location: [facility];  Service: Pain Management;  Laterality: Bilateral;     Social History     Socioeconomic History    Marital status: Single   Tobacco Use    Smoking status: Former Smoker    Smokeless tobacco: Never Used   Substance and Sexual Activity    Alcohol use: Yes     Alcohol/week: 12.0 standard drinks     Types: 12 Shots of liquor per week     Comment: three 12-14 oz cocktail drinks.     Drug use: No    Sexual activity: Never     Social Determinants of Health     Financial Resource Strain: Medium Risk    Difficulty of Paying Living Expenses: Somewhat hard   Food Insecurity: Unknown    Worried About Running Out of Food in the Last Year: Patient refused    Ran Out of Food in the Last Year: Patient refused   Transportation Needs: Unmet Transportation Needs    Lack of Transportation (Medical): Yes    Lack of Transportation (Non-Medical): No   Physical Activity: Inactive    Days of Exercise per Week: 0 days    Minutes of Exercise per Session: 0 min   Stress: Stress Concern Present    Feeling of Stress : Rather much   Social Connections: Unknown    Frequency of Communication with Friends and Family: Three times a week    Frequency of Social Gatherings with Friends and Family: Once a week    Active Member of Clubs or Organizations: No    Attends Club or Organization Meetings: Never    Marital Status: Never    Housing Stability: Low Risk     Unable to Pay for Housing in the Last Year: No    Number of Places Lived in the Last Year: 1    Unstable Housing in the Last Year: No     Family History   Problem Relation Age of Onset    Atrial fibrillation Mother     Heart failure Mother     Arthritis Mother     Macular  degeneration Mother     Stroke Father     Cancer Maternal Uncle         lung    Cataracts Paternal Grandfather     Glaucoma Paternal Grandfather        Review of patient's allergies indicates:  No Known Allergies    Current Outpatient Medications   Medication Sig    aspirin/salicylamide/caffeine (BC HEADACHE POWDER ORAL) Take 1 Package by mouth once daily. Hold medication one week prior to surgery    busPIRone (BUSPAR) 5 MG Tab Take 1 tablet (5 mg total) by mouth 2 (two) times daily as needed (anxiety).    dicyclomine (BENTYL) 10 MG capsule Take 1 capsule (10 mg total) by mouth before meals as needed.    HYDROcodone-acetaminophen (NORCO) 5-325 mg per tablet Take 1 tablet by mouth every 12 (twelve) hours as needed for Pain.    magnesium 30 mg Tab Take by mouth once.    metoprolol tartrate (LOPRESSOR) 50 MG tablet Take 1 tablet (50 mg total) by mouth once daily.    mirtazapine (REMERON) 7.5 MG Tab Take 1 tablet (7.5 mg total) by mouth every evening. For insomnia, mood    omeprazole (PRILOSEC) 20 MG capsule Take 1 capsule (20 mg total) by mouth 2 (two) times daily.    ondansetron (ZOFRAN-ODT) 4 MG TbDL Take 1 tablet (4 mg total) by mouth every 8 (eight) hours as needed.    thiamine 100 MG tablet Take 100 mg by mouth once daily.    venlafaxine (EFFEXOR-XR) 150 MG Cp24 Take 150 mg by mouth once daily. Take am of surgery    venlafaxine (EFFEXOR-XR) 75 MG 24 hr capsule Take 75 mg by mouth once daily. Take am of surgery    vitamin D (VITAMIN D3) 1000 units Tab Take 5,000 Int'l Units by mouth. Hold am of surgery    guaiFENesin (MUCINEX) 600 mg 12 hr tablet Take 1,200 mg by mouth 2 (two) times daily.    Saccharomyces boulardii (FLORASTOR) 250 mg capsule Take 250 mg by mouth 2 (two) times daily.     No current facility-administered medications for this visit.       REVIEW OF SYSTEMS:    GENERAL:  No weight loss, malaise or fevers.  HEENT:   No recent changes in vision or hearing  NECK:  Negative for lumps,  no difficulty with swallowing.  RESPIRATORY:  Negative for cough, wheezing or shortness of breath, patient denies any recent URI.  CARDIOVASCULAR:  Negative for chest pain, leg swelling or palpitations. + HTN  GI:  Negative for abdominal discomfort, blood in stools or black stools or change in bowel habits. +GERD.  MUSCULOSKELETAL:  See HPI.  SKIN:  Negative for lesions, rash, and itching.  PSYCH:  + depression.  HEMATOLOGY/LYMPHOLOGY:  Negative for prolonged bleeding, bruising easily or swollen nodes.  Patient is not currently taking any anti-coagulants  NEURO:   No history of headaches, syncope, paralysis, seizures or tremors.  All other reviewed and negative other than HPI.    OBJECTIVE:    General appearance: Well appearing, in no acute distress, alert and oriented x3.  Psych:  Mood and affect appropriate.  Back Exam     Tenderness   The patient is experiencing tenderness in the lumbar and sacroiliac.    Range of Motion   Extension: abnormal   Flexion: abnormal Back flexion: painful.     Tests   Straight leg raise right: negative  Straight leg raise left: negative    Reflexes   Patellar: 1/4  Achilles: 1/4  Babinski's sign: normal     Other   Toe walk: abnormal  Sensation: normal  Gait: antalgic   Erythema: no back redness  Scars: present    Comments:  5/5 strength throughout BLE  + Milgram's (axial pain), mildly positive facet loading  -FLAKO TABARES,           ASSESSMENT: 61 y.o. year old female with low back pain radiating to bilateral lower extremities R>L, consistent with     Encounter Diagnoses   Name Primary?    Postlaminectomy syndrome of lumbar region Yes    DDD (degenerative disc disease), cervical     Chronic pain disorder          PLAN:     - I have stressed the importance of physical activity and a home exercise plan to help with pain and improve health.    - Patient can continue with medications for now since they are providing benefits, using them appropriately, and without side effects.    -  Continue zanaflex as prescribed by Dr. Rivas    - Repeat MRI L-Spine ordered today    -Referred to aquatic therapy as she is having difficulty tolerating land-based therapy    - Prescribed Norco 5/325 mg BID as needed, #60. Will increase to TID, she may call for early refill if needed.     - Sign opioid agreement today, UDS at next visit    - RTC in 2-3 weeks for virtual visit to review MRI and discuss SCS trial. Information on SCS provided today    - Counseled patient regarding the importance of activity modification, constant sleeping habits and physical therapy.    Slava Page MD        I have reviewed and concur with the resident's history, physical, assessment, and plan.  I have personally interviewed and examined the patient at bedside.  See below addendum for my evaluation and additional findings.    Rubén Rico MD

## 2022-03-30 DIAGNOSIS — Z98.1 S/P SPINAL FUSION: Primary | ICD-10-CM

## 2022-03-31 ENCOUNTER — PATIENT MESSAGE (OUTPATIENT)
Dept: ORTHOPEDICS | Facility: CLINIC | Age: 62
End: 2022-03-31
Payer: MEDICARE

## 2022-03-31 ENCOUNTER — TELEPHONE (OUTPATIENT)
Dept: OPTOMETRY | Facility: CLINIC | Age: 62
End: 2022-03-31
Payer: MEDICARE

## 2022-03-31 NOTE — TELEPHONE ENCOUNTER
----- Message from Cindy Valente MA sent at 3/31/2022  1:58 PM CDT -----  Regarding: FW: Appt  Contact: Vale Sharp is 's pt    ----- Message -----  From: Wendy Anderson  Sent: 3/31/2022   1:37 PM CDT  To: [facility] Clinical Support Staff  Subject: Appt                                             Pt is calling to schedule appt. Pt is trying to be seen before August.      [phone]

## 2022-03-31 NOTE — TELEPHONE ENCOUNTER
----- Message from Nancy Ramos sent at 3/31/2022  4:31 PM CDT -----  Who Called:        Refill or New Rx: refill         RX Name and Strength:   HYDROcodone-acetaminophen (NORCO) 5-325 mg per tablet        How is the patient currently taking it? (ex. 1XDay)        Is this a 30 day or 90 day RX        Preferred Pharmacy with phone number:  [facility], [place] - [address]        Local or Mail Order: local         Ordering Provider:        Would the patient rather a call back or a response via [facility]? Call back         Best Call Back Number:  [phone]        Additional Information:

## 2022-04-01 ENCOUNTER — PATIENT MESSAGE (OUTPATIENT)
Dept: PAIN MEDICINE | Facility: CLINIC | Age: 62
End: 2022-04-01
Payer: MEDICARE

## 2022-04-01 RX ORDER — HYDROCODONE BITARTRATE AND ACETAMINOPHEN 5; 325 MG/1; MG/1
1 TABLET ORAL EVERY 12 HOURS PRN
Qty: 60 TABLET | Refills: 0 | Status: SHIPPED | OUTPATIENT
Start: 2022-04-01 | End: 2022-04-28 | Stop reason: SDUPTHER

## 2022-04-04 ENCOUNTER — PATIENT MESSAGE (OUTPATIENT)
Dept: PAIN MEDICINE | Facility: CLINIC | Age: 62
End: 2022-04-04
Payer: MEDICARE

## 2022-04-05 ENCOUNTER — TELEPHONE (OUTPATIENT)
Dept: PAIN MEDICINE | Facility: CLINIC | Age: 62
End: 2022-04-05
Payer: MEDICARE

## 2022-04-05 NOTE — TELEPHONE ENCOUNTER
----- Message from Jhon Mckeon sent at 4/5/2022 11:26 AM CDT -----  Regarding: CAll BAck  Name of Who is Calling:MICAH MANCINI [[id number]]              What is the request in detail:Patient requesting a call back. Patient states her medication HYDROcodone-acetaminophen (NORCO) 5-325 mg per tablet won't be ready till 04- and she needs something now for pain. Please assist               Can the clinic reply by [facility]: Yes              What Number to Call Back if not in [facility]:[phone]

## 2022-04-05 NOTE — TELEPHONE ENCOUNTER
Patient was contact and informed that her refill will be fill and sent to pharmacy and ready for  today.    Verbalized undertsanding

## 2022-04-07 ENCOUNTER — HOSPITAL ENCOUNTER (OUTPATIENT)
Dept: RADIOLOGY | Facility: HOSPITAL | Age: 62
Discharge: HOME | End: 2022-04-07
Attending: ANESTHESIOLOGY
Payer: MEDICARE

## 2022-04-07 DIAGNOSIS — M54.9 DORSALGIA, UNSPECIFIED: ICD-10-CM

## 2022-04-07 DIAGNOSIS — M96.1 POSTLAMINECTOMY SYNDROME OF LUMBAR REGION: ICD-10-CM

## 2022-04-07 PROCEDURE — 72148 MRI LUMBAR SPINE W/O DYE: CPT | Mod: 26,,, | Performed by: RADIOLOGY

## 2022-04-07 PROCEDURE — 72148 MRI LUMBAR SPINE W/O DYE: CPT | Mod: TC

## 2022-04-07 PROCEDURE — 72148 MRI LUMBAR SPINE WITHOUT CONTRAST: ICD-10-PCS | Mod: 26,,, | Performed by: RADIOLOGY

## 2022-04-11 ENCOUNTER — OFFICE VISIT (OUTPATIENT)
Dept: PAIN MEDICINE | Facility: CLINIC | Age: 62
End: 2022-04-11
Payer: MEDICARE

## 2022-04-11 ENCOUNTER — PATIENT OUTREACH (OUTPATIENT)
Dept: ADMINISTRATIVE | Facility: OTHER | Age: 62
End: 2022-04-11
Payer: MEDICARE

## 2022-04-11 ENCOUNTER — TELEPHONE (OUTPATIENT)
Dept: INTERNAL MEDICINE | Facility: CLINIC | Age: 62
End: 2022-04-11
Payer: MEDICARE

## 2022-04-11 ENCOUNTER — TELEPHONE (OUTPATIENT)
Dept: PAIN MEDICINE | Facility: OTHER | Age: 62
End: 2022-04-11
Payer: MEDICARE

## 2022-04-11 DIAGNOSIS — N28.9 RENAL LESION: Primary | ICD-10-CM

## 2022-04-11 DIAGNOSIS — M96.1 POSTLAMINECTOMY SYNDROME OF LUMBAR REGION: Primary | ICD-10-CM

## 2022-04-11 DIAGNOSIS — M54.16 LUMBAR RADICULOPATHY: ICD-10-CM

## 2022-04-11 PROCEDURE — 3044F HG A1C LEVEL LT 7.0%: CPT | Mod: CPTII,95,, | Performed by: ANESTHESIOLOGY

## 2022-04-11 PROCEDURE — 1159F PR MEDICATION LIST DOCUMENTED IN MEDICAL RECORD: ICD-10-PCS | Mod: CPTII,95,, | Performed by: ANESTHESIOLOGY

## 2022-04-11 PROCEDURE — 99213 OFFICE O/P EST LOW 20 MIN: CPT | Mod: 95,,, | Performed by: ANESTHESIOLOGY

## 2022-04-11 PROCEDURE — 99213 PR OFFICE/OUTPT VISIT, EST, LEVL III, 20-29 MIN: ICD-10-PCS | Mod: 95,,, | Performed by: ANESTHESIOLOGY

## 2022-04-11 PROCEDURE — 1160F RVW MEDS BY RX/DR IN RCRD: CPT | Mod: CPTII,95,, | Performed by: ANESTHESIOLOGY

## 2022-04-11 PROCEDURE — 3044F PR MOST RECENT HEMOGLOBIN A1C LEVEL <7.0%: ICD-10-PCS | Mod: CPTII,95,, | Performed by: ANESTHESIOLOGY

## 2022-04-11 PROCEDURE — 1160F PR REVIEW ALL MEDS BY PRESCRIBER/CLIN PHARMACIST DOCUMENTED: ICD-10-PCS | Mod: CPTII,95,, | Performed by: ANESTHESIOLOGY

## 2022-04-11 PROCEDURE — 1159F MED LIST DOCD IN RCRD: CPT | Mod: CPTII,95,, | Performed by: ANESTHESIOLOGY

## 2022-04-11 RX ORDER — OXYCODONE AND ACETAMINOPHEN 5; 325 MG/1; MG/1
1 TABLET ORAL EVERY 8 HOURS PRN
Qty: 90 TABLET | Refills: 0 | Status: SHIPPED | OUTPATIENT
Start: 2022-04-11 | End: 2022-04-28 | Stop reason: SDUPTHER

## 2022-04-11 NOTE — PROGRESS NOTES
Chronic Pain-Tele-Medicine-Established Note (Follow up visit)      The patient location is: Her home in [place]  The chief complaint leading to consultation is: Back Pain  Visit type: Virtual visit with synchronous audio and video  Total time spent with patient: 30  Each patient to whom he or she provides medical services by telemedicine is:  (1) informed of the relationship between the physician and patient and the respective role of any other health care provider with respect to management of the patient; and (2) notified that he or she may decline to receive medical services by telemedicine and may withdraw from such care at any time.      SUBJECTIVE:    History today 4/11:    Vale Gutierrez presents tele-medicine appointment for a follow-up appointment for back pain. Since the last visit, Vale Gutierrez states the pain has been worsening. Had episode where she knocked over a drink and tried to clean it up. Worsened the pain. Current pain intensity is 7/10.    Was considering SCS at last visit. Has not fully decided. Pain is increasing, norco no longer helps, taking it TID Interested in switching to Percoset. Laying on the side and rest helps.     Has not started aqua therapy, 2 month waiting list.     + pain raditing back of legs  No bowel or bowels  No numbness or tingling of LE    Interval History 3/28/22:  Patient with a history of L4-5 fusion presents in follow-up of low back pain s/p bilateral L3/4 TFESI on 3/4/22 with 2 days of relief then bilateral L2,3,4,5 MBB on 3/24/22 with 20% relief. Today, she reports persistent chronic low back pain with occasional radiation down the posterior legs to the knees. Back pain is more bothersome than the legs. Pain is worse with any activity including prolonged standing, sitting, bending, and coughing/sneezing. Pain is improved with lying down.  Takes Norco 5-325 bid, last filled 3/15/22. Takes tizanidine every 6 hours.     Interval History  "3/15/2022:  Vale Gutierrez presents tele-medicine appointment for a follow-up appointment for low back pain radiating to lower extremitiy R>L.  She is S/P Bilateral  L3/4 Lumbar Transforaminal Epidural Steroid Injection under Fluoroscopic Guidance on 3/4/2022 with only 2 days of relief.      Interval History 07/28/2020:  Vale Gutierrez presents tele-medicine appointment for a follow-up appointment for low back pain radiating to lower extremitiy R>L. Since the last visit, Vale Gutierrez states the pain has been persistant. Current pain intensity is 0/10.  Pain is worst when standing and moving and relieved with sitting, laying, and stretched out.  At the worst she says it is a 10/10.  The pain radiates to back of thighs and calves.  She describes the pain as sharp, burning in nature.  She states that the flexeril 5mg prn helps somewhat but not much. She started diclofenac yesterday (prescribed by her PCP) which helps with the pain, decreases frequency and intensity. She recently had an MRI lumbar spine done and would like to review the results.     Interval History 07/13/2020  Vale Gutierrez presents to the clinic for the evaluation of low back pain and bilateral calf pain. The calf pain started 5-6 weeks ago following non related injury and symptoms have been worsening.The pain is located in the low back area and radiates to the legs. She has suffered from lower back pain "for years."  The pain is described as aching and burning with intermittent numbness and tingling in the lower extremities. The pain is rated as 9/10. The pain is rated with a score of  3/10 on the BEST day and a score of 10/10 on the WORST day.  Symptoms interfere with daily activity. The pain is exacerbated by Standing, Walking, and Lifting.  The pain is mitigated by massage, medications and rest. She reports spending 8 hours per day reclining. The patient reports 3-4 hours of uninterrupted sleep per night. She " presents for referral from Urgent Care where she was evaluated and believed to have intermittent neurogenic claudication.       Pain Medications:    - Opioids: Hydrocodone- Acetaminophen 5-325mg TID  - NSAIDs: BC powder BID  - Anti-Depressants: Venlafaxine 150 mg + 75 mg in AM  - Anti-Convulsants: Zanaflex BID  - Others: NA      Physical Therapy/Home Exercise: Not currently but in the past, finished in January 2022. Two month wait for water therapy in [place].      report:  Reviewed and consistent with medication use as prescribed.      Pain Procedures:  Patient says she has had 9 ESIs 15-20 years ago with minimal pain relief  3/4/22 Bilateral L3/4 TFESI 3/4 - 2 days of relief  3/24/22 Bl L2,3,4,5 MBB - 20% relief      Imaging:     MRI LUMBAR SPINE WITHOUT CONTRAST 3/28     CLINICAL HISTORY:  Low back pain, symptoms persist with > 6wks conservative treatment; Postlaminectomy syndrome, not elsewhere classified     TECHNIQUE:  Multiplanar, multisequence MR images were acquired from the thoracolumbar junction to the sacrum without the administration of contrast.     COMPARISON:  Radiograph 10/04/2020, 01/26/2022, MRI 01/04/2021.     FINDINGS:  Postoperative change of L4-L5 decompressive laminectomy, discectomy and posterior fusion with bilateral pedicular screws and an intervertebral disc spacer.     Lumbar spine alignment demonstrates mild levoscoliosis with stable mild grade 1 anterolisthesis of L4 on L5 and L5 on S1.  Vertebral body heights are well maintained without evidence for fracture.  No marrow signal abnormality to suggest an infiltrative process.     There is advanced degenerative disc space narrowing and desiccation at T11-T12, L3-L4 and L5-S1 with chronic associated endplate changes, similar when compared to the previous MRI.  There is disc desiccation at L2-L3 with a posterior annular fissure.     Distal spinal cord demonstrates normal contour and signal intensity.  Cauda equina appears normal without  findings to suggest arachnoiditis.  Conus medullaris terminates at T12-L1.  Fatty filum terminale.     Coronal T1 weighted images demonstrate a partially exophytic left renal cortical lesion, incompletely evaluated on this exam.  Limited evaluation of the remaining visualized abdominal organs appears normal.  There is mild to moderate fatty atrophy of the posterior paraspinal musculature.  SI joints are symmetric.     T12-L1: No spinal canal stenosis or neural foraminal narrowing.     L1-L2: No spinal canal stenosis or neural foraminal narrowing.     L2-L3: Bilateral facet arthropathy and bilateral ligamentum flavum buckling.  No spinal canal stenosis or neural foraminal narrowing.     L3-L4: Circumferential disc bulge slightly encroaches into the bilateral foraminal zones, right greater than left.  Bilateral facet arthropathy and bilateral ligamentum flavum buckling.  Findings contribute to mild right neural foraminal narrowing.  No spinal canal stenosis.     L4-L5: Grade 1 anterolisthesis.  Bilateral facet arthropathy.  No spinal canal stenosis or neural foraminal narrowing.     L5-S1: Grade 1 anterolisthesis.  Circumferential disc bulge.  Bilateral facet arthropathy.  No spinal canal stenosis or neural foraminal narrowing.     Impression:     1. Remote postoperative change of L4-L5 decompressive laminectomy, discectomy and posterior fusion.  2. Multilevel lumbar degenerative changes contributing to mild neural foraminal narrowing at L3-L4.  No spinal canal stenosis.  3. Left renal cortical lesion, incompletely evaluated on this exam.  Recommend further characterization with a dedicated renal ultrasound.       Past Medical History:   Diagnosis Date    Alcohol abuse     Anxiety     Depression     Hyperlipidemia     Hypertension      Past Surgical History:   Procedure Laterality Date    BREAST CYST ASPIRATION      CHOLECYSTECTOMY      complicated with bile leak, sepsis    COLONOSCOPY N/A 6/3/2020     Procedure: COLONOSCOPY Golytely;  Surgeon: Tino Neil MD;  Location: [facility] ENDO;  Service: Colon and Rectal;  Laterality: N/A;    ESOPHAGOGASTRODUODENOSCOPY N/A 6/3/2020    Procedure: EGD (ESOPHAGOGASTRODUODENOSCOPY);  Surgeon: Tino Neil MD;  Location: [facility] ENDO;  Service: Colon and Rectal;  Laterality: N/A;    INJECTION OF ANESTHETIC AGENT AROUND NERVE Bilateral 3/24/2022    Procedure: BLOCK, NERVE MEDIAL BRANCH BLOCK BL L2, L3, L4 and L5  1st, needs consent;  Surgeon: Rubén Rico MD;  Location: [facility] PAIN MGT;  Service: Pain Management;  Laterality: Bilateral;    TRANSFORAMINAL EPIDURAL INJECTION OF STEROID Bilateral 8/7/2020    Procedure: INJECTION, STEROID, EPIDURAL, TRANSFORAMINAL APPROACH, L4-L5 need consent;  Surgeon: Rubén Rico MD;  Location: [facility] PAIN MGT;  Service: Pain Management;  Laterality: Bilateral;    TRANSFORAMINAL EPIDURAL INJECTION OF STEROID Bilateral 9/4/2020    Procedure: LUMBAR TRANSFORAMINAL BILATERAL L4/5 DIRECT REFERRAL;  Surgeon: Rubén Rico MD;  Location: [facility] PAIN MGT;  Service: Pain Management;  Laterality: Bilateral;  NEEDS CONSENT    TRANSFORAMINAL EPIDURAL INJECTION OF STEROID Bilateral 3/4/2022    Procedure: Injection,steroid,epidural,transforaminal approach BILATERAL L3/4 DIRECT REFERRAL;  Surgeon: Rubén Rico MD;  Location: [facility] PAIN MGT;  Service: Pain Management;  Laterality: Bilateral;     Social History     Socioeconomic History    Marital status: Single   Tobacco Use    Smoking status: Former Smoker    Smokeless tobacco: Never Used   Substance and Sexual Activity    Alcohol use: Yes     Alcohol/week: 12.0 standard drinks     Types: 12 Shots of liquor per week     Comment: three 12-14 oz cocktail drinks.     Drug use: No    Sexual activity: Never     Social Determinants of Health     Financial Resource Strain: Medium Risk    Difficulty of Paying Living Expenses: Somewhat hard   Food Insecurity: Unknown    Worried About Running Out  of Food in the Last Year: Patient refused    Ran Out of Food in the Last Year: Patient refused   Transportation Needs: Unmet Transportation Needs    Lack of Transportation (Medical): Yes    Lack of Transportation (Non-Medical): No   Physical Activity: Inactive    Days of Exercise per Week: 0 days    Minutes of Exercise per Session: 0 min   Stress: Stress Concern Present    Feeling of Stress : Rather much   Social Connections: Unknown    Frequency of Communication with Friends and Family: Three times a week    Frequency of Social Gatherings with Friends and Family: Once a week    Active Member of Clubs or Organizations: No    Attends Club or Organization Meetings: Never    Marital Status: Never    Housing Stability: Low Risk     Unable to Pay for Housing in the Last Year: No    Number of Places Lived in the Last Year: 1    Unstable Housing in the Last Year: No     Family History   Problem Relation Age of Onset    Atrial fibrillation Mother     Heart failure Mother     Arthritis Mother     Macular degeneration Mother     Stroke Father     Cancer Maternal Uncle         lung    Cataracts Paternal Grandfather     Glaucoma Paternal Grandfather        Review of patient's allergies indicates:  No Known Allergies    Current Outpatient Medications   Medication Sig    aspirin/salicylamide/caffeine (BC HEADACHE POWDER ORAL) Take 1 Package by mouth once daily. Hold medication one week prior to surgery    busPIRone (BUSPAR) 5 MG Tab Take 1 tablet (5 mg total) by mouth 2 (two) times daily as needed (anxiety).    dicyclomine (BENTYL) 10 MG capsule Take 1 capsule (10 mg total) by mouth before meals as needed.    guaiFENesin (MUCINEX) 600 mg 12 hr tablet Take 1,200 mg by mouth 2 (two) times daily.    HYDROcodone-acetaminophen (NORCO) 5-325 mg per tablet Take 1 tablet by mouth every 12 (twelve) hours as needed for Pain.    magnesium 30 mg Tab Take by mouth once.    metoprolol tartrate (LOPRESSOR) 50  MG tablet Take 1 tablet (50 mg total) by mouth once daily.    mirtazapine (REMERON) 7.5 MG Tab Take 1 tablet (7.5 mg total) by mouth every evening. For insomnia, mood    omeprazole (PRILOSEC) 20 MG capsule Take 1 capsule (20 mg total) by mouth 2 (two) times daily.    ondansetron (ZOFRAN-ODT) 4 MG TbDL Take 1 tablet (4 mg total) by mouth every 8 (eight) hours as needed.    oxyCODONE-acetaminophen (PERCOCET) 5-325 mg per tablet Take 1 tablet by mouth every 8 (eight) hours as needed for Pain.    Saccharomyces boulardii (FLORASTOR) 250 mg capsule Take 250 mg by mouth 2 (two) times daily.    thiamine 100 MG tablet Take 100 mg by mouth once daily.    venlafaxine (EFFEXOR-XR) 150 MG Cp24 Take 150 mg by mouth once daily. Take am of surgery    venlafaxine (EFFEXOR-XR) 75 MG 24 hr capsule Take 75 mg by mouth once daily. Take am of surgery    vitamin D (VITAMIN D3) 1000 units Tab Take 5,000 Int'l Units by mouth. Hold am of surgery     No current facility-administered medications for this visit.       REVIEW OF SYSTEMS:    GENERAL:  No weight loss, malaise   HEENT:   No recent changes in vision or hearing  RESPIRATORY:  Negative for cough  GI:  Negative for change in bowel habits.  MUSCULOSKELETAL:  See HPI.  SKIN:  Negative for lesions  PSYCH:  No mood disorder or recent psychosocial stressors.  Patients sleep is disturbed secondary to pain.  HEMATOLOGY/LYMPHOLOGY: Patient is not currently taking any anti-coagulants  NEURO:   No history of syncope  All other reviewed and negative other than HPI.    OBJECTIVE:    General appearance: Well appearing, in no acute distress, alert and oriented x3.  Psych:  Mood and affect appropriate.      ASSESSMENT: 61 y.o. year old female with lower back pain, consistent with     1. Postlaminectomy syndrome of lumbar region  Procedure Order to Pain Management    oxyCODONE-acetaminophen (PERCOCET) 5-325 mg per tablet    Ambulatory referral/consult to Physical/Occupational Therapy   2.  Lumbar radiculopathy  Procedure Order to Pain Management    oxyCODONE-acetaminophen (PERCOCET) 5-325 mg per tablet    Ambulatory referral/consult to Physical/Occupational Therapy         PLAN:     - MRI results reviewed and discussed with pt    -Plan for Cauda WERO, consider SCS if no improvement following injections    - Referral to Physical therapy for Lumbar stabilization, core strengthening, and a home exercise program.    - F/u wait list for Aqua therapy at [place].    - Will stop Norco, switch to Percocet 5-325 mg TID per pt request.    -Message sent to PCP regarding incidental lesion on left kidney, pt also instructed to see her PCP for dedicated renal U/S to f/u this finding    - Counseled patient regarding the importance of physical therapy.      The above plan and management options were discussed at length with patient. Patient is in agreement with the above and verbalized understanding. .      Christiana Nunez MD  04/11/2022     I have personally reviewed the encounter with resident/fellow/NP and personally spoke with patient and addressed all questions and concerns. The encounter was initiated by patient who consented and verbalized understanding to the type of encounter not related to any office visit or other encounter in the past 7 days.    Rubén Rico MD   4/11/2022

## 2022-04-11 NOTE — TELEPHONE ENCOUNTER
----- Message from France Slava sent at 4/11/2022  3:03 PM CDT -----  Regarding: MRI results  Contact: Vale Stokes calling to speak to someone in the office as soon as possible to follow up on results of MRI. She says the  Pain management doctor noticed something on her kidney. She is requesting a call back before the end of the day if at all possible. She can be reached at [phone]

## 2022-04-12 ENCOUNTER — TELEPHONE (OUTPATIENT)
Dept: ADMINISTRATIVE | Facility: OTHER | Age: 62
End: 2022-04-12
Payer: MEDICARE

## 2022-04-12 ENCOUNTER — TELEPHONE (OUTPATIENT)
Dept: PAIN MEDICINE | Facility: CLINIC | Age: 62
End: 2022-04-12
Payer: MEDICARE

## 2022-04-12 DIAGNOSIS — M54.16 LUMBAR RADICULOPATHY: ICD-10-CM

## 2022-04-12 DIAGNOSIS — M96.1 POSTLAMINECTOMY SYNDROME OF LUMBAR REGION: Primary | ICD-10-CM

## 2022-04-12 NOTE — TELEPHONE ENCOUNTER
----- Message from Beny Winn MA sent at 4/12/2022 11:48 AM CDT -----  Regarding: FW: Aqua Therapy    ----- Message -----  From: Suhail Crenshaw  Sent: 4/12/2022  11:45 AM CDT  To: Beny Winn MA, Annemarie Sue  Subject: RE: Aqua Therapy                                 Now she is saying that she wants home health because she cannot drive. Please advise.   ----- Message -----  From: Beny Winn MA  Sent: 4/12/2022  10:23 AM CDT  To: Annemarie Canales  Subject: RE: Aqua Therapy                                   ----- Message -----  From: Rubén Rico MD  Sent: 4/12/2022  10:18 AM CDT  To: Beny Winn MA  Subject: RE: Aqua Therapy                                 She ca start land therapy while waiting for aqua therapy.      ----- Message -----  From: Beny Winn MA  Sent: 4/12/2022   9:49 AM CDT  To: Rubén Rico MD  Subject: FW: Aqua Therapy                                   ----- Message -----  From: Suhail Crenshaw  Sent: 4/11/2022   6:02 PM CDT  To: Jacky Harding  Subject: Aqua Therapy                                     Hello,     I called this patient in reference to the order you placed today. She stated that she was unsure if you would like her to do aqua therapy first or if it is okay for her to have on land therapy while she is on the aqua wait list... Can you please clarify how we should be scheduling this patient?     Thank you.

## 2022-04-16 ENCOUNTER — HOSPITAL ENCOUNTER (OUTPATIENT)
Dept: RADIOLOGY | Facility: OTHER | Age: 62
Discharge: HOME | End: 2022-04-16
Attending: HOSPITALIST
Payer: MEDICARE

## 2022-04-16 DIAGNOSIS — N28.9 RENAL LESION: ICD-10-CM

## 2022-04-16 PROCEDURE — 76770 US RETROPERITONEAL COMPLETE: ICD-10-PCS | Mod: 26,,, | Performed by: RADIOLOGY

## 2022-04-16 PROCEDURE — 76770 US EXAM ABDO BACK WALL COMP: CPT | Mod: 26,,, | Performed by: RADIOLOGY

## 2022-04-16 PROCEDURE — 76770 US EXAM ABDO BACK WALL COMP: CPT | Mod: TC

## 2022-04-21 ENCOUNTER — OFFICE VISIT (OUTPATIENT)
Dept: OPTOMETRY | Facility: CLINIC | Age: 62
End: 2022-04-21
Payer: MEDICARE

## 2022-04-21 DIAGNOSIS — Z13.5 GLAUCOMA SCREENING: ICD-10-CM

## 2022-04-21 DIAGNOSIS — H25.13 NUCLEAR SCLEROSIS, BILATERAL: Primary | ICD-10-CM

## 2022-04-21 DIAGNOSIS — H52.4 PRESBYOPIA: ICD-10-CM

## 2022-04-21 PROCEDURE — 92015 DETERMINE REFRACTIVE STATE: CPT | Mod: S$GLB,,, | Performed by: OPTOMETRIST

## 2022-04-21 PROCEDURE — 1160F RVW MEDS BY RX/DR IN RCRD: CPT | Mod: CPTII,S$GLB,, | Performed by: OPTOMETRIST

## 2022-04-21 PROCEDURE — 1159F MED LIST DOCD IN RCRD: CPT | Mod: CPTII,S$GLB,, | Performed by: OPTOMETRIST

## 2022-04-21 PROCEDURE — 92014 COMPRE OPH EXAM EST PT 1/>: CPT | Mod: S$GLB,,, | Performed by: OPTOMETRIST

## 2022-04-21 PROCEDURE — 99999 PR PBB SHADOW E&M-EST. PATIENT-LVL III: CPT | Mod: PBBFAC,,, | Performed by: OPTOMETRIST

## 2022-04-21 PROCEDURE — 3044F PR MOST RECENT HEMOGLOBIN A1C LEVEL <7.0%: ICD-10-PCS | Mod: CPTII,S$GLB,, | Performed by: OPTOMETRIST

## 2022-04-21 PROCEDURE — 1160F PR REVIEW ALL MEDS BY PRESCRIBER/CLIN PHARMACIST DOCUMENTED: ICD-10-PCS | Mod: CPTII,S$GLB,, | Performed by: OPTOMETRIST

## 2022-04-21 PROCEDURE — 92015 PR REFRACTION: ICD-10-PCS | Mod: S$GLB,,, | Performed by: OPTOMETRIST

## 2022-04-21 PROCEDURE — 3044F HG A1C LEVEL LT 7.0%: CPT | Mod: CPTII,S$GLB,, | Performed by: OPTOMETRIST

## 2022-04-21 PROCEDURE — 99999 PR PBB SHADOW E&M-EST. PATIENT-LVL III: ICD-10-PCS | Mod: PBBFAC,,, | Performed by: OPTOMETRIST

## 2022-04-21 PROCEDURE — 92014 PR EYE EXAM, EST PATIENT,COMPREHESV: ICD-10-PCS | Mod: S$GLB,,, | Performed by: OPTOMETRIST

## 2022-04-21 PROCEDURE — 1159F PR MEDICATION LIST DOCUMENTED IN MEDICAL RECORD: ICD-10-PCS | Mod: CPTII,S$GLB,, | Performed by: OPTOMETRIST

## 2022-04-21 NOTE — PROGRESS NOTES
HPI     60 Y/o female is here for routine eye exam with C/o vision decrease   vision   Pt denies pain and discomfort   occasional floaters     Eye med: no gtt     Last edited by Shaun Schaeffer MA on 4/21/2022  3:25 PM. (History)        ROS     Negative for: Constitutional, Gastrointestinal, Neurological, Skin,   Genitourinary, Musculoskeletal, HENT, Endocrine, Cardiovascular, Eyes,   Respiratory, Psychiatric, Allergic/Imm, Heme/Lymph    Last edited by Placido Lyles, OD on 4/21/2022  3:31 PM. (History)        Assessment /Plan     For exam results, see Encounter Report.    Nuclear sclerosis, bilateral    Glaucoma screening    Presbyopia      1. Cat OU--pt wishes new Rx  2. Mom/sis w ARMD at 70--advised yearly exam to monitor    PLAN:    rtc 1 yr

## 2022-04-22 ENCOUNTER — PATIENT MESSAGE (OUTPATIENT)
Dept: PAIN MEDICINE | Facility: OTHER | Age: 62
End: 2022-04-22
Payer: MEDICARE

## 2022-04-25 ENCOUNTER — TELEPHONE (OUTPATIENT)
Dept: OPTOMETRY | Facility: CLINIC | Age: 62
End: 2022-04-25
Payer: MEDICARE

## 2022-04-25 NOTE — TELEPHONE ENCOUNTER
Called pt 3:30 PM.  She reports her left eye a little red.  No pain or vision loss.  Advised SOOTHE XPA ts QID.  Pt will call if the eye gets worse, OR if it is not better in 3 days, and we can make her an appt to come in.        ----- Message from Alla Jacome MA sent at 4/25/2022  2:30 PM CDT -----  Contact: Vale @[phone]  Please advise...  ----- Message -----  From: Gloria Woo  Sent: 4/25/2022   2:04 PM CDT  To: Rafal BAEZ Staff    Pt needs a call back regarding left eye is red and unsure if she should be concerned

## 2022-04-28 ENCOUNTER — TELEPHONE (OUTPATIENT)
Dept: PAIN MEDICINE | Facility: CLINIC | Age: 62
End: 2022-04-28
Payer: MEDICARE

## 2022-04-28 DIAGNOSIS — M54.16 LUMBAR RADICULOPATHY: ICD-10-CM

## 2022-04-28 DIAGNOSIS — M96.1 POSTLAMINECTOMY SYNDROME OF LUMBAR REGION: Primary | ICD-10-CM

## 2022-04-28 DIAGNOSIS — M96.1 POSTLAMINECTOMY SYNDROME OF LUMBAR REGION: ICD-10-CM

## 2022-04-28 RX ORDER — OXYCODONE AND ACETAMINOPHEN 5; 325 MG/1; MG/1
1 TABLET ORAL EVERY 8 HOURS PRN
Qty: 90 TABLET | Refills: 0 | Status: SHIPPED | OUTPATIENT
Start: 2022-04-28 | End: 2022-05-12 | Stop reason: SDUPTHER

## 2022-04-28 RX ORDER — HYDROCODONE BITARTRATE AND ACETAMINOPHEN 5; 325 MG/1; MG/1
1 TABLET ORAL EVERY 12 HOURS PRN
Qty: 60 TABLET | Refills: 0 | Status: SHIPPED | OUTPATIENT
Start: 2022-04-28 | End: 2022-05-28

## 2022-04-28 NOTE — TELEPHONE ENCOUNTER
Patient requesting refill on Oxycodone 5-325mg  Last office visit 04/11/22   shows last refill on 04/11/22  Patient does not have a pain contract on file with [facility] department  Patient last UDS none was not consistent with current therapy      
[FreeTextEntry1] : Mrs. Aquino is a 72 year old female that presents today April 16, 2019. Patient was last seen in our office on October 12, 2018. Over the winter she suffered from shingles, influenza and a GI infection. \par She has remained asymptomatic from a cardiovascular standpoint. \par Denies change in her current medication regimen.  Improvement in palpitations and irregularity in her heart beat. Prior 24-hour Holter monitor, echocardiogram and stress echocardiogram reviewed.\par \par Today she denies chest pain, pressure, unusual shortness of breath, lightheadedness, dizziness, near syncope or syncope.\par \par She states that she saw Dr. Stringer over 10 years ago and had a negative work up for arrythmia. \par \par Cardiovascular history is significant for hypertension, overweight, abnormal ekg, age and family h/o CAD (multiple family members). \par \par She denies any h/o MI, CAD, CHF, CVA, TIA or arrhythmia. \par \par Carotid duplex 4/9/19 non-obstructive disease\par \par Abd CT 10/19 no aneurysm of the abdominal aorta\par Myocardial perfusion imaging 4/4/18 performed with Lexiscan, negative for ischemia or infarct\par \par lab work 3/10/18 Alkaline phosphatase 65, ALT 9, AST 18, triglycerides 92, HDL 43, LDL 42, total cholesterol 103, CK 44.\par \par Echocardiogram February 21, 2018, ejection fraction 60%, mild mitral regurgitation, no segmental wall motion abnormalities.\par \par Stress echocardiogram March 8, 2018 patient completed 4 minutes 30 seconds of the protocol.  Submaximal rate noted.  Peak blood pressure 130/60. achieved 71% of MPHR\par \par 24hr Holter monitor - no sustained arrhythmias, rare PVC's, min HR 49bpm, max HR 85bpm, average HR 58bpm\par \par Labs:\par 11-2-17 wbc 7.1, h/h 13.0/39.54, plat 294, NA+ 142, K 3.8, bun/crea t22/0.85, AST 19, ALT 13, glucose 115, hgba1c 5.8, globulin 4.1, LDL 97, triglycerides 99, HDL 48, TSH 1.30

## 2022-04-28 NOTE — TELEPHONE ENCOUNTER
Patient requesting refill on Hydrocodone 5-325mg  Last office visit 04/11/22   shows last refill on 04/05/22  Patient does not have a pain contract on file with [facility] department  Patient last UDS none was not consistent with current therapy

## 2022-04-28 NOTE — TELEPHONE ENCOUNTER
----- Message from [facility] sent at 4/28/2022  9:09 AM CDT -----  Who Called: MICAH MANCINI    Symptoms (please be specific): Pain in the back of right knee and back pain    How long has patient had these symptoms: 4 days ago    Pharmacy name and phone #:  [facility] - [place], [place] - [address]    [facility] Call Back Number: [phone]

## 2022-04-28 NOTE — TELEPHONE ENCOUNTER
Staff spoke with patient in regards of message. Patient was informed that she has virtual visit on 05/02/2 with the provider at     Patient stated that she will wait to speak with him to further discuss pain.    Verbalized undertstanding

## 2022-04-29 ENCOUNTER — TELEPHONE (OUTPATIENT)
Dept: PAIN MEDICINE | Facility: CLINIC | Age: 62
End: 2022-04-29
Payer: MEDICARE

## 2022-04-29 NOTE — TELEPHONE ENCOUNTER
Staff spoke with pt and set up appt on 5/2/22 @ 3:45pm with  to discuss new pain right knee pt states she wants to talk  it is unbearable.

## 2022-04-29 NOTE — TELEPHONE ENCOUNTER
----- Message from Jessica Cam sent at 4/29/2022 10:09 AM CDT -----  Regarding: Adri Appointment  Name of Who is Calling: MICAH MANCINI [[id number]]          What is the request in detail: Patient is requesting a call back , patient states she's in extreme pain           Can the clinic reply by [facility]: no            What Number to Call Back if not in [facility]: [phone]

## 2022-05-02 ENCOUNTER — OFFICE VISIT (OUTPATIENT)
Dept: PAIN MEDICINE | Facility: CLINIC | Age: 62
End: 2022-05-02
Payer: MEDICARE

## 2022-05-02 DIAGNOSIS — M17.11 PRIMARY OSTEOARTHRITIS OF RIGHT KNEE: ICD-10-CM

## 2022-05-02 DIAGNOSIS — M48.062 SPINAL STENOSIS OF LUMBAR REGION WITH NEUROGENIC CLAUDICATION: Primary | ICD-10-CM

## 2022-05-02 DIAGNOSIS — Z98.1 S/P LUMBAR FUSION: ICD-10-CM

## 2022-05-02 DIAGNOSIS — G89.4 CHRONIC PAIN DISORDER: ICD-10-CM

## 2022-05-02 DIAGNOSIS — M54.17 LUMBOSACRAL RADICULOPATHY: ICD-10-CM

## 2022-05-02 PROCEDURE — 99213 PR OFFICE/OUTPT VISIT, EST, LEVL III, 20-29 MIN: ICD-10-PCS | Mod: 95,,, | Performed by: ANESTHESIOLOGY

## 2022-05-02 PROCEDURE — 1159F MED LIST DOCD IN RCRD: CPT | Mod: CPTII,95,, | Performed by: ANESTHESIOLOGY

## 2022-05-02 PROCEDURE — 1159F PR MEDICATION LIST DOCUMENTED IN MEDICAL RECORD: ICD-10-PCS | Mod: CPTII,95,, | Performed by: ANESTHESIOLOGY

## 2022-05-02 PROCEDURE — 3044F HG A1C LEVEL LT 7.0%: CPT | Mod: CPTII,95,, | Performed by: ANESTHESIOLOGY

## 2022-05-02 PROCEDURE — 1160F PR REVIEW ALL MEDS BY PRESCRIBER/CLIN PHARMACIST DOCUMENTED: ICD-10-PCS | Mod: CPTII,95,, | Performed by: ANESTHESIOLOGY

## 2022-05-02 PROCEDURE — 99213 OFFICE O/P EST LOW 20 MIN: CPT | Mod: 95,,, | Performed by: ANESTHESIOLOGY

## 2022-05-02 PROCEDURE — 3044F PR MOST RECENT HEMOGLOBIN A1C LEVEL <7.0%: ICD-10-PCS | Mod: CPTII,95,, | Performed by: ANESTHESIOLOGY

## 2022-05-02 PROCEDURE — 1160F RVW MEDS BY RX/DR IN RCRD: CPT | Mod: CPTII,95,, | Performed by: ANESTHESIOLOGY

## 2022-05-02 NOTE — H&P (VIEW-ONLY)
Chronic Pain-Tele-Medicine-Established Note (Follow up visit)      The patient location is: Her home in [place]  The chief complaint leading to consultation is: Back Pain  Visit type: Virtual visit with synchronous audio and video  Total time spent with patient: 20 minutes  Each patient to whom he or she provides medical services by telemedicine is:  (1) informed of the relationship between the physician and patient and the respective role of any other health care provider with respect to management of the patient; and (2) notified that he or she may decline to receive medical services by telemedicine and may withdraw from such care at any time.      SUBJECTIVE:    History today 5/02/2022:    Vale Gutierrez presents tele-medicine appointment for a follow-up appointment for back pain. Since the last visit, Vale Gutierrez states the pain has been stable. Current pain intensity is 6/10. She is still hasnt made her mind completely about SCS.    History today 4/11:    Vale Gutierrez presents tele-medicine appointment for a follow-up appointment for back pain. Since the last visit, Vale Gutierrez states the pain has been worsening. Had episode where she knocked over a drink and tried to clean it up. Worsened the pain. Current pain intensity is 7/10.    Was considering SCS at last visit. Has not fully decided. Pain is increasing, norco no longer helps, taking it TID Interested in switching to Percoset. Laying on the side and rest helps.     Has not started aqua therapy, 2 month waiting list.     + pain raditing back of legs  No bowel or bowels  No numbness or tingling of LE    Interval History 3/28/22:  Patient with a history of L4-5 fusion presents in follow-up of low back pain s/p bilateral L3/4 TFESI on 3/4/22 with 2 days of relief then bilateral L2,3,4,5 MBB on 3/24/22 with 20% relief. Today, she reports persistent chronic low back pain with occasional radiation down the posterior legs to the  "knees. Back pain is more bothersome than the legs. Pain is worse with any activity including prolonged standing, sitting, bending, and coughing/sneezing. Pain is improved with lying down.  Takes Norco 5-325 bid, last filled 3/15/22. Takes tizanidine every 6 hours.     Interval History 3/15/2022:  Vale Gutierrez presents tele-medicine appointment for a follow-up appointment for low back pain radiating to lower extremitiy R>L.  She is S/P Bilateral  L3/4 Lumbar Transforaminal Epidural Steroid Injection under Fluoroscopic Guidance on 3/4/2022 with only 2 days of relief.      Interval History 07/28/2020:  Vale Gutierrez presents tele-medicine appointment for a follow-up appointment for low back pain radiating to lower extremitiy R>L. Since the last visit, Vale Gutierrez states the pain has been persistant. Current pain intensity is 0/10.  Pain is worst when standing and moving and relieved with sitting, laying, and stretched out.  At the worst she says it is a 10/10.  The pain radiates to back of thighs and calves.  She describes the pain as sharp, burning in nature.  She states that the flexeril 5mg prn helps somewhat but not much. She started diclofenac yesterday (prescribed by her PCP) which helps with the pain, decreases frequency and intensity. She recently had an MRI lumbar spine done and would like to review the results.     Interval History 07/13/2020  Vale Gutierrez presents to the clinic for the evaluation of low back pain and bilateral calf pain. The calf pain started 5-6 weeks ago following non related injury and symptoms have been worsening.The pain is located in the low back area and radiates to the legs. She has suffered from lower back pain "for years."  The pain is described as aching and burning with intermittent numbness and tingling in the lower extremities. The pain is rated as 9/10. The pain is rated with a score of  3/10 on the BEST day and a score of 10/10 on the WORST " day.  Symptoms interfere with daily activity. The pain is exacerbated by Standing, Walking, and Lifting.  The pain is mitigated by massage, medications and rest. She reports spending 8 hours per day reclining. The patient reports 3-4 hours of uninterrupted sleep per night. She presents for referral from Urgent Care where she was evaluated and believed to have intermittent neurogenic claudication.       Pain Medications:    - Opioids: Hydrocodone- Acetaminophen 5-325mg TID  - NSAIDs: BC powder BID  - Anti-Depressants: Venlafaxine 150 mg + 75 mg in AM  - Anti-Convulsants: Zanaflex BID  - Others: NA      Physical Therapy/Home Exercise: Not currently but in the past, finished in January 2022. Two month wait for water therapy in [place].      report:  Reviewed and consistent with medication use as prescribed.      Pain Procedures:  Patient says she has had 9 ESIs 15-20 years ago with minimal pain relief  3/4/22 Bilateral L3/4 TFESI 3/4 - 2 days of relief  3/24/22 Bl L2,3,4,5 MBB - 20% relief      Imaging:     MRI LUMBAR SPINE WITHOUT CONTRAST 3/28     CLINICAL HISTORY:  Low back pain, symptoms persist with > 6wks conservative treatment; Postlaminectomy syndrome, not elsewhere classified     TECHNIQUE:  Multiplanar, multisequence MR images were acquired from the thoracolumbar junction to the sacrum without the administration of contrast.     COMPARISON:  Radiograph 10/04/2020, 01/26/2022, MRI 01/04/2021.     FINDINGS:  Postoperative change of L4-L5 decompressive laminectomy, discectomy and posterior fusion with bilateral pedicular screws and an intervertebral disc spacer.     Lumbar spine alignment demonstrates mild levoscoliosis with stable mild grade 1 anterolisthesis of L4 on L5 and L5 on S1.  Vertebral body heights are well maintained without evidence for fracture.  No marrow signal abnormality to suggest an infiltrative process.     There is advanced degenerative disc space narrowing and desiccation at T11-T12,  L3-L4 and L5-S1 with chronic associated endplate changes, similar when compared to the previous MRI.  There is disc desiccation at L2-L3 with a posterior annular fissure.     Distal spinal cord demonstrates normal contour and signal intensity.  Cauda equina appears normal without findings to suggest arachnoiditis.  Conus medullaris terminates at T12-L1.  Fatty filum terminale.     Coronal T1 weighted images demonstrate a partially exophytic left renal cortical lesion, incompletely evaluated on this exam.  Limited evaluation of the remaining visualized abdominal organs appears normal.  There is mild to moderate fatty atrophy of the posterior paraspinal musculature.  SI joints are symmetric.     T12-L1: No spinal canal stenosis or neural foraminal narrowing.     L1-L2: No spinal canal stenosis or neural foraminal narrowing.     L2-L3: Bilateral facet arthropathy and bilateral ligamentum flavum buckling.  No spinal canal stenosis or neural foraminal narrowing.     L3-L4: Circumferential disc bulge slightly encroaches into the bilateral foraminal zones, right greater than left.  Bilateral facet arthropathy and bilateral ligamentum flavum buckling.  Findings contribute to mild right neural foraminal narrowing.  No spinal canal stenosis.     L4-L5: Grade 1 anterolisthesis.  Bilateral facet arthropathy.  No spinal canal stenosis or neural foraminal narrowing.     L5-S1: Grade 1 anterolisthesis.  Circumferential disc bulge.  Bilateral facet arthropathy.  No spinal canal stenosis or neural foraminal narrowing.     Impression:     1. Remote postoperative change of L4-L5 decompressive laminectomy, discectomy and posterior fusion.  2. Multilevel lumbar degenerative changes contributing to mild neural foraminal narrowing at L3-L4.  No spinal canal stenosis.  3. Left renal cortical lesion, incompletely evaluated on this exam.  Recommend further characterization with a dedicated renal ultrasound.       Past Medical History:    Diagnosis Date    Alcohol abuse     Anxiety     Depression     Hyperlipidemia     Hypertension      Past Surgical History:   Procedure Laterality Date    BREAST CYST ASPIRATION      CHOLECYSTECTOMY      complicated with bile leak, sepsis    COLONOSCOPY N/A 6/3/2020    Procedure: COLONOSCOPY Golytely;  Surgeon: Tino Neil MD;  Location: [facility];  Service: Colon and Rectal;  Laterality: N/A;    ESOPHAGOGASTRODUODENOSCOPY N/A 6/3/2020    Procedure: EGD (ESOPHAGOGASTRODUODENOSCOPY);  Surgeon: Tino Neil MD;  Location: [facility];  Service: Colon and Rectal;  Laterality: N/A;    INJECTION OF ANESTHETIC AGENT AROUND NERVE Bilateral 3/24/2022    Procedure: BLOCK, NERVE MEDIAL BRANCH BLOCK BL L2, L3, L4 and L5  1st, needs consent;  Surgeon: Rubén Rico MD;  [facility];  Service: Pain Management;  Laterality: Bilateral;    TRANSFORAMINAL EPIDURAL INJECTION OF STEROID Bilateral 8/7/2020    Procedure: INJECTION, STEROID, EPIDURAL, TRANSFORAMINAL APPROACH, L4-L5 need consent;  Surgeon: Rubén Rico MD;  [facility];  Service: Pain Management;  Laterality: Bilateral;    TRANSFORAMINAL EPIDURAL INJECTION OF STEROID Bilateral 9/4/2020    Procedure: LUMBAR TRANSFORAMINAL BILATERAL L4/5 DIRECT REFERRAL;  Surgeon: Rubén Rcio MD;  [facility]: [facility];  Service: Pain Management;  Laterality: Bilateral;  NEEDS CONSENT    TRANSFORAMINAL EPIDURAL INJECTION OF STEROID Bilateral 3/4/2022    Procedure: Injection,steroid,epidural,transforaminal approach BILATERAL L3/4 DIRECT REFERRAL;  Surgeon: Rubén Rico MD;  [facility];  Service: Pain Management;  Laterality: Bilateral;     Social History     Socioeconomic History    Marital status: Single   Tobacco Use    Smoking status: Former Smoker    Smokeless tobacco: Never Used   Substance and Sexual Activity    Alcohol use: Yes     Alcohol/week: 12.0 standard drinks     Types: 12 Shots of liquor per week      Comment: three 12-14 oz cocktail drinks.     Drug use: No    Sexual activity: Never     Social Determinants of Health     Financial Resource Strain: Medium Risk    Difficulty of Paying Living Expenses: Somewhat hard   Food Insecurity: Unknown    Worried About Running Out of Food in the Last Year: Patient refused    Ran Out of Food in the Last Year: Patient refused   Transportation Needs: Unmet Transportation Needs    Lack of Transportation (Medical): Yes    Lack of Transportation (Non-Medical): No   Physical Activity: Inactive    Days of Exercise per Week: 0 days    Minutes of Exercise per Session: 0 min   Stress: Stress Concern Present    Feeling of Stress : Rather much   Social Connections: Unknown    Frequency of Communication with Friends and Family: Three times a week    Frequency of Social Gatherings with Friends and Family: Once a week    Active Member of Clubs or Organizations: No    Attends Club or Organization Meetings: Never    Marital Status: Never    Housing Stability: Low Risk     Unable to Pay for Housing in the Last Year: No    Number of Places Lived in the Last Year: 1    Unstable Housing in the Last Year: No     Family History   Problem Relation Age of Onset    Atrial fibrillation Mother     Heart failure Mother     Arthritis Mother     Macular degeneration Mother     Stroke Father     Cancer Maternal Uncle         lung    Cataracts Paternal Grandfather     Glaucoma Paternal Grandfather        Review of patient's allergies indicates:  No Known Allergies    Current Outpatient Medications   Medication Sig    aspirin/salicylamide/caffeine (BC HEADACHE POWDER ORAL) Take 1 Package by mouth once daily. Hold medication one week prior to surgery    busPIRone (BUSPAR) 5 MG Tab Take 1 tablet (5 mg total) by mouth 2 (two) times daily as needed (anxiety).    dicyclomine (BENTYL) 10 MG capsule Take 1 capsule (10 mg total) by mouth before meals as needed.    guaiFENesin  (MUCINEX) 600 mg 12 hr tablet Take 1,200 mg by mouth 2 (two) times daily.    HYDROcodone-acetaminophen (NORCO) 5-325 mg per tablet Take 1 tablet by mouth every 12 (twelve) hours as needed for Pain.    magnesium 30 mg Tab Take by mouth once.    metoprolol tartrate (LOPRESSOR) 50 MG tablet Take 1 tablet (50 mg total) by mouth once daily.    mirtazapine (REMERON) 7.5 MG Tab Take 1 tablet (7.5 mg total) by mouth every evening. For insomnia, mood    omeprazole (PRILOSEC) 20 MG capsule Take 1 capsule (20 mg total) by mouth 2 (two) times daily.    ondansetron (ZOFRAN-ODT) 4 MG TbDL Take 1 tablet (4 mg total) by mouth every 8 (eight) hours as needed.    oxyCODONE-acetaminophen (PERCOCET) 5-325 mg per tablet Take 1 tablet by mouth every 8 (eight) hours as needed for Pain.    Saccharomyces boulardii (FLORASTOR) 250 mg capsule Take 250 mg by mouth 2 (two) times daily.    thiamine 100 MG tablet Take 100 mg by mouth once daily.    venlafaxine (EFFEXOR-XR) 150 MG Cp24 Take 150 mg by mouth once daily. Take am of surgery    venlafaxine (EFFEXOR-XR) 75 MG 24 hr capsule Take 75 mg by mouth once daily. Take am of surgery    vitamin D (VITAMIN D3) 1000 units Tab Take 5,000 Int'l Units by mouth. Hold am of surgery     No current facility-administered medications for this visit.       REVIEW OF SYSTEMS:    GENERAL:  No weight loss, malaise   HEENT:   No recent changes in vision or hearing  RESPIRATORY:  Negative for cough  GI:  Negative for change in bowel habits.  MUSCULOSKELETAL:  See HPI.  SKIN:  Negative for lesions  PSYCH:  No mood disorder or recent psychosocial stressors.  Patients sleep is disturbed secondary to pain.  HEMATOLOGY/LYMPHOLOGY: Patient is not currently taking any anti-coagulants  NEURO:   No history of syncope  All other reviewed and negative other than HPI.    OBJECTIVE:    General appearance: Well appearing, in no acute distress, alert and oriented x3.  Psych:  Mood and affect  appropriate.      ASSESSMENT: 61 y.o. year old female with lower back pain, consistent with     1. Spinal stenosis of lumbar region with neurogenic claudication     2. Lumbosacral radiculopathy     3. Chronic pain disorder     4. S/P lumbar fusion     5. Primary osteoarthritis of right knee  X-Ray Knee 3 View Right         PLAN:     - MRI results reviewed and discussed with pt    - Scheduled for Caudal WERO on 5/62022, consider SCS if no improvement following injections    - Continue Physical therapy for Lumbar stabilization, core strengthening, and a home exercise program.    - F/u wait list for Aqua therapy at [place].    - Will stop Norco, switch to Percocet 5-325 mg TID per pt request.    - Counseled patient regarding the importance of physical therapy.    - Ordered right knee xray for right knee pain.    -F/U in 4 weeks after the procedure with SANJUANITA.      The above plan and management options were discussed at length with patient. Patient is in agreement with the above and verbalized understanding. .      Rubén Rico MD  05/02/2022

## 2022-05-02 NOTE — PROGRESS NOTES
Chronic Pain-Tele-Medicine-Established Note (Follow up visit)      The patient location is: Her home in [place]  The chief complaint leading to consultation is: Back Pain  Visit type: Virtual visit with synchronous audio and video  Total time spent with patient: 20 minutes  Each patient to whom he or she provides medical services by telemedicine is:  (1) informed of the relationship between the physician and patient and the respective role of any other health care provider with respect to management of the patient; and (2) notified that he or she may decline to receive medical services by telemedicine and may withdraw from such care at any time.      SUBJECTIVE:    History today 5/02/2022:    Vale Gutierrez presents tele-medicine appointment for a follow-up appointment for back pain. Since the last visit, Vale Gutierrez states the pain has been stable. Current pain intensity is 6/10. She is still hasnt made her mind completely about SCS.    History today 4/11:    Vale Gutierrez presents tele-medicine appointment for a follow-up appointment for back pain. Since the last visit, Vale Gutierrez states the pain has been worsening. Had episode where she knocked over a drink and tried to clean it up. Worsened the pain. Current pain intensity is 7/10.    Was considering SCS at last visit. Has not fully decided. Pain is increasing, norco no longer helps, taking it TID Interested in switching to Percoset. Laying on the side and rest helps.     Has not started aqua therapy, 2 month waiting list.     + pain raditing back of legs  No bowel or bowels  No numbness or tingling of LE    Interval History 3/28/22:  Patient with a history of L4-5 fusion presents in follow-up of low back pain s/p bilateral L3/4 TFESI on 3/4/22 with 2 days of relief then bilateral L2,3,4,5 MBB on 3/24/22 with 20% relief. Today, she reports persistent chronic low back pain with occasional radiation down the posterior legs to the  "knees. Back pain is more bothersome than the legs. Pain is worse with any activity including prolonged standing, sitting, bending, and coughing/sneezing. Pain is improved with lying down.  Takes Norco 5-325 bid, last filled 3/15/22. Takes tizanidine every 6 hours.     Interval History 3/15/2022:  Vale Gutierrez presents tele-medicine appointment for a follow-up appointment for low back pain radiating to lower extremitiy R>L.  She is S/P Bilateral  L3/4 Lumbar Transforaminal Epidural Steroid Injection under Fluoroscopic Guidance on 3/4/2022 with only 2 days of relief.      Interval History 07/28/2020:  Vale Gutierrez presents tele-medicine appointment for a follow-up appointment for low back pain radiating to lower extremitiy R>L. Since the last visit, Vale Gutierrez states the pain has been persistant. Current pain intensity is 0/10.  Pain is worst when standing and moving and relieved with sitting, laying, and stretched out.  At the worst she says it is a 10/10.  The pain radiates to back of thighs and calves.  She describes the pain as sharp, burning in nature.  She states that the flexeril 5mg prn helps somewhat but not much. She started diclofenac yesterday (prescribed by her PCP) which helps with the pain, decreases frequency and intensity. She recently had an MRI lumbar spine done and would like to review the results.     Interval History 07/13/2020  Vale Gutierrez presents to the clinic for the evaluation of low back pain and bilateral calf pain. The calf pain started 5-6 weeks ago following non related injury and symptoms have been worsening.The pain is located in the low back area and radiates to the legs. She has suffered from lower back pain "for years."  The pain is described as aching and burning with intermittent numbness and tingling in the lower extremities. The pain is rated as 9/10. The pain is rated with a score of  3/10 on the BEST day and a score of 10/10 on the WORST " day.  Symptoms interfere with daily activity. The pain is exacerbated by Standing, Walking, and Lifting.  The pain is mitigated by massage, medications and rest. She reports spending 8 hours per day reclining. The patient reports 3-4 hours of uninterrupted sleep per night. She presents for referral from Urgent Care where she was evaluated and believed to have intermittent neurogenic claudication.       Pain Medications:    - Opioids: Hydrocodone- Acetaminophen 5-325mg TID  - NSAIDs: BC powder BID  - Anti-Depressants: Venlafaxine 150 mg + 75 mg in AM  - Anti-Convulsants: Zanaflex BID  - Others: NA      Physical Therapy/Home Exercise: Not currently but in the past, finished in January 2022. Two month wait for water therapy in [place].      report:  Reviewed and consistent with medication use as prescribed.      Pain Procedures:  Patient says she has had 9 ESIs 15-20 years ago with minimal pain relief  3/4/22 Bilateral L3/4 TFESI 3/4 - 2 days of relief  3/24/22 Bl L2,3,4,5 MBB - 20% relief      Imaging:     MRI LUMBAR SPINE WITHOUT CONTRAST 3/28     CLINICAL HISTORY:  Low back pain, symptoms persist with > 6wks conservative treatment; Postlaminectomy syndrome, not elsewhere classified     TECHNIQUE:  Multiplanar, multisequence MR images were acquired from the thoracolumbar junction to the sacrum without the administration of contrast.     COMPARISON:  Radiograph 10/04/2020, 01/26/2022, MRI 01/04/2021.     FINDINGS:  Postoperative change of L4-L5 decompressive laminectomy, discectomy and posterior fusion with bilateral pedicular screws and an intervertebral disc spacer.     Lumbar spine alignment demonstrates mild levoscoliosis with stable mild grade 1 anterolisthesis of L4 on L5 and L5 on S1.  Vertebral body heights are well maintained without evidence for fracture.  No marrow signal abnormality to suggest an infiltrative process.     There is advanced degenerative disc space narrowing and desiccation at T11-T12,  L3-L4 and L5-S1 with chronic associated endplate changes, similar when compared to the previous MRI.  There is disc desiccation at L2-L3 with a posterior annular fissure.     Distal spinal cord demonstrates normal contour and signal intensity.  Cauda equina appears normal without findings to suggest arachnoiditis.  Conus medullaris terminates at T12-L1.  Fatty filum terminale.     Coronal T1 weighted images demonstrate a partially exophytic left renal cortical lesion, incompletely evaluated on this exam.  Limited evaluation of the remaining visualized abdominal organs appears normal.  There is mild to moderate fatty atrophy of the posterior paraspinal musculature.  SI joints are symmetric.     T12-L1: No spinal canal stenosis or neural foraminal narrowing.     L1-L2: No spinal canal stenosis or neural foraminal narrowing.     L2-L3: Bilateral facet arthropathy and bilateral ligamentum flavum buckling.  No spinal canal stenosis or neural foraminal narrowing.     L3-L4: Circumferential disc bulge slightly encroaches into the bilateral foraminal zones, right greater than left.  Bilateral facet arthropathy and bilateral ligamentum flavum buckling.  Findings contribute to mild right neural foraminal narrowing.  No spinal canal stenosis.     L4-L5: Grade 1 anterolisthesis.  Bilateral facet arthropathy.  No spinal canal stenosis or neural foraminal narrowing.     L5-S1: Grade 1 anterolisthesis.  Circumferential disc bulge.  Bilateral facet arthropathy.  No spinal canal stenosis or neural foraminal narrowing.     Impression:     1. Remote postoperative change of L4-L5 decompressive laminectomy, discectomy and posterior fusion.  2. Multilevel lumbar degenerative changes contributing to mild neural foraminal narrowing at L3-L4.  No spinal canal stenosis.  3. Left renal cortical lesion, incompletely evaluated on this exam.  Recommend further characterization with a dedicated renal ultrasound.       Past Medical History:    Diagnosis Date    Alcohol abuse     Anxiety     Depression     Hyperlipidemia     Hypertension      Past Surgical History:   Procedure Laterality Date    BREAST CYST ASPIRATION      CHOLECYSTECTOMY      complicated with bile leak, sepsis    COLONOSCOPY N/A 6/3/2020    Procedure: COLONOSCOPY Golytely;  Surgeon: Tino Neil MD;  Location: [facility];  Service: Colon and Rectal;  Laterality: N/A;    ESOPHAGOGASTRODUODENOSCOPY N/A 6/3/2020    Procedure: EGD (ESOPHAGOGASTRODUODENOSCOPY);  Surgeon: Tino Neil MD;  Location: [facility];  Service: Colon and Rectal;  Laterality: N/A;    INJECTION OF ANESTHETIC AGENT AROUND NERVE Bilateral 3/24/2022    Procedure: BLOCK, NERVE MEDIAL BRANCH BLOCK BL L2, L3, L4 and L5  1st, needs consent;  Surgeon: Rubén Rico MD;  Location: [facility];  Service: Pain Management;  Laterality: Bilateral;    TRANSFORAMINAL EPIDURAL INJECTION OF STEROID Bilateral 8/7/2020    Procedure: INJECTION, STEROID, EPIDURAL, TRANSFORAMINAL APPROACH, L4-L5 need consent;  Surgeon: Rubén Rico MD;  Location: [facility];  Service: Pain Management;  Laterality: Bilateral;    TRANSFORAMINAL EPIDURAL INJECTION OF STEROID Bilateral 9/4/2020    Procedure: LUMBAR TRANSFORAMINAL BILATERAL L4/5 DIRECT REFERRAL;  Surgeon: Rubén Rico MD;  Location: [facility];  Service: Pain Management;  Laterality: Bilateral;  NEEDS CONSENT    TRANSFORAMINAL EPIDURAL INJECTION OF STEROID Bilateral 3/4/2022    Procedure: Injection,steroid,epidural,transforaminal approach BILATERAL L3/4 DIRECT REFERRAL;  Surgeon: Rubén Rico MD;  Location: [facility];  Service: Pain Management;  Laterality: Bilateral;     Social History     Socioeconomic History    Marital status: Single   Tobacco Use    Smoking status: Former Smoker    Smokeless tobacco: Never Used   Substance and Sexual Activity    Alcohol use: Yes     Alcohol/week: 12.0 standard drinks     Types: 12 Shots of liquor per week      Comment: three 12-14 oz cocktail drinks.     Drug use: No    Sexual activity: Never     Social Determinants of Health     Financial Resource Strain: Medium Risk    Difficulty of Paying Living Expenses: Somewhat hard   Food Insecurity: Unknown    Worried About Running Out of Food in the Last Year: Patient refused    Ran Out of Food in the Last Year: Patient refused   Transportation Needs: Unmet Transportation Needs    Lack of Transportation (Medical): Yes    Lack of Transportation (Non-Medical): No   Physical Activity: Inactive    Days of Exercise per Week: 0 days    Minutes of Exercise per Session: 0 min   Stress: Stress Concern Present    Feeling of Stress : Rather much   Social Connections: Unknown    Frequency of Communication with Friends and Family: Three times a week    Frequency of Social Gatherings with Friends and Family: Once a week    Active Member of Clubs or Organizations: No    Attends Club or Organization Meetings: Never    Marital Status: Never    Housing Stability: Low Risk     Unable to Pay for Housing in the Last Year: No    Number of Places Lived in the Last Year: 1    Unstable Housing in the Last Year: No     Family History   Problem Relation Age of Onset    Atrial fibrillation Mother     Heart failure Mother     Arthritis Mother     Macular degeneration Mother     Stroke Father     Cancer Maternal Uncle         lung    Cataracts Paternal Grandfather     Glaucoma Paternal Grandfather        Review of patient's allergies indicates:  No Known Allergies    Current Outpatient Medications   Medication Sig    aspirin/salicylamide/caffeine (BC HEADACHE POWDER ORAL) Take 1 Package by mouth once daily. Hold medication one week prior to surgery    busPIRone (BUSPAR) 5 MG Tab Take 1 tablet (5 mg total) by mouth 2 (two) times daily as needed (anxiety).    dicyclomine (BENTYL) 10 MG capsule Take 1 capsule (10 mg total) by mouth before meals as needed.    guaiFENesin  (MUCINEX) 600 mg 12 hr tablet Take 1,200 mg by mouth 2 (two) times daily.    HYDROcodone-acetaminophen (NORCO) 5-325 mg per tablet Take 1 tablet by mouth every 12 (twelve) hours as needed for Pain.    magnesium 30 mg Tab Take by mouth once.    metoprolol tartrate (LOPRESSOR) 50 MG tablet Take 1 tablet (50 mg total) by mouth once daily.    mirtazapine (REMERON) 7.5 MG Tab Take 1 tablet (7.5 mg total) by mouth every evening. For insomnia, mood    omeprazole (PRILOSEC) 20 MG capsule Take 1 capsule (20 mg total) by mouth 2 (two) times daily.    ondansetron (ZOFRAN-ODT) 4 MG TbDL Take 1 tablet (4 mg total) by mouth every 8 (eight) hours as needed.    oxyCODONE-acetaminophen (PERCOCET) 5-325 mg per tablet Take 1 tablet by mouth every 8 (eight) hours as needed for Pain.    Saccharomyces boulardii (FLORASTOR) 250 mg capsule Take 250 mg by mouth 2 (two) times daily.    thiamine 100 MG tablet Take 100 mg by mouth once daily.    venlafaxine (EFFEXOR-XR) 150 MG Cp24 Take 150 mg by mouth once daily. Take am of surgery    venlafaxine (EFFEXOR-XR) 75 MG 24 hr capsule Take 75 mg by mouth once daily. Take am of surgery    vitamin D (VITAMIN D3) 1000 units Tab Take 5,000 Int'l Units by mouth. Hold am of surgery     No current facility-administered medications for this visit.       REVIEW OF SYSTEMS:    GENERAL:  No weight loss, malaise   HEENT:   No recent changes in vision or hearing  RESPIRATORY:  Negative for cough  GI:  Negative for change in bowel habits.  MUSCULOSKELETAL:  See HPI.  SKIN:  Negative for lesions  PSYCH:  No mood disorder or recent psychosocial stressors.  Patients sleep is disturbed secondary to pain.  HEMATOLOGY/LYMPHOLOGY: Patient is not currently taking any anti-coagulants  NEURO:   No history of syncope  All other reviewed and negative other than HPI.    OBJECTIVE:    General appearance: Well appearing, in no acute distress, alert and oriented x3.  Psych:  Mood and affect  appropriate.      ASSESSMENT: 61 y.o. year old female with lower back pain, consistent with     1. Spinal stenosis of lumbar region with neurogenic claudication     2. Lumbosacral radiculopathy     3. Chronic pain disorder     4. S/P lumbar fusion     5. Primary osteoarthritis of right knee  X-Ray Knee 3 View Right         PLAN:     - MRI results reviewed and discussed with pt    - Scheduled for Caudal WERO on 5/62022, consider SCS if no improvement following injections    - Continue Physical therapy for Lumbar stabilization, core strengthening, and a home exercise program.    - F/u wait list for Aqua therapy at [place].    - Will stop Norco, switch to Percocet 5-325 mg TID per pt request.    - Counseled patient regarding the importance of physical therapy.    - Ordered right knee xray for right knee pain.    -F/U in 4 weeks after the procedure with SANJUANITA.      The above plan and management options were discussed at length with patient. Patient is in agreement with the above and verbalized understanding. .      Rubén Rico MD  05/02/2022

## 2022-05-10 ENCOUNTER — TELEPHONE (OUTPATIENT)
Dept: ADMINISTRATIVE | Facility: OTHER | Age: 62
End: 2022-05-10
Payer: MEDICARE

## 2022-05-12 ENCOUNTER — TELEPHONE (OUTPATIENT)
Dept: PAIN MEDICINE | Facility: CLINIC | Age: 62
End: 2022-05-12
Payer: MEDICARE

## 2022-05-12 DIAGNOSIS — M54.16 LUMBAR RADICULOPATHY: ICD-10-CM

## 2022-05-12 DIAGNOSIS — M96.1 POSTLAMINECTOMY SYNDROME OF LUMBAR REGION: ICD-10-CM

## 2022-05-12 RX ORDER — OXYCODONE AND ACETAMINOPHEN 5; 325 MG/1; MG/1
1 TABLET ORAL EVERY 8 HOURS PRN
Qty: 90 TABLET | Refills: 0 | Status: SHIPPED | OUTPATIENT
Start: 2022-05-12 | End: 2022-06-07 | Stop reason: SDUPTHER

## 2022-05-12 NOTE — TELEPHONE ENCOUNTER
----- Message from Emeli Soler sent at 5/12/2022  1:03 PM CDT -----  Type:  Sooner Apoointment Request    Caller is requesting a sooner appointment.  Caller declined first available appointment listed below.  Caller will not accept being placed on the waitlist and is requesting a message be sent to doctor.  Name of Caller:pt   When is the first available appointment?   Symptoms:infusions   Would the patient rather a call back or a response via [employer] [employer]? Call back   Best Call Back Number: [phone]  Additional Information: would like to schedule appt for infusions

## 2022-05-12 NOTE — TELEPHONE ENCOUNTER
Staff spoke with pt in regards to wanting to be rescheduled for procedure that she cancelled last week. Staff informed patient we will submit to the schedulers.

## 2022-05-12 NOTE — TELEPHONE ENCOUNTER
Patient requesting refill on Percocet 5/325mg  Last office visit 05.02.22   shows last refill on 04.11.22  Patient does not have a pain contract on file with [facility] department  Patient last UDS No UDS on file was not consistent with current therapy

## 2022-05-12 NOTE — TELEPHONE ENCOUNTER
"----- Message from Kate Pandya sent at 5/12/2022  1:51 PM CDT -----  Regarding: Medicationn  Refill  Request              Name and Strength:   oxyCODONE-acetaminophen (PERCOCET) 5-325 mg per tablet    How is the patient currently taking:  Sig - Route: Take 1 tablet by mouth every 8 (eight) hours as needed for Pain. - Oral    Dispense:  90 Tablets     Preferred Pharmacy with phone number:  [facility] - [place], [place] - [address]   Phone: [phone]  Fax:  [phone]    Local Order:    Yes     Ordering Provider:[facility] Ordering/Authorizing: Rubén Rico MD     Would the patient rather a call back or a response via [facility]?  No     Preferred Call Back:  [phone] (S)        Additional  Notes: Patient states, "she's completely out of medication."       "

## 2022-05-16 ENCOUNTER — PES CALL (OUTPATIENT)
Dept: ADMINISTRATIVE | Facility: CLINIC | Age: 62
End: 2022-05-16
Payer: MEDICARE

## 2022-05-18 ENCOUNTER — PATIENT MESSAGE (OUTPATIENT)
Dept: ORTHOPEDICS | Facility: CLINIC | Age: 62
End: 2022-05-18
Payer: MEDICARE

## 2022-05-23 ENCOUNTER — TELEPHONE (OUTPATIENT)
Dept: ADMINISTRATIVE | Facility: CLINIC | Age: 62
End: 2022-05-23
Payer: MEDICARE

## 2022-05-25 ENCOUNTER — TELEPHONE (OUTPATIENT)
Dept: ADMINISTRATIVE | Facility: CLINIC | Age: 62
End: 2022-05-25
Payer: MEDICARE

## 2022-05-26 ENCOUNTER — TELEPHONE (OUTPATIENT)
Dept: ADMINISTRATIVE | Facility: CLINIC | Age: 62
End: 2022-05-26
Payer: MEDICARE

## 2022-05-27 ENCOUNTER — HOSPITAL ENCOUNTER (OUTPATIENT)
Facility: OTHER | Age: 62
Discharge: HOME | End: 2022-05-27
Attending: ANESTHESIOLOGY | Admitting: ANESTHESIOLOGY
Payer: MEDICARE

## 2022-05-27 VITALS
RESPIRATION RATE: 16 BRPM | DIASTOLIC BLOOD PRESSURE: 63 MMHG | HEIGHT: 64 IN | TEMPERATURE: 98 F | WEIGHT: 200 LBS | BODY MASS INDEX: 34.15 KG/M2 | SYSTOLIC BLOOD PRESSURE: 96 MMHG | OXYGEN SATURATION: 90 % | HEART RATE: 64 BPM

## 2022-05-27 DIAGNOSIS — M54.17 LUMBOSACRAL RADICULOPATHY: Primary | ICD-10-CM

## 2022-05-27 DIAGNOSIS — G89.29 CHRONIC PAIN: ICD-10-CM

## 2022-05-27 DIAGNOSIS — M51.37 DDD (DEGENERATIVE DISC DISEASE), LUMBOSACRAL: ICD-10-CM

## 2022-05-27 PROCEDURE — 62323 NJX INTERLAMINAR LMBR/SAC: CPT | Mod: ,,, | Performed by: ANESTHESIOLOGY

## 2022-05-27 PROCEDURE — 63600175 PHARM REV CODE 636 W HCPCS: Performed by: ANESTHESIOLOGY

## 2022-05-27 PROCEDURE — 62323 NJX INTERLAMINAR LMBR/SAC: CPT | Performed by: ANESTHESIOLOGY

## 2022-05-27 PROCEDURE — 25500020 PHARM REV CODE 255: Performed by: ANESTHESIOLOGY

## 2022-05-27 PROCEDURE — 25000003 PHARM REV CODE 250: Performed by: ANESTHESIOLOGY

## 2022-05-27 PROCEDURE — 25000003 PHARM REV CODE 250: Performed by: STUDENT IN AN ORGANIZED HEALTH CARE EDUCATION/TRAINING PROGRAM

## 2022-05-27 PROCEDURE — 62323 PR INJ LUMBAR/SACRAL, W/IMAGING GUIDANCE: ICD-10-PCS | Mod: ,,, | Performed by: ANESTHESIOLOGY

## 2022-05-27 RX ORDER — LIDOCAINE HYDROCHLORIDE 10 MG/ML
INJECTION, SOLUTION EPIDURAL; INFILTRATION; INTRACAUDAL; PERINEURAL
Status: DISCONTINUED | OUTPATIENT
Start: 2022-05-27 | End: 2022-05-27 | Stop reason: HOSPADM

## 2022-05-27 RX ORDER — LIDOCAINE HYDROCHLORIDE 20 MG/ML
INJECTION, SOLUTION INFILTRATION; PERINEURAL
Status: DISCONTINUED | OUTPATIENT
Start: 2022-05-27 | End: 2022-05-27 | Stop reason: HOSPADM

## 2022-05-27 RX ORDER — MIDAZOLAM HYDROCHLORIDE 1 MG/ML
INJECTION INTRAMUSCULAR; INTRAVENOUS
Status: DISCONTINUED | OUTPATIENT
Start: 2022-05-27 | End: 2022-05-27 | Stop reason: HOSPADM

## 2022-05-27 RX ORDER — FENTANYL CITRATE 50 UG/ML
INJECTION, SOLUTION INTRAMUSCULAR; INTRAVENOUS
Status: DISCONTINUED | OUTPATIENT
Start: 2022-05-27 | End: 2022-05-27 | Stop reason: HOSPADM

## 2022-05-27 RX ORDER — SODIUM CHLORIDE 9 MG/ML
INJECTION, SOLUTION INTRAVENOUS CONTINUOUS
Status: DISCONTINUED | OUTPATIENT
Start: 2022-05-27 | End: 2023-01-01

## 2022-05-27 NOTE — DISCHARGE SUMMARY
Discharge Note  Short Stay      SUMMARY     Admit Date: 5/27/2022    Attending Physician: Rubén Rico      Discharge Physician: Rubén Rico      Discharge Date: 5/27/2022 2:56 PM    Procedure(s) (LRB):  INJECTION, STEROID, EPIDURAL, CAUDAL CONTRAST (N/A)    Final Diagnosis: Lumbar radiculopathy [M54.16]    Disposition: Home or self care    Patient Instructions:   Current Discharge Medication List      CONTINUE these medications which have NOT CHANGED    Details   aspirin/salicylamide/caffeine (BC HEADACHE POWDER ORAL) Take 1 Package by mouth once daily. Hold medication one week prior to surgery      busPIRone (BUSPAR) 5 MG Tab Take 1 tablet (5 mg total) by mouth 2 (two) times daily as needed (anxiety).  Qty: 60 tablet, Refills: 11      dicyclomine (BENTYL) 10 MG capsule Take 1 capsule (10 mg total) by mouth before meals as needed.  Qty: 60 capsule, Refills: 1    Associated Diagnoses: Chronic diarrhea      guaiFENesin (MUCINEX) 600 mg 12 hr tablet Take 1,200 mg by mouth 2 (two) times daily.      HYDROcodone-acetaminophen (NORCO) 5-325 mg per tablet Take 1 tablet by mouth every 12 (twelve) hours as needed for Pain.  Qty: 60 tablet, Refills: 0    Comments: Quantity prescribed more than 7 day supply? Yes, quantity medically necessary  Associated Diagnoses: Postlaminectomy syndrome of lumbar region; Lumbar radiculopathy      magnesium 30 mg Tab Take by mouth once.      metoprolol tartrate (LOPRESSOR) 50 MG tablet Take 1 tablet (50 mg total) by mouth once daily.  Qty: 30 tablet, Refills: 11    Comments: .      mirtazapine (REMERON) 7.5 MG Tab Take 1 tablet (7.5 mg total) by mouth every evening. For insomnia, mood  Qty: 30 tablet, Refills: 11      omeprazole (PRILOSEC) 20 MG capsule Take 1 capsule (20 mg total) by mouth 2 (two) times daily.  Qty: 60 capsule, Refills: 3      ondansetron (ZOFRAN-ODT) 4 MG TbDL Take 1 tablet (4 mg total) by mouth every 8 (eight) hours as needed.  Qty: 21 tablet, Refills: 0       oxyCODONE-acetaminophen (PERCOCET) 5-325 mg per tablet Take 1 tablet by mouth every 8 (eight) hours as needed for Pain.  Qty: 90 tablet, Refills: 0    Comments: Quantity prescribed more than 7 day supply? Yes, quantity medically necessary  Associated Diagnoses: Postlaminectomy syndrome of lumbar region; Lumbar radiculopathy      Saccharomyces boulardii (FLORASTOR) 250 mg capsule Take 250 mg by mouth 2 (two) times daily.      thiamine 100 MG tablet Take 100 mg by mouth once daily.      tiZANidine (ZANAFLEX) 4 MG tablet Take 1 tablet (4 mg total) by mouth every 6 (six) hours as needed.  Qty: 60 tablet, Refills: 0    Associated Diagnoses: S/P spinal surgery      !! venlafaxine (EFFEXOR-XR) 150 MG Cp24 Take 150 mg by mouth once daily. Take am of surgery      !! venlafaxine (EFFEXOR-XR) 75 MG 24 hr capsule Take 75 mg by mouth once daily. Take am of surgery      vitamin D (VITAMIN D3) 1000 units Tab Take 5,000 Int'l Units by mouth. Hold am of surgery       !! - Potential duplicate medications found. Please discuss with provider.              Discharge Diagnosis: Lumbar radiculopathy [M54.16]  Condition on Discharge: Stable with no complications to procedure   Diet on Discharge: Same as before.  Activity: as per instruction sheet.  Discharge to: Home with a responsible adult.  Follow up: 2-4 weeks

## 2022-05-27 NOTE — DISCHARGE INSTRUCTIONS
Thank you for allowing us to care for you today. You may receive a survey about the care we provided. Your feedback is valuable and helps us provide excellent care throughout the community.     Home Care Instructions for Pain Management:    1. DIET:   You may resume your normal diet today.   2. BATHING:   You may shower with luke warm water. No tub baths or anything that will soak injection sites under water for the next 24 hours.  3. DRESSING:   You may remove your bandage today.   4. ACTIVITY LEVEL:   You may resume your normal activities 24 hrs after your procedure. Nothing strenuous today.  5. MEDICATIONS:   You may resume your normal medications today. To restart blood thinners, ask your doctor.  6. DRIVING    If you have received any sedatives by mouth today, you may not drive for 12 hours.    If you have received any sedation through your IV, you may not drive for 24 hrs.   7. SPECIAL INSTRUCTIONS:   No heat to the injection site for 24 hrs including, hot bath or shower, heating pad, moist heat, or hot tubs.    Use ice pack to injection site for any pain or discomfort.  Apply ice packs for 20 minute intervals as needed.    IF you have diabetes, be sure to monitor your blood sugar more closely. IF your injection contained steroids your blood sugar levels may become higher than normal.    If you are still having pain upon discharge:  Your pain may improve over the next 48 hours. The anesthetic (numbing medication) works immediately to 48 hours. IF your injection contained a steroid (anti-inflammatory medication), it takes approximately 3 days to start feeling relief and 7-10 days to see your greatest results from the medication. It is possible you may need subsequent injections. This would be discussed at your follow up appointment with pain management or your referring doctor.    Please call the PAIN MANAGEMENT office at [phone] or ON CALL pager at [phone] if you experienced any:   -Weakness or  loss of sensation  -Fever > 101.5  -Pain uncontrolled with oral medications   -Persistent nausea, vomiting, or diarrhea  -Redness or drainage from the injection sites, or any other worrisome concerns.   If physician on call was not reached or could not communicate with our office for any reason please go to the nearest emergency department. Adult Procedural Sedation Instructions    Recovery After Procedural Sedation (Adult)  You have been given medicine by vein to make you sleep during your surgery. This may have included both a pain medicine and sleeping medicine. Most of the effects have worn off. But you may still have some drowsiness for the next 6 to 8 hours.  Home care  Follow these guidelines when you get home:  For the next 8 hours, you should be watched by a responsible adult. This person should make sure your condition is not getting worse.  Don't drink any alcohol for the next 24 hours.  Don't drive, operate dangerous machinery, or make important business or personal decisions during the next 24 hours.  Note: Your healthcare provider may tell you not to take any medicine by mouth for pain or sleep in the next 4 hours. These medicines may react with the medicines you were given in the hospital. This could cause a much stronger response than usual.  Follow-up care  Follow up with your healthcare provider if you are not alert and back to your usual level of activity within 12 hours.  When to seek medical advice  Call your healthcare provider right away if any of these occur:  Drowsiness gets worse  Weakness or dizziness gets worse  Repeated vomiting  You can't be awakened   Date Last Reviewed: 10/18/2016  © 2421-8462 [employer] [address] [place] [address], [place] [address]. All rights reserved. This information is not intended as a substitute for professional medical care. Always follow your healthcare professional's instructions.

## 2022-05-27 NOTE — OP NOTE
Caudal Epidural Steroid Injection with Catheter under Fluoroscopic Guidance    The procedure, risks, benefits, and options were discussed with the patient. There are no contraindications to the procedure. The patent expressed understanding and agreed to the procedure. Informed written consent was obtained prior to the start of the procedure and can be found in the patient's chart.    PATIENT NAME: Vale Gutierrez   MRN: [id number]     DATE OF PROCEDURE: 05/27/2022    PROCEDURE: Caudal Epidural Steroid Injection under Fluoroscopic Guidance    PRE-OP DIAGNOSIS: Lumbar radiculopathy [M54.16] Lumbar radiculopathy [M54.16]    POST-OP DIAGNOSIS: Same    PHYSICIAN: Rubén Rico MD    ASSISTANTS: Dr. Moulton     MEDICATIONS INJECTED: Preservative-free Decadron 10mg with 4cc of Lidocaine 1% MPF     LOCAL ANESTHETIC INJECTED: Xylocaine 2%     SEDATION:   Versed 2mg and Fentanyl 50mcg                                                                                                                                                                                     Conscious sedation ordered by M.D. Patient re-evaluation prior to administration of conscious sedation. No changes noted in patient's status from initial evaluation. The patient's vital signs were monitored by RN and patient remained hemodynamically stable throughout the procedure.    Event Time In   Sedation Start 1452       ESTIMATED BLOOD LOSS: None    COMPLICATIONS: None    TECHNIQUE: Time-out was performed to identify the patient and procedure to be performed. With the patient laying in a prone position, the surgical area was prepped and draped in the usual sterile fashion using ChloraPrep and a fenestrated drape. The injection site was determined under fluoroscopy guidance. Skin anesthesia was achieved by injecting Lidocaine 2% over the injection site. The sacrum and sacral cornua were then approached with a 16 gauge, 3.5 inch epidural needle that was introduced  and advanced into the sacral hiatus under fluoroscopic guidance with AP, lateral and/or contralateral oblique imaging. After the needle passed through the sacrococcygeal ligament, the needle angle was lowered and the needle was advanced 1 cm. After negative aspiration of blood or CSF, and no evidence of paraesthesias, the catheter was threaded through the needle into the epidural space using live fluoroscopy, contrast dye Omnipaque (300mg/mL) was injected to confirm placement and there was no vascular runoff. Fluoroscopic imaging in the AP and lateral views revealed a clear outline of the spinal nerve with proximal spread of agent through the caudal epidural space. Then 5 mL of the medication mixture listed above was injected slowly. Displacement of the radio opaque contrast after injection of the medication confirmed that the medication went into the area of the transforaminal spaces. The needles were removed and bleeding was nil. A sterile dressing was applied. No specimens collected. The patient tolerated the procedure well.       The patient was monitored after the procedure in the recovery area. They were given post-procedure and discharge instructions to follow at home. The patient was discharged in a stable condition.    I reviewed and edited the fellow's note. I conducted my own interview and physical examination. I agree with the findings. I was present and supervising all critical portions of the procedure.    Rubén Rico MD

## 2022-06-07 ENCOUNTER — PATIENT MESSAGE (OUTPATIENT)
Dept: PAIN MEDICINE | Facility: CLINIC | Age: 62
End: 2022-06-07
Payer: MEDICARE

## 2022-06-07 DIAGNOSIS — M96.1 POSTLAMINECTOMY SYNDROME OF LUMBAR REGION: ICD-10-CM

## 2022-06-07 DIAGNOSIS — M54.16 LUMBAR RADICULOPATHY: ICD-10-CM

## 2022-06-07 RX ORDER — OXYCODONE AND ACETAMINOPHEN 5; 325 MG/1; MG/1
1 TABLET ORAL EVERY 8 HOURS PRN
Qty: 90 TABLET | Refills: 0 | Status: SHIPPED | OUTPATIENT
Start: 2022-06-10 | End: 2022-07-10

## 2022-06-07 NOTE — TELEPHONE ENCOUNTER
Patient requesting refill on Oxycodone  5-325mg  Last office visit 05/02/22   shows last refill on 05/12/22  Patient does not have a pain contract on file with [facility] department  Patient last UDS none was not consistent with current therapy

## 2022-06-17 ENCOUNTER — TELEPHONE (OUTPATIENT)
Dept: PAIN MEDICINE | Facility: CLINIC | Age: 62
End: 2022-06-17
Payer: MEDICARE

## 2022-06-17 NOTE — TELEPHONE ENCOUNTER
Good Morning      This is an appointment reminder about your schedule Virtual Visit with Pain Management Provider Dr. Rubén Rico MD.   Your appointment is for June 20, 2022 at 02:00pm.     Please log into your [facility] 15 min's prior to your appointment time to e-precheck, verify all corresponding pages, and troubleshoot any problems that may occur. Once your device has been verify as compatible, and you receive the green check,  you must hit/ select the start visit button, This will notify your provider that you are ready to start the Virtual Video Visit.     As [facility] is a teaching [facility], your visit may be started with a resident or a fellow that is rounding in clinic, then proceeded by your physician.    Once logged on, please allow the provider 20 -30 mins to check-in, in case running behind.       If you experience any technical issue please contact [phone]        Thank You for entrusting [facility] to handle your care       Pt verbalized understanding and confirmed

## 2022-06-20 ENCOUNTER — OFFICE VISIT (OUTPATIENT)
Dept: PAIN MEDICINE | Facility: CLINIC | Age: 62
End: 2022-06-20
Payer: MEDICARE

## 2022-06-20 DIAGNOSIS — M96.1 POSTLAMINECTOMY SYNDROME OF LUMBAR REGION: Primary | ICD-10-CM

## 2022-06-20 DIAGNOSIS — M54.17 LUMBOSACRAL RADICULOPATHY: ICD-10-CM

## 2022-06-20 DIAGNOSIS — M50.30 DDD (DEGENERATIVE DISC DISEASE), CERVICAL: ICD-10-CM

## 2022-06-20 DIAGNOSIS — G89.4 CHRONIC PAIN DISORDER: ICD-10-CM

## 2022-06-20 PROCEDURE — 1160F RVW MEDS BY RX/DR IN RCRD: CPT | Mod: CPTII,95,, | Performed by: ANESTHESIOLOGY

## 2022-06-20 PROCEDURE — 99214 OFFICE O/P EST MOD 30 MIN: CPT | Mod: 95,,, | Performed by: ANESTHESIOLOGY

## 2022-06-20 PROCEDURE — 1159F PR MEDICATION LIST DOCUMENTED IN MEDICAL RECORD: ICD-10-PCS | Mod: CPTII,95,, | Performed by: ANESTHESIOLOGY

## 2022-06-20 PROCEDURE — 1159F MED LIST DOCD IN RCRD: CPT | Mod: CPTII,95,, | Performed by: ANESTHESIOLOGY

## 2022-06-20 PROCEDURE — 1160F PR REVIEW ALL MEDS BY PRESCRIBER/CLIN PHARMACIST DOCUMENTED: ICD-10-PCS | Mod: CPTII,95,, | Performed by: ANESTHESIOLOGY

## 2022-06-20 PROCEDURE — 3044F PR MOST RECENT HEMOGLOBIN A1C LEVEL <7.0%: ICD-10-PCS | Mod: CPTII,95,, | Performed by: ANESTHESIOLOGY

## 2022-06-20 PROCEDURE — 99214 PR OFFICE/OUTPT VISIT, EST, LEVL IV, 30-39 MIN: ICD-10-PCS | Mod: 95,,, | Performed by: ANESTHESIOLOGY

## 2022-06-20 PROCEDURE — 3044F HG A1C LEVEL LT 7.0%: CPT | Mod: CPTII,95,, | Performed by: ANESTHESIOLOGY

## 2022-06-20 NOTE — PROGRESS NOTES
Chronic Pain-Tele-Medicine-Established Note (Follow up visit)        The patient location is: Her home in [place]  The chief complaint leading to consultation is: Back Pain  Visit type: Virtual visit with synchronous audio and video  Total time spent with patient: 20 minutes  Each patient to whom he or she provides medical services by telemedicine is:  (1) informed of the relationship between the physician and patient and the respective role of any other health care provider with respect to management of the patient; and (2) notified that he or she may decline to receive medical services by telemedicine and may withdraw from such care at any time.        SUBJECTIVE:    History Today 06/20/2022:  Patient presents for tele-medicine appointment for follow up s/p caudal WERO on 5/27/22. She reports good relief (70-80%) for about 3 weeks. Her pain is now back. She does find relief with Percocet 5/325 but she finds that she is requiring extra doses. She has not started aquatic therapy yet due to scheduling. She did participate in home PT in the past that she enjoyed. She is interested in discussing a spinal cord stimulator today.     History today 5/02/2022:  Vale Gutierrez presents tele-medicine appointment for a follow-up appointment for back pain. Since the last visit, Vale Gutierrez states the pain has been stable. Current pain intensity is 6/10. She is still hasnt made her mind completely about SCS.     History today 4/11:  Vale Gutierrez presents tele-medicine appointment for a follow-up appointment for back pain. Since the last visit, Vale Gutierrez states the pain has been worsening. Had episode where she knocked over a drink and tried to clean it up. Worsened the pain. Current pain intensity is 7/10.  Was considering SCS at last visit. Has not fully decided. Pain is increasing, norco no longer helps, taking it TID Interested in switching to Percoset. Laying on the side and rest helps.   Has  not started aqua therapy, 2 month waiting list.      + pain raditing back of legs  No bowel or bowels  No numbness or tingling of LE     Interval History 3/28/22:  Patient with a history of L4-5 fusion presents in follow-up of low back pain s/p bilateral L3/4 TFESI on 3/4/22 with 2 days of relief then bilateral L2,3,4,5 MBB on 3/24/22 with 20% relief. Today, she reports persistent chronic low back pain with occasional radiation down the posterior legs to the knees. Back pain is more bothersome than the legs. Pain is worse with any activity including prolonged standing, sitting, bending, and coughing/sneezing. Pain is improved with lying down.  Takes Norco 5-325 bid, last filled 3/15/22. Takes tizanidine every 6 hours.     Interval History 3/15/2022:  Vale Gutierrez presents tele-medicine appointment for a follow-up appointment for low back pain radiating to lower extremitiy R>L.  She is S/P Bilateral  L3/4 Lumbar Transforaminal Epidural Steroid Injection under Fluoroscopic Guidance on 3/4/2022 with only 2 days of relief.      Interval History 07/28/2020:  Vale Gutierrez presents tele-medicine appointment for a follow-up appointment for low back pain radiating to lower extremitiy R>L. Since the last visit, Vale Gutierrez states the pain has been persistant. Current pain intensity is 0/10.  Pain is worst when standing and moving and relieved with sitting, laying, and stretched out.  At the worst she says it is a 10/10.  The pain radiates to back of thighs and calves.  She describes the pain as sharp, burning in nature.  She states that the flexeril 5mg prn helps somewhat but not much. She started diclofenac yesterday (prescribed by her PCP) which helps with the pain, decreases frequency and intensity. She recently had an MRI lumbar spine done and would like to review the results.     Interval History 07/13/2020  Vale Gutierrez presents to the clinic for the evaluation of low back pain and  "bilateral calf pain. The calf pain started 5-6 weeks ago following non related injury and symptoms have been worsening.The pain is located in the low back area and radiates to the legs. She has suffered from lower back pain "for years."  The pain is described as aching and burning with intermittent numbness and tingling in the lower extremities. The pain is rated as 9/10. The pain is rated with a score of  3/10 on the BEST day and a score of 10/10 on the WORST day.  Symptoms interfere with daily activity. The pain is exacerbated by Standing, Walking, and Lifting.  The pain is mitigated by massage, medications and rest. She reports spending 8 hours per day reclining. The patient reports 3-4 hours of uninterrupted sleep per night. She presents for referral from Urgent Care where she was evaluated and believed to have intermittent neurogenic claudication.        Pain Medications:  - Opioids: Hydrocodone- Acetaminophen 5-325mg TID. Percocet 5/325 TID  - NSAIDs: BC powder BID  - Anti-Depressants: Venlafaxine 150 mg + 75 mg in AM  - Anti-Convulsants: Zanaflex BID  - Others: NA     Physical Therapy/Home Exercise: Not currently but in the past, finished in January 2022. Two month wait for water therapy in [place].       report:  Reviewed and consistent with medication use as prescribed.        Pain Procedures:  Patient says she has had 9 ESIs 15-20 years ago with minimal pain relief  3/4/22 Bilateral L3/4 TFESI 3/4 - 2 days of relief  3/24/22 Bl L2,3,4,5 MBB - 20% relief  5/27/22 Caudal WERO - 70-80% relief        Imaging:      MRI LUMBAR SPINE WITHOUT CONTRAST 3/28  FINDINGS:  Postoperative change of L4-L5 decompressive laminectomy, discectomy and posterior fusion with bilateral pedicular screws and an intervertebral disc spacer.     Lumbar spine alignment demonstrates mild levoscoliosis with stable mild grade 1 anterolisthesis of L4 on L5 and L5 on S1.  Vertebral body heights are well maintained without evidence for " fracture.  No marrow signal abnormality to suggest an infiltrative process.     There is advanced degenerative disc space narrowing and desiccation at T11-T12, L3-L4 and L5-S1 with chronic associated endplate changes, similar when compared to the previous MRI.  There is disc desiccation at L2-L3 with a posterior annular fissure.     Distal spinal cord demonstrates normal contour and signal intensity.  Cauda equina appears normal without findings to suggest arachnoiditis.  Conus medullaris terminates at T12-L1.  Fatty filum terminale.     Coronal T1 weighted images demonstrate a partially exophytic left renal cortical lesion, incompletely evaluated on this exam.  Limited evaluation of the remaining visualized abdominal organs appears normal.  There is mild to moderate fatty atrophy of the posterior paraspinal musculature.  SI joints are symmetric.     T12-L1: No spinal canal stenosis or neural foraminal narrowing.     L1-L2: No spinal canal stenosis or neural foraminal narrowing.     L2-L3: Bilateral facet arthropathy and bilateral ligamentum flavum buckling.  No spinal canal stenosis or neural foraminal narrowing.     L3-L4: Circumferential disc bulge slightly encroaches into the bilateral foraminal zones, right greater than left.  Bilateral facet arthropathy and bilateral ligamentum flavum buckling.  Findings contribute to mild right neural foraminal narrowing.  No spinal canal stenosis.     L4-L5: Grade 1 anterolisthesis.  Bilateral facet arthropathy.  No spinal canal stenosis or neural foraminal narrowing.     L5-S1: Grade 1 anterolisthesis.  Circumferential disc bulge.  Bilateral facet arthropathy.  No spinal canal stenosis or neural foraminal narrowing.     Impression:     1. Remote postoperative change of L4-L5 decompressive laminectomy, discectomy and posterior fusion.  2. Multilevel lumbar degenerative changes contributing to mild neural foraminal narrowing at L3-L4.  No spinal canal stenosis.  3. Left renal  cortical lesion, incompletely evaluated on this exam.  Recommend further characterization with a dedicated renal ultrasound.       Allergies: Review of patient's allergies indicates:  No Known Allergies    Current Medications:   Current Outpatient Medications   Medication Sig Dispense Refill    aspirin/salicylamide/caffeine (BC HEADACHE POWDER ORAL) Take 1 Package by mouth once daily. Hold medication one week prior to surgery      busPIRone (BUSPAR) 5 MG Tab Take 1 tablet (5 mg total) by mouth 2 (two) times daily as needed (anxiety). 60 tablet 11    dicyclomine (BENTYL) 10 MG capsule Take 1 capsule (10 mg total) by mouth before meals as needed. 60 capsule 1    guaiFENesin (MUCINEX) 600 mg 12 hr tablet Take 1,200 mg by mouth 2 (two) times daily.      magnesium 30 mg Tab Take by mouth once.      metoprolol tartrate (LOPRESSOR) 50 MG tablet Take 1 tablet (50 mg total) by mouth once daily. 30 tablet 11    mirtazapine (REMERON) 7.5 MG Tab Take 1 tablet (7.5 mg total) by mouth every evening. For insomnia, mood 30 tablet 11    omeprazole (PRILOSEC) 20 MG capsule Take 1 capsule (20 mg total) by mouth 2 (two) times daily. 60 capsule 3    ondansetron (ZOFRAN-ODT) 4 MG TbDL Take 1 tablet (4 mg total) by mouth every 8 (eight) hours as needed. 21 tablet 0    oxyCODONE-acetaminophen (PERCOCET) 5-325 mg per tablet Take 1 tablet by mouth every 8 (eight) hours as needed for Pain. 90 tablet 0    Saccharomyces boulardii (FLORASTOR) 250 mg capsule Take 250 mg by mouth 2 (two) times daily.      thiamine 100 MG tablet Take 100 mg by mouth once daily.      venlafaxine (EFFEXOR-XR) 150 MG Cp24 Take 150 mg by mouth once daily. Take am of surgery      venlafaxine (EFFEXOR-XR) 75 MG 24 hr capsule Take 75 mg by mouth once daily. Take am of surgery      vitamin D (VITAMIN D3) 1000 units Tab Take 5,000 Int'l Units by mouth. Hold am of surgery       No current facility-administered medications for this visit.      Facility-Administered Medications Ordered in Other Visits   Medication Dose Route Frequency Provider Last Rate Last Admin    0.9%  NaCl infusion   Intravenous Continuous Adrianna Velazquez DO 50 mL/hr at 05/27/22 1452 50 mL/hr at 05/27/22 1452       REVIEW OF SYSTEMS:    GENERAL:  No weight loss, malaise or fevers.  HEENT:  Negative for frequent or significant headaches.  NECK:  Negative for lumps, goiter, pain and significant neck swelling.  RESPIRATORY:  Negative for cough, wheezing or shortness of breath.  CARDIOVASCULAR:  Negative for chest pain, leg swelling or palpitations.  GI:  Negative for abdominal discomfort, blood in stools or black stools or change in bowel habits.  MUSCULOSKELETAL:  See HPI.  SKIN:  Negative for lesions, rash, and itching.  PSYCH:  Negative for sleep disturbance, mood disorder and recent psychosocial stressors.  HEMATOLOGY/LYMPHOLOGY:  Negative for prolonged bleeding, bruising easily or swollen nodes.  NEURO:   No history of headaches, syncope, paralysis, seizures or tremors.  All other reviewed and negative other than HPI.    Past Medical History:  Past Medical History:   Diagnosis Date    Alcohol abuse     Anxiety     Depression     Hyperlipidemia     Hypertension        Past Surgical History:  Past Surgical History:   Procedure Laterality Date    BREAST CYST ASPIRATION      CHOLECYSTECTOMY      complicated with bile leak, sepsis    COLONOSCOPY N/A 6/3/2020    Procedure: COLONOSCOPY Golytely;  Surgeon: Tino Neil MD;  Location: [facility];  Service: Colon and Rectal;  Laterality: N/A;    EPIDURAL STEROID INJECTION N/A 5/27/2022    Procedure: INJECTION, STEROID, EPIDURAL, CAUDAL CONTRAST;  Surgeon: Rubén Rico MD;  Location: [facility];  Service: Pain Management;  Laterality: N/A;  5/6 RESCHEDULE    ESOPHAGOGASTRODUODENOSCOPY N/A 6/3/2020    Procedure: EGD (ESOPHAGOGASTRODUODENOSCOPY);  Surgeon: Tino Neil MD;  Location: [facility];  Service: Colon  and Rectal;  Laterality: N/A;    INJECTION OF ANESTHETIC AGENT AROUND NERVE Bilateral 3/24/2022    Procedure: BLOCK, NERVE MEDIAL BRANCH BLOCK BL L2, L3, L4 and L5  1st, needs consent;  Surgeon: Rubén Rico MD;  Location: [facility];  Service: Pain Management;  Laterality: Bilateral;    TRANSFORAMINAL EPIDURAL INJECTION OF STEROID Bilateral 8/7/2020    Procedure: INJECTION, STEROID, EPIDURAL, TRANSFORAMINAL APPROACH, L4-L5 need consent;  Surgeon: Rubén Rcio MD;  Location: [facility];  Service: Pain Management;  Laterality: Bilateral;    TRANSFORAMINAL EPIDURAL INJECTION OF STEROID Bilateral 9/4/2020    Procedure: LUMBAR TRANSFORAMINAL BILATERAL L4/5 DIRECT REFERRAL;  Surgeon: Rubén Rico MD;  Location: [facility];  Service: Pain Management;  Laterality: Bilateral;  NEEDS CONSENT    TRANSFORAMINAL EPIDURAL INJECTION OF STEROID Bilateral 3/4/2022    Procedure: Injection,steroid,epidural,transforaminal approach BILATERAL L3/4 DIRECT REFERRAL;  Surgeon: Rubén Rico MD;  Location: [facility];  Service: Pain Management;  Laterality: Bilateral;       Family History:  Family History   Problem Relation Age of Onset    Atrial fibrillation Mother     Heart failure Mother     Arthritis Mother     Macular degeneration Mother     Stroke Father     Cancer Maternal Uncle         lung    Cataracts Paternal Grandfather     Glaucoma Paternal Grandfather        Social History:  Social History     Socioeconomic History    Marital status: Single   Tobacco Use    Smoking status: Former Smoker    Smokeless tobacco: Never Used   Substance and Sexual Activity    Alcohol use: Yes     Alcohol/week: 12.0 standard drinks     Types: 12 Shots of liquor per week     Comment: three 12-14 oz cocktail drinks.     Drug use: No    Sexual activity: Never     Social Determinants of Health     Financial Resource Strain: High Risk    Difficulty of Paying Living Expenses: Hard   Food Insecurity: No Food  Insecurity    Worried About Running Out of Food in the Last Year: Never true    Ran Out of Food in the Last Year: Never true   Transportation Needs: No Transportation Needs    Lack of Transportation (Medical): No    Lack of Transportation (Non-Medical): No   Physical Activity: Inactive    Days of Exercise per Week: 0 days    Minutes of Exercise per Session: 0 min   Stress: Stress Concern Present    Feeling of Stress : Very much   Social Connections: Unknown    Frequency of Communication with Friends and Family: More than three times a week    Frequency of Social Gatherings with Friends and Family: Once a week    Active Member of Clubs or Organizations: No    Attends Club or Organization Meetings: Never    Marital Status: Never    Housing Stability: Low Risk     Unable to Pay for Housing in the Last Year: No    Number of Places Lived in the Last Year: 1    Unstable Housing in the Last Year: No       OBJECTIVE:  General appearance: Well appearing, in no acute distress, alert and oriented x3.  Psych:  Mood and affect appropriate.    ASSESSMENT: 61 y.o. year old female with low back pain, consistent with      1. Postlaminectomy syndrome of lumbar region     2. Chronic pain disorder     3. DDD (degenerative disc disease), cervical     4. Lumbosacral radiculopathy           PLAN:     - I have stressed the importance of physical activity and a home exercise plan to help with pain and improve health.  - Referral to Aqua therapy with progression to land therapy for general conditioning and strengthening.  - Patient can continue with medications for now since they are providing benefits, using them appropriately, and without side effects.  - Educated on the safety of taking medications as prescribed.   - Increase Percocet frequency to 5/325 mg QID. Patient to call for refill  - Will plan for trial of spinal cord stimulator with [facility]. Will have demo sent to the patient.   - Referral for psychiatric  evaluation for SCS  - RTC in 4 weeks for further assessment.   - Counseled patient regarding the importance of activity modification, constant sleeping habits and physical therapy.    The above plan and management options were discussed at length with patient. Patient is in agreement with the above and verbalized understanding.    Carmella Contreras MD  PGY2, CA1 Anesthesiology    06/20/2022     I have personally reviewed the encounter with resident/fellow/NP and personally spoke with patient and addressed all questions and concerns. The encounter was initiated by patient who consented and verbalized understanding to the type of encounter not related to any office visit or other encounter in the past 7 days.    Rubén Rico MD   6/20/2022

## 2022-06-21 ENCOUNTER — PATIENT MESSAGE (OUTPATIENT)
Dept: PAIN MEDICINE | Facility: OTHER | Age: 62
End: 2022-06-21
Payer: MEDICARE

## 2022-06-27 ENCOUNTER — PATIENT MESSAGE (OUTPATIENT)
Dept: PAIN MEDICINE | Facility: CLINIC | Age: 62
End: 2022-06-27
Payer: MEDICARE

## 2022-06-27 RX ORDER — OXYCODONE AND ACETAMINOPHEN 5; 325 MG/1; MG/1
1 TABLET ORAL EVERY 6 HOURS PRN
Qty: 120 TABLET | Refills: 0 | Status: SHIPPED | OUTPATIENT
Start: 2022-06-27 | End: 2022-07-20 | Stop reason: SDUPTHER

## 2022-07-08 ENCOUNTER — PATIENT MESSAGE (OUTPATIENT)
Dept: PAIN MEDICINE | Facility: OTHER | Age: 62
End: 2022-07-08
Payer: MEDICARE

## 2022-07-08 ENCOUNTER — NURSE TRIAGE (OUTPATIENT)
Dept: ADMINISTRATIVE | Facility: CLINIC | Age: 62
End: 2022-07-08
Payer: MEDICARE

## 2022-07-08 NOTE — TELEPHONE ENCOUNTER
Reports pain in her right calf that has subsided some since onset. Advised, per protocol to be evaluated now. Verbalizes understanding.    Reason for Disposition   Thigh or calf pain in only one leg and present > 1 hour    Additional Information   Negative: Looks like a broken bone or dislocated joint (e.g., crooked or deformed)   Negative: Sounds like a life-threatening emergency to the triager   Negative: Chest pain   Negative: Difficulty breathing   Negative: Entire foot is cool or blue in comparison to other side   Negative: Unable to walk    Protocols used: LEG PAIN-A-OH

## 2022-07-13 ENCOUNTER — PATIENT MESSAGE (OUTPATIENT)
Dept: PAIN MEDICINE | Facility: CLINIC | Age: 62
End: 2022-07-13
Payer: MEDICARE

## 2022-07-14 ENCOUNTER — TELEPHONE (OUTPATIENT)
Dept: PAIN MEDICINE | Facility: CLINIC | Age: 62
End: 2022-07-14
Payer: MEDICARE

## 2022-07-14 ENCOUNTER — TELEPHONE (OUTPATIENT)
Dept: OPTOMETRY | Facility: CLINIC | Age: 62
End: 2022-07-14
Payer: MEDICARE

## 2022-07-14 NOTE — TELEPHONE ENCOUNTER
----- Message from Gloria Woo sent at 7/14/2022  3:00 PM CDT -----  Contact: Vale @[phone]  Pt needs a copy of her eye glasses script mailed to her home   Pt would like a call back to let her know when it has been mailed

## 2022-07-18 ENCOUNTER — PATIENT MESSAGE (OUTPATIENT)
Dept: PAIN MEDICINE | Facility: OTHER | Age: 62
End: 2022-07-18
Payer: MEDICARE

## 2022-07-19 ENCOUNTER — PATIENT MESSAGE (OUTPATIENT)
Dept: PAIN MEDICINE | Facility: CLINIC | Age: 62
End: 2022-07-19
Payer: MEDICARE

## 2022-07-19 ENCOUNTER — PATIENT MESSAGE (OUTPATIENT)
Dept: ORTHOPEDICS | Facility: CLINIC | Age: 62
End: 2022-07-19
Payer: MEDICARE

## 2022-07-20 DIAGNOSIS — M48.062 SPINAL STENOSIS OF LUMBAR REGION WITH NEUROGENIC CLAUDICATION: ICD-10-CM

## 2022-07-20 DIAGNOSIS — M96.1 POSTLAMINECTOMY SYNDROME OF LUMBAR REGION: Primary | ICD-10-CM

## 2022-07-20 RX ORDER — OXYCODONE AND ACETAMINOPHEN 5; 325 MG/1; MG/1
1 TABLET ORAL EVERY 6 HOURS PRN
Qty: 120 TABLET | Refills: 0 | Status: ON HOLD | OUTPATIENT
Start: 2022-07-29 | End: 2022-09-07 | Stop reason: HOSPADM

## 2022-07-20 NOTE — TELEPHONE ENCOUNTER
Patient requesting refill on Oxycodone 5-325mg  Last office visit 06/20/22   shows last refill on 06/30/22  Patient does not have a pain contract on file with [facility]  Patient last UDS none was not consistent with current therapy

## 2022-07-27 ENCOUNTER — TELEPHONE (OUTPATIENT)
Dept: INTERNAL MEDICINE | Facility: CLINIC | Age: 62
End: 2022-07-27
Payer: MEDICARE

## 2022-07-27 NOTE — TELEPHONE ENCOUNTER
----- Message from Sonya Massey sent at 7/27/2022 10:36 AM CDT -----  Regarding: advice  Contact: patient [phone]  [email or web address]     Patient is calling for Medical Advice regarding: sick , wek nause headache, stomach ache    How long has patient had these symptoms: 2-3- weeks    Pharmacy name and phone#:   [facility] - [place] - [address]  [address] [place] [address]  Phone: [phone] Fax: [phone]    Would like response via [employer]:  Please call if patient can be seen    Comments:

## 2022-07-27 NOTE — TELEPHONE ENCOUNTER
Her sister is also feeling bad. They are both going to get Covid tested tomorrow.  If negative she will call me to schedule an office visit.  The patient informed to go to ER or urgent care for evaluation.   HEMATURIA

## 2022-07-28 ENCOUNTER — PATIENT MESSAGE (OUTPATIENT)
Dept: INTERNAL MEDICINE | Facility: CLINIC | Age: 62
End: 2022-07-28
Payer: MEDICARE

## 2022-07-29 NOTE — TELEPHONE ENCOUNTER
The patient was called and a message left on voicemail to call the office back regarding appointment.

## 2022-08-01 ENCOUNTER — HOSPITAL ENCOUNTER (INPATIENT)
Facility: HOSPITAL | Age: 62
LOS: 22 days | Discharge: NURSING, REHAB OR LONG-TERM CARE | DRG: 189 | End: 2022-08-23
Attending: EMERGENCY MEDICINE | Admitting: INTERNAL MEDICINE
Payer: MEDICARE

## 2022-08-01 ENCOUNTER — TELEPHONE (OUTPATIENT)
Dept: INTERNAL MEDICINE | Facility: CLINIC | Age: 62
End: 2022-08-01
Payer: MEDICARE

## 2022-08-01 DIAGNOSIS — F10.232 ALCOHOL DEPENDENCE WITH WITHDRAWAL WITH PERCEPTUAL DISTURBANCE: ICD-10-CM

## 2022-08-01 DIAGNOSIS — R06.02 SOB (SHORTNESS OF BREATH): ICD-10-CM

## 2022-08-01 DIAGNOSIS — J18.9 COMMUNITY ACQUIRED PNEUMONIA OF RIGHT LOWER LOBE OF LUNG: ICD-10-CM

## 2022-08-01 DIAGNOSIS — G89.4 CHRONIC PAIN DISORDER: Chronic | ICD-10-CM

## 2022-08-01 DIAGNOSIS — R09.02 HYPOXIA: Primary | ICD-10-CM

## 2022-08-01 DIAGNOSIS — I50.33 ACUTE ON CHRONIC DIASTOLIC CONGESTIVE HEART FAILURE: ICD-10-CM

## 2022-08-01 DIAGNOSIS — R00.0 TACHYCARDIA: ICD-10-CM

## 2022-08-01 DIAGNOSIS — R06.00 DYSPNEA: ICD-10-CM

## 2022-08-01 DIAGNOSIS — R41.82 ALTERED MENTAL STATUS, UNSPECIFIED ALTERED MENTAL STATUS TYPE: ICD-10-CM

## 2022-08-01 DIAGNOSIS — I48.91 A-FIB: ICD-10-CM

## 2022-08-01 DIAGNOSIS — I10 ESSENTIAL HYPERTENSION: Chronic | ICD-10-CM

## 2022-08-01 DIAGNOSIS — J96.01 ACUTE HYPOXEMIC RESPIRATORY FAILURE: ICD-10-CM

## 2022-08-01 DIAGNOSIS — F33.42 RECURRENT MAJOR DEPRESSIVE DISORDER, IN FULL REMISSION: ICD-10-CM

## 2022-08-01 DIAGNOSIS — Z91.89 AT RISK FOR PROLONGED QT INTERVAL SYNDROME: ICD-10-CM

## 2022-08-01 DIAGNOSIS — R82.71 ASYMPTOMATIC BACTERIURIA: ICD-10-CM

## 2022-08-01 DIAGNOSIS — J96.22 ACUTE ON CHRONIC RESPIRATORY FAILURE WITH HYPERCAPNIA: ICD-10-CM

## 2022-08-01 DIAGNOSIS — M54.17 LUMBOSACRAL RADICULOPATHY: Chronic | ICD-10-CM

## 2022-08-01 DIAGNOSIS — I31.39 PERICARDIAL EFFUSION: ICD-10-CM

## 2022-08-01 DIAGNOSIS — G93.41 ENCEPHALOPATHY, METABOLIC: ICD-10-CM

## 2022-08-01 DIAGNOSIS — F41.9 ANXIETY: Chronic | ICD-10-CM

## 2022-08-01 DIAGNOSIS — F10.20 ALCOHOL USE DISORDER, SEVERE, DEPENDENCE: Chronic | ICD-10-CM

## 2022-08-01 LAB
ALBUMIN SERPL BCP-MCNC: 3.1 G/DL (ref 3.5–5.2)
ALLENS TEST: ABNORMAL
ALP SERPL-CCNC: 212 U/L (ref 55–135)
ALT SERPL W/O P-5'-P-CCNC: 124 U/L (ref 10–44)
AMMONIA PLAS-SCNC: 32 UMOL/L (ref 10–50)
ANION GAP SERPL CALC-SCNC: 13 MMOL/L (ref 8–16)
AST SERPL-CCNC: 245 U/L (ref 10–40)
BASOPHILS # BLD AUTO: 0.08 K/UL (ref 0–0.2)
BASOPHILS NFR BLD: 1.2 % (ref 0–1.9)
BILIRUB SERPL-MCNC: 0.3 MG/DL (ref 0.1–1)
BNP SERPL-MCNC: 504 PG/ML (ref 0–99)
BUN SERPL-MCNC: 20 MG/DL (ref 8–23)
BUN SERPL-MCNC: 26 MG/DL (ref 6–30)
CALCIUM SERPL-MCNC: 9.1 MG/DL (ref 8.7–10.5)
CHLORIDE SERPL-SCNC: 92 MMOL/L (ref 95–110)
CHLORIDE SERPL-SCNC: 93 MMOL/L (ref 95–110)
CO2 SERPL-SCNC: 32 MMOL/L (ref 23–29)
CREAT SERPL-MCNC: 0.9 MG/DL (ref 0.5–1.4)
CREAT SERPL-MCNC: 1 MG/DL (ref 0.5–1.4)
DIFFERENTIAL METHOD: ABNORMAL
EOSINOPHIL # BLD AUTO: 0.1 K/UL (ref 0–0.5)
EOSINOPHIL NFR BLD: 0.9 % (ref 0–8)
ERYTHROCYTE [DISTWIDTH] IN BLOOD BY AUTOMATED COUNT: 18.2 % (ref 11.5–14.5)
EST. GFR  (NO RACE VARIABLE): >60 ML/MIN/1.73 M^2
ETHANOL SERPL-MCNC: <10 MG/DL
GLUCOSE SERPL-MCNC: 79 MG/DL (ref 70–110)
GLUCOSE SERPL-MCNC: 87 MG/DL (ref 70–110)
HCO3 UR-SCNC: 44.9 MMOL/L (ref 24–28)
HCT VFR BLD AUTO: 38.9 % (ref 37–48.5)
HCT VFR BLD CALC: 41 %PCV (ref 36–54)
HGB BLD-MCNC: 11.6 G/DL (ref 12–16)
IMM GRANULOCYTES # BLD AUTO: 0.03 K/UL (ref 0–0.04)
IMM GRANULOCYTES NFR BLD AUTO: 0.4 % (ref 0–0.5)
LACTATE SERPL-SCNC: 1.3 MMOL/L (ref 0.5–2.2)
LYMPHOCYTES # BLD AUTO: 1.5 K/UL (ref 1–4.8)
LYMPHOCYTES NFR BLD: 21.6 % (ref 18–48)
MCH RBC QN AUTO: 24.9 PG (ref 27–31)
MCHC RBC AUTO-ENTMCNC: 29.8 G/DL (ref 32–36)
MCV RBC AUTO: 84 FL (ref 82–98)
MONOCYTES # BLD AUTO: 0.6 K/UL (ref 0.3–1)
MONOCYTES NFR BLD: 8.9 % (ref 4–15)
NEUTROPHILS # BLD AUTO: 4.5 K/UL (ref 1.8–7.7)
NEUTROPHILS NFR BLD: 67 % (ref 38–73)
NRBC BLD-RTO: 1 /100 WBC
PCO2 BLDA: 82.7 MMHG (ref 35–45)
PH SMN: 7.34 [PH] (ref 7.35–7.45)
PLATELET # BLD AUTO: 342 K/UL (ref 150–450)
PMV BLD AUTO: 10 FL (ref 9.2–12.9)
PO2 BLDA: 19 MMHG (ref 40–60)
POC BE: 19 MMOL/L
POC IONIZED CALCIUM: 0.94 MMOL/L (ref 1.06–1.42)
POC SATURATED O2: 24 % (ref 95–100)
POC TCO2 (MEASURED): 38 MMOL/L (ref 23–29)
POC TCO2: 47 MMOL/L (ref 24–29)
POTASSIUM BLD-SCNC: 5.6 MMOL/L (ref 3.5–5.1)
POTASSIUM SERPL-SCNC: 4.6 MMOL/L (ref 3.5–5.1)
PROT SERPL-MCNC: 7.3 G/DL (ref 6–8.4)
RBC # BLD AUTO: 4.65 M/UL (ref 4–5.4)
SAMPLE: ABNORMAL
SAMPLE: ABNORMAL
SARS-COV-2 RDRP RESP QL NAA+PROBE: NEGATIVE
SITE: ABNORMAL
SODIUM BLD-SCNC: 135 MMOL/L (ref 136–145)
SODIUM SERPL-SCNC: 137 MMOL/L (ref 136–145)
TROPONIN I SERPL DL<=0.01 NG/ML-MCNC: 0.03 NG/ML (ref 0–0.03)
WBC # BLD AUTO: 6.76 K/UL (ref 3.9–12.7)

## 2022-08-01 PROCEDURE — 80047 BASIC METABLC PNL IONIZED CA: CPT

## 2022-08-01 PROCEDURE — U0002 COVID-19 LAB TEST NON-CDC: HCPCS | Performed by: EMERGENCY MEDICINE

## 2022-08-01 PROCEDURE — 25000003 PHARM REV CODE 250: Performed by: EMERGENCY MEDICINE

## 2022-08-01 PROCEDURE — 27000221 HC OXYGEN, UP TO 24 HOURS

## 2022-08-01 PROCEDURE — 81001 URINALYSIS AUTO W/SCOPE: CPT | Performed by: NURSE PRACTITIONER

## 2022-08-01 PROCEDURE — 86803 HEPATITIS C AB TEST: CPT | Performed by: PHYSICIAN ASSISTANT

## 2022-08-01 PROCEDURE — 99285 EMERGENCY DEPT VISIT HI MDM: CPT | Mod: 25

## 2022-08-01 PROCEDURE — 96365 THER/PROPH/DIAG IV INF INIT: CPT

## 2022-08-01 PROCEDURE — 99285 EMERGENCY DEPT VISIT HI MDM: CPT | Mod: GC,,, | Performed by: EMERGENCY MEDICINE

## 2022-08-01 PROCEDURE — 12000002 HC ACUTE/MED SURGE SEMI-PRIVATE ROOM

## 2022-08-01 PROCEDURE — 82140 ASSAY OF AMMONIA: CPT | Performed by: NURSE PRACTITIONER

## 2022-08-01 PROCEDURE — 82800 BLOOD PH: CPT

## 2022-08-01 PROCEDURE — 82077 ASSAY SPEC XCP UR&BREATH IA: CPT | Performed by: EMERGENCY MEDICINE

## 2022-08-01 PROCEDURE — 93005 ELECTROCARDIOGRAM TRACING: CPT

## 2022-08-01 PROCEDURE — 63600175 PHARM REV CODE 636 W HCPCS: Performed by: STUDENT IN AN ORGANIZED HEALTH CARE EDUCATION/TRAINING PROGRAM

## 2022-08-01 PROCEDURE — 99285 PR EMERGENCY DEPT VISIT,LEVEL V: ICD-10-PCS | Mod: GC,,, | Performed by: EMERGENCY MEDICINE

## 2022-08-01 PROCEDURE — 84484 ASSAY OF TROPONIN QUANT: CPT | Performed by: NURSE PRACTITIONER

## 2022-08-01 PROCEDURE — 25500020 PHARM REV CODE 255: Performed by: EMERGENCY MEDICINE

## 2022-08-01 PROCEDURE — 25000003 PHARM REV CODE 250: Performed by: STUDENT IN AN ORGANIZED HEALTH CARE EDUCATION/TRAINING PROGRAM

## 2022-08-01 PROCEDURE — 82803 BLOOD GASES ANY COMBINATION: CPT

## 2022-08-01 PROCEDURE — 93010 EKG 12-LEAD: ICD-10-PCS | Mod: ,,, | Performed by: INTERNAL MEDICINE

## 2022-08-01 PROCEDURE — 63600175 PHARM REV CODE 636 W HCPCS: Performed by: EMERGENCY MEDICINE

## 2022-08-01 PROCEDURE — 87040 BLOOD CULTURE FOR BACTERIA: CPT | Mod: 59 | Performed by: STUDENT IN AN ORGANIZED HEALTH CARE EDUCATION/TRAINING PROGRAM

## 2022-08-01 PROCEDURE — 83605 ASSAY OF LACTIC ACID: CPT | Performed by: STUDENT IN AN ORGANIZED HEALTH CARE EDUCATION/TRAINING PROGRAM

## 2022-08-01 PROCEDURE — 96367 TX/PROPH/DG ADDL SEQ IV INF: CPT

## 2022-08-01 PROCEDURE — 93010 ELECTROCARDIOGRAM REPORT: CPT | Mod: ,,, | Performed by: INTERNAL MEDICINE

## 2022-08-01 PROCEDURE — 80053 COMPREHEN METABOLIC PANEL: CPT | Performed by: NURSE PRACTITIONER

## 2022-08-01 PROCEDURE — 99900035 HC TECH TIME PER 15 MIN (STAT)

## 2022-08-01 PROCEDURE — 87389 HIV-1 AG W/HIV-1&-2 AB AG IA: CPT | Performed by: PHYSICIAN ASSISTANT

## 2022-08-01 PROCEDURE — 85025 COMPLETE CBC W/AUTO DIFF WBC: CPT | Performed by: NURSE PRACTITIONER

## 2022-08-01 PROCEDURE — 83880 ASSAY OF NATRIURETIC PEPTIDE: CPT | Performed by: NURSE PRACTITIONER

## 2022-08-01 PROCEDURE — 96366 THER/PROPH/DIAG IV INF ADDON: CPT

## 2022-08-01 PROCEDURE — 94761 N-INVAS EAR/PLS OXIMETRY MLT: CPT

## 2022-08-01 RX ADMIN — PIPERACILLIN SODIUM AND TAZOBACTAM SODIUM 4.5 G: 4; .5 INJECTION, POWDER, LYOPHILIZED, FOR SOLUTION INTRAVENOUS at 06:08

## 2022-08-01 RX ADMIN — IOHEXOL 100 ML: 350 INJECTION, SOLUTION INTRAVENOUS at 09:08

## 2022-08-01 RX ADMIN — VANCOMYCIN HYDROCHLORIDE 2000 MG: 500 INJECTION, POWDER, LYOPHILIZED, FOR SOLUTION INTRAVENOUS at 07:08

## 2022-08-01 NOTE — FIRST PROVIDER EVALUATION
Emergency Department TeleTriage Encounter Note      CHIEF COMPLAINT    Chief Complaint   Patient presents with    Shortness of Breath     Placed on 3l nc in triage, states oxygen has been running low , but now more sob       VITAL SIGNS   Initial Vitals [08/01/22 1523]   BP Pulse Resp Temp SpO2   110/70 74 (!) 24 98.3 °F (36.8 °C) (S) (!) 82 %      MAP       --            ALLERGIES    Review of patient's allergies indicates:  No Known Allergies    PROVIDER TRIAGE NOTE  This is a teletriage evaluation of a 61 y.o. female presenting to the ED complaining of AMS.  Last known well 2 days ago.  Friend states that patient was lying in bed today when she went to check on her and appears confused.  Pt reported SOB but is now feeling better.  Denies trauma.        Pt is alert, awake. No resp distress. Pt appears confused.  No reported focal weakness.     Initial orders will be placed and care will be transferred to an alternate provider when patient is roomed for a full evaluation. Any additional orders and the final disposition will be determined by that provider.           ORDERS  Labs Reviewed   HIV 1 / 2 ANTIBODY   HEPATITIS C ANTIBODY   CBC W/ AUTO DIFFERENTIAL   COMPREHENSIVE METABOLIC PANEL   TROPONIN I   URINALYSIS, REFLEX TO URINE CULTURE   DRUG SCREEN PANEL, URINE EMERGENCY   ALCOHOL,MEDICAL (ETHANOL)   AMMONIA   ALCOHOL,MEDICAL (ETHANOL)   ACETAMINOPHEN LEVEL   SALICYLATE LEVEL   B-TYPE NATRIURETIC PEPTIDE   POCT GLUCOSE MONITORING CONTINUOUS       ED Orders (720h ago, onward)    Start Ordered     Status Ordering Provider    08/01/22 1800 08/01/22 1719  Vital Signs  Every 2 hours         Acknowledged BENITA JACINTO N.    08/01/22 1720 08/01/22 1719  Brain natriuretic peptide  STAT         Ordered BENITA JACINTO    08/01/22 1720 08/01/22 1719  X-Ray Chest AP Portable  1 time imaging         Ordered BENITA JACINTO N.    08/01/22 1719 08/01/22 1719  Ammonia  Once         Acknowledged  BENITA JACINTO N.    08/01/22 1719 08/01/22 1719  CT Head Without Contrast  1 time imaging         Acknowledged BENITA JACINTO N.    08/01/22 1719 08/01/22 1719  Ethanol  Once         Acknowledged BENITA JACINTO NMoe    08/01/22 1719 08/01/22 1719  Acetaminophen level  Once         Acknowledged BENITA JACINTO NMoe    08/01/22 1719 08/01/22 1719  Salicylate level  Once         Acknowledged BENITA JACINTO NMoe    08/01/22 1718 08/01/22 1719  CBC auto differential  STAT         Acknowledged BENITA JACINTO NMoe    08/01/22 1718 08/01/22 1719  Comprehensive metabolic panel  STAT         Acknowledged BENITA JACINTO NMoe    08/01/22 1718 08/01/22 1719  Insert Saline lock IV  Once         Acknowledged BENITA JACINTO NMoe    08/01/22 1718 08/01/22 1719  EKG 12-lead  Once         Acknowledged BENITA JACINTO NMoe    08/01/22 1718 08/01/22 1719  POCT glucose  Once         Acknowledged BENITA JACINTO NMoe    08/01/22 1718 08/01/22 1719  Cardiac Monitoring - Adult  Continuous        Comments: Notify Physician If:    Acknowledged BENITA JACINTO N.    08/01/22 1718 08/01/22 1719  Pulse Oximetry Continuous  Continuous         Acknowledged BENITA JACINTO NMoe    08/01/22 1718 08/01/22 1719  Troponin I  STAT         Acknowledged BENITA JACINTO NMoe    08/01/22 1718 08/01/22 1719  Urinalysis, Reflex to Urine Culture Urine, Clean Catch  STAT         Acknowledged BENITA JACINTO    08/01/22 1718 08/01/22 1719  Drug screen panel, emergency  STAT         Acknowledged BENITA JACINTO NMoe    08/01/22 1718 08/01/22 1719  Ethanol  Once         Acknowledged BENITA JACINTO NMoe    08/01/22 1527 08/01/22 1526  HIV 1/2 Ag/Ab (4th Gen)  STAT         Acknowledged WILLEM PETERSON    08/01/22 1527 08/01/22 1526  Hepatitis C Antibody  STAT         Acknowledged WILLEM PETERSON    08/01/22 1527 08/01/22 1526  EKG 12-lead  Once         Final result KRISTEN,  WILLEM            Virtual Visit Note: The provider triage portion of this emergency department evaluation and documentation was performed via [employer], a HIPAA-compliant telemedicine application, in concert with a tele-presenter in the room. A face to face patient evaluation with one of my colleagues will occur once the patient is placed in an emergency department room.      DISCLAIMER: This note was prepared with [employer] voice recognition transcription software. Garbled syntax, mangled pronouns, and other bizarre constructions may be attributed to that software system.

## 2022-08-01 NOTE — ED NOTES
"Pt with headache x 3 days, taking BC powder.  Sister states that the pt woke up this morning confused.  She states that she called the pt at 3pm today and the pt "doesn't seem right".  She is just not making sense.      LOC: The patient is awake, alert and aware of environment.  + slurred speech, laughing inappropriately at times.  APPEARANCE: Patient resting comfortably and in no acute distress, patient is clean and well groomed, patient's clothing is properly fastened.  SKIN: The skin is warm and dry, color consistent with ethnicity, patient has normal skin turgor and moist mucus membranes, skin intact, no breakdown or bruising noted.  MUSCULOSKELETAL: Patient moving all extremities spontaneously, no obvious swelling or deformities noted.  RESPIRATORY: Airway is open and patent, respirations are spontaneous, patient has a normal effort and rate, no accessory muscle use noted.  ABDOMEN: Soft and non tender to palpation, no distention noted.  "

## 2022-08-01 NOTE — ED NOTES
I-STAT Chem-8+ Results:   Value Reference Range   Sodium 135 136-145 mmol/L   Potassium  5.6 3.5-5.1 mmol/L   Chloride 93  mmol/L   Ionized Calcium 0.94 1.06-1.42 mmol/L   CO2 (measured) 38 23-29 mmol/L   Glucose 87  mg/dL   BUN 26 6-30 mg/dL   Creatinine 1.0 0.5-1.4 mg/dL   Hematocrit 41 36-54%

## 2022-08-01 NOTE — TELEPHONE ENCOUNTER
----- Message from Ed Bailey sent at 8/1/2022 12:41 PM CDT -----  Contact: pt [phone]  Patient is returning a phone call.  Who left a message for the patient: Vale  Does patient know what this is regarding:  appt.  Would you like a call back, or a response through your [facility] portal?:  call  Comments: patient would like to know if she needs blood work before appt?

## 2022-08-02 PROBLEM — F41.9 ANXIETY: Chronic | Status: ACTIVE | Noted: 2022-03-08

## 2022-08-02 PROBLEM — I50.33 ACUTE ON CHRONIC DIASTOLIC CONGESTIVE HEART FAILURE: Status: ACTIVE | Noted: 2022-08-02

## 2022-08-02 PROBLEM — F32.A DEPRESSION: Chronic | Status: ACTIVE | Noted: 2019-08-29

## 2022-08-02 PROBLEM — F33.42 RECURRENT MAJOR DEPRESSIVE DISORDER, IN FULL REMISSION: Status: ACTIVE | Noted: 2019-08-29

## 2022-08-02 PROBLEM — R74.01 TRANSAMINITIS: Status: ACTIVE | Noted: 2022-08-02

## 2022-08-02 PROBLEM — I10 ESSENTIAL HYPERTENSION: Chronic | Status: ACTIVE | Noted: 2019-08-29

## 2022-08-02 PROBLEM — F10.29 ALCOHOL DEPENDENCE WITH UNSPECIFIED ALCOHOL-INDUCED DISORDER: Chronic | Status: ACTIVE | Noted: 2019-08-29

## 2022-08-02 PROBLEM — J96.01 ACUTE HYPOXEMIC RESPIRATORY FAILURE: Status: ACTIVE | Noted: 2022-08-02

## 2022-08-02 PROBLEM — M54.17 LUMBOSACRAL RADICULOPATHY: Chronic | Status: ACTIVE | Noted: 2020-07-28

## 2022-08-02 PROBLEM — H81.10 BENIGN PAROXYSMAL POSITIONAL VERTIGO: Status: RESOLVED | Noted: 2020-01-17 | Resolved: 2022-08-02

## 2022-08-02 PROBLEM — J96.22 ACUTE ON CHRONIC RESPIRATORY FAILURE WITH HYPERCAPNIA: Status: ACTIVE | Noted: 2022-08-02

## 2022-08-02 PROBLEM — J18.9 COMMUNITY ACQUIRED PNEUMONIA OF RIGHT LOWER LOBE OF LUNG: Status: ACTIVE | Noted: 2022-08-02

## 2022-08-02 PROBLEM — G89.4 CHRONIC PAIN DISORDER: Chronic | Status: ACTIVE | Noted: 2020-07-28

## 2022-08-02 PROBLEM — R82.71 ASYMPTOMATIC BACTERIURIA: Status: ACTIVE | Noted: 2022-08-02

## 2022-08-02 PROBLEM — G93.41 ENCEPHALOPATHY, METABOLIC: Status: ACTIVE | Noted: 2022-08-02

## 2022-08-02 PROBLEM — F10.232 ALCOHOL DEPENDENCE WITH WITHDRAWAL WITH PERCEPTUAL DISTURBANCE: Status: ACTIVE | Noted: 2019-08-29

## 2022-08-02 LAB
ALBUMIN SERPL BCP-MCNC: 2.9 G/DL (ref 3.5–5.2)
ALP SERPL-CCNC: 221 U/L (ref 55–135)
ALT SERPL W/O P-5'-P-CCNC: 93 U/L (ref 10–44)
AMMONIA PLAS-SCNC: 31 UMOL/L (ref 10–50)
ANION GAP SERPL CALC-SCNC: 12 MMOL/L (ref 8–16)
ASCENDING AORTA: 3.6 CM
AST SERPL-CCNC: 130 U/L (ref 10–40)
AV INDEX (PROSTH): 0.89
AV MEAN GRADIENT: 7 MMHG
AV PEAK GRADIENT: 9 MMHG
AV VALVE AREA: 2.85 CM2
AV VELOCITY RATIO: 0.99
BACTERIA #/AREA URNS AUTO: ABNORMAL /HPF
BASOPHILS # BLD AUTO: 0.05 K/UL (ref 0–0.2)
BASOPHILS NFR BLD: 0.7 % (ref 0–1.9)
BILIRUB SERPL-MCNC: 0.3 MG/DL (ref 0.1–1)
BILIRUB UR QL STRIP: NEGATIVE
BSA FOR ECHO PROCEDURE: 2.02 M2
BUN SERPL-MCNC: 13 MG/DL (ref 8–23)
CALCIUM SERPL-MCNC: 8.7 MG/DL (ref 8.7–10.5)
CHLORIDE SERPL-SCNC: 92 MMOL/L (ref 95–110)
CLARITY UR REFRACT.AUTO: ABNORMAL
CO2 SERPL-SCNC: 35 MMOL/L (ref 23–29)
COLOR UR AUTO: YELLOW
CREAT SERPL-MCNC: 0.8 MG/DL (ref 0.5–1.4)
CV ECHO LV RWT: 0.46 CM
DIFFERENTIAL METHOD: ABNORMAL
DOP CALC AO PEAK VEL: 1.46 M/S
DOP CALC AO VTI: 36.34 CM
DOP CALC LVOT AREA: 3.2 CM2
DOP CALC LVOT DIAMETER: 2.02 CM
DOP CALC LVOT PEAK VEL: 1.44 M/S
DOP CALC LVOT STROKE VOLUME: 103.52 CM3
DOP CALCLVOT PEAK VEL VTI: 32.32 CM
E WAVE DECELERATION TIME: 285.01 MSEC
E/A RATIO: 0.76
E/E' RATIO: 11.33 M/S
ECHO LV POSTERIOR WALL: 1.17 CM (ref 0.6–1.1)
EJECTION FRACTION: 60 %
EOSINOPHIL # BLD AUTO: 0.1 K/UL (ref 0–0.5)
EOSINOPHIL NFR BLD: 0.8 % (ref 0–8)
ERYTHROCYTE [DISTWIDTH] IN BLOOD BY AUTOMATED COUNT: 17.2 % (ref 11.5–14.5)
EST. GFR  (NO RACE VARIABLE): >60 ML/MIN/1.73 M^2
FRACTIONAL SHORTENING: 28 % (ref 28–44)
GLUCOSE SERPL-MCNC: 98 MG/DL (ref 70–110)
GLUCOSE UR QL STRIP: NEGATIVE
HCT VFR BLD AUTO: 37.3 % (ref 37–48.5)
HGB BLD-MCNC: 10.3 G/DL (ref 12–16)
HGB UR QL STRIP: NEGATIVE
HYALINE CASTS UR QL AUTO: 3 /LPF
IMM GRANULOCYTES # BLD AUTO: 0.06 K/UL (ref 0–0.04)
IMM GRANULOCYTES NFR BLD AUTO: 0.8 % (ref 0–0.5)
INR PPP: 1.1 (ref 0.8–1.2)
INTERVENTRICULAR SEPTUM: 1.05 CM (ref 0.6–1.1)
KETONES UR QL STRIP: ABNORMAL
LA MAJOR: 6.55 CM
LA MINOR: 5.26 CM
LA WIDTH: 3.83 CM
LEFT ATRIUM SIZE: 4.08 CM
LEFT ATRIUM VOLUME INDEX MOD: 38.8 ML/M2
LEFT ATRIUM VOLUME INDEX: 39.5 ML/M2
LEFT ATRIUM VOLUME MOD: 76 CM3
LEFT ATRIUM VOLUME: 77.5 CM3
LEFT INTERNAL DIMENSION IN SYSTOLE: 3.66 CM (ref 2.1–4)
LEFT VENTRICLE DIASTOLIC VOLUME INDEX: 63.18 ML/M2
LEFT VENTRICLE DIASTOLIC VOLUME: 123.83 ML
LEFT VENTRICLE MASS INDEX: 110 G/M2
LEFT VENTRICLE SYSTOLIC VOLUME INDEX: 28.9 ML/M2
LEFT VENTRICLE SYSTOLIC VOLUME: 56.66 ML
LEFT VENTRICULAR INTERNAL DIMENSION IN DIASTOLE: 5.1 CM (ref 3.5–6)
LEFT VENTRICULAR MASS: 216.56 G
LEUKOCYTE ESTERASE UR QL STRIP: ABNORMAL
LV LATERAL E/E' RATIO: 8.5 M/S
LV SEPTAL E/E' RATIO: 17 M/S
LYMPHOCYTES # BLD AUTO: 0.6 K/UL (ref 1–4.8)
LYMPHOCYTES NFR BLD: 8.3 % (ref 18–48)
MAGNESIUM SERPL-MCNC: 1.6 MG/DL (ref 1.6–2.6)
MCH RBC QN AUTO: 23.6 PG (ref 27–31)
MCHC RBC AUTO-ENTMCNC: 27.6 G/DL (ref 32–36)
MCV RBC AUTO: 85 FL (ref 82–98)
MICROSCOPIC COMMENT: ABNORMAL
MONOCYTES # BLD AUTO: 0.3 K/UL (ref 0.3–1)
MONOCYTES NFR BLD: 4.3 % (ref 4–15)
MV PEAK A VEL: 1.12 M/S
MV PEAK E VEL: 0.85 M/S
MV STENOSIS PRESSURE HALF TIME: 82.65 MS
MV VALVE AREA P 1/2 METHOD: 2.66 CM2
NEUTROPHILS # BLD AUTO: 6.1 K/UL (ref 1.8–7.7)
NEUTROPHILS NFR BLD: 85.1 % (ref 38–73)
NITRITE UR QL STRIP: NEGATIVE
NRBC BLD-RTO: 1 /100 WBC
PH UR STRIP: 5 [PH] (ref 5–8)
PHOSPHATE SERPL-MCNC: 2.5 MG/DL (ref 2.7–4.5)
PISA TR MAX VEL: 2.96 M/S
PLATELET # BLD AUTO: 330 K/UL (ref 150–450)
PMV BLD AUTO: 10.4 FL (ref 9.2–12.9)
POTASSIUM SERPL-SCNC: 4.3 MMOL/L (ref 3.5–5.1)
PROCALCITONIN SERPL IA-MCNC: 0.1 NG/ML
PROT SERPL-MCNC: 6.3 G/DL (ref 6–8.4)
PROT UR QL STRIP: NEGATIVE
PROTHROMBIN TIME: 11.2 SEC (ref 9–12.5)
RA MAJOR: 5.04 CM
RA PRESSURE: 8 MMHG
RA WIDTH: 3.66 CM
RBC # BLD AUTO: 4.37 M/UL (ref 4–5.4)
RBC #/AREA URNS AUTO: 1 /HPF (ref 0–4)
RIGHT ATRIAL AREA: 16.5 CM2
RIGHT VENTRICULAR END-DIASTOLIC DIMENSION: 3.38 CM
SINUS: 3.18 CM
SODIUM SERPL-SCNC: 139 MMOL/L (ref 136–145)
SP GR UR STRIP: >=1.03 (ref 1–1.03)
SQUAMOUS #/AREA URNS AUTO: 7 /HPF
STJ: 3.42 CM
TDI LATERAL: 0.1 M/S
TDI SEPTAL: 0.05 M/S
TDI: 0.08 M/S
TR MAX PG: 35 MMHG
TRICUSPID ANNULAR PLANE SYSTOLIC EXCURSION: 1.65 CM
TROPONIN I SERPL DL<=0.01 NG/ML-MCNC: 0.02 NG/ML (ref 0–0.03)
TV REST PULMONARY ARTERY PRESSURE: 43 MMHG
URN SPEC COLLECT METH UR: ABNORMAL
WBC # BLD AUTO: 7.15 K/UL (ref 3.9–12.7)
WBC #/AREA URNS AUTO: 5 /HPF (ref 0–5)
YEAST UR QL AUTO: ABNORMAL

## 2022-08-02 PROCEDURE — 36415 COLL VENOUS BLD VENIPUNCTURE: CPT | Performed by: HOSPITALIST

## 2022-08-02 PROCEDURE — 80053 COMPREHEN METABOLIC PANEL: CPT | Performed by: HOSPITALIST

## 2022-08-02 PROCEDURE — 63600175 PHARM REV CODE 636 W HCPCS: Performed by: HOSPITALIST

## 2022-08-02 PROCEDURE — 27000221 HC OXYGEN, UP TO 24 HOURS

## 2022-08-02 PROCEDURE — 25000003 PHARM REV CODE 250: Performed by: INTERNAL MEDICINE

## 2022-08-02 PROCEDURE — 84145 PROCALCITONIN (PCT): CPT | Performed by: HOSPITALIST

## 2022-08-02 PROCEDURE — 25000003 PHARM REV CODE 250: Performed by: HOSPITALIST

## 2022-08-02 PROCEDURE — 84100 ASSAY OF PHOSPHORUS: CPT | Performed by: HOSPITALIST

## 2022-08-02 PROCEDURE — 83735 ASSAY OF MAGNESIUM: CPT | Performed by: HOSPITALIST

## 2022-08-02 PROCEDURE — 20600001 HC STEP DOWN PRIVATE ROOM

## 2022-08-02 PROCEDURE — 94799 UNLISTED PULMONARY SVC/PX: CPT

## 2022-08-02 PROCEDURE — 85610 PROTHROMBIN TIME: CPT | Performed by: HOSPITALIST

## 2022-08-02 PROCEDURE — 82140 ASSAY OF AMMONIA: CPT | Performed by: HOSPITALIST

## 2022-08-02 PROCEDURE — 85025 COMPLETE CBC W/AUTO DIFF WBC: CPT | Performed by: HOSPITALIST

## 2022-08-02 PROCEDURE — 99900035 HC TECH TIME PER 15 MIN (STAT)

## 2022-08-02 PROCEDURE — 99223 PR INITIAL HOSPITAL CARE,LEVL III: ICD-10-PCS | Mod: ,,, | Performed by: HOSPITALIST

## 2022-08-02 PROCEDURE — 94761 N-INVAS EAR/PLS OXIMETRY MLT: CPT

## 2022-08-02 PROCEDURE — 84484 ASSAY OF TROPONIN QUANT: CPT | Performed by: HOSPITALIST

## 2022-08-02 PROCEDURE — 63600175 PHARM REV CODE 636 W HCPCS: Performed by: INTERNAL MEDICINE

## 2022-08-02 PROCEDURE — 99223 1ST HOSP IP/OBS HIGH 75: CPT | Mod: ,,, | Performed by: HOSPITALIST

## 2022-08-02 RX ORDER — ONDANSETRON 4 MG/1
4 TABLET, ORALLY DISINTEGRATING ORAL EVERY 8 HOURS PRN
Status: DISCONTINUED | OUTPATIENT
Start: 2022-08-02 | End: 2022-08-23 | Stop reason: HOSPADM

## 2022-08-02 RX ORDER — LORAZEPAM 0.5 MG/1
2 TABLET ORAL EVERY 4 HOURS PRN
Status: DISCONTINUED | OUTPATIENT
Start: 2022-08-02 | End: 2022-08-07

## 2022-08-02 RX ORDER — TALC
6 POWDER (GRAM) TOPICAL NIGHTLY PRN
Status: DISCONTINUED | OUTPATIENT
Start: 2022-08-02 | End: 2022-08-23 | Stop reason: HOSPADM

## 2022-08-02 RX ORDER — MIRTAZAPINE 7.5 MG/1
7.5 TABLET, FILM COATED ORAL NIGHTLY
Status: DISCONTINUED | OUTPATIENT
Start: 2022-08-02 | End: 2022-08-23 | Stop reason: HOSPADM

## 2022-08-02 RX ORDER — BUSPIRONE HYDROCHLORIDE 5 MG/1
5 TABLET ORAL 2 TIMES DAILY PRN
Status: DISCONTINUED | OUTPATIENT
Start: 2022-08-02 | End: 2022-08-23 | Stop reason: HOSPADM

## 2022-08-02 RX ORDER — DIAZEPAM 5 MG/1
5 TABLET ORAL EVERY 8 HOURS
Status: DISCONTINUED | OUTPATIENT
Start: 2022-08-02 | End: 2022-08-05

## 2022-08-02 RX ORDER — VANCOMYCIN HCL IN 5 % DEXTROSE 1G/250ML
1000 PLASTIC BAG, INJECTION (ML) INTRAVENOUS
Status: DISCONTINUED | OUTPATIENT
Start: 2022-08-02 | End: 2022-08-03

## 2022-08-02 RX ORDER — OXYCODONE AND ACETAMINOPHEN 5; 325 MG/1; MG/1
1 TABLET ORAL EVERY 6 HOURS PRN
Status: DISCONTINUED | OUTPATIENT
Start: 2022-08-02 | End: 2022-08-07

## 2022-08-02 RX ORDER — TIZANIDINE 4 MG/1
4 TABLET ORAL EVERY 6 HOURS PRN
Status: DISCONTINUED | OUTPATIENT
Start: 2022-08-02 | End: 2022-08-03

## 2022-08-02 RX ORDER — SODIUM CHLORIDE 0.9 % (FLUSH) 0.9 %
10 SYRINGE (ML) INJECTION
Status: DISCONTINUED | OUTPATIENT
Start: 2022-08-02 | End: 2022-08-23 | Stop reason: HOSPADM

## 2022-08-02 RX ORDER — METOPROLOL TARTRATE 50 MG/1
50 TABLET ORAL DAILY
Status: DISCONTINUED | OUTPATIENT
Start: 2022-08-02 | End: 2022-08-06

## 2022-08-02 RX ORDER — VENLAFAXINE HYDROCHLORIDE 75 MG/1
225 CAPSULE, EXTENDED RELEASE ORAL DAILY
Status: DISCONTINUED | OUTPATIENT
Start: 2022-08-02 | End: 2022-08-23 | Stop reason: HOSPADM

## 2022-08-02 RX ORDER — HYDRALAZINE HYDROCHLORIDE 50 MG/1
50 TABLET, FILM COATED ORAL EVERY 8 HOURS PRN
Status: DISCONTINUED | OUTPATIENT
Start: 2022-08-02 | End: 2022-08-07

## 2022-08-02 RX ORDER — THIAMINE HCL 100 MG
100 TABLET ORAL DAILY
Status: DISCONTINUED | OUTPATIENT
Start: 2022-08-02 | End: 2022-08-23 | Stop reason: HOSPADM

## 2022-08-02 RX ORDER — ACETAMINOPHEN 325 MG/1
650 TABLET ORAL EVERY 8 HOURS PRN
Status: DISCONTINUED | OUTPATIENT
Start: 2022-08-02 | End: 2022-08-23 | Stop reason: HOSPADM

## 2022-08-02 RX ORDER — AMOXICILLIN 250 MG
1 CAPSULE ORAL 2 TIMES DAILY
Status: DISCONTINUED | OUTPATIENT
Start: 2022-08-02 | End: 2022-08-12

## 2022-08-02 RX ORDER — FUROSEMIDE 10 MG/ML
40 INJECTION INTRAMUSCULAR; INTRAVENOUS
Status: DISCONTINUED | OUTPATIENT
Start: 2022-08-02 | End: 2022-08-04

## 2022-08-02 RX ADMIN — TIZANIDINE 4 MG: 4 TABLET ORAL at 04:08

## 2022-08-02 RX ADMIN — LORAZEPAM 2 MG: 0.5 TABLET ORAL at 06:08

## 2022-08-02 RX ADMIN — Medication 6 MG: at 09:08

## 2022-08-02 RX ADMIN — VANCOMYCIN HYDROCHLORIDE 1000 MG: 1 INJECTION, POWDER, LYOPHILIZED, FOR SOLUTION INTRAVENOUS at 10:08

## 2022-08-02 RX ADMIN — FUROSEMIDE 40 MG: 10 INJECTION, SOLUTION INTRAMUSCULAR; INTRAVENOUS at 08:08

## 2022-08-02 RX ADMIN — DIAZEPAM 5 MG: 5 TABLET ORAL at 09:08

## 2022-08-02 RX ADMIN — MIRTAZAPINE 7.5 MG: 7.5 TABLET, FILM COATED ORAL at 09:08

## 2022-08-02 RX ADMIN — VENLAFAXINE HYDROCHLORIDE 225 MG: 75 CAPSULE, EXTENDED RELEASE ORAL at 09:08

## 2022-08-02 RX ADMIN — PIPERACILLIN SODIUM AND TAZOBACTAM SODIUM 4.5 G: 4; .5 INJECTION, POWDER, LYOPHILIZED, FOR SOLUTION INTRAVENOUS at 04:08

## 2022-08-02 RX ADMIN — OXYCODONE HYDROCHLORIDE AND ACETAMINOPHEN 1 TABLET: 5; 325 TABLET ORAL at 04:08

## 2022-08-02 RX ADMIN — DIAZEPAM 5 MG: 5 TABLET ORAL at 04:08

## 2022-08-02 RX ADMIN — PIPERACILLIN SODIUM AND TAZOBACTAM SODIUM 4.5 G: 4; .5 INJECTION, POWDER, LYOPHILIZED, FOR SOLUTION INTRAVENOUS at 12:08

## 2022-08-02 RX ADMIN — SENNOSIDES AND DOCUSATE SODIUM 1 TABLET: 50; 8.6 TABLET ORAL at 09:08

## 2022-08-02 RX ADMIN — THIAMINE HCL TAB 100 MG 100 MG: 100 TAB at 09:08

## 2022-08-02 RX ADMIN — ACETAMINOPHEN 650 MG: 325 TABLET ORAL at 04:08

## 2022-08-02 RX ADMIN — PIPERACILLIN SODIUM AND TAZOBACTAM SODIUM 4.5 G: 4; .5 INJECTION, POWDER, LYOPHILIZED, FOR SOLUTION INTRAVENOUS at 08:08

## 2022-08-02 RX ADMIN — METOPROLOL TARTRATE 50 MG: 50 TABLET, FILM COATED ORAL at 09:08

## 2022-08-02 NOTE — ASSESSMENT & PLAN NOTE
Appears to be resolving on my evaluation. CTH unremarkable. Patient appears disinhibited, transiently disoriented but easily redirectable and, though tangential, answering questions appropriately. Suspect intoxication (endorsed alcohol use in the setting of opiates and muscle relaxants) vs metabolic/infectious (possible aspiration pneumonia vs UTI). Checking ammonia, continuing antibiotics. Delirium precautions.

## 2022-08-02 NOTE — ASSESSMENT & PLAN NOTE
Community acquired pneumonia of right lower lobe of lung  Presented with acute hypercapnic and hypoxic respiratory failure, with lethargy and encephalopathy. She is not on home oxygen. COVID -ve.  CTA showing RLL consolidation with bronchogram concerning for possible CAP vs Aspiration PNA. Empiric abx initiated in ED with Zosyn/Vanc pending culture data. Blood, respiratory cultures pending. Procal ordered.   Supplemental O2 initiated and escalated to HF to maintain sats. Encouraging ambulation, OOB for all meals, incentive spirometry use.   Noted elevation in BNP and troponin; plan to trend and check 2D echo in am.

## 2022-08-02 NOTE — ED NOTES
Received bedside report from FRANCIS, RN  Pt AAOx2, resting comfortably in bed, NAD, respirations E/UL, updated on POC, wheels locked and in low position, call bell with in reach, Comfort positioning and restroom needs were addressed. Necessary items were placed with in reach and was advised when a reassessment would take place.

## 2022-08-02 NOTE — ED PROVIDER NOTES
Encounter Date: 8/1/2022       History     Chief Complaint   Patient presents with    Shortness of Breath     Placed on 3l nc in triage, states oxygen has been running low , but now more sob     Patient is a 61-year-old female with a past medical history of hypertension, hyperlipidemia, anxiety, depression and prior alcohol use disorder presenting to the emergency department for altered mental status and hypoxia x2 days.  The patient is accompanied by her sister, who provides most of the history.  The sister states that the patient had an isolated complaint of shortness of breath 2 days ago.  To sister's knowledge, the patient has not had any fevers, chills, cough, nausea/vomiting. Not on home o2. No history of prior pulmonary or cardiac disease.        Review of patient's allergies indicates:  No Known Allergies  Past Medical History:   Diagnosis Date    Alcohol abuse     Anxiety     Benign paroxysmal positional vertigo 1/17/2020    Depression     Hyperlipidemia     Hypertension      Past Surgical History:   Procedure Laterality Date    BREAST CYST ASPIRATION      CHOLECYSTECTOMY      complicated with bile leak, sepsis    COLONOSCOPY N/A 6/3/2020    Procedure: COLONOSCOPY Golytely;  Surgeon: Tino Neil MD;  Location: [facility];  Service: Colon and Rectal;  Laterality: N/A;    EPIDURAL STEROID INJECTION N/A 5/27/2022    Procedure: INJECTION, STEROID, EPIDURAL, CAUDAL CONTRAST;  Surgeon: Rubén Rico MD;  Location[facility];  Service: Pain Management;  Laterality: N/A;  5/6 RESCHEDULE    ESOPHAGOGASTRODUODENOSCOPY N/A 6/3/2020    Procedure: EGD (ESOPHAGOGASTRODUODENOSCOPY);  Surgeon: Tino Neil MD;  Location: [facility];  Service: Colon and Rectal;  Laterality: N/A;    INJECTION OF ANESTHETIC AGENT AROUND NERVE Bilateral 3/24/2022    Procedure: BLOCK, NERVE MEDIAL BRANCH BLOCK BL L2, L3, L4 and L5  1st, needs consent;  Surgeon: Rubén Rico MD;  Location: [facility] T;  Service:  Pain Management;  Laterality: Bilateral;    TRANSFORAMINAL EPIDURAL INJECTION OF STEROID Bilateral 8/7/2020    Procedure: INJECTION, STEROID, EPIDURAL, TRANSFORAMINAL APPROACH, L4-L5 need consent;  Surgeon: Rubén Rico MD;  Location: [facility] PAIN MGT;  Service: Pain Management;  Laterality: Bilateral;    TRANSFORAMINAL EPIDURAL INJECTION OF STEROID Bilateral 9/4/2020    Procedure: LUMBAR TRANSFORAMINAL BILATERAL L4/5 DIRECT REFERRAL;  Surgeon: Rubén Rico MD;  Location: [facility] PAIN MGT;  Service: Pain Management;  Laterality: Bilateral;  NEEDS CONSENT    TRANSFORAMINAL EPIDURAL INJECTION OF STEROID Bilateral 3/4/2022    Procedure: Injection,steroid,epidural,transforaminal approach BILATERAL L3/4 DIRECT REFERRAL;  Surgeon: Rubén Rico MD;  Location: [facility] PAIN MGT;  Service: Pain Management;  Laterality: Bilateral;     Family History   Problem Relation Age of Onset    Atrial fibrillation Mother     Heart failure Mother     Arthritis Mother     Macular degeneration Mother     Stroke Father     Cancer Maternal Uncle         lung    Cataracts Paternal Grandfather     Glaucoma Paternal Grandfather      Social History     Tobacco Use    Smoking status: Former Smoker    Smokeless tobacco: Never Used   Substance Use Topics    Alcohol use: Yes     Alcohol/week: 12.0 standard drinks     Types: 12 Shots of liquor per week     Comment: three 12-14 oz cocktail drinks.     Drug use: No     Review of Systems   Unable to perform ROS: Mental status change       Physical Exam     Initial Vitals [08/01/22 1523]   BP Pulse Resp Temp SpO2   110/70 74 (!) 24 98.3 °F (36.8 °C) (S) (!) 82 %      MAP       --         Physical Exam    Nursing note and vitals reviewed.  Constitutional: She appears well-developed. She is not diaphoretic. She is Obese . No distress. Nasal cannula in place.   Alert, but evidently altered.  Speaking full sentences.  No acute distress.   HENT:   Head: Normocephalic and atraumatic.   Right  Ear: External ear normal.   Left Ear: External ear normal.   Neck: Neck supple.   Cardiovascular: Normal rate, regular rhythm, normal heart sounds and intact distal pulses.   Pulmonary/Chest: No respiratory distress. She has no wheezes. She has no rhonchi. She has rales.   Diffuse crackles.   Abdominal: Abdomen is soft. She exhibits no distension. There is no abdominal tenderness. There is no rebound and no guarding.   Musculoskeletal:      Cervical back: Neck supple.     Neurological: She is alert. She is disoriented. GCS eye subscore is 4. GCS verbal subscore is 4. GCS motor subscore is 6.   Moving all extremities spontaneously. Does respond to questions inappropriately, but is cooperative.   Skin: Skin is warm. Capillary refill takes less than 2 seconds. No rash noted.   Psychiatric: She has a normal mood and affect.         ED Course   Procedures  Labs Reviewed   CBC W/ AUTO DIFFERENTIAL - Abnormal; Notable for the following components:       Result Value    Hemoglobin 11.6 (*)     MCH 24.9 (*)     MCHC 29.8 (*)     RDW 18.2 (*)     nRBC 1 (*)     All other components within normal limits   COMPREHENSIVE METABOLIC PANEL - Abnormal; Notable for the following components:    Chloride 92 (*)     CO2 32 (*)     Albumin 3.1 (*)     Alkaline Phosphatase 212 (*)      (*)      (*)     All other components within normal limits   TROPONIN I - Abnormal; Notable for the following components:    Troponin I 0.027 (*)     All other components within normal limits   URINALYSIS, REFLEX TO URINE CULTURE - Abnormal; Notable for the following components:    Appearance, UA Hazy (*)     Specific Gravity, UA >=1.030 (*)     Ketones, UA Trace (*)     Leukocytes, UA 2+ (*)     All other components within normal limits    Narrative:     Specimen Source->Urine   B-TYPE NATRIURETIC PEPTIDE - Abnormal; Notable for the following components:     (*)     All other components within normal limits   URINALYSIS MICROSCOPIC -  Abnormal; Notable for the following components:    Bacteria Many (*)     Yeast, UA Occasional (*)     Hyaline Casts, UA 3 (*)     All other components within normal limits    Narrative:     Specimen Source->Urine   ISTAT PROCEDURE - Abnormal; Notable for the following components:    POC PH 7.343 (*)     POC PCO2 82.7 (*)     POC PO2 19 (*)     POC HCO3 44.9 (*)     POC SATURATED O2 24 (*)     POC TCO2 47 (*)     All other components within normal limits   ISTAT PROCEDURE - Abnormal; Notable for the following components:    POC Sodium 135 (*)     POC Potassium 5.6 (*)     POC Chloride 93 (*)     POC TCO2 (MEASURED) 38 (*)     POC Ionized Calcium 0.94 (*)     All other components within normal limits   CULTURE, BLOOD   CULTURE, BLOOD   CULTURE, RESPIRATORY   AMMONIA   LACTIC ACID, PLASMA   SARS-COV-2 RNA AMPLIFICATION, QUAL   ALCOHOL,MEDICAL (ETHANOL)   ALCOHOL,MEDICAL (ETHANOL)   HIV 1 / 2 ANTIBODY   HEPATITIS C ANTIBODY   POCT GLUCOSE MONITORING CONTINUOUS   ISTAT CHEM8     EKG Readings: (Independently Interpreted)   Initial Reading: No STEMI. Previous EKG: Compared with most recent EKG Previous EKG Date: September 2020. Rhythm: Normal Sinus Rhythm. Heart Rate: 74. Ectopy: No Ectopy. Conduction: Normal.   Anterior T-wave inversions present on prior EKG from 2020     ECG Results          EKG 12-lead (Final result)  Result time 08/01/22 16:46:35    Final result by Interface, Lab In [facility] (08/01/22 16:46:35)                 Narrative:    Test Reason : R06.02,    Vent. Rate : 074 BPM     Atrial Rate : 074 BPM     P-R Int : 126 ms          QRS Dur : 096 ms      QT Int : 430 ms       P-R-T Axes : 042 -32 -06 degrees     QTc Int : 477 ms    Normal sinus rhythm  Left axis deviation  ST and T wave abnormality, consider anterior ischemia  Prolonged QT  Abnormal ECG  When compared with ECG of 15-SEP-2020 12:03,  Incomplete right bundle branch block is no longer Present  Confirmed by CARMENCITA GUSMAN MD ([id number]) on 8/1/2022  4:46:27 PM    Referred By: [email or web address]   SELF           Confirmed By:CARMENCITA GUSMAN MD                            Imaging Results           CTA Chest Non-Coronary (PE Study) (Final result)  Result time 08/01/22 23:43:02    Final result by Christian Wise MD (08/01/22 23:43:02)                 Impression:      1. No pulmonary embolism to the segmental level.  2. Opacification in the lingula and right lower lobe with air bronchograms representing possibly early consolidation from infectious process or atelectasis.  3. Multifocal regions of ground-glass opacifications bilaterally suggestive of edema, infectious or inflammatory process.  4. Elevation of the right hemidiaphragm, similar in comparison to oldest chest x-ray dating back to 02/11/2020.  5. Cardiomegaly with moderate pericardial effusion.  6. Fusiform aneurysmal dilation of the ascending aorta measuring up to 4.1 cm.  7. Enlargement of the main pulmonary artery, suggestive of pulmonary hypertension.  This report was flagged in Epic as abnormal.    Electronically signed by resident: Sybil Mora  Date:    08/01/2022  Time:    22:34    Electronically signed by: Christian Wise MD  Date:    08/01/2022  Time:    23:43             Narrative:    EXAMINATION:  CTA CHEST NON CORONARY    CLINICAL HISTORY:  Pulmonary embolism (PE) suspected, high prob;    TECHNIQUE:  Low dose axial images, sagittal and coronal reformations were obtained from the thoracic inlet to the lung bases following the IV administration of 100 mL of Omnipaque-350.  Scan technique was optimized to evaluate the pulmonary arteries.  MIP images were performed.    COMPARISON:  Chest x-ray 08/01/2022, 10/21/2021, and 02/11/2020.    FINDINGS:  Adequate timing of the contrast bolus.  No pulmonary artery filling defects to the segmental level.  Bilateral bandlike opacifications favored to represent scarring or atelectasis.  Opacification in the lingula and right lower lobe lung base with air  bronchograms representing either consolidation or atelectasis.  Multifocal regions of ground-glass opacifications suggestive of infectious or inflammatory process.  Elevation of the right hemidiaphragm.  Trace bilateral pleural effusion.  No pneumothorax.    Cardiomegaly with moderate pericardial effusion.  There is fusiform aneurysmal dilation of the ascending aorta measuring 4.1 cm (coronal series 601, image 117).  There is minimal calcific atherosclerosis of the aorta.  The main pulmonary artery is enlarged measuring 4.6 cm in diameter, suggestive of possible pulmonary hypertension.  No mediastinal, hilar or axillary lymphadenopathy.  The visualized thyroid gland is normal.    Limited views of the upper abdomen are normal.    Visualized bones and soft tissues are normal.                               CT Head Without Contrast (Final result)  Result time 08/01/22 21:31:23    Final result by Timothy Alvarado MD (08/01/22 21:31:23)                 Impression:      1. No acute intracranial process.  2. Involutional changes with chronic microvascular ischemic changes.  Small remote lacunar infarct of the left caudate head.      Electronically signed by: Timothy Alvarado  Date:    08/01/2022  Time:    21:31             Narrative:    EXAMINATION:  CT HEAD WITHOUT CONTRAST    CLINICAL HISTORY:  Mental status change, unknown cause;    TECHNIQUE:  Low dose axial CT images obtained throughout the head without intravenous contrast. Sagittal and coronal reconstructions were performed.    COMPARISON:  10/21/2021    FINDINGS:  Intracranial compartment:    Ventricles and sulci are normal in size for age without evidence of hydrocephalus. No extra-axial blood or fluid collections.    Moderate involutional changes and chronic microvascular ischemic changes in the periventricular white matter.  Small remote lacunar infarct of the left caudate head.    No parenchymal mass, hemorrhage, edema or major vascular distribution  infarct.    Skull/extracranial contents (limited evaluation): No fracture. Mastoid air cells and paranasal sinuses are essentially clear.                               X-Ray Chest AP Portable (Final result)  Result time 08/01/22 18:42:25    Final result by Timothy Alvarado MD (08/01/22 18:42:25)                 Impression:      See above comments.  Recommend follow-up.      Electronically signed by: Timothy Alvarado  Date:    08/01/2022  Time:    18:42             Narrative:    EXAMINATION:  XR CHEST AP PORTABLE    CLINICAL HISTORY:  sob;    TECHNIQUE:  Single frontal view of the chest was performed.    COMPARISON:  10/21/2021    FINDINGS:  Suboptimal inspiration may limit characterization.    Cardiac silhouette is borderline enlarged.    Mild perihilar interstitial changes may be associated with atelectasis or mild edema.  Mild bibasilar atelectasis.    Probable small bibasilar pleural effusions.  No evidence of pneumothorax.  No acute osseous abnormality.                              X-Rays:   Independently Interpreted Readings:   Chest X-Ray: Cardiomegaly present. Bilateral pleural effusion present.     Medications   busPIRone tablet 5 mg (has no administration in time range)   metoprolol tartrate (LOPRESSOR) tablet 50 mg (50 mg Oral Given 8/2/22 0931)   mirtazapine tablet 7.5 mg (has no administration in time range)   ondansetron disintegrating tablet 4 mg (has no administration in time range)   oxyCODONE-acetaminophen 5-325 mg per tablet 1 tablet (1 tablet Oral Given 8/2/22 0432)   thiamine tablet 100 mg (100 mg Oral Given 8/2/22 0930)   tiZANidine tablet 4 mg (4 mg Oral Given 8/2/22 1655)   venlafaxine 24 hr capsule 225 mg (225 mg Oral Given 8/2/22 0930)   sodium chloride 0.9% flush 10 mL (has no administration in time range)   melatonin tablet 6 mg (has no administration in time range)   acetaminophen tablet 650 mg (650 mg Oral Given 8/2/22 1655)   senna-docusate 8.6-50 mg per tablet 1 tablet (1 tablet Oral  Given 8/2/22 0930)   hydrALAZINE tablet 50 mg (has no administration in time range)   piperacillin-tazobactam 4.5 g in sodium chloride 0.9% 100 mL IVPB (ready to mix system) (0 g Intravenous Stopped 8/2/22 1627)   vancomycin - pharmacy to dose (has no administration in time range)   vancomycin in dextrose 5 % 1 gram/250 mL IVPB 1,000 mg ( Intravenous Trough Due As Scheduled Before Dose 8/3/22 0930)   diazePAM tablet 5 mg (5 mg Oral Given 8/2/22 1655)   LORazepam tablet 2 mg (has no administration in time range)   piperacillin-tazobactam 4.5 g in sodium chloride 0.9% 100 mL IVPB (ready to mix system) (0 g Intravenous Stopped 8/1/22 1915)   vancomycin 2 g in dextrose 5 % 500 mL IVPB (0 mg Intravenous Stopped 8/1/22 2207)   iohexoL (OMNIPAQUE 350) injection 100 mL (100 mLs Intravenous Given 8/1/22 2117)     Medical Decision Making:   History:   I obtained history from: someone other than patient.  Initial Assessment:   Emergent evaluation of altered mental status and hypoxia.  She is satting 55% on room air.  Is otherwise hemodynamically stable.  Differential Diagnosis:   Sepsis, ACS, bacterial vs Covid pneumonia, new onset heart failure, doubt PE, co2 retention/narcosis, alcohol intoxication, electrolyte abnormalities  Clinical Tests:   Lab Tests: Ordered and Reviewed  Radiological Study: Ordered and Reviewed  Medical Tests: Ordered and Reviewed  ED Management:  Chest x-ray demonstrates bilateral pleural effusions.  She was antibiosis for presumed pneumonia. Mildly elevated BNP, but does not appear overtly volume overloaded on exam. VBG shows compensated respiratory acidosis.  Pneumonia is more likely based on my physical exam, therefore avoid BiPAP at this time.  Given vanc and Zosyn.  She eventually required 10 L of high-flow nasal cannula. CT head negative for acute changes.  The patient was signed out to the oncoming team at shift change pending the final read of CTA PE study.  Anticipate admission to Hospital  Medicine.                      Clinical Impression:   Final diagnoses:  [R06.02] SOB (shortness of breath)  [R09.02] Hypoxia (Primary)  [R41.82] Altered mental status, unspecified altered mental status type          ED Disposition Condition    Admit               Jimmie Flores MD  Resident  08/01/22 [id number]       Sybil Shearer MD  08/02/22 [id number]

## 2022-08-02 NOTE — H&P
"Asad [facility]ak[facility] - Emergency Dept  [facility] Medicine  History & Physical    Patient Name: Vale Gutierrez  MRN: [id number]  Patient Class: IP- Inpatient  Admission Date: 8/1/2022  Attending Physician: No att. providers found   Primary Care Provider: Estela Mcdaniel MD         Patient information was obtained from patient and ER records.     Subjective:     Principal Problem:Acute hypoxemic respiratory failure    Chief Complaint:   Chief Complaint   Patient presents with    Shortness of Breath     Placed on 3l nc in triage, states oxygen has been running low , but now more sob        HPI: 61F w/ alcohol dependency/abuse, HTN on Toprol, HLD, depression/anxiety presenting to the ED with SOB and confusion. SOB and malaise onset a couple of days ago. Denies fevers, chills, rigors, chest pain, cough, productive sputum. Denies excessive drinking, drug use. No bowel irregularities, N/V. Denies orthopnea, LE swelling, KIRAN. Admits to having alcohol 2 days ago ("just one drink").     ED workup notable for elevated BP, normal WBC, elevation of AST//124, , trop .027, VBG w/ corrected pH of 7.4, CTA -ve for PE but notable for RLL consolidation w/ air bronchograms concerning for possible CAP v aspiration PNA and possible edema. She was placed on supplemental O2 and broad spectrum antibiotics and admitted to [facility].       Past Medical History:   Diagnosis Date    Alcohol abuse     Anxiety     Benign paroxysmal positional vertigo 1/17/2020    Depression     Hyperlipidemia     Hypertension        Past Surgical History:   Procedure Laterality Date    BREAST CYST ASPIRATION      CHOLECYSTECTOMY      complicated with bile leak, sepsis    COLONOSCOPY N/A 6/3/2020    Procedure: COLONOSCOPY Dale;  Surgeon: Tino Neil MD;  Location: [facility];  Service: Colon and Rectal;  Laterality: N/A;    EPIDURAL STEROID INJECTION N/A 5/27/2022    Procedure: INJECTION, STEROID, EPIDURAL, CAUDAL CONTRAST;  Surgeon: Rubén" MD Jacky;  Location: [facility] PAIN [facility]GT;  Service: Pain Management;  Laterality: N/A;  5/6 RESCHEDULE    ESOPHAGOGASTRODUODENOSCOPY N/A 6/3/2020    Procedure: EGD (ESOPHAGOGASTRODUODENOSCOPY);  Surgeon: Tino Neil MD;  Location: [facility] ENDO;  Service: Colon and Rectal;  Laterality: N/A;    INJECTION OF ANESTHETIC AGENT AROUND NERVE Bilateral 3/24/2022    Procedure: BLOCK, NERVE MEDIAL BRANCH BLOCK BL L2, L3, L4 and L5  1st, needs consent;  Surgeon: Rubén Rico MD;  Location: [facility]IN [facility];  Service: Pain Management;  Laterality: Bilateral;    TRANSFORAMINAL EPIDURAL INJECTION OF STEROID Bilateral 8/7/2020    Procedure: INJECTION, STEROID, EPIDURAL, TRANSFORAMINAL APPROACH, L4-L5 need consent;  Surgeon: Rubén Rico MD;  Location: [facility]IN [facility];  Service: Pain Management;  Laterality: Bilateral;    TRANSFORAMINAL EPIDURAL INJECTION OF STEROID Bilateral 9/4/2020    Procedure: LUMBAR TRANSFORAMINAL BILATERAL L4/5 DIRECT REFERRAL;  Surgeon: Rubén Rico MD;  Location: [facility]IN [facility];  Service: Pain Management;  Laterality: Bilateral;  NEEDS CONSENT    TRANSFORAMINAL EPIDURAL INJECTION OF STEROID Bilateral 3/4/2022    Procedure: Injection,steroid,epidural,transforaminal approach BILATERAL L3/4 DIRECT REFERRAL;  Surgeon: Rubén Rico MD;  Location: [facility];  Service: Pain Management;  Laterality: Bilateral;       Review of patient's allergies indicates:  No Known Allergies    Current Facility-Administered Medications on File Prior to Encounter   Medication    0.9%  NaCl infusion     Current Outpatient Medications on File Prior to Encounter   Medication Sig    aspirin/salicylamide/caffeine (BC HEADACHE POWDER ORAL) Take 1 Package by mouth once daily. Hold medication one week prior to surgery    busPIRone (BUSPAR) 5 MG Tab Take 1 tablet (5 mg total) by mouth 2 (two) times daily as needed (anxiety).    dicyclomine (BENTYL) 10 MG capsule Take 1 capsule (10 mg total) by mouth before meals as  needed.    magnesium 30 mg Tab Take by mouth once.    metoprolol tartrate (LOPRESSOR) 50 MG tablet Take 1 tablet (50 mg total) by mouth once daily.    mirtazapine (REMERON) 7.5 MG Tab Take 1 tablet (7.5 mg total) by mouth every evening. For insomnia, mood    omeprazole (PRILOSEC) 20 MG capsule Take 1 capsule (20 mg total) by mouth 2 (two) times daily.    ondansetron (ZOFRAN-ODT) 4 MG TbDL Take 1 tablet (4 mg total) by mouth every 8 (eight) hours as needed.    oxyCODONE-acetaminophen (PERCOCET) 5-325 mg per tablet Take 1 tablet by mouth every 6 (six) hours as needed for Pain.    thiamine 100 MG tablet Take 100 mg by mouth once daily.    tiZANidine (ZANAFLEX) 4 MG tablet Take 1 tablet (4 mg total) by mouth every 6 (six) hours as needed (spasms).    venlafaxine (EFFEXOR-XR) 150 MG Cp24 Take 150 mg by mouth once daily. Take am of surgery    venlafaxine (EFFEXOR-XR) 75 MG 24 hr capsule Take 75 mg by mouth once daily. Take am of surgery    vitamin D (VITAMIN D3) 1000 units Tab Take 5,000 Int'l Units by mouth. Hold am of surgery    [DISCONTINUED] guaiFENesin (MUCINEX) 600 mg 12 hr tablet Take 1,200 mg by mouth 2 (two) times daily.    [DISCONTINUED] Saccharomyces boulardii (FLORASTOR) 250 mg capsule Take 250 mg by mouth 2 (two) times daily.     Family History       Problem Relation (Age of Onset)    Arthritis Mother    Atrial fibrillation Mother    Cancer Maternal Uncle    Cataracts Paternal Grandfather    Glaucoma Paternal Grandfather    Heart failure Mother    Macular degeneration Mother    Stroke Father          Tobacco Use    Smoking status: Former Smoker    Smokeless tobacco: Never Used   Substance and Sexual Activity    Alcohol use: Yes     Alcohol/week: 12.0 standard drinks     Types: 12 Shots of liquor per week     Comment: three 12-14 oz cocktail drinks.     Drug use: No    Sexual activity: Never     Review of Systems   Constitutional:  Positive for activity change and fatigue. Negative for  appetite change, chills and fever.   HENT:  Negative for trouble swallowing and voice change.    Eyes:  Negative for photophobia and visual disturbance.   Respiratory:  Positive for shortness of breath. Negative for apnea, cough, wheezing and stridor.    Cardiovascular:  Negative for chest pain, palpitations and leg swelling.   Gastrointestinal:  Negative for abdominal distention, constipation, diarrhea, nausea and vomiting.   Genitourinary:  Negative for dysuria, frequency and urgency.   Musculoskeletal:  Positive for arthralgias. Negative for neck pain and neck stiffness.   Skin:  Negative for color change, pallor, rash and wound.   Neurological:  Positive for weakness and headaches. Negative for dizziness, seizures, syncope, facial asymmetry, speech difficulty, light-headedness and numbness.   Psychiatric/Behavioral:  Positive for confusion. Negative for agitation and behavioral problems.    Objective:     Vital Signs (Most Recent):  Temp: 98.5 °F (36.9 °C) (08/01/22 1941)  Pulse: 78 (08/01/22 2226)  Resp: 18 (08/01/22 1919)  BP: (!) 146/88 (08/01/22 2156)  SpO2: 97 % (08/01/22 2226)   Vital Signs (24h Range):  Temp:  [98.3 °F (36.8 °C)-98.5 °F (36.9 °C)] 98.5 °F (36.9 °C)  Pulse:  [71-78] 78  Resp:  [18-24] 18  SpO2:  [82 %-99 %] 97 %  BP: (110-172)/(70-95) 146/88     Weight: 90.7 kg (200 lb)  Body mass index is 34.33 kg/m².    Physical Exam  Constitutional:       General: She is not in acute distress.     Appearance: She is obese.   HENT:      Head: Normocephalic and atraumatic.      Nose: Nose normal. No congestion.      Mouth/Throat:      Mouth: Mucous membranes are moist.      Pharynx: Oropharynx is clear. No oropharyngeal exudate.   Eyes:      Extraocular Movements: Extraocular movements intact.      Conjunctiva/sclera: Conjunctivae normal.      Pupils: Pupils are equal, round, and reactive to light.   Cardiovascular:      Rate and Rhythm: Normal rate and regular rhythm.      Pulses: Normal pulses.       Heart sounds: No murmur heard.  Pulmonary:      Effort: Pulmonary effort is normal.      Breath sounds: Decreased air movement (RLL) present.   Abdominal:      General: There is no distension.      Palpations: Abdomen is soft.      Tenderness: There is no abdominal tenderness.   Musculoskeletal:         General: No swelling or tenderness. Normal range of motion.      Cervical back: Normal range of motion and neck supple. No rigidity.   Skin:     General: Skin is warm and dry.      Coloration: Skin is not jaundiced.   Neurological:      General: No focal deficit present.      Mental Status: She is oriented to person, place, and time and easily aroused.   Psychiatric:         Speech: Speech is tangential.      Comments: disinhibited         CRANIAL NERVES     CN III, IV, VI   Pupils are equal, round, and reactive to light.     Significant Labs: All pertinent labs within the past 24 hours have been reviewed.  CBC:   Recent Labs   Lab 08/01/22  1746 08/01/22  1753   WBC 6.76  --    HGB 11.6*  --    HCT 38.9 41     --      CMP:   Recent Labs   Lab 08/01/22  1746      K 4.6   CL 92*   CO2 32*   GLU 79   BUN 20   CREATININE 0.9   CALCIUM 9.1   PROT 7.3   ALBUMIN 3.1*   BILITOT 0.3   ALKPHOS 212*   *   *   ANIONGAP 13     Troponin:   Recent Labs   Lab 08/01/22  1746   TROPONINI 0.027*     Urine Studies:   Recent Labs   Lab 08/01/22  2348   COLORU Yellow   APPEARANCEUA Hazy*   PHUR 5.0   SPECGRAV >=1.030*   PROTEINUA Negative   GLUCUA Negative   KETONESU Trace*   BILIRUBINUA Negative   OCCULTUA Negative   NITRITE Negative   LEUKOCYTESUR 2+*   RBCUA 1   WBCUA 5   BACTERIA Many*   SQUAMEPITHEL 7   HYALINECASTS 3*       Significant Imaging: I have reviewed all pertinent imaging results/findings within the past 24 hours.    Assessment/Plan:     * Acute hypoxemic respiratory failure  Community acquired pneumonia of right lower lobe of lung  Presented with acute hypercapnic and hypoxic respiratory  failure, with lethargy and encephalopathy. She is not on home oxygen. COVID -ve.  CTA showing RLL consolidation with bronchogram concerning for possible CAP vs Aspiration PNA. Empiric abx initiated in ED with Zosyn/Vanc pending culture data. Blood, respiratory cultures pending. Procal ordered.   Supplemental O2 initiated and escalated to HF to maintain sats. Encouraging ambulation, OOB for all meals, incentive spirometry use.   Noted elevation in BNP and troponin; plan to trend and check 2D echo in am.    Encephalopathy, metabolic  Appears to be resolving on my evaluation. CTH unremarkable. Patient appears disinhibited, transiently disoriented but easily redirectable and, though tangential, answering questions appropriately. Suspect intoxication (endorsed alcohol use in the setting of opiates and muscle relaxants) vs metabolic/infectious (possible aspiration pneumonia vs UTI). Checking ammonia, continuing antibiotics. Delirium precautions.     Asymptomatic bacteriuria  Not complaining of dysuria, but possible confounder given encephalopathy. In any case, should be covered by empiric antibiotics.     Anxiety  Depression  Chronic, stable. Reports compliance with medications. Continue buspar, mirtazipine.     Chronic pain disorder  Lumbosacral radiculopathy  Continue home percocet and zanaflex.    Essential hypertension  Elevated on presentation. Resume metoprolol. PRN hydralazine for SBP>180. Titrate to goal.    Alcohol dependence with unspecified alcohol-induced disorder  Transaminitis  Reports drinking 2 days ago. Denies symptoms or past history of withdrawals.   Monitoring CIWA q shift, with plans to initiate benzos for CIWA >8.   Ethanol level on presentation was negative.   Trend hepatic function; AST to ALT ratio consistent with recent alcohol intake. Check ammonia.      VTE Risk Mitigation (From admission, onward)         Ordered     IP VTE HIGH RISK PATIENT  Once         08/02/22 0055     Place sequential  compression device  Until discontinued         08/02/22 0055                   Pilar Woo MD  Department of Hospital Medicine   [facility] - Emergency Dept

## 2022-08-02 NOTE — HPI
"61F w/ alcohol dependency/abuse, HTN on Toprol, HLD, depression/anxiety presenting to the ED with SOB and confusion. SOB and malaise onset a couple of days ago. Denies fevers, chills, rigors, chest pain, cough, productive sputum. Denies excessive drinking, drug use. No bowel irregularities, N/V. Denies orthopnea, LE swelling, KIRAN. Admits to having alcohol 2 days ago ("just one drink").     ED workup notable for elevated BP, normal WBC, elevation of AST//124, , trop .027, VBG w/ corrected pH of 7.4, CTA -ve for PE but notable for RLL consolidation w/ air bronchograms concerning for possible CAP v aspiration PNA and possible edema. She was placed on supplemental O2 and broad spectrum antibiotics and admitted to [facility].   "

## 2022-08-02 NOTE — SUBJECTIVE & OBJECTIVE
Past Medical History:   Diagnosis Date    Alcohol abuse     Anxiety     Benign paroxysmal positional vertigo 1/17/2020    Depression     Hyperlipidemia     Hypertension        Past Surgical History:   Procedure Laterality Date    BREAST CYST ASPIRATION      CHOLECYSTECTOMY      complicated with bile leak, sepsis    COLONOSCOPY N/A 6/3/2020    Procedure: COLONOSCOPY [place];  Surgeon: Tino Neil MD;  Location: [facility];  Service: Colon and Rectal;  Laterality: N/A;    EPIDURAL STEROID INJECTION N/A 5/27/2022    Procedure: INJECTION, STEROID, EPIDURAL, CAUDAL CONTRAST;  Surgeon: Rubén Rico MD;  Location: [facility], [place], operated [facility]IN [facility]GT;  Service: Pain Management;  Laterality: N/A;  5/6 RESCHEDULE    ESOPHAGOGASTRODUODENOSCOPY N/A 6/3/2020    Procedure: EGD (ESOPHAGOGASTRODUODENOSCOPY);  Surgeon: Tino Neil MD;  Location: [facility];  Service: Colon and Rectal;  Laterality: N/A;    INJECTION OF ANESTHETIC AGENT AROUND NERVE Bilateral 3/24/2022    Procedure: BLOCK, NERVE MEDIAL BRANCH BLOCK BL L2, L3, L4 and L5  1st, needs consent;  Surgeon: Rubén Rico MD;  Location: [facility], [place], operated by [facility]IN [facility]GT;  Service: Pain Management;  Laterality: Bilateral;    TRANSFORAMINAL EPIDURAL INJECTION OF STEROID Bilateral 8/7/2020    Procedure: INJECTION, STEROID, EPIDURAL, TRANSFORAMINAL APPROACH, L4-L5 need consent;  Surgeon: Rubén Rico MD;  Location: [facility], [place], operated [facility]IN [facility];  Service: Pain Management;  Laterality: Bilateral;    TRANSFORAMINAL EPIDURAL INJECTION OF STEROID Bilateral 9/4/2020    Procedure: LUMBAR TRANSFORAMINAL BILATERAL L4/5 DIRECT REFERRAL;  Surgeon: Rubén Rico MD;  Location: [facility], [place], operated by [facility];  Service: Pain Management;  Laterality: Bilateral;  NEEDS CONSENT    TRANSFORAMINAL EPIDURAL INJECTION OF STEROID Bilateral 3/4/2022    Procedure: Injection,steroid,epidural,transforaminal approach BILATERAL L3/4 DIRECT REFERRAL;  Surgeon: Rubén Rico MD;  Location: [facility], [place], operated by [facility];  Service: Pain Management;  Laterality: Bilateral;        Review of patient's allergies indicates:  No Known Allergies    Current Facility-Administered Medications on File Prior to Encounter   Medication    0.9%  NaCl infusion     Current Outpatient Medications on File Prior to Encounter   Medication Sig    aspirin/salicylamide/caffeine (BC HEADACHE POWDER ORAL) Take 1 Package by mouth once daily. Hold medication one week prior to surgery    busPIRone (BUSPAR) 5 MG Tab Take 1 tablet (5 mg total) by mouth 2 (two) times daily as needed (anxiety).    dicyclomine (BENTYL) 10 MG capsule Take 1 capsule (10 mg total) by mouth before meals as needed.    magnesium 30 mg Tab Take by mouth once.    metoprolol tartrate (LOPRESSOR) 50 MG tablet Take 1 tablet (50 mg total) by mouth once daily.    mirtazapine (REMERON) 7.5 MG Tab Take 1 tablet (7.5 mg total) by mouth every evening. For insomnia, mood    omeprazole (PRILOSEC) 20 MG capsule Take 1 capsule (20 mg total) by mouth 2 (two) times daily.    ondansetron (ZOFRAN-ODT) 4 MG TbDL Take 1 tablet (4 mg total) by mouth every 8 (eight) hours as needed.    oxyCODONE-acetaminophen (PERCOCET) 5-325 mg per tablet Take 1 tablet by mouth every 6 (six) hours as needed for Pain.    thiamine 100 MG tablet Take 100 mg by mouth once daily.    tiZANidine (ZANAFLEX) 4 MG tablet Take 1 tablet (4 mg total) by mouth every 6 (six) hours as needed (spasms).    venlafaxine (EFFEXOR-XR) 150 MG Cp24 Take 150 mg by mouth once daily. Take am of surgery    venlafaxine (EFFEXOR-XR) 75 MG 24 hr capsule Take 75 mg by mouth once daily. Take am of surgery    vitamin D (VITAMIN D3) 1000 units Tab Take 5,000 Int'l Units by mouth. Hold am of surgery    [DISCONTINUED] guaiFENesin (MUCINEX) 600 mg 12 hr tablet Take 1,200 mg by mouth 2 (two) times daily.    [DISCONTINUED] Saccharomyces boulardii (FLORASTOR) 250 mg capsule Take 250 mg by mouth 2 (two) times daily.     Family History       Problem Relation (Age of Onset)    Arthritis Mother    Atrial fibrillation  Mother    Cancer Maternal Uncle    Cataracts Paternal Grandfather    Glaucoma Paternal Grandfather    Heart failure Mother    Macular degeneration Mother    Stroke Father          Tobacco Use    Smoking status: Former Smoker    Smokeless tobacco: Never Used   Substance and Sexual Activity    Alcohol use: Yes     Alcohol/week: 12.0 standard drinks     Types: 12 Shots of liquor per week     Comment: three 12-14 oz cocktail drinks.     Drug use: No    Sexual activity: Never     Review of Systems   Constitutional:  Positive for activity change and fatigue. Negative for appetite change, chills and fever.   HENT:  Negative for trouble swallowing and voice change.    Eyes:  Negative for photophobia and visual disturbance.   Respiratory:  Positive for shortness of breath. Negative for apnea, cough, wheezing and stridor.    Cardiovascular:  Negative for chest pain, palpitations and leg swelling.   Gastrointestinal:  Negative for abdominal distention, constipation, diarrhea, nausea and vomiting.   Genitourinary:  Negative for dysuria, frequency and urgency.   Musculoskeletal:  Positive for arthralgias. Negative for neck pain and neck stiffness.   Skin:  Negative for color change, pallor, rash and wound.   Neurological:  Positive for weakness and headaches. Negative for dizziness, seizures, syncope, facial asymmetry, speech difficulty, light-headedness and numbness.   Psychiatric/Behavioral:  Positive for confusion. Negative for agitation and behavioral problems.    Objective:     Vital Signs (Most Recent):  Temp: 98.5 °F (36.9 °C) (08/01/22 1941)  Pulse: 78 (08/01/22 2226)  Resp: 18 (08/01/22 1919)  BP: (!) 146/88 (08/01/22 2156)  SpO2: 97 % (08/01/22 2226)   Vital Signs (24h Range):  Temp:  [98.3 °F (36.8 °C)-98.5 °F (36.9 °C)] 98.5 °F (36.9 °C)  Pulse:  [71-78] 78  Resp:  [18-24] 18  SpO2:  [82 %-99 %] 97 %  BP: (110-172)/(70-95) 146/88     Weight: 90.7 kg (200 lb)  Body mass index is 34.33 kg/m².    Physical  Exam  Constitutional:       General: She is not in acute distress.     Appearance: She is obese.   HENT:      Head: Normocephalic and atraumatic.      Nose: Nose normal. No congestion.      Mouth/Throat:      Mouth: Mucous membranes are moist.      Pharynx: Oropharynx is clear. No oropharyngeal exudate.   Eyes:      Extraocular Movements: Extraocular movements intact.      Conjunctiva/sclera: Conjunctivae normal.      Pupils: Pupils are equal, round, and reactive to light.   Cardiovascular:      Rate and Rhythm: Normal rate and regular rhythm.      Pulses: Normal pulses.      Heart sounds: No murmur heard.  Pulmonary:      Effort: Pulmonary effort is normal.      Breath sounds: Decreased air movement (RLL) present.   Abdominal:      General: There is no distension.      Palpations: Abdomen is soft.      Tenderness: There is no abdominal tenderness.   Musculoskeletal:         General: No swelling or tenderness. Normal range of motion.      Cervical back: Normal range of motion and neck supple. No rigidity.   Skin:     General: Skin is warm and dry.      Coloration: Skin is not jaundiced.   Neurological:      General: No focal deficit present.      Mental Status: She is oriented to person, place, and time and easily aroused.   Psychiatric:         Speech: Speech is tangential.      Comments: disinhibited         CRANIAL NERVES     CN III, IV, VI   Pupils are equal, round, and reactive to light.     Significant Labs: All pertinent labs within the past 24 hours have been reviewed.  CBC:   Recent Labs   Lab 08/01/22  1746 08/01/22  1753   WBC 6.76  --    HGB 11.6*  --    HCT 38.9 41     --      CMP:   Recent Labs   Lab 08/01/22  1746      K 4.6   CL 92*   CO2 32*   GLU 79   BUN 20   CREATININE 0.9   CALCIUM 9.1   PROT 7.3   ALBUMIN 3.1*   BILITOT 0.3   ALKPHOS 212*   *   *   ANIONGAP 13     Troponin:   Recent Labs   Lab 08/01/22  1746   TROPONINI 0.027*     Urine Studies:   Recent Labs   Lab  08/01/22  2348   COLORU Yellow   APPEARANCEUA Hazy*   PHUR 5.0   SPECGRAV >=1.030*   PROTEINUA Negative   GLUCUA Negative   KETONESU Trace*   BILIRUBINUA Negative   OCCULTUA Negative   NITRITE Negative   LEUKOCYTESUR 2+*   RBCUA 1   WBCUA 5   BACTERIA Many*   SQUAMEPITHEL 7   HYALINECASTS 3*       Significant Imaging: I have reviewed all pertinent imaging results/findings within the past 24 hours.

## 2022-08-02 NOTE — PROGRESS NOTES
Pharmacokinetic Initial Assessment: IV Vancomycin    Assessment/Plan:    Initiate intravenous vancomycin with a maintenance dose of vancomycin 1000mg IV every 12 hours.  Desired empiric serum trough concentration is 15 to 20 mcg/mL  Draw vancomycin trough level 60 min prior to fourth dose on 8/3/22 at approximately 0700.  Pharmacy will continue to follow and monitor vancomycin.      Please contact pharmacy at extension [phone] with any questions regarding this assessment.     Thank you for the consult,   Rachid Rae       Patient brief summary:  Vale Gutierrez is a 61 y.o. female initiated on antimicrobial therapy with IV Vancomycin for treatment of suspected lower respiratory infection    Drug Allergies:   Review of patient's allergies indicates:  No Known Allergies    Actual Body Weight:   90.7 kg    Renal Function:   Estimated Creatinine Clearance: 69.8 mL/min (based on SCr of 0.9 mg/dL).,     Dialysis Method (if applicable):  N/A    CBC (last 72 hours):  Recent Labs   Lab Result Units 08/01/22  1746   WBC K/uL 6.76   Hemoglobin g/dL 11.6*   Hematocrit % 38.9   Platelets K/uL 342   Gran % % 67.0   Lymph % % 21.6   Mono % % 8.9   Eosinophil % % 0.9   Basophil % % 1.2   Differential Method  Automated       Metabolic Panel (last 72 hours):  Recent Labs   Lab Result Units 08/01/22  1746 08/01/22  2348   Sodium mmol/L 137  --    Potassium mmol/L 4.6  --    Chloride mmol/L 92*  --    CO2 mmol/L 32*  --    Glucose mg/dL 79  --    Glucose, UA   --  Negative   BUN mg/dL 20  --    Creatinine mg/dL 0.9  --    Albumin g/dL 3.1*  --    Total Bilirubin mg/dL 0.3  --    Alkaline Phosphatase U/L 212*  --    AST U/L 245*  --    ALT U/L 124*  --        Drug levels (last 3 results):  No results for input(s): VANCOMYCINRA, VANCORANDOM, VANCOMYCINPE, VANCOPEAK, VANCOMYCINTR, VANCOTROUGH in the last 72 hours.    Microbiologic Results:  Microbiology Results (last 7 days)     Procedure Component Value Units Date/Time    Blood  culture #1 **CANNOT BE ORDERED STAT** [[id number]] Collected: 08/01/22 1831    Order Status: Completed Specimen: Blood from Peripheral, Hand, Left Updated: 08/02/22 0145     Blood Culture, Routine No Growth to date    Blood culture #2 **CANNOT BE ORDERED STAT** [[id number]] Collected: 08/01/22 1822    Order Status: Completed Specimen: Blood from Peripheral, Hand, Right Updated: 08/02/22 0145     Blood Culture, Routine No Growth to date    Culture, Respiratory with Gram Stain [[id number]]     Order Status: No result Specimen: Respiratory

## 2022-08-02 NOTE — ASSESSMENT & PLAN NOTE
Transaminitis  Reports drinking 2 days ago. Denies symptoms or past history of withdrawals.   Monitoring CIWA q shift, with plans to initiate benzos for CIWA >8.   Ethanol level on presentation was negative.   Trend hepatic function; AST to ALT ratio consistent with recent alcohol intake. Check ammonia.

## 2022-08-02 NOTE — PLAN OF CARE
Patient arrived to unit at 0200, awake and alert x4, able to make needs known to staff, periods of confusion but easily reoriented. Incont. of bladder, external catheter in place and operating well. AROM to all extremities, no apparent skin impairments. 10L of high flow of O2 being admin via nasal canula, Lung Sounds diminished. Takes medications whole, PIV to Right forearm flushed without difficulty and saline locked. Patient attempts to exit bed, bed alarm placed on for safety r/t confusion. Mood playful, no apparent distress noted, patient education provided on keeping nasal canula on and to maintain safety by calling for assistance when exiting bed. Continuing current plan of care.     Problem: Adult Inpatient Plan of Care  Goal: Plan of Care Review  Outcome: Ongoing, Progressing  Goal: Patient-Specific Goal (Individualized)  Outcome: Ongoing, Progressing  Goal: Absence of Hospital-Acquired Illness or Injury  Outcome: Ongoing, Progressing  Goal: Optimal Comfort and Wellbeing  Outcome: Ongoing, Progressing  Goal: Readiness for Transition of Care  Outcome: Ongoing, Progressing     Problem: Fluid Imbalance (Pneumonia)  Goal: Fluid Balance  Outcome: Ongoing, Progressing     Problem: Infection (Pneumonia)  Goal: Resolution of Infection Signs and Symptoms  Outcome: Ongoing, Progressing     Problem: Respiratory Compromise (Pneumonia)  Goal: Effective Oxygenation and Ventilation  Outcome: Ongoing, Progressing

## 2022-08-02 NOTE — NURSING
@3955 pt CIWA 17 with visual hallucinations-- med team notified. VSS. Afebrile. Able to feed self once tray is set up in front of her. Safety maintained. Meds admin per MAR. Will hand off to on coming RN.

## 2022-08-02 NOTE — ED NOTES
Pt repeatedly is taking off nasal canula causing her to O2 sats to go down into the 70's. I placed her back on the oxygen and reoriented her. Pt states she will not remove the oxygen again.

## 2022-08-02 NOTE — ASSESSMENT & PLAN NOTE
Not complaining of dysuria, but possible confounder given encephalopathy. In any case, should be covered by empiric antibiotics.

## 2022-08-02 NOTE — RESPIRATORY THERAPY
Sputum sample cup was left at the bedside. Pt stated she didn't have anything to cough up. Will continue to monitor

## 2022-08-02 NOTE — RESPIRATORY THERAPY
RAPID RESPONSE RESPIRATORY THERAPY PROACTIVE ROUNDING NOTE             Time of visit: 930    Code Status: Full Code   : 1960  Bed: 61568/51776 A:   MRN: [id number]  Time spent at the bedside: < 15 min    SITUATION    Evaluated patient for: HFNC Compliance     BACKGROUND    Patient has a past medical history of Alcohol abuse, Anxiety, Benign paroxysmal positional vertigo, Depression, Hyperlipidemia, and Hypertension.    24 Hours Vitals Range:  Temp:  [98 °F (36.7 °C)-98.5 °F (36.9 °C)]   Pulse:  [71-96]   Resp:  [18-37]   BP: (110-176)/(70-95)   SpO2:  [82 %-99 %]     Labs:    Recent Labs     22  1746 22  0427    139   K 4.6 4.3   CL 92* 92*   CO2 32* 35*   CREATININE 0.9 0.8   GLU 79 98   PHOS  --  2.5*   MG  --  1.6        Recent Labs     22  1747   PH 7.343*   PCO2 82.7*   PO2 19*   HCO3 44.9*   POCSATURATED 24*   BE 19       ASSESSMENT/INTERVENTIONS    Upon arrival in room pt resting and has no resp concerns at this time    Last VS   Temp: 98 °F (36.7 °C) (102)  Pulse: 96 (412)  Resp: 19 (432)  BP: 176/85 (102)  SpO2: 97 % (412)    Level of Consciousness: Level of Consciousness (AVPU): alert  Respiratory Effort: Respiratory Effort: Normal, Unlabored Expansion/Accessory Muscle Usage: Expansion/Accessory Muscles/Retractions: no retractions, no use of accessory muscles, expansion symmetric  All Lung Field Breath Sounds:    O2 Device/Concentration: Flow (L/min): 10,  , O2 Device (Oxygen Therapy): High Flow nasal Cannula  Was the O2 device able to be weaned? No  Ambu at bedside: Ambu bag with the patient?: Yes, Adult Ambu    Active Orders   Respiratory Care    Incentive spirometry     Frequency: Q6H PRN     Number of Occurrences: Until Specified    Oxygen Continuous     Frequency: Continuous     Number of Occurrences: Until Specified     Order Questions:      Device type: High flow      Device: High Flow Nasal Cannula (6 -15 Liters)      LPM: 10       Titrate O2 per Oxygen Titration Protocol: Yes      To maintain SpO2 goal of: >= 92%      Notify MD of: Inability to achieve desired SpO2; Sudden change in patient status and requires 20% increase in FiO2; Patient requires >60% FiO2    Pulse Oximetry Q4H     Frequency: Q4H     Number of Occurrences: Until Specified       RECOMMENDATIONS    We recommend: RRT Recs: wean O2 as tolerated        FOLLOW-UP    Please call back the Rapid Response RTBeatriz RRT at x [phone] for any questions or concerns.

## 2022-08-02 NOTE — CARE UPDATE
Patient seen at bedside at around 1500. Patient pleasant and conversant and stated she wanted to be called Vale. Discussed with patient findings she has RLL pneumonia and likely cause of her breathing issues and currently on IV Zosyn and Vancomycin to treat. Patient on 10 liters of high flow oxygen but comfortable and not in any respiratory distress. Patient with known alcohol dependence so nursing doing CIWA monitoring. After I left room nursing contacted me later this evening that her CIWA was 17 and she was having some hallucinations so started on scheduled Valium 5 mg po TID for alcohol withdrawal with oral Lorazepam for breakthrough agitation. Patient had echo today that showed:  · The left ventricle is normal in size with concentric hypertrophy and normal systolic function.  · The estimated ejection fraction is 60-65%.  · Grade I left ventricular diastolic dysfunction.  · Mild aortic regurgitation.  · Normal right ventricular size with normal right ventricular systolic function.  · The estimated PA systolic pressure is 43 mmHg.  · Intermediate central venous pressure (8 mmHg).  · There is pulmonary hypertension.  · Small circumferential pericardial effusion.  · Mild left atrial enlargement.    Patient with mildly elevated CVP and BNP elevated at 5-04 on admit and CTA did show some ground glass opacities that could be pulmonary edema so also concerned for acute on chronic heart failure exacerbation causing hypoxia in addition to pneumonia so will start IV Lasix 40 mg every 12 hours.       TOYA BLAKELY MD  Attending Staff Physician   Department of Hospital Medicine, [facility] on [facility]  Pager: [phone]  Spectralink: [phone]

## 2022-08-03 LAB
CREAT SERPL-MCNC: 0.9 MG/DL (ref 0.5–1.4)
EST. GFR  (NO RACE VARIABLE): >60 ML/MIN/1.73 M^2
HCV AB SERPL QL IA: NEGATIVE
HIV 1+2 AB+HIV1 P24 AG SERPL QL IA: NEGATIVE
VANCOMYCIN TROUGH SERPL-MCNC: 22.3 UG/ML (ref 10–22)

## 2022-08-03 PROCEDURE — 63600175 PHARM REV CODE 636 W HCPCS: Performed by: HOSPITALIST

## 2022-08-03 PROCEDURE — 99233 PR SUBSEQUENT HOSPITAL CARE,LEVL III: ICD-10-PCS | Mod: ,,, | Performed by: INTERNAL MEDICINE

## 2022-08-03 PROCEDURE — 25000003 PHARM REV CODE 250: Performed by: INTERNAL MEDICINE

## 2022-08-03 PROCEDURE — 94761 N-INVAS EAR/PLS OXIMETRY MLT: CPT

## 2022-08-03 PROCEDURE — 99900035 HC TECH TIME PER 15 MIN (STAT)

## 2022-08-03 PROCEDURE — 93005 ELECTROCARDIOGRAM TRACING: CPT

## 2022-08-03 PROCEDURE — 36415 COLL VENOUS BLD VENIPUNCTURE: CPT | Performed by: INTERNAL MEDICINE

## 2022-08-03 PROCEDURE — 20600001 HC STEP DOWN PRIVATE ROOM

## 2022-08-03 PROCEDURE — 93010 ELECTROCARDIOGRAM REPORT: CPT | Mod: ,,, | Performed by: INTERNAL MEDICINE

## 2022-08-03 PROCEDURE — 82565 ASSAY OF CREATININE: CPT | Performed by: INTERNAL MEDICINE

## 2022-08-03 PROCEDURE — 63600175 PHARM REV CODE 636 W HCPCS: Performed by: INTERNAL MEDICINE

## 2022-08-03 PROCEDURE — 99233 SBSQ HOSP IP/OBS HIGH 50: CPT | Mod: ,,, | Performed by: INTERNAL MEDICINE

## 2022-08-03 PROCEDURE — 80202 ASSAY OF VANCOMYCIN: CPT | Performed by: INTERNAL MEDICINE

## 2022-08-03 PROCEDURE — 93010 EKG 12-LEAD: ICD-10-PCS | Mod: ,,, | Performed by: INTERNAL MEDICINE

## 2022-08-03 PROCEDURE — 25000003 PHARM REV CODE 250: Performed by: HOSPITALIST

## 2022-08-03 PROCEDURE — 27100171 HC OXYGEN HIGH FLOW UP TO 24 HOURS

## 2022-08-03 RX ADMIN — VENLAFAXINE HYDROCHLORIDE 225 MG: 75 CAPSULE, EXTENDED RELEASE ORAL at 08:08

## 2022-08-03 RX ADMIN — MIRTAZAPINE 7.5 MG: 7.5 TABLET, FILM COATED ORAL at 08:08

## 2022-08-03 RX ADMIN — TIZANIDINE 4 MG: 4 TABLET ORAL at 01:08

## 2022-08-03 RX ADMIN — Medication 6 MG: at 08:08

## 2022-08-03 RX ADMIN — FUROSEMIDE 40 MG: 10 INJECTION, SOLUTION INTRAMUSCULAR; INTRAVENOUS at 08:08

## 2022-08-03 RX ADMIN — DIAZEPAM 5 MG: 5 TABLET ORAL at 01:08

## 2022-08-03 RX ADMIN — PIPERACILLIN SODIUM AND TAZOBACTAM SODIUM 4.5 G: 4; .5 INJECTION, POWDER, LYOPHILIZED, FOR SOLUTION INTRAVENOUS at 03:08

## 2022-08-03 RX ADMIN — PIPERACILLIN SODIUM AND TAZOBACTAM SODIUM 4.5 G: 4; .5 INJECTION, POWDER, LYOPHILIZED, FOR SOLUTION INTRAVENOUS at 10:08

## 2022-08-03 RX ADMIN — THIAMINE HCL TAB 100 MG 100 MG: 100 TAB at 08:08

## 2022-08-03 RX ADMIN — OXYCODONE HYDROCHLORIDE AND ACETAMINOPHEN 1 TABLET: 5; 325 TABLET ORAL at 08:08

## 2022-08-03 RX ADMIN — OXYCODONE HYDROCHLORIDE AND ACETAMINOPHEN 1 TABLET: 5; 325 TABLET ORAL at 11:08

## 2022-08-03 RX ADMIN — ONDANSETRON 4 MG: 4 TABLET, ORALLY DISINTEGRATING ORAL at 01:08

## 2022-08-03 RX ADMIN — PIPERACILLIN SODIUM AND TAZOBACTAM SODIUM 4.5 G: 4; .5 INJECTION, POWDER, LYOPHILIZED, FOR SOLUTION INTRAVENOUS at 05:08

## 2022-08-03 RX ADMIN — VANCOMYCIN HYDROCHLORIDE 1750 MG: 500 INJECTION, POWDER, LYOPHILIZED, FOR SOLUTION INTRAVENOUS at 09:08

## 2022-08-03 RX ADMIN — SENNOSIDES AND DOCUSATE SODIUM 1 TABLET: 50; 8.6 TABLET ORAL at 08:08

## 2022-08-03 RX ADMIN — DIAZEPAM 5 MG: 5 TABLET ORAL at 05:08

## 2022-08-03 RX ADMIN — METOPROLOL TARTRATE 50 MG: 50 TABLET, FILM COATED ORAL at 08:08

## 2022-08-03 RX ADMIN — DIAZEPAM 5 MG: 5 TABLET ORAL at 09:08

## 2022-08-03 NOTE — NURSING
Patient alert and oriented x2. Denies any pain or or discomfort. Denies any respiratory distress. Safety measure in place. Bed in lowest position and call light within reach.

## 2022-08-03 NOTE — NURSING
Resting in bed. A/O x3, but confused and impulsive-- mittens applied per MD order. Telesitter in place. Pain treated per MAR. VSS. Afebrile. Able to feed self once tray is set up in front of her.  Safety maintained. Meds admin per MAR. Will hand off to on coming RN.

## 2022-08-03 NOTE — RESPIRATORY THERAPY
RAPID RESPONSE RESPIRATORY THERAPY PROACTIVE ROUNDING NOTE             Time of visit: 801     Code Status: Full Code   : 1960  Bed: 61741/57845 A:   MRN: [id number]  Time spent at the bedside: < 15 min    SITUATION    Evaluated patient for: HFNC Compliance     BACKGROUND    Patient has a past medical history of Alcohol abuse, Anxiety, Benign paroxysmal positional vertigo, Depression, Hyperlipidemia, and Hypertension.    24 Hours Vitals Range:  Temp:  [98.1 °F (36.7 °C)-99.1 °F (37.3 °C)]   Pulse:  []   Resp:  [20-60]   BP: (107-135)/(62-91)   SpO2:  [92 %-98 %]     Labs:    Recent Labs     22  1746 22  0427    139   K 4.6 4.3   CL 92* 92*   CO2 32* 35*   CREATININE 0.9 0.8   GLU 79 98   PHOS  --  2.5*   MG  --  1.6        Recent Labs     22  1747   PH 7.343*   PCO2 82.7*   PO2 19*   HCO3 44.9*   POCSATURATED 24*   BE 19       ASSESSMENT/INTERVENTIONS    Upon arrival in room pt sleeping, on 10L HFNC, sat 94%, tachypneic but unlabored. Not able to wean O2.    Last VS   Temp: 98.3 °F (36.8 °C) (700)  Pulse: 106 (801)  Resp: 25 (833)  BP: 135/91 (700)  SpO2: 92 % (801)    Level of Consciousness: Level of Consciousness (AVPU): responds to voice  Respiratory Effort: Respiratory Effort: Unlabored, Normal Expansion/Accessory Muscle Usage: Expansion/Accessory Muscles/Retractions: no use of accessory muscles, expansion symmetric  All Lung Field Breath Sounds: All Lung Fields Breath Sounds: Anterior:, Lateral:, diminished, equal bilaterally  O2 Device/Concentration: Flow (L/min): 10,  , O2 Device (Oxygen Therapy): High Flow nasal Cannula  Was the O2 device able to be weaned? No  Ambu at bedside: Ambu bag with the patient?: Yes, Adult Ambu    Active Orders   Respiratory Care    Incentive spirometry     Frequency: Q6H PRN     Number of Occurrences: Until Specified    Oxygen Continuous     Frequency: Continuous     Number of Occurrences: Until Specified      Order Questions:      Device type: High flow      Device: High Flow Nasal Cannula (6 -15 Liters)      LPM: 10      Titrate O2 per Oxygen Titration Protocol: Yes      To maintain SpO2 goal of: >= 92%      Notify MD of: Inability to achieve desired SpO2; Sudden change in patient status and requires 20% increase in FiO2; Patient requires >60% FiO2    Pulse Oximetry Q4H     Frequency: Q4H     Number of Occurrences: Until Specified       RECOMMENDATIONS    We recommend: RRT Recs: Continue POC per primary team.    ESCALATION         FOLLOW-UP    Please call back the Rapid Response RT, Alvarez Reyes RRT at x [phone] for any questions or concerns.

## 2022-08-03 NOTE — PROGRESS NOTES
Pharmacokinetic Assessment Follow Up: IV Vancomycin    Vancomycin serum concentration assessment(s):    The trough level was drawn correctly and can be used to guide therapy at this time. The measurement is above the desired definitive target range of 15 to 20 mcg/mL.    Vancomycin Regimen Plan:    Change regimen to Vancomycin 1750 mg IV every 24 hours with next serum trough concentration measured at 2100 prior to 3rd dose on 8/5/22    Drug levels (last 3 results):  Recent Labs   Lab Result Units 08/03/22  1041   Vancomycin-Trough ug/mL 22.3*       Pharmacy will continue to follow and monitor vancomycin.    Please contact pharmacy at extension [phone] for questions regarding this assessment.    Thank you for the consult,   Lora Blankenship       Patient brief summary:  Vale Gutierrez is a 61 y.o. female initiated on antimicrobial therapy with IV Vancomycin for treatment of lower respiratory infection        Drug Allergies:   Review of patient's allergies indicates:  No Known Allergies    Actual Body Weight:   90.7 kg    Renal Function:   Estimated Creatinine Clearance: 69.8 mL/min (based on SCr of 0.9 mg/dL).,     Dialysis Method (if applicable):  N/A    CBC (last 72 hours):  Recent Labs   Lab Result Units 08/01/22 1746 08/02/22  0427   WBC K/uL 6.76 7.15   Hemoglobin g/dL 11.6* 10.3*   Hematocrit % 38.9 37.3   Platelets K/uL 342 330   Gran % % 67.0 85.1*   Lymph % % 21.6 8.3*   Mono % % 8.9 4.3   Eosinophil % % 0.9 0.8   Basophil % % 1.2 0.7   Differential Method  Automated Automated       Metabolic Panel (last 72 hours):  Recent Labs   Lab Result Units 08/01/22  1746 08/01/22  2348 08/02/22  0427 08/03/22  1145   Sodium mmol/L 137  --  139  --    Potassium mmol/L 4.6  --  4.3  --    Chloride mmol/L 92*  --  92*  --    CO2 mmol/L 32*  --  35*  --    Glucose mg/dL 79  --  98  --    Glucose, UA   --  Negative  --   --    BUN mg/dL 20  --  13  --    Creatinine mg/dL 0.9  --  0.8 0.9   Albumin g/dL 3.1*  --   2.9*  --    Total Bilirubin mg/dL 0.3  --  0.3  --    Alkaline Phosphatase U/L 212*  --  221*  --    AST U/L 245*  --  130*  --    ALT U/L 124*  --  93*  --    Magnesium mg/dL  --   --  1.6  --    Phosphorus mg/dL  --   --  2.5*  --        Vancomycin Administrations:  vancomycin given in the last 96 hours                   vancomycin in dextrose 5 % 1 gram/250 mL IVPB 1,000 mg (mg) 1,000 mg New Bag 08/02/22 2231     1,000 mg New Bag  1032    vancomycin 2 g in dextrose 5 % 500 mL IVPB (mg) 2,000 mg New Bag 08/01/22 1931                Microbiologic Results:  Microbiology Results (last 7 days)     Procedure Component Value Units Date/Time    Blood culture #1 **CANNOT BE ORDERED STAT** [[id number]] Collected: 08/01/22 1831    Order Status: Completed Specimen: Blood from Peripheral, Hand, Left Updated: 08/02/22 2012     Blood Culture, Routine No Growth to date      No Growth to date    Blood culture #2 **CANNOT BE ORDERED STAT** [[id number]] Collected: 08/01/22 1822    Order Status: Completed Specimen: Blood from Peripheral, Hand, Right Updated: 08/02/22 2012     Blood Culture, Routine No Growth to date      No Growth to date    Culture, Respiratory with Gram Stain [[id number]]     Order Status: No result Specimen: Respiratory

## 2022-08-03 NOTE — PLAN OF CARE
[facility] ([place] [address])  Initial Discharge Assessment       Primary Care Provider: Estela Mcdaniel MD    Admission Diagnosis: SOB (shortness of breath) [R06.02]  Hypoxia [R09.02]  Altered mental status, unspecified altered mental status type [R41.82]    Admission Date: 8/1/2022  Expected Discharge Date: 8/6/2022         Payor: [employer] MANAGED MEDICARE / Plan: [employer] 65 / Product Type: Medicare Advantage /     Extended Emergency Contact Information  Primary Emergency Contact: Aggie Adames   [place]  Mobile Phone: [phone]  Relation: Sister    Discharge Plan A: (P) Home with family  Discharge Plan B: (P) Home Health      [facility] - [place], [place] - [address]  [address]  [place] [address]  Phone: [phone] Fax: [phone]      Initial Assessment (most recent)       Adult Discharge Assessment - 08/03/22 1012          Discharge Assessment    Assessment Type Discharge Planning Assessment (P)      Confirmed/corrected address, phone number and insurance Yes (P)      Confirmed Demographics Correct on Facesheet (P)      Source of Information patient (P)      Does patient/caregiver understand observation status Yes (P)      Communicated DIXON with patient/caregiver Yes (P)      Reason For Admission shortness of breath, (P)      Lives With sibling(s) (P)      Facility Arrived From: HOME (P)      Do you expect to return to your current living situation? Yes (P)      Do you have help at home or someone to help you manage your care at home? Yes (P)      Who are your caregiver(s) and their phone number(s)? Aggiesister Witt, [phone] (P)      Prior to hospitilization cognitive status: Alert/Oriented (P)      Current cognitive status: Alert/Oriented (P)      Walking or Climbing Stairs Difficulty none (P)      Dressing/Bathing Difficulty none (P)      Do you have any problems with: Errands/Grocery (P)      Home  Accessibility wheelchair accessible (P)      Home Layout Able to live on 1st floor (P)      Equipment Currently Used at Home none (P)      Readmission within 30 days? No (P)      Patient currently being followed by outpatient case management? No (P)      Do you currently have service(s) that help you manage your care at home? No (P)      Is the pt/caregiver preference to resume services with current agency No (P)      Do you take prescription medications? Yes (P)      Do you have prescription coverage? Yes (P)      Coverage [employer] Managed (P)      Do you have any problems affording any of your prescribed medications? No (P)      Is the patient taking medications as prescribed? yes (P)      Who is going to help you get home at discharge? Aggie Adames, sister, [phone] (P)      How do you get to doctors appointments? car, drives self (P)      Are you on dialysis? No (P)      Do you take coumadin? No (P)      Discharge Plan A Home with family (P)      Discharge Plan B Home Health (P)      DME Needed Upon Discharge  none (P)      Discharge Plan discussed with: Patient (P)                         SW met with patient at the bedside to conduct the dc planning assessment. Per patient, she admitted to [facility] from home. She states that she admitted to [facility] from home. She reports that she is currently residing in the home with sister. She reports that she is not on dialysis nor coumadin. She states that she does use a walker to get around. Patient sister will transport her home when stable to [place].     PCP  Estela Mcdaniel MD  [phone]  .  Emergency    Aggie Adames, sister  [phone]      PHARMACY  [facility] - [place], [place] - [address]  [address]  [place] [address]  Phone: [phone] Fax: [phone]    Jenise Pham LMSW  [facility]   [id number]

## 2022-08-04 PROBLEM — E83.42 HYPOMAGNESEMIA: Status: ACTIVE | Noted: 2022-08-04

## 2022-08-04 PROBLEM — I71.9 AORTIC ANEURYSM: Status: ACTIVE | Noted: 2022-08-04

## 2022-08-04 PROBLEM — R53.1 WEAKNESS: Status: ACTIVE | Noted: 2022-08-04

## 2022-08-04 PROBLEM — E87.6 HYPOKALEMIA: Status: ACTIVE | Noted: 2022-08-04

## 2022-08-04 PROBLEM — Z72.0 NICOTINE ABUSE: Status: ACTIVE | Noted: 2022-08-04

## 2022-08-04 PROBLEM — R29.898 DECONDITIONED LOW BACK: Status: ACTIVE | Noted: 2022-08-04

## 2022-08-04 LAB
ALBUMIN SERPL BCP-MCNC: 2.8 G/DL (ref 3.5–5.2)
ALP SERPL-CCNC: 162 U/L (ref 55–135)
ALT SERPL W/O P-5'-P-CCNC: 49 U/L (ref 10–44)
ANION GAP SERPL CALC-SCNC: 13 MMOL/L (ref 8–16)
AST SERPL-CCNC: 32 U/L (ref 10–40)
BASOPHILS # BLD AUTO: 0.06 K/UL (ref 0–0.2)
BASOPHILS NFR BLD: 0.8 % (ref 0–1.9)
BILIRUB SERPL-MCNC: 0.4 MG/DL (ref 0.1–1)
BUN SERPL-MCNC: 16 MG/DL (ref 8–23)
CALCIUM SERPL-MCNC: 9.3 MG/DL (ref 8.7–10.5)
CHLORIDE SERPL-SCNC: 84 MMOL/L (ref 95–110)
CO2 SERPL-SCNC: 45 MMOL/L (ref 23–29)
CREAT SERPL-MCNC: 1.1 MG/DL (ref 0.5–1.4)
DIFFERENTIAL METHOD: ABNORMAL
EOSINOPHIL # BLD AUTO: 0.1 K/UL (ref 0–0.5)
EOSINOPHIL NFR BLD: 1.8 % (ref 0–8)
ERYTHROCYTE [DISTWIDTH] IN BLOOD BY AUTOMATED COUNT: 17.2 % (ref 11.5–14.5)
EST. GFR  (NO RACE VARIABLE): 56.5 ML/MIN/1.73 M^2
GLUCOSE SERPL-MCNC: 85 MG/DL (ref 70–110)
HCT VFR BLD AUTO: 42.9 % (ref 37–48.5)
HGB BLD-MCNC: 11.8 G/DL (ref 12–16)
IMM GRANULOCYTES # BLD AUTO: 0.04 K/UL (ref 0–0.04)
IMM GRANULOCYTES NFR BLD AUTO: 0.6 % (ref 0–0.5)
LYMPHOCYTES # BLD AUTO: 1.3 K/UL (ref 1–4.8)
LYMPHOCYTES NFR BLD: 18 % (ref 18–48)
MAGNESIUM SERPL-MCNC: 1.4 MG/DL (ref 1.6–2.6)
MCH RBC QN AUTO: 23.5 PG (ref 27–31)
MCHC RBC AUTO-ENTMCNC: 27.5 G/DL (ref 32–36)
MCV RBC AUTO: 85 FL (ref 82–98)
MONOCYTES # BLD AUTO: 0.7 K/UL (ref 0.3–1)
MONOCYTES NFR BLD: 9.1 % (ref 4–15)
NEUTROPHILS # BLD AUTO: 5 K/UL (ref 1.8–7.7)
NEUTROPHILS NFR BLD: 69.7 % (ref 38–73)
NRBC BLD-RTO: 0 /100 WBC
PLATELET # BLD AUTO: 324 K/UL (ref 150–450)
PMV BLD AUTO: 9.9 FL (ref 9.2–12.9)
POTASSIUM SERPL-SCNC: 3.2 MMOL/L (ref 3.5–5.1)
PROT SERPL-MCNC: 6.9 G/DL (ref 6–8.4)
RBC # BLD AUTO: 5.03 M/UL (ref 4–5.4)
SODIUM SERPL-SCNC: 142 MMOL/L (ref 136–145)
WBC # BLD AUTO: 7.22 K/UL (ref 3.9–12.7)

## 2022-08-04 PROCEDURE — 99233 SBSQ HOSP IP/OBS HIGH 50: CPT | Mod: ,,, | Performed by: HOSPITALIST

## 2022-08-04 PROCEDURE — 20600001 HC STEP DOWN PRIVATE ROOM

## 2022-08-04 PROCEDURE — 85025 COMPLETE CBC W/AUTO DIFF WBC: CPT | Performed by: HOSPITALIST

## 2022-08-04 PROCEDURE — 25000003 PHARM REV CODE 250: Performed by: HOSPITALIST

## 2022-08-04 PROCEDURE — 27000221 HC OXYGEN, UP TO 24 HOURS

## 2022-08-04 PROCEDURE — S4991 NICOTINE PATCH NONLEGEND: HCPCS | Performed by: HOSPITALIST

## 2022-08-04 PROCEDURE — 83735 ASSAY OF MAGNESIUM: CPT | Performed by: HOSPITALIST

## 2022-08-04 PROCEDURE — 94761 N-INVAS EAR/PLS OXIMETRY MLT: CPT

## 2022-08-04 PROCEDURE — 25000003 PHARM REV CODE 250: Performed by: INTERNAL MEDICINE

## 2022-08-04 PROCEDURE — 80053 COMPREHEN METABOLIC PANEL: CPT | Performed by: HOSPITALIST

## 2022-08-04 PROCEDURE — 36415 COLL VENOUS BLD VENIPUNCTURE: CPT | Performed by: HOSPITALIST

## 2022-08-04 PROCEDURE — 99233 PR SUBSEQUENT HOSPITAL CARE,LEVL III: ICD-10-PCS | Mod: ,,, | Performed by: HOSPITALIST

## 2022-08-04 PROCEDURE — 63600175 PHARM REV CODE 636 W HCPCS: Performed by: HOSPITALIST

## 2022-08-04 PROCEDURE — 99900035 HC TECH TIME PER 15 MIN (STAT)

## 2022-08-04 PROCEDURE — 63600175 PHARM REV CODE 636 W HCPCS: Performed by: INTERNAL MEDICINE

## 2022-08-04 RX ORDER — IBUPROFEN 200 MG
1 TABLET ORAL DAILY
Status: DISCONTINUED | OUTPATIENT
Start: 2022-08-04 | End: 2022-08-12

## 2022-08-04 RX ORDER — MAGNESIUM SULFATE HEPTAHYDRATE 40 MG/ML
2 INJECTION, SOLUTION INTRAVENOUS ONCE
Status: COMPLETED | OUTPATIENT
Start: 2022-08-04 | End: 2022-08-04

## 2022-08-04 RX ORDER — GUAIFENESIN 600 MG/1
1200 TABLET, EXTENDED RELEASE ORAL 2 TIMES DAILY
Status: DISCONTINUED | OUTPATIENT
Start: 2022-08-04 | End: 2022-08-12

## 2022-08-04 RX ORDER — FUROSEMIDE 10 MG/ML
20 INJECTION INTRAMUSCULAR; INTRAVENOUS DAILY
Status: DISCONTINUED | OUTPATIENT
Start: 2022-08-05 | End: 2022-08-07

## 2022-08-04 RX ORDER — POTASSIUM CHLORIDE 750 MG/1
30 CAPSULE, EXTENDED RELEASE ORAL ONCE
Status: COMPLETED | OUTPATIENT
Start: 2022-08-04 | End: 2022-08-04

## 2022-08-04 RX ADMIN — OXYCODONE HYDROCHLORIDE AND ACETAMINOPHEN 1 TABLET: 5; 325 TABLET ORAL at 07:08

## 2022-08-04 RX ADMIN — VANCOMYCIN HYDROCHLORIDE 1750 MG: 500 INJECTION, POWDER, LYOPHILIZED, FOR SOLUTION INTRAVENOUS at 09:08

## 2022-08-04 RX ADMIN — PIPERACILLIN SODIUM AND TAZOBACTAM SODIUM 4.5 G: 4; .5 INJECTION, POWDER, LYOPHILIZED, FOR SOLUTION INTRAVENOUS at 02:08

## 2022-08-04 RX ADMIN — MIRTAZAPINE 7.5 MG: 7.5 TABLET, FILM COATED ORAL at 09:08

## 2022-08-04 RX ADMIN — DIAZEPAM 5 MG: 5 TABLET ORAL at 09:08

## 2022-08-04 RX ADMIN — VENLAFAXINE HYDROCHLORIDE 225 MG: 75 CAPSULE, EXTENDED RELEASE ORAL at 08:08

## 2022-08-04 RX ADMIN — MAGNESIUM SULFATE 2 G: 2 INJECTION INTRAVENOUS at 07:08

## 2022-08-04 RX ADMIN — NICOTINE 1 PATCH: 14 PATCH, EXTENDED RELEASE TRANSDERMAL at 11:08

## 2022-08-04 RX ADMIN — OXYCODONE HYDROCHLORIDE AND ACETAMINOPHEN 1 TABLET: 5; 325 TABLET ORAL at 09:08

## 2022-08-04 RX ADMIN — POTASSIUM CHLORIDE 30 MEQ: 10 CAPSULE, COATED, EXTENDED RELEASE ORAL at 07:08

## 2022-08-04 RX ADMIN — SENNOSIDES AND DOCUSATE SODIUM 1 TABLET: 50; 8.6 TABLET ORAL at 08:08

## 2022-08-04 RX ADMIN — SENNOSIDES AND DOCUSATE SODIUM 1 TABLET: 50; 8.6 TABLET ORAL at 09:08

## 2022-08-04 RX ADMIN — GUAIFENESIN 1200 MG: 600 TABLET, EXTENDED RELEASE ORAL at 09:08

## 2022-08-04 RX ADMIN — PIPERACILLIN SODIUM AND TAZOBACTAM SODIUM 4.5 G: 4; .5 INJECTION, POWDER, LYOPHILIZED, FOR SOLUTION INTRAVENOUS at 11:08

## 2022-08-04 RX ADMIN — THIAMINE HCL TAB 100 MG 100 MG: 100 TAB at 08:08

## 2022-08-04 RX ADMIN — DIAZEPAM 5 MG: 5 TABLET ORAL at 02:08

## 2022-08-04 RX ADMIN — DIAZEPAM 5 MG: 5 TABLET ORAL at 05:08

## 2022-08-04 RX ADMIN — METOPROLOL TARTRATE 50 MG: 50 TABLET, FILM COATED ORAL at 07:08

## 2022-08-04 RX ADMIN — Medication 6 MG: at 09:08

## 2022-08-04 NOTE — ASSESSMENT & PLAN NOTE
Likely aspiration PNA given RLL with hx of alcohol use a predisposition to this. No signs of aspiration now as awake and alert.

## 2022-08-04 NOTE — ASSESSMENT & PLAN NOTE
· Patient exhibiting signs of withdrawal with confusion and tremors on 8/2 and started on scheduled Valium 5 mg po every 8 hours and will continue as patient still exhibiting signs of withdrawal on 8/3.   · Continue MVI, Thiamine and Folate daily.   · Reports drinking 2 days ago. Denies symptoms or past history of withdrawals.   · Monitoring CIWA q shift, with plans to initiate benzos for CIWA >8. Patient started on scheduled Valium on 8/2 at 5 mg po TID as CIWA 17. Patient on prn Ativan as per withdrawal protocol.   · Ethanol level on presentation was negative.   · Trend hepatic function; AST to ALT ratio consistent with recent alcohol intake.   · Ammonia level okay at 31 on admit.

## 2022-08-04 NOTE — SUBJECTIVE & OBJECTIVE
Interval History: Patient started on IV Vancomycin and Zosyn for RLL and right lingula pneumonia and significant hypoxia found on admit yesterday. CTA of chest negative for PE but concerning for PNA in RLL and lingula but also with changes concerning for pulmonary edema so also started on Lasix 40 mg BID yesterday as also may be contributing to hypoxia as well as pneumonia. Echo done and showed:  The left ventricle is normal in size with concentric hypertrophy and normal systolic function.  The estimated ejection fraction is 60-65%.  Grade I left ventricular diastolic dysfunction.  Mild aortic regurgitation.  Normal right ventricular size with normal right ventricular systolic function.  The estimated PA systolic pressure is 43 mmHg.  Intermediate central venous pressure (8 mmHg).  There is pulmonary hypertension.  Small circumferential pericardial effusion.  Mild left atrial enlargement.    Patient still requiring 10 liters of high flow oxygen today and not on oxygen at home. Patient with significant alcohol history and showing signs of early withdrawal with tremors and hallucinations yesterday so started on scheduled Valium 5 mg po TID and nursing monitoring CIWA with prn po Ativan for breakthrough agitation. Patient on exam today is confused but knew she was in hospital and knew year 2022 and Shellydevin was President but nursing reports patient gets confused and has pulled out several IVs today so restraints for soft mittens ordered. Patient on exam is confused and all over the place on questioning. Patient states her tremors are better today and denies ay hallucinations. Patient states she is not SOB and has no chest pain. Patient asked to please be called Vale and not Mrs. Gutierrez.     Review of Systems   Constitutional:  Negative for fever.   Respiratory:  Negative for cough and shortness of breath.    Cardiovascular:  Negative for chest pain and leg swelling.   Gastrointestinal:  Negative for abdominal pain,  nausea and vomiting.   Musculoskeletal:  Negative for arthralgias.   Skin:  Negative for rash.   Neurological:  Negative for dizziness, tremors and light-headedness.   Psychiatric/Behavioral:  Positive for agitation (At times) and confusion. Negative for hallucinations.    Objective:     Vital Signs (Most Recent):  Temp: 98.3 °F (36.8 °C) (08/03/22 0700)  Pulse: 75 (08/03/22 1100)  Resp: 18 (08/03/22 0833)  BP: 135/91 (08/03/22 0700)  SpO2: 95 % (08/03/22 1100) on 10 liters of oxygen high flow   Vital Signs (24h Range):  Temp:  [98.1 °F (36.7 °C)-99.1 °F (37.3 °C)] 98.3 °F (36.8 °C)  Pulse:  [] 75  Resp:  [23-60] 25  SpO2:  [92 %-95 %] 95 %  BP: (107-135)/(62-91) 135/91     Weight: 90.7 kg (200 lb)  Body mass index is 34.33 kg/m².    Intake/Output Summary (Last 24 hours) at 8/3/2022 1934  Last data filed at 8/3/2022 0630  Gross per 24 hour   Intake 350 ml   Output 2800 ml   Net -2450 ml      Physical Exam  Vitals and nursing note reviewed.   Constitutional:       General: She is not in acute distress.     Appearance: Normal appearance. She is obese. She is not ill-appearing.      Interventions: Nasal cannula in place.   Eyes:      General: No scleral icterus.     Conjunctiva/sclera: Conjunctivae normal.   Neck:      Vascular: No JVD.   Cardiovascular:      Rate and Rhythm: Normal rate and regular rhythm.      Heart sounds: Normal heart sounds. No murmur heard.    No friction rub. No gallop.   Pulmonary:      Effort: Pulmonary effort is normal. No respiratory distress.      Breath sounds: Rales present. No wheezing or rhonchi.   Abdominal:      General: Abdomen is flat. Bowel sounds are normal. There is no distension.      Palpations: Abdomen is soft.      Tenderness: There is no abdominal tenderness.   Musculoskeletal:      Right lower leg: No edema.      Left lower leg: No edema.   Skin:     General: Skin is warm.      Findings: No erythema.   Neurological:      Mental Status: She is alert.      Comments:  Oriented to person and place and time but confused about situation.    Psychiatric:         Mood and Affect: Mood normal.         Behavior: Behavior normal. Behavior is cooperative.      Comments: Thought is disorganized at times on questioning and needs lots of redirection on questioning.        Significant Labs:   CBC:   Recent Labs   Lab 08/02/22 0427   WBC 7.15   HGB 10.3*   HCT 37.3        CMP:   Recent Labs   Lab 08/02/22 0427 08/03/22  1145     --    K 4.3  --    CL 92*  --    CO2 35*  --    GLU 98  --    BUN 13  --    CREATININE 0.8 0.9   CALCIUM 8.7  --    PROT 6.3  --    ALBUMIN 2.9*  --    BILITOT 0.3  --    ALKPHOS 221*  --    *  --    ALT 93*  --    ANIONGAP 12  --      Blood Culture, Routine   Date Value Ref Range Status   08/01/2022 No Growth to date  Preliminary   08/01/2022 No Growth to date  Preliminary     Significant Imaging: I have reviewed all pertinent imaging results/findings within the past 24 hours.

## 2022-08-04 NOTE — ASSESSMENT & PLAN NOTE
Acute on chronic respiratory failure with hypercapnia  Community acquired pneumonia of right lower lobe of lung  - Presented with acute on chronic hypercapnic and acute hypoxic respiratory failure, with lethargy and encephalopathy. She is not on home oxygen.   - Suspect hypoxia combination of pneumonia and heart failure/pulmonary edema.   - Suspect hypercapnia related to opiates and muscle relaxants she takes at home but there is a component of possible chronicity as HCO3 elevated on admit suggesting hypercapnia may be more chronic than acute but no previous ABGs to compare unfortunately. Patient has no history of any prior chronic lung disease.   - COVID negative on admit.   - CTA showing RLL consolidation with bronchogram concerning for possible CAP vs Aspiration PNA. Empiric abx initiated in ED with Zosyn/Vanc pending culture data and will continue.  Resp CX ordered but no sputum so far.  - Procal low at 0.10.   - Blood cultures from admit so far no growth.   - Supplemental O2 initiated and escalated to high flow to maintain sats. Encouraging ambulation, OOB for all meals, incentive spirometry use.   - Wean oxygen as tolerated with goal of oxygen sats > 92%.

## 2022-08-04 NOTE — RESPIRATORY THERAPY
RAPID RESPONSE RESPIRATORY CHART CHECK       Chart check completed.  Please call [phone] for further concerns or assistance.

## 2022-08-04 NOTE — ASSESSMENT & PLAN NOTE
· Improved 8/5, no tremors, mild confusion, but endorses hx of withdrawal in the past. hallucinoatinos improved. She reports had been cutting down in past on alcohol as had had WD before. Sister gives detailed hx also    · Continue MVI, Thiamine and Folate daily.   · Reports drinking 2 days ago. Denies symptoms or past history of withdrawals.   · Monitoring CIWA q shift, with plans to initiate benzos for CIWA >8. Patient started on scheduled Valium on 8/2 at 5 mg po TID as CIWA 17. Patient on prn Ativan as per withdrawal protocol.   · Ethanol level on presentation was negative.   · Trend hepatic function; AST to ALT ratio consistent with recent alcohol intake.   · Ammonia level okay at 31 on admit.

## 2022-08-04 NOTE — ASSESSMENT & PLAN NOTE
Seen incidentally on CTA chest on admit with 4.1 cm.   -needs to stop cigarettes as recently started again as a contributor. Long term will need to likely do asa ad statin, transaminits so avoid this now  -will refer to vascular surg OP for long term monitoring

## 2022-08-04 NOTE — RESPIRATORY THERAPY
RAPID RESPONSE RESPIRATORY THERAPY PROACTIVE ROUNDING NOTE             Time of visit: 739     Code Status: Full Code   : 1960  Bed: 35392/57954 A:   MRN: [id number]  Time spent at the bedside: < 15 min    SITUATION    Evaluated patient for: HFNC Compliance     BACKGROUND    Patient has a past medical history of Alcohol abuse, Anxiety, Benign paroxysmal positional vertigo, Depression, Hyperlipidemia, and Hypertension.    24 Hours Vitals Range:  Temp:  [98.3 °F (36.8 °C)-99.2 °F (37.3 °C)]   Pulse:  []   Resp:  [20-30]   BP: (104-134)/(53-84)   SpO2:  [91 %-97 %]     Labs:    Recent Labs     22  1746 22  0427 22  1145 22  0242    139  --  142   K 4.6 4.3  --  3.2*   CL 92* 92*  --  84*   CO2 32* 35*  --  45*   CREATININE 0.9 0.8 0.9 1.1   GLU 79 98  --  85   PHOS  --  2.5*  --   --    MG  --  1.6  --  1.4*        Recent Labs     22  1747   PH 7.343*   PCO2 82.7*   PO2 19*   HCO3 44.9*   POCSATURATED 24*   BE 19       ASSESSMENT/INTERVENTIONS        Last VS   Temp: 98.7 °F (37.1 °C) ( 1150)  Pulse: 83 ( 1150)  Resp: 30 ( 115)  BP: 134/75 (0)  SpO2: 95 % (1150)    Level of Consciousness: Level of Consciousness (AVPU): alert  Respiratory Effort: Respiratory Effort: Unlabored, Normal Expansion/Accessory Muscle Usage: Expansion/Accessory Muscles/Retractions: no use of accessory muscles  All Lung Field Breath Sounds: All Lung Fields Breath Sounds: coarse  O2 Device/Concentration: Flow (L/min): 10,  , O2 Device (Oxygen Therapy): High Flow nasal Cannula  Was the O2 device able to be weaned? No  Ambu at bedside: Ambu bag with the patient?: Yes, Adult Ambu    Active Orders   Respiratory Care    Incentive spirometry     Frequency: Q6H PRN     Number of Occurrences: Until Specified    Oxygen Continuous     Frequency: Continuous     Number of Occurrences: Until Specified     Order Questions:      Device type: High flow      Device: High Flow Nasal  Cannula (6 -15 Liters)      LPM: 10      Titrate O2 per Oxygen Titration Protocol: Yes      To maintain SpO2 goal of: >= 92%      Notify MD of: Inability to achieve desired SpO2; Sudden change in patient status and requires 20% increase in FiO2; Patient requires >60% FiO2    Pulse Oximetry Q4H     Frequency: Q4H     Number of Occurrences: Until Specified       RECOMMENDATIONS    We recommend: RRT Recs: Continue POC per primary team.    ESCALATION    .    FOLLOW-UP    Please call back the Rapid Response RT, Sandy Kennedy, RRT at x [phone] for any questions or concerns.

## 2022-08-04 NOTE — CARE UPDATE
RAPID RESPONSE NURSE ROUND       Rounding completed with charge RN, [place]. No concerns verbalized at this time. Instructed to call [phone] for further concerns or assistance.

## 2022-08-04 NOTE — ASSESSMENT & PLAN NOTE
Acute on chronic respiratory failure with hypercapnia  Community acquired pneumonia of right lower lobe of lung  - Presented with acute on chronic hypercapnic and acute hypoxic respiratory failure, with lethargy and encephalopathy. She is not on home oxygen.   - Suspect hypoxia combination of pneumonia and heart failure/pulmonary edema.   - Suspect hypercapnia related to opiates and muscle relaxants she takes at home but there is a component of possible chronicity as HCO3 elevated on admit suggesting hypercapnia may be more chronic than acute but no previous ABGs to compare unfortunately. Patient has no history of any prior chronic lung disease.   - COVID negative on admit.   - CTA showing RLL consolidation with bronchogram concerning for possible CAP vs Aspiration PNA. Empiric abx initiated in ED with Zosyn/Vanc pending culture data and will continue.   - Procal low at 0.10.   - Blood cultures from admit so far no growth.   - Supplemental O2 initiated and escalated to high flow to maintain sats. Encouraging ambulation, OOB for all meals, incentive spirometry use.   - Wean oxygen as tolerated with goal of oxygen sats > 92%.

## 2022-08-04 NOTE — PROGRESS NOTES
"[facility] ([address] Medicine  Progress Note    Patient Name: Vale Gutierrez  MRN: [id number]  Patient Class: IP- Inpatient   Admission Date: 8/1/2022  Length of Stay: 3 days  Attending Physician: Pratibha Jorgensen MD  Primary Care Provider: Estela Mcdaniel MD        Subjective:     Principal Problem:Acute hypoxemic respiratory failure        HPI:  61F w/ alcohol dependency/abuse, HTN on Toprol, HLD, depression/anxiety presenting to the ED with SOB and confusion. SOB and malaise onset a couple of days ago. Denies fevers, chills, rigors, chest pain, cough, productive sputum. Denies excessive drinking, drug use. No bowel irregularities, N/V. Denies orthopnea, LE swelling, KIRAN. Admits to having alcohol 2 days ago ("just one drink").     ED workup notable for elevated BP, normal WBC, elevation of AST//124, , trop .027, VBG w/ corrected pH of 7.4, CTA -ve for PE but notable for RLL consolidation w/ air bronchograms concerning for possible CAP v aspiration PNA and possible edema. She was placed on supplemental O2 and broad spectrum antibiotics and admitted to [facility].       Overview/Hospital Course:  Patient started on IV Vancomycin and Zosyn for RLL and right lingula pneumonia and significant hypoxia. CTA of chest negative for PE but concerning for PNA in RLL and lingula but also with changes concerning for pulmonary edema so also started on Lasix 40 mg BID. Echo done and showed:  · The left ventricle is normal in size with concentric hypertrophy and normal systolic function.  · The estimated ejection fraction is 60-65%.  · Grade I left ventricular diastolic dysfunction.  · Mild aortic regurgitation.  · Normal right ventricular size with normal right ventricular systolic function.  · The estimated PA systolic pressure is 43 mmHg.  · Intermediate central venous pressure (8 mmHg).  · There is pulmonary hypertension.  · Small circumferential pericardial effusion.  · Mild " left atrial enlargement.    Patient requiring 10 liters of high flow oxygen and not on oxygen at home. Patient with significant alcohol history and showing signs of early withdrawal with tremors and hallucinations on 8/2 so started on scheduled Valium 5 mg po TID and nursing monitoring CIWA with prn po Ativan for breakthrough agitation. Patient started on MVI, Thiamine and Folate as per alcohol withdrawal protocol.       Interval History:  she reports that she cant remmeber all that happened since admit but has been having some odd dreams ad confusion. I talked to her sister this AM who reports she was having hallcinations and dr mena report has been very confused/hallucinating with alcohol withdrawall. I let her know this was the concern for that and les slikely the PNA as cause and she said she has cut back on drinking lately but has had this happen before in the past. She feels like she has more secertions today as had not bee able to have a resp CX taken before but will try today to see if can collect one. Co2 higher today and cl lower so likely some overdiuresis possibly as likely more aspiration pneumonia/pneumonia so will scale down lasix to 20 daily for some superimposed volume as she said shes urinating well. Replacing K and Mg as likely secondary to diuresis as well. Liver enzymes improving well now in addition with being off alcohol. Still on 10L oxygen with sats in mid 90s so will titarte as tolerates but likely will take a few days I told her sister. Likely has some aspiration I suspect given on the right lower lobe as plasce for aspriation  seen. Her sister reports she takes the oxy long term for pain with pain management prescibing for long term back pain, had surgery years ago with ceestre but sometimes may take some extra doses here and tehre, if combined with alcohol an give aspiration risk. Has only take pain meds 3 times so is taking apropiately here I let her sister know. Smokes cigarettes  also so added nicotine patch for her in case any withdrawal from that. She is much more oriented today and asking questions and I set her up to eat breakfast today. Her sister is going to come visit this afternoona [employer] work. Will call to update her daily and will update her  if anything changes as had talked to ehr this morning before I saw patient to let her know plans.    Review of Systems   Constitutional:  Negative for fever.   Respiratory:  Negative for cough and shortness of breath.    Cardiovascular:  Negative for chest pain and leg swelling.   Gastrointestinal:  Negative for abdominal pain, nausea and vomiting.   Musculoskeletal:  Negative for arthralgias.   Skin:  Negative for rash.   Neurological:  Negative for dizziness, tremors and light-headedness.   Psychiatric/Behavioral:  Positive for agitation (At times) and confusion. Negative for hallucinations.    Objective:     Vital Signs (Most Recent):  Temp: 98.3 °F (36.8 °C) (08/03/22 0700)  Pulse: 75 (08/03/22 1100)  Resp: 18 (08/03/22 0833)  BP: 135/91 (08/03/22 0700)  SpO2: 95 % (08/03/22 1100) on 10 liters of oxygen high flow   Vital Signs (24h Range):  Temp:  [98.3 °F (36.8 °C)-99.2 °F (37.3 °C)] 98.7 °F (37.1 °C)  Pulse:  [] 83  Resp:  [20-30] 30  SpO2:  [91 %-97 %] 95 %  BP: (104-134)/(53-84) 134/75     Weight: 90.7 kg (200 lb)  Body mass index is 34.33 kg/m².    Intake/Output Summary (Last 24 hours) at 8/4/2022 1241  Last data filed at 8/4/2022 0000  Gross per 24 hour   Intake --   Output 1500 ml   Net -1500 ml        Physical Exam  Vitals and nursing note reviewed.   Constitutional:       General: She is not in acute distress.     Appearance: Normal appearance. She is obese. She is not ill-appearing.      Interventions: Nasal cannula in place.      Comments: Alert t place and situation and medical condition (knows events prior to admit)   Eyes:      General: No scleral icterus.     Conjunctiva/sclera: Conjunctivae normal.   Neck:       Vascular: No JVD.   Cardiovascular:      Rate and Rhythm: Normal rate and regular rhythm.      Heart sounds: Normal heart sounds. No murmur heard.    No friction rub. No gallop.   Pulmonary:      Effort: Pulmonary effort is normal. No respiratory distress.      Breath sounds: Rales present. No wheezing or rhonchi.      Comments: On HFNC. Dec breath sounds at Right lung base. No wheezg  Abdominal:      General: Abdomen is flat. Bowel sounds are normal. There is no distension.      Palpations: Abdomen is soft.      Tenderness: There is no abdominal tenderness.   Musculoskeletal:      Right lower leg: No edema.      Left lower leg: No edema.   Skin:     General: Skin is warm.      Findings: No erythema.   Neurological:      General: No focal deficit present.      Mental Status: She is alert.      Comments: Oriented to person and place and time   Psychiatric:         Mood and Affect: Mood normal.         Behavior: Behavior normal. Behavior is cooperative.      Comments: Occ a little confused and tangential but knows general situation and reasons for admission and place       Significant Labs:   CBC:   Recent Labs   Lab 08/04/22  0242   WBC 7.22   HGB 11.8*   HCT 42.9          CMP:   Recent Labs   Lab 08/03/22  1145 08/04/22  0242   NA  --  142   K  --  3.2*   CL  --  84*   CO2  --  45*   GLU  --  85   BUN  --  16   CREATININE 0.9 1.1   CALCIUM  --  9.3   PROT  --  6.9   ALBUMIN  --  2.8*   BILITOT  --  0.4   ALKPHOS  --  162*   AST  --  32   ALT  --  49*   ANIONGAP  --  13       Blood Culture, Routine   Date Value Ref Range Status   08/01/2022 No Growth to date  Preliminary   08/01/2022 No Growth to date  Preliminary   08/01/2022 No Growth to date  Preliminary     Significant Imaging: I have reviewed all pertinent imaging results/findings within the past 24 hours.      Assessment/Plan:      * Acute hypoxemic respiratory failure  Acute on chronic respiratory failure with hypercapnia  Community acquired pneumonia  of right lower lobe of lung  - Presented with acute on chronic hypercapnic and acute hypoxic respiratory failure, with lethargy and encephalopathy. She is not on home oxygen.   - Suspect hypoxia combination of pneumonia and heart failure/pulmonary edema.   - Suspect hypercapnia related to opiates and muscle relaxants she takes at home but there is a component of possible chronicity as HCO3 elevated on admit suggesting hypercapnia may be more chronic than acute but no previous ABGs to compare unfortunately. Patient has no history of any prior chronic lung disease.   - COVID negative on admit.   - CTA showing RLL consolidation with bronchogram concerning for possible CAP vs Aspiration PNA. Empiric abx initiated in ED with Zosyn/Vanc pending culture data and will continue.  Resp CX ordered but no sputum so far.  - Procal low at 0.10.   - Blood cultures from admit so far no growth.   - Supplemental O2 initiated and escalated to high flow to maintain sats. Encouraging ambulation, OOB for all meals, incentive spirometry use.   - Wean oxygen as tolerated with goal of oxygen sats > 92%.       Aortic aneurysm  Seen incidentally on CTA chest on admit with 4.1 cm.   -needs to stop cigarettes as recently started again as a contributor. Long term will need to likely do asa ad statin, transaminits so avoid this now  -will refer to vascular surg OP for long term monitoring      Hypomagnesemia  Replace prn      Hypokalemia  Replace prn      Acute on chronic diastolic congestive heart failure  Patient is identified as having Diastolic (HFpEF) heart failure that is Acute on chronic. CHF is currently uncontrolled due to Rales/crackles on pulmonary exam and Pulmonary edema/pleural effusion on CXR. Latest ECHO performed and demonstrates- Results for orders placed during the hospital encounter of 08/01/22    Echo    Interpretation Summary  · The left ventricle is normal in size with concentric hypertrophy and normal systolic function.  ·  The estimated ejection fraction is 60-65%.  · Grade I left ventricular diastolic dysfunction.  · Mild aortic regurgitation.  · Normal right ventricular size with normal right ventricular systolic function.  · The estimated PA systolic pressure is 43 mmHg.  · Intermediate central venous pressure (8 mmHg).  · There is pulmonary hypertension.  · Small circumferential pericardial effusion.  · Mild left atrial enlargement.  . Continue Metoprolol and IV Lasix 40 mg BID to treat. Monitor clinical status closely. Monitor on telemetry. Patient is off CHF pathway.  Monitor strict Is&Os and daily weights.  Place on fluid restriction of 1.5 L. Continue to stress to patient importance of self efficacy and  on diet for CHF. Last BNP reviewed- and noted below   Recent Labs   Lab 08/01/22  1746   *   .  Dec diuresis on 8/4 as CO2 rising from baseline in 30s to 45 today and suspect more pneumonia than volume. Change to lasix 20 daily      Encephalopathy, metabolic  · Present on admit.   · Paent appears disinhibited, transiently disoriented but easily redirectable and, though tangential, answering questions appropriately. Patient continues to show same pattern on 8/3.   · Suspect intoxication (endorsed alcohol use in the setting of opiates and muscle relaxants for her chronic pain) vs metabolic/infectious (pneumonia).  · Improved 8/5 as can recall events prior to admit but cant remember all of last 2 days since admit but is appropraite with questions now and responses  · CTH unremarkable.   · Delirium precautions.     Asymptomatic bacteriuria  Not complaining of dysuria, but possible confounder given encephalopathy. In any case, should be covered by empiric antibiotics she is currently on for her pneumonia.     Community acquired pneumonia of right lower lobe of lung  Likely aspiration PNA given RLL with hx of alcohol use a predisposition to this. No signs of aspiration now as awake and  alert.      Transaminitis  Improved from 245/124 on admit, now 32/49. Likely from alcohol on admit, improving off alcohol. Needs to stop long term or risks adverse health issues      Anxiety  Recurrent major depressive disorder, in full remission  Chronic, stable. Reports compliance with medications. Continue home Buspar and Mirtazapine to treat.     Chronic pain disorder  Lumbosacral radiculopathy  Chronic and controlled. Continue home Percocet prn for pain. Hold home muscle relaxants due to encephalopathy.     Lumbosacral radiculopathy  On chronic oxy 5 with pain management prescribing. Sees dr jason and had surgery previously also      Essential hypertension  Chronic and controlled an stable. Continue home Metoprolol 50 mg po daily to treat.     Alcohol dependence with withdrawal with perceptual disturbance  · Improved 8/5, no tremors, mild confusion, but endorses hx of withdrawal in the past. hallucinoatinos improved. She reports had been cutting down in past on alcohol as had had WD before. Sister gives detailed hx also    · Continue MVI, Thiamine and Folate daily.   · Reports drinking 2 days ago. Denies symptoms or past history of withdrawals.   · Monitoring CIWA q shift, with plans to initiate benzos for CIWA >8. Patient started on scheduled Valium on 8/2 at 5 mg po TID as CIWA 17. Patient on prn Ativan as per withdrawal protocol.   · Ethanol level on presentation was negative.   · Trend hepatic function; AST to ALT ratio consistent with recent alcohol intake.   · Ammonia level okay at 31 on admit.       VTE Risk Mitigation (From admission, onward)         Ordered     IP VTE HIGH RISK PATIENT  Once         08/02/22 0055     Place sequential compression device  Until discontinued         08/02/22 0055                Discharge Planning   DIXON: 8/6/2022     Code Status: Full Code   Is the patient medically ready for discharge?: No    Reason for patient still in hospital (select all that apply): Patient  trending condition  Discharge Plan A: Home with family                  Pratibha Jorgensen MD  Department of Hospital Medicine   [facility] - Intensive Care ([place])

## 2022-08-04 NOTE — ASSESSMENT & PLAN NOTE
On chronic oxy 5 with pain management prescribing. Sees dr jason and had surgery previously also

## 2022-08-04 NOTE — ASSESSMENT & PLAN NOTE
Patient is identified as having Diastolic (HFpEF) heart failure that is Acute on chronic. CHF is currently uncontrolled due to Rales/crackles on pulmonary exam and Pulmonary edema/pleural effusion on CXR. Latest ECHO performed and demonstrates- Results for orders placed during the hospital encounter of 08/01/22    Echo    Interpretation Summary  · The left ventricle is normal in size with concentric hypertrophy and normal systolic function.  · The estimated ejection fraction is 60-65%.  · Grade I left ventricular diastolic dysfunction.  · Mild aortic regurgitation.  · Normal right ventricular size with normal right ventricular systolic function.  · The estimated PA systolic pressure is 43 mmHg.  · Intermediate central venous pressure (8 mmHg).  · There is pulmonary hypertension.  · Small circumferential pericardial effusion.  · Mild left atrial enlargement.  . Continue Metoprolol and IV Lasix 40 mg BID to treat. Monitor clinical status closely. Monitor on telemetry. Patient is off CHF pathway.  Monitor strict Is&Os and daily weights.  Place on fluid restriction of 1.5 L. Continue to stress to patient importance of self efficacy and  on diet for CHF. Last BNP reviewed- and noted below   Recent Labs   Lab 08/01/22  1746   *   .  Dec diuresis on 8/4 as CO2 rising from baseline in 30s to 45 today and suspect more pneumonia than volume. Change to lasix 20 daily

## 2022-08-04 NOTE — SUBJECTIVE & OBJECTIVE
Interval History:  she reports that she cant remmeber all that happened since admit but has been having some odd dreams ad confusion. I talked to her sister this AM who reports she was having hallcinations and dr mena report has been very confused/hallucinating with alcohol withdrawall. I let her know this was the concern for that and les slikely the PNA as cause and she said she has cut back on drinking lately but has had this happen before in the past. She feels like she has more secertions today as had not bee able to have a resp CX taken before but will try today to see if can collect one. Co2 higher today and cl lower so likely some overdiuresis possibly as likely more aspiration pneumonia/pneumonia so will scale down lasix to 20 daily for some superimposed volume as she said shes urinating well. Replacing K and Mg as likely secondary to diuresis as well. Liver enzymes improving well now in addition with being off alcohol. Still on 10L oxygen with sats in mid 90s so will titarte as tolerates but likely will take a few days I told her sister. Likely has some aspiration I suspect given on the right lower lobe as plasce for aspriation  seen. Her sister reports she takes the oxy long term for pain with pain management prescibing for long term back pain, had surgery years ago with ceestre but sometimes may take some extra doses here and tehre, if combined with alcohol an give aspiration risk. Has only take pain meds 3 times so is taking apropiately here I let her sister know. Smokes cigarettes also so added nicotine patch for her in case any withdrawal from that. She is much more oriented today and asking questions and I set her up to eat breakfast today. Her sister is going to come visit this afternoona fter work. Will call to update her daily and will update her  if anything changes as had talked to ehr this morning before I saw patient to let her know plans.    Review of Systems   Constitutional:  Negative for  fever.   Respiratory:  Negative for cough and shortness of breath.    Cardiovascular:  Negative for chest pain and leg swelling.   Gastrointestinal:  Negative for abdominal pain, nausea and vomiting.   Musculoskeletal:  Negative for arthralgias.   Skin:  Negative for rash.   Neurological:  Negative for dizziness, tremors and light-headedness.   Psychiatric/Behavioral:  Positive for agitation (At times) and confusion. Negative for hallucinations.    Objective:     Vital Signs (Most Recent):  Temp: 98.3 °F (36.8 °C) (08/03/22 0700)  Pulse: 75 (08/03/22 1100)  Resp: 18 (08/03/22 0833)  BP: 135/91 (08/03/22 0700)  SpO2: 95 % (08/03/22 1100) on 10 liters of oxygen high flow   Vital Signs (24h Range):  Temp:  [98.3 °F (36.8 °C)-99.2 °F (37.3 °C)] 98.7 °F (37.1 °C)  Pulse:  [] 83  Resp:  [20-30] 30  SpO2:  [91 %-97 %] 95 %  BP: (104-134)/(53-84) 134/75     Weight: 90.7 kg (200 lb)  Body mass index is 34.33 kg/m².    Intake/Output Summary (Last 24 hours) at 8/4/2022 1241  Last data filed at 8/4/2022 0000  Gross per 24 hour   Intake --   Output 1500 ml   Net -1500 ml        Physical Exam  Vitals and nursing note reviewed.   Constitutional:       General: She is not in acute distress.     Appearance: Normal appearance. She is obese. She is not ill-appearing.      Interventions: Nasal cannula in place.      Comments: Alert t place and situation and medical condition (knows events prior to admit)   Eyes:      General: No scleral icterus.     Conjunctiva/sclera: Conjunctivae normal.   Neck:      Vascular: No JVD.   Cardiovascular:      Rate and Rhythm: Normal rate and regular rhythm.      Heart sounds: Normal heart sounds. No murmur heard.    No friction rub. No gallop.   Pulmonary:      Effort: Pulmonary effort is normal. No respiratory distress.      Breath sounds: Rales present. No wheezing or rhonchi.      Comments: On HFNC. Dec breath sounds at Right lung base. No wheezg  Abdominal:      General: Abdomen is flat.  Bowel sounds are normal. There is no distension.      Palpations: Abdomen is soft.      Tenderness: There is no abdominal tenderness.   Musculoskeletal:      Right lower leg: No edema.      Left lower leg: No edema.   Skin:     General: Skin is warm.      Findings: No erythema.   Neurological:      General: No focal deficit present.      Mental Status: She is alert.      Comments: Oriented to person and place and time   Psychiatric:         Mood and Affect: Mood normal.         Behavior: Behavior normal. Behavior is cooperative.      Comments: Occ a little confused and tangential but knows general situation and reasons for admission and place       Significant Labs:   CBC:   Recent Labs   Lab 08/04/22  0242   WBC 7.22   HGB 11.8*   HCT 42.9          CMP:   Recent Labs   Lab 08/03/22  1145 08/04/22 0242   NA  --  142   K  --  3.2*   CL  --  84*   CO2  --  45*   GLU  --  85   BUN  --  16   CREATININE 0.9 1.1   CALCIUM  --  9.3   PROT  --  6.9   ALBUMIN  --  2.8*   BILITOT  --  0.4   ALKPHOS  --  162*   AST  --  32   ALT  --  49*   ANIONGAP  --  13       Blood Culture, Routine   Date Value Ref Range Status   08/01/2022 No Growth to date  Preliminary   08/01/2022 No Growth to date  Preliminary   08/01/2022 No Growth to date  Preliminary     Significant Imaging: I have reviewed all pertinent imaging results/findings within the past 24 hours.

## 2022-08-04 NOTE — PROGRESS NOTES
"[facility] ([address] Medicine  Progress Note    Patient Name: Vale Gutierrez  MRN: [id number]  Patient Class: IP- Inpatient   Admission Date: 8/1/2022  Length of Stay: 2 days  Attending Physician: Marcia North MD  Primary Care Provider: Estela Mcdaniel MD        Subjective:     Principal Problem:Acute hypoxemic respiratory failure        HPI:  61F w/ alcohol dependency/abuse, HTN on Toprol, HLD, depression/anxiety presenting to the ED with SOB and confusion. SOB and malaise onset a couple of days ago. Denies fevers, chills, rigors, chest pain, cough, productive sputum. Denies excessive drinking, drug use. No bowel irregularities, N/V. Denies orthopnea, LE swelling, KIRAN. Admits to having alcohol 2 days ago ("just one drink").     ED workup notable for elevated BP, normal WBC, elevation of AST//124, , trop .027, VBG w/ corrected pH of 7.4, CTA -ve for PE but notable for RLL consolidation w/ air bronchograms concerning for possible CAP v aspiration PNA and possible edema. She was placed on supplemental O2 and broad spectrum antibiotics and admitted to [facility].       Overview/Hospital Course:  Patient started on IV Vancomycin and Zosyn for RLL and right lingula pneumonia and significant hypoxia. CTA of chest negative for PE but concerning for PNA in RLL and lingula but also with changes concerning for pulmonary edema so also started on Lasix 40 mg BID. Echo done and showed:  · The left ventricle is normal in size with concentric hypertrophy and normal systolic function.  · The estimated ejection fraction is 60-65%.  · Grade I left ventricular diastolic dysfunction.  · Mild aortic regurgitation.  · Normal right ventricular size with normal right ventricular systolic function.  · The estimated PA systolic pressure is 43 mmHg.  · Intermediate central venous pressure (8 mmHg).  · There is pulmonary hypertension.  · Small circumferential pericardial effusion.  · Mild " left atrial enlargement.    Patient requiring 10 liters of high flow oxygen and not on oxygen at home. Patient with significant alcohol history and showing signs of early withdrawal with tremors and hallucinations on 8/2 so started on scheduled Valium 5 mg po TID and nursing monitoring CIWA with prn po Ativan for breakthrough agitation. Patient started on MVI, Thiamine and Folate as per alcohol withdrawal protocol.       Interval History: Patient started on IV Vancomycin and Zosyn for RLL and right lingula pneumonia and significant hypoxia found on admit yesterday. CTA of chest negative for PE but concerning for PNA in RLL and lingula but also with changes concerning for pulmonary edema so also started on Lasix 40 mg BID yesterday as also may be contributing to hypoxia as well as pneumonia. Echo done and showed:  · The left ventricle is normal in size with concentric hypertrophy and normal systolic function.  · The estimated ejection fraction is 60-65%.  · Grade I left ventricular diastolic dysfunction.  · Mild aortic regurgitation.  · Normal right ventricular size with normal right ventricular systolic function.  · The estimated PA systolic pressure is 43 mmHg.  · Intermediate central venous pressure (8 mmHg).  · There is pulmonary hypertension.  · Small circumferential pericardial effusion.  · Mild left atrial enlargement.    Patient still requiring 10 liters of high flow oxygen today and not on oxygen at home. Patient with significant alcohol history and showing signs of early withdrawal with tremors and hallucinations yesterday so started on scheduled Valium 5 mg po TID and nursing monitoring CIWA with prn po Ativan for breakthrough agitation. Patient on exam today is confused but knew she was in hospital and knew year 2022 and Vipin was President but nursing reports patient gets confused and has pulled out several IVs today so restraints for soft mittens ordered. Patient on exam is confused and all over the  place on questioning. Patient states her tremors are better today and denies ay hallucinations. Patient states she is not SOB and has no chest pain. Patient asked to please be called Vale and not Mrs. Gutierrez.     Review of Systems   Constitutional:  Negative for fever.   Respiratory:  Negative for cough and shortness of breath.    Cardiovascular:  Negative for chest pain and leg swelling.   Gastrointestinal:  Negative for abdominal pain, nausea and vomiting.   Musculoskeletal:  Negative for arthralgias.   Skin:  Negative for rash.   Neurological:  Negative for dizziness, tremors and light-headedness.   Psychiatric/Behavioral:  Positive for agitation (At times) and confusion. Negative for hallucinations.    Objective:     Vital Signs (Most Recent):  Temp: 98.3 °F (36.8 °C) (08/03/22 0700)  Pulse: 75 (08/03/22 1100)  Resp: 18 (08/03/22 0833)  BP: 135/91 (08/03/22 0700)  SpO2: 95 % (08/03/22 1100) on 10 liters of oxygen high flow   Vital Signs (24h Range):  Temp:  [98.1 °F (36.7 °C)-99.1 °F (37.3 °C)] 98.3 °F (36.8 °C)  Pulse:  [] 75  Resp:  [23-60] 25  SpO2:  [92 %-95 %] 95 %  BP: (107-135)/(62-91) 135/91     Weight: 90.7 kg (200 lb)  Body mass index is 34.33 kg/m².    Intake/Output Summary (Last 24 hours) at 8/3/2022 1934  Last data filed at 8/3/2022 0630  Gross per 24 hour   Intake 350 ml   Output 2800 ml   Net -2450 ml      Physical Exam  Vitals and nursing note reviewed.   Constitutional:       General: She is not in acute distress.     Appearance: Normal appearance. She is obese. She is not ill-appearing.      Interventions: Nasal cannula in place.   Eyes:      General: No scleral icterus.     Conjunctiva/sclera: Conjunctivae normal.   Neck:      Vascular: No JVD.   Cardiovascular:      Rate and Rhythm: Normal rate and regular rhythm.      Heart sounds: Normal heart sounds. No murmur heard.    No friction rub. No gallop.   Pulmonary:      Effort: Pulmonary effort is normal. No respiratory distress.       Breath sounds: Rales present. No wheezing or rhonchi.   Abdominal:      General: Abdomen is flat. Bowel sounds are normal. There is no distension.      Palpations: Abdomen is soft.      Tenderness: There is no abdominal tenderness.   Musculoskeletal:      Right lower leg: No edema.      Left lower leg: No edema.   Skin:     General: Skin is warm.      Findings: No erythema.   Neurological:      Mental Status: She is alert.      Comments: Oriented to person and place and time but confused about situation.    Psychiatric:         Mood and Affect: Mood normal.         Behavior: Behavior normal. Behavior is cooperative.      Comments: Thought is disorganized at times on questioning and needs lots of redirection on questioning.        Significant Labs:   CBC:   Recent Labs   Lab 08/02/22 0427   WBC 7.15   HGB 10.3*   HCT 37.3        CMP:   Recent Labs   Lab 08/02/22 0427 08/03/22  1145     --    K 4.3  --    CL 92*  --    CO2 35*  --    GLU 98  --    BUN 13  --    CREATININE 0.8 0.9   CALCIUM 8.7  --    PROT 6.3  --    ALBUMIN 2.9*  --    BILITOT 0.3  --    ALKPHOS 221*  --    *  --    ALT 93*  --    ANIONGAP 12  --      Blood Culture, Routine   Date Value Ref Range Status   08/01/2022 No Growth to date  Preliminary   08/01/2022 No Growth to date  Preliminary     Significant Imaging: I have reviewed all pertinent imaging results/findings within the past 24 hours.      Assessment/Plan:      * Acute hypoxemic respiratory failure  Acute on chronic respiratory failure with hypercapnia  Community acquired pneumonia of right lower lobe of lung  - Presented with acute on chronic hypercapnic and acute hypoxic respiratory failure, with lethargy and encephalopathy. She is not on home oxygen.   - Suspect hypoxia combination of pneumonia and heart failure/pulmonary edema.   - Suspect hypercapnia related to opiates and muscle relaxants she takes at home but there is a component of possible chronicity as HCO3  elevated on admit suggesting hypercapnia may be more chronic than acute but no previous ABGs to compare unfortunately. Patient has no history of any prior chronic lung disease.   - COVID negative on admit.   - CTA showing RLL consolidation with bronchogram concerning for possible CAP vs Aspiration PNA. Empiric abx initiated in ED with Zosyn/Vanc pending culture data and will continue.   - Procal low at 0.10.   - Blood cultures from admit so far no growth.   - Supplemental O2 initiated and escalated to high flow to maintain sats. Encouraging ambulation, OOB for all meals, incentive spirometry use.   - Wean oxygen as tolerated with goal of oxygen sats > 92%.       Acute on chronic diastolic congestive heart failure  Patient is identified as having Diastolic (HFpEF) heart failure that is Acute on chronic. CHF is currently uncontrolled due to Rales/crackles on pulmonary exam and Pulmonary edema/pleural effusion on CXR. Latest ECHO performed and demonstrates- Results for orders placed during the hospital encounter of 08/01/22    Echo    Interpretation Summary  · The left ventricle is normal in size with concentric hypertrophy and normal systolic function.  · The estimated ejection fraction is 60-65%.  · Grade I left ventricular diastolic dysfunction.  · Mild aortic regurgitation.  · Normal right ventricular size with normal right ventricular systolic function.  · The estimated PA systolic pressure is 43 mmHg.  · Intermediate central venous pressure (8 mmHg).  · There is pulmonary hypertension.  · Small circumferential pericardial effusion.  · Mild left atrial enlargement.  . Continue Metoprolol and IV Lasix 40 mg BID to treat. Monitor clinical status closely. Monitor on telemetry. Patient is off CHF pathway.  Monitor strict Is&Os and daily weights.  Place on fluid restriction of 1.5 L. Continue to stress to patient importance of self efficacy and  on diet for CHF. Last BNP reviewed- and noted below   Recent Labs    Lab 08/01/22  1746   *   .      Encephalopathy, metabolic  · Present on admit.   · Paent appears disinhibited, transiently disoriented but easily redirectable and, though tangential, answering questions appropriately. Patient continues to show same pattern on 8/3.   · Suspect intoxication (endorsed alcohol use in the setting of opiates and muscle relaxants for her chronic pain) vs metabolic/infectious (pneumonia).  · CTH unremarkable.   · Delirium precautions.     Alcohol dependence with withdrawal with perceptual disturbance  · Patient exhibiting signs of withdrawal with confusion and tremors on 8/2 and started on scheduled Valium 5 mg po every 8 hours and will continue as patient still exhibiting signs of withdrawal on 8/3.   · Continue MVI, Thiamine and Folate daily.   · Reports drinking 2 days ago. Denies symptoms or past history of withdrawals.   · Monitoring CIWA q shift, with plans to initiate benzos for CIWA >8. Patient started on scheduled Valium on 8/2 at 5 mg po TID as CIWA 17. Patient on prn Ativan as per withdrawal protocol.   · Ethanol level on presentation was negative.   · Trend hepatic function; AST to ALT ratio consistent with recent alcohol intake.   · Ammonia level okay at 31 on admit.     Essential hypertension  Chronic and controlled an stable. Continue home Metoprolol 50 mg po daily to treat.     Chronic pain disorder  Lumbosacral radiculopathy  Chronic and controlled. Continue home Percocet prn for pain. Hold home muscle relaxants due to encephalopathy.     Asymptomatic bacteriuria  Not complaining of dysuria, but possible confounder given encephalopathy. In any case, should be covered by empiric antibiotics she is currently on for her pneumonia.     Anxiety  Recurrent major depressive disorder, in full remission  Chronic, stable. Reports compliance with medications. Continue home Buspar and Mirtazapine to treat.       VTE Risk Mitigation (From admission, onward)         Ordered      IP VTE HIGH RISK PATIENT  Once         08/02/22 0055     Place sequential compression device  Until discontinued         08/02/22 0055                Discharge Planning   DIXON: 8/6/2022     Code Status: Full Code   Is the patient medically ready for discharge?: No    Reason for patient still in hospital (select all that apply): Patient unstable and Patient trending condition  Discharge Plan A: Home with family      Marcia North MD  Department of Hospital Medicine   [facility] - Intensive Care ([place])

## 2022-08-04 NOTE — ASSESSMENT & PLAN NOTE
Not complaining of dysuria, but possible confounder given encephalopathy. In any case, should be covered by empiric antibiotics she is currently on for her pneumonia.

## 2022-08-04 NOTE — ASSESSMENT & PLAN NOTE
· Present on admit.   · Paent appears disinhibited, transiently disoriented but easily redirectable and, though tangential, answering questions appropriately. Patient continues to show same pattern on 8/3.   · Suspect intoxication (endorsed alcohol use in the setting of opiates and muscle relaxants for her chronic pain) vs metabolic/infectious (pneumonia).  · Improved 8/5 as can recall events prior to admit but cant remember all of last 2 days since admit but is appropraite with questions now and responses  · CTH unremarkable.   · Delirium precautions.

## 2022-08-04 NOTE — ASSESSMENT & PLAN NOTE
Lumbosacral radiculopathy  Chronic and controlled. Continue home Percocet prn for pain. Hold home muscle relaxants due to encephalopathy.

## 2022-08-04 NOTE — HOSPITAL COURSE
Patient started on IV Vancomycin and Zosyn for RLL and right lingula pneumonia and significant hypoxia. CTA of chest negative for PE but concerning for PNA in RLL and lingula but also with changes concerning for pulmonary edema so also started on Lasix 40 mg BID. Echo done and showed:  The left ventricle is normal in size with concentric hypertrophy and normal systolic function.  The estimated ejection fraction is 60-65%.  Grade I left ventricular diastolic dysfunction.  Mild aortic regurgitation.  Normal right ventricular size with normal right ventricular systolic function.  The estimated PA systolic pressure is 43 mmHg.  Intermediate central venous pressure (8 mmHg).  There is pulmonary hypertension.  Small circumferential pericardial effusion.  Mild left atrial enlargement.    Patient requiring 10 liters of high flow oxygen and not on oxygen at home. Patient with significant alcohol history and showing signs of early withdrawal with tremors and hallucinations on 8/2 so started on scheduled Valium 5 mg po TID and nursing monitoring CIWA with prn po Ativan for breakthrough agitation. Patient started on MVI, Thiamine and Folate as per alcohol withdrawal protocol.     She experienced hallucinations and tremor, consistent with alcohol withdrawal / delirium, and was put on CIWA protocol with valium PRN (shortage of ativan). She was treated with antibiotics (vancomycin, zosyn, and azithro) scheduled for 10 days and duonebs prn. On 8/7 she developed worsening hypoxic respiratory failure, was put on 50L 100% O2 bipap, and admitted to the ICU due to increased oxygen requirements. Work up revealed small pericardial effusion, cardiomegaly, normal LVEF, pleural effusions and opacities bilaterally, and atrial fibrillation. Patient has now completed courses of zosyn, vancomycin, and azithromycin. She was subsequently given diflucan  and meropenem out of concern for respiratory infection, stopped 8/15. On lasix BID for  pulmonary edema. She was started on high dose steroids 8/13 as there was concern for autoimmune respiratory process, and labs were sent for C-ANCA and P-ANCA (currently pending). She previously had a +BRANDYN titer, but antiCCP, RF, and  anti-dsDNA were negative. Patient's CXR and respiratory status improving on 8/14, able to tolerate CF down to 64% O2. 8/16 patient O2 requirements increasing, O2 increased to 90%.  Repeat CXR 8/16 with right cardiophrenic and left basilar-retrocardiac opacities. Repeat CT chest with no remarkable changes, continues to have atelectasis. 8/17 weaned to 50L, 70% O2. 8/19 on 50L, 55% O2. Patient has been accepted to LTAC, pending insurance.     Patient stepped down to floor

## 2022-08-04 NOTE — ASSESSMENT & PLAN NOTE
Recurrent major depressive disorder, in full remission  Chronic, stable. Reports compliance with medications. Continue home Buspar and Mirtazapine to treat.

## 2022-08-04 NOTE — NURSING
Patient alert with confusion. Denies any pain or or discomfort. Denies any respiratory distress. Safety measure in place. Bed in lowest position and call light within reach.

## 2022-08-04 NOTE — ASSESSMENT & PLAN NOTE
Improved from 245/124 on admit, now 32/49. Likely from alcohol on admit, improving off alcohol. Needs to stop long term or risks adverse health issues

## 2022-08-04 NOTE — NURSING
Patient heart elevated to 171. On call provider notified. EKG and chest x-ray ordered. Patient HR return to 110. Patient stable. No s/s of distress noted.

## 2022-08-04 NOTE — PLAN OF CARE
Alert and oriented x 4 with periods of confusion. Hi flow O2 at 10 L / NC. Speech is clear. VS stable. CIWA presently at 2. Compliant with meds and treatment. Safety measures in place. Bed in low position. Call light in reach.

## 2022-08-04 NOTE — ASSESSMENT & PLAN NOTE
Patient is identified as having Diastolic (HFpEF) heart failure that is Acute on chronic. CHF is currently uncontrolled due to Rales/crackles on pulmonary exam and Pulmonary edema/pleural effusion on CXR. Latest ECHO performed and demonstrates- Results for orders placed during the hospital encounter of 08/01/22    Echo    Interpretation Summary  · The left ventricle is normal in size with concentric hypertrophy and normal systolic function.  · The estimated ejection fraction is 60-65%.  · Grade I left ventricular diastolic dysfunction.  · Mild aortic regurgitation.  · Normal right ventricular size with normal right ventricular systolic function.  · The estimated PA systolic pressure is 43 mmHg.  · Intermediate central venous pressure (8 mmHg).  · There is pulmonary hypertension.  · Small circumferential pericardial effusion.  · Mild left atrial enlargement.  . Continue Metoprolol and IV Lasix 40 mg BID to treat. Monitor clinical status closely. Monitor on telemetry. Patient is off CHF pathway.  Monitor strict Is&Os and daily weights.  Place on fluid restriction of 1.5 L. Continue to stress to patient importance of self efficacy and  on diet for CHF. Last BNP reviewed- and noted below   Recent Labs   Lab 08/01/22  5816   *   .

## 2022-08-04 NOTE — ASSESSMENT & PLAN NOTE
· Present on admit.   · Paent appears disinhibited, transiently disoriented but easily redirectable and, though tangential, answering questions appropriately. Patient continues to show same pattern on 8/3.   · Suspect intoxication (endorsed alcohol use in the setting of opiates and muscle relaxants for her chronic pain) vs metabolic/infectious (pneumonia).  · CTH unremarkable.   · Delirium precautions.

## 2022-08-05 PROBLEM — J69.0 ASPIRATION PNEUMONIA: Status: ACTIVE | Noted: 2022-08-05

## 2022-08-05 LAB
ALBUMIN SERPL BCP-MCNC: 2.6 G/DL (ref 3.5–5.2)
ALP SERPL-CCNC: 134 U/L (ref 55–135)
ALT SERPL W/O P-5'-P-CCNC: 34 U/L (ref 10–44)
ANION GAP SERPL CALC-SCNC: 12 MMOL/L (ref 8–16)
AST SERPL-CCNC: 18 U/L (ref 10–40)
BASOPHILS # BLD AUTO: 0.05 K/UL (ref 0–0.2)
BASOPHILS NFR BLD: 0.7 % (ref 0–1.9)
BILIRUB SERPL-MCNC: 0.4 MG/DL (ref 0.1–1)
BUN SERPL-MCNC: 15 MG/DL (ref 8–23)
CALCIUM SERPL-MCNC: 9.2 MG/DL (ref 8.7–10.5)
CHLORIDE SERPL-SCNC: 91 MMOL/L (ref 95–110)
CO2 SERPL-SCNC: 39 MMOL/L (ref 23–29)
CREAT SERPL-MCNC: 0.8 MG/DL (ref 0.5–1.4)
DIFFERENTIAL METHOD: ABNORMAL
EOSINOPHIL # BLD AUTO: 0.2 K/UL (ref 0–0.5)
EOSINOPHIL NFR BLD: 2.7 % (ref 0–8)
ERYTHROCYTE [DISTWIDTH] IN BLOOD BY AUTOMATED COUNT: 16.9 % (ref 11.5–14.5)
EST. GFR  (NO RACE VARIABLE): >60 ML/MIN/1.73 M^2
GLUCOSE SERPL-MCNC: 81 MG/DL (ref 70–110)
HCT VFR BLD AUTO: 37.7 % (ref 37–48.5)
HGB BLD-MCNC: 10.5 G/DL (ref 12–16)
IMM GRANULOCYTES # BLD AUTO: 0.02 K/UL (ref 0–0.04)
IMM GRANULOCYTES NFR BLD AUTO: 0.3 % (ref 0–0.5)
LYMPHOCYTES # BLD AUTO: 1.1 K/UL (ref 1–4.8)
LYMPHOCYTES NFR BLD: 15.2 % (ref 18–48)
MAGNESIUM SERPL-MCNC: 2 MG/DL (ref 1.6–2.6)
MCH RBC QN AUTO: 23.4 PG (ref 27–31)
MCHC RBC AUTO-ENTMCNC: 27.9 G/DL (ref 32–36)
MCV RBC AUTO: 84 FL (ref 82–98)
MONOCYTES # BLD AUTO: 0.9 K/UL (ref 0.3–1)
MONOCYTES NFR BLD: 11.8 % (ref 4–15)
NEUTROPHILS # BLD AUTO: 5.1 K/UL (ref 1.8–7.7)
NEUTROPHILS NFR BLD: 69.3 % (ref 38–73)
NRBC BLD-RTO: 0 /100 WBC
PLATELET # BLD AUTO: 298 K/UL (ref 150–450)
PMV BLD AUTO: 9.9 FL (ref 9.2–12.9)
POTASSIUM SERPL-SCNC: 3.6 MMOL/L (ref 3.5–5.1)
PROT SERPL-MCNC: 5.9 G/DL (ref 6–8.4)
RBC # BLD AUTO: 4.49 M/UL (ref 4–5.4)
SODIUM SERPL-SCNC: 142 MMOL/L (ref 136–145)
VANCOMYCIN TROUGH SERPL-MCNC: 19.8 UG/ML (ref 10–22)
WBC # BLD AUTO: 7.37 K/UL (ref 3.9–12.7)

## 2022-08-05 PROCEDURE — 80202 ASSAY OF VANCOMYCIN: CPT | Performed by: INTERNAL MEDICINE

## 2022-08-05 PROCEDURE — 25000003 PHARM REV CODE 250: Performed by: HOSPITALIST

## 2022-08-05 PROCEDURE — 97161 PT EVAL LOW COMPLEX 20 MIN: CPT

## 2022-08-05 PROCEDURE — 83735 ASSAY OF MAGNESIUM: CPT | Performed by: HOSPITALIST

## 2022-08-05 PROCEDURE — 99900035 HC TECH TIME PER 15 MIN (STAT)

## 2022-08-05 PROCEDURE — 97530 THERAPEUTIC ACTIVITIES: CPT

## 2022-08-05 PROCEDURE — 99233 SBSQ HOSP IP/OBS HIGH 50: CPT | Mod: ,,, | Performed by: HOSPITALIST

## 2022-08-05 PROCEDURE — 25000003 PHARM REV CODE 250: Performed by: INTERNAL MEDICINE

## 2022-08-05 PROCEDURE — 94640 AIRWAY INHALATION TREATMENT: CPT

## 2022-08-05 PROCEDURE — 25000242 PHARM REV CODE 250 ALT 637 W/ HCPCS: Performed by: HOSPITALIST

## 2022-08-05 PROCEDURE — 63600175 PHARM REV CODE 636 W HCPCS: Performed by: HOSPITALIST

## 2022-08-05 PROCEDURE — 92610 EVALUATE SWALLOWING FUNCTION: CPT

## 2022-08-05 PROCEDURE — 97535 SELF CARE MNGMENT TRAINING: CPT

## 2022-08-05 PROCEDURE — 27000221 HC OXYGEN, UP TO 24 HOURS

## 2022-08-05 PROCEDURE — 80053 COMPREHEN METABOLIC PANEL: CPT | Performed by: HOSPITALIST

## 2022-08-05 PROCEDURE — 20600001 HC STEP DOWN PRIVATE ROOM

## 2022-08-05 PROCEDURE — S4991 NICOTINE PATCH NONLEGEND: HCPCS | Performed by: HOSPITALIST

## 2022-08-05 PROCEDURE — 36415 COLL VENOUS BLD VENIPUNCTURE: CPT | Performed by: HOSPITALIST

## 2022-08-05 PROCEDURE — 97165 OT EVAL LOW COMPLEX 30 MIN: CPT

## 2022-08-05 PROCEDURE — 99233 PR SUBSEQUENT HOSPITAL CARE,LEVL III: ICD-10-PCS | Mod: ,,, | Performed by: HOSPITALIST

## 2022-08-05 PROCEDURE — 36415 COLL VENOUS BLD VENIPUNCTURE: CPT | Performed by: INTERNAL MEDICINE

## 2022-08-05 PROCEDURE — 94761 N-INVAS EAR/PLS OXIMETRY MLT: CPT

## 2022-08-05 PROCEDURE — 85025 COMPLETE CBC W/AUTO DIFF WBC: CPT | Performed by: HOSPITALIST

## 2022-08-05 RX ORDER — IPRATROPIUM BROMIDE AND ALBUTEROL SULFATE 2.5; .5 MG/3ML; MG/3ML
3 SOLUTION RESPIRATORY (INHALATION)
Status: DISCONTINUED | OUTPATIENT
Start: 2022-08-05 | End: 2022-08-22

## 2022-08-05 RX ORDER — DIAZEPAM 5 MG/1
5 TABLET ORAL EVERY 8 HOURS
Status: DISCONTINUED | OUTPATIENT
Start: 2022-08-05 | End: 2022-08-07

## 2022-08-05 RX ORDER — DIAZEPAM 5 MG/1
5 TABLET ORAL EVERY 12 HOURS
Status: DISCONTINUED | OUTPATIENT
Start: 2022-08-05 | End: 2022-08-05

## 2022-08-05 RX ADMIN — FUROSEMIDE 20 MG: 10 INJECTION, SOLUTION INTRAMUSCULAR; INTRAVENOUS at 06:08

## 2022-08-05 RX ADMIN — DIAZEPAM 5 MG: 5 TABLET ORAL at 09:08

## 2022-08-05 RX ADMIN — PIPERACILLIN SODIUM AND TAZOBACTAM SODIUM 4.5 G: 4; .5 INJECTION, POWDER, LYOPHILIZED, FOR SOLUTION INTRAVENOUS at 03:08

## 2022-08-05 RX ADMIN — NICOTINE 1 PATCH: 14 PATCH, EXTENDED RELEASE TRANSDERMAL at 08:08

## 2022-08-05 RX ADMIN — OXYCODONE HYDROCHLORIDE AND ACETAMINOPHEN 1 TABLET: 5; 325 TABLET ORAL at 08:08

## 2022-08-05 RX ADMIN — IPRATROPIUM BROMIDE AND ALBUTEROL SULFATE 3 ML: 2.5; .5 SOLUTION RESPIRATORY (INHALATION) at 04:08

## 2022-08-05 RX ADMIN — SENNOSIDES AND DOCUSATE SODIUM 1 TABLET: 50; 8.6 TABLET ORAL at 08:08

## 2022-08-05 RX ADMIN — PIPERACILLIN SODIUM AND TAZOBACTAM SODIUM 4.5 G: 4; .5 INJECTION, POWDER, LYOPHILIZED, FOR SOLUTION INTRAVENOUS at 08:08

## 2022-08-05 RX ADMIN — IPRATROPIUM BROMIDE AND ALBUTEROL SULFATE 3 ML: 2.5; .5 SOLUTION RESPIRATORY (INHALATION) at 12:08

## 2022-08-05 RX ADMIN — DIAZEPAM 5 MG: 5 TABLET ORAL at 05:08

## 2022-08-05 RX ADMIN — PIPERACILLIN SODIUM AND TAZOBACTAM SODIUM 4.5 G: 4; .5 INJECTION, POWDER, LYOPHILIZED, FOR SOLUTION INTRAVENOUS at 10:08

## 2022-08-05 RX ADMIN — GUAIFENESIN 1200 MG: 600 TABLET, EXTENDED RELEASE ORAL at 08:08

## 2022-08-05 RX ADMIN — MIRTAZAPINE 7.5 MG: 7.5 TABLET, FILM COATED ORAL at 08:08

## 2022-08-05 RX ADMIN — Medication 6 MG: at 08:08

## 2022-08-05 RX ADMIN — VENLAFAXINE HYDROCHLORIDE 225 MG: 75 CAPSULE, EXTENDED RELEASE ORAL at 08:08

## 2022-08-05 RX ADMIN — THIAMINE HCL TAB 100 MG 100 MG: 100 TAB at 08:08

## 2022-08-05 RX ADMIN — VANCOMYCIN HYDROCHLORIDE 1500 MG: 1.5 INJECTION, POWDER, LYOPHILIZED, FOR SOLUTION INTRAVENOUS at 10:08

## 2022-08-05 RX ADMIN — PIPERACILLIN SODIUM AND TAZOBACTAM SODIUM 4.5 G: 4; .5 INJECTION, POWDER, LYOPHILIZED, FOR SOLUTION INTRAVENOUS at 12:08

## 2022-08-05 RX ADMIN — METOPROLOL TARTRATE 50 MG: 50 TABLET, FILM COATED ORAL at 08:08

## 2022-08-05 RX ADMIN — OXYCODONE HYDROCHLORIDE AND ACETAMINOPHEN 1 TABLET: 5; 325 TABLET ORAL at 10:08

## 2022-08-05 RX ADMIN — OXYCODONE HYDROCHLORIDE AND ACETAMINOPHEN 1 TABLET: 5; 325 TABLET ORAL at 03:08

## 2022-08-05 RX ADMIN — DIAZEPAM 5 MG: 5 TABLET ORAL at 03:08

## 2022-08-05 NOTE — PLAN OF CARE
Bedside swallow study completed. Recommend regular diet/thin liquids. No further ST warranted.   8/5/2022

## 2022-08-05 NOTE — NURSING
Patient alert and oriented x4. Denies any pain or or discomfort. Denies any respiratory distress. Patient is stable. Safety measure in place. Bed in lowest position and call light within reach.

## 2022-08-05 NOTE — ASSESSMENT & PLAN NOTE
· Still having a hallucination with PT this afternoon 8/5, so keep valium TID. Sister reports when had detox at  in prev years she was there 14 days with withdrawal  + aspiration PNA  · Improved 8/5, no tremors, mild confusion, but endorses hx of withdrawal in the past. hallucinoatinos improved. She reports had been cutting down in past on alcohol as had had WD before. Sister gives detailed hx also    · Continue MVI, Thiamine and Folate daily.   · Reports drinking 2 days ago. Denies symptoms or past history of withdrawals.   · Monitoring CIWA q shift, with plans to initiate benzos for CIWA >8. Patient started on scheduled Valium on 8/2 at 5 mg po TID as CIWA 17. Patient on prn Ativan as per withdrawal protocol.   · Ethanol level on presentation was negative.   · Trend hepatic function; AST to ALT ratio consistent with recent alcohol intake.   · Ammonia level okay at 31 on admit.

## 2022-08-05 NOTE — SUBJECTIVE & OBJECTIVE
Interval History:  she reports that she is still having  some trouble breathing that feels a little bit better. Still no sputum but she feels like she may start producing some soon. She said she had some brief choking yesterady on mucus and called  and they took forever to answer. Speech consulted to make sure no diet changes needed given she had aspiration pneumonia on admit but suspect that was also due to intoxication as a contributor to aspiration. Still on 10L oxygen and may take some time to wean down. Her sister reports that she had a similar aspiration pneumonia event at  in the past a few years ago and spent 14 days there. Liver enzymes normalized off of alcohol so really needs to stay off long term, has had adverse life events secondary to this per family and is very sedentary at home and high risk of falls and fractures and will need SNF to improve mobility and weakness once medically stable. She worked with PT and told them she had depression and will disuss further to see if would benefit from psych consultation and sister reported had had some disagreement bween the 2 and the patient also had said that to me today. Shes planning to visit this afternoon to check on her, they live together prior to this. Updated sister on phone on status. Will keep valium on TID given was having some hallucations with PT later in AM, as was going to scale to q12 but appears having some still symptoms of withdrawal.      Review of Systems   Constitutional:  Negative for fever.   Respiratory:  Negative for cough and shortness of breath.    Cardiovascular:  Negative for chest pain and leg swelling.   Gastrointestinal:  Negative for abdominal pain, nausea and vomiting.   Musculoskeletal:  Negative for arthralgias.   Skin:  Negative for rash.   Neurological:  Negative for dizziness, tremors and light-headedness.   Psychiatric/Behavioral:  Positive for agitation (At times) and confusion. Negative for  hallucinations.    Objective:     Vital Signs (Most Recent):  Temp: 98.3 °F (36.8 °C) (08/03/22 0700)  Pulse: 75 (08/03/22 1100)  Resp: 18 (08/03/22 0833)  BP: 135/91 (08/03/22 0700)  SpO2: 95 % (08/03/22 1100) on 10 liters of oxygen high flow   Vital Signs (24h Range):  Temp:  [97.9 °F (36.6 °C)-98.9 °F (37.2 °C)] 98.7 °F (37.1 °C)  Pulse:  [] 105  Resp:  [16-26] 18  SpO2:  [91 %-98 %] 95 %  BP: (134-143)/(74-82) 140/78     Weight: 90.7 kg (200 lb)  Body mass index is 34.33 kg/m².    Intake/Output Summary (Last 24 hours) at 8/5/2022 1216  Last data filed at 8/5/2022 0600  Gross per 24 hour   Intake 800 ml   Output 400 ml   Net 400 ml        Physical Exam  Vitals and nursing note reviewed.   Constitutional:       General: She is not in acute distress.     Appearance: Normal appearance. She is obese. She is not ill-appearing.      Interventions: Nasal cannula in place.      Comments: Alert t place and situation and medical condition (knows events prior to admit)   Eyes:      General: No scleral icterus.     Conjunctiva/sclera: Conjunctivae normal.   Neck:      Vascular: No JVD.   Cardiovascular:      Rate and Rhythm: Normal rate and regular rhythm.      Heart sounds: Normal heart sounds. No murmur heard.    No friction rub. No gallop.      Comments: -110 on tele bedside  Pulmonary:      Effort: Pulmonary effort is normal. No respiratory distress.      Breath sounds: Rales present. No wheezing or rhonchi.      Comments: On HFNC. Dec breath sounds at Right lung base. Wheezing on exp heard  Abdominal:      General: Abdomen is flat. Bowel sounds are normal. There is no distension.      Palpations: Abdomen is soft.      Tenderness: There is no abdominal tenderness.   Musculoskeletal:      Right lower leg: No edema.      Left lower leg: No edema.   Skin:     General: Skin is warm.      Findings: No erythema.   Neurological:      General: No focal deficit present.      Mental Status: She is alert.      Comments:  Oriented to person and place and time   Psychiatric:         Mood and Affect: Mood normal.         Behavior: Behavior normal. Behavior is cooperative.      Comments: Occ a little confused and tangential but knows general situation and reasons for admission and place       Significant Labs:   CBC:   Recent Labs   Lab 08/04/22 0242 08/05/22 0413   WBC 7.22 7.37   HGB 11.8* 10.5*   HCT 42.9 37.7    298       CMP:   Recent Labs   Lab 08/04/22 0242 08/05/22 0413    142   K 3.2* 3.6   CL 84* 91*   CO2 45* 39*   GLU 85 81   BUN 16 15   CREATININE 1.1 0.8   CALCIUM 9.3 9.2   PROT 6.9 5.9*   ALBUMIN 2.8* 2.6*   BILITOT 0.4 0.4   ALKPHOS 162* 134   AST 32 18   ALT 49* 34   ANIONGAP 13 12       Blood Culture, Routine   Date Value Ref Range Status   08/01/2022 No Growth to date  Preliminary   08/01/2022 No Growth to date  Preliminary   08/01/2022 No Growth to date  Preliminary   08/01/2022 No Growth to date  Preliminary     Significant Imaging: I have reviewed all pertinent imaging results/findings within the past 24 hours.

## 2022-08-05 NOTE — PLAN OF CARE
Problem: Physical Therapy  Goal: Physical Therapy Goal  Description: Goals to be met by: 8/15/22     Patient will increase functional independence with mobility by performin. Supine to sit with Stand-by Assistance  2. Sit to supine with Stand-by Assistance  3. Sit to stand transfer with Stand-by Assistance  4. Gait  x 50 feet with Contact Guard Assistance using LRAD, if needed.   5. Stand for 5 minutes with Stand-by Assistance using LRAD, if needed  6. Lower extremity exercise program x10 reps per handout, with independence    Outcome: Ongoing, Progressing     Initial evaluation completed. Patient tolerated assessment well. Established POC and goals. Patient would continue to benefit from skilled PT services in order to improve functional mobility independence.     Alisa Bean, PT, DPT  2022

## 2022-08-05 NOTE — PROGRESS NOTES
"[facility] ([address] Medicine  Progress Note    Patient Name: Vale Gutierrez  MRN: [id number]  Patient Class: IP- Inpatient   Admission Date: 8/1/2022  Length of Stay: 4 days  Attending Physician: Pratibha Jorgensen MD  Primary Care Provider: Estela Mcdaniel MD        Subjective:     Principal Problem:Acute hypoxemic respiratory failure        HPI:  61F w/ alcohol dependency/abuse, HTN on Toprol, HLD, depression/anxiety presenting to the ED with SOB and confusion. SOB and malaise onset a couple of days ago. Denies fevers, chills, rigors, chest pain, cough, productive sputum. Denies excessive drinking, drug use. No bowel irregularities, N/V. Denies orthopnea, LE swelling, KIRAN. Admits to having alcohol 2 days ago ("just one drink").     ED workup notable for elevated BP, normal WBC, elevation of AST//124, , trop .027, VBG w/ corrected pH of 7.4, CTA -ve for PE but notable for RLL consolidation w/ air bronchograms concerning for possible CAP v aspiration PNA and possible edema. She was placed on supplemental O2 and broad spectrum antibiotics and admitted to [facility].       Overview/Hospital Course:  Patient started on IV Vancomycin and Zosyn for RLL and right lingula pneumonia and significant hypoxia. CTA of chest negative for PE but concerning for PNA in RLL and lingula but also with changes concerning for pulmonary edema so also started on Lasix 40 mg BID. Echo done and showed:  · The left ventricle is normal in size with concentric hypertrophy and normal systolic function.  · The estimated ejection fraction is 60-65%.  · Grade I left ventricular diastolic dysfunction.  · Mild aortic regurgitation.  · Normal right ventricular size with normal right ventricular systolic function.  · The estimated PA systolic pressure is 43 mmHg.  · Intermediate central venous pressure (8 mmHg).  · There is pulmonary hypertension.  · Small circumferential pericardial effusion.  · Mild " left atrial enlargement.    Patient requiring 10 liters of high flow oxygen and not on oxygen at home. Patient with significant alcohol history and showing signs of early withdrawal with tremors and hallucinations on 8/2 so started on scheduled Valium 5 mg po TID and nursing monitoring CIWA with prn po Ativan for breakthrough agitation. Patient started on MVI, Thiamine and Folate as per alcohol withdrawal protocol.       Interval History:  she reports that she is still having  some trouble breathing that feels a little bit better. Still no sputum but she feels like she may start producing some soon. She said she had some brief choking yesterady on mucus and called  and they took forever to answer. Speech consulted to make sure no diet changes needed given she had aspiration pneumonia on admit but suspect that was also due to intoxication as a contributor to aspiration. Still on 10L oxygen and may take some time to wean down. Her sister reports that she had a similar aspiration pneumonia event at  in the past a few years ago and spent 14 days there. Liver enzymes normalized off of alcohol so really needs to stay off long term, has had adverse life events secondary to this per family and is very sedentary at home and high risk of falls and fractures and will need SNF to improve mobility and weakness once medically stable. She worked with PT and told them she had depression and will disuss further to see if would benefit from psych consultation and sister reported had had some disagreement bween the 2 and the patient also had said that to me today. Shes planning to visit this afternoon to check on her, they live together prior to this. Updated sister on phone on status. Will keep valium on TID given was having some hallucations with PT later in AM, as was going to scale to q12 but appears having some still symptoms of withdrawal.      Review of Systems   Constitutional:  Negative for fever.   Respiratory:   Negative for cough and shortness of breath.    Cardiovascular:  Negative for chest pain and leg swelling.   Gastrointestinal:  Negative for abdominal pain, nausea and vomiting.   Musculoskeletal:  Negative for arthralgias.   Skin:  Negative for rash.   Neurological:  Negative for dizziness, tremors and light-headedness.   Psychiatric/Behavioral:  Positive for agitation (At times) and confusion. Negative for hallucinations.    Objective:     Vital Signs (Most Recent):  Temp: 98.3 °F (36.8 °C) (08/03/22 0700)  Pulse: 75 (08/03/22 1100)  Resp: 18 (08/03/22 0833)  BP: 135/91 (08/03/22 0700)  SpO2: 95 % (08/03/22 1100) on 10 liters of oxygen high flow   Vital Signs (24h Range):  Temp:  [97.9 °F (36.6 °C)-98.9 °F (37.2 °C)] 98.7 °F (37.1 °C)  Pulse:  [] 105  Resp:  [16-26] 18  SpO2:  [91 %-98 %] 95 %  BP: (134-143)/(74-82) 140/78     Weight: 90.7 kg (200 lb)  Body mass index is 34.33 kg/m².    Intake/Output Summary (Last 24 hours) at 8/5/2022 1216  Last data filed at 8/5/2022 0600  Gross per 24 hour   Intake 800 ml   Output 400 ml   Net 400 ml        Physical Exam  Vitals and nursing note reviewed.   Constitutional:       General: She is not in acute distress.     Appearance: Normal appearance. She is obese. She is not ill-appearing.      Interventions: Nasal cannula in place.      Comments: Alert t place and situation and medical condition (knows events prior to admit)   Eyes:      General: No scleral icterus.     Conjunctiva/sclera: Conjunctivae normal.   Neck:      Vascular: No JVD.   Cardiovascular:      Rate and Rhythm: Normal rate and regular rhythm.      Heart sounds: Normal heart sounds. No murmur heard.    No friction rub. No gallop.      Comments: -110 on tele bedside  Pulmonary:      Effort: Pulmonary effort is normal. No respiratory distress.      Breath sounds: Rales present. No wheezing or rhonchi.      Comments: On HFNC. Dec breath sounds at Right lung base. Wheezing on exp heard  Abdominal:       General: Abdomen is flat. Bowel sounds are normal. There is no distension.      Palpations: Abdomen is soft.      Tenderness: There is no abdominal tenderness.   Musculoskeletal:      Right lower leg: No edema.      Left lower leg: No edema.   Skin:     General: Skin is warm.      Findings: No erythema.   Neurological:      General: No focal deficit present.      Mental Status: She is alert.      Comments: Oriented to person and place and time   Psychiatric:         Mood and Affect: Mood normal.         Behavior: Behavior normal. Behavior is cooperative.      Comments: Occ a little confused and tangential but knows general situation and reasons for admission and place       Significant Labs:   CBC:   Recent Labs   Lab 08/04/22  0242 08/05/22 0413   WBC 7.22 7.37   HGB 11.8* 10.5*   HCT 42.9 37.7    298       CMP:   Recent Labs   Lab 08/04/22 0242 08/05/22 0413    142   K 3.2* 3.6   CL 84* 91*   CO2 45* 39*   GLU 85 81   BUN 16 15   CREATININE 1.1 0.8   CALCIUM 9.3 9.2   PROT 6.9 5.9*   ALBUMIN 2.8* 2.6*   BILITOT 0.4 0.4   ALKPHOS 162* 134   AST 32 18   ALT 49* 34   ANIONGAP 13 12       Blood Culture, Routine   Date Value Ref Range Status   08/01/2022 No Growth to date  Preliminary   08/01/2022 No Growth to date  Preliminary   08/01/2022 No Growth to date  Preliminary   08/01/2022 No Growth to date  Preliminary     Significant Imaging: I have reviewed all pertinent imaging results/findings within the past 24 hours.      Assessment/Plan:      * Acute hypoxemic respiratory failure  Acute on chronic respiratory failure with hypercapnia  Community acquired pneumonia of right lower lobe of lung  - Presented with acute on chronic hypercapnic and acute hypoxic respiratory failure, with lethargy and encephalopathy. She is not on home oxygen.   - Suspect hypoxia combination of pneumonia and heart failure/pulmonary edema.   - Suspect hypercapnia related to opiates and muscle relaxants she takes at home but  there is a component of possible chronicity as HCO3 elevated on admit suggesting hypercapnia may be more chronic than acute but no previous ABGs to compare unfortunately. Patient has no history of any prior chronic lung disease.   - COVID negative on admit.   - CTA showing RLL consolidation with bronchogram concerning for possible CAP vs Aspiration PNA. Empiric abx initiated in ED with Zosyn/Vanc pending culture data and will continue.  Resp CX ordered but no sputum so far.  - Procal low at 0.10.   - Blood cultures from admit so far no growth.   - Supplemental O2 initiated and escalated to high flow to maintain sats. Encouraging ambulation, OOB for all meals, incentive spirometry use.   - Wean oxygen as tolerated with goal of oxygen sats > 92%.  Nebs added 8/5, as had some wheeinzg on exam       Aspiration pneumonia  See CAP  Likely from alcohol intoxication as precipitant, happened a few years ago similar fashion per family leading to 14 day sstay at   -speech consult for swallow      Weakness  SNF rec when med stable      Aortic aneurysm  Seen incidentally on CTA chest on admit with 4.1 cm.   -needs to stop cigarettes as recently started again as a contributor. Long term will need to likely do asa ad statin, transaminits so avoid this now  -will refer to vascular surg OP for long term monitoring      Hypomagnesemia  Replace prn      Hypokalemia  Replace prn      Acute on chronic diastolic congestive heart failure  Patient is identified as having Diastolic (HFpEF) heart failure that is Acute on chronic. CHF is currently uncontrolled due to Rales/crackles on pulmonary exam and Pulmonary edema/pleural effusion on CXR. Latest ECHO performed and demonstrates- Results for orders placed during the hospital encounter of 08/01/22    Echo    Interpretation Summary  · The left ventricle is normal in size with concentric hypertrophy and normal systolic function.  · The estimated ejection fraction is 60-65%.  · Grade I left  ventricular diastolic dysfunction.  · Mild aortic regurgitation.  · Normal right ventricular size with normal right ventricular systolic function.  · The estimated PA systolic pressure is 43 mmHg.  · Intermediate central venous pressure (8 mmHg).  · There is pulmonary hypertension.  · Small circumferential pericardial effusion.  · Mild left atrial enlargement.  . Continue Metoprolol and IV Lasix 40 mg BID to treat. Monitor clinical status closely. Monitor on telemetry. Patient is off CHF pathway.  Monitor strict Is&Os and daily weights.  Place on fluid restriction of 1.5 L. Continue to stress to patient importance of self efficacy and  on diet for CHF. Last BNP reviewed- and noted below   Recent Labs   Lab 08/01/22  1746   *   .  Dec diuresis on 8/4 as CO2 rising from baseline in 30s to 45 today and suspect more pneumonia than volume. Change to lasix 20 daily      Encephalopathy, metabolic  · Present on admit.   · Paent appears disinhibited, transiently disoriented but easily redirectable and, though tangential, answering questions appropriately. Patient continues to show same pattern on 8/3.   · Suspect intoxication (endorsed alcohol use in the setting of opiates and muscle relaxants for her chronic pain) vs metabolic/infectious (pneumonia).  · Improved 8/5 as can recall events prior to admit but cant remember all of last 2 days since admit but is appropraite with questions now and responses. Occasional halluciation from withdrawal  · CTH unremarkable.   · Delirium precautions.     Asymptomatic bacteriuria  Not complaining of dysuria, but possible confounder given encephalopathy. In any case, should be covered by empiric antibiotics she is currently on for her pneumonia.     Community acquired pneumonia of right lower lobe of lung  Likely aspiration PNA given RLL with hx of alcohol use a predisposition to this. No signs of aspiration now as awake and alert.      Transaminitis  Improved from 245/124 on  admit, now normalized Likely from alcohol on admit, improving off alcohol. Needs to stop long term or risks adverse health issues      Anxiety  Recurrent major depressive disorder, in full remission  Chronic, stable. Reports compliance with medications. Continue home Buspar and Mirtazapine to treat.     Chronic pain disorder  Lumbosacral radiculopathy  Chronic and controlled. Continue home Percocet prn for pain. Hold home muscle relaxants due to encephalopathy.     Lumbosacral radiculopathy  On chronic oxy 5 with pain management prescribing. Sees dr jason and had surgery previously also      Essential hypertension  Chronic and controlled an stable. Continue home Metoprolol 50 mg po daily to treat.     Recurrent major depressive disorder, in full remission  Known hx but expressed some worsenig symtoms to PT today. Will assess if needs psych consultation or if having acute stress response from withdrawal/acute illness        Alcohol dependence with withdrawal with perceptual disturbance  · Still having a hallucination with PT this afternoon 8/5, so keep valium TID. Sister reports when had detox at  in prev years she was there 14 days with withdrawal  + aspiration PNA  · Improved 8/5, no tremors, mild confusion, but endorses hx of withdrawal in the past. hallucinoatinos improved. She reports had been cutting down in past on alcohol as had had WD before. Sister gives detailed hx also    · Continue MVI, Thiamine and Folate daily.   · Reports drinking 2 days ago. Denies symptoms or past history of withdrawals.   · Monitoring CIWA q shift, with plans to initiate benzos for CIWA >8. Patient started on scheduled Valium on 8/2 at 5 mg po TID as CIWA 17. Patient on prn Ativan as per withdrawal protocol.   · Ethanol level on presentation was negative.   · Trend hepatic function; AST to ALT ratio consistent with recent alcohol intake.   · Ammonia level okay at 31 on admit.       VTE Risk Mitigation (From admission, onward)          Ordered     IP VTE HIGH RISK PATIENT  Once         08/02/22 0055     Place sequential compression device  Until discontinued         08/02/22 0055                Discharge Planning   DIXON: 8/6/2022     Code Status: Full Code   Is the patient medically ready for discharge?: No    Reason for patient still in hospital (select all that apply): Patient trending condition  Discharge Plan A: Home with family                  Pratibha Jorgensen MD  Department of Hospital Medicine   [facility] - Intensive Care ([place])

## 2022-08-05 NOTE — RESPIRATORY THERAPY
RAPID RESPONSE RESPIRATORY THERAPY PROACTIVE ROUNDING NOTE             Time of visit: 828     Code Status: Full Code   : 1960  Bed: 07140/38248 A:   MRN: [id number]  Time spent at the bedside: 15 -30 min    SITUATION    Evaluated patient for: HFNC Compliance     BACKGROUND    Patient has a past medical history of Alcohol abuse, Anxiety, Benign paroxysmal positional vertigo, Depression, Hyperlipidemia, and Hypertension.    24 Hours Vitals Range:  Temp:  [97.9 °F (36.6 °C)-98.9 °F (37.2 °C)]   Pulse:  []   Resp:  [16-30]   BP: (134-143)/(74-82)   SpO2:  [91 %-98 %]     Labs:    Recent Labs     22  1145 22  0242 22  0413   NA  --  142 142   K  --  3.2* 3.6   CL  --  84* 91*   CO2  --  45* 39*   CREATININE 0.9 1.1 0.8   GLU  --  85 81   MG  --  1.4* 2.0        No results for input(s): PH, PCO2, PO2, HCO3, POCSATURATED, BE in the last 72 hours.    ASSESSMENT/INTERVENTIONS    Upon arrival in room pt being seen by nurse, states she is unlabored. On 10L HFNC, sat 92-93%. Talked with pt about what brought her in. Unable to wean O2.    Last VS   Temp: 98.7 °F (37.1 °C) (815)  Pulse: 105 (930)  Resp: 19 (930)  BP: 140/78 ( 0435)  SpO2: 95 % (930)    Level of Consciousness: Level of Consciousness (AVPU): alert  Respiratory Effort: Respiratory Effort: Unlabored, Normal Expansion/Accessory Muscle Usage: Expansion/Accessory Muscles/Retractions: no use of accessory muscles, expansion symmetric  All Lung Field Breath Sounds: All Lung Fields Breath Sounds: Anterior:, Lateral:, diminished, equal bilaterally  O2 Device/Concentration: Flow (L/min): 10,  , O2 Device (Oxygen Therapy): High Flow nasal Cannula  Was the O2 device able to be weaned? No  Ambu at bedside: Ambu bag with the patient?: Yes, Adult Ambu    Active Orders   Respiratory Care    Incentive spirometry     Frequency: Q6H PRN     Number of Occurrences: Until Specified    Inhalation Treatment Q4H WAKE      Frequency: Q4H WAKE     Number of Occurrences: Until Specified    Oxygen Continuous     Frequency: Continuous     Number of Occurrences: Until Specified     Order Questions:      Device type: High flow      Device: High Flow Nasal Cannula (6 -15 Liters)      LPM: 10      Titrate O2 per Oxygen Titration Protocol: Yes      To maintain SpO2 goal of: >= 92%      Notify MD of: Inability to achieve desired SpO2; Sudden change in patient status and requires 20% increase in FiO2; Patient requires >60% FiO2    Pulse Oximetry Q4H     Frequency: Q4H     Number of Occurrences: Until Specified       RECOMMENDATIONS    We recommend: RRT Recs: Continue POC per primary team.    ESCALATION        FOLLOW-UP    Please call back the Rapid Response RTAlvarez RRT at x [phone] for any questions or concerns.

## 2022-08-05 NOTE — ASSESSMENT & PLAN NOTE
Acute on chronic respiratory failure with hypercapnia  Community acquired pneumonia of right lower lobe of lung  - Presented with acute on chronic hypercapnic and acute hypoxic respiratory failure, with lethargy and encephalopathy. She is not on home oxygen.   - Suspect hypoxia combination of pneumonia and heart failure/pulmonary edema.   - Suspect hypercapnia related to opiates and muscle relaxants she takes at home but there is a component of possible chronicity as HCO3 elevated on admit suggesting hypercapnia may be more chronic than acute but no previous ABGs to compare unfortunately. Patient has no history of any prior chronic lung disease.   - COVID negative on admit.   - CTA showing RLL consolidation with bronchogram concerning for possible CAP vs Aspiration PNA. Empiric abx initiated in ED with Zosyn/Vanc pending culture data and will continue.  Resp CX ordered but no sputum so far.  - Procal low at 0.10.   - Blood cultures from admit so far no growth.   - Supplemental O2 initiated and escalated to high flow to maintain sats. Encouraging ambulation, OOB for all meals, incentive spirometry use.   - Wean oxygen as tolerated with goal of oxygen sats > 92%.  Nebs added 8/5, as had some wheeinzg on exam

## 2022-08-05 NOTE — PT/OT/SLP EVAL
Physical Therapy Evaluation      Patient Name:  Vale Gutierrez   MRN:  [id number]    Recommendations:     Discharge Recommendations:  nursing facility, skilled   Discharge Equipment Recommendations:  (TBD pending progress)   Barriers to discharge: Increased skilled assistance needed; fall risk     Assessment:     Vale Gutierrez is a 61 y.o. female admitted with a medical diagnosis of Acute hypoxemic respiratory failure.  She presents with the following impairments/functional limitations:  weakness, impaired endurance, impaired self care skills, impaired functional mobility, gait instability, impaired balance, impaired cognition, decreased coordination, decreased upper extremity function, decreased lower extremity function, decreased safety awareness, pain . Patient tolerated session fairly well. Patient with confusion and easily directed today, but able to be re-directed. Also with visual hallucinations - MD notified. Patient also with tangential speech.  Patient will benefit from PT services to address the mentioned deficits in order to promote an improve functional mobility status. Patient is currently functioning below PLOF Patient would require increased assistance should they discharge home . Upon d/c, recommendation SNF  for Vale Gutierrez in order to progress towards an improved level of functional mobility independence.     Rehab Prognosis: Good; patient would benefit from acute skilled PT services to address these deficits and reach maximum level of function.    Recent Surgery: * No surgery found *      Plan:     During this hospitalization, patient to be seen 3 x/week to address the identified rehab impairments via gait training, therapeutic activities, therapeutic exercises, neuromuscular re-education and progress toward the following goals:    · Plan of Care Expires:  09/04/22    History:     Past Medical History:   Diagnosis Date    Alcohol abuse     Anxiety     Benign paroxysmal  "positional vertigo 1/17/2020    Depression     Hyperlipidemia     Hypertension        Past Surgical History:   Procedure Laterality Date    BREAST CYST ASPIRATION      CHOLECYSTECTOMY      complicated with bile leak, sepsis    COLONOSCOPY N/A 6/3/2020    Procedure: COLONOSCOPY Golytely;  Surgeon: Tino Neil MD;  Location: [facility];  Service: Colon and Rectal;  Laterality: N/A;    EPIDURAL STEROID INJECTION N/A 5/27/2022    Procedure: INJECTION, STEROID, EPIDURAL, CAUDAL CONTRAST;  Surgeon: Rubén Rico MD;  Location: [facility]IN [facility]GT;  Service: Pain Management;  Laterality: N/A;  5/6 RESCHEDULE    ESOPHAGOGASTRODUODENOSCOPY N/A 6/3/2020    Procedure: EGD (ESOPHAGOGASTRODUODENOSCOPY);  Surgeon: Tino Neil MD;  Location: [facility];  Service: Colon and Rectal;  Laterality: N/A;    INJECTION OF ANESTHETIC AGENT AROUND NERVE Bilateral 3/24/2022    Procedure: BLOCK, NERVE MEDIAL BRANCH BLOCK BL L2, L3, L4 and L5  1st, needs consent;  Surgeon: Rubén Rico MD;  Location: [facility];  Service: Pain Management;  Laterality: Bilateral;    TRANSFORAMINAL EPIDURAL INJECTION OF STEROID Bilateral 8/7/2020    Procedure: INJECTION, STEROID, EPIDURAL, TRANSFORAMINAL APPROACH, L4-L5 need consent;  Surgeon: Rubén Rico MD;  Location: [facility];  Service: Pain Management;  Laterality: Bilateral;    TRANSFORAMINAL EPIDURAL INJECTION OF STEROID Bilateral 9/4/2020    Procedure: LUMBAR TRANSFORAMINAL BILATERAL L4/5 DIRECT REFERRAL;  Surgeon: Rubén Rico MD;  Location: [facility];  Service: Pain Management;  Laterality: Bilateral;  NEEDS CONSENT    TRANSFORAMINAL EPIDURAL INJECTION OF STEROID Bilateral 3/4/2022    Procedure: Injection,steroid,epidural,transforaminal approach BILATERAL L3/4 DIRECT REFERRAL;  Surgeon: Rubén Rico MD;  Location: [facility];  Service: Pain Management;  Laterality: Bilateral;       Subjective     Chief Complaint: pain   Patient/Family Comments/goals: "I'm " "sorry I'm being so rude, I don't feel good."  Pain/Comfort:  · Pain Rating 1:  (did not rate; reported generalized pain)  · Pain Addressed 1: Reposition, Distraction, Cessation of Activity    Patients cultural, spiritual, Restorationist conflicts given the current situation: no    Living Environment:  Patient's living environment is as follows:  Home type apartment    1 or 2 stories  single story    Number of FERNANDO/ rails 0 FERNANDO    AD used?/Owned?  SPC, shower chair, rollator    Bathroom set-up  tub/shower combo   Working? No   Driving? No   Individuals living with patient:  Magda Ruiz    Hobbies/Roles: None stated    Prior to admission, patients level of function was (I) for ADLs and assist for mobility since "getting sick". Patient reports not able to recal amount of falls. Equipment used at home: cane, straight, bath bench, rollator.  DME owned (not currently used): none.  Upon discharge, patient will have assistance from sister - not able to state amount of time available.  Objective:   Communicated with RN prior to session.  Patient found HOB elevated with blood pressure cuff, pulse ox (continuous), peripheral IV, oxygen, PureWick, telemetry  upon PT entry to room. See below for detailed functional assessment. Patient agreeable to participate in initial evaluation.    General Precautions: Standard, fall   Orthopedic Precautions:N/A   Braces: N/A     Vitals:    08/05/22 1245   BP: (!) 120/92   Pulse: 88   Resp: 18   Temp: 97.5 °F (36.4 °C)       Exams:  · Cognitive Exam:  Patient is oriented to Person, Place  · Patient follows % of one -step commands   · Fine Motor Coordination:  Test:   Comments    Rapid ankle DF/PF  Intact     · Postural Exam:  Patient presented with the following abnormalities:    · -       Rounded shoulders  · -       Forward head  · Sensation:    LEFT LE: Intact light touch to BLEs RIGHT LE: Intact light touch to BLEs      ROM and Strength   Right Lower Extremity  Left Lower Extremity  " "  Hip Flexion (Iliopsoas)  WFL WFL   Knee Extension (Quadriceps) WFL WFL   Knee Flexion (Hamstrings) WFL WFL   Ankle DF (Ant. Tib) WFL WFL   Ankle PF (Gastrocnemius)  WFL WFL     Functional Mobility:  · Bed Mobility:    · Rolling Right: stand by assistance  · Scooting: contact guard assistance  · Supine to Sit: stand by assistance  · Sit to Supine: contact guard assistance for LE management  · Transfers:     · Sit to Stand:  contact guard assistance with hand-held assist  · Gait: 2 lateral side steps with CGA and HHA   · Patient returned self to sitting reporting that she "may throw up and needed to lay down"    · decreased speed, forward flexed posture, increased cervical flexion  and decreased hip extension, unsteady   ·     Balance:    Level of assist   Static Sitting  SBA   Dynamic Sitting  SBA   Static Standing  CGA   Dynamic Standing  CGA     Therapeutic Activities and Education:  -Patient educated on the role and goal of PT services during acute care LOS. Question and concerns acknowledged and answered as appropriate.   -Will continue to educated as needed.      - Educated on the importance of OOB mobility within safe range in order to decrease adverse effects of prolonged bedrest.        - Patient encouraged to get OOBTC 3x daily with assistance  -White board updated in patients room to reflect level of assistance needed with nursing.       - Patient is not clear to transfer with RN/PCT for OOB mobility with RN.     AM-PAC 6 CLICK MOBILITY  Total Score:17     Patient left HOB elevated with all lines intact, call button in reach, bed alarm on and RN notified.  White board updated in patient room to reflect level of function with nursing.     GOALS:   Multidisciplinary Problems     Physical Therapy Goals        Problem: Physical Therapy    Goal Priority Disciplines Outcome Goal Variances Interventions   Physical Therapy Goal     PT, PT/OT Ongoing, Progressing     Description: Goals to be met by: 8/15/22 "     Patient will increase functional independence with mobility by performin. Supine to sit with Stand-by Assistance  2. Sit to supine with Stand-by Assistance  3. Sit to stand transfer with Stand-by Assistance  4. Gait  x 50 feet with Contact Guard Assistance using LRAD, if needed.   5. Stand for 5 minutes with Stand-by Assistance using LRAD, if needed  6. Lower extremity exercise program x10 reps per handout, with independence                     Time Tracking:     PT Received On: 22  PT Start Time: 1054     PT Stop Time: 1115  PT Total Time (min): 21 min     Billable Minutes: Evaluation 10 and Therapeutic Activity 11      Alisa Bean, PT  2022

## 2022-08-05 NOTE — ASSESSMENT & PLAN NOTE
Improved from 245/124 on admit, now normalized Likely from alcohol on admit, improving off alcohol. Needs to stop long term or risks adverse health issues

## 2022-08-05 NOTE — PT/OT/SLP EVAL
Speech Language Pathology Evaluation  Bedside Swallow  Discharge    Patient Name:  Vale Gutierrez   MRN:  [id number]  Admitting Diagnosis: Acute hypoxemic respiratory failure    Recommendations:                 General Recommendations:  Follow-up not indicated  Diet recommendations:  Regular, Thin   Aspiration Precautions: Standard aspiration precautions   General Precautions: Standard, fall  Communication strategies:  none    History:     Past Medical History:   Diagnosis Date    Alcohol abuse     Anxiety     Benign paroxysmal positional vertigo 1/17/2020    Depression     Hyperlipidemia     Hypertension        Past Surgical History:   Procedure Laterality Date    BREAST CYST ASPIRATION      CHOLECYSTECTOMY      complicated with bile leak, sepsis    COLONOSCOPY N/A 6/3/2020    Procedure: COLONOSCOPY Golytely;  Surgeon: Tino Neil MD;  Location: [facility] ENDO;  Service: Colon and Rectal;  Laterality: N/A;    EPIDURAL STEROID INJECTION N/A 5/27/2022    Procedure: INJECTION, STEROID, EPIDURAL, CAUDAL CONTRAST;  Surgeon: Rubén Rico MD;  Location: [facility]IN [facility]GT;  Service: Pain Management;  Laterality: N/A;  5/6 RESCHEDULE    ESOPHAGOGASTRODUODENOSCOPY N/A 6/3/2020    Procedure: EGD (ESOPHAGOGASTRODUODENOSCOPY);  Surgeon: Tino Neil MD;  Location: [facility];  Service: Colon and Rectal;  Laterality: N/A;    INJECTION OF ANESTHETIC AGENT AROUND NERVE Bilateral 3/24/2022    Procedure: BLOCK, NERVE MEDIAL BRANCH BLOCK BL L2, L3, L4 and L5  1st, needs consent;  Surgeon: Rubén Rico MD;  Location: [facility]IN [facility]GT;  Service: Pain Management;  Laterality: Bilateral;    TRANSFORAMINAL EPIDURAL INJECTION OF STEROID Bilateral 8/7/2020    Procedure: INJECTION, STEROID, EPIDURAL, TRANSFORAMINAL APPROACH, L4-L5 need consent;  Surgeon: Rubén Rico MD;  Location: [facility]IN [facility]GT;  Service: Pain Management;  Laterality: Bilateral;    TRANSFORAMINAL EPIDURAL INJECTION OF STEROID Bilateral 9/4/2020     "Procedure: LUMBAR TRANSFORAMINAL BILATERAL L4/5 DIRECT REFERRAL;  Surgeon: Rubén Rico MD;  Location: [facility]IN [facility]GT;  Service: Pain Management;  Laterality: Bilateral;  NEEDS CONSENT    TRANSFORAMINAL EPIDURAL INJECTION OF STEROID Bilateral 3/4/2022    Procedure: Injection,steroid,epidural,transforaminal approach BILATERAL L3/4 DIRECT REFERRAL;  Surgeon: Rubén Rico MD;  Location: [facility]IN [facility]GT;  Service: Pain Management;  Laterality: Bilateral;       Chest X-Rays: 8/4 Cardiac monitoring leads overlie the chest.  Cardiac silhouette is enlarged, similar to prior examination.  There is prominence of the central pulmonary vasculature.  Lung volumes are diminished bilaterally.  There is increased interstitial attenuation bilaterally, similar to prior examination.  There are continued bibasilar opacities and possible left-sided pleural fluid.  No new large confluent airspace consolidation or pneumothorax appreciated.    Prior diet: regular/thin      Subjective     Awake/alert  "I just feel things coming back up"     Pain/Comfort:  · Pain Rating 1: 0/10  · Pain Rating Post-Intervention 1: 0/10    Respiratory Status: Room air    Objective:     Oral Musculature Evaluation  · Oral Musculature: WFL  · Dentition: present and adequate  · Oral Labial Strength and Mobility: WFL  · Lingual Strength and Mobility: WFL    Bedside Swallow Eval:   Consistencies Assessed:  · Thin liquids x6   · Solids x3     Oral Phase:   · WFL    Pharyngeal Phase:   · no overt clinical signs/symptoms of aspiration      Assessment:     Vale Gutierrez is a 61 y.o. female with oropharyngeal swallow deemed WFL. No further ST warranted.     Goals:   Multidisciplinary Problems     SLP Goals     Not on file                Plan:     · Plan of Care reviewed with:    patient  · SLP Follow-Up:  No       Discharge recommendations:    no ST f/u       Time Tracking:     SLP Treatment Date:   08/05/22  Speech Start Time:  1354  Speech Stop Time:  " 1401     Speech Total Time (min):  7 min    Billable Minutes: Eval Swallow and Oral Function 7    08/05/2022

## 2022-08-05 NOTE — PT/OT/SLP EVAL
"Occupational Therapy   Evaluation    Name: Vale Gutierrez  MRN: [id number]  Admitting Diagnosis:  Acute hypoxemic respiratory failure  Recent Surgery: * No surgery found *      Recommendations:     Discharge Recommendations:    Discharge Equipment Recommendations:  walker, rolling  Barriers to discharge:   (increased A required)    Assessment:     Vale Gutierrez is a 61 y.o. female with a medical diagnosis of Acute hypoxemic respiratory failure.  She presents with anxiety re: declined safe performance of functional mobility and ADL's which has been progressing over the past several weeks with noted need for increased assistance from her support system.. Performance deficits affecting function: impaired endurance, weakness, impaired self care skills, impaired functional mobility, impaired cardiopulmonary response to activity.    Patient currently demonstrates a need for therapy on a daily basis in a skilled nursing facility secondary to a decline in functional status secondary to illness. She is a significant fall risk and at risk for further functional decline without skilled intervention and requires daily OT and PT in SNF.     Rehab Prognosis: Good; patient would benefit from acute skilled OT services to address these deficits and reach maximum level of function.       Plan:     Patient to be seen 3 x/week to address the above listed problems via self-care/home management, therapeutic activities, therapeutic exercises  · Plan of Care Expires: 08/19/22  · Plan of Care Reviewed with:      Subjective     Chief Complaint: "I'm just not feeling well at all, everything hurts when I move and when I lie here."  Patient/Family Comments/goals: Nonspecific with patient having difficulty specifying functional goals.    Occupational Profile:  Living Environment: Pt lives with her sister in a 1 SH with sister with 0 FERNANDO. She has adapted bed/ shower combo.   Previous level of function: Independent in basic self care, " required A with IADL's and does not drive.  Roles and Routines: Pt primarily sedentary PTA.   Equipment Used at Home:  bath bench  Assistance upon Discharge: Increased A required.    Pain/Comfort:  · Pain Rating 1: 0/10  · Pain Rating Post-Intervention 1: 0/10    Patients cultural, spiritual, Buddhism conflicts given the current situation:      Objective:     Communicated with: nurse Ulises prior to session.  Patient found HOB elevated with blood pressure cuff, PureWick, oxygen, peripheral IV, pulse ox (continuous) upon OT entry to room.    General Precautions: Standard, fall   Orthopedic Precautions:N/A   Braces: N/A  Respiratory Status: Nasal cannula, flow 10 L/min    Occupational Performance:    Bed Mobility:    · Patient completed Rolling/Turning to Left with  contact guard assistance  · Patient completed Rolling/Turning to Right with contact guard assistance  · Patient completed Supine to Sit with minimum assistance  · Patient completed Sit to Supine with minimum assistance    Functional Mobility/Transfers:  · Patient completed Sit <> Stand Transfer with contact guard assistance  with  hand-held assist   · Patient completed Bed <> Chair Transfer using Stand Pivot technique with contact guard assistance with rolling walker  · Patient completed Toilet Transfer Stand Pivot technique with minimum assistance with  rolling walker and bedside commode  · Functional Mobility: Pt able to perform sit <> stand x 5 while performing posterior perineal care for toileting, requiring rest break each 6-7 seconds with increased SOB with activity.     Activities of Daily Living:  · Grooming: minimum assistance    · Upper Body Dressing: minimum assistance    · Lower Body Dressing: moderate assistance    · Toileting: maximal assistance for perineal care.    Cognitive/Visual Perceptual:  No deficits noted.    Physical Exam:  Balance: -       min to mod A as she fatigues.  Upper Extremity Range of Motion:  -       Right Upper  Extremity: grossly WFL  -       Left Upper Extremity: grossly WFL  Upper Extremity Strength: -       Right Upper Extremity: grossly 3/5  -       Left Upper Extremity: grossly 3/5    AMPAC 6 Click ADL:  AMPAC Total Score: 19    Treatment & Education:  -Pt education on OT role and POC.  -Importance of E/OOB activity with staff assistance, emphasis on daily participation  -Multiple self-care tasks and functional mobility completed -- assistance level noted above  -Safety during functional transfer and mobility ensured  -Patient provided with education on importance of Bilateral UB/LB integration during functional tasks for improvement in functional performance.   -Education provided/reviewed, questions answered within OT scope of practice.   -Education provided to pt on use of call bell and repeating call to receive service in timely manner to prevent falls/injury  -Additional time spent providing emotional support as pt expressed experience since medical diagnosis and hospital stay  -Patient demonstrates understanding and learning this date.  Education:    Patient left HOB elevated with call button in reach    GOALS:   Multidisciplinary Problems     Occupational Therapy Goals     Not on file                History:     Past Medical History:   Diagnosis Date    Alcohol abuse     Anxiety     Benign paroxysmal positional vertigo 1/17/2020    Depression     Hyperlipidemia     Hypertension        Past Surgical History:   Procedure Laterality Date    BREAST CYST ASPIRATION      CHOLECYSTECTOMY      complicated with bile leak, sepsis    COLONOSCOPY N/A 6/3/2020    Procedure: COLONOSCOPY Golytely;  Surgeon: Tino Neil MD;  Location: [facility] ENDO;  Service: Colon and Rectal;  Laterality: N/A;    EPIDURAL STEROID INJECTION N/A 5/27/2022    Procedure: INJECTION, STEROID, EPIDURAL, CAUDAL CONTRAST;  Surgeon: Rubén Rico MD;  Location: [facility];  Service: Pain Management;  Laterality: N/A;  5/6 RESCHEDULE     ESOPHAGOGASTRODUODENOSCOPY N/A 6/3/2020    Procedure: EGD (ESOPHAGOGASTRODUODENOSCOPY);  Surgeon: Tino Neil MD;  [facility]: [facility];  Service: Colon and Rectal;  Laterality: N/A;    INJECTION OF ANESTHETIC AGENT AROUND NERVE Bilateral 3/24/2022    Procedure: BLOCK, NERVE MEDIAL BRANCH BLOCK BL L2, L3, L4 and L5  1st, needs consent;  Surgeon: Rubén Rico MD;  Location: [facility] PAIN [facility]GT;  Service: Pain Management;  Laterality: Bilateral;    TRANSFORAMINAL EPIDURAL INJECTION OF STEROID Bilateral 8/7/2020    Procedure: INJECTION, STEROID, EPIDURAL, TRANSFORAMINAL APPROACH, L4-L5 need consent;  Surgeon: Rubén Rico MD;  Location: [facility]IN [facility]GT;  Service: Pain Management;  Laterality: Bilateral;    TRANSFORAMINAL EPIDURAL INJECTION OF STEROID Bilateral 9/4/2020    Procedure: LUMBAR TRANSFORAMINAL BILATERAL L4/5 DIRECT REFERRAL;  Surgeon: Rubén Rico MD;  Location: [facility]IN [facility]GT;  Service: Pain Management;  Laterality: Bilateral;  NEEDS CONSENT    TRANSFORAMINAL EPIDURAL INJECTION OF STEROID Bilateral 3/4/2022    Procedure: Injection,steroid,epidural,transforaminal approach BILATERAL L3/4 DIRECT REFERRAL;  Surgeon: Rubén Rico MD;  Location: [facility]IN [facility]GT;  Service: Pain Management;  Laterality: Bilateral;       Time Tracking:     OT Date of Treatment:    OT Start Time: 0925  OT Stop Time: 1009  OT Total Time (min): 44 min    Billable Minutes:Evaluation 10  Self Care/Home Management 34.    8/5/2022

## 2022-08-05 NOTE — ASSESSMENT & PLAN NOTE
· Present on admit.   · Paent appears disinhibited, transiently disoriented but easily redirectable and, though tangential, answering questions appropriately. Patient continues to show same pattern on 8/3.   · Suspect intoxication (endorsed alcohol use in the setting of opiates and muscle relaxants for her chronic pain) vs metabolic/infectious (pneumonia).  · Improved 8/5 as can recall events prior to admit but cant remember all of last 2 days since admit but is appropraite with questions now and responses. Occasional halluciation from withdrawal  · CTH unremarkable.   · Delirium precautions.

## 2022-08-05 NOTE — ASSESSMENT & PLAN NOTE
See CAP  Likely from alcohol intoxication as precipitant, happened a few years ago similar fashion per family leading to 14 day sstay at   -speech consult for swallow

## 2022-08-05 NOTE — PLAN OF CARE
Problem: Adult Inpatient Plan of Care  Goal: Plan of Care Review  Outcome: Ongoing, Progressing  Goal: Patient-Specific Goal (Individualized)  Outcome: Ongoing, Progressing  Goal: Absence of Hospital-Acquired Illness or Injury  Outcome: Ongoing, Progressing  Goal: Optimal Comfort and Wellbeing  Outcome: Ongoing, Progressing  Goal: Readiness for Transition of Care  Outcome: Ongoing, Progressing     Problem: Fluid Imbalance (Pneumonia)  Goal: Fluid Balance  Outcome: Ongoing, Progressing     Problem: Infection (Pneumonia)  Goal: Resolution of Infection Signs and Symptoms  Outcome: Ongoing, Progressing     Problem: Respiratory Compromise (Pneumonia)  Goal: Effective Oxygenation and Ventilation  Outcome: Ongoing, Progressing     Problem: Skin Injury Risk Increased  Goal: Skin Health and Integrity  Outcome: Ongoing, Progressing     Problem: Fall Injury Risk  Goal: Absence of Fall and Fall-Related Injury  Outcome: Ongoing, Progressing     Problem: Restraint, Nonbehavioral (Nonviolent)  Goal: Absence of Harm or Injury  Outcome: Ongoing, Progressing

## 2022-08-05 NOTE — PLAN OF CARE
Problem: Occupational Therapy  Goal: Occupational Therapy Goal  Description: Goals to be met by: 8/19/22     Patient will increase functional independence with ADLs by performing:    UE Dressing with Supervision.  LE Dressing with Supervision.  Toileting from bedside commode with Supervision for hygiene and clothing management.   Bathing from  sitting at sink with Minimal Assistance.  Step transfer with Supervision  Increased functional strength to 4/5 for improved functioning in ADL's.    Outcome: Ongoing, Progressing

## 2022-08-06 PROBLEM — R00.0 TACHYCARDIA: Status: ACTIVE | Noted: 2022-08-06

## 2022-08-06 LAB
ALBUMIN SERPL BCP-MCNC: 2.6 G/DL (ref 3.5–5.2)
ALP SERPL-CCNC: 121 U/L (ref 55–135)
ALT SERPL W/O P-5'-P-CCNC: 24 U/L (ref 10–44)
ANION GAP SERPL CALC-SCNC: 11 MMOL/L (ref 8–16)
AST SERPL-CCNC: 15 U/L (ref 10–40)
BACTERIA BLD CULT: NORMAL
BACTERIA BLD CULT: NORMAL
BASOPHILS # BLD AUTO: 0.06 K/UL (ref 0–0.2)
BASOPHILS NFR BLD: 0.7 % (ref 0–1.9)
BILIRUB SERPL-MCNC: 0.4 MG/DL (ref 0.1–1)
BUN SERPL-MCNC: 13 MG/DL (ref 8–23)
CALCIUM SERPL-MCNC: 10 MG/DL (ref 8.7–10.5)
CHLORIDE SERPL-SCNC: 91 MMOL/L (ref 95–110)
CO2 SERPL-SCNC: 39 MMOL/L (ref 23–29)
CREAT SERPL-MCNC: 0.7 MG/DL (ref 0.5–1.4)
DIFFERENTIAL METHOD: ABNORMAL
EOSINOPHIL # BLD AUTO: 0.2 K/UL (ref 0–0.5)
EOSINOPHIL NFR BLD: 2 % (ref 0–8)
ERYTHROCYTE [DISTWIDTH] IN BLOOD BY AUTOMATED COUNT: 17.1 % (ref 11.5–14.5)
EST. GFR  (NO RACE VARIABLE): >60 ML/MIN/1.73 M^2
GLUCOSE SERPL-MCNC: 97 MG/DL (ref 70–110)
HCT VFR BLD AUTO: 38.7 % (ref 37–48.5)
HGB BLD-MCNC: 10.7 G/DL (ref 12–16)
IMM GRANULOCYTES # BLD AUTO: 0.03 K/UL (ref 0–0.04)
IMM GRANULOCYTES NFR BLD AUTO: 0.4 % (ref 0–0.5)
LYMPHOCYTES # BLD AUTO: 1 K/UL (ref 1–4.8)
LYMPHOCYTES NFR BLD: 11.4 % (ref 18–48)
MAGNESIUM SERPL-MCNC: 1.7 MG/DL (ref 1.6–2.6)
MCH RBC QN AUTO: 23.6 PG (ref 27–31)
MCHC RBC AUTO-ENTMCNC: 27.6 G/DL (ref 32–36)
MCV RBC AUTO: 85 FL (ref 82–98)
MONOCYTES # BLD AUTO: 1.2 K/UL (ref 0.3–1)
MONOCYTES NFR BLD: 14.1 % (ref 4–15)
NEUTROPHILS # BLD AUTO: 5.9 K/UL (ref 1.8–7.7)
NEUTROPHILS NFR BLD: 71.4 % (ref 38–73)
NRBC BLD-RTO: 0 /100 WBC
PLATELET # BLD AUTO: 286 K/UL (ref 150–450)
PMV BLD AUTO: 10 FL (ref 9.2–12.9)
POTASSIUM SERPL-SCNC: 3.3 MMOL/L (ref 3.5–5.1)
PROT SERPL-MCNC: 6.7 G/DL (ref 6–8.4)
RBC # BLD AUTO: 4.54 M/UL (ref 4–5.4)
SODIUM SERPL-SCNC: 141 MMOL/L (ref 136–145)
WBC # BLD AUTO: 8.32 K/UL (ref 3.9–12.7)

## 2022-08-06 PROCEDURE — 80053 COMPREHEN METABOLIC PANEL: CPT | Performed by: HOSPITALIST

## 2022-08-06 PROCEDURE — 20600001 HC STEP DOWN PRIVATE ROOM

## 2022-08-06 PROCEDURE — 94761 N-INVAS EAR/PLS OXIMETRY MLT: CPT

## 2022-08-06 PROCEDURE — 85025 COMPLETE CBC W/AUTO DIFF WBC: CPT | Performed by: HOSPITALIST

## 2022-08-06 PROCEDURE — 25000003 PHARM REV CODE 250: Performed by: HOSPITALIST

## 2022-08-06 PROCEDURE — 27000221 HC OXYGEN, UP TO 24 HOURS

## 2022-08-06 PROCEDURE — 36415 COLL VENOUS BLD VENIPUNCTURE: CPT | Performed by: HOSPITALIST

## 2022-08-06 PROCEDURE — 63600175 PHARM REV CODE 636 W HCPCS: Performed by: HOSPITALIST

## 2022-08-06 PROCEDURE — 94640 AIRWAY INHALATION TREATMENT: CPT

## 2022-08-06 PROCEDURE — 93010 ELECTROCARDIOGRAM REPORT: CPT | Mod: ,,, | Performed by: INTERNAL MEDICINE

## 2022-08-06 PROCEDURE — 99900035 HC TECH TIME PER 15 MIN (STAT)

## 2022-08-06 PROCEDURE — 93005 ELECTROCARDIOGRAM TRACING: CPT

## 2022-08-06 PROCEDURE — 25000003 PHARM REV CODE 250: Performed by: INTERNAL MEDICINE

## 2022-08-06 PROCEDURE — 93010 EKG 12-LEAD: ICD-10-PCS | Mod: ,,, | Performed by: INTERNAL MEDICINE

## 2022-08-06 PROCEDURE — 25000242 PHARM REV CODE 250 ALT 637 W/ HCPCS: Performed by: HOSPITALIST

## 2022-08-06 PROCEDURE — 99233 SBSQ HOSP IP/OBS HIGH 50: CPT | Mod: ,,, | Performed by: HOSPITALIST

## 2022-08-06 PROCEDURE — 83735 ASSAY OF MAGNESIUM: CPT | Performed by: HOSPITALIST

## 2022-08-06 PROCEDURE — 99233 PR SUBSEQUENT HOSPITAL CARE,LEVL III: ICD-10-PCS | Mod: ,,, | Performed by: HOSPITALIST

## 2022-08-06 RX ORDER — METOPROLOL TARTRATE 50 MG/1
50 TABLET ORAL ONCE
Status: COMPLETED | OUTPATIENT
Start: 2022-08-06 | End: 2022-08-06

## 2022-08-06 RX ORDER — LANOLIN ALCOHOL/MO/W.PET/CERES
400 CREAM (GRAM) TOPICAL ONCE
Status: COMPLETED | OUTPATIENT
Start: 2022-08-06 | End: 2022-08-06

## 2022-08-06 RX ORDER — POTASSIUM CHLORIDE 750 MG/1
30 CAPSULE, EXTENDED RELEASE ORAL 2 TIMES DAILY
Status: COMPLETED | OUTPATIENT
Start: 2022-08-06 | End: 2022-08-06

## 2022-08-06 RX ORDER — METOPROLOL TARTRATE 100 MG/1
100 TABLET ORAL DAILY
Status: DISCONTINUED | OUTPATIENT
Start: 2022-08-07 | End: 2022-08-09

## 2022-08-06 RX ORDER — BUTALBITAL, ACETAMINOPHEN AND CAFFEINE 50; 325; 40 MG/1; MG/1; MG/1
1 TABLET ORAL EVERY 4 HOURS PRN
Status: DISCONTINUED | OUTPATIENT
Start: 2022-08-06 | End: 2022-08-23 | Stop reason: HOSPADM

## 2022-08-06 RX ADMIN — VANCOMYCIN HYDROCHLORIDE 1500 MG: 1.5 INJECTION, POWDER, LYOPHILIZED, FOR SOLUTION INTRAVENOUS at 10:08

## 2022-08-06 RX ADMIN — FUROSEMIDE 20 MG: 10 INJECTION, SOLUTION INTRAMUSCULAR; INTRAVENOUS at 04:08

## 2022-08-06 RX ADMIN — POTASSIUM CHLORIDE 30 MEQ: 10 CAPSULE, COATED, EXTENDED RELEASE ORAL at 08:08

## 2022-08-06 RX ADMIN — GUAIFENESIN 1200 MG: 600 TABLET, EXTENDED RELEASE ORAL at 08:08

## 2022-08-06 RX ADMIN — OXYCODONE HYDROCHLORIDE AND ACETAMINOPHEN 1 TABLET: 5; 325 TABLET ORAL at 08:08

## 2022-08-06 RX ADMIN — PIPERACILLIN SODIUM AND TAZOBACTAM SODIUM 4.5 G: 4; .5 INJECTION, POWDER, LYOPHILIZED, FOR SOLUTION INTRAVENOUS at 08:08

## 2022-08-06 RX ADMIN — THIAMINE HCL TAB 100 MG 100 MG: 100 TAB at 08:08

## 2022-08-06 RX ADMIN — IPRATROPIUM BROMIDE AND ALBUTEROL SULFATE 3 ML: 2.5; .5 SOLUTION RESPIRATORY (INHALATION) at 08:08

## 2022-08-06 RX ADMIN — OXYCODONE HYDROCHLORIDE AND ACETAMINOPHEN 1 TABLET: 5; 325 TABLET ORAL at 03:08

## 2022-08-06 RX ADMIN — DIAZEPAM 5 MG: 5 TABLET ORAL at 05:08

## 2022-08-06 RX ADMIN — IPRATROPIUM BROMIDE AND ALBUTEROL SULFATE 3 ML: 2.5; .5 SOLUTION RESPIRATORY (INHALATION) at 09:08

## 2022-08-06 RX ADMIN — PIPERACILLIN SODIUM AND TAZOBACTAM SODIUM 4.5 G: 4; .5 INJECTION, POWDER, LYOPHILIZED, FOR SOLUTION INTRAVENOUS at 10:08

## 2022-08-06 RX ADMIN — METOPROLOL TARTRATE 50 MG: 50 TABLET, FILM COATED ORAL at 11:08

## 2022-08-06 RX ADMIN — Medication 400 MG: at 08:08

## 2022-08-06 RX ADMIN — BUTALBITAL, ACETAMINOPHEN, AND CAFFEINE 1 TABLET: 50; 325; 40 TABLET ORAL at 04:08

## 2022-08-06 RX ADMIN — MIRTAZAPINE 7.5 MG: 7.5 TABLET, FILM COATED ORAL at 08:08

## 2022-08-06 RX ADMIN — VENLAFAXINE HYDROCHLORIDE 225 MG: 75 CAPSULE, EXTENDED RELEASE ORAL at 08:08

## 2022-08-06 RX ADMIN — IPRATROPIUM BROMIDE AND ALBUTEROL SULFATE 3 ML: 2.5; .5 SOLUTION RESPIRATORY (INHALATION) at 03:08

## 2022-08-06 RX ADMIN — DIAZEPAM 5 MG: 5 TABLET ORAL at 02:08

## 2022-08-06 RX ADMIN — LORAZEPAM 2 MG: 0.5 TABLET ORAL at 08:08

## 2022-08-06 RX ADMIN — DIAZEPAM 5 MG: 5 TABLET ORAL at 10:08

## 2022-08-06 RX ADMIN — IPRATROPIUM BROMIDE AND ALBUTEROL SULFATE 3 ML: 2.5; .5 SOLUTION RESPIRATORY (INHALATION) at 12:08

## 2022-08-06 RX ADMIN — ACETAMINOPHEN 650 MG: 325 TABLET ORAL at 10:08

## 2022-08-06 RX ADMIN — METOPROLOL TARTRATE 50 MG: 50 TABLET, FILM COATED ORAL at 08:08

## 2022-08-06 RX ADMIN — PIPERACILLIN SODIUM AND TAZOBACTAM SODIUM 4.5 G: 4; .5 INJECTION, POWDER, LYOPHILIZED, FOR SOLUTION INTRAVENOUS at 02:08

## 2022-08-06 RX ADMIN — Medication 6 MG: at 08:08

## 2022-08-06 NOTE — PROGRESS NOTES
Pharmacokinetic Assessment Follow Up: IV Vancomycin    Vancomycin serum concentration assessment(s):    The trough level was drawn correctly and can be used to guide therapy at this time. The measurement is within the desired definitive target range of 15 to 20 mcg/mL.    Vancomycin Regimen Plan:    Change regimen to Vancomycin 1500 mg IV every 24 hours with next serum trough concentration measured at 2230 prior to 3rd  dose on 8/7    Drug levels (last 3 results):  Recent Labs   Lab Result Units 08/03/22  1041 08/05/22  2117   Vancomycin-Trough ug/mL 22.3* 19.8       Pharmacy will continue to follow and monitor vancomycin.    Please contact pharmacy at extension [phone] for questions regarding this assessment.    Thank you for the consult,   Zackery Plummer       Patient brief summary:  Vale Gutierrez is a 61 y.o. female initiated on antimicrobial therapy with IV Vancomycin for treatment of lower respiratory infection    The patient's current regimen is Vancomycin 1750mg IV every 24 hours.     Drug Allergies:   Review of patient's allergies indicates:  No Known Allergies    Actual Body Weight:   90.7kg    Renal Function:   Estimated Creatinine Clearance: 78.5 mL/min (based on SCr of 0.8 mg/dL).,     Dialysis Method (if applicable):  N/A    CBC (last 72 hours):  Recent Labs   Lab Result Units 08/04/22  0242 08/05/22  0413   WBC K/uL 7.22 7.37   Hemoglobin g/dL 11.8* 10.5*   Hematocrit % 42.9 37.7   Platelets K/uL 324 298   Gran % % 69.7 69.3   Lymph % % 18.0 15.2*   Mono % % 9.1 11.8   Eosinophil % % 1.8 2.7   Basophil % % 0.8 0.7   Differential Method  Automated Automated       Metabolic Panel (last 72 hours):  Recent Labs   Lab Result Units 08/03/22  1145 08/04/22  0242 08/05/22  0413   Sodium mmol/L  --  142 142   Potassium mmol/L  --  3.2* 3.6   Chloride mmol/L  --  84* 91*   CO2 mmol/L  --  45* 39*   Glucose mg/dL  --  85 81   BUN mg/dL  --  16 15   Creatinine mg/dL 0.9 1.1 0.8   Albumin g/dL  --  2.8* 2.6*    Total Bilirubin mg/dL  --  0.4 0.4   Alkaline Phosphatase U/L  --  162* 134   AST U/L  --  32 18   ALT U/L  --  49* 34   Magnesium mg/dL  --  1.4* 2.0       Vancomycin Administrations:  vancomycin given in the last 96 hours                   vancomycin 1.75 g in 5 % dextrose 500 mL IVPB (mg) 1,750 mg New Bag 08/04/22 2112     1,750 mg New Bag 08/03/22 2139    vancomycin in dextrose 5 % 1 gram/250 mL IVPB 1,000 mg (mg) 1,000 mg New Bag 08/02/22 2231     1,000 mg New Bag  1032                Microbiologic Results:  Microbiology Results (last 7 days)     Procedure Component Value Units Date/Time    Blood culture #2 **CANNOT BE ORDERED STAT** [[id number]] Collected: 08/01/22 1822    Order Status: Completed Specimen: Blood from Peripheral, Hand, Right Updated: 08/05/22 2012     Blood Culture, Routine No Growth to date      No Growth to date      No Growth to date      No Growth to date      No Growth to date    Blood culture #1 **CANNOT BE ORDERED STAT** [[id number]] Collected: 08/01/22 1831    Order Status: Completed Specimen: Blood from Peripheral, Hand, Left Updated: 08/05/22 2012     Blood Culture, Routine No Growth to date      No Growth to date      No Growth to date      No Growth to date      No Growth to date    Culture, Respiratory with Gram Stain [[id number]]     Order Status: No result Specimen: Respiratory     Culture, Respiratory with Gram Stain [[id number]]     Order Status: No result Specimen: Respiratory

## 2022-08-06 NOTE — ASSESSMENT & PLAN NOTE
· 8/6 still some tremors in hands, so keep valium same dosing  · Still having a hallucination with PT this afternoon 8/5, so keep valium TID. Sister reports when had detox at  in prev years she was there 14 days with withdrawal  + aspiration PNA  · Improved 8/5, no tremors, mild confusion, but endorses hx of withdrawal in the past. hallucinoatinos improved. She reports had been cutting down in past on alcohol as had had WD before. Sister gives detailed hx also    · Continue MVI, Thiamine and Folate daily.   · Reports drinking 2 days ago. Denies symptoms or past history of withdrawals.   · Monitoring CIWA q shift, with plans to initiate benzos for CIWA >8. Patient started on scheduled Valium on 8/2 at 5 mg po TID as CIWA 17. Patient on prn Ativan as per withdrawal protocol.   · Ethanol level on presentation was negative.   · Trend hepatic function; AST to ALT ratio consistent with recent alcohol intake.   · Ammonia level okay at 31 on admit.

## 2022-08-06 NOTE — SUBJECTIVE & OBJECTIVE
Interval History:  she was having what looked like afib during exam ad nursing reported it also before my exam, she would have HR of 135 and then go back to <100 at least 5-6 times during my exam. Ekg obtained but was in NSR. 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 If occurs again and sustains overnight atempt to get a 12 lead to confirm this. Her mom had a lengthy afib hx. Still feeling not great, likely fro mwithdrawals also, has some mild tremors on exam. Discussed importance of staying off alcohol once out of withdrawal and lab work better once off alcohol as well. She reports drinks vodka at home as drink of choice. Will ikely stop lasix toorrow as suspect its more a bad aspiration pneumonia causing this more than volume. Still on 10 L. Talked to sister to update her today. She plans to come visit tomorrow.       Review of Systems   Constitutional:  Negative for fever.   Respiratory:  Negative for cough and shortness of breath.    Cardiovascular:  Negative for chest pain and leg swelling.   Gastrointestinal:  Negative for abdominal pain, nausea and vomiting.   Musculoskeletal:  Negative for arthralgias.   Skin:  Negative for rash.   Neurological:  Negative for dizziness, tremors and light-headedness.   Psychiatric/Behavioral:  Positive for agitation (At times) and confusion. Negative for hallucinations.    Objective:     Vital Signs (Most Recent):  Temp: 98.3 °F (36.8 °C) (08/03/22 0700)  Pulse: 75 (08/03/22 1100)  Resp: 18 (08/03/22 0833)  BP: 135/91 (08/03/22 0700)  SpO2: 95 % (08/03/22 1100) on 10 liters of oxygen high flow   Vital Signs (24h Range):  Temp:  [98 °F (36.7 °C)-98.9 °F (37.2 °C)] 98.1 °F (36.7 °C)  Pulse:  [] 78  Resp:  [14-24] 18  SpO2:  [92 %-98 %] 96 %  BP: (105-132)/(64-84) 105/66     Weight:  90.7 kg (200 lb)  Body mass index is 34.33 kg/m².    Intake/Output Summary (Last 24 hours) at 8/6/2022 1512  Last data filed at 8/6/2022 0622  Gross per 24 hour   Intake 450 ml   Output 1400 ml   Net -950 ml        Physical Exam  Vitals and nursing note reviewed.   Constitutional:       General: She is not in acute distress.     Appearance: Normal appearance. She is obese. She is not ill-appearing.      Interventions: Nasal cannula in place.      Comments: Alert t place and situation and medical condition (knows events prior to admit)   Eyes:      General: No scleral icterus.     Conjunctiva/sclera: Conjunctivae normal.   Neck:      Vascular: No JVD.   Cardiovascular:      Rate and Rhythm: Normal rate and regular rhythm.      Heart sounds: Normal heart sounds. No murmur heard.    No friction rub. No gallop.      Comments: -130 on tele bedside with possible afib jaunts very briefly on tele  Pulmonary:      Effort: Pulmonary effort is normal. No respiratory distress.      Breath sounds: Rales present. No wheezing or rhonchi.      Comments: On HFNC. Dec breath sounds at Right lung base. Wheezing on exp heard  Abdominal:      General: Abdomen is flat. Bowel sounds are normal. There is no distension.      Palpations: Abdomen is soft.      Tenderness: There is no abdominal tenderness.   Musculoskeletal:      Right lower leg: No edema.      Left lower leg: No edema.   Skin:     General: Skin is warm.      Findings: No erythema.   Neurological:      General: No focal deficit present.      Mental Status: She is alert.      Comments: Oriented to person and place and time   Psychiatric:         Mood and Affect: Mood normal.         Behavior: Behavior normal. Behavior is cooperative.      Comments: Occ a little confused and tangential but knows general situation and reasons for admission and place       Significant Labs:   CBC:   Recent Labs   Lab 08/05/22  0413 08/06/22  0422   WBC 7.37 8.32   HGB 10.5* 10.7*   HCT 37.7  38.7    286       CMP:   Recent Labs   Lab 08/05/22  0413 08/06/22  0422    141   K 3.6 3.3*   CL 91* 91*   CO2 39* 39*   GLU 81 97   BUN 15 13   CREATININE 0.8 0.7   CALCIUM 9.2 10.0   PROT 5.9* 6.7   ALBUMIN 2.6* 2.6*   BILITOT 0.4 0.4   ALKPHOS 134 121   AST 18 15   ALT 34 24   ANIONGAP 12 11       Blood Culture, Routine   Date Value Ref Range Status   08/01/2022 No Growth to date  Preliminary   08/01/2022 No Growth to date  Preliminary   08/01/2022 No Growth to date  Preliminary   08/01/2022 No Growth to date  Preliminary   08/01/2022 No Growth to date  Preliminary     Significant Imaging: I have reviewed all pertinent imaging results/findings within the past 24 hours.

## 2022-08-06 NOTE — ASSESSMENT & PLAN NOTE
Known hx but expressed some worsenig symtoms to PT today. Will assess if needs psych consultation or if having acute stress response from withdrawal/acute illness- she denies having any active SI/HI  Will see if would like to talk to psychology dr talley on monday

## 2022-08-06 NOTE — RESPIRATORY THERAPY
RAPID RESPONSE RESPIRATORY THERAPY PROACTIVE ROUNDING NOTE             Time of visit: 910     Code Status: Full Code   : 1960  Bed: 62626/32530 A:   MRN: [id number]  Time spent at the bedside: < 15 min    SITUATION    Evaluated patient for: HFNC Compliance     BACKGROUND    Patient has a past medical history of Alcohol abuse, Anxiety, Benign paroxysmal positional vertigo, Depression, Hyperlipidemia, and Hypertension.    24 Hours Vitals Range:  Temp:  [97.9 °F (36.6 °C)-98.9 °F (37.2 °C)]   Pulse:  []   Resp:  [14-26]   BP: (105-132)/(64-84)   SpO2:  [92 %-98 %]     Labs:    Recent Labs     22  0242 22  0413 22  0422    142 141   K 3.2* 3.6 3.3*   CL 84* 91* 91*   CO2 45* 39* 39*   CREATININE 1.1 0.8 0.7   GLU 85 81 97   MG 1.4* 2.0 1.7        No results for input(s): PH, PCO2, PO2, HCO3, POCSATURATED, BE in the last 72 hours.    ASSESSMENT/INTERVENTIONS    Upon arrival in room Pt resting in bed.    Last VS   Temp: 97.9 °F (36.6 °C) ( 1615)  Pulse: 77 ( 1523)  Resp: 23 ( 1523)  BP: 105/66 ( 1145)  SpO2: 95 % ( 1523)    Level of Consciousness: Level of Consciousness (AVPU): alert  Respiratory Effort: Respiratory Effort: Normal Expansion/Accessory Muscle Usage: Expansion/Accessory Muscles/Retractions: no use of accessory muscles  All Lung Field Breath Sounds: All Lung Fields Breath Sounds: clear, diminished  O2 Device/Concentration: Flow (L/min): 10,  , O2 Device (Oxygen Therapy): High Flow nasal Cannula  Was the O2 device able to be weaned? No  Ambu at bedside: Ambu bag with the patient?: Yes, Adult Ambu    Active Orders   Respiratory Care    Incentive spirometry     Frequency: Q6H PRN     Number of Occurrences: Until Specified    Inhalation Treatment Q4H WAKE     Frequency: Q4H WAKE     Number of Occurrences: Until Specified    Oxygen Continuous     Frequency: Continuous     Number of Occurrences: Until Specified     Order Questions:      Device type:  High flow      Device: High Flow Nasal Cannula (6 -15 Liters)      LPM: 10      Titrate O2 per Oxygen Titration Protocol: Yes      To maintain SpO2 goal of: >= 92%      Notify MD of: Inability to achieve desired SpO2; Sudden change in patient status and requires 20% increase in FiO2; Patient requires >60% FiO2    Pulse Oximetry Q4H     Frequency: Q4H     Number of Occurrences: Until Specified       RECOMMENDATIONS    We recommend: RRT Recs: Continue POC per primary team.    FOLLOW-UP    Please call back the Rapid Response RT, Laquita Tijerina RRT at x [phone] for any questions or concerns.

## 2022-08-06 NOTE — PLAN OF CARE
Problem: Adult Inpatient Plan of Care  Goal: Plan of Care Review  Outcome: Ongoing, Not Progressing  Goal: Patient-Specific Goal (Individualized)  Outcome: Ongoing, Not Progressing  Goal: Absence of Hospital-Acquired Illness or Injury  Outcome: Ongoing, Not Progressing  Goal: Optimal Comfort and Wellbeing  Outcome: Ongoing, Not Progressing  Goal: Readiness for Transition of Care  Outcome: Ongoing, Not Progressing     Problem: Fluid Imbalance (Pneumonia)  Goal: Fluid Balance  Outcome: Ongoing, Not Progressing     Problem: Infection (Pneumonia)  Goal: Resolution of Infection Signs and Symptoms  Outcome: Ongoing, Not Progressing     Problem: Respiratory Compromise (Pneumonia)  Goal: Effective Oxygenation and Ventilation  Outcome: Ongoing, Not Progressing     Problem: Skin Injury Risk Increased  Goal: Skin Health and Integrity  Outcome: Ongoing, Not Progressing     Problem: Fall Injury Risk  Goal: Absence of Fall and Fall-Related Injury  Outcome: Ongoing, Not Progressing     Problem: Restraint, Nonbehavioral (Nonviolent)  Goal: Absence of Harm or Injury  Outcome: Ongoing, Not Progressing

## 2022-08-06 NOTE — PROGRESS NOTES
"[facility] - [facility] ([address] Medicine  Progress Note    Patient Name: Vale Gutierrez  MRN: [id number]  Patient Class: IP- Inpatient   Admission Date: 8/1/2022  Length of Stay: 5 days  Attending Physician: Pratibha Jorgensen MD  Primary Care Provider: Estela Mcdaniel MD        Subjective:     Principal Problem:Acute hypoxemic respiratory failure        HPI:  61F w/ alcohol dependency/abuse, HTN on Toprol, HLD, depression/anxiety presenting to the ED with SOB and confusion. SOB and malaise onset a couple of days ago. Denies fevers, chills, rigors, chest pain, cough, productive sputum. Denies excessive drinking, drug use. No bowel irregularities, N/V. Denies orthopnea, LE swelling, KIRAN. Admits to having alcohol 2 days ago ("just one drink").     ED workup notable for elevated BP, normal WBC, elevation of AST//124, , trop .027, VBG w/ corrected pH of 7.4, CTA -ve for PE but notable for RLL consolidation w/ air bronchograms concerning for possible CAP v aspiration PNA and possible edema. She was placed on supplemental O2 and broad spectrum antibiotics and admitted to [facility].       Overview/Hospital Course:  Patient started on IV Vancomycin and Zosyn for RLL and right lingula pneumonia and significant hypoxia. CTA of chest negative for PE but concerning for PNA in RLL and lingula but also with changes concerning for pulmonary edema so also started on Lasix 40 mg BID. Echo done and showed:  · The left ventricle is normal in size with concentric hypertrophy and normal systolic function.  · The estimated ejection fraction is 60-65%.  · Grade I left ventricular diastolic dysfunction.  · Mild aortic regurgitation.  · Normal right ventricular size with normal right ventricular systolic function.  · The estimated PA systolic pressure is 43 mmHg.  · Intermediate central venous pressure (8 mmHg).  · There is pulmonary hypertension.  · Small circumferential pericardial effusion.  · Mild " left atrial enlargement.    Patient requiring 10 liters of high flow oxygen and not on oxygen at home. Patient with significant alcohol history and showing signs of early withdrawal with tremors and hallucinations on 8/2 so started on scheduled Valium 5 mg po TID and nursing monitoring CIWA with prn po Ativan for breakthrough agitation. Patient started on MVI, Thiamine and Folate as per alcohol withdrawal protocol.       Interval History:  she was having what looked like afib during exam ad nursing reported it also before my exam, she would have HR of 135 and then go back to <100 at least 5-6 times during my exam. Ekg obtained but was in NSR. 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 If occurs again and sustains overnight atempt to get a 12 lead to confirm this. Her mom had a lengthy afib hx. Still feeling not great, likely fro mwithdrawals also, has some mild tremors on exam. Discussed importance of staying off alcohol once out of withdrawal and lab work better once off alcohol as well. She reports drinks vodka at home as drink of choice. Will ikely stop lasix toorrow as suspect its more a bad aspiration pneumonia causing this more than volume. Still on 10 L. Talked to sister to update her today. She plans to come visit tomorrow.       Review of Systems   Constitutional:  Negative for fever.   Respiratory:  Negative for cough and shortness of breath.    Cardiovascular:  Negative for chest pain and leg swelling.   Gastrointestinal:  Negative for abdominal pain, nausea and vomiting.   Musculoskeletal:  Negative for arthralgias.   Skin:  Negative for rash.   Neurological:  Negative for dizziness, tremors and light-headedness.   Psychiatric/Behavioral:  Positive for agitation (At times) and confusion. Negative for  hallucinations.    Objective:     Vital Signs (Most Recent):  Temp: 98.3 °F (36.8 °C) (08/03/22 0700)  Pulse: 75 (08/03/22 1100)  Resp: 18 (08/03/22 0833)  BP: 135/91 (08/03/22 0700)  SpO2: 95 % (08/03/22 1100) on 10 liters of oxygen high flow   Vital Signs (24h Range):  Temp:  [98 °F (36.7 °C)-98.9 °F (37.2 °C)] 98.1 °F (36.7 °C)  Pulse:  [] 78  Resp:  [14-24] 18  SpO2:  [92 %-98 %] 96 %  BP: (105-132)/(64-84) 105/66     Weight: 90.7 kg (200 lb)  Body mass index is 34.33 kg/m².    Intake/Output Summary (Last 24 hours) at 8/6/2022 1512  Last data filed at 8/6/2022 0622  Gross per 24 hour   Intake 450 ml   Output 1400 ml   Net -950 ml        Physical Exam  Vitals and nursing note reviewed.   Constitutional:       General: She is not in acute distress.     Appearance: Normal appearance. She is obese. She is not ill-appearing.      Interventions: Nasal cannula in place.      Comments: Alert t place and situation and medical condition (knows events prior to admit)   Eyes:      General: No scleral icterus.     Conjunctiva/sclera: Conjunctivae normal.   Neck:      Vascular: No JVD.   Cardiovascular:      Rate and Rhythm: Normal rate and regular rhythm.      Heart sounds: Normal heart sounds. No murmur heard.    No friction rub. No gallop.      Comments: -130 on tele bedside with possible afib jaunts very briefly on tele  Pulmonary:      Effort: Pulmonary effort is normal. No respiratory distress.      Breath sounds: Rales present. No wheezing or rhonchi.      Comments: On HFNC. Dec breath sounds at Right lung base. Wheezing on exp heard  Abdominal:      General: Abdomen is flat. Bowel sounds are normal. There is no distension.      Palpations: Abdomen is soft.      Tenderness: There is no abdominal tenderness.   Musculoskeletal:      Right lower leg: No edema.      Left lower leg: No edema.   Skin:     General: Skin is warm.      Findings: No erythema.   Neurological:      General: No focal deficit present.       Mental Status: She is alert.      Comments: Oriented to person and place and time   Psychiatric:         Mood and Affect: Mood normal.         Behavior: Behavior normal. Behavior is cooperative.      Comments: Occ a little confused and tangential but knows general situation and reasons for admission and place       Significant Labs:   CBC:   Recent Labs   Lab 08/05/22 0413 08/06/22 0422   WBC 7.37 8.32   HGB 10.5* 10.7*   HCT 37.7 38.7    286       CMP:   Recent Labs   Lab 08/05/22 0413 08/06/22 0422    141   K 3.6 3.3*   CL 91* 91*   CO2 39* 39*   GLU 81 97   BUN 15 13   CREATININE 0.8 0.7   CALCIUM 9.2 10.0   PROT 5.9* 6.7   ALBUMIN 2.6* 2.6*   BILITOT 0.4 0.4   ALKPHOS 134 121   AST 18 15   ALT 34 24   ANIONGAP 12 11       Blood Culture, Routine   Date Value Ref Range Status   08/01/2022 No Growth to date  Preliminary   08/01/2022 No Growth to date  Preliminary   08/01/2022 No Growth to date  Preliminary   08/01/2022 No Growth to date  Preliminary   08/01/2022 No Growth to date  Preliminary     Significant Imaging: I have reviewed all pertinent imaging results/findings within the past 24 hours.      Assessment/Plan:      * Acute hypoxemic respiratory failure  Acute on chronic respiratory failure with hypercapnia  Community acquired pneumonia of right lower lobe of lung  - Presented with acute on chronic hypercapnic and acute hypoxic respiratory failure, with lethargy and encephalopathy. She is not on home oxygen.   - Suspect hypoxia combination of pneumonia and heart failure/pulmonary edema.   - Suspect hypercapnia related to opiates and muscle relaxants she takes at home but there is a component of possible chronicity as HCO3 elevated on admit suggesting hypercapnia may be more chronic than acute but no previous ABGs to compare unfortunately. Patient has no history of any prior chronic lung disease.   - COVID negative on admit.   - CTA showing RLL consolidation with bronchogram concerning for  possible CAP vs Aspiration PNA. Empiric abx initiated in ED with Zosyn/Vanc pending culture data and will continue.  Resp CX ordered but no sputum so far.  - Procal low at 0.10.   - Blood cultures from admit so far no growth.   - Supplemental O2 initiated and escalated to high flow to maintain sats. Encouraging ambulation, OOB for all meals, incentive spirometry use.   - Wean oxygen as tolerated with goal of oxygen sats > 92%.  Nebs added 8/5, as had some wheeinzg on exam       Tachycardia  Suspect may have paroxysmal atrial fibrilllation as HRs 130s 5-6 times for <30 seconds on exam 8/6, but not fast enough to get a 12 lead for it. Watch closely for this, inc BB 8/6. If dx as afib will need to calculate stroke risk with [id number] for long term ppx but is high fall risk with alcohol hx      Aspiration pneumonia  See CAP  Likely from alcohol intoxication as precipitant, happened a few years ago similar fashion per family leading to 14 day sstay at   -speech consult for swallow      Weakness  SNF rec when med stable      Aortic aneurysm  Seen incidentally on CTA chest on admit with 4.1 cm.   -needs to stop cigarettes as recently started again as a contributor. Long term will need to likely do asa ad statin, transaminits so avoid this now  -will refer to vascular surg OP for long term monitoring      Hypomagnesemia  Replace prn      Hypokalemia  Replace prn      Acute on chronic diastolic congestive heart failure  Patient is identified as having Diastolic (HFpEF) heart failure that is Acute on chronic. CHF is currently uncontrolled due to Rales/crackles on pulmonary exam and Pulmonary edema/pleural effusion on CXR. Latest ECHO performed and demonstrates- Results for orders placed during the hospital encounter of 08/01/22    Echo    Interpretation Summary  · The left ventricle is normal in size with concentric hypertrophy and normal systolic function.  · The estimated ejection fraction is 60-65%.  · Grade I left  ventricular diastolic dysfunction.  · Mild aortic regurgitation.  · Normal right ventricular size with normal right ventricular systolic function.  · The estimated PA systolic pressure is 43 mmHg.  · Intermediate central venous pressure (8 mmHg).  · There is pulmonary hypertension.  · Small circumferential pericardial effusion.  · Mild left atrial enlargement.  . Continue Metoprolol and IV Lasix 40 mg BID to treat. Monitor clinical status closely. Monitor on telemetry. Patient is off CHF pathway.  Monitor strict Is&Os and daily weights.  Place on fluid restriction of 1.5 L. Continue to stress to patient importance of self efficacy and  on diet for CHF. Last BNP reviewed- and noted below   Recent Labs   Lab 08/01/22  1746   *   .  Dec diuresis on 8/4 as CO2 rising from baseline in 30s to 45 today and suspect more pneumonia than volume. Change to lasix 20 daily      Encephalopathy, metabolic  · Present on admit.   · Paent appears disinhibited, transiently disoriented but easily redirectable and, though tangential, answering questions appropriately. Patient continues to show same pattern on 8/3.   · Suspect intoxication (endorsed alcohol use in the setting of opiates and muscle relaxants for her chronic pain) vs metabolic/infectious (pneumonia).  · Improved 8/5 as can recall events prior to admit but cant remember all of last 2 days since admit but is appropraite with questions now and responses. Occasional halluciation from withdrawal  · CTH unremarkable.   · Delirium precautions.     Asymptomatic bacteriuria  Not complaining of dysuria, but possible confounder given encephalopathy. In any case, should be covered by empiric antibiotics she is currently on for her pneumonia.     Community acquired pneumonia of right lower lobe of lung  Likely aspiration PNA given RLL with hx of alcohol use a predisposition to this. No signs of aspiration now as awake and alert.      Transaminitis  Improved from 245/124 on  admit, now normalized Likely from alcohol on admit, improving off alcohol. Needs to stop long term or risks adverse health issues      Anxiety  Recurrent major depressive disorder, in full remission  Chronic, stable. Reports compliance with medications. Continue home Buspar and Mirtazapine to treat.     Chronic pain disorder  Lumbosacral radiculopathy  Chronic and controlled. Continue home Percocet prn for pain. Hold home muscle relaxants due to encephalopathy.     Lumbosacral radiculopathy  On chronic oxy 5 with pain management prescribing. Sees dr jason and had surgery previously also      Essential hypertension  Chronic and controlled an stable. Continue home Metoprolol 50 mg po daily to treat.     Recurrent major depressive disorder, in full remission  Known hx but expressed some worsenig symtoms to PT today. Will assess if needs psych consultation or if having acute stress response from withdrawal/acute illness- she denies having any active SI/HI  Will see if would like to talk to psychology dr talley on monday        Alcohol dependence with withdrawal with perceptual disturbance  · 8/6 still some tremors in hands, so keep valium same dosing  · Still having a hallucination with PT this afternoon 8/5, so keep valium TID. Sister reports when had detox at  in prev years she was there 14 days with withdrawal  + aspiration PNA  · Improved 8/5, no tremors, mild confusion, but endorses hx of withdrawal in the past. hallucinoatinos improved. She reports had been cutting down in past on alcohol as had had WD before. Sister gives detailed hx also    · Continue MVI, Thiamine and Folate daily.   · Reports drinking 2 days ago. Denies symptoms or past history of withdrawals.   · Monitoring CIWA q shift, with plans to initiate benzos for CIWA >8. Patient started on scheduled Valium on 8/2 at 5 mg po TID as CIWA 17. Patient on prn Ativan as per withdrawal protocol.   · Ethanol level on presentation was negative.   · Trend  hepatic function; AST to ALT ratio consistent with recent alcohol intake.   · Ammonia level okay at 31 on admit.       VTE Risk Mitigation (From admission, onward)         Ordered     IP VTE HIGH RISK PATIENT  Once         08/02/22 0055     Place sequential compression device  Until discontinued         08/02/22 0055                Discharge Planning   DIXON: 8/6/2022     Code Status: Full Code   Is the patient medically ready for discharge?: No    Reason for patient still in hospital (select all that apply): Patient trending condition  Discharge Plan A: Home with family                  Pratibha Jorgensen MD  Department of Hospital Medicine   [facility] - Intensive Care ([place])

## 2022-08-06 NOTE — NURSING
Patient alert and oriented x4. Patient c/o pain. Administered pain med as ordered. Pain med was effective. Denies any respiratory distress. Patient is stable. Safety measure in place. Bed in lowest position and call light within reach.

## 2022-08-06 NOTE — ASSESSMENT & PLAN NOTE
Suspect may have paroxysmal atrial fibrilllation as HRs 130s 5-6 times for <30 seconds on exam 8/6, but not fast enough to get a 12 lead for it. Watch closely for this, inc NESTOR 8/6. If dx as afib will need to calculate stroke risk with [id number] for long term ppx but is high fall risk with alcohol hx

## 2022-08-07 PROBLEM — I31.39 PERICARDIAL EFFUSION: Status: ACTIVE | Noted: 2022-08-07

## 2022-08-07 LAB
ADENOVIRUS: NOT DETECTED
ALBUMIN SERPL BCP-MCNC: 2.6 G/DL (ref 3.5–5.2)
ALLENS TEST: ABNORMAL
ALLENS TEST: ABNORMAL
ALP SERPL-CCNC: 123 U/L (ref 55–135)
ALT SERPL W/O P-5'-P-CCNC: 23 U/L (ref 10–44)
ANION GAP SERPL CALC-SCNC: 14 MMOL/L (ref 8–16)
AST SERPL-CCNC: 17 U/L (ref 10–40)
BASOPHILS # BLD AUTO: 0.09 K/UL (ref 0–0.2)
BASOPHILS NFR BLD: 1 % (ref 0–1.9)
BILIRUB SERPL-MCNC: 0.5 MG/DL (ref 0.1–1)
BORDETELLA PARAPERTUSSIS (IS1001): NOT DETECTED
BORDETELLA PERTUSSIS (PTXP): NOT DETECTED
BUN SERPL-MCNC: 14 MG/DL (ref 8–23)
CALCIUM SERPL-MCNC: 9.5 MG/DL (ref 8.7–10.5)
CHLAMYDIA PNEUMONIAE: NOT DETECTED
CHLORIDE SERPL-SCNC: 90 MMOL/L (ref 95–110)
CO2 SERPL-SCNC: 35 MMOL/L (ref 23–29)
CORONAVIRUS 229E, COMMON COLD VIRUS: NOT DETECTED
CORONAVIRUS HKU1, COMMON COLD VIRUS: NOT DETECTED
CORONAVIRUS NL63, COMMON COLD VIRUS: NOT DETECTED
CORONAVIRUS OC43, COMMON COLD VIRUS: NOT DETECTED
CREAT SERPL-MCNC: 0.8 MG/DL (ref 0.5–1.4)
DELSYS: ABNORMAL
DELSYS: ABNORMAL
DIFFERENTIAL METHOD: ABNORMAL
EOSINOPHIL # BLD AUTO: 0.2 K/UL (ref 0–0.5)
EOSINOPHIL NFR BLD: 2.5 % (ref 0–8)
EP: 5
ERYTHROCYTE [DISTWIDTH] IN BLOOD BY AUTOMATED COUNT: 17.1 % (ref 11.5–14.5)
ERYTHROCYTE [SEDIMENTATION RATE] IN BLOOD BY WESTERGREN METHOD: 14 MM/H
EST. GFR  (NO RACE VARIABLE): >60 ML/MIN/1.73 M^2
FIO2: 100
FIO2: 100
FLOW: 50
FLUBV RNA NPH QL NAA+NON-PROBE: NOT DETECTED
GLUCOSE SERPL-MCNC: 84 MG/DL (ref 70–110)
HCO3 UR-SCNC: 42.4 MMOL/L (ref 24–28)
HCO3 UR-SCNC: 45.6 MMOL/L (ref 24–28)
HCT VFR BLD AUTO: 37.5 % (ref 37–48.5)
HGB BLD-MCNC: 10.4 G/DL (ref 12–16)
HPIV1 RNA NPH QL NAA+NON-PROBE: NOT DETECTED
HPIV2 RNA NPH QL NAA+NON-PROBE: NOT DETECTED
HPIV3 RNA NPH QL NAA+NON-PROBE: NOT DETECTED
HPIV4 RNA NPH QL NAA+NON-PROBE: NOT DETECTED
HUMAN METAPNEUMOVIRUS: NOT DETECTED
IMM GRANULOCYTES # BLD AUTO: 0.03 K/UL (ref 0–0.04)
IMM GRANULOCYTES NFR BLD AUTO: 0.3 % (ref 0–0.5)
INFLUENZA A (SUBTYPES H1,H1-2009,H3): NOT DETECTED
IP: 15
LYMPHOCYTES # BLD AUTO: 1.3 K/UL (ref 1–4.8)
LYMPHOCYTES NFR BLD: 13.8 % (ref 18–48)
MAGNESIUM SERPL-MCNC: 1.7 MG/DL (ref 1.6–2.6)
MCH RBC QN AUTO: 23.7 PG (ref 27–31)
MCHC RBC AUTO-ENTMCNC: 27.7 G/DL (ref 32–36)
MCV RBC AUTO: 86 FL (ref 82–98)
MODE: ABNORMAL
MODE: ABNORMAL
MONOCYTES # BLD AUTO: 1.2 K/UL (ref 0.3–1)
MONOCYTES NFR BLD: 13.1 % (ref 4–15)
MYCOPLASMA PNEUMONIAE: NOT DETECTED
NEUTROPHILS # BLD AUTO: 6.4 K/UL (ref 1.8–7.7)
NEUTROPHILS NFR BLD: 69.3 % (ref 38–73)
NRBC BLD-RTO: 0 /100 WBC
PCO2 BLDA: 72.6 MMHG (ref 35–45)
PCO2 BLDA: 75.7 MMHG (ref 35–45)
PH SMN: 7.38 [PH] (ref 7.35–7.45)
PH SMN: 7.39 [PH] (ref 7.35–7.45)
PLATELET # BLD AUTO: 292 K/UL (ref 150–450)
PMV BLD AUTO: 10.2 FL (ref 9.2–12.9)
PO2 BLDA: 29 MMHG (ref 40–60)
PO2 BLDA: 32 MMHG (ref 40–60)
POC BE: 17 MMOL/L
POC BE: 21 MMOL/L
POC SATURATED O2: 50 % (ref 95–100)
POC SATURATED O2: 57 % (ref 95–100)
POC TCO2: 45 MMOL/L (ref 24–29)
POC TCO2: 48 MMOL/L (ref 24–29)
POCT GLUCOSE: 99 MG/DL (ref 70–110)
POTASSIUM SERPL-SCNC: 4 MMOL/L (ref 3.5–5.1)
PROT SERPL-MCNC: 6.3 G/DL (ref 6–8.4)
RBC # BLD AUTO: 4.38 M/UL (ref 4–5.4)
RESPIRATORY INFECTION PANEL SOURCE: NORMAL
RSV RNA NPH QL NAA+NON-PROBE: NOT DETECTED
RV+EV RNA NPH QL NAA+NON-PROBE: NOT DETECTED
SAMPLE: ABNORMAL
SAMPLE: ABNORMAL
SARS-COV-2 RNA RESP QL NAA+PROBE: NOT DETECTED
SITE: ABNORMAL
SITE: ABNORMAL
SODIUM SERPL-SCNC: 139 MMOL/L (ref 136–145)
VANCOMYCIN TROUGH SERPL-MCNC: 18.1 UG/ML (ref 10–22)
WBC # BLD AUTO: 9.25 K/UL (ref 3.9–12.7)

## 2022-08-07 PROCEDURE — 87633 RESP VIRUS 12-25 TARGETS: CPT | Performed by: HOSPITALIST

## 2022-08-07 PROCEDURE — 25000003 PHARM REV CODE 250: Performed by: INTERNAL MEDICINE

## 2022-08-07 PROCEDURE — 25000003 PHARM REV CODE 250: Performed by: HOSPITALIST

## 2022-08-07 PROCEDURE — 63600175 PHARM REV CODE 636 W HCPCS: Performed by: STUDENT IN AN ORGANIZED HEALTH CARE EDUCATION/TRAINING PROGRAM

## 2022-08-07 PROCEDURE — 99233 PR SUBSEQUENT HOSPITAL CARE,LEVL III: ICD-10-PCS | Mod: ,,, | Performed by: HOSPITALIST

## 2022-08-07 PROCEDURE — 27000190 HC CPAP FULL FACE MASK W/VALVE

## 2022-08-07 PROCEDURE — 85025 COMPLETE CBC W/AUTO DIFF WBC: CPT | Performed by: HOSPITALIST

## 2022-08-07 PROCEDURE — 27100171 HC OXYGEN HIGH FLOW UP TO 24 HOURS

## 2022-08-07 PROCEDURE — 99233 SBSQ HOSP IP/OBS HIGH 50: CPT | Mod: ,,, | Performed by: HOSPITALIST

## 2022-08-07 PROCEDURE — 63700000 PHARM REV CODE 250 ALT 637 W/O HCPCS: Performed by: HOSPITALIST

## 2022-08-07 PROCEDURE — 94640 AIRWAY INHALATION TREATMENT: CPT

## 2022-08-07 PROCEDURE — 82803 BLOOD GASES ANY COMBINATION: CPT

## 2022-08-07 PROCEDURE — 80202 ASSAY OF VANCOMYCIN: CPT | Performed by: HOSPITALIST

## 2022-08-07 PROCEDURE — 80053 COMPREHEN METABOLIC PANEL: CPT | Performed by: HOSPITALIST

## 2022-08-07 PROCEDURE — 87798 DETECT AGENT NOS DNA AMP: CPT | Mod: 59 | Performed by: HOSPITALIST

## 2022-08-07 PROCEDURE — 99291 PR CRITICAL CARE, E/M 30-74 MINUTES: ICD-10-PCS | Mod: GC,,, | Performed by: INTERNAL MEDICINE

## 2022-08-07 PROCEDURE — 20000000 HC ICU ROOM

## 2022-08-07 PROCEDURE — 99291 CRITICAL CARE FIRST HOUR: CPT | Mod: GC,,, | Performed by: INTERNAL MEDICINE

## 2022-08-07 PROCEDURE — 27000221 HC OXYGEN, UP TO 24 HOURS

## 2022-08-07 PROCEDURE — 83735 ASSAY OF MAGNESIUM: CPT | Performed by: HOSPITALIST

## 2022-08-07 PROCEDURE — 36415 COLL VENOUS BLD VENIPUNCTURE: CPT | Performed by: HOSPITALIST

## 2022-08-07 PROCEDURE — 25000242 PHARM REV CODE 250 ALT 637 W/ HCPCS: Performed by: HOSPITALIST

## 2022-08-07 PROCEDURE — 63600175 PHARM REV CODE 636 W HCPCS: Performed by: HOSPITALIST

## 2022-08-07 PROCEDURE — 94761 N-INVAS EAR/PLS OXIMETRY MLT: CPT

## 2022-08-07 PROCEDURE — 25000003 PHARM REV CODE 250: Performed by: STUDENT IN AN ORGANIZED HEALTH CARE EDUCATION/TRAINING PROGRAM

## 2022-08-07 PROCEDURE — 99900035 HC TECH TIME PER 15 MIN (STAT)

## 2022-08-07 PROCEDURE — 94660 CPAP INITIATION&MGMT: CPT

## 2022-08-07 PROCEDURE — 99900031 HC PATIENT EDUCATION (STAT)

## 2022-08-07 PROCEDURE — 87449 NOS EACH ORGANISM AG IA: CPT | Performed by: HOSPITALIST

## 2022-08-07 PROCEDURE — 87899 AGENT NOS ASSAY W/OPTIC: CPT | Performed by: HOSPITALIST

## 2022-08-07 RX ORDER — METOPROLOL TARTRATE 50 MG/1
50 TABLET ORAL NIGHTLY
Status: DISCONTINUED | OUTPATIENT
Start: 2022-08-07 | End: 2022-08-09

## 2022-08-07 RX ORDER — AZITHROMYCIN 250 MG/1
500 TABLET, FILM COATED ORAL ONCE
Status: COMPLETED | OUTPATIENT
Start: 2022-08-07 | End: 2022-08-07

## 2022-08-07 RX ORDER — LANOLIN ALCOHOL/MO/W.PET/CERES
400 CREAM (GRAM) TOPICAL ONCE
Status: COMPLETED | OUTPATIENT
Start: 2022-08-07 | End: 2022-08-07

## 2022-08-07 RX ORDER — AZITHROMYCIN 250 MG/1
250 TABLET, FILM COATED ORAL DAILY
Status: COMPLETED | OUTPATIENT
Start: 2022-08-08 | End: 2022-08-11

## 2022-08-07 RX ORDER — FUROSEMIDE 10 MG/ML
40 INJECTION INTRAMUSCULAR; INTRAVENOUS ONCE
Status: COMPLETED | OUTPATIENT
Start: 2022-08-07 | End: 2022-08-07

## 2022-08-07 RX ORDER — FUROSEMIDE 10 MG/ML
40 INJECTION INTRAMUSCULAR; INTRAVENOUS
Status: DISCONTINUED | OUTPATIENT
Start: 2022-08-08 | End: 2022-08-07

## 2022-08-07 RX ORDER — DIAZEPAM 10 MG/2ML
5 INJECTION INTRAMUSCULAR EVERY 4 HOURS PRN
Status: DISCONTINUED | OUTPATIENT
Start: 2022-08-07 | End: 2022-08-07

## 2022-08-07 RX ORDER — DIAZEPAM 5 MG/1
5 TABLET ORAL EVERY 6 HOURS PRN
Status: DISCONTINUED | OUTPATIENT
Start: 2022-08-07 | End: 2022-08-11

## 2022-08-07 RX ORDER — FUROSEMIDE 10 MG/ML
40 INJECTION INTRAMUSCULAR; INTRAVENOUS DAILY
Status: DISCONTINUED | OUTPATIENT
Start: 2022-08-07 | End: 2022-08-07

## 2022-08-07 RX ORDER — ONDANSETRON 2 MG/ML
4 INJECTION INTRAMUSCULAR; INTRAVENOUS ONCE
Status: DISCONTINUED | OUTPATIENT
Start: 2022-08-07 | End: 2022-08-09

## 2022-08-07 RX ADMIN — PIPERACILLIN SODIUM AND TAZOBACTAM SODIUM 4.5 G: 4; .5 INJECTION, POWDER, LYOPHILIZED, FOR SOLUTION INTRAVENOUS at 03:08

## 2022-08-07 RX ADMIN — IPRATROPIUM BROMIDE AND ALBUTEROL SULFATE 3 ML: 2.5; .5 SOLUTION RESPIRATORY (INHALATION) at 09:08

## 2022-08-07 RX ADMIN — IPRATROPIUM BROMIDE AND ALBUTEROL SULFATE 3 ML: 2.5; .5 SOLUTION RESPIRATORY (INHALATION) at 03:08

## 2022-08-07 RX ADMIN — SENNOSIDES AND DOCUSATE SODIUM 1 TABLET: 50; 8.6 TABLET ORAL at 08:08

## 2022-08-07 RX ADMIN — Medication 400 MG: at 08:08

## 2022-08-07 RX ADMIN — IPRATROPIUM BROMIDE AND ALBUTEROL SULFATE 3 ML: 2.5; .5 SOLUTION RESPIRATORY (INHALATION) at 07:08

## 2022-08-07 RX ADMIN — FUROSEMIDE 40 MG: 10 INJECTION, SOLUTION INTRAMUSCULAR; INTRAVENOUS at 09:08

## 2022-08-07 RX ADMIN — MIRTAZAPINE 7.5 MG: 7.5 TABLET, FILM COATED ORAL at 08:08

## 2022-08-07 RX ADMIN — LORAZEPAM 2 MG: 0.5 TABLET ORAL at 08:08

## 2022-08-07 RX ADMIN — ONDANSETRON 4 MG: 4 TABLET, ORALLY DISINTEGRATING ORAL at 06:08

## 2022-08-07 RX ADMIN — ONDANSETRON 4 MG: 4 TABLET, ORALLY DISINTEGRATING ORAL at 04:08

## 2022-08-07 RX ADMIN — DIAZEPAM 5 MG: 5 TABLET ORAL at 11:08

## 2022-08-07 RX ADMIN — DIAZEPAM 5 MG: 5 TABLET ORAL at 05:08

## 2022-08-07 RX ADMIN — VENLAFAXINE HYDROCHLORIDE 225 MG: 75 CAPSULE, EXTENDED RELEASE ORAL at 08:08

## 2022-08-07 RX ADMIN — METOPROLOL TARTRATE 100 MG: 100 TABLET, FILM COATED ORAL at 08:08

## 2022-08-07 RX ADMIN — THIAMINE HCL TAB 100 MG 100 MG: 100 TAB at 08:08

## 2022-08-07 RX ADMIN — ACETAMINOPHEN 650 MG: 325 TABLET ORAL at 11:08

## 2022-08-07 RX ADMIN — IPRATROPIUM BROMIDE AND ALBUTEROL SULFATE 3 ML: 2.5; .5 SOLUTION RESPIRATORY (INHALATION) at 12:08

## 2022-08-07 RX ADMIN — AZITHROMYCIN MONOHYDRATE 500 MG: 250 TABLET ORAL at 03:08

## 2022-08-07 RX ADMIN — GUAIFENESIN 1200 MG: 600 TABLET, EXTENDED RELEASE ORAL at 08:08

## 2022-08-07 RX ADMIN — METOPROLOL TARTRATE 50 MG: 50 TABLET, FILM COATED ORAL at 08:08

## 2022-08-07 RX ADMIN — DIAZEPAM 5 MG: 5 TABLET ORAL at 01:08

## 2022-08-07 RX ADMIN — PIPERACILLIN SODIUM AND TAZOBACTAM SODIUM 4.5 G: 4; .5 INJECTION, POWDER, LYOPHILIZED, FOR SOLUTION INTRAVENOUS at 08:08

## 2022-08-07 RX ADMIN — FUROSEMIDE 40 MG: 10 INJECTION, SOLUTION INTRAMUSCULAR; INTRAVENOUS at 12:08

## 2022-08-07 NOTE — PROGRESS NOTES
"[facility] - Intensive Care ([address] Medicine  Progress Note    Patient Name: Vale Gutierrez  MRN: [id number]  Patient Class: IP- Inpatient   Admission Date: 8/1/2022  Length of Stay: 6 days  Attending Physician: Pratibha Jorgensen MD  Primary Care Provider: Estela Mcdaniel MD        Subjective:     Principal Problem:Acute hypoxemic respiratory failure        HPI:  61F w/ alcohol dependency/abuse, HTN on Toprol, HLD, depression/anxiety presenting to the ED with SOB and confusion. SOB and malaise onset a couple of days ago. Denies fevers, chills, rigors, chest pain, cough, productive sputum. Denies excessive drinking, drug use. No bowel irregularities, N/V. Denies orthopnea, LE swelling, KIRAN. Admits to having alcohol 2 days ago ("just one drink").     ED workup notable for elevated BP, normal WBC, elevation of AST//124, , trop .027, VBG w/ corrected pH of 7.4, CTA -ve for PE but notable for RLL consolidation w/ air bronchograms concerning for possible CAP v aspiration PNA and possible edema. She was placed on supplemental O2 and broad spectrum antibiotics and admitted to [facility].       Overview/Hospital Course:  Patient started on IV Vancomycin and Zosyn for RLL and right lingula pneumonia and significant hypoxia. CTA of chest negative for PE but concerning for PNA in RLL and lingula but also with changes concerning for pulmonary edema so also started on Lasix 40 mg BID. Echo done and showed:  · The left ventricle is normal in size with concentric hypertrophy and normal systolic function.  · The estimated ejection fraction is 60-65%.  · Grade I left ventricular diastolic dysfunction.  · Mild aortic regurgitation.  · Normal right ventricular size with normal right ventricular systolic function.  · The estimated PA systolic pressure is 43 mmHg.  · Intermediate central venous pressure (8 mmHg).  · There is pulmonary hypertension.  · Small circumferential pericardial effusion.  · Mild " left atrial enlargement.    Patient requiring 10 liters of high flow oxygen and not on oxygen at home. Patient with significant alcohol history and showing signs of early withdrawal with tremors and hallucinations on 8/2 so started on scheduled Valium 5 mg po TID and nursing monitoring CIWA with prn po Ativan for breakthrough agitation. Patient started on MVI, Thiamine and Folate as per alcohol withdrawal protocol.       Interval History:        This AM patient was pretty oversedated with the IV ativan and barely keeping eyes open, her HR was bouncing around to 120s-130s but appeared to have P waves before every qrs so didn't seem to be afib so much, charge nurse at bedside with me as well, and they report this happes in the AMs, she is on lopressor but daily and dosing is BID so may be a cause for this in the AM so inc to BID for 50 tonight also. On repeat exam this PM HR 80s consistently. From 10-15 L oxygen, on PM exam sats 96-99% on 15L. Looks more tired than prev. Cxr this AM showing similar to prev with effusions and atelectasis + PNA. Given nonimproving and some worsening this afternoon will repeat CT chest, had CTA on admit showing no clots. Will check viral panel as well and also check legionalla and strep pneumo antigens. Zosyn doenst cover legionella it appears so judy add azihromycin now in addition. Sister at bedside on PM exam, reports that she had diarrheal illness before admit with the pneumonia, they have window AC units in each room and they have black discoloration on them, which is a legionella risk. Inc diuresis in meantime as the high HR could have lead to some flash pulm edema, and concern that could have also had some aspiraion with the oversedation with the iv ativan and stopped this and would do he valium scheduled now still as still having some hallucinations from withdrawal intermittently, and if needs an iv ativan would do 1 time dosing when needed to avoid oversedation.   Repeat ct  pending. Encouraged IS use given atelectasis. And likely will ask pulm for assistance given worsening once we know more with CT scan.  Higher risk for decompensation and if worsening resp status or tiring of WOB would have low threshold to talk to ICU if worsens overnight.  Sister is contact for updates and updated her in the room this afternoon and reviewed update care plan.        Review of Systems   Constitutional:  Negative for fever.   Respiratory:  Negative for cough and shortness of breath.    Cardiovascular:  Negative for chest pain and leg swelling.   Gastrointestinal:  Negative for abdominal pain, nausea and vomiting.   Musculoskeletal:  Negative for arthralgias.   Skin:  Negative for rash.   Neurological:  Negative for dizziness, tremors and light-headedness.   Psychiatric/Behavioral:  Positive for agitation (At times) and confusion. Negative for hallucinations.    Objective:     Vital Signs (Most Recent):  Temp: 98.3 °F (36.8 °C) (08/03/22 0700)  Pulse: 75 (08/03/22 1100)  Resp: 18 (08/03/22 0833)  BP: 135/91 (08/03/22 0700)  SpO2: 95 % (08/03/22 1100) on 10 liters of oxygen high flow   Vital Signs (24h Range):  Temp:  [97.4 °F (36.3 °C)-98.4 °F (36.9 °C)] 97.9 °F (36.6 °C)  Pulse:  [] 84  Resp:  [18-37] 24  SpO2:  [88 %-98 %] 91 %  BP: (111-132)/(73-75) 111/75     Weight: 90.7 kg (200 lb)  Body mass index is 34.33 kg/m².    Intake/Output Summary (Last 24 hours) at 8/7/2022 1434  Last data filed at 8/7/2022 0657  Gross per 24 hour   Intake 300 ml   Output 1050 ml   Net -750 ml        Physical Exam  Vitals and nursing note reviewed.   Constitutional:       General: She is not in acute distress.     Appearance: She is obese. She is not ill-appearing.      Interventions: Nasal cannula in place.      Comments: Looks more lethargic than prev days, asking to go home, confused at times. Looks ill on 15 L   Eyes:      General: No scleral icterus.     Conjunctiva/sclera: Conjunctivae normal.   Neck:       Vascular: No JVD.   Cardiovascular:      Rate and Rhythm: Normal rate and regular rhythm.      Heart sounds: Normal heart sounds. No murmur heard.    No friction rub. No gallop.      Comments: HR 80s on PM exam, this AM was 130s at times. Appeared to have p waves before each qrs but was irregular at times, regular this PM  Pulmonary:      Effort: Pulmonary effort is normal. No respiratory distress.      Breath sounds: Rales present. No wheezing or rhonchi.      Comments: Appears more labored and stopping for dyspnea more than prev days. On HFNC. Dec breath sounds at Right lung base. Wheezing on exp heard  Abdominal:      General: Abdomen is flat. Bowel sounds are normal. There is no distension.      Palpations: Abdomen is soft.      Tenderness: There is no abdominal tenderness.   Musculoskeletal:      Right lower leg: No edema.      Left lower leg: No edema.   Skin:     General: Skin is warm.      Findings: No erythema.   Neurological:      General: No focal deficit present.      Comments: Oriented to person but disoriented to situation (does not undersand why she cant go home)   Psychiatric:         Mood and Affect: Mood normal.         Behavior: Behavior normal. Behavior is cooperative.      Comments: Not at baseline, hallucinations at times still, tremors at times       Significant Labs:   CBC:   Recent Labs   Lab 08/06/22  0422 08/07/22  0435   WBC 8.32 9.25   HGB 10.7* 10.4*   HCT 38.7 37.5    292       CMP:   Recent Labs   Lab 08/06/22  0422 08/07/22  0435    139   K 3.3* 4.0   CL 91* 90*   CO2 39* 35*   GLU 97 84   BUN 13 14   CREATININE 0.7 0.8   CALCIUM 10.0 9.5   PROT 6.7 6.3   ALBUMIN 2.6* 2.6*   BILITOT 0.4 0.5   ALKPHOS 121 123   AST 15 17   ALT 24 23   ANIONGAP 11 14       Blood Culture, Routine   Date Value Ref Range Status   08/01/2022 No growth after 5 days.  Final     Significant Imaging: I have reviewed all pertinent imaging results/findings within the past 24  hours.      Assessment/Plan:      * Acute hypoxemic respiratory failure  Acute on chronic respiratory failure with hypercapnia  Community acquired pneumonia of right lower lobe of lung  - Presented with acute on chronic hypercapnic and acute hypoxic respiratory failure, with lethargy and encephalopathy. She is not on home oxygen.   - Suspect hypoxia combination of pneumonia and heart failure/pulmonary edema.   - Suspect hypercapnia related to opiates and muscle relaxants she takes at home but there is a component of possible chronicity as HCO3 elevated on admit suggesting hypercapnia may be more chronic than acute but no previous ABGs to compare unfortunately. Patient has no history of any prior chronic lung disease.   - COVID negative on admit.   - CTA showing RLL consolidation with bronchogram concerning for possible CAP vs Aspiration PNA. Empiric abx initiated in ED with Zosyn/Vanc pending culture data and will continue.  Resp CX ordered but no sputum so far.  - Procal low at 0.10.   - Blood cultures from admit so far no growth.   - Supplemental O2 initiated and escalated to high flow to maintain sats. Encouraging ambulation, OOB for all meals, incentive spirometry use.   - Wean oxygen as tolerated with goal of oxygen sats > 92%.  Nebs added 8/5, as had some wheeinzg on exam   worsenin to 15L on 8/7, unclear if source control issue (add legionella coverage today with azithro, add legionella and strep pneumo antigens and viral culture), vs volume from rapid tachy and pulm edema from this, vs aspiration from oversedation or combination. Repeat CT pending as CXR this AM showing volume, aelectasis effusions and pneumonia      Tachycardia  Suspect may have paroxysmal atrial fibrilllation as HRs 130s 5-6 times for <30 seconds on exam 8/6, but not fast enough to get a 12 lead for it. Watch closely for this, inc NESTOR 8/6. If dx as afib will need to calculate stroke risk with [id number] for long term ppx but is high fall  risk with alcohol hx  87 AM again with tachy to 130s, did not appear to be aafib as could see p wves on the tele bedside, resolved on PM exam in 80s, lopressor was on just daily from home dosing so may be that is on wrong dosing schedule as this is a BID me dalso, so add 50 mg for tonight       Aspiration pneumonia  See CAP  Likely from alcohol intoxication as precipitant, happened a few years ago similar fashion per family leading to 14 day sstay at   -speech consult for swallow reports regular diet.   -avoid oversedation, stopped prn ativan 8/7 as was oversedated this am. Aspiration precautions      Weakness  SNF rec when med stable      Aortic aneurysm  Seen incidentally on CTA chest on admit with 4.1 cm.   -needs to stop cigarettes as recently started again as a contributor. Long term will need to likely do asa ad statin, transaminits so avoid this now  -will refer to vascular surg OP for long term monitoring      Hypomagnesemia  Replace prn      Hypokalemia  Replace prn      Acute on chronic diastolic congestive heart failure  Patient is identified as having Diastolic (HFpEF) heart failure that is Acute on chronic. CHF is currently uncontrolled due to Rales/crackles on pulmonary exam and Pulmonary edema/pleural effusion on CXR. Latest ECHO performed and demonstrates- Results for orders placed during the hospital encounter of 08/01/22    Echo    Interpretation Summary  · The left ventricle is normal in size with concentric hypertrophy and normal systolic function.  · The estimated ejection fraction is 60-65%.  · Grade I left ventricular diastolic dysfunction.  · Mild aortic regurgitation.  · Normal right ventricular size with normal right ventricular systolic function.  · The estimated PA systolic pressure is 43 mmHg.  · Intermediate central venous pressure (8 mmHg).  · There is pulmonary hypertension.  · Small circumferential pericardial effusion.  · Mild left atrial enlargement.  . Continue Metoprolol and IV  Lasix 40 mg BID to treat. Monitor clinical status closely. Monitor on telemetry. Patient is off CHF pathway.  Monitor strict Is&Os and daily weights.  Place on fluid restriction of 1.5 L. Continue to stress to patient importance of self efficacy and  on diet for CHF. Last BNP reviewed- and noted below   Recent Labs   Lab 08/01/22  1746   *   .  Dec diuresis on 8/4 as CO2 rising from baseline in 30s to 45 today and suspect more pneumonia than volume. Change to lasix 20 daily  Inc to 40 BID on 8/7 as still effusions seen on cxr      Encephalopathy, metabolic  · Present on admit.   · Paent appears disinhibited, transiently disoriented but easily redirectable and, though tangential, answering questions appropriately. Patient continues to show same pattern on 8/3.   · Suspect intoxication (endorsed alcohol use in the setting of opiates and muscle relaxants for her chronic pain) vs metabolic/infectious (pneumonia).  · Improved 8/5 as can recall events prior to admit but cant remember all of last 2 days since admit but is appropraite with questions now and responses. Occasional halluciation from withdrawal  · CTH unremarkable.   · Delirium precautions.     Asymptomatic bacteriuria  Not complaining of dysuria, but possible confounder given encephalopathy. In any case, should be covered by empiric antibiotics she is currently on for her pneumonia.     Community acquired pneumonia of right lower lobe of lung  Likely aspiration PNA given RLL with hx of alcohol use a predisposition to this. No signs of aspiration now as awake and alert.  -add legionalla coverage 8/7, check legionella and strep pneumo antigens as had diarrhea illness before this and has window units with black mold on them.   -check viral swab    Transaminitis  Improved from 245/124 on admit, now normalized Likely from alcohol on admit, improving off alcohol. Needs to stop long term or risks adverse health issues      Anxiety  Recurrent major  depressive disorder, in full remission  Chronic, stable. Reports compliance with medications. Continue home Buspar and Mirtazapine to treat.     Chronic pain disorder  Lumbosacral radiculopathy  Chronic and controlled. Continue home Percocet prn for pain. Hold home muscle relaxants due to encephalopathy.   Avoid pain meds unless absolutely needs as has risk of withdrawal due to chronic use without them but also need to avoid oversedation given aspiration risk is high    Lumbosacral radiculopathy  On chronic oxy 5 with pain management prescribing. Sees dr jason and had surgery previously also      Essential hypertension  Chronic and controlled an stable. Continue home Metoprolol 50 mg po daily to treat.     Recurrent major depressive disorder, in full remission  Known hx but expressed some worsenig symtoms to PT today. Will assess if needs psych consultation or if having acute stress response from withdrawal/acute illness- she denies having any active SI/HI  Will see if would like to talk to psychology dr talley on monday        Alcohol dependence with withdrawal with perceptual disturbance  · 8/7- still some tremors at imes and hallucination rarely. Keep valium but stp the prn ativan as was very oversedated this Am on it  · 8/6 still some tremors in hands, so keep valium same dosing  · Still having a hallucination with PT this afternoon 8/5, so keep valium TID. Sister reports when had detox at  in prev years she was there 14 days with withdrawal  + aspiration PNA  · Improved 8/5, no tremors, mild confusion, but endorses hx of withdrawal in the past. hallucinoatinos improved. She reports had been cutting down in past on alcohol as had had WD before. Sister gives detailed hx also    · Continue MVI, Thiamine and Folate daily.   · Reports drinking 2 days ago. Denies symptoms or past history of withdrawals.   · Monitoring CIWA q shift, with plans to initiate benzos for CIWA >8. Patient started on scheduled Valium on  8/2 at 5 mg po TID as CIWA 17. Patient on prn Ativan as per withdrawal protocol.   · Ethanol level on presentation was negative.   · Trend hepatic function; AST to ALT ratio consistent with recent alcohol intake.   · Ammonia level okay at 31 on admit.       VTE Risk Mitigation (From admission, onward)         Ordered     IP VTE HIGH RISK PATIENT  Once         08/02/22 0055     Place sequential compression device  Until discontinued         08/02/22 0055                Discharge Planning   DIXON: 8/6/2022     Code Status: Full Code   Is the patient medically ready for discharge?: No    Reason for patient still in hospital (select all that apply): Patient trending condition  Discharge Plan A: Home with family                  Pratibha Jorgensen MD  Department of Hospital Medicine   [facility] - Intensive Care ([place]-)

## 2022-08-07 NOTE — CARE UPDATE
RAPID RESPONSE NURSE ROUND       Rounding completed with 14W Charge RN, [place]. No concerns verbalized at this time. Instructed to call [phone] for further concerns or assistance.

## 2022-08-07 NOTE — HOSPITAL COURSE
Patient presented to the ED on 8/1 with a CC of hypoxia and dyspnea. She was initially admitted to the floor. She experienced hallucinations and tremor, consistent with alcohol withdrawal / delirium, and was put on CIWA protocol with valium PRN (shortage of ativan). She was treated with antibiotics (vancomycin, zosyn, and azithro) scheduled for 10 days and duonebs prn. On 8/7 she developed worsening hypoxic respiratory failure, was put on 50L 100% O2 bipap, and admitted to the ICU due to increased oxygen requirements. Work up revealed small pericardial effusion, cardiomegaly, normal LVEF, pleural effusions and opacities bilaterally, and atrial fibrillation. Patient has now completed courses of zosyn, vancomycin, and azithromycin. She was subsequently given diflucan  and meropenem out of concern for respiratory infection, stopped 8/15. On lasix BID for pulmonary edema. She was started on high dose steroids 8/13 as there was concern for autoimmune respiratory process, and labs were sent for C-ANCA and P-ANCA (currently pending). She previously had a +BRANDYN titer, but antiCCP, RF, and  anti-dsDNA were negative. Patient's CXR and respiratory status improving on 8/14, able to tolerate CF down to 64% O2. 8/16 patient O2 requirements increasing, O2 increased to 90%.  Repeat CXR 8/16 with right cardiophrenic and left basilar-retrocardiac opacities. Repeat CT chest with no remarkable changes, continues to have atelectasis. 8/17 weaned to 50L, 70% O2. 8/19 on 50L, 55% O2. Patient weaned to nasal cannula 7L on 8/20. Patient has been accepted to LTAC.

## 2022-08-07 NOTE — RESPIRATORY THERAPY
RAPID RESPONSE RESPIRATORY THERAPY PROACTIVE ROUNDING NOTE             Time of visit: 851     Code Status: Full Code   : 1960  Bed: 97780/51892 A:   MRN: [id number]  Time spent at the bedside: < 15 min    SITUATION    Evaluated patient for: HFNC Compliance     BACKGROUND    Patient has a past medical history of Alcohol abuse, Anxiety, Benign paroxysmal positional vertigo, Depression, Hyperlipidemia, and Hypertension.    24 Hours Vitals Range:  Temp:  [97.4 °F (36.3 °C)-98.4 °F (36.9 °C)]   Pulse:  []   Resp:  [18-37]   BP: (105-132)/(66-75)   SpO2:  [88 %-98 %]     Labs:    Recent Labs     22  0435    141 139   K 3.6 3.3* 4.0   CL 91* 91* 90*   CO2 39* 39* 35*   CREATININE 0.8 0.7 0.8   GLU 81 97 84   MG 2.0 1.7 1.7        No results for input(s): PH, PCO2, PO2, HCO3, POCSATURATED, BE in the last 72 hours.    ASSESSMENT/INTERVENTIONS    Upon arrival in room patient resting comfortably and receiving meds from RN. Patient found on 13L HFNC and weaned to 10L with saturations 94% and above.    Last VS   Temp: 97.4 °F (36.3 °C) (419)  Pulse: 89 (919)  Resp: 34 (919)  BP: 111/75 (836)  SpO2: 88 % (919)    Level of Consciousness: Level of Consciousness (AVPU): alert  Respiratory Effort: Respiratory Effort: Unlabored Expansion/Accessory Muscle Usage: Expansion/Accessory Muscles/Retractions: expansion symmetric, no retractions, no use of accessory muscles  All Lung Field Breath Sounds: All Lung Fields Breath Sounds: Anterior:, Lateral:, diminished  O2 Device/Concentration: Flow (L/min): 10,  , O2 Device (Oxygen Therapy): High Flow nasal Cannula  Was the O2 device able to be weaned? Yes  Ambu at bedside: Ambu bag with the patient?: Yes, Adult Ambu    Active Orders   Respiratory Care    Incentive spirometry     Frequency: Q6H PRN     Number of Occurrences: Until Specified    Inhalation Treatment Q4H WAKE     Frequency: Q4H WAKE     Number  of Occurrences: Until Specified    Oxygen Continuous     Frequency: Continuous     Number of Occurrences: Until Specified     Order Questions:      Device type: High flow      Device: High Flow Nasal Cannula (6 -15 Liters)      LPM: 10      Titrate O2 per Oxygen Titration Protocol: Yes      To maintain SpO2 goal of: >= 92%      Notify MD of: Inability to achieve desired SpO2; Sudden change in patient status and requires 20% increase in FiO2; Patient requires >60% FiO2    Pulse Oximetry Q4H     Frequency: Q4H     Number of Occurrences: Until Specified       RECOMMENDATIONS    We recommend: RRT Recs: Continue POC per primary team.      FOLLOW-UP    Please call back the Rapid Response RT, Darrion Ruth, WILMER at x [phone] for any questions or concerns.

## 2022-08-07 NOTE — ACP (ADVANCE CARE PLANNING)
"RAPID RESPONSE NURSE CHART REVIEW        Chart Reviewed: 08/07/2022, 4:40 PM    MRN: [id number]  Bed: 17753/07420 A    Dx: Acute hypoxemic respiratory failure    Vale Gutierrez has a past medical history of Alcohol abuse, Anxiety, Benign paroxysmal positional vertigo, Depression, Hyperlipidemia, and Hypertension.    Last VS: /75   Pulse 92   Temp 97.9 °F (36.6 °C) (Oral)   Resp (!) 30   Ht 5' 4" (1.626 m)   Wt 90.7 kg (200 lb)   SpO2 (!) 87%   BMI 34.33 kg/m²     24H Vital Sign Range:  Temp:  [97.4 °F (36.3 °C)-98.4 °F (36.9 °C)]   Pulse:  []   Resp:  [18-37]   BP: (111-132)/(73-75)   SpO2:  [87 %-98 %]     Level of Consciousness (AVPU): alert    Recent Labs     08/05/22  0413 08/06/22  0422 08/07/22  0435   WBC 7.37 8.32 9.25   HGB 10.5* 10.7* 10.4*   HCT 37.7 38.7 37.5    286 292       Recent Labs     08/05/22  0413 08/06/22  0422 08/07/22  0435    141 139   K 3.6 3.3* 4.0   CL 91* 91* 90*   CO2 39* 39* 35*   CREATININE 0.8 0.7 0.8   GLU 81 97 84   MG 2.0 1.7 1.7        No results for input(s): PH, PCO2, PO2, HCO3, POCSATURATED, BE in the last 72 hours.     OXYGEN:  Flow (L/min): 10     O2 Device (Oxygen Therapy): High Flow nasal Cannula    MEWS score: 4    14W Charge RN, [place] contacted for respiratory status.   Pt on CF, 20L.   SpO2 ~93%, sustaining.   ABG obtained.   CXR completed.   No additional concerns verbalized at this time.   Instructed to call [phone] for further concerns or assistance.    Jagruti Harding RN        "

## 2022-08-07 NOTE — PROGRESS NOTES
[facility] - Cardiac Medical ICU  Critical Care Medicine  Progress Note    Patient Name: Vale Gutierrez  MRN: [id number]  Admission Date: 8/1/2022  Hospital Length of Stay: 6 days  Code Status: Full Code  Attending Provider: Patrice Burnett MD  Primary Care Provider: Estela Mcdaniel MD   Principal Problem: Acute hypoxemic respiratory failure    Subjective:     HPI:  Ms. Gutierrez is a 60 y/o female with a PMH of etoh abuse and withdrawal, aspiration pneumonia, delirium, smoking, HTN, HLD, BPPV, alcohol abuse, depression and anxiety who was admitted to the ICU from the floor for acute hypoxic respiratory failure. She was having increased oxygen needs on the floor and was requiring valium to keep her presumed alcohol withdrawal under control. The patient states that her stomach hurts in the midepigastric region and endorses nausea, vomiting x 1 day. She is unsure how long she has been coughing or feeling ill, and says she cannot remember the last few days.       Hospital/ICU Course:  No notes on file    No new subjective & objective note has been filed under this hospital service since the last note was generated.      ABG  Recent Labs   Lab 08/01/22  1747   PH 7.343*   PO2 19*   PCO2 82.7*   HCO3 44.9*   BE 19     Assessment/Plan:     Psychiatric  Alcohol dependence with withdrawal with perceptual disturbance  -Patient reportedly had tremors and hallucinations on the floor 8/5-8/7  -Sister reports when had detox at  in prev years she was there 14 days with withdrawal + aspiration PNA. Patient had endorsed hx of withdrawal in the past. Sister gave detailed hx also   -Continue MVI, Thiamine and Folate daily.   -Reports drinking 2 days ago. Denies symptoms or past history of withdrawals.   -Monitoring CIWA q shift, with plans to initiate benzos for CIWA >8. Patient started on scheduled Valium on 8/2 at 5 mg po TID as CIWA 17. Patient on prn Ativan as per withdrawal protocol.   - Ethanol level on presentation  was negative.   - AST to ALT ratio consistent with recent alcohol intake.       Pulmonary  Acute on chronic respiratory failure with hypercapnia  Patient with Hypoxic Respiratory failure which is Acute on chronic.  she is not on home oxygen. Supplemental oxygen was provided and noted- Oxygen Concentration (%):  [90] 90.   Signs/symptoms of respiratory failure include- tachypnea. Contributing diagnoses includes - ARDS, Aspiration, CHF, COPD, Interstitial lung disease, Pleural effusion, Pneumonia and Pulmonary Embolus Labs and images were reviewed. Patient Has not had a recent ABG. Will treat underlying causes and adjust management of respiratory failure.     Cardiac/Vascular  Acute on chronic diastolic congestive heart failure  Patient is identified as having Diastolic (HFpEF) heart failure that is Acute on chronic. CHF is currently uncontrolled due to Pulmonary edema/pleural effusion on CXR. Latest ECHO performed and demonstrates- Results for orders placed during the hospital encounter of 08/01/22    Echo    Interpretation Summary  · The left ventricle is normal in size with concentric hypertrophy and normal systolic function.  · The estimated ejection fraction is 60-65%.  · Grade I left ventricular diastolic dysfunction.  · Mild aortic regurgitation.  · Normal right ventricular size with normal right ventricular systolic function.  · The estimated PA systolic pressure is 43 mmHg.  · Intermediate central venous pressure (8 mmHg).  · There is pulmonary hypertension.  · Small circumferential pericardial effusion.  · Mild left atrial enlargement.  . Continue Furosemide and monitor clinical status closely. Monitor on telemetry.  Monitor strict Is&Os and daily weights.  Place on fluid restriction of 1.5 L. Continue to stress to patient importance of self efficacy and  on diet for CHF. Last BNP reviewed- and noted below   Recent Labs   Lab 08/01/22  1746   *   .         Critical Care Daily Checklist:    A:  Awake: RASS Goal/Actual Goal:    Actual:     B: Spontaneous Breathing Trial Performed?     C: SAT & SBT Coordinated?  Patient awake and breathing on high flow                      D: Delirium: CAM-ICU     E: Early Mobility Performed? Yes   F: Feeding Goal:    Status:     Current Diet Order   Procedures    Diet Adult Regular (IDDSI Level 7)      AS: Analgesia/Sedation yes   T: Thromboembolic Prophylaxis no   H: HOB > 300 yes   U: Stress Ulcer Prophylaxis (if needed) no   G: Glucose Control yes   B: Bowel Function Stool Occurrence: 1   I: Indwelling Catheter (Lines & [place]) Necessity yes   D: De-escalation of Antimicrobials/Pharmacotherapies no    Plan for the day/ETD Admit to ICU, evaluate patient and contributing factors. Consult cardio for echo    Code Status:  Family/Goals of Care: Full Code  Will discuss with patient's sister who she reportedly lives with and who helps take care of her.        Critical secondary to Patient has a condition that poses threat to life and bodily function: Severe Respiratory Distress  Patient has an abrupt change in neurologic status: confusion, delirium       Critical care was time spent personally by me on the following activities: development of treatment plan with patient or surrogate and bedside caregivers, discussions with consultants, evaluation of patient's response to treatment, examination of patient, ordering and performing treatments and interventions, ordering and review of laboratory studies, ordering and review of radiographic studies, pulse oximetry, re-evaluation of patient's condition. This critical care time did not overlap with that of any other provider or involve time for any procedures.     Denise Crenshaw, DO   Critical Care Medicine  [facility] - Cardiac Medical ICU

## 2022-08-07 NOTE — ASSESSMENT & PLAN NOTE
Patient with Hypoxic Respiratory failure which is Acute on chronic.  she is not on home oxygen. Supplemental oxygen was provided and noted- Oxygen Concentration (%):  [90] 90.   Signs/symptoms of respiratory failure include- tachypnea. Contributing diagnoses includes - ARDS, Aspiration, CHF, COPD, Interstitial lung disease, Pleural effusion, Pneumonia and Pulmonary Embolus Labs and images were reviewed. Patient Has recent ABG, which has been reviewed. Will treat underlying causes and adjust management of respiratory failure.

## 2022-08-07 NOTE — CODE/ RAPID DOCUMENTATION
RAPID RESPONSE NURSE NOTE        Admit Date: 2022  LOS: 6  Code Status: Full Code   Date of Consult: 2022  : 1960  Age: 61 y.o.  Weight:   Wt Readings from Last 1 Encounters:   22 90.7 kg (200 lb)     Sex: female  Race: White   Bed: [facility]/60 A:   MRN: [id number]  Time Rapid Response Team page Received: 1646  Time Rapid Response Team at Bedside: 1650  Time Rapid Response Team left Bedside: 1730  Was the patient discharged from an ICU this admission? No   Was the patient discharged from a PACU within last 24 hours? No   Did the patient receive conscious sedation/general anesthesia in last 24 hours? No  Was the patient in the ED within the past 24 hours? No  Was the patient on NIPPV within the past 24 hours? No   Did this progress into an ARC or CPA: no  Attending Physician: Pratibha Jorgensen MD;A*  [place] [facility] [facility]       SITUATION    Notified by charge RN via phone call.  Reason for alert: Desaturation  Called to evaluate the patient for Respiratory    BACKGROUND     Why is the patient in the hospital?: Acute hypoxemic respiratory failure    Patient has a past medical history of Alcohol abuse, Anxiety, Benign paroxysmal positional vertigo, Depression, Hyperlipidemia, and Hypertension.    Last Vitals:  Temp: 98.1 °F (36.7 °C) (1746)  Pulse: 99 (1741)  Resp: 26 (1741)  BP: 111/75 (836)  SpO2: 94 % (1741)    24 Hours Vitals Range:  Temp:  [97.4 °F (36.3 °C)-98.4 °F (36.9 °C)]   Pulse:  []   Resp:  [18-37]   BP: (111-132)/(73-75)   SpO2:  [84 %-98 %]     Labs:  Recent Labs     22  0435   WBC 7.37 8.32 9.25   HGB 10.5* 10.7* 10.4*   HCT 37.7 38.7 37.5    286 292       Recent Labs     22  0435    141 139   K 3.6 3.3* 4.0   CL 91* 91* 90*   CO2 39* 39* 35*   CREATININE 0.8 0.7 0.8   GLU 81 97 84   MG 2.0 1.7 1.7        No results for input(s): PH, PCO2,  PO2, HCO3, POCSATURATED, BE in the last 72 hours.     ASSESSMENT    Physical Exam     Called to bedside for pt dramatic escalation in O2 demand. Upon arrival, pt who this morning was saturating 94% on 10L HFNC now with SpO2 84% on 30L/90%. Pt in no acute distress. . O2 increased to 60L 90% with SpO2 improving to 90%. Dr. Oh to bedside to evaluate. Decision made to move pt to ICU for closer monitoring.    INTERVENTIONS    The patient was seen for a Respiratory problem. Staff concerns included oxygen saturation < 90% despite supplemental oxygen. The following interventions were performed: continued pulse ox monitoring and supplemental oxygen.    RECOMMENDATIONS    We recommend: Continue with POC per primary team.    PROVIDER ESCALATION    Orders received and case discussed with Dr. Oh.    Primary team arrival time: N/A - seen previously; CCM consulted by primary.    Disposition: Tx in ICU bed 6086.    FOLLOW UP    charge Emma RUIZ updated on plan of care. Instructed to call the Rapid Response Nurse, Alexandro Ramirez RN at [phone] for additional questions or concerns.

## 2022-08-07 NOTE — ASSESSMENT & PLAN NOTE
Patient is identified as having Diastolic (HFpEF) heart failure that is Acute on chronic. CHF is currently uncontrolled due to Rales/crackles on pulmonary exam and Pulmonary edema/pleural effusion on CXR. Latest ECHO performed and demonstrates- Results for orders placed during the hospital encounter of 08/01/22    Echo    Interpretation Summary  · The left ventricle is normal in size with concentric hypertrophy and normal systolic function.  · The estimated ejection fraction is 60-65%.  · Grade I left ventricular diastolic dysfunction.  · Mild aortic regurgitation.  · Normal right ventricular size with normal right ventricular systolic function.  · The estimated PA systolic pressure is 43 mmHg.  · Intermediate central venous pressure (8 mmHg).  · There is pulmonary hypertension.  · Small circumferential pericardial effusion.  · Mild left atrial enlargement.  . Continue Metoprolol and IV Lasix 40 mg BID to treat. Monitor clinical status closely. Monitor on telemetry. Patient is off CHF pathway.  Monitor strict Is&Os and daily weights.  Place on fluid restriction of 1.5 L. Continue to stress to patient importance of self efficacy and  on diet for CHF. Last BNP reviewed- and noted below   Recent Labs   Lab 08/01/22  1746   *   .  Dec diuresis on 8/4 as CO2 rising from baseline in 30s to 45 today and suspect more pneumonia than volume. Change to lasix 20 daily  Inc to 40 BID on 8/7 as still effusions seen on cxr

## 2022-08-07 NOTE — ASSESSMENT & PLAN NOTE
Acute on chronic respiratory failure with hypercapnia  Community acquired pneumonia of right lower lobe of lung  - Presented with acute on chronic hypercapnic and acute hypoxic respiratory failure, with lethargy and encephalopathy. She is not on home oxygen.   - Suspect hypoxia combination of pneumonia and heart failure/pulmonary edema.   - Suspect hypercapnia related to opiates and muscle relaxants she takes at home but there is a component of possible chronicity as HCO3 elevated on admit suggesting hypercapnia may be more chronic than acute but no previous ABGs to compare unfortunately. Patient has no history of any prior chronic lung disease.   - COVID negative on admit.   - CTA showing RLL consolidation with bronchogram concerning for possible CAP vs Aspiration PNA. Empiric abx initiated in ED with Zosyn/Vanc pending culture data and will continue.  Resp CX ordered but no sputum so far.  - Procal low at 0.10.   - Blood cultures from admit so far no growth.   - Supplemental O2 initiated and escalated to high flow to maintain sats. Encouraging ambulation, OOB for all meals, incentive spirometry use.   - Wean oxygen as tolerated with goal of oxygen sats > 92%.  Nebs added 8/5, as had some wheeinzg on exam   worsenin to 15L on 8/7, unclear if source control issue (add legionella coverage today with azithro, add legionella and strep pneumo antigens and viral culture), vs volume from rapid tachy and pulm edema from this, vs aspiration from oversedation or combination. Repeat CT pending as CXR this AM showing volume, aelectasis effusions and pneumonia

## 2022-08-07 NOTE — ASSESSMENT & PLAN NOTE
See CAP  Likely from alcohol intoxication as precipitant, happened a few years ago similar fashion per family leading to 14 day sstay at   -speech consult for swallow reports regular diet.   -avoid oversedation, stopped prn ativan 8/7 as was oversedated this am. Aspiration precautions

## 2022-08-07 NOTE — NURSING
1800 Received patient from a rapid response from the 14th floor, room 51566 Patient requiring increased oxygen support from previous days. Currently patient is alert and oriented x 3. (Not to situation), afebrile, stable blood pressure but tachycardic.   Patient is currently on nasal cannula high flow at 50L/90% oxygen support.

## 2022-08-07 NOTE — ASSESSMENT & PLAN NOTE
Suspect may have paroxysmal atrial fibrilllation as HRs 130s 5-6 times for <30 seconds on exam 8/6, but not fast enough to get a 12 lead for it. Watch closely for this, inc NESTOR 8/6. If dx as afib will need to calculate stroke risk with [id number] for long term ppx but is high fall risk with alcohol hx  87 AM again with tachy to 130s, did not appear to be aafib as could see sabina rasmussen on the tele bedside, resolved on PM exam in 80s, lopressor was on just daily from home dosing so may be that is on wrong dosing schedule as this is a BID me dalso, so add 50 mg for tonight

## 2022-08-07 NOTE — HPI
Ms. Gutierrez is a 60 y/o female with a PMH of etoh abuse and withdrawal, aspiration pneumonia, delirium, smoking, HTN, HLD, BPPV, alcohol abuse, depression and anxiety who was admitted to the ICU from the floor for acute hypoxic respiratory failure. She was having increased oxygen needs on the floor and was requiring valium to keep her presumed alcohol withdrawal under control. The patient states that her stomach hurts in the midepigastric region and endorses nausea, vomiting x 1 day. She is unsure how long she has been coughing or feeling ill, and says she cannot remember the last few days. She says she lives with her sister Angela who helps care for her.

## 2022-08-07 NOTE — AI DETERIORATION ALERT
"RAPID RESPONSE NURSE AI ALERT       AI alert received.    Chart Reviewed: 08/07/2022, 5:20 AM    MRN: [id number]  Bed: 86908/30980 A    Dx: Acute hypoxemic respiratory failure    Vale Gutierrez has a past medical history of Alcohol abuse, Anxiety, Benign paroxysmal positional vertigo, Depression, Hyperlipidemia, and Hypertension.    Last VS: /73 (BP Location: Left arm, Patient Position: Lying)   Pulse 105   Temp 97.4 °F (36.3 °C) (Oral)   Resp (!) 37   Ht 5' 4" (1.626 m)   Wt 90.7 kg (200 lb)   SpO2 (!) 90%   BMI 34.33 kg/m²     24H Vital Sign Range:  Temp:  [97.4 °F (36.3 °C)-98.9 °F (37.2 °C)]   Pulse:  []   Resp:  [18-37]   BP: (105-132)/(66-84)   SpO2:  [90 %-98 %]     Level of Consciousness (AVPU): alert    Recent Labs     08/05/22  0413 08/06/22  0422   WBC 7.37 8.32   HGB 10.5* 10.7*   HCT 37.7 38.7    286       Recent Labs     08/05/22  0413 08/06/22  0422    141   K 3.6 3.3*   CL 91* 91*   CO2 39* 39*   CREATININE 0.8 0.7   GLU 81 97   MG 2.0 1.7        No results for input(s): PH, PCO2, PO2, HCO3, POCSATURATED, BE in the last 72 hours.     OXYGEN:  Flow (L/min): 10     O2 Device (Oxygen Therapy): High Flow nasal Cannula    MEWS score: 3    Bedside RNJluis contacted. No concerns verbalized at this time. Instructed to call [phone] for further concerns or assistance.    Teresa De RN      "

## 2022-08-07 NOTE — SUBJECTIVE & OBJECTIVE
Interval History:        This AM patient was pretty oversedated with the IV ativan and barely keeping eyes open, her HR was bouncing around to 120s-130s but appeared to have P waves before every qrs so didn't seem to be afib so much, charge nurse at bedside with me as well, and they report this happes in the AMs, she is on lopressor but daily and dosing is BID so may be a cause for this in the AM so inc to BID for 50 tonight also. On repeat exam this PM HR 80s consistently. From 10-15 L oxygen, on PM exam sats 96-99% on 15L. Looks more tired than prev. Cxr this AM showing similar to prev with effusions and atelectasis + PNA. Given nonimproving and some worsening this afternoon will repeat CT chest, had CTA on admit showing no clots. Will check viral panel as well and also check legionalla and strep pneumo antigens. Zosyn doenst cover legionella it appears so judy add azihromycin now in addition. Sister at bedside on PM exam, reports that she had diarrheal illness before admit with the pneumonia, they have window AC units in each room and they have black discoloration on them, which is a legionella risk. Inc diuresis in meantime as the high HR could have lead to some flash pulm edema, and concern that could have also had some aspiraion with the oversedation with the iv ativan and stopped this and would do he valium scheduled now still as still having some hallucinations from withdrawal intermittently, and if needs an iv ativan would do 1 time dosing when needed to avoid oversedation.   Repeat ct pending. Encouraged IS use given atelectasis. And likely will ask pulm for assistance given worsening once we know more with CT scan.  Higher risk for decompensation and if worsening resp status or tiring of WOB would have low threshold to talk to ICU if worsens overnight.  Sister is contact for updates and updated her in the room this afternoon and reviewed update care plan.        Review of Systems   Constitutional:   Negative for fever.   Respiratory:  Negative for cough and shortness of breath.    Cardiovascular:  Negative for chest pain and leg swelling.   Gastrointestinal:  Negative for abdominal pain, nausea and vomiting.   Musculoskeletal:  Negative for arthralgias.   Skin:  Negative for rash.   Neurological:  Negative for dizziness, tremors and light-headedness.   Psychiatric/Behavioral:  Positive for agitation (At times) and confusion. Negative for hallucinations.    Objective:     Vital Signs (Most Recent):  Temp: 98.3 °F (36.8 °C) (08/03/22 0700)  Pulse: 75 (08/03/22 1100)  Resp: 18 (08/03/22 0833)  BP: 135/91 (08/03/22 0700)  SpO2: 95 % (08/03/22 1100) on 10 liters of oxygen high flow   Vital Signs (24h Range):  Temp:  [97.4 °F (36.3 °C)-98.4 °F (36.9 °C)] 97.9 °F (36.6 °C)  Pulse:  [] 84  Resp:  [18-37] 24  SpO2:  [88 %-98 %] 91 %  BP: (111-132)/(73-75) 111/75     Weight: 90.7 kg (200 lb)  Body mass index is 34.33 kg/m².    Intake/Output Summary (Last 24 hours) at 8/7/2022 1434  Last data filed at 8/7/2022 0657  Gross per 24 hour   Intake 300 ml   Output 1050 ml   Net -750 ml        Physical Exam  Vitals and nursing note reviewed.   Constitutional:       General: She is not in acute distress.     Appearance: She is obese. She is not ill-appearing.      Interventions: Nasal cannula in place.      Comments: Looks more lethargic than prev days, asking to go home, confused at times. Looks ill on 15 L   Eyes:      General: No scleral icterus.     Conjunctiva/sclera: Conjunctivae normal.   Neck:      Vascular: No JVD.   Cardiovascular:      Rate and Rhythm: Normal rate and regular rhythm.      Heart sounds: Normal heart sounds. No murmur heard.    No friction rub. No gallop.      Comments: HR 80s on PM exam, this AM was 130s at times. Appeared to have p waves before each qrs but was irregular at times, regular this PM  Pulmonary:      Effort: Pulmonary effort is normal. No respiratory distress.      Breath sounds:  Rales present. No wheezing or rhonchi.      Comments: Appears more labored and stopping for dyspnea more than prev days. On HFNC. Dec breath sounds at Right lung base. Wheezing on exp heard  Abdominal:      General: Abdomen is flat. Bowel sounds are normal. There is no distension.      Palpations: Abdomen is soft.      Tenderness: There is no abdominal tenderness.   Musculoskeletal:      Right lower leg: No edema.      Left lower leg: No edema.   Skin:     General: Skin is warm.      Findings: No erythema.   Neurological:      General: No focal deficit present.      Comments: Oriented to person but disoriented to situation (does not undersand why she cant go home)   Psychiatric:         Mood and Affect: Mood normal.         Behavior: Behavior normal. Behavior is cooperative.      Comments: Not at baseline, hallucinations at times still, tremors at times       Significant Labs:   CBC:   Recent Labs   Lab 08/06/22 0422 08/07/22  0435   WBC 8.32 9.25   HGB 10.7* 10.4*   HCT 38.7 37.5    292       CMP:   Recent Labs   Lab 08/06/22 0422 08/07/22  0435    139   K 3.3* 4.0   CL 91* 90*   CO2 39* 35*   GLU 97 84   BUN 13 14   CREATININE 0.7 0.8   CALCIUM 10.0 9.5   PROT 6.7 6.3   ALBUMIN 2.6* 2.6*   BILITOT 0.4 0.5   ALKPHOS 121 123   AST 15 17   ALT 24 23   ANIONGAP 11 14       Blood Culture, Routine   Date Value Ref Range Status   08/01/2022 No growth after 5 days.  Final     Significant Imaging: I have reviewed all pertinent imaging results/findings within the past 24 hours.

## 2022-08-07 NOTE — ASSESSMENT & PLAN NOTE
Likely aspiration PNA given RLL with hx of alcohol use a predisposition to this. No signs of aspiration now as awake and alert.  -add legionalla coverage 8/7, check legionella and strep pneumo antigens as had diarrhea illness before this and has window units with black mold on them.   -check viral swab

## 2022-08-07 NOTE — ASSESSMENT & PLAN NOTE
-Patient reportedly had tremors and hallucinations on the floor 8/5-8/7  -Sister reports when had detox at  in prev years she was there 14 days with withdrawal + aspiration PNA. Patient had endorsed hx of withdrawal in the past. Sister gave detailed hx also   -Continue MVI, Thiamine and Folate daily.   -Reports drinking 2 days ago. Denies symptoms or past history of withdrawals.   -Monitoring CIWA q shift, with plans to initiate benzos for CIWA >8. Patient started on scheduled Valium on 8/2 at 5 mg po TID as CIWA 17. Patient on prn Ativan as per withdrawal protocol.   - Ethanol level on presentation was negative.   - AST to ALT ratio consistent with recent alcohol intake.

## 2022-08-07 NOTE — RESPIRATORY THERAPY
RAPID RESPONSE RESPIRATORY THERAPY NOTE             Code Status: Full Code   : 1960  Bed: 64853/70249 A:   MRN: [id number]  Time page Received: 1640  Time Rapid Response RT at Bedside: 1647  Time Rapid Response RT left Bedside: 1724    SITUATION    Evaluated patient for: increased oxygen requirements    BACKGROUND    Why is the patient in the hospital?: Acute hypoxemic respiratory failure    Patient has a past medical history of Alcohol abuse, Anxiety, Benign paroxysmal positional vertigo, Depression, Hyperlipidemia, and Hypertension.    24 Hours Vitals Range:  Temp:  [97.4 °F (36.3 °C)-98.4 °F (36.9 °C)]   Pulse:  []   Resp:  [18-37]   BP: (111-132)/(73-75)   SpO2:  [87 %-98 %]     Labs:    Recent Labs     22  0413 22  0422 22  0435    141 139   K 3.6 3.3* 4.0   CL 91* 91* 90*   CO2 39* 39* 35*   CREATININE 0.8 0.7 0.8   GLU 81 97 84   MG 2.0 1.7 1.7        No results for input(s): PH, PCO2, PO2, HCO3, POCSATURATED, BE in the last 72 hours.    ASSESSMENT/INTERVENTIONS    Upon arrival in room patient resting in bed with no visible distress. Oxygen saturations were as low as 84% on 30L/90%. Flow was increased to 60L with 90% FiO2 which slightly improved saturations to 92-94%.    Last Vitals: Temp: 97.9 °F (36.6 °C) (805)  Pulse: 92 (1539)  Resp: 30 (1539)  BP: 111/75 (836)  SpO2: 87 % (1539)  Level of Consciousness: Level of Consciousness (AVPU): alert  Respiratory Effort: Respiratory Effort: Unlabored  Expansion/Accessory Muscle Usage: Expansion/Accessory Muscles/Retractions: expansion symmetric, no retractions, no use of accessory muscles  All Lung Field Breath Sounds: All Lung Fields Breath Sounds: Anterior:, Lateral:, diminished  O2 Device/Concentration: Flow (L/min): 10,  , O2 Device (Oxygen Therapy): High Flow nasal Cannula  NIPPV: No Surgical airway: No Vent: No  ETCO2 monitored:    Ambu at bedside: Ambu bag with the patient?: Yes, Adult  Ambu    Active Orders   Respiratory Care    Incentive spirometry     Frequency: Q6H PRN     Number of Occurrences: Until Specified    Inhalation Treatment Q4H WAKE     Frequency: Q4H WAKE     Number of Occurrences: Until Specified    Oxygen Continuous     Frequency: Continuous     Number of Occurrences: Until Specified     Order Questions:      Device type: High flow      Device: High Flow Nasal Cannula (6 -15 Liters)      LPM: 10      Titrate O2 per Oxygen Titration Protocol: Yes      To maintain SpO2 goal of: >= 92%      Notify MD of: Inability to achieve desired SpO2; Sudden change in patient status and requires 20% increase in FiO2; Patient requires >60% FiO2    Oxygen Continuous     Frequency: Continuous     Number of Occurrences: Until Specified     Order Questions:      Device type: High flow      Device: Comfort Flow      LPM: 20      Titrate O2 per Oxygen Titration Protocol: Yes      To maintain SpO2 goal of: >= 90%      Notify MD of: Inability to achieve desired SpO2; Sudden change in patient status and requires 20% increase in FiO2; Patient requires >60% FiO2    Pulse Oximetry Q4H     Frequency: Q4H     Number of Occurrences: Until Specified       RECOMMENDATIONS  ?  We recommend: RRT Recs: Transfer to ICU for higher level of care    ESCALATION    Patient transferred to [facility] room 6096 without incident    FOLLOW-UP    Disposition: Tx in ICU bed 6096.    Please call back the Rapid Response RT, Darrion Ruth, RRT at x [phone] for any questions or concerns.

## 2022-08-07 NOTE — ASSESSMENT & PLAN NOTE
Patient is identified as having Diastolic (HFpEF) heart failure that is Acute on chronic. CHF is currently uncontrolled due to Pulmonary edema/pleural effusion on CXR. Latest ECHO performed and demonstrates- Results for orders placed during the hospital encounter of 08/01/22    Echo    Interpretation Summary  · The left ventricle is normal in size with concentric hypertrophy and normal systolic function.  · The estimated ejection fraction is 60-65%.  · Grade I left ventricular diastolic dysfunction.  · Mild aortic regurgitation.  · Normal right ventricular size with normal right ventricular systolic function.  · The estimated PA systolic pressure is 43 mmHg.  · Intermediate central venous pressure (8 mmHg).  · There is pulmonary hypertension.  · Small circumferential pericardial effusion.  · Mild left atrial enlargement.  . Continue Furosemide and monitor clinical status closely. Monitor on telemetry.  Monitor strict Is&Os and daily weights.  Place on fluid restriction of 1.5 L. Continue to stress to patient importance of self efficacy and  on diet for CHF. Last BNP reviewed- and noted below. Has improved since BNP on 8/1 of 504  Recent Labs   Lab 08/08/22  1158   BNP 90   .

## 2022-08-07 NOTE — PLAN OF CARE
Plan to go to CF 20 L as 93% per charge on RA but having some desats with movement so will go up from 15L.   CT chest showing similar consolidation, volume loss and atelectasis. Possible small inc in pericardial effusion, showed this before on CT and echo only showed mild effusion, repeta limited echo tomorrow to reassess this better. Legionella and strep pneumo antigens pending.   Viral CX ordered  Will plan to consult pulm for assist tomorrow, if patient has worsening resp status  Overnight and is going up to 30L or tiring out I would consult micu to eval her or have on their radar..  Will check abg now to see where we are at.

## 2022-08-07 NOTE — NURSING
Pt has been requiring increasing amounts of oxygen so after discussing with the physician and the rapid response team, it was determined that the pt will be better suited to transfer to ICU. Pt has left the floor via bed with rapid team and report has been called to Jessica RUIZ.

## 2022-08-07 NOTE — ASSESSMENT & PLAN NOTE
Lumbosacral radiculopathy  Chronic and controlled. Continue home Percocet prn for pain. Hold home muscle relaxants due to encephalopathy.   Avoid pain meds unless absolutely needs as has risk of withdrawal due to chronic use without them but also need to avoid oversedation given aspiration risk is high

## 2022-08-08 LAB
ALBUMIN SERPL BCP-MCNC: 2.5 G/DL (ref 3.5–5.2)
ALLENS TEST: ABNORMAL
ALP SERPL-CCNC: 112 U/L (ref 55–135)
ALT SERPL W/O P-5'-P-CCNC: 19 U/L (ref 10–44)
ANION GAP SERPL CALC-SCNC: 15 MMOL/L (ref 8–16)
ASCENDING AORTA: 3.83 CM
AST SERPL-CCNC: 21 U/L (ref 10–40)
AV MEAN GRADIENT: 26 MMHG
AV PEAK GRADIENT: 48 MMHG
BASOPHILS # BLD AUTO: 0.09 K/UL (ref 0–0.2)
BASOPHILS NFR BLD: 0.9 % (ref 0–1.9)
BILIRUB SERPL-MCNC: 0.5 MG/DL (ref 0.1–1)
BNP SERPL-MCNC: 90 PG/ML (ref 0–99)
BSA FOR ECHO PROCEDURE: 2.02 M2
BUN SERPL-MCNC: 14 MG/DL (ref 8–23)
CALCIUM SERPL-MCNC: 9.2 MG/DL (ref 8.7–10.5)
CCP AB SER IA-ACNC: 0.6 U/ML
CHLORIDE SERPL-SCNC: 88 MMOL/L (ref 95–110)
CO2 SERPL-SCNC: 34 MMOL/L (ref 23–29)
CREAT SERPL-MCNC: 0.8 MG/DL (ref 0.5–1.4)
CV ECHO LV RWT: 0.37 CM
DELSYS: ABNORMAL
DIFFERENTIAL METHOD: ABNORMAL
DOP CALC AO PEAK VEL: 3.45 M/S
DOP CALC AO VTI: 40.53 CM
DOP CALC LVOT AREA: 3.9 CM2
DOP CALC LVOT DIAMETER: 2.22 CM
E WAVE DECELERATION TIME: 159.61 MSEC
E/A RATIO: 0.86
E/E' RATIO: 14.89 M/S
ECHO LV POSTERIOR WALL: 0.99 CM (ref 0.6–1.1)
EJECTION FRACTION: 70 %
EOSINOPHIL # BLD AUTO: 0.2 K/UL (ref 0–0.5)
EOSINOPHIL NFR BLD: 1.9 % (ref 0–8)
ERYTHROCYTE [DISTWIDTH] IN BLOOD BY AUTOMATED COUNT: 16.8 % (ref 11.5–14.5)
ERYTHROCYTE [SEDIMENTATION RATE] IN BLOOD BY WESTERGREN METHOD: 25 MM/H
EST. GFR  (NO RACE VARIABLE): >60 ML/MIN/1.73 M^2
FIO2: 100
FLOW: 60
FRACTIONAL SHORTENING: 33 % (ref 28–44)
GLUCOSE SERPL-MCNC: 83 MG/DL (ref 70–110)
HCO3 UR-SCNC: 41.5 MMOL/L (ref 24–28)
HCT VFR BLD AUTO: 35.4 % (ref 37–48.5)
HGB BLD-MCNC: 10.1 G/DL (ref 12–16)
IMM GRANULOCYTES # BLD AUTO: 0.03 K/UL (ref 0–0.04)
IMM GRANULOCYTES NFR BLD AUTO: 0.3 % (ref 0–0.5)
INTERVENTRICULAR SEPTUM: 1.05 CM (ref 0.6–1.1)
LA MAJOR: 5.83 CM
LA MINOR: 5.98 CM
LA WIDTH: 3.95 CM
LEFT ATRIUM SIZE: 3.76 CM
LEFT ATRIUM VOLUME INDEX MOD: 26.3 ML/M2
LEFT ATRIUM VOLUME INDEX: 38 ML/M2
LEFT ATRIUM VOLUME MOD: 51.53 CM3
LEFT ATRIUM VOLUME: 74.53 CM3
LEFT INTERNAL DIMENSION IN SYSTOLE: 3.62 CM (ref 2.1–4)
LEFT VENTRICLE DIASTOLIC VOLUME INDEX: 71.1 ML/M2
LEFT VENTRICLE DIASTOLIC VOLUME: 139.35 ML
LEFT VENTRICLE MASS INDEX: 107 G/M2
LEFT VENTRICLE SYSTOLIC VOLUME INDEX: 28.1 ML/M2
LEFT VENTRICLE SYSTOLIC VOLUME: 54.99 ML
LEFT VENTRICULAR INTERNAL DIMENSION IN DIASTOLE: 5.37 CM (ref 3.5–6)
LEFT VENTRICULAR MASS: 210.28 G
LIPASE SERPL-CCNC: 19 U/L (ref 4–60)
LV LATERAL E/E' RATIO: 13.4 M/S
LV SEPTAL E/E' RATIO: 16.75 M/S
LYMPHOCYTES # BLD AUTO: 1.3 K/UL (ref 1–4.8)
LYMPHOCYTES NFR BLD: 13.5 % (ref 18–48)
MAGNESIUM SERPL-MCNC: 1.8 MG/DL (ref 1.6–2.6)
MCH RBC QN AUTO: 23.8 PG (ref 27–31)
MCHC RBC AUTO-ENTMCNC: 28.5 G/DL (ref 32–36)
MCV RBC AUTO: 83 FL (ref 82–98)
MODE: ABNORMAL
MONOCYTES # BLD AUTO: 1 K/UL (ref 0.3–1)
MONOCYTES NFR BLD: 10.4 % (ref 4–15)
MV PEAK A VEL: 0.78 M/S
MV PEAK E VEL: 0.67 M/S
MV STENOSIS PRESSURE HALF TIME: 46.29 MS
MV VALVE AREA P 1/2 METHOD: 4.75 CM2
NEUTROPHILS # BLD AUTO: 7 K/UL (ref 1.8–7.7)
NEUTROPHILS NFR BLD: 73 % (ref 38–73)
NRBC BLD-RTO: 0 /100 WBC
PCO2 BLDA: 59.8 MMHG (ref 35–45)
PH SMN: 7.45 [PH] (ref 7.35–7.45)
PISA TR MAX VEL: 3.36 M/S
PLATELET # BLD AUTO: 269 K/UL (ref 150–450)
PMV BLD AUTO: 9.9 FL (ref 9.2–12.9)
PO2 BLDA: 47 MMHG (ref 40–60)
POC BE: 17 MMOL/L
POC SATURATED O2: 83 % (ref 95–100)
POC TCO2: 43 MMOL/L (ref 24–29)
POCT GLUCOSE: 125 MG/DL (ref 70–110)
POCT GLUCOSE: 86 MG/DL (ref 70–110)
POCT GLUCOSE: 96 MG/DL (ref 70–110)
POTASSIUM SERPL-SCNC: 3.8 MMOL/L (ref 3.5–5.1)
PROT SERPL-MCNC: 6.3 G/DL (ref 6–8.4)
RA MAJOR: 4.93 CM
RA PRESSURE: 8 MMHG
RA WIDTH: 2.68 CM
RBC # BLD AUTO: 4.25 M/UL (ref 4–5.4)
RHEUMATOID FACT SERPL-ACNC: <13 IU/ML (ref 0–15)
RIGHT VENTRICULAR END-DIASTOLIC DIMENSION: 2.57 CM
SAMPLE: ABNORMAL
SINUS: 3.41 CM
SITE: ABNORMAL
SODIUM SERPL-SCNC: 137 MMOL/L (ref 136–145)
SP02: 93
STJ: 3.32 CM
TDI LATERAL: 0.05 M/S
TDI SEPTAL: 0.04 M/S
TDI: 0.05 M/S
TR MAX PG: 45 MMHG
TRICUSPID ANNULAR PLANE SYSTOLIC EXCURSION: 1.52 CM
TV REST PULMONARY ARTERY PRESSURE: 53 MMHG
WBC # BLD AUTO: 9.63 K/UL (ref 3.9–12.7)

## 2022-08-08 PROCEDURE — 99900035 HC TECH TIME PER 15 MIN (STAT)

## 2022-08-08 PROCEDURE — 93005 ELECTROCARDIOGRAM TRACING: CPT

## 2022-08-08 PROCEDURE — 27100171 HC OXYGEN HIGH FLOW UP TO 24 HOURS

## 2022-08-08 PROCEDURE — 93010 EKG 12-LEAD: ICD-10-PCS | Mod: ,,, | Performed by: INTERNAL MEDICINE

## 2022-08-08 PROCEDURE — 80053 COMPREHEN METABOLIC PANEL: CPT | Performed by: HOSPITALIST

## 2022-08-08 PROCEDURE — 99233 PR SUBSEQUENT HOSPITAL CARE,LEVL III: ICD-10-PCS | Mod: GC,,, | Performed by: INTERNAL MEDICINE

## 2022-08-08 PROCEDURE — 82803 BLOOD GASES ANY COMBINATION: CPT

## 2022-08-08 PROCEDURE — 83880 ASSAY OF NATRIURETIC PEPTIDE: CPT

## 2022-08-08 PROCEDURE — 27000221 HC OXYGEN, UP TO 24 HOURS

## 2022-08-08 PROCEDURE — 94640 AIRWAY INHALATION TREATMENT: CPT

## 2022-08-08 PROCEDURE — 99222 PR INITIAL HOSPITAL CARE,LEVL II: ICD-10-PCS | Mod: ,,, | Performed by: INTERNAL MEDICINE

## 2022-08-08 PROCEDURE — 36600 WITHDRAWAL OF ARTERIAL BLOOD: CPT

## 2022-08-08 PROCEDURE — 25000003 PHARM REV CODE 250: Performed by: HOSPITALIST

## 2022-08-08 PROCEDURE — 86038 ANTINUCLEAR ANTIBODIES: CPT

## 2022-08-08 PROCEDURE — 63700000 PHARM REV CODE 250 ALT 637 W/O HCPCS: Performed by: HOSPITALIST

## 2022-08-08 PROCEDURE — 94761 N-INVAS EAR/PLS OXIMETRY MLT: CPT

## 2022-08-08 PROCEDURE — 94799 UNLISTED PULMONARY SVC/PX: CPT

## 2022-08-08 PROCEDURE — 83735 ASSAY OF MAGNESIUM: CPT | Performed by: HOSPITALIST

## 2022-08-08 PROCEDURE — 86200 CCP ANTIBODY: CPT

## 2022-08-08 PROCEDURE — 83690 ASSAY OF LIPASE: CPT

## 2022-08-08 PROCEDURE — 86039 ANTINUCLEAR ANTIBODIES (ANA): CPT

## 2022-08-08 PROCEDURE — 63600175 PHARM REV CODE 636 W HCPCS: Performed by: HOSPITALIST

## 2022-08-08 PROCEDURE — 20000000 HC ICU ROOM

## 2022-08-08 PROCEDURE — 99222 1ST HOSP IP/OBS MODERATE 55: CPT | Mod: ,,, | Performed by: INTERNAL MEDICINE

## 2022-08-08 PROCEDURE — 25000242 PHARM REV CODE 250 ALT 637 W/ HCPCS: Performed by: HOSPITALIST

## 2022-08-08 PROCEDURE — 86431 RHEUMATOID FACTOR QUANT: CPT

## 2022-08-08 PROCEDURE — 25000003 PHARM REV CODE 250: Performed by: STUDENT IN AN ORGANIZED HEALTH CARE EDUCATION/TRAINING PROGRAM

## 2022-08-08 PROCEDURE — 93010 ELECTROCARDIOGRAM REPORT: CPT | Mod: ,,, | Performed by: INTERNAL MEDICINE

## 2022-08-08 PROCEDURE — 25000003 PHARM REV CODE 250

## 2022-08-08 PROCEDURE — 99233 SBSQ HOSP IP/OBS HIGH 50: CPT | Mod: GC,,, | Performed by: INTERNAL MEDICINE

## 2022-08-08 PROCEDURE — 85025 COMPLETE CBC W/AUTO DIFF WBC: CPT | Performed by: HOSPITALIST

## 2022-08-08 PROCEDURE — 86235 NUCLEAR ANTIGEN ANTIBODY: CPT | Mod: 59

## 2022-08-08 RX ORDER — LOPERAMIDE HYDROCHLORIDE 2 MG/1
2 CAPSULE ORAL 4 TIMES DAILY PRN
Status: DISCONTINUED | OUTPATIENT
Start: 2022-08-08 | End: 2022-08-23 | Stop reason: HOSPADM

## 2022-08-08 RX ORDER — OXYCODONE AND ACETAMINOPHEN 5; 325 MG/1; MG/1
1 TABLET ORAL EVERY 6 HOURS PRN
Status: DISCONTINUED | OUTPATIENT
Start: 2022-08-08 | End: 2022-08-23 | Stop reason: HOSPADM

## 2022-08-08 RX ORDER — ENOXAPARIN SODIUM 100 MG/ML
40 INJECTION SUBCUTANEOUS EVERY 24 HOURS
Status: CANCELLED | OUTPATIENT
Start: 2022-08-08

## 2022-08-08 RX ADMIN — BUTALBITAL, ACETAMINOPHEN, AND CAFFEINE 1 TABLET: 50; 325; 40 TABLET ORAL at 02:08

## 2022-08-08 RX ADMIN — PIPERACILLIN SODIUM AND TAZOBACTAM SODIUM 4.5 G: 4; .5 INJECTION, POWDER, LYOPHILIZED, FOR SOLUTION INTRAVENOUS at 12:08

## 2022-08-08 RX ADMIN — SENNOSIDES AND DOCUSATE SODIUM 1 TABLET: 50; 8.6 TABLET ORAL at 08:08

## 2022-08-08 RX ADMIN — IPRATROPIUM BROMIDE AND ALBUTEROL SULFATE 3 ML: 2.5; .5 SOLUTION RESPIRATORY (INHALATION) at 11:08

## 2022-08-08 RX ADMIN — AZITHROMYCIN MONOHYDRATE 250 MG: 250 TABLET ORAL at 08:08

## 2022-08-08 RX ADMIN — DIAZEPAM 5 MG: 5 TABLET ORAL at 08:08

## 2022-08-08 RX ADMIN — OXYCODONE HYDROCHLORIDE AND ACETAMINOPHEN 1 TABLET: 5; 325 TABLET ORAL at 11:08

## 2022-08-08 RX ADMIN — IPRATROPIUM BROMIDE AND ALBUTEROL SULFATE 3 ML: 2.5; .5 SOLUTION RESPIRATORY (INHALATION) at 07:08

## 2022-08-08 RX ADMIN — VENLAFAXINE HYDROCHLORIDE 225 MG: 75 CAPSULE, EXTENDED RELEASE ORAL at 08:08

## 2022-08-08 RX ADMIN — OXYCODONE HYDROCHLORIDE AND ACETAMINOPHEN 1 TABLET: 5; 325 TABLET ORAL at 07:08

## 2022-08-08 RX ADMIN — METOPROLOL TARTRATE 100 MG: 100 TABLET, FILM COATED ORAL at 08:08

## 2022-08-08 RX ADMIN — Medication 6 MG: at 09:08

## 2022-08-08 RX ADMIN — IPRATROPIUM BROMIDE AND ALBUTEROL SULFATE 3 ML: 2.5; .5 SOLUTION RESPIRATORY (INHALATION) at 03:08

## 2022-08-08 RX ADMIN — BUSPIRONE HYDROCHLORIDE 5 MG: 5 TABLET ORAL at 01:08

## 2022-08-08 RX ADMIN — GUAIFENESIN 1200 MG: 600 TABLET, EXTENDED RELEASE ORAL at 09:08

## 2022-08-08 RX ADMIN — PIPERACILLIN SODIUM AND TAZOBACTAM SODIUM 4.5 G: 4; .5 INJECTION, POWDER, LYOPHILIZED, FOR SOLUTION INTRAVENOUS at 08:08

## 2022-08-08 RX ADMIN — LOPERAMIDE HYDROCHLORIDE 2 MG: 2 CAPSULE ORAL at 09:08

## 2022-08-08 RX ADMIN — GUAIFENESIN 1200 MG: 600 TABLET, EXTENDED RELEASE ORAL at 08:08

## 2022-08-08 RX ADMIN — THIAMINE HCL TAB 100 MG 100 MG: 100 TAB at 08:08

## 2022-08-08 RX ADMIN — METOPROLOL TARTRATE 50 MG: 50 TABLET, FILM COATED ORAL at 09:08

## 2022-08-08 RX ADMIN — MIRTAZAPINE 7.5 MG: 7.5 TABLET, FILM COATED ORAL at 09:08

## 2022-08-08 RX ADMIN — BUTALBITAL, ACETAMINOPHEN, AND CAFFEINE 1 TABLET: 50; 325; 40 TABLET ORAL at 10:08

## 2022-08-08 RX ADMIN — PIPERACILLIN SODIUM AND TAZOBACTAM SODIUM 4.5 G: 4; .5 INJECTION, POWDER, LYOPHILIZED, FOR SOLUTION INTRAVENOUS at 05:08

## 2022-08-08 RX ADMIN — VANCOMYCIN HYDROCHLORIDE 1500 MG: 1.5 INJECTION, POWDER, LYOPHILIZED, FOR SOLUTION INTRAVENOUS at 12:08

## 2022-08-08 NOTE — PT/OT/SLP DISCHARGE
Physical Therapy Discharge Summary    Name: Vale Gutierrez  MRN: [id number]   Principal Problem: Acute hypoxemic respiratory failure     Patient Discharged from acute Physical Therapy on 22. Pt was transferred to MICU  due to decreased respiratory status. .  Please refer to prior PT noted date on 22 for functional status.     Assessment:     Patient has not met goals.    Objective:     GOALS:   Multidisciplinary Problems     Physical Therapy Goals        Problem: Physical Therapy    Goal Priority Disciplines Outcome Goal Variances Interventions   Physical Therapy Goal     PT, PT/OT Ongoing, Progressing     Description: Goals to be met by: 8/15/22     Patient will increase functional independence with mobility by performin. Supine to sit with Stand-by Assistance  2. Sit to supine with Stand-by Assistance  3. Sit to stand transfer with Stand-by Assistance  4. Gait  x 50 feet with Contact Guard Assistance using LRAD, if needed.   5. Stand for 5 minutes with Stand-by Assistance using LRAD, if needed  6. Lower extremity exercise program x10 reps per handout, with independence                     Reasons for Discontinuation of Therapy Services  Transfer to alternate level of care.      Plan:     Patient Discharged to: to ICU, new orders needed when medically stable. Therapist of record not available to write discharge summary.  .      2022

## 2022-08-08 NOTE — PLAN OF CARE
[facility] DAILY GOALS       A: Awake    RASS: Goal -    Actual - RASS (Goldman Agitation-Sedation Scale): 0-->alert and calm   Restraint necessity: Clinical Justification: Removing medical devices, Climbing out of bed, Treatment Interference  B: Breathe   SBT: Not intubated   C: Coordinate A & B, analgesics/sedatives   Pain: managed    SAT: Not intubated  D: Delirium   CAM-ICU:    E: Early(intubated/ Progressive (non-intubated) Mobility   MOVE Screen: Pass   Activity: Activity Management: Rolling - L1  FAS: Feeding/Nutrition   Diet order: Diet/Nutrition Received: regular,    T: Thrombus   DVT prophylaxis: VTE Required Core Measure: Provider determined low risk VTE  H: HOB Elevation   Head of Bed (HOB) Positioning: HOB at 30-45 degrees  U: Ulcer Prophylaxis   GI: yes  G: Glucose control   managed    S: Skin   Bathing/Skin Care: bath, complete, dressed/undressed, incontinence care, linen changed  Device Skin Pressure Protection: skin-to-skin areas padded, skin-to-device areas padded, pressure points protected  Pressure Reduction Devices: specialty bed utilized, pressure-redistributing mattress utilized, positioning supports utilized, heel offloading device utilized  Pressure Reduction Techniques: weight shift assistance provided  Skin Protection: transparent dressing maintained, skin-to-skin areas padded, tubing/devices free from skin contact  B: Bowel Function   diarrhea   I: Indwelling Catheters   [place] necessity:     CVC necessity: Yes  D: De-escalation Antibiotics   Yes    Family/Goals of care/Code Status   Code Status: Full Code    24H Vital Sign Range  Temp:  [98.4 °F (36.9 °C)-99.6 °F (37.6 °C)]   Pulse:  []   Resp:  [18-37]   BP: ()/(51-91)   SpO2:  [88 %-100 %]      Shift Events   No acute events throughout shift    VS and assessment per flow sheet, patient progressing towards goals as tolerated, plan of care reviewed with [unfilled] and family, all concerns addressed, will continue to  monitor.    Monique Smith

## 2022-08-08 NOTE — SUBJECTIVE & OBJECTIVE
Interval History/Significant Events: Ms. Gutierrez became more hypoxic this morning requiring switch from high flow O2 to bipap, at 100% FiO2 and 50 L and was still having some intermittent dyspnea and hypoxia down to 88%. Patient says she used to drink a lot and has trouble with memory.     Review of Systems   Constitutional:  Negative for chills and fever.   HENT:  Negative for congestion and sore throat.    Eyes:  Negative for pain and redness.   Respiratory:  Negative for chest tightness and shortness of breath.    Cardiovascular:  Negative for chest pain and leg swelling.   Gastrointestinal:  Positive for abdominal pain. Negative for abdominal distention, diarrhea, nausea and vomiting.   Genitourinary:  Negative for dysuria, flank pain and hematuria.   Musculoskeletal:  Negative for back pain and neck pain.   Skin:  Negative for color change and rash.   Neurological:  Negative for dizziness, speech difficulty, light-headedness and headaches.        Positive for memory problem    Objective:     Vital Signs (Most Recent):  Temp: 98.7 °F (37.1 °C) (08/08/22 1100)  Pulse: 80 (08/08/22 1200)  Resp: (!) 25 (08/08/22 1200)  BP: 97/70 (08/08/22 1100)  SpO2: 99 % (08/08/22 1200)   Vital Signs (24h Range):  Temp:  [98.1 °F (36.7 °C)-99.6 °F (37.6 °C)] 98.7 °F (37.1 °C)  Pulse:  [] 80  Resp:  [18-36] 25  SpO2:  [84 %-100 %] 99 %  BP: ()/(57-91) 97/70   Weight: 90.7 kg (200 lb)  Body mass index is 34.33 kg/m².      Intake/Output Summary (Last 24 hours) at 8/8/2022 1310  Last data filed at 8/8/2022 1200  Gross per 24 hour   Intake 1545.4 ml   Output 1002 ml   Net 543.4 ml       Physical Exam  Constitutional:       Comments: Alert, elderly female in bed, appears anxious but responds to questions appropriately    HENT:      Head: Normocephalic and atraumatic.      Nose: Nose normal.      Mouth/Throat:      Mouth: Mucous membranes are moist.   Eyes:      Conjunctiva/sclera: Conjunctivae normal.      Pupils: Pupils  are equal, round, and reactive to light.   Cardiovascular:      Rate and Rhythm: Tachycardia present.      Pulses: Normal pulses.   Pulmonary:      Comments: In mild respiratory distress, on bipap, speaking in short sentences. Diffuse crackles.   Abdominal:      General: There is no distension.      Palpations: Abdomen is soft.      Tenderness: There is no right CVA tenderness or left CVA tenderness.   Musculoskeletal:         General: No swelling.      Cervical back: Neck supple.   Skin:     General: Skin is warm.      Capillary Refill: Capillary refill takes less than 2 seconds.      Coloration: Skin is not jaundiced.   Neurological:      Comments: Oriented to person and place but seems confused about events of the past week        Vents:  Oxygen Concentration (%): 70 (08/08/22 1200)  Lines/Drains/Airways       Peripheral Intravenous Line  Duration                  Peripheral IV - Single Lumen 08/07/22 0930 24 G Left;Posterior Hand 1 day         Peripheral IV - Single Lumen 08/07/22 2345 20 G Anterior;Right Forearm <1 day                  Significant Labs:    CBC/Anemia Profile:  Recent Labs   Lab 08/07/22  0435 08/08/22  0437   WBC 9.25 9.63   HGB 10.4* 10.1*   HCT 37.5 35.4*    269   MCV 86 83   RDW 17.1* 16.8*        Chemistries:  Recent Labs   Lab 08/07/22  0435 08/08/22  0437    137   K 4.0 3.8   CL 90* 88*   CO2 35* 34*   BUN 14 14   CREATININE 0.8 0.8   CALCIUM 9.5 9.2   ALBUMIN 2.6* 2.5*   PROT 6.3 6.3   BILITOT 0.5 0.5   ALKPHOS 123 112   ALT 23 19   AST 17 21   MG 1.7 1.8       All pertinent labs within the past 24 hours have been reviewed.    Significant Imaging:  I have reviewed all pertinent imaging results/findings within the past 24 hours.

## 2022-08-08 NOTE — ASSESSMENT & PLAN NOTE
"Echocardiogram showing small pericardial effusion on 8/2, repeat on 8/8 after CT chest again confirmed the effusion to be a "trivial circumferential pericardial effusion"    There is no evidence of tamponade physiology at this time.    No distention of neck veins, heart sounds are not distant, no hypotension    The patient has a valsalva gradient on Echo but after extensive review no significant hypertrophy of the septum is noted and thus this gradient is believed to be physiologic at this time      -No interventions recommended at this time  - If future tap desired please consult the interventional cardiology team   "

## 2022-08-08 NOTE — PT/OT/SLP DISCHARGE
Occupational Therapy Discharge Summary    Vale Gutierrez  MRN: [id number]   Principal Problem: Acute hypoxemic respiratory failure      Patient Discharged from acute Occupational Therapy on 8/8/22.  Please refer to prior OT note dated 8/5/22 for functional status.    Assessment:      Patient appropriate for care in another setting.    Objective:     GOALS:   Multidisciplinary Problems     Occupational Therapy Goals        Problem: Occupational Therapy    Goal Priority Disciplines Outcome Interventions   Occupational Therapy Goal     OT, PT/OT Ongoing, Progressing    Description: Goals to be met by: 8/19/22     Patient will increase functional independence with ADLs by performing:    UE Dressing with Supervision.  LE Dressing with Supervision.  Toileting from bedside commode with Supervision for hygiene and clothing management.   Bathing from  sitting at sink with Minimal Assistance.  Step transfer with Supervision  Increased functional strength to 4/5 for improved functioning in ADL's.                     Reasons for Discontinuation of Therapy Services  Transfer to alternate level of care.      Plan:     Patient Discharged to: MICU 8/7 due to decreased respiratory status.    8/8/2022

## 2022-08-08 NOTE — H&P
[facility] - Cardiac Medical ICU  Critical Care Medicine  History & Physical    Patient Name: Vale Gutierrez  MRN: [id number]  Admission Date: 8/1/2022  Hospital Length of Stay: 6 days  Code Status: Full Code  Attending Physician: Patrice Burnett MD  Primary Care Provider: Estela Mcdaniel MD   Principal Problem: Acute hypoxemic respiratory failure    Subjective:     HPI:  Ms. Gutierrez is a 60 y/o female with a PMH of etoh abuse and withdrawal, aspiration pneumonia, delirium, smoking, HTN, HLD, BPPV, alcohol abuse, depression and anxiety who was admitted to the ICU from the floor for acute hypoxic respiratory failure. She was having increased oxygen needs on the floor and was requiring valium to keep her presumed alcohol withdrawal under control. The patient states that her stomach hurts in the midepigastric region and endorses nausea, vomiting x 1 day. She is unsure how long she has been coughing or feeling ill, and says she cannot remember the last few days. She says she lives with her sister Thad and that Thad helps care for her.       Hospital/ICU Course:  Patient presented to the ED on 8/1 with a CC of hypoxia and dyspnea. She was initially admitted to the floor.        Past Medical History:   Diagnosis Date    Alcohol abuse     Anxiety     Benign paroxysmal positional vertigo 1/17/2020    Depression     Hyperlipidemia     Hypertension        Past Surgical History:   Procedure Laterality Date    BREAST CYST ASPIRATION      CHOLECYSTECTOMY      complicated with bile leak, sepsis    COLONOSCOPY N/A 6/3/2020    Procedure: COLONOSCOPY Golytely;  Surgeon: Tino Neil MD;  Location: [facility] ENDO;  Service: Colon and Rectal;  Laterality: N/A;    EPIDURAL STEROID INJECTION N/A 5/27/2022    Procedure: INJECTION, STEROID, EPIDURAL, CAUDAL CONTRAST;  Surgeon: Rubén Rico MD;  Location: [facility] PAIN MGT;  Service: Pain Management;  Laterality: N/A;  5/6 RESCHEDULE    ESOPHAGOGASTRODUODENOSCOPY N/A  6/3/2020    Procedure: EGD (ESOPHAGOGASTRODUODENOSCOPY);  Surgeon: Tino Neil MD;  Location: [facility] ENDO;  Service: Colon and Rectal;  Laterality: N/A;    INJECTION OF ANESTHETIC AGENT AROUND NERVE Bilateral 3/24/2022    Procedure: BLOCK, NERVE MEDIAL BRANCH BLOCK BL L2, L3, L4 and L5  1st, needs consent;  Surgeon: Rubén Rico MD;  Location: [facility] PAIN MGT;  Service: Pain Management;  Laterality: Bilateral;    TRANSFORAMINAL EPIDURAL INJECTION OF STEROID Bilateral 8/7/2020    Procedure: INJECTION, STEROID, EPIDURAL, TRANSFORAMINAL APPROACH, L4-L5 need consent;  Surgeon: Rubén Rico MD;  Location: [facility] PAIN MGT;  Service: Pain Management;  Laterality: Bilateral;    TRANSFORAMINAL EPIDURAL INJECTION OF STEROID Bilateral 9/4/2020    Procedure: LUMBAR TRANSFORAMINAL BILATERAL L4/5 DIRECT REFERRAL;  Surgeon: Rubén Rico MD;  Location: [facility] PAIN MGT;  Service: Pain Management;  Laterality: Bilateral;  NEEDS CONSENT    TRANSFORAMINAL EPIDURAL INJECTION OF STEROID Bilateral 3/4/2022    Procedure: Injection,steroid,epidural,transforaminal approach BILATERAL L3/4 DIRECT REFERRAL;  Surgeon: Rubén Rico MD;  Location: [facility] PAIN MGT;  Service: Pain Management;  Laterality: Bilateral;       Review of patient's allergies indicates:  No Known Allergies    Family History       Problem Relation (Age of Onset)    Arthritis Mother    Atrial fibrillation Mother    Cancer Maternal Uncle    Cataracts Paternal Grandfather    Glaucoma Paternal Grandfather    Heart failure Mother    Macular degeneration Mother    Stroke Father          Tobacco Use    Smoking status: Former Smoker    Smokeless tobacco: Never Used   Substance and Sexual Activity    Alcohol use: Yes     Alcohol/week: 12.0 standard drinks     Types: 12 Shots of liquor per week     Comment: three 12-14 oz cocktail drinks.     Drug use: No    Sexual activity: Never      Review of Systems   Constitutional:  Negative for chills, fatigue and fever.    HENT:  Negative for congestion and sore throat.    Eyes:  Negative for pain and redness.   Respiratory:  Negative for chest tightness and shortness of breath.    Cardiovascular:  Negative for chest pain and leg swelling.   Gastrointestinal:  Positive for abdominal pain, nausea and vomiting. Negative for abdominal distention and diarrhea.   Genitourinary:  Negative for dysuria, flank pain and hematuria.   Musculoskeletal:  Negative for back pain and neck pain.   Skin:  Negative for color change and rash.   Neurological:  Negative for dizziness, speech difficulty, light-headedness and headaches.   Objective:     Vital Signs (Most Recent):  Temp: 98.1 °F (36.7 °C) (08/07/22 1746)  Pulse: 93 (08/07/22 1800)  Resp: (!) 26 (08/07/22 1800)  BP: 97/62 (08/07/22 1800)  SpO2: (!) 94 % (08/07/22 1800)   Vital Signs (24h Range):  Temp:  [97.4 °F (36.3 °C)-98.4 °F (36.9 °C)] 98.1 °F (36.7 °C)  Pulse:  [] 93  Resp:  [18-37] 26  SpO2:  [84 %-98 %] 94 %  BP: ()/(62-75) 97/62   Weight: 90.7 kg (200 lb)  Body mass index is 34.33 kg/m².      Intake/Output Summary (Last 24 hours) at 8/7/2022 1920  Last data filed at 8/7/2022 0657  Gross per 24 hour   Intake 300 ml   Output 1050 ml   Net -750 ml       Physical Exam  Constitutional:       General: She is not in acute distress.  HENT:      Head: Normocephalic and atraumatic.      Right Ear: External ear normal.      Left Ear: External ear normal.      Nose: Nose normal.      Mouth/Throat:      Mouth: Mucous membranes are moist.   Eyes:      General: No scleral icterus.     Pupils: Pupils are equal, round, and reactive to light.   Cardiovascular:      Rate and Rhythm: Normal rate.      Pulses: Normal pulses.   Pulmonary:      Comments: Increased respiratory effort, on high flow  Abdominal:      Palpations: Abdomen is soft.      Tenderness: There is no abdominal tenderness.   Musculoskeletal:         General: No swelling.      Cervical back: Neck supple.   Skin:     General:  Skin is warm and dry.      Capillary Refill: Capillary refill takes less than 2 seconds.   Neurological:      Mental Status: She is alert.      Comments: Seems a little confused, A&Ox2 but confused about the past few days        Vents:  Oxygen Concentration (%): 90 (08/07/22 1746)  Lines/Drains/Airways       Peripheral Intravenous Line  Duration                  Peripheral IV - Single Lumen 08/07/22 0930 24 G Left;Posterior Hand <1 day                  Significant Labs:    CBC/Anemia Profile:  Recent Labs   Lab 08/06/22 0422 08/07/22  0435   WBC 8.32 9.25   HGB 10.7* 10.4*   HCT 38.7 37.5    292   MCV 85 86   RDW 17.1* 17.1*        Chemistries:  Recent Labs   Lab 08/06/22 0422 08/07/22  0435    139   K 3.3* 4.0   CL 91* 90*   CO2 39* 35*   BUN 13 14   CREATININE 0.7 0.8   CALCIUM 10.0 9.5   ALBUMIN 2.6* 2.6*   PROT 6.7 6.3   BILITOT 0.4 0.5   ALKPHOS 121 123   ALT 24 23   AST 15 17   MG 1.7 1.7       All pertinent labs within the past 24 hours have been reviewed.    Significant Imaging: I have reviewed all pertinent imaging results/findings within the past 24 hours.    Assessment/Plan:     Psychiatric  Alcohol dependence with withdrawal with perceptual disturbance  -Patient reportedly had tremors and hallucinations on the floor 8/5-8/7  -Sister reports when had detox at  in prev years she was there 14 days with withdrawal + aspiration PNA. Patient had endorsed hx of withdrawal in the past. Sister gave detailed hx also   -Continue MVI, Thiamine and Folate daily.   -Reports drinking 2 days ago. Denies symptoms or past history of withdrawals.   -Monitoring CIWA q shift, with plans to initiate benzos for CIWA >8. Patient started on scheduled Valium on 8/2 at 5 mg po TID as CIWA 17. Patient on prn Ativan as per withdrawal protocol.   - Ethanol level on presentation was negative.   - AST to ALT ratio consistent with recent alcohol intake.       Pulmonary  Acute on chronic respiratory failure with  hypercapnia  Patient with Hypoxic Respiratory failure which is Acute on chronic.  she is not on home oxygen. Supplemental oxygen was provided and noted- Oxygen Concentration (%):  [90] 90.   Signs/symptoms of respiratory failure include- tachypnea. Contributing diagnoses includes - ARDS, Aspiration, CHF, COPD, Interstitial lung disease, Pleural effusion, Pneumonia and Pulmonary Embolus Labs and images were reviewed. Patient Has not had a recent ABG. Will treat underlying causes and adjust management of respiratory failure.     Cardiac/Vascular  Acute on chronic diastolic congestive heart failure  Patient is identified as having Diastolic (HFpEF) heart failure that is Acute on chronic. CHF is currently uncontrolled due to Pulmonary edema/pleural effusion on CXR. Latest ECHO performed and demonstrates- Results for orders placed during the hospital encounter of 08/01/22    Echo    Interpretation Summary  · The left ventricle is normal in size with concentric hypertrophy and normal systolic function.  · The estimated ejection fraction is 60-65%.  · Grade I left ventricular diastolic dysfunction.  · Mild aortic regurgitation.  · Normal right ventricular size with normal right ventricular systolic function.  · The estimated PA systolic pressure is 43 mmHg.  · Intermediate central venous pressure (8 mmHg).  · There is pulmonary hypertension.  · Small circumferential pericardial effusion.  · Mild left atrial enlargement.  . Continue Furosemide and monitor clinical status closely. Monitor on telemetry.  Monitor strict Is&Os and daily weights.  Place on fluid restriction of 1.5 L. Continue to stress to patient importance of self efficacy and  on diet for CHF. Last BNP reviewed- and noted below   Recent Labs   Lab 08/01/22  1746   *   .          Critical Care Daily Checklist:    A: Awake: RASS Goal/Actual Goal:    Actual:     B: Spontaneous Breathing Trial Performed?     C: SAT & SBT Coordinated?  yes                       D: Delirium: CAM-ICU     E: Early Mobility Performed? Yes   F: Feeding Goal:    Status:     Current Diet Order   Procedures    Diet Adult Regular (IDDSI Level 7)      AS: Analgesia/Sedation no   T: Thromboembolic Prophylaxis no   H: HOB > 300 Yes   U: Stress Ulcer Prophylaxis (if needed) no   G: Glucose Control yes   B: Bowel Function Stool Occurrence: 1   I: Indwelling Catheter (Lines & [place]) Necessity yes   D: De-escalation of Antimicrobials/Pharmacotherapies no    Plan for the day/ETD Optimized respiratory status    Code Status:  Family/Goals of Care: Full Code  Will discuss with sister        Critical secondary to Patient has a condition that poses threat to life and bodily function: Severe Respiratory Distress     Critical care was time spent personally by me on the following activities: development of treatment plan with patient or surrogate and bedside caregivers, discussions with consultants, evaluation of patient's response to treatment, examination of patient, ordering and performing treatments and interventions, ordering and review of laboratory studies, ordering and review of radiographic studies, pulse oximetry, re-evaluation of patient's condition. This critical care time did not overlap with that of any other provider or involve time for any procedures.     Denise Crenshaw MD  Critical Care Medicine  [facility] - Cardiac Medical ICU

## 2022-08-08 NOTE — SUBJECTIVE & OBJECTIVE
Past Medical History:   Diagnosis Date    Alcohol abuse     Anxiety     Benign paroxysmal positional vertigo 1/17/2020    Depression     Hyperlipidemia     Hypertension        Past Surgical History:   Procedure Laterality Date    BREAST CYST ASPIRATION      CHOLECYSTECTOMY      complicated with bile leak, sepsis    COLONOSCOPY N/A 6/3/2020    Procedure: COLONOSCOPY Golytely;  Surgeon: Tino Neil MD;  Location: [facility];  Service: Colon and Rectal;  Laterality: N/A;    EPIDURAL STEROID INJECTION N/A 5/27/2022    Procedure: INJECTION, STEROID, EPIDURAL, CAUDAL CONTRAST;  Surgeon: Rubén Rico MD;  Location: [facility] PAIN MGT;  Service: Pain Management;  Laterality: N/A;  5/6 RESCHEDULE    ESOPHAGOGASTRODUODENOSCOPY N/A 6/3/2020    Procedure: EGD (ESOPHAGOGASTRODUODENOSCOPY);  Surgeon: Tino Neil MD;  Location: [facility];  Service: Colon and Rectal;  Laterality: N/A;    INJECTION OF ANESTHETIC AGENT AROUND NERVE Bilateral 3/24/2022    Procedure: BLOCK, NERVE MEDIAL BRANCH BLOCK BL L2, L3, L4 and L5  1st, needs consent;  Surgeon: Rubén Rico MD;  Location: [facility] PAIN MGT;  Service: Pain Management;  Laterality: Bilateral;    TRANSFORAMINAL EPIDURAL INJECTION OF STEROID Bilateral 8/7/2020    Procedure: INJECTION, STEROID, EPIDURAL, TRANSFORAMINAL APPROACH, L4-L5 need consent;  Surgeon: Rubén Rico MD;  Location: [facility] PAIN MGT;  Service: Pain Management;  Laterality: Bilateral;    TRANSFORAMINAL EPIDURAL INJECTION OF STEROID Bilateral 9/4/2020    Procedure: LUMBAR TRANSFORAMINAL BILATERAL L4/5 DIRECT REFERRAL;  Surgeon: Rubén Rico MD;  Location: [facility] PAIN MGT;  Service: Pain Management;  Laterality: Bilateral;  NEEDS CONSENT    TRANSFORAMINAL EPIDURAL INJECTION OF STEROID Bilateral 3/4/2022    Procedure: Injection,steroid,epidural,transforaminal approach BILATERAL L3/4 DIRECT REFERRAL;  Surgeon: Rubén Rico MD;  Location: [facility] PAIN MGT;  Service: Pain Management;  Laterality: Bilateral;        Review of patient's allergies indicates:  No Known Allergies    Current Facility-Administered Medications on File Prior to Encounter   Medication    0.9%  NaCl infusion     Current Outpatient Medications on File Prior to Encounter   Medication Sig    aspirin/salicylamide/caffeine (BC HEADACHE POWDER ORAL) Take 1 Package by mouth once daily. Hold medication one week prior to surgery    busPIRone (BUSPAR) 5 MG Tab Take 1 tablet (5 mg total) by mouth 2 (two) times daily as needed (anxiety).    dicyclomine (BENTYL) 10 MG capsule Take 1 capsule (10 mg total) by mouth before meals as needed.    magnesium 30 mg Tab Take by mouth once.    metoprolol tartrate (LOPRESSOR) 50 MG tablet Take 1 tablet (50 mg total) by mouth once daily.    mirtazapine (REMERON) 7.5 MG Tab Take 1 tablet (7.5 mg total) by mouth every evening. For insomnia, mood    omeprazole (PRILOSEC) 20 MG capsule Take 1 capsule (20 mg total) by mouth 2 (two) times daily.    ondansetron (ZOFRAN-ODT) 4 MG TbDL Take 1 tablet (4 mg total) by mouth every 8 (eight) hours as needed.    oxyCODONE-acetaminophen (PERCOCET) 5-325 mg per tablet Take 1 tablet by mouth every 6 (six) hours as needed for Pain.    thiamine 100 MG tablet Take 100 mg by mouth once daily.    tiZANidine (ZANAFLEX) 4 MG tablet Take 1 tablet (4 mg total) by mouth every 6 (six) hours as needed (spasms).    venlafaxine (EFFEXOR-XR) 150 MG Cp24 Take 150 mg by mouth once daily. Take am of surgery    venlafaxine (EFFEXOR-XR) 75 MG 24 hr capsule Take 75 mg by mouth once daily. Take am of surgery    vitamin D (VITAMIN D3) 1000 units Tab Take 5,000 Int'l Units by mouth. Hold am of surgery     Family History       Problem Relation (Age of Onset)    Arthritis Mother    Atrial fibrillation Mother    Cancer Maternal Uncle    Cataracts Paternal Grandfather    Glaucoma Paternal Grandfather    Heart failure Mother    Macular degeneration Mother    Stroke Father          Tobacco Use    Smoking status: Former  Smoker    Smokeless tobacco: Never Used   Substance and Sexual Activity    Alcohol use: Yes     Alcohol/week: 12.0 standard drinks     Types: 12 Shots of liquor per week     Comment: three 12-14 oz cocktail drinks.     Drug use: No    Sexual activity: Never     Review of Systems   HENT:  Negative for congestion and hearing loss.    Cardiovascular:  Negative for chest pain and palpitations.   Respiratory:  Positive for shortness of breath. Negative for cough.    Psychiatric/Behavioral:  Negative for altered mental status and depression.    Objective:     Vital Signs (Most Recent):  Temp: 98.7 °F (37.1 °C) (08/08/22 1100)  Pulse: 85 (08/08/22 1547)  Resp: (!) 24 (08/08/22 1547)  BP: (!) 80/51 (08/08/22 1300)  SpO2: 97 % (08/08/22 1547)   Vital Signs (24h Range):  Temp:  [98.1 °F (36.7 °C)-99.6 °F (37.6 °C)] 98.7 °F (37.1 °C)  Pulse:  [] 85  Resp:  [18-36] 24  SpO2:  [84 %-100 %] 97 %  BP: ()/(51-91) 80/51     Weight: 90.7 kg (200 lb)  Body mass index is 34.33 kg/m².    SpO2: 97 %  O2 Device (Oxygen Therapy): High Flow nasal Cannula (AIRVO)      Intake/Output Summary (Last 24 hours) at 8/8/2022 1600  Last data filed at 8/8/2022 1300  Gross per 24 hour   Intake 1568.7 ml   Output 1002 ml   Net 566.7 ml       Lines/Drains/Airways       Peripheral Intravenous Line  Duration                  Peripheral IV - Single Lumen 08/07/22 0930 24 G Left;Posterior Hand 1 day         Peripheral IV - Single Lumen 08/07/22 2345 20 G Anterior;Right Forearm <1 day                    Physical Exam  Vitals reviewed.   Constitutional:       Appearance: She is obese. She is ill-appearing.   HENT:      Head: Normocephalic.      Nose: Nose normal.      Comments: HFNC in place  Eyes:      General: No scleral icterus.  Neck:      Comments: No neck vein distention  Cardiovascular:      Rate and Rhythm: Normal rate and regular rhythm.      Pulses: Normal pulses.      Heart sounds:     No friction rub.   Pulmonary:      Effort: Pulmonary  effort is normal.      Breath sounds: Rhonchi present.   Abdominal:      General: Abdomen is flat. There is no distension.      Palpations: Abdomen is soft.   Musculoskeletal:      Cervical back: Neck supple.      Right lower leg: Edema present.      Left lower leg: Edema present.   Neurological:      General: No focal deficit present.      Mental Status: She is alert and oriented to person, place, and time.   Psychiatric:         Mood and Affect: Mood normal.         Behavior: Behavior normal.         Thought Content: Thought content normal.       Significant Labs: All pertinent lab results from the last 24 hours have been reviewed.    Significant Imaging: EKG: reviewed

## 2022-08-08 NOTE — PLAN OF CARE
CMICU DAILY GOALS       A: Awake    RASS: Goal -    Actual - RASS (Goldman Agitation-Sedation Scale): -1-->drowsy   Restraint necessity: Clinical Justification: Removing medical devices, Climbing out of bed, Treatment Interference  B: Breathe   SBT: Not intubated   C: Coordinate A & B, analgesics/sedatives   Pain: managed    SAT: Not intubated  D: Delirium   CAM-ICU:    E: Early(intubated/ Progressive (non-intubated) Mobility   MOVE Screen: Fail   Activity: Activity Management: Arm raise - L1  FAS: Feeding/Nutrition   Diet order: Diet/Nutrition Received: regular,    T: Thrombus   DVT prophylaxis: VTE Required Core Measure: Provider determined low risk VTE  H: HOB Elevation   Head of Bed (HOB) Positioning: HOB at 30 degrees  U: Ulcer Prophylaxis   GI: no  G: Glucose control   N/A     S: Skin   Bathing/Skin Care: dressed/undressed, electrode patches/site rotation, incontinence care, linen changed, bath, partial  Device Skin Pressure Protection: adhesive use limited, absorbent pad utilized/changed, pressure points protected, skin-to-skin areas padded, skin-to-device areas padded  Pressure Reduction Devices: foam padding utilized, specialty bed utilized  Pressure Reduction Techniques: weight shift assistance provided, pressure points protected, frequent weight shift encouraged  Skin Protection: adhesive use limited, incontinence pads utilized, transparent dressing maintained, tubing/devices free from skin contact, skin-to-skin areas padded, skin-to-device areas padded  B: Bowel Function   No issues  I: Indwelling Catheters   [place] necessity:     CVC necessity: No  D: De-escalation Antibiotics   No    Family/Goals of care/Code Status   Code Status: Full Code    24H Vital Sign Range  Temp:  [97.9 °F (36.6 °C)-99.6 °F (37.6 °C)]   Pulse:  []   Resp:  [18-34]   BP: ()/(57-82)   SpO2:  [84 %-100 %]      Shift Events   Pt A&Ox2 and requiring frequent re-orientation and education. BiPap put on for a short period  per MD order, but was switched to high-flow nasal cannula when the repeat VBG showed no significant changes.  Pt was drowsy and difficulty to arouse this morning; repeat VBG obtained per NP orders. NP called to bedside to assess; no new orders received. Pt maintaing O2 sats >90% on the high-flow nasal cannula. No acute events throughout shift.    VS and assessment per flow sheet, patient progressing towards goals as tolerated, plan of care reviewed with  Vale Gutierrez , all concerns addressed, will continue to monitor.    Lyndsay Newell

## 2022-08-08 NOTE — CONSULTS
Patient assessed by critical care team for hypoxia, deemed appropriate for ICU. Please refer to Dr. Crenshaw' full H&P.     Leo Montiel MD, PGY-2  Critical Care Medicine  [facility] - Cardiac Medical ICU

## 2022-08-08 NOTE — PROGRESS NOTES
[facility] - Cardiac Medical [facility]  Critical Care Medicine  Progress Note    Patient Name: Vale Gutierrez  MRN: [id number]  Admission Date: 8/1/2022  Hospital Length of Stay: 7 days  Code Status: Full Code  Attending Provider: Nicholas Knowles*  Primary Care Provider: Estela Mcdaniel MD   Principal Problem: Acute hypoxemic respiratory failure    Subjective:     HPI:  Ms. Gutierrez is a 60 y/o female with a PMH of etoh abuse and withdrawal, aspiration pneumonia, delirium, smoking, HTN, HLD, BPPV, alcohol abuse, depression and anxiety who was admitted to the ICU from the floor for acute hypoxic respiratory failure. She was having increased oxygen needs on the floor and was requiring valium to keep her presumed alcohol withdrawal under control. The patient states that her stomach hurts in the midepigastric region and endorses nausea, vomiting x 1 day. She is unsure how long she has been coughing or feeling ill, and says she cannot remember the last few days. She says she lives with her sister Thad and that Thad helps care for her.       Hospital/ICU Course:  Patient presented to the ED on 8/1 with a CC of hypoxia and dyspnea. She was initially admitted to the floor.       Interval History/Significant Events: Ms. Gutierrez became more hypoxic this morning requiring switch from high flow O2 to bipap, at 100% FiO2 and 50 L and was still having some intermittent dyspnea and hypoxia down to 88%. Patient says she used to drink a lot and has trouble with memory.     Review of Systems   Constitutional:  Negative for chills and fever.   HENT:  Negative for congestion and sore throat.    Eyes:  Negative for pain and redness.   Respiratory:  Negative for chest tightness and shortness of breath.    Cardiovascular:  Negative for chest pain and leg swelling.   Gastrointestinal:  Positive for abdominal pain. Negative for abdominal distention, diarrhea, nausea and vomiting.   Genitourinary:  Negative for dysuria, flank  pain and hematuria.   Musculoskeletal:  Negative for back pain and neck pain.   Skin:  Negative for color change and rash.   Neurological:  Negative for dizziness, speech difficulty, light-headedness and headaches.        Positive for memory problem    Objective:     Vital Signs (Most Recent):  Temp: 98.7 °F (37.1 °C) (08/08/22 1100)  Pulse: 80 (08/08/22 1200)  Resp: (!) 25 (08/08/22 1200)  BP: 97/70 (08/08/22 1100)  SpO2: 99 % (08/08/22 1200)   Vital Signs (24h Range):  Temp:  [98.1 °F (36.7 °C)-99.6 °F (37.6 °C)] 98.7 °F (37.1 °C)  Pulse:  [] 80  Resp:  [18-36] 25  SpO2:  [84 %-100 %] 99 %  BP: ()/(57-91) 97/70   Weight: 90.7 kg (200 lb)  Body mass index is 34.33 kg/m².      Intake/Output Summary (Last 24 hours) at 8/8/2022 1310  Last data filed at 8/8/2022 1200  Gross per 24 hour   Intake 1545.4 ml   Output 1002 ml   Net 543.4 ml       Physical Exam  Constitutional:       Comments: Alert, elderly female in bed, appears anxious but responds to questions appropriately    HENT:      Head: Normocephalic and atraumatic.      Nose: Nose normal.      Mouth/Throat:      Mouth: Mucous membranes are moist.   Eyes:      Conjunctiva/sclera: Conjunctivae normal.      Pupils: Pupils are equal, round, and reactive to light.   Cardiovascular:      Rate and Rhythm: Tachycardia present.      Pulses: Normal pulses.   Pulmonary:      Comments: In mild respiratory distress, on bipap, speaking in short sentences. Diffuse crackles.   Abdominal:      General: There is no distension.      Palpations: Abdomen is soft.      Tenderness: There is no right CVA tenderness or left CVA tenderness.   Musculoskeletal:         General: No swelling.      Cervical back: Neck supple.   Skin:     General: Skin is warm.      Capillary Refill: Capillary refill takes less than 2 seconds.      Coloration: Skin is not jaundiced.   Neurological:      Comments: Oriented to person and place but seems confused about events of the past week         Vents:  Oxygen Concentration (%): 70 (08/08/22 1200)  Lines/Drains/Airways       Peripheral Intravenous Line  Duration                  Peripheral IV - Single Lumen 08/07/22 0930 24 G Left;Posterior Hand 1 day         Peripheral IV - Single Lumen 08/07/22 2345 20 G Anterior;Right Forearm <1 day                  Significant Labs:    CBC/Anemia Profile:  Recent Labs   Lab 08/07/22  0435 08/08/22  0437   WBC 9.25 9.63   HGB 10.4* 10.1*   HCT 37.5 35.4*    269   MCV 86 83   RDW 17.1* 16.8*        Chemistries:  Recent Labs   Lab 08/07/22  0435 08/08/22 0437    137   K 4.0 3.8   CL 90* 88*   CO2 35* 34*   BUN 14 14   CREATININE 0.8 0.8   CALCIUM 9.5 9.2   ALBUMIN 2.6* 2.5*   PROT 6.3 6.3   BILITOT 0.5 0.5   ALKPHOS 123 112   ALT 23 19   AST 17 21   MG 1.7 1.8       All pertinent labs within the past 24 hours have been reviewed.    Significant Imaging:  I have reviewed all pertinent imaging results/findings within the past 24 hours.      ABG  Recent Labs   Lab 08/08/22  0942   PH 7.449   PO2 47   PCO2 59.8*   HCO3 41.5*   BE 17     Assessment/Plan:     Psychiatric  Alcohol dependence with withdrawal with perceptual disturbance  -Patient reportedly had tremors and hallucinations on the floor 8/5-8/7  -Sister reports when had detox at  in prev years she was there 14 days with withdrawal + aspiration PNA. Patient had endorsed hx of withdrawal in the past. Sister gave detailed hx also   -Continue MVI, Thiamine and Folate daily.   -Reports drinking 2 days ago. Denies symptoms or past history of withdrawals.   -Monitoring CIWA q shift, with plans to initiate benzos for CIWA >8. Patient started on scheduled Valium on 8/2 at 5 mg po TID as CIWA 17. Patient on prn Ativan as per withdrawal protocol.   - Ethanol level on presentation was negative.   - AST to ALT ratio consistent with recent alcohol intake.       Pulmonary  Acute on chronic respiratory failure with hypercapnia  Patient with Hypoxic Respiratory  failure which is Acute on chronic.  she is not on home oxygen. Supplemental oxygen was provided and noted- Oxygen Concentration (%):  [90] 90.   Signs/symptoms of respiratory failure include- tachypnea. Contributing diagnoses includes - ARDS, Aspiration, CHF, COPD, Interstitial lung disease, Pleural effusion, Pneumonia and Pulmonary Embolus Labs and images were reviewed. Patient Has not had a recent ABG. Will treat underlying causes and adjust management of respiratory failure.     Cardiac/Vascular  Acute on chronic diastolic congestive heart failure  Patient is identified as having Diastolic (HFpEF) heart failure that is Acute on chronic. CHF is currently uncontrolled due to Pulmonary edema/pleural effusion on CXR. Latest ECHO performed and demonstrates- Results for orders placed during the hospital encounter of 08/01/22    Echo    Interpretation Summary  · The left ventricle is normal in size with concentric hypertrophy and normal systolic function.  · The estimated ejection fraction is 60-65%.  · Grade I left ventricular diastolic dysfunction.  · Mild aortic regurgitation.  · Normal right ventricular size with normal right ventricular systolic function.  · The estimated PA systolic pressure is 43 mmHg.  · Intermediate central venous pressure (8 mmHg).  · There is pulmonary hypertension.  · Small circumferential pericardial effusion.  · Mild left atrial enlargement.  . Continue Furosemide and monitor clinical status closely. Monitor on telemetry.  Monitor strict Is&Os and daily weights. Place on fluid restriction of 1.5 L. Last BNP reviewed- and noted below. Has improved since BNP on 8/1 of 504    Recent Labs   Lab 08/01/22  1746   BNP 90   .         Critical Care Daily Checklist:    A: Awake: RASS Goal/Actual Goal:  0  Actual:  -1   B: Spontaneous Breathing Trial Performed?  yes   C: SAT & SBT Coordinated?  yes                      D: Delirium: CAM-ICU     E: Early Mobility Performed? Yes   F: Feeding Goal:     Status:     Current Diet Order   Procedures    Diet Adult Regular (IDDSI Level 7)      AS: Analgesia/Sedation Yes     T: Thromboembolic Prophylaxis yes   H: HOB > 300 Yes   U: Stress Ulcer Prophylaxis (if needed) yes   G: Glucose Control yes   B: Bowel Function Stool Occurrence: 1   I: Indwelling Catheter (Lines & [place]) Necessity Yes    D: De-escalation of Antimicrobials/Pharmacotherapies Not today     Plan for the day/ETD SAT/SBT, ta    Code Status:  Family/Goals of Care: Full Code  Will discuss with patients sister tomorrow        Critical secondary to Patient has a condition that poses threat to life and bodily function: Severe Respiratory Distress      Critical care was time spent personally by me on the following activities: development of treatment plan with patient or surrogate and bedside caregivers, discussions with consultants, evaluation of patient's response to treatment, examination of patient, ordering and performing treatments and interventions, ordering and review of laboratory studies, ordering and review of radiographic studies, pulse oximetry, re-evaluation of patient's condition. This critical care time did not overlap with that of any other provider or involve time for any procedures.     Denise Crenshaw,    Critical Care Medicine  [facility] - Cardiac Medical ICU

## 2022-08-08 NOTE — HPI
Ms. Gutierrez is a 62 y/o female with a PMH of etoh abuse and withdrawal, aspiration pneumonia, delirium, smoking, HTN, HLD, BPPV, alcohol abuse, depression and anxiety who was admitted to the ICU from the floor for acute hypoxic respiratory failure. She was having increased oxygen needs on the floor and was requiring valium to keep her presumed alcohol withdrawal under control. The patient states that her stomach hurts in the midepigastric region and endorses nausea, vomiting x 1 day. She is unsure how long she has been coughing or feeling ill, and says she cannot remember the last few days. She says she lives with her sister Thad and that Thad helps care for her.     The cardiology team was consulted for recommendations regarding a pericardial effusion seen on imaging    8/2  The left ventricle is normal in size with concentric hypertrophy and normal systolic function.  The estimated ejection fraction is 60-65%.  Grade I left ventricular diastolic dysfunction.  Mild aortic regurgitation.  Normal right ventricular size with normal right ventricular systolic function.  The estimated PA systolic pressure is 43 mmHg.  Intermediate central venous pressure (8 mmHg).  There is pulmonary hypertension.  Small circumferential pericardial effusion.  Mild left atrial enlargement.  8/8  The left ventricle is moderately enlarged with mild eccentric hypertrophy and normal systolic function.  The estimated ejection fraction is 70%.  Grade II left ventricular diastolic dysfunction.  Mild left atrial enlargement.  Normal right ventricular size with normal right ventricular systolic function.  Mild right atrial enlargement.  Moderate systolic anterior motion of the anterior mitral leaflet is present. Moderate systolic anterior motion of the posterior mitral leaflet is present. The peak velocity across the LVOT at rest was in low 4s pek gradient 70mmHg and increased to low 5s and 115 mmHg with Valsalva.  Mild tricuspid  regurgitation.  The ascending aorta is mildly dilated.  Trivial circumferential pericardial effusion.  There is a small left pleural effusion.  Intermediate central venous pressure (8 mmHg).  The estimated PA systolic pressure is 53 mmHg.  There is moderate pulmonary hypertension.  CT 8/7   1. Stable consolidation of the right lower lobe and lingula, similar to prior, which could reflect atelectasis/volume loss with pneumonia not excluded.  Continued follow-up advised.  2. Cardiomegaly with stable to mildly increased moderate volume pericardial effusion.  3. Dilated main pulmonary arteries suggesting pulmonary artery hypertension.  4. Mild fusiform aneurysmal dilatation of the ascending aorta.  5. Additional findings detailed above.    BNP 90

## 2022-08-08 NOTE — SUBJECTIVE & OBJECTIVE
Past Medical History:   Diagnosis Date    Alcohol abuse     Anxiety     Benign paroxysmal positional vertigo 1/17/2020    Depression     Hyperlipidemia     Hypertension        Past Surgical History:   Procedure Laterality Date    BREAST CYST ASPIRATION      CHOLECYSTECTOMY      complicated with bile leak, sepsis    COLONOSCOPY N/A 6/3/2020    Procedure: COLONOSCOPY Golytely;  Surgeon: Tino Neil MD;  Location: [facility];  Service: Colon and Rectal;  Laterality: N/A;    EPIDURAL STEROID INJECTION N/A 5/27/2022    Procedure: INJECTION, STEROID, EPIDURAL, CAUDAL CONTRAST;  Surgeon: Rubén Rico MD;  Location: [facility]IN [facility];  Service: Pain Management;  Laterality: N/A;  5/6 RESCHEDULE    ESOPHAGOGASTRODUODENOSCOPY N/A 6/3/2020    Procedure: EGD (ESOPHAGOGASTRODUODENOSCOPY);  Surgeon: Tino Neil MD;  Location: [facility];  Service: Colon and Rectal;  Laterality: N/A;    INJECTION OF ANESTHETIC AGENT AROUND NERVE Bilateral 3/24/2022    Procedure: BLOCK, NERVE MEDIAL BRANCH BLOCK BL L2, L3, L4 and L5  1st, needs consent;  Surgeon: Rubén Rico MD;  Location: [facility];  Service: Pain Management;  Laterality: Bilateral;    TRANSFORAMINAL EPIDURAL INJECTION OF STEROID Bilateral 8/7/2020    Procedure: INJECTION, STEROID, EPIDURAL, TRANSFORAMINAL APPROACH, L4-L5 need consent;  Surgeon: Rubén Rico MD;  Location: [facility];  Service: Pain Management;  Laterality: Bilateral;    TRANSFORAMINAL EPIDURAL INJECTION OF STEROID Bilateral 9/4/2020    Procedure: LUMBAR TRANSFORAMINAL BILATERAL L4/5 DIRECT REFERRAL;  Surgeon: Rubén Rico MD;  Location: [facility];  Service: Pain Management;  Laterality: Bilateral;  NEEDS CONSENT    TRANSFORAMINAL EPIDURAL INJECTION OF STEROID Bilateral 3/4/2022    Procedure: Injection,steroid,epidural,transforaminal approach BILATERAL L3/4 DIRECT REFERRAL;  Surgeon: Rubén Rico MD;  Location: [facility];  Service: Pain Management;  Laterality: Bilateral;        Review of patient's allergies indicates:  No Known Allergies    Family History       Problem Relation (Age of Onset)    Arthritis Mother    Atrial fibrillation Mother    Cancer Maternal Uncle    Cataracts Paternal Grandfather    Glaucoma Paternal Grandfather    Heart failure Mother    Macular degeneration Mother    Stroke Father          Tobacco Use    Smoking status: Former Smoker    Smokeless tobacco: Never Used   Substance and Sexual Activity    Alcohol use: Yes     Alcohol/week: 12.0 standard drinks     Types: 12 Shots of liquor per week     Comment: three 12-14 oz cocktail drinks.     Drug use: No    Sexual activity: Never      Review of Systems   Constitutional:  Negative for chills, fatigue and fever.   HENT:  Negative for congestion and sore throat.    Eyes:  Negative for pain and redness.   Respiratory:  Negative for chest tightness and shortness of breath.    Cardiovascular:  Negative for chest pain and leg swelling.   Gastrointestinal:  Positive for abdominal pain, nausea and vomiting. Negative for abdominal distention and diarrhea.   Genitourinary:  Negative for dysuria, flank pain and hematuria.   Musculoskeletal:  Negative for back pain and neck pain.   Skin:  Negative for color change and rash.   Neurological:  Negative for dizziness, speech difficulty, light-headedness and headaches.   Objective:     Vital Signs (Most Recent):  Temp: 98.1 °F (36.7 °C) (08/07/22 1746)  Pulse: 93 (08/07/22 1800)  Resp: (!) 26 (08/07/22 1800)  BP: 97/62 (08/07/22 1800)  SpO2: (!) 94 % (08/07/22 1800)   Vital Signs (24h Range):  Temp:  [97.4 °F (36.3 °C)-98.4 °F (36.9 °C)] 98.1 °F (36.7 °C)  Pulse:  [] 93  Resp:  [18-37] 26  SpO2:  [84 %-98 %] 94 %  BP: ()/(62-75) 97/62   Weight: 90.7 kg (200 lb)  Body mass index is 34.33 kg/m².      Intake/Output Summary (Last 24 hours) at 8/7/2022 1920  Last data filed at 8/7/2022 0657  Gross per 24 hour   Intake 300 ml   Output 1050 ml   Net -750 ml       Physical  Exam  Constitutional:       General: She is not in acute distress.  HENT:      Head: Normocephalic and atraumatic.      Right Ear: External ear normal.      Left Ear: External ear normal.      Nose: Nose normal.      Mouth/Throat:      Mouth: Mucous membranes are moist.   Eyes:      General: No scleral icterus.     Pupils: Pupils are equal, round, and reactive to light.   Cardiovascular:      Rate and Rhythm: Normal rate.      Pulses: Normal pulses.   Pulmonary:      Comments: Increased respiratory effort, on high flow  Abdominal:      Palpations: Abdomen is soft.      Tenderness: There is no abdominal tenderness.   Musculoskeletal:         General: No swelling.      Cervical back: Neck supple.   Skin:     General: Skin is warm and dry.      Capillary Refill: Capillary refill takes less than 2 seconds.   Neurological:      Mental Status: She is alert.      Comments: Seems a little confused, A&Ox2 but confused about the past few days        Vents:  Oxygen Concentration (%): 90 (08/07/22 1746)  Lines/Drains/Airways       Peripheral Intravenous Line  Duration                  Peripheral IV - Single Lumen 08/07/22 0930 24 G Left;Posterior Hand <1 day                  Significant Labs:    CBC/Anemia Profile:  Recent Labs   Lab 08/06/22  0422 08/07/22  0435   WBC 8.32 9.25   HGB 10.7* 10.4*   HCT 38.7 37.5    292   MCV 85 86   RDW 17.1* 17.1*        Chemistries:  Recent Labs   Lab 08/06/22  0422 08/07/22  0435    139   K 3.3* 4.0   CL 91* 90*   CO2 39* 35*   BUN 13 14   CREATININE 0.7 0.8   CALCIUM 10.0 9.5   ALBUMIN 2.6* 2.6*   PROT 6.7 6.3   BILITOT 0.4 0.5   ALKPHOS 121 123   ALT 24 23   AST 15 17   MG 1.7 1.7       All pertinent labs within the past 24 hours have been reviewed.    Significant Imaging: I have reviewed all pertinent imaging results/findings within the past 24 hours.

## 2022-08-08 NOTE — PROGRESS NOTES
Pharmacokinetic Assessment Follow Up: IV Vancomycin    Vancomycin serum concentration assessment(s):    The trough level was drawn correctly and can be used to guide therapy at this time. The measurement is within the desired definitive target range of 15 to 20 mcg/mL.    Vancomycin Regimen Plan:    Continue regimen to Vancomycin 1250 mg IV every 24 hours with next serum trough concentration measured at 0030 on 8/12    Drug levels (last 3 results):  Recent Labs   Lab Result Units 08/05/22 2117 08/07/22  2244   Vancomycin-Trough ug/mL 19.8 18.1       Pharmacy will continue to follow and monitor vancomycin.    Please contact pharmacy at extension [phone] for questions regarding this assessment.    Thank you for the consult,   Agnes Finnegan       Patient brief summary:  Vale Gutierrez is a 61 y.o. female initiated on antimicrobial therapy with IV Vancomycin for treatment of bacteremia    Drug Allergies:   Review of patient's allergies indicates:  No Known Allergies    Actual Body Weight:   90.7 kg    Renal Function:   Estimated Creatinine Clearance: 78.5 mL/min (based on SCr of 0.8 mg/dL).     Dialysis Method (if applicable):  N/A    CBC (last 72 hours):  Recent Labs   Lab Result Units 08/05/22 0413 08/06/22  0422 08/07/22  0435   WBC K/uL 7.37 8.32 9.25   Hemoglobin g/dL 10.5* 10.7* 10.4*   Hematocrit % 37.7 38.7 37.5   Platelets K/uL 298 286 292   Gran % % 69.3 71.4 69.3   Lymph % % 15.2* 11.4* 13.8*   Mono % % 11.8 14.1 13.1   Eosinophil % % 2.7 2.0 2.5   Basophil % % 0.7 0.7 1.0   Differential Method  Automated Automated Automated       Metabolic Panel (last 72 hours):  Recent Labs   Lab Result Units 08/05/22 0413 08/06/22  0422 08/07/22  0435   Sodium mmol/L 142 141 139   Potassium mmol/L 3.6 3.3* 4.0   Chloride mmol/L 91* 91* 90*   CO2 mmol/L 39* 39* 35*   Glucose mg/dL 81 97 84   BUN mg/dL 15 13 14   Creatinine mg/dL 0.8 0.7 0.8   Albumin g/dL 2.6* 2.6* 2.6*   Total Bilirubin mg/dL 0.4 0.4 0.5    Alkaline Phosphatase U/L 134 121 123   AST U/L 18 15 17   ALT U/L 34 24 23   Magnesium mg/dL 2.0 1.7 1.7       Vancomycin Administrations:  vancomycin given in the last 96 hours                   vancomycin 1.5 g in dextrose 5 % 250 mL IVPB (ready to mix) (mg) 1,500 mg New Bag 08/08/22 0027     1,500 mg New Bag 08/06/22 2237     1,500 mg New Bag 08/05/22 2259    vancomycin 1.75 g in 5 % dextrose 500 mL IVPB (mg) 1,750 mg New Bag 08/04/22 2112                Microbiologic Results:  Microbiology Results (last 7 days)     Procedure Component Value Units Date/Time    Respiratory Infection Panel (PCR), Nasopharyngeal [[id number]] Collected: 08/07/22 1601    Order Status: Completed Specimen: Nasopharyngeal Swab Updated: 08/07/22 1828     Respiratory Infection Panel Source NP Swab     Adenovirus Not Detected     Coronavirus 229E, Common Cold Virus Not Detected     Coronavirus HKU1, Common Cold Virus Not Detected     Coronavirus NL63, Common Cold Virus Not Detected     Coronavirus OC43, Common Cold Virus Not Detected     Comment: The Coronavirus strains detected in this test cause the common cold.  These strains are not the COVID-19 (novel Coronavirus)strain   associated with the respiratory disease outbreak.          SARS-CoV2 (COVID-19) Qualitative PCR Not Detected     Human Metapneumovirus Not Detected     Human Rhinovirus/Enterovirus Not Detected     Influenza A (subtypes H1, H1-2009,H3) Not Detected     Influenza B Not Detected     Parainfluenza Virus 1 Not Detected     Parainfluenza Virus 2 Not Detected     Parainfluenza Virus 3 Not Detected     Parainfluenza Virus 4 Not Detected     Respiratory Syncytial Virus Not Detected     Bordetella Parapertussis (GV6180) Not Detected     Bordetella pertussis (ptxP) Not Detected     Chlamydia pneumoniae Not Detected     Mycoplasma pneumoniae Not Detected    Narrative:      For all other respiratory sources, order [id number] -  Respiratory Viral Panel by PCR    Blood culture #1  **CANNOT BE ORDERED STAT** [[id number]] Collected: 08/01/22 1831    Order Status: Completed Specimen: Blood from Peripheral, Hand, Left Updated: 08/06/22 2012     Blood Culture, Routine No growth after 5 days.    Blood culture #2 **CANNOT BE ORDERED STAT** [[id number]] Collected: 08/01/22 1822    Order Status: Completed Specimen: Blood from Peripheral, Hand, Right Updated: 08/06/22 2012     Blood Culture, Routine No growth after 5 days.    Culture, Respiratory with Gram Stain [[id number]]     Order Status: No result Specimen: Respiratory     Culture, Respiratory with Gram Stain [[id number]]     Order Status: Canceled Specimen: Respiratory

## 2022-08-08 NOTE — CONSULTS
[facility] - Card[facility]  Cardiology  Consult Note    Patient Name: Vale Gutierrez  MRN: [id number]  Admission Date: 8/1/2022  Hospital Length of Stay: 7 days  Code Status: Full Code   Attending Provider: Nicholas Knowles*   Consulting Provider: Ja Staples MD  Primary Care Physician: Estela Mcdaniel MD  Principal Problem:Acute hypoxemic respiratory failure    Patient information was obtained from patient and ER records.     Consults  Subjective:     Chief Complaint:  Pericardial Effusion     HPI:   Ms. Gutierrez is a 60 y/o female with a PMH of etoh abuse and withdrawal, aspiration pneumonia, delirium, smoking, HTN, HLD, BPPV, alcohol abuse, depression and anxiety who was admitted to the ICU from the floor for acute hypoxic respiratory failure. She was having increased oxygen needs on the floor and was requiring valium to keep her presumed alcohol withdrawal under control. The patient states that her stomach hurts in the midepigastric region and endorses nausea, vomiting x 1 day. She is unsure how long she has been coughing or feeling ill, and says she cannot remember the last few days. She says she lives with her sister Thad and that Thad helps care for her.     The cardiology team was consulted for recommendations regarding a pericardial effusion seen on imaging    8/2  · The left ventricle is normal in size with concentric hypertrophy and normal systolic function.  · The estimated ejection fraction is 60-65%.  · Grade I left ventricular diastolic dysfunction.  · Mild aortic regurgitation.  · Normal right ventricular size with normal right ventricular systolic function.  · The estimated PA systolic pressure is 43 mmHg.  · Intermediate central venous pressure (8 mmHg).  · There is pulmonary hypertension.  · Small circumferential pericardial effusion.  · Mild left atrial enlargement.  8/8  · The left ventricle is moderately enlarged with mild eccentric hypertrophy and normal systolic  function.  · The estimated ejection fraction is 70%.  · Grade II left ventricular diastolic dysfunction.  · Mild left atrial enlargement.  · Normal right ventricular size with normal right ventricular systolic function.  · Mild right atrial enlargement.  · Moderate systolic anterior motion of the anterior mitral leaflet is present. Moderate systolic anterior motion of the posterior mitral leaflet is present. The peak velocity across the LVOT at rest was in low 4s pek gradient 70mmHg and increased to low 5s and 115 mmHg with Valsalva.  · Mild tricuspid regurgitation.  · The ascending aorta is mildly dilated.  · Trivial circumferential pericardial effusion.  · There is a small left pleural effusion.  · Intermediate central venous pressure (8 mmHg).  · The estimated PA systolic pressure is 53 mmHg.  · There is moderate pulmonary hypertension.  CT 8/7   1. Stable consolidation of the right lower lobe and lingula, similar to prior, which could reflect atelectasis/volume loss with pneumonia not excluded.  Continued follow-up advised.  2. Cardiomegaly with stable to mildly increased moderate volume pericardial effusion.  3. Dilated main pulmonary arteries suggesting pulmonary artery hypertension.  4. Mild fusiform aneurysmal dilatation of the ascending aorta.  5. Additional findings detailed above.    BNP 90      Past Medical History:   Diagnosis Date    Alcohol abuse     Anxiety     Benign paroxysmal positional vertigo 1/17/2020    Depression     Hyperlipidemia     Hypertension        Past Surgical History:   Procedure Laterality Date    BREAST CYST ASPIRATION      CHOLECYSTECTOMY      complicated with bile leak, sepsis    COLONOSCOPY N/A 6/3/2020    Procedure: COLONOSCOPY Gol[place];  Surgeon: Tino Neil MD;  Location: [facility];  Service: Colon and Rectal;  Laterality: N/A;    EPIDURAL STEROID INJECTION N/A 5/27/2022    Procedure: INJECTION, STEROID, EPIDURAL, CAUDAL CONTRAST;  Surgeon: Rubén Rico  MD;  Location: [facility] PAIN MGT;  Service: Pain Management;  Laterality: N/A;  5/6 RESCHEDULE    ESOPHAGOGASTRODUODENOSCOPY N/A 6/3/2020    Procedure: EGD (ESOPHAGOGASTRODUODENOSCOPY);  Surgeon: Tino Neil MD;  Location: [facility] ENDO;  Service: Colon and Rectal;  Laterality: N/A;    INJECTION OF ANESTHETIC AGENT AROUND NERVE Bilateral 3/24/2022    Procedure: BLOCK, NERVE MEDIAL BRANCH BLOCK BL L2, L3, L4 and L5  1st, needs consent;  Surgeon: Rubén Rico MD;  Location: [facility] PAIN [facility]GT;  Service: Pain Management;  Laterality: Bilateral;    TRANSFORAMINAL EPIDURAL INJECTION OF STEROID Bilateral 8/7/2020    Procedure: INJECTION, STEROID, EPIDURAL, TRANSFORAMINAL APPROACH, L4-L5 need consent;  Surgeon: Rubén Rico MD;  Location: [facility]IN [facility]GT;  Service: Pain Management;  Laterality: Bilateral;    TRANSFORAMINAL EPIDURAL INJECTION OF STEROID Bilateral 9/4/2020    Procedure: LUMBAR TRANSFORAMINAL BILATERAL L4/5 DIRECT REFERRAL;  Surgeon: Rubén Rico MD;  Location: [facility];  Service: Pain Management;  Laterality: Bilateral;  NEEDS CONSENT    TRANSFORAMINAL EPIDURAL INJECTION OF STEROID Bilateral 3/4/2022    Procedure: Injection,steroid,epidural,transforaminal approach BILATERAL L3/4 DIRECT REFERRAL;  Surgeon: Rubén Rico MD;  Location: [facility] PAIN [facility]GT;  Service: Pain Management;  Laterality: Bilateral;       Review of patient's allergies indicates:  No Known Allergies    Current Facility-Administered Medications on File Prior to Encounter   Medication    0.9%  NaCl infusion     Current Outpatient Medications on File Prior to Encounter   Medication Sig    aspirin/salicylamide/caffeine (BC HEADACHE POWDER ORAL) Take 1 Package by mouth once daily. Hold medication one week prior to surgery    busPIRone (BUSPAR) 5 MG Tab Take 1 tablet (5 mg total) by mouth 2 (two) times daily as needed (anxiety).    dicyclomine (BENTYL) 10 MG capsule Take 1 capsule (10 mg total) by mouth before meals as needed.     magnesium 30 mg Tab Take by mouth once.    metoprolol tartrate (LOPRESSOR) 50 MG tablet Take 1 tablet (50 mg total) by mouth once daily.    mirtazapine (REMERON) 7.5 MG Tab Take 1 tablet (7.5 mg total) by mouth every evening. For insomnia, mood    omeprazole (PRILOSEC) 20 MG capsule Take 1 capsule (20 mg total) by mouth 2 (two) times daily.    ondansetron (ZOFRAN-ODT) 4 MG TbDL Take 1 tablet (4 mg total) by mouth every 8 (eight) hours as needed.    oxyCODONE-acetaminophen (PERCOCET) 5-325 mg per tablet Take 1 tablet by mouth every 6 (six) hours as needed for Pain.    thiamine 100 MG tablet Take 100 mg by mouth once daily.    tiZANidine (ZANAFLEX) 4 MG tablet Take 1 tablet (4 mg total) by mouth every 6 (six) hours as needed (spasms).    venlafaxine (EFFEXOR-XR) 150 MG Cp24 Take 150 mg by mouth once daily. Take am of surgery    venlafaxine (EFFEXOR-XR) 75 MG 24 hr capsule Take 75 mg by mouth once daily. Take am of surgery    vitamin D (VITAMIN D3) 1000 units Tab Take 5,000 Int'l Units by mouth. Hold am of surgery     Family History       Problem Relation (Age of Onset)    Arthritis Mother    Atrial fibrillation Mother    Cancer Maternal Uncle    Cataracts Paternal Grandfather    Glaucoma Paternal Grandfather    Heart failure Mother    Macular degeneration Mother    Stroke Father          Tobacco Use    Smoking status: Former Smoker    Smokeless tobacco: Never Used   Substance and Sexual Activity    Alcohol use: Yes     Alcohol/week: 12.0 standard drinks     Types: 12 Shots of liquor per week     Comment: three 12-14 oz cocktail drinks.     Drug use: No    Sexual activity: Never     Review of Systems   HENT:  Negative for congestion and hearing loss.    Cardiovascular:  Negative for chest pain and palpitations.   Respiratory:  Positive for shortness of breath. Negative for cough.    Psychiatric/Behavioral:  Negative for altered mental status and depression.    Objective:     Vital Signs (Most  Recent):  Temp: 98.7 °F (37.1 °C) (08/08/22 1100)  Pulse: 85 (08/08/22 1547)  Resp: (!) 24 (08/08/22 1547)  BP: (!) 80/51 (08/08/22 1300)  SpO2: 97 % (08/08/22 1547)   Vital Signs (24h Range):  Temp:  [98.1 °F (36.7 °C)-99.6 °F (37.6 °C)] 98.7 °F (37.1 °C)  Pulse:  [] 85  Resp:  [18-36] 24  SpO2:  [84 %-100 %] 97 %  BP: ()/(51-91) 80/51     Weight: 90.7 kg (200 lb)  Body mass index is 34.33 kg/m².    SpO2: 97 %  O2 Device (Oxygen Therapy): High Flow nasal Cannula (AIRVO)      Intake/Output Summary (Last 24 hours) at 8/8/2022 1600  Last data filed at 8/8/2022 1300  Gross per 24 hour   Intake 1568.7 ml   Output 1002 ml   Net 566.7 ml       Lines/Drains/Airways       Peripheral Intravenous Line  Duration                  Peripheral IV - Single Lumen 08/07/22 0930 24 G Left;Posterior Hand 1 day         Peripheral IV - Single Lumen 08/07/22 2345 20 G Anterior;Right Forearm <1 day                    Physical Exam  Vitals reviewed.   Constitutional:       Appearance: She is obese. She is ill-appearing.   HENT:      Head: Normocephalic.      Nose: Nose normal.      Comments: HFNC in place  Eyes:      General: No scleral icterus.  Neck:      Comments: No neck vein distention  Cardiovascular:      Rate and Rhythm: Normal rate and regular rhythm.      Pulses: Normal pulses.      Heart sounds:     No friction rub.   Pulmonary:      Effort: Pulmonary effort is normal.      Breath sounds: Rhonchi present.   Abdominal:      General: Abdomen is flat. There is no distension.      Palpations: Abdomen is soft.   Musculoskeletal:      Cervical back: Neck supple.      Right lower leg: Edema present.      Left lower leg: Edema present.   Neurological:      General: No focal deficit present.      Mental Status: She is alert and oriented to person, place, and time.   Psychiatric:         Mood and Affect: Mood normal.         Behavior: Behavior normal.         Thought Content: Thought content normal.       Significant Labs: All  "pertinent lab results from the last 24 hours have been reviewed.    Significant Imaging: EKG: reviewed    Assessment and Plan:     Pericardial effusion  Echocardiogram showing small pericardial effusion on 8/2, repeat on 8/8 after CT chest again confirmed the effusion to be a "trivial circumferential pericardial effusion"    There is no evidence of tamponade physiology at this time.  No distention of neck veins, heart sounds are not distant, no hypotension    The patient has a valsalva gradient on Echo but after extensive review no significant hypertrophy of the septum is noted and thus this gradient is believed to be physiologic at this time      -In the absence of clinically significant tamponade physiology no interventions recommended at this time  - If future pericardiocentesis for diagnostic purposes desired please consult the interventional cardiology team         VTE Risk Mitigation (From admission, onward)         Ordered     IP VTE HIGH RISK PATIENT  Once         08/02/22 0055     Place sequential compression device  Until discontinued         08/02/22 0055                Thank you for your consult. I will sign off. Please contact us if you have any additional questions.    Ja Staples MD  Cardiology   [facility] - Cardiac Medical ICU    "

## 2022-08-09 LAB
ALBUMIN SERPL BCP-MCNC: 2.3 G/DL (ref 3.5–5.2)
ALP SERPL-CCNC: 100 U/L (ref 55–135)
ALT SERPL W/O P-5'-P-CCNC: 15 U/L (ref 10–44)
ANA PATTERN 1: NORMAL
ANA SER QL IF: POSITIVE
ANA TITR SER IF: NORMAL {TITER}
ANION GAP SERPL CALC-SCNC: 11 MMOL/L (ref 8–16)
AST SERPL-CCNC: 16 U/L (ref 10–40)
BASOPHILS # BLD AUTO: 0.08 K/UL (ref 0–0.2)
BASOPHILS NFR BLD: 0.7 % (ref 0–1.9)
BILIRUB SERPL-MCNC: 0.3 MG/DL (ref 0.1–1)
BUN SERPL-MCNC: 22 MG/DL (ref 8–23)
CALCIUM SERPL-MCNC: 9.4 MG/DL (ref 8.7–10.5)
CHLORIDE SERPL-SCNC: 92 MMOL/L (ref 95–110)
CO2 SERPL-SCNC: 34 MMOL/L (ref 23–29)
CREAT SERPL-MCNC: 0.9 MG/DL (ref 0.5–1.4)
DIFFERENTIAL METHOD: ABNORMAL
EOSINOPHIL # BLD AUTO: 0.3 K/UL (ref 0–0.5)
EOSINOPHIL NFR BLD: 2.4 % (ref 0–8)
ERYTHROCYTE [DISTWIDTH] IN BLOOD BY AUTOMATED COUNT: 17 % (ref 11.5–14.5)
EST. GFR  (NO RACE VARIABLE): >60 ML/MIN/1.73 M^2
GLUCOSE SERPL-MCNC: 97 MG/DL (ref 70–110)
HCT VFR BLD AUTO: 35.2 % (ref 37–48.5)
HGB BLD-MCNC: 9.8 G/DL (ref 12–16)
IMM GRANULOCYTES # BLD AUTO: 0.04 K/UL (ref 0–0.04)
IMM GRANULOCYTES NFR BLD AUTO: 0.4 % (ref 0–0.5)
LYMPHOCYTES # BLD AUTO: 1.5 K/UL (ref 1–4.8)
LYMPHOCYTES NFR BLD: 13.2 % (ref 18–48)
MAGNESIUM SERPL-MCNC: 1.7 MG/DL (ref 1.6–2.6)
MCH RBC QN AUTO: 23.5 PG (ref 27–31)
MCHC RBC AUTO-ENTMCNC: 27.8 G/DL (ref 32–36)
MCV RBC AUTO: 84 FL (ref 82–98)
MONOCYTES # BLD AUTO: 0.7 K/UL (ref 0.3–1)
MONOCYTES NFR BLD: 6.4 % (ref 4–15)
NEUTROPHILS # BLD AUTO: 8.7 K/UL (ref 1.8–7.7)
NEUTROPHILS NFR BLD: 76.9 % (ref 38–73)
NRBC BLD-RTO: 0 /100 WBC
PHOSPHATE SERPL-MCNC: 2.9 MG/DL (ref 2.7–4.5)
PLATELET # BLD AUTO: 288 K/UL (ref 150–450)
PMV BLD AUTO: 10.6 FL (ref 9.2–12.9)
POTASSIUM SERPL-SCNC: 3.5 MMOL/L (ref 3.5–5.1)
PROT SERPL-MCNC: 6.4 G/DL (ref 6–8.4)
RBC # BLD AUTO: 4.17 M/UL (ref 4–5.4)
SODIUM SERPL-SCNC: 137 MMOL/L (ref 136–145)
WBC # BLD AUTO: 11.25 K/UL (ref 3.9–12.7)

## 2022-08-09 PROCEDURE — 99900035 HC TECH TIME PER 15 MIN (STAT)

## 2022-08-09 PROCEDURE — 63600175 PHARM REV CODE 636 W HCPCS

## 2022-08-09 PROCEDURE — 85025 COMPLETE CBC W/AUTO DIFF WBC: CPT | Performed by: HOSPITALIST

## 2022-08-09 PROCEDURE — 27100171 HC OXYGEN HIGH FLOW UP TO 24 HOURS

## 2022-08-09 PROCEDURE — 94660 CPAP INITIATION&MGMT: CPT

## 2022-08-09 PROCEDURE — 25000003 PHARM REV CODE 250

## 2022-08-09 PROCEDURE — 99233 PR SUBSEQUENT HOSPITAL CARE,LEVL III: ICD-10-PCS | Mod: GC,,, | Performed by: INTERNAL MEDICINE

## 2022-08-09 PROCEDURE — 63700000 PHARM REV CODE 250 ALT 637 W/O HCPCS: Performed by: HOSPITALIST

## 2022-08-09 PROCEDURE — 94761 N-INVAS EAR/PLS OXIMETRY MLT: CPT

## 2022-08-09 PROCEDURE — 94799 UNLISTED PULMONARY SVC/PX: CPT

## 2022-08-09 PROCEDURE — 25000003 PHARM REV CODE 250: Performed by: HOSPITALIST

## 2022-08-09 PROCEDURE — S4991 NICOTINE PATCH NONLEGEND: HCPCS | Performed by: HOSPITALIST

## 2022-08-09 PROCEDURE — 25000003 PHARM REV CODE 250: Performed by: INTERNAL MEDICINE

## 2022-08-09 PROCEDURE — 99233 SBSQ HOSP IP/OBS HIGH 50: CPT | Mod: GC,,, | Performed by: INTERNAL MEDICINE

## 2022-08-09 PROCEDURE — 63600175 PHARM REV CODE 636 W HCPCS: Performed by: INTERNAL MEDICINE

## 2022-08-09 PROCEDURE — 25000242 PHARM REV CODE 250 ALT 637 W/ HCPCS: Performed by: HOSPITALIST

## 2022-08-09 PROCEDURE — 80053 COMPREHEN METABOLIC PANEL: CPT | Performed by: HOSPITALIST

## 2022-08-09 PROCEDURE — 94640 AIRWAY INHALATION TREATMENT: CPT

## 2022-08-09 PROCEDURE — 83735 ASSAY OF MAGNESIUM: CPT | Performed by: HOSPITALIST

## 2022-08-09 PROCEDURE — 84100 ASSAY OF PHOSPHORUS: CPT

## 2022-08-09 PROCEDURE — 63600175 PHARM REV CODE 636 W HCPCS: Performed by: HOSPITALIST

## 2022-08-09 PROCEDURE — 27000221 HC OXYGEN, UP TO 24 HOURS

## 2022-08-09 PROCEDURE — 20000000 HC ICU ROOM

## 2022-08-09 PROCEDURE — 25000003 PHARM REV CODE 250: Performed by: STUDENT IN AN ORGANIZED HEALTH CARE EDUCATION/TRAINING PROGRAM

## 2022-08-09 RX ORDER — ENOXAPARIN SODIUM 100 MG/ML
1 INJECTION SUBCUTANEOUS
Status: DISCONTINUED | OUTPATIENT
Start: 2022-08-09 | End: 2022-08-22

## 2022-08-09 RX ORDER — DEXMEDETOMIDINE HYDROCHLORIDE 4 UG/ML
0-1.4 INJECTION, SOLUTION INTRAVENOUS CONTINUOUS
Status: DISCONTINUED | OUTPATIENT
Start: 2022-08-09 | End: 2022-08-12

## 2022-08-09 RX ORDER — METOPROLOL TARTRATE 1 MG/ML
2.5 INJECTION, SOLUTION INTRAVENOUS ONCE
Status: COMPLETED | OUTPATIENT
Start: 2022-08-09 | End: 2022-08-09

## 2022-08-09 RX ORDER — METOPROLOL TARTRATE 100 MG/1
100 TABLET ORAL 2 TIMES DAILY
Status: DISCONTINUED | OUTPATIENT
Start: 2022-08-09 | End: 2022-08-19

## 2022-08-09 RX ADMIN — BUTALBITAL, ACETAMINOPHEN, AND CAFFEINE 1 TABLET: 50; 325; 40 TABLET ORAL at 07:08

## 2022-08-09 RX ADMIN — BUTALBITAL, ACETAMINOPHEN, AND CAFFEINE 1 TABLET: 50; 325; 40 TABLET ORAL at 06:08

## 2022-08-09 RX ADMIN — GUAIFENESIN 1200 MG: 600 TABLET, EXTENDED RELEASE ORAL at 08:08

## 2022-08-09 RX ADMIN — METOPROLOL TARTRATE 100 MG: 100 TABLET, FILM COATED ORAL at 08:08

## 2022-08-09 RX ADMIN — SENNOSIDES AND DOCUSATE SODIUM 1 TABLET: 50; 8.6 TABLET ORAL at 08:08

## 2022-08-09 RX ADMIN — PIPERACILLIN SODIUM AND TAZOBACTAM SODIUM 4.5 G: 4; .5 INJECTION, POWDER, LYOPHILIZED, FOR SOLUTION INTRAVENOUS at 05:08

## 2022-08-09 RX ADMIN — OXYCODONE HYDROCHLORIDE AND ACETAMINOPHEN 1 TABLET: 5; 325 TABLET ORAL at 10:08

## 2022-08-09 RX ADMIN — IPRATROPIUM BROMIDE AND ALBUTEROL SULFATE 3 ML: 2.5; .5 SOLUTION RESPIRATORY (INHALATION) at 12:08

## 2022-08-09 RX ADMIN — IPRATROPIUM BROMIDE AND ALBUTEROL SULFATE 3 ML: 2.5; .5 SOLUTION RESPIRATORY (INHALATION) at 08:08

## 2022-08-09 RX ADMIN — VANCOMYCIN HYDROCHLORIDE 1250 MG: 1.25 INJECTION, POWDER, LYOPHILIZED, FOR SOLUTION INTRAVENOUS at 08:08

## 2022-08-09 RX ADMIN — ONDANSETRON 4 MG: 4 TABLET, ORALLY DISINTEGRATING ORAL at 01:08

## 2022-08-09 RX ADMIN — AZITHROMYCIN MONOHYDRATE 250 MG: 250 TABLET ORAL at 08:08

## 2022-08-09 RX ADMIN — THIAMINE HCL TAB 100 MG 100 MG: 100 TAB at 08:08

## 2022-08-09 RX ADMIN — IPRATROPIUM BROMIDE AND ALBUTEROL SULFATE 3 ML: 2.5; .5 SOLUTION RESPIRATORY (INHALATION) at 04:08

## 2022-08-09 RX ADMIN — ENOXAPARIN SODIUM 90 MG: 100 INJECTION SUBCUTANEOUS at 11:08

## 2022-08-09 RX ADMIN — PIPERACILLIN SODIUM AND TAZOBACTAM SODIUM 4.5 G: 4; .5 INJECTION, POWDER, LYOPHILIZED, FOR SOLUTION INTRAVENOUS at 12:08

## 2022-08-09 RX ADMIN — BUSPIRONE HYDROCHLORIDE 5 MG: 5 TABLET ORAL at 07:08

## 2022-08-09 RX ADMIN — OXYCODONE HYDROCHLORIDE AND ACETAMINOPHEN 1 TABLET: 5; 325 TABLET ORAL at 03:08

## 2022-08-09 RX ADMIN — DIAZEPAM 5 MG: 5 TABLET ORAL at 12:08

## 2022-08-09 RX ADMIN — BUSPIRONE HYDROCHLORIDE 5 MG: 5 TABLET ORAL at 05:08

## 2022-08-09 RX ADMIN — IPRATROPIUM BROMIDE AND ALBUTEROL SULFATE 3 ML: 2.5; .5 SOLUTION RESPIRATORY (INHALATION) at 07:08

## 2022-08-09 RX ADMIN — LOPERAMIDE HYDROCHLORIDE 2 MG: 2 CAPSULE ORAL at 08:08

## 2022-08-09 RX ADMIN — VENLAFAXINE HYDROCHLORIDE 225 MG: 75 CAPSULE, EXTENDED RELEASE ORAL at 08:08

## 2022-08-09 RX ADMIN — MIRTAZAPINE 7.5 MG: 7.5 TABLET, FILM COATED ORAL at 08:08

## 2022-08-09 RX ADMIN — DEXMEDETOMIDINE HYDROCHLORIDE 0.2 MCG/KG/HR: 4 INJECTION INTRAVENOUS at 09:08

## 2022-08-09 RX ADMIN — METOROPROLOL TARTRATE 2.5 MG: 5 INJECTION, SOLUTION INTRAVENOUS at 05:08

## 2022-08-09 RX ADMIN — DIAZEPAM 5 MG: 5 TABLET ORAL at 06:08

## 2022-08-09 RX ADMIN — NICOTINE 1 PATCH: 14 PATCH, EXTENDED RELEASE TRANSDERMAL at 08:08

## 2022-08-09 RX ADMIN — PIPERACILLIN SODIUM AND TAZOBACTAM SODIUM 4.5 G: 4; .5 INJECTION, POWDER, LYOPHILIZED, FOR SOLUTION INTRAVENOUS at 08:08

## 2022-08-09 RX ADMIN — ENOXAPARIN SODIUM 90 MG: 100 INJECTION SUBCUTANEOUS at 10:08

## 2022-08-09 NOTE — ASSESSMENT & PLAN NOTE
Patient has a history of chronic back pain, for which she had surgery in October 2020.   She has been followed by a pain management doctor and takes percocet daily for pain.

## 2022-08-09 NOTE — PROGRESS NOTES
Patient pulled off BiPAP. BiPAP replaced. Patient reports anxiety related to claustrophobia. Patient educated on BiPAP. Patient verbalized understanding, but is confused and has reported hallucinations and may need reinforcement. See MAR for PRN medication for anxiety.

## 2022-08-09 NOTE — ASSESSMENT & PLAN NOTE
Patient with Hypercapnic and Hypoxic Respiratory failure which is Acute on chronic.  she is not on home oxygen. Supplemental oxygen was provided and noted- Oxygen Concentration (%):  [] 100.   Signs/symptoms of respiratory failure include- increased work of breathing and respiratory distress. Contributing diagnoses includes - ARDS, CHF, COPD, Interstitial lung disease, Obesity Hypoventilation and Pleural effusion Labs and images were reviewed. Patient Has recent ABG, which has been reviewed. Will treat underlying causes and adjust management of respiratory failure as follows- antibiotics, repeat CBC, CXR.

## 2022-08-09 NOTE — PROGRESS NOTES
[facility] Card[facility]  Critical Care Medicine  Progress Note    Patient Name: Vale Gutierrez  MRN: [id number]  Admission Date: 8/1/2022  Hospital Length of Stay: 8 days  Code Status: Full Code  Attending Provider: Nicholas Knowles*  Primary Care Provider: Estela Mcdaniel MD   Principal Problem: Acute hypoxemic respiratory failure    Subjective:     HPI:  Ms. Gutierrez is a 62 y/o female with a PMH of etoh abuse and withdrawal, aspiration pneumonia, delirium, smoking, HTN, HLD, BPPV, alcohol abuse, depression and anxiety who was admitted to the ICU from the floor for acute hypoxic respiratory failure. She was having increased oxygen needs on the floor and was requiring valium to keep her presumed alcohol withdrawal under control. The patient states that her stomach hurts in the midepigastric region and endorses nausea, vomiting x 1 day. She is unsure how long she has been coughing or feeling ill, and says she cannot remember the last few days. She says she lives with her sister Angela who helps care for her.       Hospital/ICU Course:  Patient presented to the ED on 8/1 with a CC of hypoxia and dyspnea. She was initially admitted to the floor. She experienced hallucinations and tremor, consistent with alcohol withdrawal / delirium, and was put on CIWA protocol with valium PRN (shortage of ativan). She was treated with antibiotics (vancomycin, zosyn, and azithro) scheduled for 10 days and duonebs prn. On 8/7 she developed worsening hypoxic respiratory failure, was put on 50L 100% O2 bipap, and admitted to the ICU due to increased oxygen requirements.     8/9: Work up thus far has revealed small pericardial effusion, cardiomegaly, normal LVEF, pleural effusions and opacities bilaterally, and atrial fibrillation. She lives with her sister Angela, who takes care of her typically. Patient's sister Angela says the patient takes percocet for chronic back pain and drinks alcohol frequently and needs help  with almost all ADLs at baseline. Angela says she does not want the patient to come back to live with her because she exhausted caring for her all the time and feels she cannot keep doing it.       Interval History/Significant Events: Ms. Gutierrez denies any abdominal pain, nausea, or vomiting. She is having 3-4 episodes of watery diarrhea per day, but says that is chronic for her. She says her breathing feels better today but she is still on high flow. She denies chest pain, but says she can feel palpitations when her heart rate goes up.     Review of Systems   Constitutional:  Negative for chills and fever.   HENT:  Negative for congestion and sore throat.    Eyes:  Negative for pain and redness.   Respiratory:  Positive for shortness of breath. Negative for chest tightness.    Cardiovascular:  Positive for palpitations. Negative for chest pain and leg swelling.   Gastrointestinal:  Positive for diarrhea. Negative for abdominal distention, abdominal pain, nausea and vomiting.   Genitourinary:  Negative for dysuria, flank pain and hematuria.   Musculoskeletal:  Negative for back pain and neck pain.   Skin:  Negative for color change and rash.   Neurological:  Negative for dizziness, speech difficulty, light-headedness and headaches.   Objective:     Vital Signs (Most Recent):  Temp: 98.1 °F (36.7 °C) (08/09/22 0800)  Pulse: (!) 163 (08/09/22 0820)  Resp: (!) 24 (08/09/22 0820)  BP: 116/78 (08/09/22 0800)  SpO2: 98 % (08/09/22 0820)   Vital Signs (24h Range):  Temp:  [98.1 °F (36.7 °C)-100.4 °F (38 °C)] 98.1 °F (36.7 °C)  Pulse:  [] 163  Resp:  [18-44] 24  SpO2:  [78 %-99 %] 98 %  BP: ()/() 116/78   Weight: 90.7 kg (200 lb)  Body mass index is 34.33 kg/m².      Intake/Output Summary (Last 24 hours) at 8/9/2022 0842  Last data filed at 8/9/2022 0800  Gross per 24 hour   Intake 834.2 ml   Output 1151 ml   Net -316.8 ml       Physical Exam  Constitutional:       Comments: Alert, elderly female in bed,  appears comfortable, eating breakfast and breathing comfortably with high flow nasal cannula. Answers questions appropriately    HENT:      Head: Normocephalic and atraumatic.      Nose: Nose normal.      Mouth/Throat:      Mouth: Mucous membranes are moist.   Eyes:      Conjunctiva/sclera: Conjunctivae normal.      Pupils: Pupils are equal, round, and reactive to light.   Cardiovascular:      Rate and Rhythm: Tachycardia present.      Pulses: Normal pulses.   Pulmonary:      Comments: Breathing comfortably on 35L high flow nasal cannula   Abdominal:      General: There is no distension.      Palpations: Abdomen is soft.      Tenderness: There is no right CVA tenderness or left CVA tenderness.   Musculoskeletal:         General: No swelling.      Cervical back: Neck supple.   Skin:     General: Skin is warm.      Capillary Refill: Capillary refill takes less than 2 seconds.      Coloration: Skin is not jaundiced.   Neurological:      Mental Status: She is oriented to person, place, and time.       Vents:  Oxygen Concentration (%): 84 (08/09/22 0820)  Lines/Drains/Airways       Drain  Duration             Female External Urinary Catheter 08/09/22 0540 <1 day              Peripheral Intravenous Line  Duration                  Peripheral IV - Single Lumen 08/07/22 2345 20 G Anterior;Right Forearm 1 day         Peripheral IV - Single Lumen 08/08/22 1430 22 G Anterior;Right Hand <1 day                  Significant Labs:    CBC/Anemia Profile:  Recent Labs   Lab 08/08/22  0437 08/09/22  0416   WBC 9.63 11.25   HGB 10.1* 9.8*   HCT 35.4* 35.2*    288   MCV 83 84   RDW 16.8* 17.0*        Chemistries:  Recent Labs   Lab 08/08/22  0437 08/09/22  0416    137   K 3.8 3.5   CL 88* 92*   CO2 34* 34*   BUN 14 22   CREATININE 0.8 0.9   CALCIUM 9.2 9.4   ALBUMIN 2.5* 2.3*   PROT 6.3 6.4   BILITOT 0.5 0.3   ALKPHOS 112 100   ALT 19 15   AST 21 16   MG 1.8 1.7   PHOS  --  2.9       All pertinent labs within the past 24  hours have been reviewed.    Significant Imaging:  I have reviewed all pertinent imaging results/findings within the past 24 hours.      ABG  Recent Labs   Lab 08/08/22  0942   PH 7.449   PO2 47   PCO2 59.8*   HCO3 41.5*   BE 17     Assessment/Plan:     Psychiatric  Alcohol dependence with withdrawal with perceptual disturbance  -Patient reportedly had tremors and hallucinations on the floor 8/5-8/7  -Sister reports when had detox at  in prev years she was there 14 days with withdrawal + aspiration PNA. Patient had endorsed hx of withdrawal in the past. Sister gave detailed hx also   -On CIWA protocol, valium PRN for CIWA >8      Pulmonary  Aspiration pneumonia  Continue vanc, zosyn, and azithro until day 10     Acute on chronic respiratory failure with hypercapnia  Patient with Hypoxic Respiratory failure which is Acute on chronic.  she is not on home oxygen. Supplemental oxygen was provided and noted- Oxygen Concentration (%):  [90] 90.   Signs/symptoms of respiratory failure include- tachypnea. Contributing diagnoses includes - ARDS, Aspiration, CHF, COPD, Interstitial lung disease, Pleural effusion, Pneumonia and Pulmonary Embolus Labs and images were reviewed. Patient Has recent ABG, which has been reviewed. Will treat underlying causes and adjust management of respiratory failure.     Cardiac/Vascular  Acute on chronic diastolic congestive heart failure  Patient is identified as having Diastolic (HFpEF) heart failure that is Acute on chronic. CHF is currently uncontrolled due to Pulmonary edema/pleural effusion on CXR. Latest ECHO performed and demonstrates- Results for orders placed during the hospital encounter of 08/01/22    Echo    Interpretation Summary  · The left ventricle is normal in size with concentric hypertrophy and normal systolic function.  · The estimated ejection fraction is 60-65%.  · Grade I left ventricular diastolic dysfunction.  · Mild aortic regurgitation.  · Normal right ventricular  size with normal right ventricular systolic function.  · The estimated PA systolic pressure is 43 mmHg.  · Intermediate central venous pressure (8 mmHg).  · There is pulmonary hypertension.  · Small circumferential pericardial effusion.  · Mild left atrial enlargement.  . Continue Furosemide and monitor clinical status closely. Monitor on telemetry.  Monitor strict Is&Os and daily weights.  Place on fluid restriction of 1.5 L. Continue to stress to patient importance of self efficacy and  on diet for CHF. Last BNP reviewed- and noted below. Has improved since BNP on 8/1 of 504  Recent Labs   Lab 08/08/22  1158   BNP 90   .             Critical Care Daily Checklist:    A: Awake: RASS Goal/Actual Goal:    Actual: Goldman Agitation Sedation Scale (RASS): Drowsy   B: Spontaneous Breathing Trial Performed?     C: SAT & SBT Coordinated?  Patient is awake and breathing on high flow                      D: Delirium: CAM-ICU Overall CAM-ICU: Negative   E: Early Mobility Performed? Yes, PT consulted   F: Feeding Goal:  regular  Status:   regular  Current Diet Order   Procedures    Diet Adult Regular (IDDSI Level 7)      AS: Analgesia/Sedation None   T: Thromboembolic Prophylaxis yes   H: HOB > 300 Yes   U: Stress Ulcer Prophylaxis (if needed) no   G: Glucose Control yes   B: Bowel Function Stool Occurrence: 1   I: Indwelling Catheter (Lines & [place]) Necessity yes   D: De-escalation of Antimicrobials/Pharmacotherapies considered    Plan for the day/ETD Continue antibiotics, PT will evaluated and treat patient. Control delirium, discuss with pts sister.     Code Status:  Family/Goals of Care: Full Code  Will discuss with pts sister Angela        Critical secondary to Patient has a condition that poses threat to life and bodily function: Severe Respiratory Distress and alcohol withdrawal  Patient has an abrupt change in neurologic status: alcohol withdrawal, delirium       Critical care was time spent personally by me on  the following activities: development of treatment plan with patient or surrogate and bedside caregivers, discussions with consultants, evaluation of patient's response to treatment, examination of patient, ordering and performing treatments and interventions, ordering and review of laboratory studies, ordering and review of radiographic studies, pulse oximetry, re-evaluation of patient's condition. This critical care time did not overlap with that of any other provider or involve time for any procedures.     Denise Crenshaw, DO   Critical Care Medicine  [facility] - Cardiac Medical ICU

## 2022-08-09 NOTE — NURSING
Dr. Mahan notified patient HR jumping to 170-200 with frequents PACs then A fib but not sustaining and back to ST with PACs. Patient doing this with increasing frequency. Provider to come round on patient.

## 2022-08-09 NOTE — ASSESSMENT & PLAN NOTE
Patient with Hypoxic Respiratory failure which is Acute on chronic.  she is not on home oxygen. Supplemental oxygen was provided and noted- Oxygen Concentration (%):  [] 100.   Signs/symptoms of respiratory failure include- tachypnea. Contributing diagnoses includes - ARDS, Aspiration, CHF, COPD, Interstitial lung disease, Pleural effusion, Pneumonia and Pulmonary Embolus Labs and images were reviewed. Patient Has recent ABG, which has been reviewed. Will treat underlying causes and adjust management of respiratory failure.

## 2022-08-09 NOTE — SUBJECTIVE & OBJECTIVE
Interval History/Significant Events: Ms. Gutierrez denies any abdominal pain, nausea, or vomiting. She is having 3-4 episodes of watery diarrhea per day, but says that is chronic for her. She says her breathing feels better today but she is still on high flow. She denies chest pain, but says she can feel palpitations when her heart rate goes up.     Review of Systems   Constitutional:  Negative for chills and fever.   HENT:  Negative for congestion and sore throat.    Eyes:  Negative for pain and redness.   Respiratory:  Positive for shortness of breath. Negative for chest tightness.    Cardiovascular:  Positive for palpitations. Negative for chest pain and leg swelling.   Gastrointestinal:  Positive for diarrhea. Negative for abdominal distention, abdominal pain, nausea and vomiting.   Genitourinary:  Negative for dysuria, flank pain and hematuria.   Musculoskeletal:  Negative for back pain and neck pain.   Skin:  Negative for color change and rash.   Neurological:  Negative for dizziness, speech difficulty, light-headedness and headaches.   Objective:     Vital Signs (Most Recent):  Temp: 98.1 °F (36.7 °C) (08/09/22 0800)  Pulse: (!) 163 (08/09/22 0820)  Resp: (!) 24 (08/09/22 0820)  BP: 116/78 (08/09/22 0800)  SpO2: 98 % (08/09/22 0820)   Vital Signs (24h Range):  Temp:  [98.1 °F (36.7 °C)-100.4 °F (38 °C)] 98.1 °F (36.7 °C)  Pulse:  [] 163  Resp:  [18-44] 24  SpO2:  [78 %-99 %] 98 %  BP: ()/() 116/78   Weight: 90.7 kg (200 lb)  Body mass index is 34.33 kg/m².      Intake/Output Summary (Last 24 hours) at 8/9/2022 0842  Last data filed at 8/9/2022 0800  Gross per 24 hour   Intake 834.2 ml   Output 1151 ml   Net -316.8 ml       Physical Exam  Constitutional:       Comments: Alert, elderly female in bed, appears comfortable, eating breakfast and breathing comfortably with high flow nasal cannula. Answers questions appropriately    HENT:      Head: Normocephalic and atraumatic.      Nose: Nose  normal.      Mouth/Throat:      Mouth: Mucous membranes are moist.   Eyes:      Conjunctiva/sclera: Conjunctivae normal.      Pupils: Pupils are equal, round, and reactive to light.   Cardiovascular:      Rate and Rhythm: Tachycardia present.      Pulses: Normal pulses.   Pulmonary:      Comments: Breathing comfortably on 35L high flow nasal cannula   Abdominal:      General: There is no distension.      Palpations: Abdomen is soft.      Tenderness: There is no right CVA tenderness or left CVA tenderness.   Musculoskeletal:         General: No swelling.      Cervical back: Neck supple.   Skin:     General: Skin is warm.      Capillary Refill: Capillary refill takes less than 2 seconds.      Coloration: Skin is not jaundiced.   Neurological:      Mental Status: She is oriented to person, place, and time.       Vents:  Oxygen Concentration (%): 84 (08/09/22 0820)  Lines/Drains/Airways       Drain  Duration             Female External Urinary Catheter 08/09/22 0540 <1 day              Peripheral Intravenous Line  Duration                  Peripheral IV - Single Lumen 08/07/22 2345 20 G Anterior;Right Forearm 1 day         Peripheral IV - Single Lumen 08/08/22 1430 22 G Anterior;Right Hand <1 day                  Significant Labs:    CBC/Anemia Profile:  Recent Labs   Lab 08/08/22  0437 08/09/22  0416   WBC 9.63 11.25   HGB 10.1* 9.8*   HCT 35.4* 35.2*    288   MCV 83 84   RDW 16.8* 17.0*        Chemistries:  Recent Labs   Lab 08/08/22  0437 08/09/22  0416    137   K 3.8 3.5   CL 88* 92*   CO2 34* 34*   BUN 14 22   CREATININE 0.8 0.9   CALCIUM 9.2 9.4   ALBUMIN 2.5* 2.3*   PROT 6.3 6.4   BILITOT 0.5 0.3   ALKPHOS 112 100   ALT 19 15   AST 21 16   MG 1.8 1.7   PHOS  --  2.9       All pertinent labs within the past 24 hours have been reviewed.    Significant Imaging:  I have reviewed all pertinent imaging results/findings within the past 24 hours.

## 2022-08-09 NOTE — ASSESSMENT & PLAN NOTE
-Patient reportedly had tremors and hallucinations on the floor 8/5-8/7  -Sister reports when had detox at  in prev years she was there 14 days with withdrawal + aspiration PNA. Patient had endorsed hx of withdrawal in the past. Sister gave detailed hx also   -On CIWA protocol, valium PRN for CIWA >8

## 2022-08-09 NOTE — NURSING
Dr. Mahan rounding on patient aware of HR increases that are not sustaining but increasing in frequency. Update provided. New orders received.

## 2022-08-09 NOTE — PLAN OF CARE
[facility] DAILY GOALS       A: Awake    RASS: Goal -  0  Actual - RASS (Goldman Agitation-Sedation Scale): 0-->alert and calm   Restraint necessity: Clinical Justification: Removing medical devices, Climbing out of bed, Treatment Interference  B: Breathe   SBT: Not intubated   C: Coordinate A & B, analgesics/sedatives   Pain: managed    SAT: Not intubated  D: Delirium   CAM-ICU: Overall CAM-ICU: Negative  E: Early(intubated/ Progressive (non-intubated) Mobility   MOVE Screen: Pass   Activity: Activity Management: Rolling - L1  FAS: Feeding/Nutrition   Diet order: Diet/Nutrition Received: regular,    T: Thrombus   DVT prophylaxis: VTE Required Core Measure: Provider determined low risk VTE  H: HOB Elevation   Head of Bed (HOB) Positioning: HOB at 30-45 degrees  U: Ulcer Prophylaxis   GI: yes  G: Glucose control   managed    S: Skin   Bathing/Skin Care: bath, partial, linen changed, dressed/undressed, electrode patches/site rotation  Device Skin Pressure Protection: adhesive use limited, pressure points protected, skin-to-device areas padded, skin-to-skin areas padded  Pressure Reduction Devices: foam padding utilized, positioning supports utilized, specialty bed utilized  Pressure Reduction Techniques: frequent weight shift encouraged, heels elevated off bed, positioned off wounds, weight shift assistance provided, pressure points protected  Skin Protection: adhesive use limited, incontinence pads utilized, silicone foam dressing in place, skin sealant/moisture barrier applied, skin-to-device areas padded, skin-to-skin areas padded, tubing/devices free from skin contact  B: Bowel Function   diarrhea   I: Indwelling Catheters   [place] necessity:     CVC necessity: No  D: De-escalation Antibiotics   Yes    Family/Goals of care/Code Status   Code Status: Full Code    24H Vital Sign Range  Temp:  [98.5 °F (36.9 °C)-100.4 °F (38 °C)]   Pulse:  []   Resp:  [18-44]   BP: ()/()   SpO2:  [78 %-99 %]      Shift  Events   Patient HR increase to 150-170s without sustaining. Increase in frequency this am. New orders for one time dose of IV metoprolol. Patient confused and has ongoing hallucinations. Patient refusing to wear BiPAP at night and pulling it off despite anxiety medications. Patient reports this is from claustorphobia and reports unable to tolerate. Patient has loose BMs, improvement with PRN medication.    VS and assessment per flow sheet, patient progressing towards goals as tolerated, plan of care reviewed with no family at bedside. , all concerns addressed, will continue to monitor.    Symone Mayorga

## 2022-08-09 NOTE — PROGRESS NOTES
"Patient pulling off BiPAP despite medications. Patient states,"I'm not going to wear that. Tell the doctor." Patient placed back on comfort flow. RT is aware.   "

## 2022-08-10 LAB
ALBUMIN SERPL BCP-MCNC: 2.2 G/DL (ref 3.5–5.2)
ALLENS TEST: ABNORMAL
ALP SERPL-CCNC: 93 U/L (ref 55–135)
ALT SERPL W/O P-5'-P-CCNC: 13 U/L (ref 10–44)
ANION GAP SERPL CALC-SCNC: 10 MMOL/L (ref 8–16)
AST SERPL-CCNC: 15 U/L (ref 10–40)
BASOPHILS # BLD AUTO: 0.06 K/UL (ref 0–0.2)
BASOPHILS NFR BLD: 0.6 % (ref 0–1.9)
BILIRUB SERPL-MCNC: 0.3 MG/DL (ref 0.1–1)
BUN SERPL-MCNC: 19 MG/DL (ref 8–23)
CALCIUM SERPL-MCNC: 9.4 MG/DL (ref 8.7–10.5)
CHLORIDE SERPL-SCNC: 94 MMOL/L (ref 95–110)
CO2 SERPL-SCNC: 35 MMOL/L (ref 23–29)
CREAT SERPL-MCNC: 0.9 MG/DL (ref 0.5–1.4)
DELSYS: ABNORMAL
DIFFERENTIAL METHOD: ABNORMAL
EOSINOPHIL # BLD AUTO: 0.2 K/UL (ref 0–0.5)
EOSINOPHIL NFR BLD: 2.1 % (ref 0–8)
ERYTHROCYTE [DISTWIDTH] IN BLOOD BY AUTOMATED COUNT: 16.8 % (ref 11.5–14.5)
ERYTHROCYTE [SEDIMENTATION RATE] IN BLOOD BY WESTERGREN METHOD: 24 MM/H
EST. GFR  (NO RACE VARIABLE): >60 ML/MIN/1.73 M^2
FIO2: 90
GLUCOSE SERPL-MCNC: 89 MG/DL (ref 70–110)
HCO3 UR-SCNC: 35.6 MMOL/L (ref 24–28)
HCT VFR BLD AUTO: 32.8 % (ref 37–48.5)
HGB BLD-MCNC: 9 G/DL (ref 12–16)
IMM GRANULOCYTES # BLD AUTO: 0.03 K/UL (ref 0–0.04)
IMM GRANULOCYTES NFR BLD AUTO: 0.3 % (ref 0–0.5)
L PNEUMO AG UR QL IA: NEGATIVE
LYMPHOCYTES # BLD AUTO: 2 K/UL (ref 1–4.8)
LYMPHOCYTES NFR BLD: 19.6 % (ref 18–48)
MAGNESIUM SERPL-MCNC: 1.7 MG/DL (ref 1.6–2.6)
MCH RBC QN AUTO: 23.5 PG (ref 27–31)
MCHC RBC AUTO-ENTMCNC: 27.4 G/DL (ref 32–36)
MCV RBC AUTO: 86 FL (ref 82–98)
MODE: ABNORMAL
MONOCYTES # BLD AUTO: 0.5 K/UL (ref 0.3–1)
MONOCYTES NFR BLD: 5.2 % (ref 4–15)
NEUTROPHILS # BLD AUTO: 7.2 K/UL (ref 1.8–7.7)
NEUTROPHILS NFR BLD: 72.2 % (ref 38–73)
NRBC BLD-RTO: 0 /100 WBC
PCO2 BLDA: 62 MMHG (ref 35–45)
PH SMN: 7.37 [PH] (ref 7.35–7.45)
PHOSPHATE SERPL-MCNC: 2.8 MG/DL (ref 2.7–4.5)
PLATELET # BLD AUTO: 274 K/UL (ref 150–450)
PMV BLD AUTO: 10.5 FL (ref 9.2–12.9)
PO2 BLDA: 43 MMHG (ref 40–60)
POC BE: 10 MMOL/L
POC SATURATED O2: 75 % (ref 95–100)
POC TCO2: 38 MMOL/L (ref 24–29)
POTASSIUM SERPL-SCNC: 3.5 MMOL/L (ref 3.5–5.1)
PROT SERPL-MCNC: 6.2 G/DL (ref 6–8.4)
RBC # BLD AUTO: 3.83 M/UL (ref 4–5.4)
SAMPLE: ABNORMAL
SITE: ABNORMAL
SODIUM SERPL-SCNC: 139 MMOL/L (ref 136–145)
SP02: 99
TROPONIN I SERPL DL<=0.01 NG/ML-MCNC: <0.006 NG/ML (ref 0–0.03)
WBC # BLD AUTO: 9.93 K/UL (ref 3.9–12.7)

## 2022-08-10 PROCEDURE — 63600175 PHARM REV CODE 636 W HCPCS

## 2022-08-10 PROCEDURE — 94761 N-INVAS EAR/PLS OXIMETRY MLT: CPT

## 2022-08-10 PROCEDURE — 94799 UNLISTED PULMONARY SVC/PX: CPT

## 2022-08-10 PROCEDURE — 25000003 PHARM REV CODE 250

## 2022-08-10 PROCEDURE — 84484 ASSAY OF TROPONIN QUANT: CPT

## 2022-08-10 PROCEDURE — 93005 ELECTROCARDIOGRAM TRACING: CPT

## 2022-08-10 PROCEDURE — 82803 BLOOD GASES ANY COMBINATION: CPT

## 2022-08-10 PROCEDURE — 93010 ELECTROCARDIOGRAM REPORT: CPT | Mod: ,,, | Performed by: INTERNAL MEDICINE

## 2022-08-10 PROCEDURE — 25000003 PHARM REV CODE 250: Performed by: HOSPITALIST

## 2022-08-10 PROCEDURE — 94640 AIRWAY INHALATION TREATMENT: CPT

## 2022-08-10 PROCEDURE — 25000003 PHARM REV CODE 250: Performed by: STUDENT IN AN ORGANIZED HEALTH CARE EDUCATION/TRAINING PROGRAM

## 2022-08-10 PROCEDURE — 99291 CRITICAL CARE FIRST HOUR: CPT | Mod: GC,,, | Performed by: INTERNAL MEDICINE

## 2022-08-10 PROCEDURE — 99291 CRITICAL CARE FIRST HOUR: CPT | Mod: ,,, | Performed by: INTERNAL MEDICINE

## 2022-08-10 PROCEDURE — 84100 ASSAY OF PHOSPHORUS: CPT

## 2022-08-10 PROCEDURE — 83735 ASSAY OF MAGNESIUM: CPT | Performed by: HOSPITALIST

## 2022-08-10 PROCEDURE — 85025 COMPLETE CBC W/AUTO DIFF WBC: CPT | Performed by: HOSPITALIST

## 2022-08-10 PROCEDURE — 80053 COMPREHEN METABOLIC PANEL: CPT | Performed by: HOSPITALIST

## 2022-08-10 PROCEDURE — 20000000 HC ICU ROOM

## 2022-08-10 PROCEDURE — 99291 PR CRITICAL CARE, E/M 30-74 MINUTES: ICD-10-PCS | Mod: ,,, | Performed by: INTERNAL MEDICINE

## 2022-08-10 PROCEDURE — 27000221 HC OXYGEN, UP TO 24 HOURS

## 2022-08-10 PROCEDURE — 94660 CPAP INITIATION&MGMT: CPT

## 2022-08-10 PROCEDURE — 99900035 HC TECH TIME PER 15 MIN (STAT)

## 2022-08-10 PROCEDURE — 25000242 PHARM REV CODE 250 ALT 637 W/ HCPCS: Performed by: HOSPITALIST

## 2022-08-10 PROCEDURE — 63700000 PHARM REV CODE 250 ALT 637 W/O HCPCS: Performed by: HOSPITALIST

## 2022-08-10 PROCEDURE — 87040 BLOOD CULTURE FOR BACTERIA: CPT

## 2022-08-10 PROCEDURE — 93010 EKG 12-LEAD: ICD-10-PCS | Mod: ,,, | Performed by: INTERNAL MEDICINE

## 2022-08-10 PROCEDURE — 99291 PR CRITICAL CARE, E/M 30-74 MINUTES: ICD-10-PCS | Mod: GC,,, | Performed by: INTERNAL MEDICINE

## 2022-08-10 PROCEDURE — 27100171 HC OXYGEN HIGH FLOW UP TO 24 HOURS

## 2022-08-10 RX ORDER — NOREPINEPHRINE BITARTRATE/D5W 4MG/250ML
0-3 PLASTIC BAG, INJECTION (ML) INTRAVENOUS CONTINUOUS
Status: DISCONTINUED | OUTPATIENT
Start: 2022-08-10 | End: 2022-08-12

## 2022-08-10 RX ORDER — OLANZAPINE 2.5 MG/1
5 TABLET ORAL DAILY
Status: DISCONTINUED | OUTPATIENT
Start: 2022-08-10 | End: 2022-08-10

## 2022-08-10 RX ORDER — MEROPENEM AND SODIUM CHLORIDE 1 G/50ML
1 INJECTION, SOLUTION INTRAVENOUS
Status: DISCONTINUED | OUTPATIENT
Start: 2022-08-10 | End: 2022-08-12

## 2022-08-10 RX ORDER — FLUCONAZOLE 2 MG/ML
400 INJECTION, SOLUTION INTRAVENOUS
Status: DISCONTINUED | OUTPATIENT
Start: 2022-08-10 | End: 2022-08-12

## 2022-08-10 RX ADMIN — GUAIFENESIN 1200 MG: 600 TABLET, EXTENDED RELEASE ORAL at 08:08

## 2022-08-10 RX ADMIN — IPRATROPIUM BROMIDE AND ALBUTEROL SULFATE 3 ML: 2.5; .5 SOLUTION RESPIRATORY (INHALATION) at 08:08

## 2022-08-10 RX ADMIN — IPRATROPIUM BROMIDE AND ALBUTEROL SULFATE 3 ML: 2.5; .5 SOLUTION RESPIRATORY (INHALATION) at 12:08

## 2022-08-10 RX ADMIN — OLANZAPINE 5 MG: 2.5 TABLET, FILM COATED ORAL at 08:08

## 2022-08-10 RX ADMIN — OXYCODONE HYDROCHLORIDE AND ACETAMINOPHEN 1 TABLET: 5; 325 TABLET ORAL at 10:08

## 2022-08-10 RX ADMIN — AMIODARONE HYDROCHLORIDE 150 MG: 1.5 INJECTION, SOLUTION INTRAVENOUS at 11:08

## 2022-08-10 RX ADMIN — ACETAMINOPHEN 650 MG: 325 TABLET ORAL at 10:08

## 2022-08-10 RX ADMIN — BUSPIRONE HYDROCHLORIDE 5 MG: 5 TABLET ORAL at 08:08

## 2022-08-10 RX ADMIN — ENOXAPARIN SODIUM 90 MG: 100 INJECTION SUBCUTANEOUS at 11:08

## 2022-08-10 RX ADMIN — AMIODARONE HYDROCHLORIDE 1 MG/MIN: 1.8 INJECTION, SOLUTION INTRAVENOUS at 12:08

## 2022-08-10 RX ADMIN — AMIODARONE HYDROCHLORIDE 0.5 MG/MIN: 1.8 INJECTION, SOLUTION INTRAVENOUS at 05:08

## 2022-08-10 RX ADMIN — FLUCONAZOLE IN SODIUM CHLORIDE 400 MG: 2 INJECTION, SOLUTION INTRAVENOUS at 02:08

## 2022-08-10 RX ADMIN — DIAZEPAM 5 MG: 5 TABLET ORAL at 08:08

## 2022-08-10 RX ADMIN — OXYCODONE HYDROCHLORIDE AND ACETAMINOPHEN 1 TABLET: 5; 325 TABLET ORAL at 07:08

## 2022-08-10 RX ADMIN — MEROPENEM AND SODIUM CHLORIDE 1 G: 1 INJECTION, SOLUTION INTRAVENOUS at 09:08

## 2022-08-10 RX ADMIN — MIRTAZAPINE 7.5 MG: 7.5 TABLET, FILM COATED ORAL at 08:08

## 2022-08-10 RX ADMIN — AZITHROMYCIN MONOHYDRATE 250 MG: 250 TABLET ORAL at 08:08

## 2022-08-10 RX ADMIN — VANCOMYCIN HYDROCHLORIDE 1250 MG: 1.25 INJECTION, POWDER, LYOPHILIZED, FOR SOLUTION INTRAVENOUS at 08:08

## 2022-08-10 RX ADMIN — SODIUM CHLORIDE, SODIUM LACTATE, POTASSIUM CHLORIDE, AND CALCIUM CHLORIDE 500 ML: .6; .31; .03; .02 INJECTION, SOLUTION INTRAVENOUS at 01:08

## 2022-08-10 RX ADMIN — PIPERACILLIN SODIUM AND TAZOBACTAM SODIUM 4.5 G: 4; .5 INJECTION, POWDER, LYOPHILIZED, FOR SOLUTION INTRAVENOUS at 01:08

## 2022-08-10 RX ADMIN — PIPERACILLIN SODIUM AND TAZOBACTAM SODIUM 4.5 G: 4; .5 INJECTION, POWDER, LYOPHILIZED, FOR SOLUTION INTRAVENOUS at 08:08

## 2022-08-10 RX ADMIN — LOPERAMIDE HYDROCHLORIDE 2 MG: 2 CAPSULE ORAL at 08:08

## 2022-08-10 RX ADMIN — VENLAFAXINE HYDROCHLORIDE 225 MG: 75 CAPSULE, EXTENDED RELEASE ORAL at 08:08

## 2022-08-10 RX ADMIN — IPRATROPIUM BROMIDE AND ALBUTEROL SULFATE 3 ML: 2.5; .5 SOLUTION RESPIRATORY (INHALATION) at 04:08

## 2022-08-10 RX ADMIN — THIAMINE HCL TAB 100 MG 100 MG: 100 TAB at 08:08

## 2022-08-10 RX ADMIN — MEROPENEM AND SODIUM CHLORIDE 1 G: 1 INJECTION, SOLUTION INTRAVENOUS at 12:08

## 2022-08-10 RX ADMIN — SODIUM CHLORIDE, SODIUM LACTATE, POTASSIUM CHLORIDE, AND CALCIUM CHLORIDE 500 ML: .6; .31; .03; .02 INJECTION, SOLUTION INTRAVENOUS at 12:08

## 2022-08-10 RX ADMIN — METOPROLOL TARTRATE 100 MG: 100 TABLET, FILM COATED ORAL at 08:08

## 2022-08-10 NOTE — PT/OT/SLP PROGRESS
Physical Therapy      Patient Name:  Vale Gutierrez   MRN:  [id number]    Patient not seen today secondary to  (pt on hold per RN due to decrease respiratory status and increased agitation.). Will follow-up at a later date    8/10/2022  .

## 2022-08-10 NOTE — NURSING
Upon initial assessment, pt restless and confused, tachycardiac ranging from 110s-180s, tachypneic, and hypoxic on HFNC at 40L/75%. O2 increased to 100% oxygenation & pt showed no signs of respiratory improvement.     MD notified of pt status and at bedside. Verbal orders given to place pt on BiPap and restart precedex. Anxiety medication ordered and given.    MD notified again of unresolved tachycardia paired with hypotension. Scheduled metoprolol held r/t hypotension and MD stated further plans for patient care would be discussed on rounds. Will continue to monitor pt.

## 2022-08-10 NOTE — ASSESSMENT & PLAN NOTE
61F admitted with ETOH withdrawal and aspiration pneumonia. CT chest w/ consolidations, increasing pericardial effusion, worsening hypoxic respiratory failure requiring bipap 90%, intermittent fevers to 101.2 despite broad spectrum vanc / zosyn / azithro    Recommendations:   - agree with changing pip-tazo to meropenem  - obtain blood and respiratory cultures if able  - consider repeat TTE to reassess for worsening pericardial effusion, cardiology consult - etiology unclear  - recommend autoimmune eval given constellation of symptoms  - per RN, plans for ongoing goals of care discussions    ID will follow

## 2022-08-10 NOTE — PLAN OF CARE
[facility] DAILY GOALS       A: Awake    RASS: Goal -    Actual - RASS (Goldman Agitation-Sedation Scale): -1-->drowsy   Restraint necessity: Clinical Justification: Removing medical devices, Climbing out of bed, Treatment Interference  B: Breathe   SBT: Not intubated   C: Coordinate A & B, analgesics/sedatives   Pain: managed    SAT: Not intubated  D: Delirium   CAM-ICU: Overall CAM-ICU: Negative  E: Early(intubated/ Progressive (non-intubated) Mobility   MOVE Screen: Pass   Activity: Activity Management: Rolling - L1  FAS: Feeding/Nutrition   Diet order: Diet/Nutrition Received: regular,    T: Thrombus   DVT prophylaxis: VTE Required Core Measure: Pharmacological prophylaxis initiated/maintained  H: HOB Elevation   Head of Bed (HOB) Positioning: HOB at 30-45 degrees  U: Ulcer Prophylaxis   GI: yes  G: Glucose control   managed    S: Skin   Bathing/Skin Care: incontinence care  Device Skin Pressure Protection: absorbent pad utilized/changed, adhesive use limited, positioning supports utilized, skin-to-device areas padded, pressure points protected, skin-to-skin areas padded  Pressure Reduction Devices: foam padding utilized, positioning supports utilized, pressure-redistributing mattress utilized, specialty bed utilized  Pressure Reduction Techniques: heels elevated off bed, frequent weight shift encouraged, weight shift assistance provided, positioned off wounds, pressure points protected  Skin Protection: adhesive use limited, incontinence pads utilized, silicone foam dressing in place, skin sealant/moisture barrier applied, skin-to-device areas padded, skin-to-skin areas padded, tubing/devices free from skin contact  B: Bowel Function   diarrhea   I: Indwelling Catheters   [place] necessity:     CVC necessity: No  D: De-escalation Antibiotics   Yes    Family/Goals of care/Code Status   Code Status: Full Code    24H Vital Sign Range  Temp:  [98.1 °F (36.7 °C)-99 °F (37.2 °C)]   Pulse:  []   Resp:  [15-52]   BP:  ()/()   SpO2:  [67 %-98 %]      Shift Events   Patient pulling off BiPAP and dropping sat to 60s during initial assessment and returned to comfort flow. Precedex initiated for to assist with patient tolerating BiPAP. Patient tolerated BiPAP well after 2300. Patient BP decrease approx 0200. Patient received 500mL LR bolus with BP improvement noted. Precedex weaned overnight.     VS and assessment per flow sheet, patient progressing towards goals as tolerated, plan of care reviewed with no family present at bedside. POC, progress towards goals, and education reviewed with patient. , all concerns addressed, will continue to monitor.    Symone Mayorga

## 2022-08-10 NOTE — HPI
61F with history of ETOH abuse and withdrawal, aspiration pneumonia, tobacco abuse, HTN, HLD, who was admitted on 8/1 w/ SOB. Developed ETOH withdrawal, started on CIWA protocol, and was started on treatment for pneumonia with vanc, pip-tazo and azithromycin. She developed worsening hypoxic respiratory failure on 8/7, requiring bipap, and was transferred to ICU for further evaluation. Pt has developed intermittent fevers w/ Tm 101.2. Blood cx 8/1 and RIP are negative. Respiratory cultures were unable to be obtained. CT chest on admission w/ RLL and lingular consolidations, cardiomegaly w/ increased volume of moderate pericardial effusion.  ID consulted for ongoing fevers and worsening respiratory status, now on bipap 90%. Pt is unable to provide any additional history due to clinical status.

## 2022-08-10 NOTE — NURSING
MD Montiel notified of persistent tachycardia with hypotension. No orders given. Will continue to monitor pt.

## 2022-08-10 NOTE — NURSING
Team rounding. Plan for BiPAP tonight. New orders for precedex to assist with patient pulling off BiPAP. Patient educated on plan and verbalizes understanding and agreement. Patient confused and may require reinforcement.

## 2022-08-10 NOTE — ASSESSMENT & PLAN NOTE
-Repeat CXR  -LE ultrasound doppler to check for DVT. Low concern for PE, but is a possibility  -DC zosyn. Start meropenem, diflucan. Continue vancomycin, azithromycin

## 2022-08-10 NOTE — CONSULTS
[facility] - [facility]  Infectious Disease  Consult Note    Patient Name: Vale Gutierrez  MRN: [id number]  Admission Date: 8/1/2022  Hospital Length of Stay: 9 days  Attending Physician: Nicholas Knowles*  Primary Care Provider: Estela Mcdaniel MD     Isolation Status: No active isolations    Patient information was obtained from patient, past medical records and ER records.      Inpatient consult to Infectious Diseases  Consult performed by: Lyndsay Holland DO  Consult ordered by: Leo Montiel MD        Assessment/Plan:     Aspiration pneumonia  61F admitted with ETOH withdrawal and aspiration pneumonia. CT chest w/ consolidations, increasing pericardial effusion, worsening hypoxic respiratory failure requiring bipap 90%, intermittent fevers to 101.2 despite broad spectrum vanc / zosyn / azithro    Recommendations:   - agree with changing pip-tazo to meropenem  - obtain blood and respiratory cultures if able  - consider repeat TTE to reassess for worsening pericardial effusion, cardiology consult - etiology unclear  - recommend autoimmune eval given constellation of symptoms  - per RN, plans for ongoing goals of care discussions    ID will follow      Thank you for your consult. I will follow-up with patient. Please contact us if you have any additional questions.    Fatuma Holland DO  Critical Care Infectious Disease    Critical care time: 35 minutes   I personally spent critical care time on the following: evaluating this patient's organ dysfunction, development of treatment plan, discussing treatment plan with patient or surrogate and bedside caregivers, discussions with critical care service and/or consultants, evaluation of patient's response to treatment, physical examination of patient, ordering and review of treatments interventions, laboratory studies, and radiographic studies, re-evaluation of patient's condition. This critical care time did not overlap with that of any other  provider of the same specialty or involve time for procedures.     Subjective:     Principal Problem: Acute hypoxemic respiratory failure    HPI: 61F with history of ETOH abuse and withdrawal, aspiration pneumonia, tobacco abuse, HTN, HLD, who was admitted on 8/1 w/ SOB. Developed ETOH withdrawal, started on CIWA protocol, and was started on treatment for pneumonia with vanc, pip-tazo and azithromycin. She developed worsening hypoxic respiratory failure on 8/7, requiring bipap, and was transferred to ICU for further evaluation. Pt has developed intermittent fevers w/ Tm 101.2. Blood cx 8/1 and RIP are negative. Respiratory cultures were unable to be obtained. CT chest on admission w/ RLL and lingular consolidations, cardiomegaly w/ increased volume of moderate pericardial effusion.  ID consulted for ongoing fevers and worsening respiratory status, now on bipap 90%. Pt is unable to provide any additional history due to clinical status.         Past Medical History:   Diagnosis Date    Alcohol abuse     Anxiety     Benign paroxysmal positional vertigo 1/17/2020    Depression     Hyperlipidemia     Hypertension        Past Surgical History:   Procedure Laterality Date    BREAST CYST ASPIRATION      CHOLECYSTECTOMY      complicated with bile leak, sepsis    COLONOSCOPY N/A 6/3/2020    Procedure: COLONOSCOPY Gol[place]ely;  Surgeon: Tino Neil MD;  Location: [facility];  Service: Colon and Rectal;  Laterality: N/A;    EPIDURAL STEROID INJECTION N/A 5/27/2022    Procedure: INJECTION, STEROID, EPIDURAL, CAUDAL CONTRAST;  Surgeon: Rubén Rico MD;  Location: [facility] PAIN MGT;  Service: Pain Management;  Laterality: N/A;  5/6 RESCHEDULE    ESOPHAGOGASTRODUODENOSCOPY N/A 6/3/2020    Procedure: EGD (ESOPHAGOGASTRODUODENOSCOPY);  Surgeon: Tino Neil MD;  Location: [facility] ENDO;  Service: Colon and Rectal;  Laterality: N/A;    INJECTION OF ANESTHETIC AGENT AROUND NERVE Bilateral 3/24/2022    Procedure: BLOCK,  NERVE MEDIAL BRANCH BLOCK BL L2, L3, L4 and L5  1st, needs consent;  Surgeon: Rubén Rico MD;  Location: [facility] PAIN [facility]GT;  Service: Pain Management;  Laterality: Bilateral;    TRANSFORAMINAL EPIDURAL INJECTION OF STEROID Bilateral 8/7/2020    Procedure: INJECTION, STEROID, EPIDURAL, TRANSFORAMINAL APPROACH, L4-L5 need consent;  Surgeon: Rubén Rico MD;  Location: [facility] PAIN [facility]GT;  Service: Pain Management;  Laterality: Bilateral;    TRANSFORAMINAL EPIDURAL INJECTION OF STEROID Bilateral 9/4/2020    Procedure: LUMBAR TRANSFORAMINAL BILATERAL L4/5 DIRECT REFERRAL;  Surgeon: Rubén Rico MD;  Location: [facility] PAIN [facility]GT;  Service: Pain Management;  Laterality: Bilateral;  NEEDS CONSENT    TRANSFORAMINAL EPIDURAL INJECTION OF STEROID Bilateral 3/4/2022    Procedure: Injection,steroid,epidural,transforaminal approach BILATERAL L3/4 DIRECT REFERRAL;  Surgeon: Rubén Rico MD;  Location: [facility] PAIN MGT;  Service: Pain Management;  Laterality: Bilateral;       Review of patient's allergies indicates:  No Known Allergies    Medications:  Medications Prior to Admission   Medication Sig    aspirin/salicylamide/caffeine (BC HEADACHE POWDER ORAL) Take 1 Package by mouth once daily. Hold medication one week prior to surgery    busPIRone (BUSPAR) 5 MG Tab Take 1 tablet (5 mg total) by mouth 2 (two) times daily as needed (anxiety).    dicyclomine (BENTYL) 10 MG capsule Take 1 capsule (10 mg total) by mouth before meals as needed.    magnesium 30 mg Tab Take by mouth once.    metoprolol tartrate (LOPRESSOR) 50 MG tablet Take 1 tablet (50 mg total) by mouth once daily.    mirtazapine (REMERON) 7.5 MG Tab Take 1 tablet (7.5 mg total) by mouth every evening. For insomnia, mood    omeprazole (PRILOSEC) 20 MG capsule Take 1 capsule (20 mg total) by mouth 2 (two) times daily.    ondansetron (ZOFRAN-ODT) 4 MG TbDL Take 1 tablet (4 mg total) by mouth every 8 (eight) hours as needed.    oxyCODONE-acetaminophen (PERCOCET)  "5-325 mg per tablet Take 1 tablet by mouth every 6 (six) hours as needed for Pain.    thiamine 100 MG tablet Take 100 mg by mouth once daily.    tiZANidine (ZANAFLEX) 4 MG tablet Take 1 tablet (4 mg total) by mouth every 6 (six) hours as needed (spasms).    venlafaxine (EFFEXOR-XR) 150 MG Cp24 Take 150 mg by mouth once daily. Take am of surgery    venlafaxine (EFFEXOR-XR) 75 MG 24 hr capsule Take 75 mg by mouth once daily. Take am of surgery    vitamin D (VITAMIN D3) 1000 units Tab Take 5,000 Int'l Units by mouth. Hold am of surgery     Antibiotics (From admission, onward)                Start     Stop Route Frequency Ordered    08/10/22 1230  meropenem-0.9% sodium chloride 1 g/50 mL IVPB         -- IV Every 8 hours (non-standard times) 08/10/22 1125    08/09/22 0900  vancomycin 1.25 g in dextrose 5% 250 mL IVPB (ready to mix)         08/11 2359 IV Every 24 hours (non-standard times) 08/08/22 0813    08/08/22 0900  azithromycin tablet 250 mg         08/12 0859 Oral Daily 08/07/22 1433    08/02/22 0230  vancomycin - pharmacy to dose  (vancomycin IVPB)        "And" Linked Group Details    -- IV pharmacy to manage frequency 08/02/22 0131          Antifungals (From admission, onward)                None          Antivirals (From admission, onward)      None             Immunization History   Administered Date(s) Administered    COVID-19, MRNA, LN-S, PF (MODERNA FULL 0.5 ML DOSE) 03/18/2021    Influenza - Quadrivalent - PF *Preferred* (6 months and older) 12/03/2018, 11/18/2019, 12/06/2021    Pneumococcal Polysaccharide - 23 Valent 11/18/2019       Family History       Problem Relation (Age of Onset)    Arthritis Mother    Atrial fibrillation Mother    Cancer Maternal Uncle    Cataracts Paternal Grandfather    Glaucoma Paternal Grandfather    Heart failure Mother    Macular degeneration Mother    Stroke Father          Social History     Socioeconomic History    Marital status: Single   Tobacco Use    Smoking " status: Former Smoker    Smokeless tobacco: Never Used   Substance and Sexual Activity    Alcohol use: Yes     Alcohol/week: 12.0 standard drinks     Types: 12 Shots of liquor per week     Comment: three 12-14 oz cocktail drinks.     Drug use: No    Sexual activity: Never     Social Determinants of Health     Financial Resource Strain: Medium Risk    Difficulty of Paying Living Expenses: Somewhat hard   Food Insecurity: Food Insecurity Present    Worried About Running Out of Food in the Last Year: Sometimes true    Ran Out of Food in the Last Year: Never true   Transportation Needs: Unmet Transportation Needs    Lack of Transportation (Medical): Yes    Lack of Transportation (Non-Medical): Yes   Physical Activity: Inactive    Days of Exercise per Week: 0 days    Minutes of Exercise per Session: 0 min   Stress: Stress Concern Present    Feeling of Stress : Rather much   Social Connections: Unknown    Frequency of Communication with Friends and Family: Twice a week    Frequency of Social Gatherings with Friends and Family: Three times a week    Active Member of Clubs or Organizations: No    Attends Club or Organization Meetings: Never    Marital Status: Never    Housing Stability: Low Risk     Unable to Pay for Housing in the Last Year: No    Number of Places Lived in the Last Year: 1    Unstable Housing in the Last Year: No     Review of Systems   Unable to perform ROS: Acuity of condition   Objective:     Vital Signs (Most Recent):  Temp: (!) 101.2 °F (38.4 °C) (08/10/22 1000)  Pulse: (!) 125 (08/10/22 1015)  Resp: (!) 36 (08/10/22 1038)  BP: 101/62 (08/10/22 1015)  SpO2: (!) 94 % (08/10/22 1015)   Vital Signs (24h Range):  Temp:  [98.4 °F (36.9 °C)-101.2 °F (38.4 °C)] 101.2 °F (38.4 °C)  Pulse:  [] 125  Resp:  [15-50] 36  SpO2:  [67 %-97 %] 94 %  BP: ()/(43-92) 101/62     Weight: 90.7 kg (200 lb)  Body mass index is 34.33 kg/m².    Estimated Creatinine Clearance: 69.8 mL/min  (based on SCr of 0.9 mg/dL).    Physical Exam  Constitutional:       General: She is in acute distress.      Appearance: She is well-developed. She is ill-appearing. She is not diaphoretic.   HENT:      Head: Normocephalic and atraumatic.      Right Ear: External ear normal.      Left Ear: External ear normal.      Nose: Nose normal.   Eyes:      General: No scleral icterus.        Right eye: No discharge.         Left eye: No discharge.      Extraocular Movements: Extraocular movements intact.      Conjunctiva/sclera: Conjunctivae normal.   Pulmonary:      Effort: No respiratory distress.      Breath sounds: No stridor.      Comments: Increased effort    Oxygen Concentration (%):  [] 90 (BIPAP)    Abdominal:      General: Abdomen is flat. There is no distension.      Palpations: Abdomen is soft.   Musculoskeletal:         General: Normal range of motion.      Comments: [place] neck deformity of b/l thumbs   Skin:     Findings: No erythema or rash.      Comments: cool   Neurological:      Mental Status: She is disoriented.       Significant Labs: CBC:   Recent Labs   Lab 08/09/22 0416 08/10/22  0437   WBC 11.25 9.93   HGB 9.8* 9.0*   HCT 35.2* 32.8*    274     CMP:   Recent Labs   Lab 08/09/22 0416 08/10/22  0437    139   K 3.5 3.5   CL 92* 94*   CO2 34* 35*   GLU 97 89   BUN 22 19   CREATININE 0.9 0.9   CALCIUM 9.4 9.4   PROT 6.4 6.2   ALBUMIN 2.3* 2.2*   BILITOT 0.3 0.3   ALKPHOS 100 93   AST 16 15   ALT 15 13   ANIONGAP 11 10     Microbiology Results (last 7 days)       Procedure Component Value Units Date/Time    Blood culture [[id number]]     Order Status: Sent Specimen: Blood     Blood culture [[id number]]     Order Status: Sent Specimen: Blood     Respiratory Infection Panel (PCR), Nasopharyngeal [[id number]] Collected: 08/07/22 1601    Order Status: Completed Specimen: Nasopharyngeal Swab Updated: 08/07/22 1821     Respiratory Infection Panel Source NP Swab     Adenovirus Not Detected      Coronavirus 229E, Common Cold Virus Not Detected     Coronavirus HKU1, Common Cold Virus Not Detected     Coronavirus NL63, Common Cold Virus Not Detected     Coronavirus OC43, Common Cold Virus Not Detected     Comment: The Coronavirus strains detected in this test cause the common cold.  These strains are not the COVID-19 (novel Coronavirus)strain   associated with the respiratory disease outbreak.          SARS-CoV2 (COVID-19) Qualitative PCR Not Detected     Human Metapneumovirus Not Detected     Human Rhinovirus/Enterovirus Not Detected     Influenza A (subtypes H1, H1-2009,H3) Not Detected     Influenza B Not Detected     Parainfluenza Virus 1 Not Detected     Parainfluenza Virus 2 Not Detected     Parainfluenza Virus 3 Not Detected     Parainfluenza Virus 4 Not Detected     Respiratory Syncytial Virus Not Detected     Bordetella Parapertussis (AH1029) Not Detected     Bordetella pertussis (ptxP) Not Detected     Chlamydia pneumoniae Not Detected     Mycoplasma pneumoniae Not Detected    Narrative:      For all other respiratory sources, order [id number] -  Respiratory Viral Panel by PCR    Blood culture #1 **CANNOT BE ORDERED STAT** [[id number]] Collected: 08/01/22 1831    Order Status: Completed Specimen: Blood from Peripheral, Hand, Left Updated: 08/06/22 2012     Blood Culture, Routine No growth after 5 days.    Blood culture #2 **CANNOT BE ORDERED STAT** [[id number]] Collected: 08/01/22 1822    Order Status: Completed Specimen: Blood from Peripheral, Hand, Right Updated: 08/06/22 2012     Blood Culture, Routine No growth after 5 days.    Culture, Respiratory with Gram Stain [[id number]]     Order Status: No result Specimen: Respiratory             Significant Imaging: I have reviewed all pertinent imaging results/findings within the past 24 hours.

## 2022-08-10 NOTE — PROGRESS NOTES
[facility] - [facility]  Critical Care Medicine  Progress Note    Patient Name: Vale Gutierrez  MRN: [id number]  Admission Date: 8/1/2022  Hospital Length of Stay: 9 days  Code Status: Full Code  Attending Provider: Nicholas Knowles*  Primary Care Provider: Estela Mcdaniel MD   Principal Problem: Acute hypoxemic respiratory failure    Subjective:     HPI:  Ms. Gutierrez is a 62 y/o female with a PMH of etoh abuse and withdrawal, aspiration pneumonia, delirium, smoking, HTN, HLD, BPPV, alcohol abuse, depression and anxiety who was admitted to the ICU from the floor for acute hypoxic respiratory failure. She was having increased oxygen needs on the floor and was requiring valium to keep her presumed alcohol withdrawal under control. The patient states that her stomach hurts in the midepigastric region and endorses nausea, vomiting x 1 day. She is unsure how long she has been coughing or feeling ill, and says she cannot remember the last few days. She says she lives with her sister Angela who helps care for her.       Hospital/ICU Course:  Patient presented to the ED on 8/1 with a CC of hypoxia and dyspnea. She was initially admitted to the floor. She experienced hallucinations and tremor, consistent with alcohol withdrawal / delirium, and was put on CIWA protocol with valium PRN (shortage of ativan). She was treated with antibiotics (vancomycin, zosyn, and azithro) scheduled for 10 days and duonebs prn. On 8/7 she developed worsening hypoxic respiratory failure, was put on 50L 100% O2 bipap, and admitted to the ICU due to increased oxygen requirements.     8/9: Work up thus far has revealed small pericardial effusion, cardiomegaly, normal LVEF, pleural effusions and opacities bilaterally, and atrial fibrillation. She lives with her sister Angela, who takes care of her typically. Patient's sister Angela says the patient takes percocet for chronic back pain and drinks alcohol frequently and needs help  with almost all ADLs at baseline. Angela says she does not want the patient to come back to live with her because she exhausted caring for her all the time and feels she cannot keep doing it.       Interval History/Significant Events: Patient was able to tolerate bipap overnight with some precedex. She denies any new complaints this morning. She is back on high flow now. She seems somewhat confused. Unable to obtain complete history 2/2 clinical condition.     Review of Systems   Reason unable to perform ROS: clinical condition.   Objective:     Vital Signs (Most Recent):  Temp: 98.6 °F (37 °C) (08/10/22 0705)  Pulse: (!) 116 (08/10/22 0844)  Resp: (!) 35 (08/10/22 0844)  BP: (!) 88/55 (08/10/22 0834)  SpO2: (!) 94 % (08/10/22 0844)   Vital Signs (24h Range):  Temp:  [98.4 °F (36.9 °C)-99 °F (37.2 °C)] 98.6 °F (37 °C)  Pulse:  [] 116  Resp:  [15-52] 35  SpO2:  [67 %-97 %] 94 %  BP: ()/(43-92) 88/55   Weight: 90.7 kg (200 lb)  Body mass index is 34.33 kg/m².      Intake/Output Summary (Last 24 hours) at 8/10/2022 0927  Last data filed at 8/10/2022 0800  Gross per 24 hour   Intake 1280.36 ml   Output 300 ml   Net 980.36 ml       Physical Exam  Constitutional:       General: She is not in acute distress.  HENT:      Head: Normocephalic and atraumatic.      Right Ear: External ear normal.      Left Ear: External ear normal.      Nose: Nose normal.      Mouth/Throat:      Mouth: Mucous membranes are moist.   Eyes:      General: No scleral icterus.     Conjunctiva/sclera: Conjunctivae normal.      Pupils: Pupils are equal, round, and reactive to light.   Cardiovascular:      Rate and Rhythm: Tachycardia present. Rhythm irregular.   Pulmonary:      Comments: With high flow nasal cannula   Musculoskeletal:         General: No swelling.      Cervical back: Normal range of motion and neck supple.   Skin:     General: Skin is warm and dry.      Capillary Refill: Capillary refill takes less than 2 seconds.       Coloration: Skin is not jaundiced.      Findings: No rash.   Neurological:      General: No focal deficit present.      Mental Status: She is alert.      Comments: Seems mildly confused, tangential though processes at times but redirectable        Vents:  Oxygen Concentration (%): 100 (08/10/22 0844)  Lines/Drains/Airways       Peripheral Intravenous Line  Duration                  Peripheral IV - Single Lumen 08/07/22 2345 20 G Anterior;Right Forearm 2 days         Peripheral IV - Single Lumen 08/10/22 0800 20 G Right Antecubital <1 day                  Significant Labs:    CBC/Anemia Profile:  Recent Labs   Lab 08/09/22  0416 08/10/22  0437   WBC 11.25 9.93   HGB 9.8* 9.0*   HCT 35.2* 32.8*    274   MCV 84 86   RDW 17.0* 16.8*        Chemistries:  Recent Labs   Lab 08/09/22  0416 08/10/22  0437    139   K 3.5 3.5   CL 92* 94*   CO2 34* 35*   BUN 22 19   CREATININE 0.9 0.9   CALCIUM 9.4 9.4   ALBUMIN 2.3* 2.2*   PROT 6.4 6.2   BILITOT 0.3 0.3   ALKPHOS 100 93   ALT 15 13   AST 16 15   MG 1.7 1.7   PHOS 2.9 2.8       All pertinent labs within the past 24 hours have been reviewed.    Significant Imaging:  I have reviewed all pertinent imaging results/findings within the past 24 hours.      ABG  Recent Labs   Lab 08/10/22  1211   PH 7.367   PO2 43   PCO2 62.0*   HCO3 35.6*   BE 10     Assessment/Plan:     Neuro  Chronic pain disorder  Patient has a history of chronic back pain, for which she had surgery in October 2020.   She has been followed by a pain management doctor and takes percocet daily for pain.     Psychiatric  Alcohol dependence with withdrawal with perceptual disturbance  -Patient reportedly had tremors and hallucinations on the floor 8/5-8/7  -Sister reports when had detox at  in prev years she was there 14 days with withdrawal + aspiration PNA. Patient had endorsed hx of withdrawal in the past. Sister gave detailed hx also   -On CIWA protocol, valium PRN for CIWA >8  -Trying to avoid  benzodiazepines unless absolutely necessary due to patients worsening delirium. Don't believe she is still withdrawing from alcohol      Pulmonary  * Acute hypoxemic respiratory failure  Patient with Hypercapnic and Hypoxic Respiratory failure which is Acute on chronic.  she is not on home oxygen. Supplemental oxygen was provided and noted- Oxygen Concentration (%):  [] 90.   Signs/symptoms of respiratory failure include- increased work of breathing and respiratory distress. Contributing diagnoses includes - ARDS, CHF, COPD, Interstitial lung disease, Obesity Hypoventilation and Pleural effusion Labs and images were reviewed. Patient Has recent ABG, which has been reviewed. Will treat underlying causes and adjust management of respiratory failure.    -Repeat CXR  -LE ultrasound doppler to check for DVT. Low concern for PE, but is a possibility  -DC zosyn. Start meropenem, diflucan. Continue vancomycin, azithromycin     Aspiration pneumonia  -Repeat CXR  -LE ultrasound doppler to check for DVT. Low concern for PE, but is a possibility  -DC zosyn. Start meropenem, diflucan. Continue vancomycin, azithromycin       Acute on chronic respiratory failure with hypercapnia  Patient with Hypoxic Respiratory failure which is Acute on chronic.  she is not on home oxygen. Supplemental oxygen was provided and noted- Oxygen Concentration (%):  [] 100.   Signs/symptoms of respiratory failure include- tachypnea. Contributing diagnoses includes - ARDS, Aspiration, CHF, COPD, Interstitial lung disease, Pleural effusion, Pneumonia and Pulmonary Embolus Labs and images were reviewed. Patient Has recent ABG, which has been reviewed. Will treat underlying causes and adjust management of respiratory failure.     Community acquired pneumonia of right lower lobe of lung  -Repeat CXR  -LE ultrasound doppler to check for DVT. Low concern for PE, but is a possibility  -DC zosyn. Start meropenem, diflucan. Continue vancomycin,  azithromycin     Cardiac/Vascular  Acute on chronic diastolic congestive heart failure  Patient is identified as having Diastolic (HFpEF) heart failure that is Acute on chronic. CHF is currently uncontrolled due to Pulmonary edema/pleural effusion on CXR. Latest ECHO performed and demonstrates- Results for orders placed during the hospital encounter of 08/01/22    Echo    Interpretation Summary  · The left ventricle is normal in size with concentric hypertrophy and normal systolic function.  · The estimated ejection fraction is 60-65%.  · Grade I left ventricular diastolic dysfunction.  · Mild aortic regurgitation.  · Normal right ventricular size with normal right ventricular systolic function.  · The estimated PA systolic pressure is 43 mmHg.  · Intermediate central venous pressure (8 mmHg).  · There is pulmonary hypertension.  · Small circumferential pericardial effusion.  · Mild left atrial enlargement.  . Continue Furosemide and monitor clinical status closely. Monitor on telemetry.  Monitor strict Is&Os and daily weights.  Place on fluid restriction of 1.5 L. Continue to stress to patient importance of self efficacy and  on diet for CHF. Last BNP reviewed- and noted below. Has improved since BNP on 8/1 of 504  Recent Labs   Lab 08/08/22  1158   BNP 90   .           Critical Care Daily Checklist:    A: Awake: RASS Goal/Actual Goal:    Actual: Goldman Agitation Sedation Scale (RASS): Drowsy   B: Spontaneous Breathing Trial Performed?     C: SAT & SBT Coordinated?  Patient away and breathing                       D: Delirium: CAM-ICU Overall CAM-ICU: Positive   E: Early Mobility Performed? Yes   F: Feeding Goal:    Status:     Current Diet Order   Procedures    Diet Adult Regular (IDDSI Level 7)      AS: Analgesia/Sedation yes   T: Thromboembolic Prophylaxis yes   H: HOB > 300 Yes   U: Stress Ulcer Prophylaxis (if needed) no   G: Glucose Control yes   B: Bowel Function Stool Occurrence: 1   I: Indwelling  Catheter (Lines & [place]) Necessity yes   D: De-escalation of Antimicrobials/Pharmacotherapies no    Plan for the day/ETD Change antibiotics, assess for worsening pneumonia vs dvt/pe, delirium, meet with pts sister     Code Status:  Family/Goals of Care: Full Code  Will discuss with pts sister Angela today       Critical secondary to Patient has a condition that poses threat to life and bodily function: Severe Respiratory Distress      Critical care was time spent personally by me on the following activities: development of treatment plan with patient or surrogate and bedside caregivers, discussions with consultants, evaluation of patient's response to treatment, examination of patient, ordering and performing treatments and interventions, ordering and review of laboratory studies, ordering and review of radiographic studies, pulse oximetry, re-evaluation of patient's condition. This critical care time did not overlap with that of any other provider or involve time for any procedures.     Denise Crenshaw, DO   Critical Care Medicine  [facility] - Cardiac Medical ICU

## 2022-08-10 NOTE — SUBJECTIVE & OBJECTIVE
Interval History/Significant Events: Patient was able to tolerate bipap overnight with some precedex. She denies any new complaints this morning. She is back on high flow now. She seems somewhat confused. Unable to obtain complete history 2/2 clinical condition.     Review of Systems   Reason unable to perform ROS: clinical condition.   Objective:     Vital Signs (Most Recent):  Temp: 98.6 °F (37 °C) (08/10/22 0705)  Pulse: (!) 116 (08/10/22 0844)  Resp: (!) 35 (08/10/22 0844)  BP: (!) 88/55 (08/10/22 0834)  SpO2: (!) 94 % (08/10/22 0844)   Vital Signs (24h Range):  Temp:  [98.4 °F (36.9 °C)-99 °F (37.2 °C)] 98.6 °F (37 °C)  Pulse:  [] 116  Resp:  [15-52] 35  SpO2:  [67 %-97 %] 94 %  BP: ()/(43-92) 88/55   Weight: 90.7 kg (200 lb)  Body mass index is 34.33 kg/m².      Intake/Output Summary (Last 24 hours) at 8/10/2022 0927  Last data filed at 8/10/2022 0800  Gross per 24 hour   Intake 1280.36 ml   Output 300 ml   Net 980.36 ml       Physical Exam  Constitutional:       General: She is not in acute distress.  HENT:      Head: Normocephalic and atraumatic.      Right Ear: External ear normal.      Left Ear: External ear normal.      Nose: Nose normal.      Mouth/Throat:      Mouth: Mucous membranes are moist.   Eyes:      General: No scleral icterus.     Conjunctiva/sclera: Conjunctivae normal.      Pupils: Pupils are equal, round, and reactive to light.   Cardiovascular:      Rate and Rhythm: Tachycardia present. Rhythm irregular.   Pulmonary:      Comments: With high flow nasal cannula   Musculoskeletal:         General: No swelling.      Cervical back: Normal range of motion and neck supple.   Skin:     General: Skin is warm and dry.      Capillary Refill: Capillary refill takes less than 2 seconds.      Coloration: Skin is not jaundiced.      Findings: No rash.   Neurological:      General: No focal deficit present.      Mental Status: She is alert.      Comments: Seems mildly confused, tangential  though processes at times but redirectable        Vents:  Oxygen Concentration (%): 100 (08/10/22 0844)  Lines/Drains/Airways       Peripheral Intravenous Line  Duration                  Peripheral IV - Single Lumen 08/07/22 2345 20 G Anterior;Right Forearm 2 days         Peripheral IV - Single Lumen 08/10/22 0800 20 G Right Antecubital <1 day                  Significant Labs:    CBC/Anemia Profile:  Recent Labs   Lab 08/09/22 0416 08/10/22  0437   WBC 11.25 9.93   HGB 9.8* 9.0*   HCT 35.2* 32.8*    274   MCV 84 86   RDW 17.0* 16.8*        Chemistries:  Recent Labs   Lab 08/09/22 0416 08/10/22  0437    139   K 3.5 3.5   CL 92* 94*   CO2 34* 35*   BUN 22 19   CREATININE 0.9 0.9   CALCIUM 9.4 9.4   ALBUMIN 2.3* 2.2*   PROT 6.4 6.2   BILITOT 0.3 0.3   ALKPHOS 100 93   ALT 15 13   AST 16 15   MG 1.7 1.7   PHOS 2.9 2.8       All pertinent labs within the past 24 hours have been reviewed.    Significant Imaging:  I have reviewed all pertinent imaging results/findings within the past 24 hours.

## 2022-08-10 NOTE — ASSESSMENT & PLAN NOTE
Patient with Hypercapnic and Hypoxic Respiratory failure which is Acute on chronic.  she is not on home oxygen. Supplemental oxygen was provided and noted- Oxygen Concentration (%):  [] 90.   Signs/symptoms of respiratory failure include- increased work of breathing and respiratory distress. Contributing diagnoses includes - ARDS, CHF, COPD, Interstitial lung disease, Obesity Hypoventilation and Pleural effusion Labs and images were reviewed. Patient Has recent ABG, which has been reviewed. Will treat underlying causes and adjust management of respiratory failure.    -Repeat CXR  -LE ultrasound doppler to check for DVT. Low concern for PE, but is a possibility  -DC zosyn. Start meropenem, diflucan. Continue vancomycin, azithromycin

## 2022-08-10 NOTE — SUBJECTIVE & OBJECTIVE
Past Medical History:   Diagnosis Date    Alcohol abuse     Anxiety     Benign paroxysmal positional vertigo 1/17/2020    Depression     Hyperlipidemia     Hypertension        Past Surgical History:   Procedure Laterality Date    BREAST CYST ASPIRATION      CHOLECYSTECTOMY      complicated with bile leak, sepsis    COLONOSCOPY N/A 6/3/2020    Procedure: COLONOSCOPY Golytely;  Surgeon: Tino Niel MD;  Location: [facility];  Service: Colon and Rectal;  Laterality: N/A;    EPIDURAL STEROID INJECTION N/A 5/27/2022    Procedure: INJECTION, STEROID, EPIDURAL, CAUDAL CONTRAST;  Surgeon: Rubén Rico MD;  Location: [facility];  Service: Pain Management;  Laterality: N/A;  5/6 RESCHEDULE    ESOPHAGOGASTRODUODENOSCOPY N/A 6/3/2020    Procedure: EGD (ESOPHAGOGASTRODUODENOSCOPY);  Surgeon: Tino Neil MD;  Location: [facility];  Service: Colon and Rectal;  Laterality: N/A;    INJECTION OF ANESTHETIC AGENT AROUND NERVE Bilateral 3/24/2022    Procedure: BLOCK, NERVE MEDIAL BRANCH BLOCK BL L2, L3, L4 and L5  1st, needs consent;  Surgeon: Rubén Rico MD;  Location: [facility];  Service: Pain Management;  Laterality: Bilateral;    TRANSFORAMINAL EPIDURAL INJECTION OF STEROID Bilateral 8/7/2020    Procedure: INJECTION, STEROID, EPIDURAL, TRANSFORAMINAL APPROACH, L4-L5 need consent;  Surgeon: Rubén Rico MD;  Location: [facility];  Service: Pain Management;  Laterality: Bilateral;    TRANSFORAMINAL EPIDURAL INJECTION OF STEROID Bilateral 9/4/2020    Procedure: LUMBAR TRANSFORAMINAL BILATERAL L4/5 DIRECT REFERRAL;  Surgeon: Rubén Rico MD;  Location: [facility];  Service: Pain Management;  Laterality: Bilateral;  NEEDS CONSENT    TRANSFORAMINAL EPIDURAL INJECTION OF STEROID Bilateral 3/4/2022    Procedure: Injection,steroid,epidural,transforaminal approach BILATERAL L3/4 DIRECT REFERRAL;  Surgeon: Rubén Rico MD;  Location: [facility];  Service: Pain Management;  Laterality: Bilateral;        Review of patient's allergies indicates:  No Known Allergies    Medications:  Medications Prior to Admission   Medication Sig    aspirin/salicylamide/caffeine (BC HEADACHE POWDER ORAL) Take 1 Package by mouth once daily. Hold medication one week prior to surgery    busPIRone (BUSPAR) 5 MG Tab Take 1 tablet (5 mg total) by mouth 2 (two) times daily as needed (anxiety).    dicyclomine (BENTYL) 10 MG capsule Take 1 capsule (10 mg total) by mouth before meals as needed.    magnesium 30 mg Tab Take by mouth once.    metoprolol tartrate (LOPRESSOR) 50 MG tablet Take 1 tablet (50 mg total) by mouth once daily.    mirtazapine (REMERON) 7.5 MG Tab Take 1 tablet (7.5 mg total) by mouth every evening. For insomnia, mood    omeprazole (PRILOSEC) 20 MG capsule Take 1 capsule (20 mg total) by mouth 2 (two) times daily.    ondansetron (ZOFRAN-ODT) 4 MG TbDL Take 1 tablet (4 mg total) by mouth every 8 (eight) hours as needed.    oxyCODONE-acetaminophen (PERCOCET) 5-325 mg per tablet Take 1 tablet by mouth every 6 (six) hours as needed for Pain.    thiamine 100 MG tablet Take 100 mg by mouth once daily.    tiZANidine (ZANAFLEX) 4 MG tablet Take 1 tablet (4 mg total) by mouth every 6 (six) hours as needed (spasms).    venlafaxine (EFFEXOR-XR) 150 MG Cp24 Take 150 mg by mouth once daily. Take am of surgery    venlafaxine (EFFEXOR-XR) 75 MG 24 hr capsule Take 75 mg by mouth once daily. Take am of surgery    vitamin D (VITAMIN D3) 1000 units Tab Take 5,000 Int'l Units by mouth. Hold am of surgery     Antibiotics (From admission, onward)                Start     Stop Route Frequency Ordered    08/10/22 1230  meropenem-0.9% sodium chloride 1 g/50 mL IVPB         -- IV Every 8 hours (non-standard times) 08/10/22 1125    08/09/22 0900  vancomycin 1.25 g in dextrose 5% 250 mL IVPB (ready to mix)         08/11 1811 IV Every 24 hours (non-standard times) 08/08/22 0813    08/08/22 0900  azithromycin tablet 250 mg         08/12 0859 Oral  "Daily 08/07/22 1433    08/02/22 0230  vancomycin - pharmacy to dose  (vancomycin IVPB)        "And" Linked Group Details    -- IV pharmacy to manage frequency 08/02/22 0131          Antifungals (From admission, onward)                None          Antivirals (From admission, onward)      None             Immunization History   Administered Date(s) Administered    COVID-19, MRNA, LN-S, PF (MODERNA FULL 0.5 ML DOSE) 03/18/2021    Influenza - Quadrivalent - PF *Preferred* (6 months and older) 12/03/2018, 11/18/2019, 12/06/2021    Pneumococcal Polysaccharide - 23 Valent 11/18/2019       Family History       Problem Relation (Age of Onset)    Arthritis Mother    Atrial fibrillation Mother    Cancer Maternal Uncle    Cataracts Paternal Grandfather    Glaucoma Paternal Grandfather    Heart failure Mother    Macular degeneration Mother    Stroke Father          Social History     Socioeconomic History    Marital status: Single   Tobacco Use    Smoking status: Former Smoker    Smokeless tobacco: Never Used   Substance and Sexual Activity    Alcohol use: Yes     Alcohol/week: 12.0 standard drinks     Types: 12 Shots of liquor per week     Comment: three 12-14 oz cocktail drinks.     Drug use: No    Sexual activity: Never     Social Determinants of Health     Financial Resource Strain: Medium Risk    Difficulty of Paying Living Expenses: Somewhat hard   Food Insecurity: Food Insecurity Present    Worried About Running Out of Food in the Last Year: Sometimes true    Ran Out of Food in the Last Year: Never true   Transportation Needs: Unmet Transportation Needs    Lack of Transportation (Medical): Yes    Lack of Transportation (Non-Medical): Yes   Physical Activity: Inactive    Days of Exercise per Week: 0 days    Minutes of Exercise per Session: 0 min   Stress: Stress Concern Present    Feeling of Stress : Rather much   Social Connections: Unknown    Frequency of Communication with Friends and Family: Twice a week    Frequency " of Social Gatherings with Friends and Family: Three times a week    Active Member of Clubs or Organizations: No    Attends Club or Organization Meetings: Never    Marital Status: Never    Housing Stability: Low Risk     Unable to Pay for Housing in the Last Year: No    Number of Places Lived in the Last Year: 1    Unstable Housing in the Last Year: No     Review of Systems   Unable to perform ROS: Acuity of condition   Objective:     Vital Signs (Most Recent):  Temp: (!) 101.2 °F (38.4 °C) (08/10/22 1000)  Pulse: (!) 125 (08/10/22 1015)  Resp: (!) 36 (08/10/22 1038)  BP: 101/62 (08/10/22 1015)  SpO2: (!) 94 % (08/10/22 1015)   Vital Signs (24h Range):  Temp:  [98.4 °F (36.9 °C)-101.2 °F (38.4 °C)] 101.2 °F (38.4 °C)  Pulse:  [] 125  Resp:  [15-50] 36  SpO2:  [67 %-97 %] 94 %  BP: ()/(43-92) 101/62     Weight: 90.7 kg (200 lb)  Body mass index is 34.33 kg/m².    Estimated Creatinine Clearance: 69.8 mL/min (based on SCr of 0.9 mg/dL).    Physical Exam  Constitutional:       General: She is in acute distress.      Appearance: She is well-developed. She is ill-appearing. She is not diaphoretic.   HENT:      Head: Normocephalic and atraumatic.      Right Ear: External ear normal.      Left Ear: External ear normal.      Nose: Nose normal.   Eyes:      General: No scleral icterus.        Right eye: No discharge.         Left eye: No discharge.      Extraocular Movements: Extraocular movements intact.      Conjunctiva/sclera: Conjunctivae normal.   Pulmonary:      Effort: No respiratory distress.      Breath sounds: No stridor.      Comments: Increased effort    Oxygen Concentration (%):  [] 90 (BIPAP)    Abdominal:      General: Abdomen is flat. There is no distension.      Palpations: Abdomen is soft.   Musculoskeletal:         General: Normal range of motion.      Comments: [place] neck deformity of b/l thumbs   Skin:     Findings: No erythema or rash.      Comments: cool   Neurological:       Mental Status: She is disoriented.       Significant Labs: CBC:   Recent Labs   Lab 08/09/22  0416 08/10/22  0437   WBC 11.25 9.93   HGB 9.8* 9.0*   HCT 35.2* 32.8*    274     CMP:   Recent Labs   Lab 08/09/22  0416 08/10/22  0437    139   K 3.5 3.5   CL 92* 94*   CO2 34* 35*   GLU 97 89   BUN 22 19   CREATININE 0.9 0.9   CALCIUM 9.4 9.4   PROT 6.4 6.2   ALBUMIN 2.3* 2.2*   BILITOT 0.3 0.3   ALKPHOS 100 93   AST 16 15   ALT 15 13   ANIONGAP 11 10     Microbiology Results (last 7 days)       Procedure Component Value Units Date/Time    Blood culture [[id number]]     Order Status: Sent Specimen: Blood     Blood culture [[id number]]     Order Status: Sent Specimen: Blood     Respiratory Infection Panel (PCR), Nasopharyngeal [[id number]] Collected: 08/07/22 1601    Order Status: Completed Specimen: Nasopharyngeal Swab Updated: 08/07/22 1828     Respiratory Infection Panel Source NP Swab     Adenovirus Not Detected     Coronavirus 229E, Common Cold Virus Not Detected     Coronavirus HKU1, Common Cold Virus Not Detected     Coronavirus NL63, Common Cold Virus Not Detected     Coronavirus OC43, Common Cold Virus Not Detected     Comment: The Coronavirus strains detected in this test cause the common cold.  These strains are not the COVID-19 (novel Coronavirus)strain   associated with the respiratory disease outbreak.          SARS-CoV2 (COVID-19) Qualitative PCR Not Detected     Human Metapneumovirus Not Detected     Human Rhinovirus/Enterovirus Not Detected     Influenza A (subtypes H1, H1-2009,H3) Not Detected     Influenza B Not Detected     Parainfluenza Virus 1 Not Detected     Parainfluenza Virus 2 Not Detected     Parainfluenza Virus 3 Not Detected     Parainfluenza Virus 4 Not Detected     Respiratory Syncytial Virus Not Detected     Bordetella Parapertussis (UE2831) Not Detected     Bordetella pertussis (ptxP) Not Detected     Chlamydia pneumoniae Not Detected     Mycoplasma pneumoniae Not Detected     Narrative:      For all other respiratory sources, order [id number] -  Respiratory Viral Panel by PCR    Blood culture #1 **CANNOT BE ORDERED STAT** [[id number]] Collected: 08/01/22 1831    Order Status: Completed Specimen: Blood from Peripheral, Hand, Left Updated: 08/06/22 2012     Blood Culture, Routine No growth after 5 days.    Blood culture #2 **CANNOT BE ORDERED STAT** [[id number]] Collected: 08/01/22 1822    Order Status: Completed Specimen: Blood from Peripheral, Hand, Right Updated: 08/06/22 2012     Blood Culture, Routine No growth after 5 days.    Culture, Respiratory with Gram Stain [[id number]]     Order Status: No result Specimen: Respiratory             Significant Imaging: I have reviewed all pertinent imaging results/findings within the past 24 hours.

## 2022-08-10 NOTE — ASSESSMENT & PLAN NOTE
-Patient reportedly had tremors and hallucinations on the floor 8/5-8/7  -Sister reports when had detox at  in prev years she was there 14 days with withdrawal + aspiration PNA. Patient had endorsed hx of withdrawal in the past. Sister gave detailed hx also   -On CIWA protocol, valium PRN for CIWA >8  -Trying to avoid benzodiazepines unless absolutely necessary due to patients worsening delirium. Don't believe she is still withdrawing from alcohol

## 2022-08-11 LAB
ALBUMIN SERPL BCP-MCNC: 1.9 G/DL (ref 3.5–5.2)
ALLENS TEST: ABNORMAL
ALP SERPL-CCNC: 87 U/L (ref 55–135)
ALT SERPL W/O P-5'-P-CCNC: 10 U/L (ref 10–44)
ANION GAP SERPL CALC-SCNC: 13 MMOL/L (ref 8–16)
ANION GAP SERPL CALC-SCNC: 9 MMOL/L (ref 8–16)
ANTI SM ANTIBODY: 0.06 RATIO (ref 0–0.99)
ANTI SM/RNP ANTIBODY: 0.08 RATIO (ref 0–0.99)
ANTI-SM INTERPRETATION: NEGATIVE
ANTI-SM/RNP INTERPRETATION: NEGATIVE
ANTI-SSA ANTIBODY: 0.05 RATIO (ref 0–0.99)
ANTI-SSA INTERPRETATION: NEGATIVE
ANTI-SSB ANTIBODY: 0.05 RATIO (ref 0–0.99)
ANTI-SSB INTERPRETATION: NEGATIVE
AST SERPL-CCNC: 13 U/L (ref 10–40)
BASOPHILS # BLD AUTO: 0.05 K/UL (ref 0–0.2)
BASOPHILS NFR BLD: 0.6 % (ref 0–1.9)
BILIRUB SERPL-MCNC: 0.2 MG/DL (ref 0.1–1)
BILIRUB UR QL STRIP: NEGATIVE
BUN SERPL-MCNC: 16 MG/DL (ref 8–23)
BUN SERPL-MCNC: 16 MG/DL (ref 8–23)
CALCIUM SERPL-MCNC: 8.8 MG/DL (ref 8.7–10.5)
CALCIUM SERPL-MCNC: 9.7 MG/DL (ref 8.7–10.5)
CHLORIDE SERPL-SCNC: 101 MMOL/L (ref 95–110)
CHLORIDE SERPL-SCNC: 95 MMOL/L (ref 95–110)
CLARITY UR REFRACT.AUTO: CLEAR
CO2 SERPL-SCNC: 29 MMOL/L (ref 23–29)
CO2 SERPL-SCNC: 33 MMOL/L (ref 23–29)
COLOR UR AUTO: YELLOW
CREAT SERPL-MCNC: 0.7 MG/DL (ref 0.5–1.4)
CREAT SERPL-MCNC: 0.8 MG/DL (ref 0.5–1.4)
DELSYS: ABNORMAL
DIFFERENTIAL METHOD: ABNORMAL
DSDNA AB SER-ACNC: NORMAL [IU]/ML
EOSINOPHIL # BLD AUTO: 0.2 K/UL (ref 0–0.5)
EOSINOPHIL NFR BLD: 2.4 % (ref 0–8)
ERYTHROCYTE [DISTWIDTH] IN BLOOD BY AUTOMATED COUNT: 16.8 % (ref 11.5–14.5)
EST. GFR  (NO RACE VARIABLE): >60 ML/MIN/1.73 M^2
EST. GFR  (NO RACE VARIABLE): >60 ML/MIN/1.73 M^2
GLUCOSE SERPL-MCNC: 117 MG/DL (ref 70–110)
GLUCOSE SERPL-MCNC: 85 MG/DL (ref 70–110)
GLUCOSE UR QL STRIP: NEGATIVE
HCO3 UR-SCNC: 38.9 MMOL/L (ref 24–28)
HCT VFR BLD AUTO: 29.4 % (ref 37–48.5)
HGB BLD-MCNC: 8.2 G/DL (ref 12–16)
HGB UR QL STRIP: NEGATIVE
IMM GRANULOCYTES # BLD AUTO: 0.02 K/UL (ref 0–0.04)
IMM GRANULOCYTES NFR BLD AUTO: 0.3 % (ref 0–0.5)
KETONES UR QL STRIP: ABNORMAL
LEUKOCYTE ESTERASE UR QL STRIP: NEGATIVE
LYMPHOCYTES # BLD AUTO: 1.7 K/UL (ref 1–4.8)
LYMPHOCYTES NFR BLD: 21.5 % (ref 18–48)
MAGNESIUM SERPL-MCNC: 1.7 MG/DL (ref 1.6–2.6)
MCH RBC QN AUTO: 23.4 PG (ref 27–31)
MCHC RBC AUTO-ENTMCNC: 27.9 G/DL (ref 32–36)
MCV RBC AUTO: 84 FL (ref 82–98)
MONOCYTES # BLD AUTO: 0.6 K/UL (ref 0.3–1)
MONOCYTES NFR BLD: 7.7 % (ref 4–15)
NEUTROPHILS # BLD AUTO: 5.2 K/UL (ref 1.8–7.7)
NEUTROPHILS NFR BLD: 67.5 % (ref 38–73)
NITRITE UR QL STRIP: NEGATIVE
NRBC BLD-RTO: 0 /100 WBC
PCO2 BLDA: 63.1 MMHG (ref 35–45)
PH SMN: 7.4 [PH] (ref 7.35–7.45)
PH UR STRIP: 6 [PH] (ref 5–8)
PHOSPHATE SERPL-MCNC: 2.7 MG/DL (ref 2.7–4.5)
PLATELET # BLD AUTO: 223 K/UL (ref 150–450)
PMV BLD AUTO: 10.6 FL (ref 9.2–12.9)
PO2 BLDA: 36 MMHG (ref 40–60)
POC BE: 14 MMOL/L
POC SATURATED O2: 66 % (ref 95–100)
POC TCO2: 41 MMOL/L (ref 24–29)
POTASSIUM SERPL-SCNC: 2.9 MMOL/L (ref 3.5–5.1)
POTASSIUM SERPL-SCNC: 3.8 MMOL/L (ref 3.5–5.1)
PROT SERPL-MCNC: 5.6 G/DL (ref 6–8.4)
PROT UR QL STRIP: NEGATIVE
RBC # BLD AUTO: 3.51 M/UL (ref 4–5.4)
SAMPLE: ABNORMAL
SITE: ABNORMAL
SODIUM SERPL-SCNC: 139 MMOL/L (ref 136–145)
SODIUM SERPL-SCNC: 141 MMOL/L (ref 136–145)
SP GR UR STRIP: 1.02 (ref 1–1.03)
URN SPEC COLLECT METH UR: ABNORMAL
VANCOMYCIN TROUGH SERPL-MCNC: 13.3 UG/ML (ref 10–22)
WBC # BLD AUTO: 7.77 K/UL (ref 3.9–12.7)

## 2022-08-11 PROCEDURE — 99233 SBSQ HOSP IP/OBS HIGH 50: CPT | Mod: ,,, | Performed by: INTERNAL MEDICINE

## 2022-08-11 PROCEDURE — 83735 ASSAY OF MAGNESIUM: CPT | Performed by: STUDENT IN AN ORGANIZED HEALTH CARE EDUCATION/TRAINING PROGRAM

## 2022-08-11 PROCEDURE — 97535 SELF CARE MNGMENT TRAINING: CPT

## 2022-08-11 PROCEDURE — 27000221 HC OXYGEN, UP TO 24 HOURS

## 2022-08-11 PROCEDURE — 25000003 PHARM REV CODE 250: Performed by: HOSPITALIST

## 2022-08-11 PROCEDURE — 99233 PR SUBSEQUENT HOSPITAL CARE,LEVL III: ICD-10-PCS | Mod: GC,,, | Performed by: INTERNAL MEDICINE

## 2022-08-11 PROCEDURE — 63700000 PHARM REV CODE 250 ALT 637 W/O HCPCS: Performed by: HOSPITALIST

## 2022-08-11 PROCEDURE — 94640 AIRWAY INHALATION TREATMENT: CPT

## 2022-08-11 PROCEDURE — 85025 COMPLETE CBC W/AUTO DIFF WBC: CPT | Performed by: STUDENT IN AN ORGANIZED HEALTH CARE EDUCATION/TRAINING PROGRAM

## 2022-08-11 PROCEDURE — 94761 N-INVAS EAR/PLS OXIMETRY MLT: CPT

## 2022-08-11 PROCEDURE — 25000242 PHARM REV CODE 250 ALT 637 W/ HCPCS: Performed by: HOSPITALIST

## 2022-08-11 PROCEDURE — 81003 URINALYSIS AUTO W/O SCOPE: CPT

## 2022-08-11 PROCEDURE — 99900035 HC TECH TIME PER 15 MIN (STAT)

## 2022-08-11 PROCEDURE — 63600175 PHARM REV CODE 636 W HCPCS

## 2022-08-11 PROCEDURE — 97165 OT EVAL LOW COMPLEX 30 MIN: CPT

## 2022-08-11 PROCEDURE — 97164 PT RE-EVAL EST PLAN CARE: CPT

## 2022-08-11 PROCEDURE — 99233 PR SUBSEQUENT HOSPITAL CARE,LEVL III: ICD-10-PCS | Mod: ,,, | Performed by: INTERNAL MEDICINE

## 2022-08-11 PROCEDURE — 99233 SBSQ HOSP IP/OBS HIGH 50: CPT | Mod: GC,,, | Performed by: INTERNAL MEDICINE

## 2022-08-11 PROCEDURE — 84100 ASSAY OF PHOSPHORUS: CPT

## 2022-08-11 PROCEDURE — 80053 COMPREHEN METABOLIC PANEL: CPT | Performed by: STUDENT IN AN ORGANIZED HEALTH CARE EDUCATION/TRAINING PROGRAM

## 2022-08-11 PROCEDURE — 80048 BASIC METABOLIC PNL TOTAL CA: CPT | Mod: XB

## 2022-08-11 PROCEDURE — 20000000 HC ICU ROOM

## 2022-08-11 PROCEDURE — 25000003 PHARM REV CODE 250

## 2022-08-11 PROCEDURE — 94660 CPAP INITIATION&MGMT: CPT

## 2022-08-11 PROCEDURE — 82803 BLOOD GASES ANY COMBINATION: CPT

## 2022-08-11 PROCEDURE — 25000003 PHARM REV CODE 250: Performed by: STUDENT IN AN ORGANIZED HEALTH CARE EDUCATION/TRAINING PROGRAM

## 2022-08-11 PROCEDURE — 97530 THERAPEUTIC ACTIVITIES: CPT

## 2022-08-11 PROCEDURE — 27100171 HC OXYGEN HIGH FLOW UP TO 24 HOURS

## 2022-08-11 PROCEDURE — 97168 OT RE-EVAL EST PLAN CARE: CPT

## 2022-08-11 PROCEDURE — 80202 ASSAY OF VANCOMYCIN: CPT | Performed by: INTERNAL MEDICINE

## 2022-08-11 RX ORDER — FUROSEMIDE 10 MG/ML
40 INJECTION INTRAMUSCULAR; INTRAVENOUS ONCE
Status: COMPLETED | OUTPATIENT
Start: 2022-08-11 | End: 2022-08-11

## 2022-08-11 RX ORDER — MAGNESIUM SULFATE HEPTAHYDRATE 40 MG/ML
2 INJECTION, SOLUTION INTRAVENOUS ONCE
Status: COMPLETED | OUTPATIENT
Start: 2022-08-11 | End: 2022-08-11

## 2022-08-11 RX ORDER — POTASSIUM CHLORIDE 20 MEQ/1
40 TABLET, EXTENDED RELEASE ORAL
Status: COMPLETED | OUTPATIENT
Start: 2022-08-11 | End: 2022-08-11

## 2022-08-11 RX ADMIN — AZITHROMYCIN MONOHYDRATE 250 MG: 250 TABLET ORAL at 08:08

## 2022-08-11 RX ADMIN — GUAIFENESIN 1200 MG: 600 TABLET, EXTENDED RELEASE ORAL at 08:08

## 2022-08-11 RX ADMIN — OXYCODONE HYDROCHLORIDE AND ACETAMINOPHEN 1 TABLET: 5; 325 TABLET ORAL at 08:08

## 2022-08-11 RX ADMIN — POTASSIUM CHLORIDE 40 MEQ: 1500 TABLET, EXTENDED RELEASE ORAL at 10:08

## 2022-08-11 RX ADMIN — METOPROLOL TARTRATE 100 MG: 100 TABLET, FILM COATED ORAL at 08:08

## 2022-08-11 RX ADMIN — MEROPENEM AND SODIUM CHLORIDE 1 G: 1 INJECTION, SOLUTION INTRAVENOUS at 01:08

## 2022-08-11 RX ADMIN — MEROPENEM AND SODIUM CHLORIDE 1 G: 1 INJECTION, SOLUTION INTRAVENOUS at 08:08

## 2022-08-11 RX ADMIN — AMIODARONE HYDROCHLORIDE 0.5 MG/MIN: 1.8 INJECTION, SOLUTION INTRAVENOUS at 04:08

## 2022-08-11 RX ADMIN — IPRATROPIUM BROMIDE AND ALBUTEROL SULFATE 3 ML: 2.5; .5 SOLUTION RESPIRATORY (INHALATION) at 07:08

## 2022-08-11 RX ADMIN — VENLAFAXINE HYDROCHLORIDE 225 MG: 75 CAPSULE, EXTENDED RELEASE ORAL at 08:08

## 2022-08-11 RX ADMIN — IPRATROPIUM BROMIDE AND ALBUTEROL SULFATE 3 ML: 2.5; .5 SOLUTION RESPIRATORY (INHALATION) at 11:08

## 2022-08-11 RX ADMIN — ENOXAPARIN SODIUM 90 MG: 100 INJECTION SUBCUTANEOUS at 11:08

## 2022-08-11 RX ADMIN — ONDANSETRON 4 MG: 4 TABLET, ORALLY DISINTEGRATING ORAL at 10:08

## 2022-08-11 RX ADMIN — BUSPIRONE HYDROCHLORIDE 5 MG: 5 TABLET ORAL at 08:08

## 2022-08-11 RX ADMIN — VANCOMYCIN HYDROCHLORIDE 1250 MG: 1.25 INJECTION, POWDER, LYOPHILIZED, FOR SOLUTION INTRAVENOUS at 10:08

## 2022-08-11 RX ADMIN — MEROPENEM AND SODIUM CHLORIDE 1 G: 1 INJECTION, SOLUTION INTRAVENOUS at 04:08

## 2022-08-11 RX ADMIN — OXYCODONE HYDROCHLORIDE AND ACETAMINOPHEN 1 TABLET: 5; 325 TABLET ORAL at 05:08

## 2022-08-11 RX ADMIN — OXYCODONE HYDROCHLORIDE AND ACETAMINOPHEN 1 TABLET: 5; 325 TABLET ORAL at 11:08

## 2022-08-11 RX ADMIN — FLUCONAZOLE IN SODIUM CHLORIDE 400 MG: 2 INJECTION, SOLUTION INTRAVENOUS at 04:08

## 2022-08-11 RX ADMIN — MAGNESIUM SULFATE 2 G: 2 INJECTION INTRAVENOUS at 08:08

## 2022-08-11 RX ADMIN — POTASSIUM CHLORIDE 40 MEQ: 1500 TABLET, EXTENDED RELEASE ORAL at 08:08

## 2022-08-11 RX ADMIN — FUROSEMIDE 40 MG: 10 INJECTION, SOLUTION INTRAMUSCULAR; INTRAVENOUS at 10:08

## 2022-08-11 RX ADMIN — MIRTAZAPINE 7.5 MG: 7.5 TABLET, FILM COATED ORAL at 08:08

## 2022-08-11 RX ADMIN — DEXMEDETOMIDINE HYDROCHLORIDE 0.2 MCG/KG/HR: 4 INJECTION INTRAVENOUS at 12:08

## 2022-08-11 RX ADMIN — THIAMINE HCL TAB 100 MG 100 MG: 100 TAB at 08:08

## 2022-08-11 RX ADMIN — ENOXAPARIN SODIUM 90 MG: 100 INJECTION SUBCUTANEOUS at 10:08

## 2022-08-11 RX ADMIN — IPRATROPIUM BROMIDE AND ALBUTEROL SULFATE 3 ML: 2.5; .5 SOLUTION RESPIRATORY (INHALATION) at 03:08

## 2022-08-11 NOTE — PT/OT/SLP RE-EVAL
Physical Therapy Co- Re-evaluation and co-treatment with OT    Patient Name:  Vale Gutierrez   MRN:  [id number]    Recommendations:     Discharge Recommendations:  nursing facility, skilled   Discharge Equipment Recommendations:  (will determine DME needs closer to discharge)   Barriers to discharge: Decreased caregiver support  Family will not be able to assist at current functional level.     Assessment:     Vale Gutierrez is a 61 y.o. female admitted with a medical diagnosis of Acute hypoxemic respiratory failure.  She presents with the following impairments/functional limitations:  weakness, impaired endurance, impaired functional mobility, gait instability, impaired balance, decreased safety awareness, decreased lower extremity function pt tolerated treatment well and will benefit from skilled PT 3x/wk to progress physically. Pt will need SNF placement when medically stable to maximize rehab potential. Pt was evaluated 8/5 acute hypoxic respiratory failure and was transferred to ICU 8/7 with decreased respiratory status.    SOCIAL: pt lives with her sister in 1 st floor apt with slab entrance. Pt sister will assist. Pt owns SC, shower chair, rollator .    Rehab Prognosis:  Good ; patient would benefit from acute skilled PT services to address these deficits and reach maximum level of function.      Recent Surgery: * No surgery found *      Plan:     During this hospitalization, patient to be seen 3 x/week to address the above listed problems via gait training, therapeutic activities, therapeutic exercises, neuromuscular re-education  · Plan of Care Expires:  09/08/22   Plan of Care Reviewed with: patient    Subjective     Communicated with nurse prior to session.  Patient found supine with telemetry, pulse ox (continuous), blood pressure cuff, peripheral IV, oxygen (hep lock IV) upon PT entry to room, agreeable to evaluation.      Chief Complaint: pt c/o back pain during treatment.   Patient  comments/goals: to get better and go home.   Pain/Comfort:  · Pain Rating 1: 10/10 (back (chronic))  · Pain Addressed 1: Distraction  · Pain Rating Post-Intervention 1: 10/10 (back)    Patients cultural, spiritual, Adventism conflicts given the current situation: no      Objective:     Patient found with: telemetry, pulse ox (continuous), blood pressure cuff, peripheral IV, oxygen (hep lock IV)     General Precautions: Standard, fall   Orthopedic Precautions:N/A   Braces:    Respiratory Status: Comfort flow, flow 50 L/min, concentration 92%    Exams:  · Cognitive Exam:  Patient is oriented to Person, Place, Time and Situation  · RLE ROM: WFL  · RLE Strength: WFL  · LLE ROM: WFL  · LLE Strength: WFL    Functional Mobility:  · Bed Mobility:  Pt needed verbal cues for hand placement and sequencing for functional mobility.    · Rolling Left:  contact guard assistance  · Supine to Sit: contact guard assistance  ·   · Transfers:     · Sit to Stand:  minimum assistance with hand-held assist  · Bed to Chair: maximum  assistance with  hand-held assist  using  Stand Pivot. Pt was not able to move feet to pivot into chair.   ·   Balance: pt sat on EOB with min assist leaning to L side but was able to correct and was CGA sitting balance.     Due to pt complex medical condition, the skill of 2 licensed therapists is needed to maximize treatment session and progression towards goals    AM-PAC 6 CLICK MOBILITY  Total Score:14       Therapeutic Activities and Exercises:   pt received verbal instructions in role of PT and POC. Pt verbally expressed understanding of such.     Patient left up in chair with all lines intact, call button in reach and RN notified.    GOALS:   Multidisciplinary Problems     Physical Therapy Goals        Problem: Physical Therapy    Goal Priority Disciplines Outcome Goal Variances Interventions   Physical Therapy Goal     PT, PT/OT Ongoing, Progressing     Description: Goals to be met by:9/8/22      Patient will increase functional independence with mobility by performin. Supine to sit with Stand-by Assistance  2. Sit to supine with Stand-by Assistance  3. Sit to stand transfer with Stand-by Assistance  4. Gait  x 50 feet with Contact Guard Assistance using LRAD, if needed.   5. Stand for 5 minutes with Stand-by Assistance using LRAD, if needed  6. Lower extremity exercise program x10 reps per handout, with independence                     History:     Past Medical History:   Diagnosis Date    Alcohol abuse     Anxiety     Benign paroxysmal positional vertigo 2020    Depression     Hyperlipidemia     Hypertension        Past Surgical History:   Procedure Laterality Date    BREAST CYST ASPIRATION      CHOLECYSTECTOMY      complicated with bile leak, sepsis    COLONOSCOPY N/A 6/3/2020    Procedure: COLONOSCOPY Golytely;  Surgeon: Tino Neil MD;  Location: [facility];  Service: Colon and Rectal;  Laterality: N/A;    EPIDURAL STEROID INJECTION N/A 2022    Procedure: INJECTION, STEROID, EPIDURAL, CAUDAL CONTRAST;  Surgeon: Rubén Rico MD;  Location: [facility];  Service: Pain Management;  Laterality: N/A;   RESCHEDULE    ESOPHAGOGASTRODUODENOSCOPY N/A 6/3/2020    Procedure: EGD (ESOPHAGOGASTRODUODENOSCOPY);  Surgeon: Tino Neil MD;  Location: [facility];  Service: Colon and Rectal;  Laterality: N/A;    INJECTION OF ANESTHETIC AGENT AROUND NERVE Bilateral 3/24/2022    Procedure: BLOCK, NERVE MEDIAL BRANCH BLOCK BL L2, L3, L4 and L5  1st, needs consent;  Surgeon: Rubén Rico MD;  Location: [facility];  Service: Pain Management;  Laterality: Bilateral;    TRANSFORAMINAL EPIDURAL INJECTION OF STEROID Bilateral 2020    Procedure: INJECTION, STEROID, EPIDURAL, TRANSFORAMINAL APPROACH, L4-L5 need consent;  Surgeon: Rubén Rico MD;  Location: [facility];  Service: Pain Management;  Laterality: Bilateral;    TRANSFORAMINAL EPIDURAL INJECTION OF  STEROID Bilateral 9/4/2020    Procedure: LUMBAR TRANSFORAMINAL BILATERAL L4/5 DIRECT REFERRAL;  Surgeon: Rubén Rico MD;  Location: [facility];  Service: Pain Management;  Laterality: Bilateral;  NEEDS CONSENT    TRANSFORAMINAL EPIDURAL INJECTION OF STEROID Bilateral 3/4/2022    Procedure: Injection,steroid,epidural,transforaminal approach BILATERAL L3/4 DIRECT REFERRAL;  Surgeon: Rubén Rico MD;  Location: [facility];  Service: Pain Management;  Laterality: Bilateral;       Time Tracking:     PT Received On: 08/11/22  PT Start Time: 1019     PT Stop Time: 1039  PT Total Time (min): 20 min     Billable Minutes: Re-eval 10 min and Therapeutic Activity 10 min      08/11/2022

## 2022-08-11 NOTE — PROGRESS NOTES
22[facility] - Cardiac Medical ICU  Critical Care Medicine  Progress Note    Patient Name: Vale Gutierrez  MRN: [id number]  Admission Date: 8/1/2022  Hospital Length of Stay: 10 days  Code Status: Full Code  Attending Provider: Nicholas Knowles*  Primary Care Provider: Estela Mcdaniel MD   Principal Problem: Acute hypoxemic respiratory failure    Subjective:     HPI:  Ms. Gutierrez is a 60 y/o female with a PMH of etoh abuse and withdrawal, aspiration pneumonia, delirium, smoking, HTN, HLD, BPPV, alcohol abuse, depression and anxiety who was admitted to the ICU from the floor for acute hypoxic respiratory failure. She was having increased oxygen needs on the floor and was requiring valium to keep her presumed alcohol withdrawal under control. The patient states that her stomach hurts in the midepigastric region and endorses nausea, vomiting x 1 day. She is unsure how long she has been coughing or feeling ill, and says she cannot remember the last few days. She says she lives with her sister Angela who helps care for her.       Hospital/ICU Course:  Patient presented to the ED on 8/1 with a CC of hypoxia and dyspnea. She was initially admitted to the floor. She experienced hallucinations and tremor, consistent with alcohol withdrawal / delirium, and was put on CIWA protocol with valium PRN (shortage of ativan). She was treated with antibiotics (vancomycin, zosyn, and azithro) scheduled for 10 days and duonebs prn. On 8/7 she developed worsening hypoxic respiratory failure, was put on 50L 100% O2 bipap, and admitted to the ICU due to increased oxygen requirements.     8/9: Work up thus far has revealed small pericardial effusion, cardiomegaly, normal LVEF, pleural effusions and opacities bilaterally, and atrial fibrillation. She lives with her sister Angela, who takes care of her typically. Patient's sister Angela says the patient takes percocet for chronic back pain and drinks alcohol frequently and needs  help with almost all ADLs at baseline. Angela says she does not want the patient to come back to live with her because she exhausted caring for her all the time and feels she cannot keep doing it.       Interval History/Significant Events: Patient says she is feeling much better today, less confused and not having any symptoms aside from dyspnea. Denies chest pain, cough, headache, N/V, or abdominal pain.     Review of Systems   Constitutional:  Negative for chills and fever.   HENT:  Negative for congestion and sore throat.    Eyes:  Negative for pain and redness.   Respiratory:  Positive for shortness of breath. Negative for chest tightness.    Cardiovascular:  Negative for chest pain, palpitations and leg swelling.   Gastrointestinal:  Negative for abdominal distention, abdominal pain, nausea and vomiting.   Genitourinary:  Negative for dysuria, flank pain and hematuria.   Musculoskeletal:  Negative for back pain and neck pain.   Skin:  Negative for color change and rash.   Neurological:  Negative for dizziness, speech difficulty, light-headedness and headaches.   Objective:     Vital Signs (Most Recent):  Temp: 98.9 °F (37.2 °C) (08/11/22 0700)  Pulse: 96 (08/11/22 0905)  Resp: (!) 37 (08/11/22 0905)  BP: (!) 105/59 (08/11/22 0900)  SpO2: (!) 90 % (08/11/22 0905)   Vital Signs (24h Range):  Temp:  [98.3 °F (36.8 °C)-101.2 °F (38.4 °C)] 98.9 °F (37.2 °C)  Pulse:  [] 96  Resp:  [18-41] 37  SpO2:  [90 %-99 %] 90 %  BP: ()/(52-84) 105/59   Weight: 90.7 kg (200 lb)  Body mass index is 34.33 kg/m².      Intake/Output Summary (Last 24 hours) at 8/11/2022 0921  Last data filed at 8/11/2022 0700  Gross per 24 hour   Intake 1600.55 ml   Output 500 ml   Net 1100.55 ml       Physical Exam  Constitutional:       Comments: Alert, elderly female in bed, appears comfortable, eating breakfast and breathing comfortably with high flow nasal cannula. Answers questions appropriately    HENT:      Head: Normocephalic and  atraumatic.      Nose: Nose normal.      Mouth/Throat:      Mouth: Mucous membranes are moist.   Eyes:      Conjunctiva/sclera: Conjunctivae normal.      Pupils: Pupils are equal, round, and reactive to light.   Cardiovascular:      Rate and Rhythm: Tachycardia present.      Pulses: Normal pulses.   Pulmonary:      Comments: Breathing comfortably on 35L high flow nasal cannula   Abdominal:      General: There is no distension.      Palpations: Abdomen is soft.      Tenderness: There is no right CVA tenderness or left CVA tenderness.   Musculoskeletal:         General: No swelling.      Cervical back: Neck supple.   Skin:     General: Skin is warm.      Capillary Refill: Capillary refill takes less than 2 seconds.      Coloration: Skin is not jaundiced.   Neurological:      Mental Status: She is oriented to person, place, and time.       Vents:  Oxygen Concentration (%): 90 (08/11/22 0748)  Lines/Drains/Airways       Peripheral Intravenous Line  Duration                  Peripheral IV - Single Lumen 08/07/22 2345 20 G Anterior;Right Forearm 3 days         Peripheral IV - Single Lumen 08/10/22 0800 20 G Right Antecubital 1 day         Peripheral IV - Single Lumen 08/11/22 0123 20 G Anterior;Left;Proximal Forearm <1 day                  Significant Labs:    CBC/Anemia Profile:  Recent Labs   Lab 08/10/22  0437 08/11/22  0519   WBC 9.93 7.77   HGB 9.0* 8.2*   HCT 32.8* 29.4*    223   MCV 86 84   RDW 16.8* 16.8*        Chemistries:  Recent Labs   Lab 08/10/22  0437 08/11/22  0321 08/11/22  0519     --  139   K 3.5  --  2.9*   CL 94*  --  101   CO2 35*  --  29   BUN 19  --  16   CREATININE 0.9  --  0.7   CALCIUM 9.4  --  8.8   ALBUMIN 2.2*  --  1.9*   PROT 6.2  --  5.6*   BILITOT 0.3  --  0.2   ALKPHOS 93  --  87   ALT 13  --  10   AST 15  --  13   MG 1.7  --  1.7   PHOS 2.8 2.7  --        All pertinent labs within the past 24 hours have been reviewed.    Significant Imaging:  I have reviewed all pertinent  imaging results/findings within the past 24 hours.      ABG  Recent Labs   Lab 08/11/22  0820   PH 7.399   PO2 36*   PCO2 63.1*   HCO3 38.9*   BE 14     Assessment/Plan:     Neuro  Chronic pain disorder  Patient has a history of chronic back pain, for which she had surgery in October 2020. She has been followed by a pain management doctor and takes percocet daily for pain.   -Percocet PRN for pain     Psychiatric  Alcohol dependence with withdrawal with perceptual disturbance  -Patient reportedly had tremors and hallucinations on the floor 8/5-8/7  -Sister reports when had detox at  in prev years she was there 14 days with withdrawal + aspiration PNA. Patient had endorsed hx of withdrawal in the past. Sister gave detailed hx also   -Patient previously on CIWA protocol with PRN valium for CIWA >8 (valium was chosen due to ativan shortage) but CIWA protocal and PRN valium have now been discontinued. Pt has been hospitalized 10 days now, low concern for ongoing etoh withdrawal, favor ICU delirium as cause of confusion which is exacerbated by benzodiazepines.     Pulmonary  * Acute hypoxemic respiratory failure with hypercapnia  -High flow nasal cannula, 90% O2 and 50L/min at present.   -Repeat CXR shows slightly worsening opacities bilaterally  -Pt finished azithro course. Discussed with ID and they agree with plan to continue meropenem and diflucan.   -LE ultrasound showed no DVTs  -Continue to wean O2 as tolerated     Aspiration pneumonia of right lower lobe of lung  -Continue antibx     Cardiac/Vascular  Acute on chronic diastolic congestive heart failure  Echo  Interpretation Summary  · The left ventricle is normal in size with concentric hypertrophy and normal systolic function.  · The estimated ejection fraction is 60-65%.  · Grade I left ventricular diastolic dysfunction.  · Mild aortic regurgitation.  · Normal right ventricular size with normal right ventricular systolic function.  · The estimated PA systolic  pressure is 43 mmHg.  · Intermediate central venous pressure (8 mmHg).  · There is pulmonary hypertension.  · Small circumferential pericardial effusion.  · Mild left atrial enlargement.  . Continue Furosemide and monitor clinical status closely. Monitor on telemetry.  Monitor strict Is&Os and daily weights.  Place on fluid restriction of 1.5 L. Continue to stress to patient importance of self efficacy and  on diet for CHF. Last BNP reviewed- and noted below. Has improved since BNP on 8/1 of 504  Recent Labs   Lab 08/08/22  1158   BNP 90   .  -restart lasix 40 mg IV            Critical Care Daily Checklist:    A: Awake: RASS Goal/Actual Goal:  0  Actual: Goldman Agitation Sedation Scale (RASS): Alert and calm   B: Spontaneous Breathing Trial Performed?     C: SAT & SBT Coordinated?  n/a                      D: Delirium: CAM-ICU Overall CAM-ICU: Positive   E: Early Mobility Performed? yes   F: Feeding Goal: Goals: Meet % EEN, EPN by RD f/u date  Status: Nutrition Goal Status: new   Current Diet Order   Procedures    Diet Adult Regular (IDDSI Level 7)      AS: Analgesia/Sedation yes   T: Thromboembolic Prophylaxis yes   H: HOB > 300 Yes   U: Stress Ulcer Prophylaxis (if needed) no   G: Glucose Control yes   B: Bowel Function Stool Occurrence: 1   I: Indwelling Catheter (Lines & [place]) Necessity yes   D: De-escalation of Antimicrobials/Pharmacotherapies Yes     Plan for the day/ETD Start lasix, monitor oxygen requirements and delirium, continue antibiotics     Code Status:  Family/Goals of Care: Full Code  Discussed with pt and her sister Angela        Critical secondary to Patient has a condition that poses threat to life and bodily function: Severe Respiratory Distress      Critical care was time spent personally by me on the following activities: development of treatment plan with patient or surrogate and bedside caregivers, discussions with consultants, evaluation of patient's response to treatment,  examination of patient, ordering and performing treatments and interventions, ordering and review of laboratory studies, ordering and review of radiographic studies, pulse oximetry, re-evaluation of patient's condition. This critical care time did not overlap with that of any other provider or involve time for any procedures.     Denise Crenshaw, DO  Critical Care Medicine  [facility] - Cardiac Medical ICU

## 2022-08-11 NOTE — SUBJECTIVE & OBJECTIVE
Interval History: off bipap today, on comfort flow, still some confusion but more awake and cooperative    Review of Systems   Unable to perform ROS: Acuity of condition   Objective:     Vital Signs (Most Recent):  Temp: 99.3 °F (37.4 °C) (08/11/22 1100)  Pulse: 91 (08/11/22 1558)  Resp: 20 (08/11/22 1558)  BP: 117/79 (08/11/22 1400)  SpO2: (!) 92 % (08/11/22 1558)   Vital Signs (24h Range):  Temp:  [98.3 °F (36.8 °C)-99.3 °F (37.4 °C)] 99.3 °F (37.4 °C)  Pulse:  [] 91  Resp:  [18-37] 20  SpO2:  [84 %-97 %] 92 %  BP: ()/(52-84) 117/79     Weight: 90.7 kg (199 lb 15.3 oz)  Body mass index is 34.32 kg/m².    Estimated Creatinine Clearance: 78.5 mL/min (based on SCr of 0.8 mg/dL).    Physical Exam  Constitutional:       General: She is not in acute distress.     Appearance: She is well-developed. She is ill-appearing. She is not diaphoretic.   HENT:      Head: Normocephalic and atraumatic.      Right Ear: External ear normal.      Left Ear: External ear normal.      Nose: Nose normal.   Eyes:      General: No scleral icterus.        Right eye: No discharge.         Left eye: No discharge.      Extraocular Movements: Extraocular movements intact.      Conjunctiva/sclera: Conjunctivae normal.   Pulmonary:      Effort: Pulmonary effort is normal. No respiratory distress.      Breath sounds: Normal breath sounds. No stridor.   Abdominal:      General: Abdomen is flat. There is no distension.      Palpations: Abdomen is soft.   Musculoskeletal:         General: Normal range of motion.      Comments: [place] neck deformity of b/l thumbs   Skin:     Findings: No erythema or rash.      Comments: cool   Neurological:      Mental Status: She is disoriented.       Significant Labs: CBC:   Recent Labs   Lab 08/10/22  0437 08/11/22  0519   WBC 9.93 7.77   HGB 9.0* 8.2*   HCT 32.8* 29.4*    223     CMP:   Recent Labs   Lab 08/10/22  0437 08/11/22  0519 08/11/22  1326    139 141   K 3.5 2.9* 3.8   CL 94* 101 95    CO2 35* 29 33*   GLU 89 85 117*   BUN 19 16 16   CREATININE 0.9 0.7 0.8   CALCIUM 9.4 8.8 9.7   PROT 6.2 5.6*  --    ALBUMIN 2.2* 1.9*  --    BILITOT 0.3 0.2  --    ALKPHOS 93 87  --    AST 15 13  --    ALT 13 10  --    ANIONGAP 10 9 13     Microbiology Results (last 7 days)       Procedure Component Value Units Date/Time    Blood culture [[id number]] Collected: 08/10/22 1153    Order Status: Completed Specimen: Blood from Peripheral, Antecubital, Right Updated: 08/11/22 1412     Blood Culture, Routine No Growth to date      No Growth to date    Blood culture [[id number]] Collected: 08/10/22 1153    Order Status: Completed Specimen: Blood from Peripheral, Antecubital, Right Updated: 08/11/22 1412     Blood Culture, Routine No Growth to date      No Growth to date    Respiratory Infection Panel (PCR), Nasopharyngeal [[id number]] Collected: 08/07/22 1601    Order Status: Completed Specimen: Nasopharyngeal Swab Updated: 08/07/22 1828     Respiratory Infection Panel Source NP Swab     Adenovirus Not Detected     Coronavirus 229E, Common Cold Virus Not Detected     Coronavirus HKU1, Common Cold Virus Not Detected     Coronavirus NL63, Common Cold Virus Not Detected     Coronavirus OC43, Common Cold Virus Not Detected     Comment: The Coronavirus strains detected in this test cause the common cold.  These strains are not the COVID-19 (novel Coronavirus)strain   associated with the respiratory disease outbreak.          SARS-CoV2 (COVID-19) Qualitative PCR Not Detected     Human Metapneumovirus Not Detected     Human Rhinovirus/Enterovirus Not Detected     Influenza A (subtypes H1, H1-2009,H3) Not Detected     Influenza B Not Detected     Parainfluenza Virus 1 Not Detected     Parainfluenza Virus 2 Not Detected     Parainfluenza Virus 3 Not Detected     Parainfluenza Virus 4 Not Detected     Respiratory Syncytial Virus Not Detected     Bordetella Parapertussis ([id number]) Not Detected     Bordetella pertussis (ptxP) Not  Detected     Chlamydia pneumoniae Not Detected     Mycoplasma pneumoniae Not Detected    Narrative:      For all other respiratory sources, order [id number] -  Respiratory Viral Panel by PCR    Blood culture #1 **CANNOT BE ORDERED STAT** [[id number]] Collected: 08/01/22 1831    Order Status: Completed Specimen: Blood from Peripheral, Hand, Left Updated: 08/06/22 2012     Blood Culture, Routine No growth after 5 days.    Blood culture #2 **CANNOT BE ORDERED STAT** [[id number]] Collected: 08/01/22 1822    Order Status: Completed Specimen: Blood from Peripheral, Hand, Right Updated: 08/06/22 2012     Blood Culture, Routine No growth after 5 days.            Significant Imaging: I have reviewed all pertinent imaging results/findings within the past 24 hours.

## 2022-08-11 NOTE — ASSESSMENT & PLAN NOTE
61F admitted with ETOH withdrawal and aspiration pneumonia. CT chest w/ consolidations, increasing pericardial effusion, worsening hypoxic respiratory failure requiring bipap 90%, intermittent fevers to 101.2 despite broad spectrum vanc / zosyn / azithro    Recommendations:   - continue melissa - 7 days total  - d/c fluc tomorrow if blood cx remain negative  - obtain blood and respiratory cultures if able    Will sign off. Please call back if cultures turn positive

## 2022-08-11 NOTE — PROGRESS NOTES
"  [facility] - Cardiac Medical ICU  Adult Nutrition  Consult Note    SUMMARY     Recommendations    1. Continue current Regular diet.   2. RD to monitor & follow-up.    Goals: Meet % EEN, EPN by RD f/u date  Nutrition Goal Status: new  Communication of RD Recs: reviewed with RN    Assessment and Plan    No nutritional dx at this time.    Reason for Assessment    Reason For Assessment: length of stay  Diagnosis: other (see comments) (Resp. fx)  Relevant Medical History: Etoh abuse, HTN, HLD  Interdisciplinary Rounds: attended    General Information Comments: Unable to assess pt this AM 2/2 team at bedside. Per RN documentation, pt tolerating diet w/ 50-75% PO intake. Appears nourished; UBW: 200# per chart review. No indicators of malnutrition noted.  Nutrition Discharge Planning: Adequate PO intake    Nutrition/Diet History    Spiritual, Cultural Beliefs, Episcopal Practices, Values that Affect Care: no  Factors Affecting Nutritional Intake: None identified at this time    Anthropometrics    Temp: 99.3 °F (37.4 °C)  Height Method: Stated  Height: 5' 4" (162.6 cm)  Height (inches): 64 in  Weight Method: Stated  Weight: 90.7 kg (199 lb 15.3 oz)  Weight (lb): 199.96 lb  Ideal Body Weight (IBW), Female: 120 lb  % Ideal Body Weight, Female (lb): 166.63 %  BMI (Calculated): 34.3  BMI Grade: 30 - 34.9- obesity - grade I    Lab/Procedures/Meds    Pertinent Labs Reviewed: reviewed  Pertinent Medications Reviewed: reviewed  Pertinent Medications Comments: Amidarone, Prececex, Levophed    Estimated/Assessed Needs    Weight Used For Calorie Calculations: 90.7 kg (199 lb 15.3 oz)     Energy Calorie Requirements (kcal): 1592 kcal/d  Energy Need Method: Dundy-St Jeor (1.1 PAL)     Protein Requirements: 91 g/d (1 g/kg)  Weight Used For Protein Calculations: 90.7 kg (199 lb 15.3 oz)     Estimated Fluid Requirement Method: other (see comments) (Per MD or 1 mL/kcal)  RDA Method (mL): 1592    Nutrition Prescription " Ordered    Current Diet Order: Regular    Evaluation of Received Nutrient/Fluid Intake    I/O: -3.2L since admit    Comments: LBM: 8/9    Tolerance: tolerating    Nutrition Risk    Level of Risk/Frequency of Follow-up:  (1x/week)     Monitor and Evaluation    Food and Nutrient Intake: energy intake, food and beverage intake  Food and Nutrient Adminstration: diet order  Physical Activity and Function: nutrition-related ADLs and IADLs  Anthropometric Measurements: weight, weight change  Biochemical Data, Medical Tests and Procedures: inflammatory profile, lipid profile, glucose/endocrine profile, gastrointestinal profile, electrolyte and renal panel  Nutrition-Focused Physical Findings: overall appearance     Nutrition Follow-Up    RD Follow-up?: Yes

## 2022-08-11 NOTE — PROGRESS NOTES
[facility]  Infectious Disease  Progress Note    Patient Name: Vale Gutierrez  MRN: [id number]  Admission Date: 8/1/2022  Length of Stay: 10 days  Attending Physician: Nicholas Knowles*  Primary Care Provider: Estela Mcdaniel MD    Isolation Status: No active isolations  Assessment/Plan:      Aspiration pneumonia  61F admitted with ETOH withdrawal and aspiration pneumonia. CT chest w/ consolidations, increasing pericardial effusion, worsening hypoxic respiratory failure requiring bipap 90%, intermittent fevers to 101.2 despite broad spectrum vanc / zosyn / azithro    Recommendations:   - continue melissa - 7 days total  - d/c fluc tomorrow if blood cx remain negative  - obtain blood and respiratory cultures if able    Will sign off. Please call back if cultures turn positive        Anticipated Disposition: TBD    Thank you for your consult. I will sign off. Please contact us if you have any additional questions.    Fatuma Holland DO  Critical Care Infectious Disease    Time: 35 minutes   50% of time spent on face-to-face counseling and coordination of care. Counseling included review of test results, diagnosis, and treatment plan with patient and/or family.        Subjective:     Principal Problem:Acute hypoxemic respiratory failure    HPI: 61F with history of ETOH abuse and withdrawal, aspiration pneumonia, tobacco abuse, HTN, HLD, who was admitted on 8/1 w/ SOB. Developed ETOH withdrawal, started on CIWA protocol, and was started on treatment for pneumonia with vanc, pip-tazo and azithromycin. She developed worsening hypoxic respiratory failure on 8/7, requiring bipap, and was transferred to ICU for further evaluation. Pt has developed intermittent fevers w/ Tm 101.2. Blood cx 8/1 and RIP are negative. Respiratory cultures were unable to be obtained. CT chest on admission w/ RLL and lingular consolidations, cardiomegaly w/ increased volume of moderate pericardial effusion.  ID consulted  for ongoing fevers and worsening respiratory status, now on bipap 90%. Pt is unable to provide any additional history due to clinical status.       Interval History: off bipap today, on comfort flow, still some confusion but more awake and cooperative    Review of Systems   Unable to perform ROS: Acuity of condition   Objective:     Vital Signs (Most Recent):  Temp: 99.3 °F (37.4 °C) (08/11/22 1100)  Pulse: 91 (08/11/22 1558)  Resp: 20 (08/11/22 1558)  BP: 117/79 (08/11/22 1400)  SpO2: (!) 92 % (08/11/22 1558)   Vital Signs (24h Range):  Temp:  [98.3 °F (36.8 °C)-99.3 °F (37.4 °C)] 99.3 °F (37.4 °C)  Pulse:  [] 91  Resp:  [18-37] 20  SpO2:  [84 %-97 %] 92 %  BP: ()/(52-84) 117/79     Weight: 90.7 kg (199 lb 15.3 oz)  Body mass index is 34.32 kg/m².    Estimated Creatinine Clearance: 78.5 mL/min (based on SCr of 0.8 mg/dL).    Physical Exam  Constitutional:       General: She is not in acute distress.     Appearance: She is well-developed. She is ill-appearing. She is not diaphoretic.   HENT:      Head: Normocephalic and atraumatic.      Right Ear: External ear normal.      Left Ear: External ear normal.      Nose: Nose normal.   Eyes:      General: No scleral icterus.        Right eye: No discharge.         Left eye: No discharge.      Extraocular Movements: Extraocular movements intact.      Conjunctiva/sclera: Conjunctivae normal.   Pulmonary:      Effort: Pulmonary effort is normal. No respiratory distress.      Breath sounds: Normal breath sounds. No stridor.   Abdominal:      General: Abdomen is flat. There is no distension.      Palpations: Abdomen is soft.   Musculoskeletal:         General: Normal range of motion.      Comments: [place] neck deformity of b/l thumbs   Skin:     Findings: No erythema or rash.      Comments: cool   Neurological:      Mental Status: She is disoriented.       Significant Labs: CBC:   Recent Labs   Lab 08/10/22  0437 08/11/22  0519   WBC 9.93 7.77   HGB 9.0* 8.2*   HCT  32.8* 29.4*    223     CMP:   Recent Labs   Lab 08/10/22  0437 08/11/22  0519 08/11/22  1326    139 141   K 3.5 2.9* 3.8   CL 94* 101 95   CO2 35* 29 33*   GLU 89 85 117*   BUN 19 16 16   CREATININE 0.9 0.7 0.8   CALCIUM 9.4 8.8 9.7   PROT 6.2 5.6*  --    ALBUMIN 2.2* 1.9*  --    BILITOT 0.3 0.2  --    ALKPHOS 93 87  --    AST 15 13  --    ALT 13 10  --    ANIONGAP 10 9 13     Microbiology Results (last 7 days)       Procedure Component Value Units Date/Time    Blood culture [[id number]] Collected: 08/10/22 1153    Order Status: Completed Specimen: Blood from Peripheral, Antecubital, Right Updated: 08/11/22 1412     Blood Culture, Routine No Growth to date      No Growth to date    Blood culture [[id number]] Collected: 08/10/22 1153    Order Status: Completed Specimen: Blood from Peripheral, Antecubital, Right Updated: 08/11/22 1412     Blood Culture, Routine No Growth to date      No Growth to date    Respiratory Infection Panel (PCR), Nasopharyngeal [[id number]] Collected: 08/07/22 1601    Order Status: Completed Specimen: Nasopharyngeal Swab Updated: 08/07/22 1828     Respiratory Infection Panel Source NP Swab     Adenovirus Not Detected     Coronavirus 229E, Common Cold Virus Not Detected     Coronavirus HKU1, Common Cold Virus Not Detected     Coronavirus NL63, Common Cold Virus Not Detected     Coronavirus OC43, Common Cold Virus Not Detected     Comment: The Coronavirus strains detected in this test cause the common cold.  These strains are not the COVID-19 (novel Coronavirus)strain   associated with the respiratory disease outbreak.          SARS-CoV2 (COVID-19) Qualitative PCR Not Detected     Human Metapneumovirus Not Detected     Human Rhinovirus/Enterovirus Not Detected     Influenza A (subtypes H1, H1-2009,H3) Not Detected     Influenza B Not Detected     Parainfluenza Virus 1 Not Detected     Parainfluenza Virus 2 Not Detected     Parainfluenza Virus 3 Not Detected     Parainfluenza Virus 4  Not Detected     Respiratory Syncytial Virus Not Detected     Bordetella Parapertussis (OH1560) Not Detected     Bordetella pertussis (ptxP) Not Detected     Chlamydia pneumoniae Not Detected     Mycoplasma pneumoniae Not Detected    Narrative:      For all other respiratory sources, order [id number] -  Respiratory Viral Panel by PCR    Blood culture #1 **CANNOT BE ORDERED STAT** [[id number]] Collected: 08/01/22 1831    Order Status: Completed Specimen: Blood from Peripheral, Hand, Left Updated: 08/06/22 2012     Blood Culture, Routine No growth after 5 days.    Blood culture #2 **CANNOT BE ORDERED STAT** [[id number]] Collected: 08/01/22 1822    Order Status: Completed Specimen: Blood from Peripheral, Hand, Right Updated: 08/06/22 2012     Blood Culture, Routine No growth after 5 days.            Significant Imaging: I have reviewed all pertinent imaging results/findings within the past 24 hours.

## 2022-08-11 NOTE — PLAN OF CARE
Problem: Physical Therapy  Goal: Physical Therapy Goal  Description: Goals to be met by:22     Patient will increase functional independence with mobility by performin. Supine to sit with Stand-by Assistance  2. Sit to supine with Stand-by Assistance  3. Sit to stand transfer with Stand-by Assistance  4. Gait  x 50 feet with Contact Guard Assistance using LRAD, if needed.   5. Stand for 5 minutes with Stand-by Assistance using LRAD, if needed  6. Lower extremity exercise program x10 reps per handout, with independence    Outcome: Ongoing, Progressing   Re-evaluation completed and goals appropriate. 2022

## 2022-08-11 NOTE — PT/OT/SLP RE-EVAL
Occupational Therapy   Re-evaluation    Name: Vale Gutierrez  MRN: [id number]  Admitting Diagnosis:  Acute hypoxemic respiratory failure  Recent Surgery: * No surgery found *      Recommendations:     Discharge Recommendations: nursing facility, skilled  Discharge Equipment Recommendations:  walker, rolling, bedside commode  Barriers to discharge:  Decreased caregiver support    Assessment:     Vale Gutierrez is a 61 y.o. female with a medical diagnosis of Acute hypoxemic respiratory failure.  Pt was evaluated 8/5 acute hypoxic respiratory failure and was transferred to ICU 8/7 with decreased respiratory status / pneumonia. Initial admit was 8/01.     SOCIAL: pt lives with her sister in 1 st floor apt with slab entrance. Pt sister will assist. Pt owns SC, shower chair, rollator. Performance deficits affecting function are weakness, impaired endurance, impaired self care skills, impaired functional mobility, gait instability, impaired balance, decreased coordination, decreased safety awareness.      Rehab Prognosis:  Good; patient would benefit from acute skilled OT services to address these deficits and reach maximum level of function.       Plan:     Patient to be seen 3 x/week to address the above listed problems via self-care/home management, therapeutic exercises, therapeutic activities  · Plan of Care Expires: 09/10/22  · Plan of Care Reviewed with: patient    Subjective     Communicated with: rn prior to session.  Pain/Comfort:  · Pain Rating 1: 10/10  · Location 1: back    Objective:     Communicated with: rn prior to session.  Patient found supine with: oxygen, peripheral IV, pulse ox (continuous), telemetry upon OT entry to room.    General Precautions: Standard, fall   Orthopedic Precautions:N/A   Braces:    Respiratory Status: High flow, flow 50 L/min, concentration 92%    Occupational Performance:    Bed Mobility:    · Patient completed Rolling/Turning to Left with  contact guard  assistance  · Patient completed Scooting/Bridging with moderate assistance  · Patient completed Supine to Sit with contact guard assistance    Functional Mobility/Transfers:  · Patient completed Sit <> Stand Transfer with minimum assistance  with  no assistive device   · Patient completed Bed <> Chair Transfer using Stand Pivot technique with maximal assistance with no assistive device    Activities of Daily Living:  · Feeding:  independence   · Grooming: stand by assistance from EOB.  · Lower Body Dressing: moderate assistance     Cognitive/Visual Perceptual:  Cognitive/Psychosocial Skills:     -       Oriented to: Person, Place, Time and Situation   -       Safety awareness/insight to disability: impaired     Physical Exam:  BUE AROM/MMT: WNL  EOB balance = CGA-Min A due to left leaning.    [facility] 6 Click:  AMPA Total Score: 18    Treatment & Education:  Education:  UE ROM/MMT  Bed mobility training / assessment  Functional mobility assessment  Sit/standing balance assessment  Educated on importance of sitting OOB in bedside chair to promote increased strength, endurance & breathing.  Discussed OT POC / Post-acute plan    Patient left up in chair with all lines intact and call button in reach    GOALS:   Multidisciplinary Problems     Occupational Therapy Goals        Problem: Occupational Therapy    Goal Priority Disciplines Outcome Interventions   Occupational Therapy Goal     OT, PT/OT Ongoing, Progressing    Description: Goals to be met by: 8/18/22    Patient will increase functional independence with ADLs by performing:    LE Dressing with Stand-by Assistance.  Grooming while standing with Stand-by Assistance.  Toileting from bedside commode with Minimal Assistance for hygiene and clothing management.   Sitting at edge of bed with Supervision.  Supine to sit with Supervision.  Step transfer with Contact Guard Assistance  Toilet transfer to bedside commode with Contact Guard Assistance.                      History:     Past Medical History:   Diagnosis Date    Alcohol abuse     Anxiety     Benign paroxysmal positional vertigo 1/17/2020    Depression     Hyperlipidemia     Hypertension        Past Surgical History:   Procedure Laterality Date    BREAST CYST ASPIRATION      CHOLECYSTECTOMY      complicated with bile leak, sepsis    COLONOSCOPY N/A 6/3/2020    Procedure: COLONOSCOPY Golytely;  Surgeon: Tino Neil MD;  Location: [facility] ENDO;  Service: Colon and Rectal;  Laterality: N/A;    EPIDURAL STEROID INJECTION N/A 5/27/2022    Procedure: INJECTION, STEROID, EPIDURAL, CAUDAL CONTRAST;  Surgeon: Rubén Rico MD;  Location: [facility] [place] PAIN MGT;  Service: Pain Management;  Laterality: N/A;  5/6 RESCHEDULE    ESOPHAGOGASTRODUODENOSCOPY N/A 6/3/2020    Procedure: EGD (ESOPHAGOGASTRODUODENOSCOPY);  Surgeon: Tino Neil MD;  Location: [facility];  Service: Colon and Rectal;  Laterality: N/A;    INJECTION OF ANESTHETIC AGENT AROUND NERVE Bilateral 3/24/2022    Procedure: BLOCK, NERVE MEDIAL BRANCH BLOCK BL L2, L3, L4 and L5  1st, needs consent;  Surgeon: Rubén Rico MD;  Location: [facility] [place] PAIN MGT;  Service: Pain Management;  Laterality: Bilateral;    TRANSFORAMINAL EPIDURAL INJECTION OF STEROID Bilateral 8/7/2020    Procedure: INJECTION, STEROID, EPIDURAL, TRANSFORAMINAL APPROACH, L4-L5 need consent;  Surgeon: Rubén Rico MD;  Location: [facility] [place] PAIN MGT;  Service: Pain Management;  Laterality: Bilateral;    TRANSFORAMINAL EPIDURAL INJECTION OF STEROID Bilateral 9/4/2020    Procedure: LUMBAR TRANSFORAMINAL BILATERAL L4/5 DIRECT REFERRAL;  Surgeon: Rubén Rico MD;  Location: [facility] [place] PAIN MGT;  Service: Pain Management;  Laterality: Bilateral;  NEEDS CONSENT    TRANSFORAMINAL EPIDURAL INJECTION OF STEROID Bilateral 3/4/2022    Procedure: Injection,steroid,epidural,transforaminal approach BILATERAL L3/4 DIRECT REFERRAL;  Surgeon: Rubén Rico MD;  Location: [facility] [place] PAIN MGT;  Service: Pain  Management;  Laterality: Bilateral;       Time Tracking:     OT Date of Treatment: 08/11/22  OT Start Time: 1019  OT Stop Time: 1041  OT Total Time (min): 22 min    Billable Minutes: Re-Evaluation 10  Self Care/Home Management 12    8/11/2022

## 2022-08-12 LAB
ALBUMIN SERPL BCP-MCNC: 2 G/DL (ref 3.5–5.2)
ALP SERPL-CCNC: 94 U/L (ref 55–135)
ALT SERPL W/O P-5'-P-CCNC: 11 U/L (ref 10–44)
ANION GAP SERPL CALC-SCNC: 8 MMOL/L (ref 8–16)
ASCENDING AORTA: 3.38 CM
AST SERPL-CCNC: 15 U/L (ref 10–40)
AV INDEX (PROSTH): 0.82
AV MEAN GRADIENT: 9 MMHG
AV PEAK GRADIENT: 14 MMHG
AV VALVE AREA: 2.96 CM2
AV VELOCITY RATIO: 0.83
BASOPHILS # BLD AUTO: 0.04 K/UL (ref 0–0.2)
BASOPHILS NFR BLD: 0.5 % (ref 0–1.9)
BILIRUB SERPL-MCNC: 0.1 MG/DL (ref 0.1–1)
BSA FOR ECHO PROCEDURE: 2.02 M2
BUN SERPL-MCNC: 14 MG/DL (ref 8–23)
CALCIUM SERPL-MCNC: 9.8 MG/DL (ref 8.7–10.5)
CHLORIDE SERPL-SCNC: 97 MMOL/L (ref 95–110)
CO2 SERPL-SCNC: 32 MMOL/L (ref 23–29)
CREAT SERPL-MCNC: 0.7 MG/DL (ref 0.5–1.4)
CV ECHO LV RWT: 0.66 CM
DIFFERENTIAL METHOD: ABNORMAL
DOP CALC AO PEAK VEL: 1.85 M/S
DOP CALC AO VTI: 38.47 CM
DOP CALC LVOT AREA: 3.6 CM2
DOP CALC LVOT DIAMETER: 2.14 CM
DOP CALC LVOT PEAK VEL: 1.54 M/S
DOP CALC LVOT STROKE VOLUME: 113.82 CM3
DOP CALCLVOT PEAK VEL VTI: 31.66 CM
E WAVE DECELERATION TIME: 262.08 MSEC
E/A RATIO: 0.59
E/E' RATIO: 7.57 M/S
ECHO LV POSTERIOR WALL: 1.14 CM (ref 0.6–1.1)
EJECTION FRACTION: 68 %
EOSINOPHIL # BLD AUTO: 0.2 K/UL (ref 0–0.5)
EOSINOPHIL NFR BLD: 2.2 % (ref 0–8)
ERYTHROCYTE [DISTWIDTH] IN BLOOD BY AUTOMATED COUNT: 17.2 % (ref 11.5–14.5)
EST. GFR  (NO RACE VARIABLE): >60 ML/MIN/1.73 M^2
FRACTIONAL SHORTENING: 36 % (ref 28–44)
GLUCOSE SERPL-MCNC: 95 MG/DL (ref 70–110)
HCT VFR BLD AUTO: 29.3 % (ref 37–48.5)
HGB BLD-MCNC: 8.2 G/DL (ref 12–16)
IMM GRANULOCYTES # BLD AUTO: 0.04 K/UL (ref 0–0.04)
IMM GRANULOCYTES NFR BLD AUTO: 0.5 % (ref 0–0.5)
INTERVENTRICULAR SEPTUM: 1.03 CM (ref 0.6–1.1)
LA MAJOR: 5.48 CM
LA MINOR: 5.65 CM
LA WIDTH: 3.34 CM
LEFT ATRIUM SIZE: 3.39 CM
LEFT ATRIUM VOLUME INDEX MOD: 17.1 ML/M2
LEFT ATRIUM VOLUME INDEX: 27.5 ML/M2
LEFT ATRIUM VOLUME MOD: 33.42 CM3
LEFT ATRIUM VOLUME: 53.55 CM3
LEFT INTERNAL DIMENSION IN SYSTOLE: 2.2 CM (ref 2.1–4)
LEFT VENTRICLE DIASTOLIC VOLUME INDEX: 25.02 ML/M2
LEFT VENTRICLE DIASTOLIC VOLUME: 48.79 ML
LEFT VENTRICLE MASS INDEX: 58 G/M2
LEFT VENTRICLE SYSTOLIC VOLUME INDEX: 8.3 ML/M2
LEFT VENTRICLE SYSTOLIC VOLUME: 16.13 ML
LEFT VENTRICULAR INTERNAL DIMENSION IN DIASTOLE: 3.44 CM (ref 3.5–6)
LEFT VENTRICULAR MASS: 113.63 G
LV LATERAL E/E' RATIO: 6.63 M/S
LV SEPTAL E/E' RATIO: 8.83 M/S
LYMPHOCYTES # BLD AUTO: 1.8 K/UL (ref 1–4.8)
LYMPHOCYTES NFR BLD: 21.4 % (ref 18–48)
MAGNESIUM SERPL-MCNC: 1.9 MG/DL (ref 1.6–2.6)
MCH RBC QN AUTO: 23.7 PG (ref 27–31)
MCHC RBC AUTO-ENTMCNC: 28 G/DL (ref 32–36)
MCV RBC AUTO: 85 FL (ref 82–98)
MONOCYTES # BLD AUTO: 0.7 K/UL (ref 0.3–1)
MONOCYTES NFR BLD: 8.3 % (ref 4–15)
MV PEAK A VEL: 0.9 M/S
MV PEAK E VEL: 0.53 M/S
MV STENOSIS PRESSURE HALF TIME: 76 MS
MV VALVE AREA P 1/2 METHOD: 2.89 CM2
NEUTROPHILS # BLD AUTO: 5.6 K/UL (ref 1.8–7.7)
NEUTROPHILS NFR BLD: 67.1 % (ref 38–73)
NRBC BLD-RTO: 0 /100 WBC
PHOSPHATE SERPL-MCNC: 2.4 MG/DL (ref 2.7–4.5)
PISA TR MAX VEL: 2.92 M/S
PLATELET # BLD AUTO: 271 K/UL (ref 150–450)
PMV BLD AUTO: 11 FL (ref 9.2–12.9)
POCT GLUCOSE: 107 MG/DL (ref 70–110)
POTASSIUM SERPL-SCNC: 3.9 MMOL/L (ref 3.5–5.1)
PROT SERPL-MCNC: 6.1 G/DL (ref 6–8.4)
RA MAJOR: 4.4 CM
RA PRESSURE: 15 MMHG
RA WIDTH: 3.48 CM
RBC # BLD AUTO: 3.46 M/UL (ref 4–5.4)
RIGHT VENTRICULAR END-DIASTOLIC DIMENSION: 2.63 CM
RV TISSUE DOPPLER FREE WALL SYSTOLIC VELOCITY 1 (APICAL 4 CHAMBER VIEW): 14.3 CM/S
SINUS: 3.03 CM
SODIUM SERPL-SCNC: 137 MMOL/L (ref 136–145)
STJ: 2.47 CM
TDI LATERAL: 0.08 M/S
TDI SEPTAL: 0.06 M/S
TDI: 0.07 M/S
TR MAX PG: 34 MMHG
TRICUSPID ANNULAR PLANE SYSTOLIC EXCURSION: 1 CM
TV REST PULMONARY ARTERY PRESSURE: 49 MMHG
WBC # BLD AUTO: 8.36 K/UL (ref 3.9–12.7)

## 2022-08-12 PROCEDURE — 25000003 PHARM REV CODE 250: Performed by: HOSPITALIST

## 2022-08-12 PROCEDURE — 94660 CPAP INITIATION&MGMT: CPT

## 2022-08-12 PROCEDURE — 99900035 HC TECH TIME PER 15 MIN (STAT)

## 2022-08-12 PROCEDURE — 20000000 HC ICU ROOM

## 2022-08-12 PROCEDURE — 27000221 HC OXYGEN, UP TO 24 HOURS

## 2022-08-12 PROCEDURE — 85025 COMPLETE CBC W/AUTO DIFF WBC: CPT | Performed by: STUDENT IN AN ORGANIZED HEALTH CARE EDUCATION/TRAINING PROGRAM

## 2022-08-12 PROCEDURE — 83735 ASSAY OF MAGNESIUM: CPT | Performed by: STUDENT IN AN ORGANIZED HEALTH CARE EDUCATION/TRAINING PROGRAM

## 2022-08-12 PROCEDURE — 94640 AIRWAY INHALATION TREATMENT: CPT

## 2022-08-12 PROCEDURE — 25000003 PHARM REV CODE 250

## 2022-08-12 PROCEDURE — 27100171 HC OXYGEN HIGH FLOW UP TO 24 HOURS

## 2022-08-12 PROCEDURE — 80053 COMPREHEN METABOLIC PANEL: CPT | Performed by: STUDENT IN AN ORGANIZED HEALTH CARE EDUCATION/TRAINING PROGRAM

## 2022-08-12 PROCEDURE — 84100 ASSAY OF PHOSPHORUS: CPT

## 2022-08-12 PROCEDURE — 25000242 PHARM REV CODE 250 ALT 637 W/ HCPCS: Performed by: HOSPITALIST

## 2022-08-12 PROCEDURE — 99233 SBSQ HOSP IP/OBS HIGH 50: CPT | Mod: GC,,, | Performed by: INTERNAL MEDICINE

## 2022-08-12 PROCEDURE — 63600175 PHARM REV CODE 636 W HCPCS

## 2022-08-12 PROCEDURE — 94761 N-INVAS EAR/PLS OXIMETRY MLT: CPT

## 2022-08-12 PROCEDURE — 99233 PR SUBSEQUENT HOSPITAL CARE,LEVL III: ICD-10-PCS | Mod: GC,,, | Performed by: INTERNAL MEDICINE

## 2022-08-12 RX ORDER — FUROSEMIDE 10 MG/ML
40 INJECTION INTRAMUSCULAR; INTRAVENOUS
Status: DISCONTINUED | OUTPATIENT
Start: 2022-08-12 | End: 2022-08-16

## 2022-08-12 RX ORDER — AMOXICILLIN 250 MG
1 CAPSULE ORAL 2 TIMES DAILY PRN
Status: DISCONTINUED | OUTPATIENT
Start: 2022-08-12 | End: 2022-08-23 | Stop reason: HOSPADM

## 2022-08-12 RX ORDER — BUSPIRONE HYDROCHLORIDE 15 MG/1
15 TABLET ORAL 2 TIMES DAILY
Status: ON HOLD | COMMUNITY
Start: 2022-06-04 | End: 2022-08-19 | Stop reason: HOSPADM

## 2022-08-12 RX ORDER — AMIODARONE HYDROCHLORIDE 200 MG/1
400 TABLET ORAL 2 TIMES DAILY
Status: COMPLETED | OUTPATIENT
Start: 2022-08-12 | End: 2022-08-22

## 2022-08-12 RX ORDER — MEROPENEM AND SODIUM CHLORIDE 1 G/50ML
1 INJECTION, SOLUTION INTRAVENOUS
Status: DISCONTINUED | OUTPATIENT
Start: 2022-08-12 | End: 2022-08-15

## 2022-08-12 RX ORDER — AMIODARONE HYDROCHLORIDE 200 MG/1
200 TABLET ORAL DAILY
Status: DISCONTINUED | OUTPATIENT
Start: 2022-08-23 | End: 2022-08-23 | Stop reason: HOSPADM

## 2022-08-12 RX ORDER — FUROSEMIDE 10 MG/ML
40 INJECTION INTRAMUSCULAR; INTRAVENOUS ONCE
Status: COMPLETED | OUTPATIENT
Start: 2022-08-12 | End: 2022-08-12

## 2022-08-12 RX ADMIN — FUROSEMIDE 40 MG: 10 INJECTION, SOLUTION INTRAMUSCULAR; INTRAVENOUS at 05:08

## 2022-08-12 RX ADMIN — GUAIFENESIN 1200 MG: 600 TABLET, EXTENDED RELEASE ORAL at 09:08

## 2022-08-12 RX ADMIN — IPRATROPIUM BROMIDE AND ALBUTEROL SULFATE 3 ML: 2.5; .5 SOLUTION RESPIRATORY (INHALATION) at 04:08

## 2022-08-12 RX ADMIN — ENOXAPARIN SODIUM 90 MG: 100 INJECTION SUBCUTANEOUS at 11:08

## 2022-08-12 RX ADMIN — OXYCODONE HYDROCHLORIDE AND ACETAMINOPHEN 1 TABLET: 5; 325 TABLET ORAL at 03:08

## 2022-08-12 RX ADMIN — IPRATROPIUM BROMIDE AND ALBUTEROL SULFATE 3 ML: 2.5; .5 SOLUTION RESPIRATORY (INHALATION) at 12:08

## 2022-08-12 RX ADMIN — AMIODARONE HYDROCHLORIDE 400 MG: 200 TABLET ORAL at 09:08

## 2022-08-12 RX ADMIN — IPRATROPIUM BROMIDE AND ALBUTEROL SULFATE 3 ML: 2.5; .5 SOLUTION RESPIRATORY (INHALATION) at 07:08

## 2022-08-12 RX ADMIN — METOPROLOL TARTRATE 100 MG: 100 TABLET, FILM COATED ORAL at 09:08

## 2022-08-12 RX ADMIN — MIRTAZAPINE 7.5 MG: 7.5 TABLET, FILM COATED ORAL at 09:08

## 2022-08-12 RX ADMIN — DEXMEDETOMIDINE HYDROCHLORIDE 0.2 MCG/KG/HR: 4 INJECTION INTRAVENOUS at 12:08

## 2022-08-12 RX ADMIN — IPRATROPIUM BROMIDE AND ALBUTEROL SULFATE 3 ML: 2.5; .5 SOLUTION RESPIRATORY (INHALATION) at 08:08

## 2022-08-12 RX ADMIN — ENOXAPARIN SODIUM 90 MG: 100 INJECTION SUBCUTANEOUS at 09:08

## 2022-08-12 RX ADMIN — MEROPENEM AND SODIUM CHLORIDE 1 G: 1 INJECTION, SOLUTION INTRAVENOUS at 12:08

## 2022-08-12 RX ADMIN — THIAMINE HCL TAB 100 MG 100 MG: 100 TAB at 09:08

## 2022-08-12 RX ADMIN — OXYCODONE HYDROCHLORIDE AND ACETAMINOPHEN 1 TABLET: 5; 325 TABLET ORAL at 09:08

## 2022-08-12 RX ADMIN — MEROPENEM AND SODIUM CHLORIDE 1 G: 1 INJECTION, SOLUTION INTRAVENOUS at 04:08

## 2022-08-12 RX ADMIN — AMIODARONE HYDROCHLORIDE 0.5 MG/MIN: 1.8 INJECTION, SOLUTION INTRAVENOUS at 04:08

## 2022-08-12 RX ADMIN — Medication 6 MG: at 09:08

## 2022-08-12 RX ADMIN — VENLAFAXINE HYDROCHLORIDE 225 MG: 75 CAPSULE, EXTENDED RELEASE ORAL at 09:08

## 2022-08-12 RX ADMIN — MEROPENEM AND SODIUM CHLORIDE 1 G: 1 INJECTION, SOLUTION INTRAVENOUS at 09:08

## 2022-08-12 RX ADMIN — FUROSEMIDE 40 MG: 10 INJECTION, SOLUTION INTRAMUSCULAR; INTRAVENOUS at 12:08

## 2022-08-12 RX ADMIN — ONDANSETRON 4 MG: 4 TABLET, ORALLY DISINTEGRATING ORAL at 11:08

## 2022-08-12 NOTE — SUBJECTIVE & OBJECTIVE
Interval History/Significant Events: Patient reports continued dyspnea, and complains of pain over her frontal and maxillary sinuses. Denies chest pain, cough, sputum production.     Review of Systems   Constitutional:  Positive for fatigue. Negative for chills and fever.   HENT:  Positive for sinus pressure and sinus pain. Negative for congestion, ear pain, facial swelling, rhinorrhea and sore throat.    Eyes:  Negative for pain and redness.   Respiratory:  Positive for shortness of breath. Negative for chest tightness.    Cardiovascular:  Negative for chest pain, palpitations and leg swelling.   Gastrointestinal:  Negative for abdominal distention, abdominal pain, nausea and vomiting.   Genitourinary:  Negative for dysuria, flank pain and hematuria.   Musculoskeletal:  Negative for back pain and neck pain.   Skin:  Negative for color change and rash.   Neurological:  Negative for dizziness, speech difficulty, light-headedness and headaches.   Objective:     Vital Signs (Most Recent):  Temp: 99.9 °F (37.7 °C) (08/12/22 1100)  Pulse: 77 (08/12/22 1239)  Resp: (!) 27 (08/12/22 1239)  BP: 129/71 (08/12/22 1205)  SpO2: (!) 90 % (08/12/22 1239)   Vital Signs (24h Range):  Temp:  [99 °F (37.2 °C)-99.9 °F (37.7 °C)] 99.9 °F (37.7 °C)  Pulse:  [] 77  Resp:  [16-42] 27  SpO2:  [87 %-94 %] 90 %  BP: ()/(64-89) 129/71   Weight: 90.7 kg (199 lb 15.3 oz)  Body mass index is 34.32 kg/m².      Intake/Output Summary (Last 24 hours) at 8/12/2022 1348  Last data filed at 8/12/2022 1200  Gross per 24 hour   Intake 1567.17 ml   Output 1050 ml   Net 517.17 ml       Physical Exam  Constitutional:       Appearance: She is ill-appearing.      Comments: Alert female in bed, seems mildly more agitated than yesterday but answers questions appropriately    HENT:      Head: Normocephalic and atraumatic.      Nose: Nose normal.      Mouth/Throat:      Mouth: Mucous membranes are moist.   Eyes:      Conjunctiva/sclera: Conjunctivae  normal.      Pupils: Pupils are equal, round, and reactive to light.   Cardiovascular:      Rate and Rhythm: Tachycardia present.      Pulses: Normal pulses.   Pulmonary:      Comments: On 50L high flow nasal cannula   Abdominal:      General: There is no distension.      Palpations: Abdomen is soft.      Tenderness: There is no right CVA tenderness or left CVA tenderness.   Musculoskeletal:         General: No swelling.      Cervical back: Neck supple.   Skin:     General: Skin is warm.      Capillary Refill: Capillary refill takes less than 2 seconds.      Coloration: Skin is not jaundiced.   Neurological:      Mental Status: She is oriented to person, place, and time.       Vents:  Oxygen Concentration (%): 90 (08/12/22 1239)  Lines/Drains/Airways       Peripheral Intravenous Line  Duration                  Peripheral IV - Single Lumen 08/07/22 2345 20 G Anterior;Right Forearm 4 days         Peripheral IV - Single Lumen 08/12/22 0938 20 G Anterior;Left Forearm <1 day                  Significant Labs:    CBC/Anemia Profile:  Recent Labs   Lab 08/11/22  0519 08/12/22  0440   WBC 7.77 8.36   HGB 8.2* 8.2*   HCT 29.4* 29.3*    271   MCV 84 85   RDW 16.8* 17.2*        Chemistries:  Recent Labs   Lab 08/11/22  0321 08/11/22  0519 08/11/22  1326 08/12/22  0440   NA  --  139 141 137   K  --  2.9* 3.8 3.9   CL  --  101 95 97   CO2  --  29 33* 32*   BUN  --  16 16 14   CREATININE  --  0.7 0.8 0.7   CALCIUM  --  8.8 9.7 9.8   ALBUMIN  --  1.9*  --  2.0*   PROT  --  5.6*  --  6.1   BILITOT  --  0.2  --  0.1   ALKPHOS  --  87  --  94   ALT  --  10  --  11   AST  --  13  --  15   MG  --  1.7  --  1.9   PHOS 2.7  --   --  2.4*       All pertinent labs within the past 24 hours have been reviewed.    Significant Imaging:  I have reviewed all pertinent imaging results/findings within the past 24 hours.

## 2022-08-12 NOTE — PLAN OF CARE
CM sent 8 SNF referrals via [employer] per PT/OT recommendations and family's wishes. CM to follow for acceptance.      Yarelis Alva RN     [phone]

## 2022-08-12 NOTE — NURSING
Order for CT of chest. Pt on 50L 100% comfort flow with O2 sats ranging 88-90%. Laid Pt flat for 1 min with O2 sats decreasing to 86%-88% on same settings. Pt unable to tolerate laying flat at this time. Team made aware.

## 2022-08-12 NOTE — NURSING
Pt AAOx3, NAD, Pt switched from BiPap to Comfort flow O2 sats 88-90%. Pt denies SOB. No abd use noted. Team made aware of O2 sats.

## 2022-08-12 NOTE — PLAN OF CARE
CM called in [facility] to [facility]. Pasrr uploaded to [facility].        Yarelis Alva RN     [phone]

## 2022-08-12 NOTE — PLAN OF CARE
[facility]  Discharge Reassessment    Primary Care Provider: Estela Mcdaniel MD    Expected Discharge Date: 8/16/2022     Patient not medically per MD.    Reassessment (most recent)     Discharge Reassessment - 08/12/22 1217        Discharge Reassessment    Assessment Type Discharge Planning Reassessment     Did the patient's condition or plan change since previous assessment? No     Discharge Plan discussed with: Sibling     Name(s) and Number(s) Aggie (sister) [phone]     Communicated DIXON with patient/caregiver Date not available/Unable to determine     Discharge Plan A Skilled Nursing Facility     Discharge Plan B Home with family     DME Needed Upon Discharge  other (see comments)   TBD    Discharge Barriers Identified None     Why the patient remains in the hospital Requires continued medical care        Post-Acute Status    Post-Acute Authorization Placement     Post-Acute Placement Status Pending medical clearance/testing     Coverage [employer] [facility]     Discharge Delays None known at this time               Yarelis Alva RN     [phone]

## 2022-08-12 NOTE — PROGRESS NOTES
[facility] - Card[facility] Medical [facility]  Critical Care Medicine  Progress Note    Patient Name: Vale Gutierrez  MRN: [id number]  Admission Date: 8/1/2022  Hospital Length of Stay: 11 days  Code Status: Full Code  Attending Provider: Nicholas Knowles*  Primary Care Provider: Estela Mcdaniel MD   Principal Problem: Acute hypoxemic respiratory failure    Subjective:     HPI:  Ms. Gutierrez is a 60 y/o female with a PMH of etoh abuse and withdrawal, aspiration pneumonia, delirium, smoking, HTN, HLD, BPPV, alcohol abuse, depression and anxiety who was admitted to the ICU from the floor for acute hypoxic respiratory failure. She was having increased oxygen needs on the floor and was requiring valium to keep her presumed alcohol withdrawal under control. The patient states that her stomach hurts in the midepigastric region and endorses nausea, vomiting x 1 day. She is unsure how long she has been coughing or feeling ill, and says she cannot remember the last few days. She says she lives with her sister Angela who helps care for her.       Hospital/ICU Course:  Patient presented to the ED on 8/1 with a CC of hypoxia and dyspnea. She was initially admitted to the floor. She experienced hallucinations and tremor, consistent with alcohol withdrawal / delirium, and was put on CIWA protocol with valium PRN (shortage of ativan). She was treated with antibiotics (vancomycin, zosyn, and azithro) scheduled for 10 days and duonebs prn. On 8/7 she developed worsening hypoxic respiratory failure, was put on 50L 100% O2 bipap, and admitted to the ICU due to increased oxygen requirements.     8/9: Work up thus far has revealed small pericardial effusion, cardiomegaly, normal LVEF, pleural effusions and opacities bilaterally, and atrial fibrillation. She lives with her sister Angela, who takes care of her typically. Patient's sister Angela says the patient takes percocet for chronic back pain and drinks alcohol frequently and needs help  with almost all ADLs at baseline. Angela says she does not want the patient to come back to live with her because she exhausted caring for her all the time and feels she cannot keep doing it.       Interval History/Significant Events: Patient reports continued dyspnea, and complains of pain over her frontal and maxillary sinuses. Denies chest pain, cough, sputum production.     Review of Systems   Constitutional:  Positive for fatigue. Negative for chills and fever.   HENT:  Positive for sinus pressure and sinus pain. Negative for congestion, ear pain, facial swelling, rhinorrhea and sore throat.    Eyes:  Negative for pain and redness.   Respiratory:  Positive for shortness of breath. Negative for chest tightness.    Cardiovascular:  Negative for chest pain, palpitations and leg swelling.   Gastrointestinal:  Negative for abdominal distention, abdominal pain, nausea and vomiting.   Genitourinary:  Negative for dysuria, flank pain and hematuria.   Musculoskeletal:  Negative for back pain and neck pain.   Skin:  Negative for color change and rash.   Neurological:  Negative for dizziness, speech difficulty, light-headedness and headaches.   Objective:     Vital Signs (Most Recent):  Temp: 99.9 °F (37.7 °C) (08/12/22 1100)  Pulse: 77 (08/12/22 1239)  Resp: (!) 27 (08/12/22 1239)  BP: 129/71 (08/12/22 1205)  SpO2: (!) 90 % (08/12/22 1239)   Vital Signs (24h Range):  Temp:  [99 °F (37.2 °C)-99.9 °F (37.7 °C)] 99.9 °F (37.7 °C)  Pulse:  [] 77  Resp:  [16-42] 27  SpO2:  [87 %-94 %] 90 %  BP: ()/(64-89) 129/71   Weight: 90.7 kg (199 lb 15.3 oz)  Body mass index is 34.32 kg/m².      Intake/Output Summary (Last 24 hours) at 8/12/2022 1348  Last data filed at 8/12/2022 1200  Gross per 24 hour   Intake 1567.17 ml   Output 1050 ml   Net 517.17 ml       Physical Exam  Constitutional:       Appearance: She is ill-appearing.      Comments: Alert female in bed, seems mildly more agitated than yesterday but answers  questions appropriately    HENT:      Head: Normocephalic and atraumatic. No tenderness to palpation over frontal or maxillary sinuses      Nose: Nose normal.      Mouth/Throat:      Mouth: Mucous membranes are moist.   Eyes:      Conjunctiva/sclera: Conjunctivae normal.      Pupils: Pupils are equal, round, and reactive to light.   Cardiovascular:      Rate and Rhythm: Tachycardia present.      Pulses: Normal pulses.   Pulmonary:      Comments: On 50L high flow nasal cannula   Abdominal:      General: There is no distension.      Palpations: Abdomen is soft.      Tenderness: There is no right CVA tenderness or left CVA tenderness.   Musculoskeletal:         General: No swelling.      Cervical back: Neck supple.   Skin:     General: Skin is warm.      Capillary Refill: Capillary refill takes less than 2 seconds.      Coloration: Skin is not jaundiced.   Neurological:      Mental Status: She is oriented to person, place, and time.       Vents:  Oxygen Concentration (%): 90 (08/12/22 1239)  Lines/Drains/Airways       Peripheral Intravenous Line  Duration                  Peripheral IV - Single Lumen 08/07/22 2345 20 G Anterior;Right Forearm 4 days         Peripheral IV - Single Lumen 08/12/22 0938 20 G Anterior;Left Forearm <1 day                  Significant Labs:    CBC/Anemia Profile:  Recent Labs   Lab 08/11/22  0519 08/12/22  0440   WBC 7.77 8.36   HGB 8.2* 8.2*   HCT 29.4* 29.3*    271   MCV 84 85   RDW 16.8* 17.2*        Chemistries:  Recent Labs   Lab 08/11/22  0321 08/11/22  0519 08/11/22  1326 08/12/22  0440   NA  --  139 141 137   K  --  2.9* 3.8 3.9   CL  --  101 95 97   CO2  --  29 33* 32*   BUN  --  16 16 14   CREATININE  --  0.7 0.8 0.7   CALCIUM  --  8.8 9.7 9.8   ALBUMIN  --  1.9*  --  2.0*   PROT  --  5.6*  --  6.1   BILITOT  --  0.2  --  0.1   ALKPHOS  --  87  --  94   ALT  --  10  --  11   AST  --  13  --  15   MG  --  1.7  --  1.9   PHOS 2.7  --   --  2.4*       All pertinent labs within  the past 24 hours have been reviewed.    Significant Imaging:  I have reviewed all pertinent imaging results/findings within the past 24 hours.      ABG  Recent Labs   Lab 08/11/22  0820   PH 7.399   PO2 36*   PCO2 63.1*   HCO3 38.9*   BE 14     Assessment/Plan:     Neuro  Chronic pain disorder  Patient has a history of chronic back pain, for which she had surgery in October 2020.   She has been followed by a pain management doctor and takes percocet daily for pain.     Psychiatric  Alcohol dependence with withdrawal with perceptual disturbance  -Patient reportedly had tremors and hallucinations on the floor 8/5-8/7  -Sister reports when had detox at  in prev years she was there 14 days with withdrawal + aspiration PNA. Patient had endorsed hx of withdrawal in the past. Sister gave detailed hx also   -DC'd valium due to concern for worsening delirium. Patient seems to be doing better, no ongoing need for benzos currently.     Pulmonary  * Acute hypoxemic respiratory failure  -Continue lasix BID and meropenem  -DC fluconazole   -Unsure why patient's respiratory status is not improving. Unlikely to be PE given negative chest CTA, negative LE doppler US. Unlikely ACS as troponin trended down and was only mildly elevated. Concern for aspiration pneumonia not responding to antibx vs worsening heart failure / volume overload, or some combination. Will increase lasix and continue meropenem, reassess, ensure PT/OT are coming, and have nurse try to encourage pt to sit up for part of the day. Concerned for increasing atelectasis from being sedentary in bed.     Aspiration pneumonia  See AHRF     Community acquired pneumonia of right lower lobe of lung  See AHRF     Cardiac/Vascular  Acute on chronic diastolic congestive heart failure  Patient is identified as having Diastolic (HFpEF) heart failure that is Acute on chronic. CHF is currently uncontrolled due to Pulmonary edema/pleural effusion on CXR. Latest ECHO performed  and demonstrates- Results for orders placed during the hospital encounter of 08/01/22    Echo    Interpretation Summary  · The left ventricle is normal in size with concentric hypertrophy and normal systolic function.  · The estimated ejection fraction is 60-65%.  · Grade I left ventricular diastolic dysfunction.  · Mild aortic regurgitation.  · Normal right ventricular size with normal right ventricular systolic function.  · The estimated PA systolic pressure is 43 mmHg.  · Intermediate central venous pressure (8 mmHg).  · There is pulmonary hypertension.  · Small circumferential pericardial effusion.  · Mild left atrial enlargement.  . Continue Furosemide and monitor clinical status closely. Monitor on telemetry.  Monitor strict Is&Os and daily weights.  Place on fluid restriction of 1.5 L. Continue to stress to patient importance of self efficacy and  on diet for CHF. Last BNP reviewed- and noted below. Has improved since BNP on 8/1 of 504  Recent Labs   Lab 08/08/22  1158   BNP 90   .             Critical Care Daily Checklist:    A: Awake: RASS Goal/Actual Goal:    Actual: Goldman Agitation Sedation Scale (RASS): Alert and calm   B: Spontaneous Breathing Trial Performed?     C: SAT & SBT Coordinated?  N/a                      D: Delirium: CAM-ICU Overall CAM-ICU: Negative   E: Early Mobility Performed? Yes   F: Feeding Goal: Goals: Meet % EEN, EPN by RD f/u date  Status: Nutrition Goal Status: new   Current Diet Order   Procedures    Diet Adult Regular (IDDSI Level 7)      AS: Analgesia/Sedation prn   T: Thromboembolic Prophylaxis yes   H: HOB > 300 Yes   U: Stress Ulcer Prophylaxis (if needed) no   G: Glucose Control monitored   B: Bowel Function Stool Occurrence: 1   I: Indwelling Catheter (Lines & [place]) Necessity yes   D: De-escalation of Antimicrobials/Pharmacotherapies yes    Plan for the day/ETD Lasix, VBG.     Code Status:  Family/Goals of Care: Full Code  Discussed        Critical  secondary to Patient has a condition that poses threat to life and bodily function: Severe Respiratory Distress      Critical care was time spent personally by me on the following activities: development of treatment plan with patient or surrogate and bedside caregivers, discussions with consultants, evaluation of patient's response to treatment, examination of patient, ordering and performing treatments and interventions, ordering and review of laboratory studies, ordering and review of radiographic studies, pulse oximetry, re-evaluation of patient's condition. This critical care time did not overlap with that of any other provider or involve time for any procedures.     Denise Crenshaw, DO  Critical Care Medicine  [facility] - Cardiac Medical ICU

## 2022-08-12 NOTE — PHYSICIAN QUERY
PT Name: Vale Gutierrez  MR #: [id number]     DOCUMENTATION CLARIFICATION     CDS/: Blekys Billings RN          Contact Information: [email or web address]   This form is a permanent document in the medical record.     Query Date: August 12, 2022    By submitting this query, we are merely seeking further clarification of documentation.  Please utilize your independent clinical judgment when addressing the question(s) below.  The Medical Record contains the following   Indicators   Supporting Clinical Findings Location in Medical Record     x SOB, KIRAN, Wheezing, Productive Cough, Use of Accessory Muscles, etc. SOB, decreased air movement,     Dyspnea, tachypnea, Increased respiratory effort, rapidly increasing oxygen needs    8/1/2022 H&P    8/7/2022 CCM     x RR         ABGs         O2 sat  08/01/22 17:47   POC PH 7.343 (L)   POC PCO2 82.7 (H)   POC PO2 19 (L)   POC HCO3 44.9 (H)   POC SATURATED O2 24 (L)   POC BE 19   POC TCO2 47 (H)   Sample VENOUS     RR 24  O2 sat 82% Lab results                            8/1/2022 vitals     x Hypoxia/Hypercapnia hypoxia  8/1/2022 H&P     x BiPAP/Intubation/Mechanical Ventilation BPAP 100%    8/7/2022 CCM     x Supplemental O2 12L HFNC 8/1/2022 vitals     x Home O2, Oxygen Dependence She is not on home oxygen 8/3/2022 HM PN     x Respiratory distress or failure acute hypercapnic and hypoxic respiratory failure    acute on chronic hypercapnic and acute hypoxic respiratory failure    Hypercapnic and Hypoxic Respiratory failure which is Acute on chronic    Acute hypoxemic respiratory failure with hypercapnia 8/1/2022 H&P    8/3/2022 HM PN      8/10/2022 CCM PN      8/11/2022 CCM PN     x Radiology findings CTA showing RLL consolidation with bronchogram  8/1/2022 H&P     x Acute/Chronic Illness Metabolic encephalopathy, asymptomatic bacteriuria, CAP RLL  PMH of etoh abuse and withdrawal, aspiration pneumonia, delirium, smoking, HTN, depression and anxiety  8/1/2022 H&P     Treatment       x Other Suspect hypoxia combination of pneumonia and heart failure/pulmonary edema  Suspect hypercapnia related to opiates and muscle relaxants she takes at home but there is a component of possible chronicity as HCO3 elevated on admit suggesting hypercapnia may be more chronic than acute but no previous ABGs to compare unfortunately. Patient has no history of any prior chronic lung disease 8/3/2022  PN       The noted clinical guidelines are only system guidelines and do not replace the providers clinical judgment.    Provider, please clarify the acuity of the hypoxic respiratory failure with hypercapnia diagnosis associated with above clinical findings.     [   x ] Acute Respiratory Failure with Hypoxia and Hypercapnia - Hypoxia: ABG pO2 < 60 mmHg or O2 sat of <91% on room air and Hypercapnia: pCO2 > 50 mmHg with pH < 7.35 and respiratory symptoms documented   [    ] Acute and (on) Chronic Respiratory Failure with Hypoxia and Hypercapnia - Hypoxic: pO2 >10 mmHg below baseline or SpO2 < 91% on usual home O2 or O2 ? 2L/min over baseline home O2 and Hypercapnic: pCO2 >10 mmHg over baseline and pH < 7.35 and respiratory symptoms documented   [    ] Other Respiratory Diagnosis (please specify): _________________   [   ] Clinically Undetermined         Please document in your progress notes daily for the duration of treatment until resolved and include in your discharge summary.       Form No. [id number]

## 2022-08-13 LAB
ALBUMIN SERPL BCP-MCNC: 1.8 G/DL (ref 3.5–5.2)
ALLENS TEST: ABNORMAL
ALP SERPL-CCNC: 93 U/L (ref 55–135)
ALT SERPL W/O P-5'-P-CCNC: 12 U/L (ref 10–44)
ANION GAP SERPL CALC-SCNC: 9 MMOL/L (ref 8–16)
AST SERPL-CCNC: 16 U/L (ref 10–40)
BASOPHILS # BLD AUTO: 0.06 K/UL (ref 0–0.2)
BASOPHILS NFR BLD: 0.8 % (ref 0–1.9)
BILIRUB SERPL-MCNC: 0.2 MG/DL (ref 0.1–1)
BUN SERPL-MCNC: 14 MG/DL (ref 8–23)
CALCIUM SERPL-MCNC: 9.2 MG/DL (ref 8.7–10.5)
CHLORIDE SERPL-SCNC: 97 MMOL/L (ref 95–110)
CO2 SERPL-SCNC: 33 MMOL/L (ref 23–29)
CREAT SERPL-MCNC: 0.7 MG/DL (ref 0.5–1.4)
CRP SERPL-MCNC: 311.4 MG/L (ref 0–8.2)
DELSYS: ABNORMAL
DIFFERENTIAL METHOD: ABNORMAL
EOSINOPHIL # BLD AUTO: 0.2 K/UL (ref 0–0.5)
EOSINOPHIL NFR BLD: 2.5 % (ref 0–8)
EP: 6
ERYTHROCYTE [DISTWIDTH] IN BLOOD BY AUTOMATED COUNT: 17.2 % (ref 11.5–14.5)
ERYTHROCYTE [SEDIMENTATION RATE] IN BLOOD BY PHOTOMETRIC METHOD: >120 MM/HR (ref 0–36)
ERYTHROCYTE [SEDIMENTATION RATE] IN BLOOD BY WESTERGREN METHOD: 4 MM/H
EST. GFR  (NO RACE VARIABLE): >60 ML/MIN/1.73 M^2
FERRITIN SERPL-MCNC: 218 NG/ML (ref 20–300)
FIO2: 100
GLUCOSE SERPL-MCNC: 81 MG/DL (ref 70–110)
HCO3 UR-SCNC: 44.2 MMOL/L (ref 24–28)
HCT VFR BLD AUTO: 28.1 % (ref 37–48.5)
HGB BLD-MCNC: 8 G/DL (ref 12–16)
IMM GRANULOCYTES # BLD AUTO: 0.03 K/UL (ref 0–0.04)
IMM GRANULOCYTES NFR BLD AUTO: 0.4 % (ref 0–0.5)
IP: 16
IRON SERPL-MCNC: 26 UG/DL (ref 30–160)
LYMPHOCYTES # BLD AUTO: 1.4 K/UL (ref 1–4.8)
LYMPHOCYTES NFR BLD: 17.1 % (ref 18–48)
MAGNESIUM SERPL-MCNC: 1.5 MG/DL (ref 1.6–2.6)
MCH RBC QN AUTO: 23.3 PG (ref 27–31)
MCHC RBC AUTO-ENTMCNC: 28.5 G/DL (ref 32–36)
MCV RBC AUTO: 82 FL (ref 82–98)
MODE: ABNORMAL
MONOCYTES # BLD AUTO: 0.8 K/UL (ref 0.3–1)
MONOCYTES NFR BLD: 10 % (ref 4–15)
NEUTROPHILS # BLD AUTO: 5.5 K/UL (ref 1.8–7.7)
NEUTROPHILS NFR BLD: 69.2 % (ref 38–73)
NRBC BLD-RTO: 0 /100 WBC
PCO2 BLDA: 68.5 MMHG (ref 35–45)
PH SMN: 7.42 [PH] (ref 7.35–7.45)
PHOSPHATE SERPL-MCNC: 2.2 MG/DL (ref 2.7–4.5)
PLATELET # BLD AUTO: 282 K/UL (ref 150–450)
PMV BLD AUTO: 10.3 FL (ref 9.2–12.9)
PO2 BLDA: 54 MMHG (ref 80–100)
POC BE: 20 MMOL/L
POC SATURATED O2: 87 % (ref 95–100)
POC TCO2: 46 MMOL/L (ref 23–27)
POTASSIUM SERPL-SCNC: 3.7 MMOL/L (ref 3.5–5.1)
PROT SERPL-MCNC: 5.8 G/DL (ref 6–8.4)
RBC # BLD AUTO: 3.43 M/UL (ref 4–5.4)
RETICS/RBC NFR AUTO: 1.5 % (ref 0.5–2.5)
S PNEUM AG UR QL: NOT DETECTED
SAMPLE: ABNORMAL
SATURATED IRON: 7 % (ref 20–50)
SITE: ABNORMAL
SODIUM SERPL-SCNC: 139 MMOL/L (ref 136–145)
SP02: 98
TOTAL IRON BINDING CAPACITY: 371 UG/DL (ref 250–450)
TRANSFERRIN SERPL-MCNC: 251 MG/DL (ref 200–375)
TRANSFERRIN SERPL-MCNC: 251 MG/DL (ref 200–375)
WBC # BLD AUTO: 7.89 K/UL (ref 3.9–12.7)

## 2022-08-13 PROCEDURE — 63600175 PHARM REV CODE 636 W HCPCS

## 2022-08-13 PROCEDURE — 94660 CPAP INITIATION&MGMT: CPT

## 2022-08-13 PROCEDURE — 84100 ASSAY OF PHOSPHORUS: CPT

## 2022-08-13 PROCEDURE — 63600175 PHARM REV CODE 636 W HCPCS: Performed by: STUDENT IN AN ORGANIZED HEALTH CARE EDUCATION/TRAINING PROGRAM

## 2022-08-13 PROCEDURE — 85652 RBC SED RATE AUTOMATED: CPT

## 2022-08-13 PROCEDURE — 94640 AIRWAY INHALATION TREATMENT: CPT

## 2022-08-13 PROCEDURE — 27000221 HC OXYGEN, UP TO 24 HOURS

## 2022-08-13 PROCEDURE — 99233 SBSQ HOSP IP/OBS HIGH 50: CPT | Mod: GC,,, | Performed by: INTERNAL MEDICINE

## 2022-08-13 PROCEDURE — 85045 AUTOMATED RETICULOCYTE COUNT: CPT

## 2022-08-13 PROCEDURE — 99900035 HC TECH TIME PER 15 MIN (STAT)

## 2022-08-13 PROCEDURE — 86255 FLUORESCENT ANTIBODY SCREEN: CPT

## 2022-08-13 PROCEDURE — 99233 PR SUBSEQUENT HOSPITAL CARE,LEVL III: ICD-10-PCS | Mod: GC,,, | Performed by: INTERNAL MEDICINE

## 2022-08-13 PROCEDURE — 80053 COMPREHEN METABOLIC PANEL: CPT | Performed by: STUDENT IN AN ORGANIZED HEALTH CARE EDUCATION/TRAINING PROGRAM

## 2022-08-13 PROCEDURE — 83735 ASSAY OF MAGNESIUM: CPT | Performed by: STUDENT IN AN ORGANIZED HEALTH CARE EDUCATION/TRAINING PROGRAM

## 2022-08-13 PROCEDURE — 63700000 PHARM REV CODE 250 ALT 637 W/O HCPCS

## 2022-08-13 PROCEDURE — 82728 ASSAY OF FERRITIN: CPT

## 2022-08-13 PROCEDURE — 82803 BLOOD GASES ANY COMBINATION: CPT

## 2022-08-13 PROCEDURE — 84466 ASSAY OF TRANSFERRIN: CPT

## 2022-08-13 PROCEDURE — 94761 N-INVAS EAR/PLS OXIMETRY MLT: CPT

## 2022-08-13 PROCEDURE — 20000000 HC ICU ROOM

## 2022-08-13 PROCEDURE — 25000003 PHARM REV CODE 250: Performed by: HOSPITALIST

## 2022-08-13 PROCEDURE — 25000003 PHARM REV CODE 250

## 2022-08-13 PROCEDURE — 25000003 PHARM REV CODE 250: Performed by: STUDENT IN AN ORGANIZED HEALTH CARE EDUCATION/TRAINING PROGRAM

## 2022-08-13 PROCEDURE — 85025 COMPLETE CBC W/AUTO DIFF WBC: CPT | Performed by: STUDENT IN AN ORGANIZED HEALTH CARE EDUCATION/TRAINING PROGRAM

## 2022-08-13 PROCEDURE — 25000242 PHARM REV CODE 250 ALT 637 W/ HCPCS: Performed by: HOSPITALIST

## 2022-08-13 PROCEDURE — 27100171 HC OXYGEN HIGH FLOW UP TO 24 HOURS

## 2022-08-13 PROCEDURE — 86140 C-REACTIVE PROTEIN: CPT

## 2022-08-13 PROCEDURE — 36600 WITHDRAWAL OF ARTERIAL BLOOD: CPT

## 2022-08-13 RX ORDER — LIDOCAINE 50 MG/G
2 PATCH TOPICAL
Status: DISCONTINUED | OUTPATIENT
Start: 2022-08-13 | End: 2022-08-23

## 2022-08-13 RX ORDER — METHYLPREDNISOLONE SOD SUCC 125 MG
125 VIAL (EA) INJECTION EVERY 4 HOURS
Status: COMPLETED | OUTPATIENT
Start: 2022-08-13 | End: 2022-08-16

## 2022-08-13 RX ORDER — MAGNESIUM SULFATE HEPTAHYDRATE 40 MG/ML
2 INJECTION, SOLUTION INTRAVENOUS ONCE
Status: COMPLETED | OUTPATIENT
Start: 2022-08-13 | End: 2022-08-14

## 2022-08-13 RX ORDER — FLUCONAZOLE 100 MG/1
100 TABLET ORAL DAILY
Status: DISCONTINUED | OUTPATIENT
Start: 2022-08-13 | End: 2022-08-14

## 2022-08-13 RX ADMIN — IPRATROPIUM BROMIDE AND ALBUTEROL SULFATE 3 ML: 2.5; .5 SOLUTION RESPIRATORY (INHALATION) at 04:08

## 2022-08-13 RX ADMIN — METHYLPREDNISOLONE SODIUM SUCCINATE 125 MG: 125 INJECTION, POWDER, FOR SOLUTION INTRAMUSCULAR; INTRAVENOUS at 03:08

## 2022-08-13 RX ADMIN — LIDOCAINE 2 PATCH: 50 PATCH CUTANEOUS at 09:08

## 2022-08-13 RX ADMIN — MAGNESIUM SULFATE 2 G: 2 INJECTION INTRAVENOUS at 11:08

## 2022-08-13 RX ADMIN — AMIODARONE HYDROCHLORIDE 400 MG: 200 TABLET ORAL at 10:08

## 2022-08-13 RX ADMIN — VENLAFAXINE HYDROCHLORIDE 225 MG: 75 CAPSULE, EXTENDED RELEASE ORAL at 10:08

## 2022-08-13 RX ADMIN — FUROSEMIDE 40 MG: 10 INJECTION, SOLUTION INTRAMUSCULAR; INTRAVENOUS at 04:08

## 2022-08-13 RX ADMIN — METHYLPREDNISOLONE SODIUM SUCCINATE 125 MG: 125 INJECTION, POWDER, FOR SOLUTION INTRAMUSCULAR; INTRAVENOUS at 09:08

## 2022-08-13 RX ADMIN — IPRATROPIUM BROMIDE AND ALBUTEROL SULFATE 3 ML: 2.5; .5 SOLUTION RESPIRATORY (INHALATION) at 12:08

## 2022-08-13 RX ADMIN — MEROPENEM AND SODIUM CHLORIDE 1 G: 1 INJECTION, SOLUTION INTRAVENOUS at 01:08

## 2022-08-13 RX ADMIN — AMIODARONE HYDROCHLORIDE 400 MG: 200 TABLET ORAL at 09:08

## 2022-08-13 RX ADMIN — BUSPIRONE HYDROCHLORIDE 5 MG: 5 TABLET ORAL at 07:08

## 2022-08-13 RX ADMIN — ENOXAPARIN SODIUM 90 MG: 100 INJECTION SUBCUTANEOUS at 10:08

## 2022-08-13 RX ADMIN — METOPROLOL TARTRATE 100 MG: 100 TABLET, FILM COATED ORAL at 09:08

## 2022-08-13 RX ADMIN — FLUCONAZOLE 100 MG: 100 TABLET ORAL at 01:08

## 2022-08-13 RX ADMIN — IPRATROPIUM BROMIDE AND ALBUTEROL SULFATE 3 ML: 2.5; .5 SOLUTION RESPIRATORY (INHALATION) at 07:08

## 2022-08-13 RX ADMIN — OXYCODONE HYDROCHLORIDE AND ACETAMINOPHEN 1 TABLET: 5; 325 TABLET ORAL at 05:08

## 2022-08-13 RX ADMIN — POTASSIUM BICARBONATE 40 MEQ: 391 TABLET, EFFERVESCENT ORAL at 09:08

## 2022-08-13 RX ADMIN — OXYCODONE HYDROCHLORIDE AND ACETAMINOPHEN 1 TABLET: 5; 325 TABLET ORAL at 10:08

## 2022-08-13 RX ADMIN — OXYCODONE HYDROCHLORIDE AND ACETAMINOPHEN 1 TABLET: 5; 325 TABLET ORAL at 04:08

## 2022-08-13 RX ADMIN — MEROPENEM AND SODIUM CHLORIDE 1 G: 1 INJECTION, SOLUTION INTRAVENOUS at 04:08

## 2022-08-13 RX ADMIN — MIRTAZAPINE 7.5 MG: 7.5 TABLET, FILM COATED ORAL at 09:08

## 2022-08-13 RX ADMIN — THIAMINE HCL TAB 100 MG 100 MG: 100 TAB at 10:08

## 2022-08-13 RX ADMIN — MEROPENEM AND SODIUM CHLORIDE 1 G: 1 INJECTION, SOLUTION INTRAVENOUS at 09:08

## 2022-08-13 RX ADMIN — FUROSEMIDE 40 MG: 10 INJECTION, SOLUTION INTRAMUSCULAR; INTRAVENOUS at 05:08

## 2022-08-13 RX ADMIN — Medication 6 MG: at 09:08

## 2022-08-13 RX ADMIN — METHYLPREDNISOLONE SODIUM SUCCINATE 125 MG: 125 INJECTION, POWDER, FOR SOLUTION INTRAMUSCULAR; INTRAVENOUS at 01:08

## 2022-08-13 RX ADMIN — METHYLPREDNISOLONE SODIUM SUCCINATE 125 MG: 125 INJECTION, POWDER, FOR SOLUTION INTRAMUSCULAR; INTRAVENOUS at 05:08

## 2022-08-13 RX ADMIN — METOPROLOL TARTRATE 100 MG: 100 TABLET, FILM COATED ORAL at 10:08

## 2022-08-13 RX ADMIN — ENOXAPARIN SODIUM 90 MG: 100 INJECTION SUBCUTANEOUS at 09:08

## 2022-08-13 NOTE — PROGRESS NOTES
[facility] - Card[facility] Medical [facility]  Critical Care Medicine  Progress Note    Patient Name: Vale Gutierrez  MRN: [id number]  Admission Date: 8/1/2022  Hospital Length of Stay: 12 days  Code Status: Full Code  Attending Provider: Nicholas Knowles*  Primary Care Provider: Estela Mcdaniel MD   Principal Problem: Acute hypoxemic respiratory failure    Subjective:     HPI:  Ms. Gutierrez is a 62 y/o female with a PMH of etoh abuse and withdrawal, aspiration pneumonia, delirium, smoking, HTN, HLD, BPPV, alcohol abuse, depression and anxiety who was admitted to the ICU from the floor for acute hypoxic respiratory failure. She was having increased oxygen needs on the floor and was requiring valium to keep her presumed alcohol withdrawal under control. The patient states that her stomach hurts in the midepigastric region and endorses nausea, vomiting x 1 day. She is unsure how long she has been coughing or feeling ill, and says she cannot remember the last few days. She says she lives with her sister Angeal who helps care for her.       Hospital/ICU Course:  Patient presented to the ED on 8/1 with a CC of hypoxia and dyspnea. She was initially admitted to the floor. She experienced hallucinations and tremor, consistent with alcohol withdrawal / delirium, and was put on CIWA protocol with valium PRN (shortage of ativan). She was treated with antibiotics (vancomycin, zosyn, and azithro) scheduled for 10 days and duonebs prn. On 8/7 she developed worsening hypoxic respiratory failure, was put on 50L 100% O2 bipap, and admitted to the ICU due to increased oxygen requirements.     8/9: Work up thus far has revealed small pericardial effusion, cardiomegaly, normal LVEF, pleural effusions and opacities bilaterally, and atrial fibrillation. She lives with her sister Angela, who takes care of her typically. Patient's sister Angela says the patient takes percocet for chronic back pain and drinks alcohol frequently and needs help  with almost all ADLs at baseline. Angela says she does not want the patient to come back to live with her because she exhausted caring for her all the time and feels she cannot keep doing it.     8/13: Patient has completed courses of zosyn, vancomycin, and azithromycin. She is currently on diflucan  and meropenem out of concern for respiratory infection, and lasix BID out of concern for pulmonary edema. Her respiratory status  seems to be declining, she has increased oxygen requirements and is now not tolerating high flow. She was started on steroids today out of concern autoimmune respiratory condition, and labs were sent for C-ANCA and P-ANCA. She previously had a +BRANDYN titer, but antiCCP, RF, and  anti-dsDNA were negative. She was also found to be anemic today, normocytic but borderline microcytic (MCV=82) so iron studies were ordered. Discussed the potentially need for endotracheal intubation and she expressed understanding and stated she would like to have that done if her respiratory  status declines to the point of needing it.       Interval History/Significant Events: Patient having increased dyspnea today. Denies chest pain, N/V/D.     Review of Systems   Constitutional:  Positive for fatigue. Negative for chills and fever.   HENT:  Positive for sinus pressure and sinus pain. Negative for congestion, ear pain, facial swelling, rhinorrhea and sore throat.    Eyes:  Negative for pain and redness.   Respiratory:  Positive for shortness of breath. Negative for chest tightness.    Cardiovascular:  Negative for chest pain, palpitations and leg swelling.   Gastrointestinal:  Negative for abdominal distention, abdominal pain, nausea and vomiting.   Genitourinary:  Negative for dysuria, flank pain and hematuria.   Musculoskeletal:  Negative for back pain and neck pain.   Skin:  Negative for color change and rash.   Neurological:  Negative for dizziness, speech difficulty, light-headedness and headaches.   Objective:      Vital Signs (Most Recent):  Temp: 99.4 °F (37.4 °C) (08/13/22 1101)  Pulse: 66 (08/13/22 1301)  Resp: (!) 29 (08/13/22 1301)  BP: 98/61 (08/13/22 1301)  SpO2: 96 % (08/13/22 1301)   Vital Signs (24h Range):  Temp:  [99.1 °F (37.3 °C)-100.4 °F (38 °C)] 99.4 °F (37.4 °C)  Pulse:  [] 66  Resp:  [18-44] 29  SpO2:  [87 %-100 %] 96 %  BP: ()/(49-81) 98/61   Weight: 90.3 kg (199 lb)  Body mass index is 34.16 kg/m².      Intake/Output Summary (Last 24 hours) at 8/13/2022 1455  Last data filed at 8/13/2022 0600  Gross per 24 hour   Intake 1399.93 ml   Output 2350 ml   Net -950.07 ml       Physical Exam  Constitutional:       Appearance: She is ill-appearing.      Comments: Alert female in bed   HENT:      Head: Normocephalic and atraumatic.      Nose: Nose normal.      Mouth/Throat:      Mouth: Mucous membranes are moist.   Eyes:      Conjunctiva/sclera: Conjunctivae normal.      Pupils: Pupils are equal, round, and reactive to light.   Cardiovascular:      Rate and Rhythm: Tachycardia present.      Pulses: Normal pulses.   Pulmonary:      Effort: Tachypnea present.      Comments: On bipap, in moderate acute respiratory distress. Desats to 75% when switched to high flow NC   Abdominal:      General: There is no distension.      Palpations: Abdomen is soft.      Tenderness: There is no right CVA tenderness or left CVA tenderness.   Musculoskeletal:         General: No swelling.      Cervical back: Neck supple.   Skin:     General: Skin is warm.      Capillary Refill: Capillary refill takes less than 2 seconds.      Coloration: Skin is not jaundiced.   Neurological:      Mental Status: She is oriented to person, place, and time.       Vents:  Oxygen Concentration (%): 100 (08/13/22 1204)  Lines/Drains/Airways       Peripheral Intravenous Line  Duration                  Peripheral IV - Single Lumen 08/07/22 2345 20 G Anterior;Right Forearm 5 days         Peripheral IV - Single Lumen 08/12/22 0938 20 G  Anterior;Left Forearm 1 day                  Significant Labs:    CBC/Anemia Profile:  Recent Labs   Lab 08/12/22  0440 08/13/22  0404 08/13/22  1100 08/13/22  1330   WBC 8.36 7.89  --   --    HGB 8.2* 8.0*  --   --    HCT 29.3* 28.1*  --   --     282  --   --    MCV 85 82  --   --    RDW 17.2* 17.2*  --   --    IRON  --   --  26*  --    FERRITIN  --   --  218  --    RETIC  --   --   --  1.5        Chemistries:  Recent Labs   Lab 08/12/22  0440 08/13/22  0404    139   K 3.9 3.7   CL 97 97   CO2 32* 33*   BUN 14 14   CREATININE 0.7 0.7   CALCIUM 9.8 9.2   ALBUMIN 2.0* 1.8*   PROT 6.1 5.8*   BILITOT 0.1 0.2   ALKPHOS 94 93   ALT 11 12   AST 15 16   MG 1.9 1.5*   PHOS 2.4* 2.2*       All pertinent labs within the past 24 hours have been reviewed.    Significant Imaging:  I have reviewed all pertinent imaging results/findings within the past 24 hours.      ABG  Recent Labs   Lab 08/13/22  0544   PH 7.418   PO2 54*   PCO2 68.5*   HCO3 44.2*   BE 20     Assessment/Plan:     Neuro  Chronic pain disorder  Patient has a history of chronic back pain, for which she had surgery in October 2020.   She has been followed by a pain management doctor and takes percocet daily for pain.     Psychiatric  Alcohol dependence with withdrawal with perceptual disturbance  -Patient reportedly had tremors and hallucinations on the floor 8/5-8/7  -Sister reports when had detox at  in prev years she was there 14 days with withdrawal + aspiration PNA. Patient had endorsed hx of withdrawal in the past. Sister gave detailed hx also   -Pt was previously on CIWA protocol, receiving valium for CIWA>8, but she has subsequently improved and given the duration of time inpatient, concern for continuing Etoh withdrawal is low. Also hoping to avoid benzos as there was concern for ICU delirium. CIWA and valium DC'd.     Pulmonary  * Acute hypoxemic respiratory failure  -Continued hypoxic respiratory failure   -Pt now dependent on  bipap  -Finished vancomycin, zosyn, azithromycin  -Still on meropenem and diflucan  -previously finish stress dose steroids.   -Starting solumedrol now x3 days out of concern for autoimmune etiology given lack of improvement and worsening of sx   -CXR qd  -VBG q12h  -Lasix 40 mg IV BID      Aspiration pneumonia  See AHRF       [facility] right lower lobe of lung  [facility]     Cardiac/Vascular  Acute on chronic diastolic congestive heart failure  Patient is identified as having Diastolic (HFpEF) heart failure that is Acute on chronic. CHF is currently uncontrolled due to Pulmonary edema/pleural effusion on CXR. Latest ECHO performed and demonstrates- Results for orders placed during the hospital encounter of 08/01/22    Echo    Interpretation Summary  · The left ventricle is normal in size with concentric hypertrophy and normal systolic function.  · The estimated ejection fraction is 60-65%.  · Grade I left ventricular diastolic dysfunction.  · Mild aortic regurgitation.  · Normal right ventricular size with normal right ventricular systolic function.  · The estimated PA systolic pressure is 43 mmHg.  · Intermediate central venous pressure (8 mmHg).  · There is pulmonary hypertension.  · Small circumferential pericardial effusion.  · Mild left atrial enlargement.  . Continue Furosemide and monitor clinical status closely. Monitor on telemetry.  Monitor strict Is&Os and daily weights.  Place on fluid restriction of 1.5 L. Continue to stress to patient importance of self efficacy and  on diet for CHF. Last BNP reviewed- and noted below. Has improved since BNP on 8/1 of 504  Recent Labs   Lab 08/08/22  1158   BNP 90   .           Critical Care Daily Checklist:    A: Awake: RASS Goal/Actual Goal:    Actual: Goldman Agitation Sedation Scale (RASS): Alert and calm   B: Spontaneous Breathing Trial Performed?     C: SAT & SBT Coordinated?  yes                      D: Delirium: CAM-ICU Overall  CAM-ICU: Negative   E: Early Mobility Performed? yes   F: Feeding Goal: Goals: Meet % EEN, EPN by RD f/u date  Status: Nutrition Goal Status: new   Current Diet Order   Procedures    Diet Adult Regular (IDDSI Level 7)      AS: Analgesia/Sedation yes   T: Thromboembolic Prophylaxis yes   H: HOB > 300 Yes   U: Stress Ulcer Prophylaxis (if needed) No   G: Glucose Control Monitored    B: Bowel Function Stool Occurrence: 1   I: Indwelling Catheter (Lines & [place]) Necessity yes   D: De-escalation of Antimicrobials/Pharmacotherapies Not now     Plan for the day/ETD Monitor respiratory status and blood gas, begin steroids and continue lasix, ordered autoimmune workup    Code Status:  Family/Goals of Care: Full Code  Discussed        Critical secondary to Patient has a condition that poses threat to life and bodily function: Severe Respiratory Distress      Critical care was time spent personally by me on the following activities: development of treatment plan with patient or surrogate and bedside caregivers, discussions with consultants, evaluation of patient's response to treatment, examination of patient, ordering and performing treatments and interventions, ordering and review of laboratory studies, ordering and review of radiographic studies, pulse oximetry, re-evaluation of patient's condition. This critical care time did not overlap with that of any other provider or involve time for any procedures.     Denise Crenshaw,   Critical Care Medicine  [facility] - Cardiac Medical ICU

## 2022-08-13 NOTE — SUBJECTIVE & OBJECTIVE
Interval History/Significant Events: Patient having increased dyspnea today. Denies chest pain, N/V/D.     Review of Systems   Constitutional:  Positive for fatigue. Negative for chills and fever.   HENT:  Positive for sinus pressure and sinus pain. Negative for congestion, ear pain, facial swelling, rhinorrhea and sore throat.    Eyes:  Negative for pain and redness.   Respiratory:  Positive for shortness of breath. Negative for chest tightness.    Cardiovascular:  Negative for chest pain, palpitations and leg swelling.   Gastrointestinal:  Negative for abdominal distention, abdominal pain, nausea and vomiting.   Genitourinary:  Negative for dysuria, flank pain and hematuria.   Musculoskeletal:  Negative for back pain and neck pain.   Skin:  Negative for color change and rash.   Neurological:  Negative for dizziness, speech difficulty, light-headedness and headaches.   Objective:     Vital Signs (Most Recent):  Temp: 99.4 °F (37.4 °C) (08/13/22 1101)  Pulse: 66 (08/13/22 1301)  Resp: (!) 29 (08/13/22 1301)  BP: 98/61 (08/13/22 1301)  SpO2: 96 % (08/13/22 1301)   Vital Signs (24h Range):  Temp:  [99.1 °F (37.3 °C)-100.4 °F (38 °C)] 99.4 °F (37.4 °C)  Pulse:  [] 66  Resp:  [18-44] 29  SpO2:  [87 %-100 %] 96 %  BP: ()/(49-81) 98/61   Weight: 90.3 kg (199 lb)  Body mass index is 34.16 kg/m².      Intake/Output Summary (Last 24 hours) at 8/13/2022 1455  Last data filed at 8/13/2022 0600  Gross per 24 hour   Intake 1399.93 ml   Output 2350 ml   Net -950.07 ml       Physical Exam  Constitutional:       Appearance: She is ill-appearing.      Comments: Alert female in bed   HENT:      Head: Normocephalic and atraumatic.      Nose: Nose normal.      Mouth/Throat:      Mouth: Mucous membranes are moist.   Eyes:      Conjunctiva/sclera: Conjunctivae normal.      Pupils: Pupils are equal, round, and reactive to light.   Cardiovascular:      Rate and Rhythm: Tachycardia present.      Pulses: Normal pulses.    Pulmonary:      Effort: Tachypnea present.      Comments: On bipap, in moderate acute respiratory distress. Desats to 75% when switched to high flow NC   Abdominal:      General: There is no distension.      Palpations: Abdomen is soft.      Tenderness: There is no right CVA tenderness or left CVA tenderness.   Musculoskeletal:         General: No swelling.      Cervical back: Neck supple.   Skin:     General: Skin is warm.      Capillary Refill: Capillary refill takes less than 2 seconds.      Coloration: Skin is not jaundiced.   Neurological:      Mental Status: She is oriented to person, place, and time.       Vents:  Oxygen Concentration (%): 100 (08/13/22 1204)  Lines/Drains/Airways       Peripheral Intravenous Line  Duration                  Peripheral IV - Single Lumen 08/07/22 2345 20 G Anterior;Right Forearm 5 days         Peripheral IV - Single Lumen 08/12/22 0938 20 G Anterior;Left Forearm 1 day                  Significant Labs:    CBC/Anemia Profile:  Recent Labs   Lab 08/12/22  0440 08/13/22  0404 08/13/22  1100 08/13/22  1330   WBC 8.36 7.89  --   --    HGB 8.2* 8.0*  --   --    HCT 29.3* 28.1*  --   --     282  --   --    MCV 85 82  --   --    RDW 17.2* 17.2*  --   --    IRON  --   --  26*  --    FERRITIN  --   --  218  --    RETIC  --   --   --  1.5        Chemistries:  Recent Labs   Lab 08/12/22  0440 08/13/22  0404    139   K 3.9 3.7   CL 97 97   CO2 32* 33*   BUN 14 14   CREATININE 0.7 0.7   CALCIUM 9.8 9.2   ALBUMIN 2.0* 1.8*   PROT 6.1 5.8*   BILITOT 0.1 0.2   ALKPHOS 94 93   ALT 11 12   AST 15 16   MG 1.9 1.5*   PHOS 2.4* 2.2*       All pertinent labs within the past 24 hours have been reviewed.    Significant Imaging:  I have reviewed all pertinent imaging results/findings within the past 24 hours.

## 2022-08-13 NOTE — PLAN OF CARE
Problem: Adult Inpatient Plan of Care  Goal: Plan of Care Review  Outcome: Ongoing, Progressing  Goal: Patient-Specific Goal (Individualized)  Outcome: Ongoing, Progressing  Goal: Absence of Hospital-Acquired Illness or Injury  Outcome: Ongoing, Progressing  Goal: Optimal Comfort and Wellbeing  Outcome: Ongoing, Progressing  Goal: Readiness for Transition of Care  Outcome: Ongoing, Progressing     Problem: Fluid Imbalance (Pneumonia)  Goal: Fluid Balance  Outcome: Ongoing, Progressing     Problem: Infection (Pneumonia)  Goal: Resolution of Infection Signs and Symptoms  Outcome: Ongoing, Progressing     Problem: Respiratory Compromise (Pneumonia)  Goal: Effective Oxygenation and Ventilation  Outcome: Ongoing, Progressing     Problem: Skin Injury Risk Increased  Goal: Skin Health and Integrity  Outcome: Ongoing, Progressing     Problem: Fall Injury Risk  Goal: Absence of Fall and Fall-Related Injury  Outcome: Ongoing, Progressing     Problem: Restraint, Nonbehavioral (Nonviolent)  Goal: Absence of Harm or Injury  Outcome: Ongoing, Progressing     Problem: Impaired Wound Healing  Goal: Optimal Wound Healing  Outcome: Ongoing, Progressing     CMICU DAILY GOALS       A: Awake    RASS: Goal -    Actual - RASS (Goldman Agitation-Sedation Scale): 0-->alert and calm   Restraint necessity: Clinical Justification: Removing medical devices, Climbing out of bed, Treatment Interference  B: Breathe   SBT: Not intubated   C: Coordinate A & B, analgesics/sedatives   Pain: managed    SAT: Not intubated  D: Delirium   CAM-ICU: Overall CAM-ICU: Negative  E: Early(intubated/ Progressive (non-intubated) Mobility   MOVE Screen: Fail   Activity: Activity Management: Rolling - L1  FAS: Feeding/Nutrition   Diet order: Diet/Nutrition Received: regular,    T: Thrombus   DVT prophylaxis: VTE Required Core Measure: Pharmacological prophylaxis initiated/maintained  H: HOB Elevation   Head of Bed (HOB) Positioning: HOB elevated  U: Ulcer  Prophylaxis   GI: yes  G: Glucose control   managed    S: Skin   Bathing/Skin Care: bath, complete, dressed/undressed, electrode patches/site rotation, incontinence care, linen changed  Device Skin Pressure Protection: absorbent pad utilized/changed, pressure points protected  Pressure Reduction Devices: foam padding utilized, specialty bed utilized  Pressure Reduction Techniques: frequent weight shift encouraged, weight shift assistance provided  Skin Protection: drying agents applied, hydrocolloids used, incontinence pads utilized, tubing/devices free from skin contact  B: Bowel Function   no issues   I: Indwelling Catheters   [place] necessity:     CVC necessity: No  D: De-escalation Antibiotics   No    Family/Goals of care/Code Status   Code Status: Full Code    24H Vital Sign Range  Temp:  [99.1 °F (37.3 °C)-100.4 °F (38 °C)]   Pulse:  []   Resp:  [18-45]   BP: ()/(49-81)   SpO2:  [87 %-100 %]      Shift Events   Patient on continuous bipap throughout the day due to not being able to tolerate airvo. O2 requirements have increased throughout shift.    VS and assessment per flow sheet, patient progressing towards goals as tolerated, plan of care reviewed with [unfilled], all concerns addressed, will continue to monitor.    Shania Gomez

## 2022-08-13 NOTE — ASSESSMENT & PLAN NOTE
-Continued hypoxic respiratory failure   -Pt now dependent on bipap  -Finished vancomycin, zosyn, azithromycin  -Still on meropenem and diflucan  -previously finish stress dose steroids.   -Starting solumedrol now x3 days out of concern for autoimmune etiology given lack of improvement and worsening of sx   -CXR qd  -VBG q12h  -Lasix 40 mg IV BID

## 2022-08-13 NOTE — ASSESSMENT & PLAN NOTE
-Patient reportedly had tremors and hallucinations on the floor 8/5-8/7  -Sister reports when had detox at  in prev years she was there 14 days with withdrawal + aspiration PNA. Patient had endorsed hx of withdrawal in the past. Sister gave detailed hx also   -Pt was previously on CIWA protocol, receiving valium for CIWA>8, but she has subsequently improved and given the duration of time inpatient, concern for continuing Etoh withdrawal is low. Also hoping to avoid benzos as there was concern for ICU delirium. CIWA and valium DC'd.

## 2022-08-14 PROBLEM — R74.01 TRANSAMINITIS: Status: RESOLVED | Noted: 2022-08-02 | Resolved: 2022-08-14

## 2022-08-14 PROBLEM — I48.0 PAROXYSMAL ATRIAL FIBRILLATION: Status: ACTIVE | Noted: 2022-08-14

## 2022-08-14 PROBLEM — E87.6 HYPOKALEMIA: Status: RESOLVED | Noted: 2022-08-04 | Resolved: 2022-08-14

## 2022-08-14 LAB
ALBUMIN SERPL BCP-MCNC: 2.1 G/DL (ref 3.5–5.2)
ALLENS TEST: ABNORMAL
ALP SERPL-CCNC: 138 U/L (ref 55–135)
ALT SERPL W/O P-5'-P-CCNC: 12 U/L (ref 10–44)
ANION GAP SERPL CALC-SCNC: 15 MMOL/L (ref 8–16)
AST SERPL-CCNC: 20 U/L (ref 10–40)
BASOPHILS # BLD AUTO: 0.02 K/UL (ref 0–0.2)
BASOPHILS NFR BLD: 0.3 % (ref 0–1.9)
BILIRUB SERPL-MCNC: 0.2 MG/DL (ref 0.1–1)
BUN SERPL-MCNC: 21 MG/DL (ref 8–23)
CALCIUM SERPL-MCNC: 9.9 MG/DL (ref 8.7–10.5)
CHLORIDE SERPL-SCNC: 89 MMOL/L (ref 95–110)
CO2 SERPL-SCNC: 36 MMOL/L (ref 23–29)
CREAT SERPL-MCNC: 0.8 MG/DL (ref 0.5–1.4)
DELSYS: ABNORMAL
DIFFERENTIAL METHOD: ABNORMAL
EOSINOPHIL # BLD AUTO: 0 K/UL (ref 0–0.5)
EOSINOPHIL NFR BLD: 0.2 % (ref 0–8)
EP: 15
ERYTHROCYTE [DISTWIDTH] IN BLOOD BY AUTOMATED COUNT: 16.8 % (ref 11.5–14.5)
ERYTHROCYTE [SEDIMENTATION RATE] IN BLOOD BY WESTERGREN METHOD: 18 MM/H
EST. GFR  (NO RACE VARIABLE): >60 ML/MIN/1.73 M^2
FIO2: 100
GLUCOSE SERPL-MCNC: 102 MG/DL (ref 70–110)
HCO3 UR-SCNC: 45.5 MMOL/L (ref 24–28)
HCT VFR BLD AUTO: 28.7 % (ref 37–48.5)
HGB BLD-MCNC: 8.3 G/DL (ref 12–16)
IMM GRANULOCYTES # BLD AUTO: 0.02 K/UL (ref 0–0.04)
IMM GRANULOCYTES NFR BLD AUTO: 0.3 % (ref 0–0.5)
IP: 22
LYMPHOCYTES # BLD AUTO: 0.6 K/UL (ref 1–4.8)
LYMPHOCYTES NFR BLD: 9.9 % (ref 18–48)
MAGNESIUM SERPL-MCNC: 1.6 MG/DL (ref 1.6–2.6)
MCH RBC QN AUTO: 23.6 PG (ref 27–31)
MCHC RBC AUTO-ENTMCNC: 28.9 G/DL (ref 32–36)
MCV RBC AUTO: 82 FL (ref 82–98)
MIN VOL: 14.8
MODE: ABNORMAL
MONOCYTES # BLD AUTO: 0.1 K/UL (ref 0.3–1)
MONOCYTES NFR BLD: 1.8 % (ref 4–15)
NEUTROPHILS # BLD AUTO: 5.5 K/UL (ref 1.8–7.7)
NEUTROPHILS NFR BLD: 87.5 % (ref 38–73)
NRBC BLD-RTO: 0 /100 WBC
PCO2 BLDA: 69.1 MMHG (ref 35–45)
PH SMN: 7.43 [PH] (ref 7.35–7.45)
PHOSPHATE SERPL-MCNC: 3.1 MG/DL (ref 2.7–4.5)
PLATELET # BLD AUTO: 347 K/UL (ref 150–450)
PMV BLD AUTO: 11.2 FL (ref 9.2–12.9)
PO2 BLDA: 58 MMHG (ref 80–100)
POC BE: 21 MMOL/L
POC SATURATED O2: 89 % (ref 95–100)
POC TCO2: 48 MMOL/L (ref 23–27)
POTASSIUM SERPL-SCNC: 4.8 MMOL/L (ref 3.5–5.1)
PROT SERPL-MCNC: 6.6 G/DL (ref 6–8.4)
RBC # BLD AUTO: 3.52 M/UL (ref 4–5.4)
SAMPLE: ABNORMAL
SITE: ABNORMAL
SODIUM SERPL-SCNC: 140 MMOL/L (ref 136–145)
SP02: 92
SPONT RATE: 18
WBC # BLD AUTO: 6.24 K/UL (ref 3.9–12.7)

## 2022-08-14 PROCEDURE — 94761 N-INVAS EAR/PLS OXIMETRY MLT: CPT

## 2022-08-14 PROCEDURE — 94660 CPAP INITIATION&MGMT: CPT

## 2022-08-14 PROCEDURE — 83735 ASSAY OF MAGNESIUM: CPT | Performed by: STUDENT IN AN ORGANIZED HEALTH CARE EDUCATION/TRAINING PROGRAM

## 2022-08-14 PROCEDURE — 25000003 PHARM REV CODE 250: Performed by: HOSPITALIST

## 2022-08-14 PROCEDURE — 80053 COMPREHEN METABOLIC PANEL: CPT | Performed by: STUDENT IN AN ORGANIZED HEALTH CARE EDUCATION/TRAINING PROGRAM

## 2022-08-14 PROCEDURE — 20000000 HC ICU ROOM

## 2022-08-14 PROCEDURE — 27000221 HC OXYGEN, UP TO 24 HOURS

## 2022-08-14 PROCEDURE — 99900035 HC TECH TIME PER 15 MIN (STAT)

## 2022-08-14 PROCEDURE — 36600 WITHDRAWAL OF ARTERIAL BLOOD: CPT

## 2022-08-14 PROCEDURE — 27000249 HC VAPOTHERM CIRCUIT

## 2022-08-14 PROCEDURE — 87449 NOS EACH ORGANISM AG IA: CPT

## 2022-08-14 PROCEDURE — 94799 UNLISTED PULMONARY SVC/PX: CPT

## 2022-08-14 PROCEDURE — 63600175 PHARM REV CODE 636 W HCPCS

## 2022-08-14 PROCEDURE — 25000003 PHARM REV CODE 250

## 2022-08-14 PROCEDURE — 25000242 PHARM REV CODE 250 ALT 637 W/ HCPCS: Performed by: HOSPITALIST

## 2022-08-14 PROCEDURE — 99233 PR SUBSEQUENT HOSPITAL CARE,LEVL III: ICD-10-PCS | Mod: GC,,, | Performed by: INTERNAL MEDICINE

## 2022-08-14 PROCEDURE — 63700000 PHARM REV CODE 250 ALT 637 W/O HCPCS

## 2022-08-14 PROCEDURE — 27000190 HC CPAP FULL FACE MASK W/VALVE

## 2022-08-14 PROCEDURE — 82803 BLOOD GASES ANY COMBINATION: CPT

## 2022-08-14 PROCEDURE — 84100 ASSAY OF PHOSPHORUS: CPT

## 2022-08-14 PROCEDURE — 94640 AIRWAY INHALATION TREATMENT: CPT

## 2022-08-14 PROCEDURE — 83516 IMMUNOASSAY NONANTIBODY: CPT

## 2022-08-14 PROCEDURE — 85025 COMPLETE CBC W/AUTO DIFF WBC: CPT | Performed by: STUDENT IN AN ORGANIZED HEALTH CARE EDUCATION/TRAINING PROGRAM

## 2022-08-14 PROCEDURE — 27100171 HC OXYGEN HIGH FLOW UP TO 24 HOURS

## 2022-08-14 PROCEDURE — 99233 SBSQ HOSP IP/OBS HIGH 50: CPT | Mod: GC,,, | Performed by: INTERNAL MEDICINE

## 2022-08-14 RX ORDER — FLUCONAZOLE 2 MG/ML
400 INJECTION, SOLUTION INTRAVENOUS
Status: DISCONTINUED | OUTPATIENT
Start: 2022-08-14 | End: 2022-08-14

## 2022-08-14 RX ORDER — FLUCONAZOLE 2 MG/ML
400 INJECTION, SOLUTION INTRAVENOUS
Status: DISCONTINUED | OUTPATIENT
Start: 2022-08-15 | End: 2022-08-15

## 2022-08-14 RX ORDER — MAGNESIUM SULFATE HEPTAHYDRATE 40 MG/ML
2 INJECTION, SOLUTION INTRAVENOUS ONCE
Status: DISCONTINUED | OUTPATIENT
Start: 2022-08-14 | End: 2022-08-15

## 2022-08-14 RX ADMIN — METOPROLOL TARTRATE 100 MG: 100 TABLET, FILM COATED ORAL at 08:08

## 2022-08-14 RX ADMIN — VENLAFAXINE HYDROCHLORIDE 225 MG: 75 CAPSULE, EXTENDED RELEASE ORAL at 08:08

## 2022-08-14 RX ADMIN — MIRTAZAPINE 7.5 MG: 7.5 TABLET, FILM COATED ORAL at 08:08

## 2022-08-14 RX ADMIN — METHYLPREDNISOLONE SODIUM SUCCINATE 125 MG: 125 INJECTION, POWDER, FOR SOLUTION INTRAMUSCULAR; INTRAVENOUS at 10:08

## 2022-08-14 RX ADMIN — METHYLPREDNISOLONE SODIUM SUCCINATE 125 MG: 125 INJECTION, POWDER, FOR SOLUTION INTRAMUSCULAR; INTRAVENOUS at 06:08

## 2022-08-14 RX ADMIN — IPRATROPIUM BROMIDE AND ALBUTEROL SULFATE 3 ML: 2.5; .5 SOLUTION RESPIRATORY (INHALATION) at 04:08

## 2022-08-14 RX ADMIN — ACETAMINOPHEN 650 MG: 325 TABLET ORAL at 11:08

## 2022-08-14 RX ADMIN — METHYLPREDNISOLONE SODIUM SUCCINATE 125 MG: 125 INJECTION, POWDER, FOR SOLUTION INTRAMUSCULAR; INTRAVENOUS at 02:08

## 2022-08-14 RX ADMIN — FLUCONAZOLE 100 MG: 100 TABLET ORAL at 08:08

## 2022-08-14 RX ADMIN — IPRATROPIUM BROMIDE AND ALBUTEROL SULFATE 3 ML: 2.5; .5 SOLUTION RESPIRATORY (INHALATION) at 08:08

## 2022-08-14 RX ADMIN — IPRATROPIUM BROMIDE AND ALBUTEROL SULFATE 3 ML: 2.5; .5 SOLUTION RESPIRATORY (INHALATION) at 12:08

## 2022-08-14 RX ADMIN — ENOXAPARIN SODIUM 90 MG: 100 INJECTION SUBCUTANEOUS at 08:08

## 2022-08-14 RX ADMIN — MEROPENEM AND SODIUM CHLORIDE 1 G: 1 INJECTION, SOLUTION INTRAVENOUS at 05:08

## 2022-08-14 RX ADMIN — MEROPENEM AND SODIUM CHLORIDE 1 G: 1 INJECTION, SOLUTION INTRAVENOUS at 08:08

## 2022-08-14 RX ADMIN — METHYLPREDNISOLONE SODIUM SUCCINATE 125 MG: 125 INJECTION, POWDER, FOR SOLUTION INTRAMUSCULAR; INTRAVENOUS at 03:08

## 2022-08-14 RX ADMIN — Medication 6 MG: at 08:08

## 2022-08-14 RX ADMIN — THIAMINE HCL TAB 100 MG 100 MG: 100 TAB at 08:08

## 2022-08-14 RX ADMIN — OXYCODONE HYDROCHLORIDE AND ACETAMINOPHEN 1 TABLET: 5; 325 TABLET ORAL at 08:08

## 2022-08-14 RX ADMIN — OXYCODONE HYDROCHLORIDE AND ACETAMINOPHEN 1 TABLET: 5; 325 TABLET ORAL at 02:08

## 2022-08-14 RX ADMIN — FUROSEMIDE 40 MG: 10 INJECTION, SOLUTION INTRAMUSCULAR; INTRAVENOUS at 06:08

## 2022-08-14 RX ADMIN — AMIODARONE HYDROCHLORIDE 400 MG: 200 TABLET ORAL at 08:08

## 2022-08-14 RX ADMIN — MEROPENEM AND SODIUM CHLORIDE 1 G: 1 INJECTION, SOLUTION INTRAVENOUS at 02:08

## 2022-08-14 RX ADMIN — FUROSEMIDE 40 MG: 10 INJECTION, SOLUTION INTRAMUSCULAR; INTRAVENOUS at 05:08

## 2022-08-14 NOTE — SUBJECTIVE & OBJECTIVE
Interval History/Significant Events: Desaturated to mid 80s briefly overnight, recovered with re-positioning. Transitioned to CF 60L/100% FiO2 this morning, tolerating well. ABG 7.426/68.1/58/45.5. CXR this morning much improved. Will continue lasix 40 BID. Today day 2 high dose steroids.     Review of Systems   Constitutional:  Positive for fatigue. Negative for chills and fever.   HENT:  Negative for congestion, ear pain, facial swelling, rhinorrhea, sinus pressure, sinus pain and sore throat.    Eyes:  Negative for pain and redness.   Respiratory:  Positive for shortness of breath. Negative for chest tightness.    Cardiovascular:  Negative for chest pain, palpitations and leg swelling.   Gastrointestinal:  Negative for abdominal distention, abdominal pain, nausea and vomiting.   Genitourinary:  Negative for dysuria, flank pain and hematuria.   Musculoskeletal:  Negative for back pain and neck pain.   Skin:  Negative for color change and rash.   Neurological:  Negative for dizziness, speech difficulty, light-headedness and headaches.   Objective:     Vital Signs (Most Recent):  Temp: 97.3 °F (36.3 °C) (08/14/22 0301)  Pulse: (!) 112 (08/14/22 1701)  Resp: (!) 37 (08/14/22 1701)  BP: 109/66 (08/14/22 1701)  SpO2: 95 % (08/14/22 1701)   Vital Signs (24h Range):  Temp:  [97.3 °F (36.3 °C)-99.5 °F (37.5 °C)] 97.3 °F (36.3 °C)  Pulse:  [] 112  Resp:  [15-45] 37  SpO2:  [84 %-99 %] 95 %  BP: ()/(54-82) 109/66   Weight: 90.3 kg (199 lb)  Body mass index is 34.16 kg/m².      Intake/Output Summary (Last 24 hours) at 8/14/2022 1756  Last data filed at 8/14/2022 1601  Gross per 24 hour   Intake 199.84 ml   Output 700 ml   Net -500.16 ml       Physical Exam  Constitutional:       Comments: Alert female in bed, lying comfortably   HENT:      Head: Normocephalic and atraumatic.      Nose: Nose normal.      Mouth/Throat:      Mouth: Mucous membranes are moist.   Eyes:      Conjunctiva/sclera: Conjunctivae normal.       Pupils: Pupils are equal, round, and reactive to light.   Cardiovascular:      Rate and Rhythm: Tachycardia present.      Pulses: Normal pulses.   Pulmonary:      Effort: Pulmonary effort is normal. Tachypnea present.      Comments: On CF, comfortable, SpO2 93%  Abdominal:      General: There is no distension.      Palpations: Abdomen is soft.      Tenderness: There is no right CVA tenderness or left CVA tenderness.   Musculoskeletal:         General: No swelling.      Cervical back: Neck supple.   Skin:     General: Skin is warm.      Capillary Refill: Capillary refill takes less than 2 seconds.      Coloration: Skin is not jaundiced.   Neurological:      Mental Status: She is oriented to person, place, and time.   Psychiatric:         Mood and Affect: Mood normal.         Behavior: Behavior normal.         Thought Content: Thought content normal.         Judgment: Judgment normal.       Vents:  Oxygen Concentration (%): 80 (08/14/22 1656)  Lines/Drains/Airways       Peripheral Intravenous Line  Duration                  Peripheral IV - Single Lumen 08/07/22 2345 20 G Anterior;Right Forearm 6 days         Peripheral IV - Single Lumen 08/12/22 0938 20 G Anterior;Left Forearm 2 days                  Significant Labs:    CBC/Anemia Profile:  Recent Labs   Lab 08/13/22  0404 08/13/22  1100 08/13/22  1330 08/14/22  0313   WBC 7.89  --   --  6.24   HGB 8.0*  --   --  8.3*   HCT 28.1*  --   --  28.7*     --   --  347   MCV 82  --   --  82   RDW 17.2*  --   --  16.8*   IRON  --  26*  --   --    FERRITIN  --  218  --   --    RETIC  --   --  1.5  --         Chemistries:  Recent Labs   Lab 08/13/22  0404 08/14/22  0313    140   K 3.7 4.8   CL 97 89*   CO2 33* 36*   BUN 14 21   CREATININE 0.7 0.8   CALCIUM 9.2 9.9   ALBUMIN 1.8* 2.1*   PROT 5.8* 6.6   BILITOT 0.2 0.2   ALKPHOS 93 138*   ALT 12 12   AST 16 20   MG 1.5* 1.6   PHOS 2.2* 3.1       ABGs:   Recent Labs   Lab 08/14/22  0019   PH 7.426   PCO2 69.1*    HCO3 45.5*   POCSATURATED 89*   BE 21       Significant Imaging:  I have reviewed all pertinent imaging results/findings within the past 24 hours.

## 2022-08-14 NOTE — ASSESSMENT & PLAN NOTE
Patient has a history of chronic back pain, for which she had surgery in October 2020.   She has been followed by a pain management doctor and takes percocet daily for pain.   - Percocet 5mg q6h prn available

## 2022-08-14 NOTE — PLAN OF CARE
CMICU DAILY GOALS       A: Awake    RASS: Goal -    Actual - RASS (Goldman Agitation-Sedation Scale): 0-->alert and calm   Restraint necessity: Clinical Justification: Removing medical devices, Climbing out of bed, Treatment Interference  B: Breathe   SBT: Not intubated   C: Coordinate A & B, analgesics/sedatives   Pain: managed    SAT: Not intubated  D: Delirium   CAM-ICU: Overall CAM-ICU: Negative  E: Early(intubated/ Progressive (non-intubated) Mobility   MOVE Screen: Fail   Activity: Activity Management: Rolling - L1  FAS: Feeding/Nutrition   Diet order: Diet/Nutrition Received: regular,    T: Thrombus   DVT prophylaxis: VTE Required Core Measure: Pharmacological prophylaxis initiated/maintained  H: HOB Elevation   Head of Bed (HOB) Positioning: HOB at 30 degrees  U: Ulcer Prophylaxis   GI: yes  G: Glucose control   managed    S: Skin   Bathing/Skin Care: back care, bath, complete, dressed/undressed, electrode patches/site rotation, foot care, incontinence care, linen changed  Device Skin Pressure Protection: absorbent pad utilized/changed, positioning supports utilized, pressure points protected, skin-to-device areas padded, skin-to-skin areas padded  Pressure Reduction Devices: positioning supports utilized, specialty bed utilized  Pressure Reduction Techniques: frequent weight shift encouraged, weight shift assistance provided  Skin Protection: incontinence pads utilized, skin-to-device areas padded, skin-to-skin areas padded, tubing/devices free from skin contact  B: Bowel Function   no issues   I: Indwelling Catheters   [place] necessity:     CVC necessity: No  D: De-escalation Antibiotics   No    Family/Goals of care/Code Status   Code Status: Full Code    24H Vital Sign Range  Temp:  [97.3 °F (36.3 °C)-99.5 °F (37.5 °C)]   Pulse:  [66-90]   Resp:  [15-45]   BP: ()/(49-79)   SpO2:  [86 %-99 %]      Shift Events   Phos and Mag replaced overnight. Pt rested comfortably overnight. FiO2 weaned down to 75%  and 22/15.     VS and assessment per flow sheet, patient progressing towards goals as tolerated, plan of care reviewed with  Ms. Gutierrez , all concerns addressed.

## 2022-08-14 NOTE — ASSESSMENT & PLAN NOTE
Patient is identified as having Diastolic (HFpEF) heart failure that is Acute on chronic. CHF is currently uncontrolled due to Pulmonary edema/pleural effusion on CXR. Latest ECHO performed and demonstrates-     Echo  · The left ventricle is normal in size with concentric hypertrophy and normal systolic function.  · The estimated ejection fraction is 60-65%.  · Grade I left ventricular diastolic dysfunction.  · Mild aortic regurgitation.  · Normal right ventricular size with normal right ventricular systolic function.  · The estimated PA systolic pressure is 43 mmHg.  · Intermediate central venous pressure (8 mmHg).  · There is pulmonary hypertension.  · Small circumferential pericardial effusion.  · Mild left atrial enlargement.    - Lasix 40mg BID for continued diuresis  - 1.5L fluid restriction  - Strict Is/Os, daily weights

## 2022-08-14 NOTE — ASSESSMENT & PLAN NOTE
Patient stepped up to MICU for AHRF. Concern for aspiration pna vs autoimmune respiratory process vs pulmonary edema as etiology  - S/p courses of vanc/zosyn/azithromycin  - Currently on meopenem and diflucan  - Currently on 3d course of high dose steroids IV solumedrol 125mg q4h (today day 2)  - Patient with significant improvement in respiratory status 8/14, able to transition to CF from bipap (previously could not tolerate).   -CXR qd  -VBG q12h  -Lasix 40 mg IV BID for diuresis/pulmonary edema

## 2022-08-14 NOTE — PROGRESS NOTES
[facility] - Cardiac Medical ICU  Critical Care Medicine  Progress Note    Patient Name: Vale Gutierrez  MRN: [id number]  Admission Date: 8/1/2022  Hospital Length of Stay: 13 days  Code Status: Full Code  Attending Provider: Nicholas Knowles*  Primary Care Provider: Estela Mcdaniel MD   Principal Problem: Acute hypoxemic respiratory failure    Subjective:     HPI:  Ms. Gutierrez is a 60 y/o female with a PMH of etoh abuse and withdrawal, aspiration pneumonia, delirium, smoking, HTN, HLD, BPPV, alcohol abuse, depression and anxiety who was admitted to the ICU from the floor for acute hypoxic respiratory failure. She was having increased oxygen needs on the floor and was requiring valium to keep her presumed alcohol withdrawal under control. The patient states that her stomach hurts in the midepigastric region and endorses nausea, vomiting x 1 day. She is unsure how long she has been coughing or feeling ill, and says she cannot remember the last few days. She says she lives with her sister Angela who helps care for her.       Hospital/ICU Course:  Patient presented to the ED on 8/1 with a CC of hypoxia and dyspnea. She was initially admitted to the floor. She experienced hallucinations and tremor, consistent with alcohol withdrawal / delirium, and was put on CIWA protocol with valium PRN (shortage of ativan). She was treated with antibiotics (vancomycin, zosyn, and azithro) scheduled for 10 days and duonebs prn. On 8/7 she developed worsening hypoxic respiratory failure, was put on 50L 100% O2 bipap, and admitted to the ICU due to increased oxygen requirements. Work up revealed small pericardial effusion, cardiomegaly, normal LVEF, pleural effusions and opacities bilaterally, and atrial fibrillation. Patient has now completed courses of zosyn, vancomycin, and azithromycin. She is currently on diflucan  and meropenem out of concern for respiratory infection, and lasix BID for pulmonary edema. She was started  on high dose steroids 8/13 as there was concern for autoimmune respiratory process, and labs were sent for C-ANCA and P-ANCA (currently pending). She previously had a +BRANDYN titer, but antiCCP, RF, and  anti-dsDNA were negative. Patient's CXR and respiratory status improving on 8/14, able to tolerate CF.     Of note, discussed the potential need for endotracheal intubation and she expressed understanding and stated she would like to have that done if her respiratory  status declines to the point of needing it.       Interval History/Significant Events: Desaturated to mid 80s briefly overnight, recovered with re-positioning. Transitioned to CF 60L/100% FiO2 this morning, tolerating well. ABG 7.426/68.1/58/45.5. CXR this morning much improved. Will continue lasix 40 BID. Today day 2 high dose steroids.     Review of Systems   Constitutional:  Positive for fatigue. Negative for chills and fever.   HENT:  Negative for congestion, ear pain, facial swelling, rhinorrhea, sinus pressure, sinus pain and sore throat.    Eyes:  Negative for pain and redness.   Respiratory:  Positive for shortness of breath. Negative for chest tightness.    Cardiovascular:  Negative for chest pain, palpitations and leg swelling.   Gastrointestinal:  Negative for abdominal distention, abdominal pain, nausea and vomiting.   Genitourinary:  Negative for dysuria, flank pain and hematuria.   Musculoskeletal:  Negative for back pain and neck pain.   Skin:  Negative for color change and rash.   Neurological:  Negative for dizziness, speech difficulty, light-headedness and headaches.   Objective:     Vital Signs (Most Recent):  Temp: 97.3 °F (36.3 °C) (08/14/22 0301)  Pulse: (!) 112 (08/14/22 1701)  Resp: (!) 37 (08/14/22 1701)  BP: 109/66 (08/14/22 1701)  SpO2: 95 % (08/14/22 1701)   Vital Signs (24h Range):  Temp:  [97.3 °F (36.3 °C)-99.5 °F (37.5 °C)] 97.3 °F (36.3 °C)  Pulse:  [] 112  Resp:  [15-45] 37  SpO2:  [84 %-99 %] 95 %  BP:  ()/(54-82) 109/66   Weight: 90.3 kg (199 lb)  Body mass index is 34.16 kg/m².      Intake/Output Summary (Last 24 hours) at 8/14/2022 1756  Last data filed at 8/14/2022 1601  Gross per 24 hour   Intake 199.84 ml   Output 700 ml   Net -500.16 ml       Physical Exam  Constitutional:       Comments: Alert female in bed, lying comfortably   HENT:      Head: Normocephalic and atraumatic.      Nose: Nose normal.      Mouth/Throat:      Mouth: Mucous membranes are moist.   Eyes:      Conjunctiva/sclera: Conjunctivae normal.      Pupils: Pupils are equal, round, and reactive to light.   Cardiovascular:      Rate and Rhythm: Tachycardia present.      Pulses: Normal pulses.   Pulmonary:      Effort: Pulmonary effort is normal. Tachypnea present.      Comments: On CF, comfortable, SpO2 93%  Abdominal:      General: There is no distension.      Palpations: Abdomen is soft.      Tenderness: There is no right CVA tenderness or left CVA tenderness.   Musculoskeletal:         General: No swelling.      Cervical back: Neck supple.   Skin:     General: Skin is warm.      Capillary Refill: Capillary refill takes less than 2 seconds.      Coloration: Skin is not jaundiced.   Neurological:      Mental Status: She is oriented to person, place, and time.   Psychiatric:         Mood and Affect: Mood normal.         Behavior: Behavior normal.         Thought Content: Thought content normal.         Judgment: Judgment normal.       Vents:  Oxygen Concentration (%): 80 (08/14/22 1656)  Lines/Drains/Airways       Peripheral Intravenous Line  Duration                  Peripheral IV - Single Lumen 08/07/22 2345 20 G Anterior;Right Forearm 6 days         Peripheral IV - Single Lumen 08/12/22 0938 20 G Anterior;Left Forearm 2 days                  Significant Labs:    CBC/Anemia Profile:  Recent Labs   Lab 08/13/22  0404 08/13/22  1100 08/13/22  1330 08/14/22  0313   WBC 7.89  --   --  6.24   HGB 8.0*  --   --  8.3*   HCT 28.1*  --   --   28.7*     --   --  347   MCV 82  --   --  82   RDW 17.2*  --   --  16.8*   IRON  --  26*  --   --    FERRITIN  --  218  --   --    RETIC  --   --  1.5  --         Chemistries:  Recent Labs   Lab 08/13/22  0404 08/14/22  0313    140   K 3.7 4.8   CL 97 89*   CO2 33* 36*   BUN 14 21   CREATININE 0.7 0.8   CALCIUM 9.2 9.9   ALBUMIN 1.8* 2.1*   PROT 5.8* 6.6   BILITOT 0.2 0.2   ALKPHOS 93 138*   ALT 12 12   AST 16 20   MG 1.5* 1.6   PHOS 2.2* 3.1       ABGs:   Recent Labs   Lab 08/14/22  0019   PH 7.426   PCO2 69.1*   HCO3 45.5*   POCSATURATED 89*   BE 21       Significant Imaging:  I have reviewed all pertinent imaging results/findings within the past 24 hours.      ABG  Recent Labs   Lab 08/14/22  0019   PH 7.426   PO2 58*   PCO2 69.1*   HCO3 45.5*   BE 21     Assessment/Plan:     Neuro  Chronic pain disorder  Patient has a history of chronic back pain, for which she had surgery in October 2020.   She has been followed by a pain management doctor and takes percocet daily for pain.   - Percocet 5mg q6h prn available    Psychiatric  Recurrent major depressive disorder, in full remission  - Continue home Mirtazapine 7.5mg qd and venlafaxine 225mg q.d.    Alcohol dependence with withdrawal with perceptual disturbance  - Patient reportedly had tremors and hallucinations on the floor 8/5-8/7 concerning for withdrawal  - Has history of complicated withdrawal hx requiring hospitalization at  per sister   - On [facility] protocol with PRN BZDs on arrival, no longer exhibiting sxs of alcohol withdrawal  - Thiamine 100mg q.d.    Pulmonary  * Acute hypoxemic respiratory failure  Patient stepped up to MICU for AHRF. Concern for aspiration pna vs CAP vs autoimmune respiratory process vs pulmonary edema as etiology  - S/p courses of vanc/zosyn/azithromycin  - Currently on meopenem and diflucan  - Currently on 3d course of high dose steroids IV solumedrol 125mg q4h (today day 2)  - Patient with significant improvement in  respiratory status 8/14, able to transition to CF from bipap (previously could not tolerate).   -CXR qd  -VBG q12h  -Lasix 40 mg IV BID for diuresis/pulmonary edema    [facility]  [facility]     Community acquired pneumonia of right lower lobe of lung  [facility]       Cardiac/Vascular  Paroxysmal atrial fibrillation  Patient with paroxysmal atrial fibrillation during admission  - Currently on Amiodarone 400mg BID x 10days, to transition to 200mg daily on 8/23  - Therapeutic lovenox 90mg BID for anticoagulation  - On lopressor 100mg BID      Acute on chronic diastolic congestive heart failure  Patient is identified as having Diastolic (HFpEF) heart failure that is Acute on chronic. CHF is currently uncontrolled due to Pulmonary edema/pleural effusion on CXR. Latest ECHO performed and demonstrates-     Echo  · The left ventricle is normal in size with concentric hypertrophy and normal systolic function.  · The estimated ejection fraction is 60-65%.  · Grade I left ventricular diastolic dysfunction.  · Mild aortic regurgitation.  · Normal right ventricular size with normal right ventricular systolic function.  · The estimated PA systolic pressure is 43 mmHg.  · Intermediate central venous pressure (8 mmHg).  · There is pulmonary hypertension.  · Small circumferential pericardial effusion.  · Mild left atrial enlargement.    - Lasix 40mg BID for continued diuresis  - 1.5L fluid restriction  - Strict Is/Os, daily weights        Essential hypertension  Home Med: Metoprolol 50mg po q.d.  - On lopressor 100mg BID for rate control and HTN       Critical Care Daily Checklist:    A: Awake: RASS Goal/Actual Goal:    Actual: Goldman Agitation Sedation Scale (RASS): Alert and calm   B: Spontaneous Breathing Trial Performed?  n/a   C: SAT & SBT Coordinated?  n/a                      D: Delirium: CAM-ICU Overall CAM-ICU: Negative   E: Early Mobility Performed? Yes   F: Feeding Goal: Goals: Meet % EEN, EPN by RD f/u  date  Status: Nutrition Goal Status: new   Current Diet Order   Procedures    Diet Adult Regular (IDDSI Level 7) Fluid - 1500mL     Order Specific Question:   Fluid restriction:     Answer:   Fluid - 1500mL      AS: Analgesia/Sedation available   T: Thromboembolic Prophylaxis lovenox   H: HOB > 300 Yes   U: Stress Ulcer Prophylaxis (if needed) n/a   G: Glucose Control monitoring   B: Bowel Function Stool Occurrence: 1   I: Indwelling Catheter (Lines & [place]) Necessity yes   D: De-escalation of Antimicrobials/Pharmacotherapies As needed    Plan for the day/ETD CF, bipap qhs, day 2 steroids     Code Status:  Family/Goals of Care: Full Code         Critical secondary to Patient has a condition that poses threat to life and bodily function: Severe Respiratory Distress      Critical care was time spent personally by me on the following activities: development of treatment plan with patient or surrogate and bedside caregivers, discussions with consultants, evaluation of patient's response to treatment, examination of patient, ordering and performing treatments and interventions, ordering and review of laboratory studies, ordering and review of radiographic studies, pulse oximetry, re-evaluation of patient's condition. This critical care time did not overlap with that of any other provider or involve time for any procedures.     Janeth Dobson MD  Critical Care Medicine  [facility] - Cardiac Medical ICU

## 2022-08-14 NOTE — ASSESSMENT & PLAN NOTE
- Patient reportedly had tremors and hallucinations on the floor 8/5-8/7 concerning for withdrawal  - Has history of complicated withdrawal hx requiring hospitalization at  per sister   - On [facility] protocol with PRN BZDs on arrival, no longer exhibiting sxs of alcohol withdrawal  - Thiamine 100mg q.d.

## 2022-08-15 PROBLEM — F10.20 ALCOHOL USE DISORDER, SEVERE, DEPENDENCE: Chronic | Status: ACTIVE | Noted: 2019-08-29

## 2022-08-15 LAB
ALBUMIN SERPL BCP-MCNC: 2.1 G/DL (ref 3.5–5.2)
ALP SERPL-CCNC: 113 U/L (ref 55–135)
ALT SERPL W/O P-5'-P-CCNC: 10 U/L (ref 10–44)
ANION GAP SERPL CALC-SCNC: 13 MMOL/L (ref 8–16)
AST SERPL-CCNC: 15 U/L (ref 10–40)
BACTERIA BLD CULT: NORMAL
BACTERIA BLD CULT: NORMAL
BASOPHILS # BLD AUTO: 0.01 K/UL (ref 0–0.2)
BASOPHILS NFR BLD: 0.1 % (ref 0–1.9)
BILIRUB SERPL-MCNC: 0.2 MG/DL (ref 0.1–1)
BUN SERPL-MCNC: 35 MG/DL (ref 8–23)
CALCIUM SERPL-MCNC: 9.6 MG/DL (ref 8.7–10.5)
CHLORIDE SERPL-SCNC: 90 MMOL/L (ref 95–110)
CO2 SERPL-SCNC: 40 MMOL/L (ref 23–29)
CREAT SERPL-MCNC: 1.1 MG/DL (ref 0.5–1.4)
DIFFERENTIAL METHOD: ABNORMAL
EOSINOPHIL # BLD AUTO: 0 K/UL (ref 0–0.5)
EOSINOPHIL NFR BLD: 0 % (ref 0–8)
ERYTHROCYTE [DISTWIDTH] IN BLOOD BY AUTOMATED COUNT: 16.9 % (ref 11.5–14.5)
EST. GFR  (NO RACE VARIABLE): 56.5 ML/MIN/1.73 M^2
FACT X PPP CHRO-ACNC: 0.87 IU/ML (ref 0.3–0.7)
GLUCOSE SERPL-MCNC: 142 MG/DL (ref 70–110)
HCT VFR BLD AUTO: 27.8 % (ref 37–48.5)
HGB BLD-MCNC: 8 G/DL (ref 12–16)
IMM GRANULOCYTES # BLD AUTO: 0.05 K/UL (ref 0–0.04)
IMM GRANULOCYTES NFR BLD AUTO: 0.4 % (ref 0–0.5)
LYMPHOCYTES # BLD AUTO: 0.6 K/UL (ref 1–4.8)
LYMPHOCYTES NFR BLD: 4.9 % (ref 18–48)
MAGNESIUM SERPL-MCNC: 2 MG/DL (ref 1.6–2.6)
MCH RBC QN AUTO: 23.8 PG (ref 27–31)
MCHC RBC AUTO-ENTMCNC: 28.8 G/DL (ref 32–36)
MCV RBC AUTO: 83 FL (ref 82–98)
MONOCYTES # BLD AUTO: 0.5 K/UL (ref 0.3–1)
MONOCYTES NFR BLD: 4.4 % (ref 4–15)
NEUTROPHILS # BLD AUTO: 10.9 K/UL (ref 1.8–7.7)
NEUTROPHILS NFR BLD: 90.2 % (ref 38–73)
NRBC BLD-RTO: 0 /100 WBC
PHOSPHATE SERPL-MCNC: 2.6 MG/DL (ref 2.7–4.5)
PLATELET # BLD AUTO: 393 K/UL (ref 150–450)
PMV BLD AUTO: 10.9 FL (ref 9.2–12.9)
POTASSIUM SERPL-SCNC: 3.6 MMOL/L (ref 3.5–5.1)
PROT SERPL-MCNC: 6.4 G/DL (ref 6–8.4)
RBC # BLD AUTO: 3.36 M/UL (ref 4–5.4)
SODIUM SERPL-SCNC: 143 MMOL/L (ref 136–145)
WBC # BLD AUTO: 12.07 K/UL (ref 3.9–12.7)

## 2022-08-15 PROCEDURE — 80053 COMPREHEN METABOLIC PANEL: CPT | Performed by: STUDENT IN AN ORGANIZED HEALTH CARE EDUCATION/TRAINING PROGRAM

## 2022-08-15 PROCEDURE — 97530 THERAPEUTIC ACTIVITIES: CPT

## 2022-08-15 PROCEDURE — 83735 ASSAY OF MAGNESIUM: CPT | Performed by: STUDENT IN AN ORGANIZED HEALTH CARE EDUCATION/TRAINING PROGRAM

## 2022-08-15 PROCEDURE — 25000003 PHARM REV CODE 250

## 2022-08-15 PROCEDURE — 99900035 HC TECH TIME PER 15 MIN (STAT)

## 2022-08-15 PROCEDURE — 25000003 PHARM REV CODE 250: Performed by: HOSPITALIST

## 2022-08-15 PROCEDURE — 84100 ASSAY OF PHOSPHORUS: CPT

## 2022-08-15 PROCEDURE — 25000242 PHARM REV CODE 250 ALT 637 W/ HCPCS: Performed by: HOSPITALIST

## 2022-08-15 PROCEDURE — 27000249 HC VAPOTHERM CIRCUIT

## 2022-08-15 PROCEDURE — 97110 THERAPEUTIC EXERCISES: CPT

## 2022-08-15 PROCEDURE — 94761 N-INVAS EAR/PLS OXIMETRY MLT: CPT

## 2022-08-15 PROCEDURE — 99291 PR CRITICAL CARE, E/M 30-74 MINUTES: ICD-10-PCS | Mod: GC,,, | Performed by: STUDENT IN AN ORGANIZED HEALTH CARE EDUCATION/TRAINING PROGRAM

## 2022-08-15 PROCEDURE — 27000221 HC OXYGEN, UP TO 24 HOURS

## 2022-08-15 PROCEDURE — 85025 COMPLETE CBC W/AUTO DIFF WBC: CPT | Performed by: STUDENT IN AN ORGANIZED HEALTH CARE EDUCATION/TRAINING PROGRAM

## 2022-08-15 PROCEDURE — 25000003 PHARM REV CODE 250: Performed by: STUDENT IN AN ORGANIZED HEALTH CARE EDUCATION/TRAINING PROGRAM

## 2022-08-15 PROCEDURE — 97116 GAIT TRAINING THERAPY: CPT

## 2022-08-15 PROCEDURE — 94660 CPAP INITIATION&MGMT: CPT

## 2022-08-15 PROCEDURE — 94799 UNLISTED PULMONARY SVC/PX: CPT

## 2022-08-15 PROCEDURE — 99291 CRITICAL CARE FIRST HOUR: CPT | Mod: GC,,, | Performed by: STUDENT IN AN ORGANIZED HEALTH CARE EDUCATION/TRAINING PROGRAM

## 2022-08-15 PROCEDURE — 27100171 HC OXYGEN HIGH FLOW UP TO 24 HOURS

## 2022-08-15 PROCEDURE — 63600175 PHARM REV CODE 636 W HCPCS

## 2022-08-15 PROCEDURE — 94640 AIRWAY INHALATION TREATMENT: CPT

## 2022-08-15 PROCEDURE — 97535 SELF CARE MNGMENT TRAINING: CPT

## 2022-08-15 PROCEDURE — 99222 PR INITIAL HOSPITAL CARE,LEVL II: ICD-10-PCS | Mod: ,,, | Performed by: PSYCHIATRY & NEUROLOGY

## 2022-08-15 PROCEDURE — 85520 HEPARIN ASSAY: CPT | Performed by: INTERNAL MEDICINE

## 2022-08-15 PROCEDURE — 20000000 HC ICU ROOM

## 2022-08-15 PROCEDURE — 99222 1ST HOSP IP/OBS MODERATE 55: CPT | Mod: ,,, | Performed by: PSYCHIATRY & NEUROLOGY

## 2022-08-15 RX ORDER — POTASSIUM CHLORIDE 20 MEQ/1
40 TABLET, EXTENDED RELEASE ORAL ONCE
Status: COMPLETED | OUTPATIENT
Start: 2022-08-15 | End: 2022-08-15

## 2022-08-15 RX ORDER — OLANZAPINE 5 MG/1
10 TABLET, ORALLY DISINTEGRATING ORAL NIGHTLY PRN
Status: DISCONTINUED | OUTPATIENT
Start: 2022-08-15 | End: 2022-08-19

## 2022-08-15 RX ADMIN — FUROSEMIDE 40 MG: 10 INJECTION, SOLUTION INTRAMUSCULAR; INTRAVENOUS at 05:08

## 2022-08-15 RX ADMIN — VENLAFAXINE HYDROCHLORIDE 225 MG: 75 CAPSULE, EXTENDED RELEASE ORAL at 08:08

## 2022-08-15 RX ADMIN — IPRATROPIUM BROMIDE AND ALBUTEROL SULFATE 3 ML: 2.5; .5 SOLUTION RESPIRATORY (INHALATION) at 12:08

## 2022-08-15 RX ADMIN — METOPROLOL TARTRATE 100 MG: 100 TABLET, FILM COATED ORAL at 09:08

## 2022-08-15 RX ADMIN — METHYLPREDNISOLONE SODIUM SUCCINATE 125 MG: 125 INJECTION, POWDER, FOR SOLUTION INTRAMUSCULAR; INTRAVENOUS at 09:08

## 2022-08-15 RX ADMIN — AMIODARONE HYDROCHLORIDE 400 MG: 200 TABLET ORAL at 08:08

## 2022-08-15 RX ADMIN — OXYCODONE HYDROCHLORIDE AND ACETAMINOPHEN 1 TABLET: 5; 325 TABLET ORAL at 08:08

## 2022-08-15 RX ADMIN — METOPROLOL TARTRATE 100 MG: 100 TABLET, FILM COATED ORAL at 08:08

## 2022-08-15 RX ADMIN — METHYLPREDNISOLONE SODIUM SUCCINATE 125 MG: 125 INJECTION, POWDER, FOR SOLUTION INTRAMUSCULAR; INTRAVENOUS at 10:08

## 2022-08-15 RX ADMIN — IPRATROPIUM BROMIDE AND ALBUTEROL SULFATE 3 ML: 2.5; .5 SOLUTION RESPIRATORY (INHALATION) at 08:08

## 2022-08-15 RX ADMIN — AMIODARONE HYDROCHLORIDE 400 MG: 200 TABLET ORAL at 09:08

## 2022-08-15 RX ADMIN — IPRATROPIUM BROMIDE AND ALBUTEROL SULFATE 3 ML: 2.5; .5 SOLUTION RESPIRATORY (INHALATION) at 04:08

## 2022-08-15 RX ADMIN — BUTALBITAL, ACETAMINOPHEN, AND CAFFEINE 1 TABLET: 50; 325; 40 TABLET ORAL at 02:08

## 2022-08-15 RX ADMIN — FLUCONAZOLE IN SODIUM CHLORIDE 400 MG: 2 INJECTION, SOLUTION INTRAVENOUS at 08:08

## 2022-08-15 RX ADMIN — METHYLPREDNISOLONE SODIUM SUCCINATE 125 MG: 125 INJECTION, POWDER, FOR SOLUTION INTRAMUSCULAR; INTRAVENOUS at 02:08

## 2022-08-15 RX ADMIN — OXYCODONE HYDROCHLORIDE AND ACETAMINOPHEN 1 TABLET: 5; 325 TABLET ORAL at 09:08

## 2022-08-15 RX ADMIN — OXYCODONE HYDROCHLORIDE AND ACETAMINOPHEN 1 TABLET: 5; 325 TABLET ORAL at 02:08

## 2022-08-15 RX ADMIN — OLANZAPINE 10 MG: 5 TABLET, ORALLY DISINTEGRATING ORAL at 09:08

## 2022-08-15 RX ADMIN — MEROPENEM AND SODIUM CHLORIDE 1 G: 1 INJECTION, SOLUTION INTRAVENOUS at 05:08

## 2022-08-15 RX ADMIN — THIAMINE HCL TAB 100 MG 100 MG: 100 TAB at 08:08

## 2022-08-15 RX ADMIN — METHYLPREDNISOLONE SODIUM SUCCINATE 125 MG: 125 INJECTION, POWDER, FOR SOLUTION INTRAMUSCULAR; INTRAVENOUS at 05:08

## 2022-08-15 RX ADMIN — MIRTAZAPINE 7.5 MG: 7.5 TABLET, FILM COATED ORAL at 09:08

## 2022-08-15 RX ADMIN — ENOXAPARIN SODIUM 90 MG: 100 INJECTION SUBCUTANEOUS at 08:08

## 2022-08-15 RX ADMIN — IPRATROPIUM BROMIDE AND ALBUTEROL SULFATE 3 ML: 2.5; .5 SOLUTION RESPIRATORY (INHALATION) at 07:08

## 2022-08-15 RX ADMIN — ENOXAPARIN SODIUM 90 MG: 100 INJECTION SUBCUTANEOUS at 09:08

## 2022-08-15 RX ADMIN — POTASSIUM CHLORIDE 40 MEQ: 1500 TABLET, EXTENDED RELEASE ORAL at 07:08

## 2022-08-15 NOTE — PROGRESS NOTES
[facility] - Cardiac Medical ICU  Critical Care Medicine  Progress Note    Patient Name: Vale Gutierrez  MRN: [id number]  Admission Date: 8/1/2022  Hospital Length of Stay: 14 days  Code Status: Full Code  Attending Provider: Wiley Helm MD  Primary Care Provider: Estela Mcdaniel MD   Principal Problem: Acute hypoxemic respiratory failure    Subjective:     HPI:  Ms. Gutierrez is a 60 y/o female with a PMH of etoh abuse and withdrawal, aspiration pneumonia, delirium, smoking, HTN, HLD, BPPV, alcohol abuse, depression and anxiety who was admitted to the ICU from the floor for acute hypoxic respiratory failure. She was having increased oxygen needs on the floor and was requiring valium to keep her presumed alcohol withdrawal under control. The patient states that her stomach hurts in the midepigastric region and endorses nausea, vomiting x 1 day. She is unsure how long she has been coughing or feeling ill, and says she cannot remember the last few days. She says she lives with her sister Angela who helps care for her.       Hospital/ICU Course:  Patient presented to the ED on 8/1 with a CC of hypoxia and dyspnea. She was initially admitted to the floor. She experienced hallucinations and tremor, consistent with alcohol withdrawal / delirium, and was put on CIWA protocol with valium PRN (shortage of ativan). She was treated with antibiotics (vancomycin, zosyn, and azithro) scheduled for 10 days and duonebs prn. On 8/7 she developed worsening hypoxic respiratory failure, was put on 50L 100% O2 bipap, and admitted to the ICU due to increased oxygen requirements. Work up revealed small pericardial effusion, cardiomegaly, normal LVEF, pleural effusions and opacities bilaterally, and atrial fibrillation. Patient has now completed courses of zosyn, vancomycin, and azithromycin. She was subsequently given diflucan  and meropenem out of concern for respiratory infection, stopped 8/15. On lasix BID for pulmonary edema.  She was started on high dose steroids 8/13 as there was concern for autoimmune respiratory process, and labs were sent for C-ANCA and P-ANCA (currently pending). She previously had a +BRANDYN titer, but antiCCP, RF, and  anti-dsDNA were negative. Patient's CXR and respiratory status improving on 8/14, able to tolerate CF. Weaning CF as tolerated.     Of note, discussed the potential need for endotracheal intubation and she expressed understanding and stated she would like to have that done if her respiratory  status declines to the point of needing it.           Interval History/Significant Events: No acute events overnight. Patient is feeling well and states she would like to walk around.    Review of Systems   Constitutional:  Negative for chills and fever.   HENT:  Positive for rhinorrhea. Negative for congestion.    Eyes:  Negative for photophobia and visual disturbance.   Respiratory:  Positive for cough and shortness of breath.    Cardiovascular:  Negative for chest pain and leg swelling.   Gastrointestinal:  Positive for abdominal pain (TTP in LUQ). Negative for abdominal distention, diarrhea, nausea and vomiting.   Genitourinary:  Negative for dysuria.   Musculoskeletal:  Positive for back pain (chronic).   Skin:  Negative for rash.   Neurological:  Negative for light-headedness and headaches.   Psychiatric/Behavioral:  Negative for agitation and confusion.    Objective:     Vital Signs (Most Recent):  Temp: 98.3 °F (36.8 °C) (08/15/22 1100)  Pulse: 95 (08/15/22 1209)  Resp: (!) 47 (08/15/22 1209)  BP: 99/74 (08/15/22 1200)  SpO2: 98 % (08/15/22 1209)   Vital Signs (24h Range):  Temp:  [98.2 °F (36.8 °C)-99 °F (37.2 °C)] 98.3 °F (36.8 °C)  Pulse:  [] 95  Resp:  [16-53] 47  SpO2:  [88 %-98 %] 98 %  BP: ()/(51-74) 99/74   Weight: 90.3 kg (199 lb)  Body mass index is 34.16 kg/m².      Intake/Output Summary (Last 24 hours) at 8/15/2022 1334  Last data filed at 8/15/2022 1100  Gross per 24 hour   Intake  797.88 ml   Output 1500 ml   Net -702.12 ml       Physical Exam  Constitutional:       Appearance: She is obese.   HENT:      Head: Normocephalic and atraumatic.      Right Ear: External ear normal.      Left Ear: External ear normal.      Nose: Nose normal.      Mouth/Throat:      Mouth: Mucous membranes are moist.      Pharynx: Oropharynx is clear.   Eyes:      Extraocular Movements: Extraocular movements intact.      Conjunctiva/sclera: Conjunctivae normal.   Cardiovascular:      Rate and Rhythm: Normal rate and regular rhythm.   Pulmonary:      Breath sounds: Wheezing and rales present.      Comments: Comfort flow 60L/min  Abdominal:      General: Abdomen is flat. Bowel sounds are normal.      Palpations: Abdomen is soft.      Tenderness: There is abdominal tenderness (TTP in LUQ\).   Musculoskeletal:         General: Normal range of motion.      Cervical back: Normal range of motion.      Right lower leg: No edema.      Left lower leg: No edema.   Skin:     General: Skin is warm and dry.   Neurological:      General: No focal deficit present.      Mental Status: She is alert.   Psychiatric:         Mood and Affect: Mood normal.         Behavior: Behavior normal.       Vents:  Oxygen Concentration (%): 64 (08/15/22 1209)  Lines/Drains/Airways       Peripheral Intravenous Line  Duration                  Peripheral IV - Single Lumen 08/07/22 2345 20 G Anterior;Right Forearm 7 days         Peripheral IV - Single Lumen 08/12/22 0938 20 G Anterior;Left Forearm 3 days                  Significant Labs:    CBC/Anemia Profile:  Recent Labs   Lab 08/14/22  0313 08/15/22  0305   WBC 6.24 12.07   HGB 8.3* 8.0*   HCT 28.7* 27.8*    393   MCV 82 83   RDW 16.8* 16.9*        Chemistries:  Recent Labs   Lab 08/14/22  0313 08/15/22  0305    143   K 4.8 3.6   CL 89* 90*   CO2 36* 40*   BUN 21 35*   CREATININE 0.8 1.1   CALCIUM 9.9 9.6   ALBUMIN 2.1* 2.1*   PROT 6.6 6.4   BILITOT 0.2 0.2   ALKPHOS 138* 113   ALT 12 10    AST 20 15   MG 1.6 2.0   PHOS 3.1 2.6*       All pertinent labs within the past 24 hours have been reviewed.    Significant Imaging:  I have reviewed all pertinent imaging results/findings within the past 24 hours.      ABG  Recent Labs   Lab 08/14/22  0019   PH 7.426   PO2 58*   PCO2 69.1*   HCO3 45.5*   BE 21     Assessment/Plan:     Neuro  Chronic pain disorder  Patient has a history of chronic back pain, for which she had surgery in October 2020.   She has been followed by a pain management doctor and takes percocet daily for pain.   - Percocet 5mg q6h prn available    Psychiatric  Recurrent major depressive disorder, in full remission  - Continue home Mirtazapine 7.5mg qd and venlafaxine 225mg q.d.    Alcohol use disorder, severe, dependence  Patient reportedly had tremors and hallucinations on the floor 8/5-8/7 concerning for withdrawal. Has history of complicated withdrawal hx requiring hospitalization at  per sister   - On [facility] protocol with PRN BZDs on arrival, no longer exhibiting sxs of alcohol withdrawal  - Thiamine 100mg q.d.    Pulmonary  * Acute hypoxemic respiratory failure  Patient stepped up to MICU for AHRF. Concern for aspiration pna vs autoimmune respiratory process vs pulmonary edema as etiology  - S/p courses of vanc/zosyn/azithromycin, meropenem/diflucan stopped 8/15  - Currently on 3d course of high dose steroids IV solumedrol 125mg q4h (today day 3)  - Patient with significant improvement in respiratory status 8/14, able to transition to CF from bipap, weaning O2 - now on 64%  -CXR 8/14 with improved aeration  -Lasix 40 mg IV BID for diuresis/pulmonary edema    Aspiration pneumonia  See AHRF         Acute on chronic respiratory failure with hypercapnia  Patient with Hypoxic Respiratory failure which is Acute on chronic.  she is not on home oxygen. Supplemental oxygen was provided and noted- Oxygen Concentration (%):  [] 100.   Signs/symptoms of respiratory failure include-  tachypnea. Contributing diagnoses includes - ARDS, Aspiration, CHF, COPD, Interstitial lung disease, Pleural effusion, Pneumonia and Pulmonary Embolus Labs and images were reviewed. Patient Has recent ABG, which has been reviewed. Will treat underlying causes and adjust management of respiratory failure.     Community acquired pneumonia of right lower lobe of lung  [facility]       Cardiac/Vascular  Paroxysmal atrial fibrillation  Patient with paroxysmal atrial fibrillation during admission  - Currently on Amiodarone 400mg BID x 10days, to transition to 200mg daily on 8/23  - Therapeutic lovenox 90mg BID for anticoagulation  - On lopressor 100mg BID for rate control    Acute on chronic diastolic congestive heart failure  Patient is identified as having Diastolic (HFpEF) heart failure that is Acute on chronic. CHF is currently uncontrolled due to Pulmonary edema/pleural effusion on CXR. Latest ECHO performed and demonstrates-     Echo  · The left ventricle is normal in size with concentric hypertrophy and normal systolic function.  · The estimated ejection fraction is 60-65%.  · Grade I left ventricular diastolic dysfunction.  · Mild aortic regurgitation.  · Normal right ventricular size with normal right ventricular systolic function.  · The estimated PA systolic pressure is 43 mmHg.  · Intermediate central venous pressure (8 mmHg).  · There is pulmonary hypertension.  · Small circumferential pericardial effusion.  · Mild left atrial enlargement.    - Lasix 40mg BID for continued diuresis  - 1.5L fluid restriction  - Strict Is/Os, daily weights          Essential hypertension  Home Med: Metoprolol 50mg po q.d.  - On lopressor 100mg BID for rate control and HTN       Critical Care Daily Checklist:    A: Awake: RASS Goal/Actual Goal:    Actual: Goldman Agitation Sedation Scale (RASS): Alert and calm   B: Spontaneous Breathing Trial Performed?  n/a   C: SAT & SBT Coordinated?  n/a                      D: Delirium:  CAM-ICU Overall CAM-ICU: Negative   E: Early Mobility Performed? Yes   F: Feeding Goal: Goals: Meet % EEN, EPN by RD f/u date  Status: Nutrition Goal Status: new   Current Diet Order   Procedures    Diet Adult Regular (IDDSI Level 7) Fluid - 1500mL     Order Specific Question:   Fluid restriction:     Answer:   Fluid - 1500mL      AS: Analgesia/Sedation available   T: Thromboembolic Prophylaxis lovenox   H: HOB > 300 Yes   U: Stress Ulcer Prophylaxis (if needed) n/a   G: Glucose Control monitoring   B: Bowel Function Stool Occurrence: 2   I: Indwelling Catheter (Lines & [place]) Necessity no   D: De-escalation of Antimicrobials/Pharmacotherapies Yes- abx stopped    Plan for the day/ETD Wean O2 support    Code Status:  Family/Goals of Care: Full Code         Critical secondary to Patient has a condition that poses threat to life and bodily function: Severe Respiratory Distress      Critical care was time spent personally by me on the following activities: development of treatment plan with patient or surrogate and bedside caregivers, discussions with consultants, evaluation of patient's response to treatment, examination of patient, ordering and performing treatments and interventions, ordering and review of laboratory studies, ordering and review of radiographic studies, pulse oximetry, re-evaluation of patient's condition. This critical care time did not overlap with that of any other provider or involve time for any procedures.     SILVIO BERMUDEZ MD  Critical Care Medicine  [facility] - Cardiac Medical ICU

## 2022-08-15 NOTE — PT/OT/SLP PROGRESS
"Physical Therapy Co-Treatment    Patient Name:  Vale Gutierrez   MRN:  [id number]    Recommendations:     Discharge Recommendations:  nursing facility, skilled   Discharge Equipment Recommendations:  (TBD)   Barriers to discharge: Decreased caregiver support    Assessment:     Vale Gutierrez is a 61 y.o. female admitted with a medical diagnosis of Acute hypoxemic respiratory failure. Patient tolerated session well. Intermittent decreases in SpO2 with mobility, but increased with cuing for breathing technique and rest.     She presents with the following impairments/functional limitations: weakness, impaired endurance, impaired functional mobility, gait instability, impaired balance, decreased safety awareness, decreased lower extremity function. Once medically stable, recommending pt discharge to nursing facility, skilled.    Rehab Prognosis: Good; patient continues to benefit from acute skilled PT services to address these deficits and reach maximum level of function.  Recent Surgery: * No surgery found *      Plan:     During this hospitalization, patient to be seen 3 x/week to address the identified rehab impairments via gait training, therapeutic activities, therapeutic exercises and progress toward the following goals:    · Plan of Care Expires:  09/08/22    Subjective     Chief Complaint: None verbalized  Patient/Family Comments/Goals: "My door is always open", "I want to get back to me"  Pain/Comfort:  · Pain Rating 1: 0/10    Objective:     Communicated with RN prior to session. Patient found HOB elevated with telemetry, peripheral IV, pulse ox (continuous), blood pressure cuff, oxygen, PureWick upon PT entry to room.     General Precautions: Standard, fall   Orthopedic Precautions:N/A   Braces: N/A   Respiratory Status: comfort flow, 60 L/min, 83%    Functional Mobility:  · Bed Mobility:     · Supine to Sit: stand by assistance  · Transfers:     · Sit to Stand: minimum assistance with hand-held " assist  · Bed to Chair: minimum assistance of 2 persons with hand-held assist using Step Transfer, noted poor control for stand to sit transfer  · Gait: Patient ambulated 3 ft to bedside chair with hand-held assist and minimum assistance of 2 persons. Patient demonstrates unsteady gait, decreased step length and narrow base of support. Patient demonstrated 1 LOB requiring minimal assistance from PT to regain balance. All lines remained intact throughout ambulation trial.  · Balance:   · Static Sitting: Good, able to maintain for 10 minute(s) with stand by assistance  · Dynamic Sitting: Fair: Patient accepts minimal challenge, stand by assistance  · Static Standing: Fair, able to maintain for 15 seconds with minimum assistance  · Dynamic Standing: Fair: Patient accepts minimal challenge, minimum assistance of 2 persons    AM-PAC 6 CLICK MOBILITY  Turning over in bed (including adjusting bedclothes, sheets and blankets)?: 3  Sitting down on and standing up from a chair with arms (e.g., wheelchair, bedside commode, etc.): 3  Moving from lying on back to sitting on the side of the bed?: 3  Moving to and from a bed to a chair (including a wheelchair)?: 2  Need to walk in hospital room?: 2  Climbing 3-5 steps with a railing?: 1  Basic Mobility Total Score: 14     Therapeutic Activities and Exercises:  Patient educated on role of acute care PT and PT POC, safety while in hospital including calling nurse for mobility and call light usage  Patient performed 1 set(s) of 5 repetitions of  the following seated exercises: ankle pumps, long arc quads and marches for bilateral LE. Patient required skilled PT for instruction of exercises and appropriate cues to perform exercises safely and appropriately.    Educated about importance of OOB mobility and remaining UIC most of the day   Educated about pursed lip breathing technique and cued for use with mobility   Patient is clear to stand pivot transfer with RN/PCT, assist x1      Patient left up in chair with all lines intact, call button in reach and RN notified.    GOALS:   Multidisciplinary Problems     Physical Therapy Goals        Problem: Physical Therapy    Goal Priority Disciplines Outcome Goal Variances Interventions   Physical Therapy Goal     PT, PT/OT Ongoing, Progressing     Description: Goals to be met by:22     Patient will increase functional independence with mobility by performin. Supine to sit with Stand-by Assistance  2. Sit to supine with Stand-by Assistance  3. Sit to stand transfer with Stand-by Assistance  4. Gait  x 50 feet with Contact Guard Assistance using LRAD, if needed.   5. Stand for 5 minutes with Stand-by Assistance using LRAD, if needed  6. Lower extremity exercise program x10 reps per handout, with independence                     Time Tracking:     PT Received On: 08/15/22  PT Start Time: 931     PT Stop Time: 954  PT Total Time (min): 23 min     Billable Minutes: Gait Training 12 min and Therapeutic Exercise 11 min        PT/PTA: PT     PTA Visit Number: 0     08/15/2022    Co-treatment performed for this visit due to patient need for two skilled therapists to ensure patient and staff safety and to accommodate for patient activity tolerance/pain management

## 2022-08-15 NOTE — PLAN OF CARE
Problem: Adult Inpatient Plan of Care  Goal: Plan of Care Review  Outcome: Ongoing, Progressing  Goal: Patient-Specific Goal (Individualized)  Outcome: Ongoing, Progressing  Goal: Absence of Hospital-Acquired Illness or Injury  Outcome: Ongoing, Progressing  Goal: Optimal Comfort and Wellbeing  Outcome: Ongoing, Progressing  Goal: Readiness for Transition of Care  Outcome: Ongoing, Progressing     Problem: Fluid Imbalance (Pneumonia)  Goal: Fluid Balance  Outcome: Ongoing, Progressing     Problem: Infection (Pneumonia)  Goal: Resolution of Infection Signs and Symptoms  Outcome: Ongoing, Progressing     Problem: Respiratory Compromise (Pneumonia)  Goal: Effective Oxygenation and Ventilation  Outcome: Ongoing, Progressing     Problem: Skin Injury Risk Increased  Goal: Skin Health and Integrity  Outcome: Ongoing, Progressing     Problem: Fall Injury Risk  Goal: Absence of Fall and Fall-Related Injury  Outcome: Ongoing, Progressing     Problem: Restraint, Nonbehavioral (Nonviolent)  Goal: Absence of Harm or Injury  Outcome: Ongoing, Progressing     Problem: Impaired Wound Healing  Goal: Optimal Wound Healing  Outcome: Ongoing, Progressing     CMICU DAILY GOALS       A: Awake    RASS: Goal -    Actual - RASS (Goldman Agitation-Sedation Scale): 0-->alert and calm   Restraint necessity: Clinical Justification: Removing medical devices, Climbing out of bed, Treatment Interference  B: Breathe   SBT: Not intubated   C: Coordinate A & B, analgesics/sedatives   Pain: managed    SAT: Not intubated  D: Delirium   CAM-ICU: Overall CAM-ICU: Negative  E: Early(intubated/ Progressive (non-intubated) Mobility   MOVE Screen: Fail   Activity: Activity Management: Rolling - L1  FAS: Feeding/Nutrition   Diet order: Diet/Nutrition Received: regular,    T: Thrombus   DVT prophylaxis: VTE Required Core Measure: Pharmacological prophylaxis initiated/maintained  H: HOB Elevation   Head of Bed (HOB) Positioning: HOB elevated  U: Ulcer  Prophylaxis   GI: yes  G: Glucose control   managed    S: Skin   Bathing/Skin Care: back care, bath, complete, dressed/undressed, electrode patches/site rotation, foot care, incontinence care, linen changed  Device Skin Pressure Protection: absorbent pad utilized/changed, pressure points protected  Pressure Reduction Devices: foam padding utilized, specialty bed utilized  Pressure Reduction Techniques: frequent weight shift encouraged, weight shift assistance provided  Skin Protection: drying agents applied, hydrocolloids used, incontinence pads utilized, tubing/devices free from skin contact  B: Bowel Function   no issues   I: Indwelling Catheters   [place] necessity:     CVC necessity: No  D: De-escalation Antibiotics   Yes    Family/Goals of care/Code Status   Code Status: Full Code    24H Vital Sign Range  Temp:  [97.3 °F (36.3 °C)-98.9 °F (37.2 °C)]   Pulse:  []   Resp:  [15-45]   BP: ()/(54-82)   SpO2:  [84 %-99 %]      Shift Events   No acute events throughout shift. Patient on airvo for several hours. Currently back on bipap.    VS and assessment per flow sheet, patient progressing towards goals as tolerated, plan of care reviewed with [unfilled] and family, all concerns addressed.    Shania Gomez

## 2022-08-15 NOTE — ASSESSMENT & PLAN NOTE
ASSESSMENT     Vale Gutierrez is a 61 y.o. female with a past psychiatric history of depression, anxiety, and alcohol use disorder, who [place] [facility] due to [place]ness of breath. Hospital course complicated by pneumonia, alcohol withdrawal delirium, respiratory depression requiring ICU admission, developed pericardial effusion and A.fib with RVR. Psychiatry was consulted for level II PASSR evaluation.     Patient appears stable from a depression and anxiety standpoint. She is currently managed on medications, which she continued to report benefit her. She is established with an outpatient psychiatrist. She denies previous psychiatric hospitalizations or suicide attempts. She currently denies suicidal ideation/plan/intent. She does not appear acutely psychotic, delusions or paranoia at this time. Denies signs or symptoms of eneida. She is actively drinking alcohol up until this hospitalization, though is now outside the window of withdrawal. She does not meet criteria for inpatient psychiatric hospitalization and overall is stable from a psychiatric stand point.     IMPRESSION  Delirium - improved  Depression  Anxiety  Alcohol use disorder    RECOMMENDATION(S)      1. Scheduled Medication(s):  - Continue Effexor 225mg daily  - Continue Mirtazapine 7.5mg nightly    2. PRN Medication(s):  - Continue Buspar 5mg BID PRN for anxiety    3.  Monitor:  QTc 400 (8/1/2022)    4. Legal Status/Precaution(s):  Patient does not meet criteria for PEC or inpatient psychiatric admission at this time. Patient is not currently an imminent danger to self or others and is not gravely disabled due to a psychiatric illness.      DELIRIUM BEHAVIOR MANAGEMENT   PLEASE utilize CHEMICAL restraints with PRN meds first for agitation. Minimize use of PHYSICAL restraints   Keep window shades open and room lit during day and room dim at night in order to promote normal sleep-wake cycles   Encourage family at bedside. [place] patient  often to situation, location, date   Continue to Limit or Discontinue use of Narcotics, Benzos and Anti-cholinergic medications as they may worsen delirium   Continue medical workup for causative etiology of Delirium

## 2022-08-15 NOTE — HPI
"Vale Gutierrez is a 61 y.o. female with a past psychiatric history of *** who presented to [facility] due to Acute hypoxemic respiratory failure. Psychiatry was consulted for "***".    Per Primary Team:  Ms. Gutierrez is a 60 y/o female with a PMH of etoh abuse and withdrawal, aspiration pneumonia, delirium, smoking, HTN, HLD, BPPV, alcohol abuse, depression and anxiety who was admitted to the ICU from the floor for acute hypoxic respiratory failure. She was having increased oxygen needs on the floor and was requiring valium to keep her presumed alcohol withdrawal under control. The patient states that her stomach hurts in the midepigastric region and endorses nausea, vomiting x 1 day. She is unsure how long she has been coughing or feeling ill, and says she cannot remember the last few days. She says she lives with her sister Angela who helps care for her.     Patient presented to the ED on 8/1 with a CC of hypoxia and dyspnea. She was initially admitted to the floor. She experienced hallucinations and tremor, consistent with alcohol withdrawal / delirium, and was put on CIWA protocol with valium PRN (shortage of ativan). She was treated with antibiotics (vancomycin, zosyn, and azithro) scheduled for 10 days and duonebs prn. On 8/7 she developed worsening hypoxic respiratory failure, was put on 50L 100% O2 bipap, and admitted to the ICU due to increased oxygen requirements. Work up revealed small pericardial effusion, cardiomegaly, normal LVEF, pleural effusions and opacities bilaterally, and atrial fibrillation. Patient has now completed courses of zosyn, vancomycin, and azithromycin. She is currently on diflucan  and meropenem out of concern for respiratory infection, and lasix BID for pulmonary edema. She was started on high dose steroids 8/13 as there was concern for autoimmune respiratory process, and labs were sent for C-ANCA and P-ANCA (currently pending). She previously had a +BRANDYN titer, but antiCCP, RF, and " " anti-dsDNA were negative. Patient's CXR and respiratory status improving on 8/14, able to tolerate CF.     Per Psychiatry:  Patient is resting comfortably in bedside chair. States she has struggled with depression/anxiety throughout most of her life. Has been on psychiatric medications for years. Has been stable on Effexor and notes it improves depression/anxiety. Has noticed worsening depression when she medically decompensated. States her mood is improving along with her breathing status. She denies problems with sleep or appetite. States energy level is low but improving. Denies suicidal ideation/plan/intent. Denies excessive worries or difficulty controlling worries at this time. Denies signs or symptoms of eneida. Denies HI. Denies psychosis, delusions or paranoia. Notes daily alcohol use. Had one year of sobriety and relapsed about 3 months drink. Notes drinking "4 hefty liquor drinks per day." Notes history of alcohol withdrawal, denies known alcohol withdrawal seizures. Has been in inpatient rehab, [facility] in 2020. Follows outpatient with Dr. Ely and also had medications adjustments per primary care provider. Denies illicit substance use. Previous history of opiate use disorder due to back pain, current following with outpatient provider for pain management. Denies heroin or IVDU. Denies other illicit substance use. No current [facility]        Medical Review of Systems:  A comprehensive review of systems was negative.    Psychiatric Review of Systems-is patient experiencing or having changes in  sleep: no  appetite: no  weight: no  energy/anergy: yes - improving  interest/pleasure/anhedonia: no  somatic symptoms: no  libido: no  anxiety/panic: no  guilty/hopelessness: no  concentration: no  S.I.B.s/risky behavior: no  any drugs: no  alcohol: yes - daily     Allergies:  Patient has no known allergies.    Past Medical/Surgical History:  Past Medical History:   Diagnosis Date    Alcohol abuse     Anxiety     Benign " paroxysmal positional vertigo 1/17/2020    Depression     Hyperlipidemia     Hypertension      Past Surgical History:   Procedure Laterality Date    BREAST CYST ASPIRATION      CHOLECYSTECTOMY      complicated with bile leak, sepsis    COLONOSCOPY N/A 6/3/2020    Procedure: COLONOSCOPY Golytely;  Surgeon: Tino Neil MD;  Location: [facility];  Service: Colon and Rectal;  Laterality: N/A;    EPIDURAL STEROID INJECTION N/A 5/27/2022    Procedure: INJECTION, STEROID, EPIDURAL, CAUDAL CONTRAST;  Surgeon: Rubén Rico MD;  Location: [facility];  Service: Pain Management;  Laterality: N/A;  5/6 RESCHEDULE    ESOPHAGOGASTRODUODENOSCOPY N/A 6/3/2020    Procedure: EGD (ESOPHAGOGASTRODUODENOSCOPY);  Surgeon: Tino Neil MD;  Location: [facility];  Service: Colon and Rectal;  Laterality: N/A;    INJECTION OF ANESTHETIC AGENT AROUND NERVE Bilateral 3/24/2022    Procedure: BLOCK, NERVE MEDIAL BRANCH BLOCK BL L2, L3, L4 and L5  1st, needs consent;  Surgeon: Rubén Rico MD;  Location: [facility];  Service: Pain Management;  Laterality: Bilateral;    TRANSFORAMINAL EPIDURAL INJECTION OF STEROID Bilateral 8/7/2020    Procedure: INJECTION, STEROID, EPIDURAL, TRANSFORAMINAL APPROACH, L4-L5 need consent;  Surgeon: Rubén Rico MD;  Location: [facility];  Service: Pain Management;  Laterality: Bilateral;    TRANSFORAMINAL EPIDURAL INJECTION OF STEROID Bilateral 9/4/2020    Procedure: LUMBAR TRANSFORAMINAL BILATERAL L4/5 DIRECT REFERRAL;  Surgeon: Rubén Rico MD;  Location: [facility];  Service: Pain Management;  Laterality: Bilateral;  NEEDS CONSENT    TRANSFORAMINAL EPIDURAL INJECTION OF STEROID Bilateral 3/4/2022    Procedure: Injection,steroid,epidural,transforaminal approach BILATERAL L3/4 DIRECT REFERRAL;  Surgeon: Rubén Rico MD;  Location: [facility];  Service: Pain Management;  Laterality: Bilateral;       Past Psychiatric History:  Previous Medication Trials: yes, Ativan, Effexor,  "Mirtazapine, suboxone  Previous Psychiatric Hospitalizations: Denies  Previous Suicide Attempts:  Denies    History of Violence: no  Outpatient Psychiatrist: yes Dr. Ely    Social History:  Marital Status: single  Children: 0   Employment Status/Info: retired  Education:  some college  Housing Status: Lives with sister  No pending legal problems    Substance Abuse History:  Recreational Drugs:  Denies  Use of Alcohol:  Yes, daily use  - "four hefty liquor drinks daily"  Rehab History: yes - last was 2020  Tobacco Use: yes      Family Psychiatric History:   Denies family history of mood or substance abuse problems    Psychosocial Stressors: health and drug and alcohol  Functioning Relationships: good support system - sister    "

## 2022-08-15 NOTE — ASSESSMENT & PLAN NOTE
Patient with paroxysmal atrial fibrillation during admission  - Currently on Amiodarone 400mg BID x 10days, to transition to 200mg daily on 8/23  - Therapeutic lovenox 90mg BID for anticoagulation  - On lopressor 100mg BID for rate control

## 2022-08-15 NOTE — PLAN OF CARE
CMICU DAILY GOALS       A: Awake    RASS: Goal -    Actual - RASS (Goldman Agitation-Sedation Scale): 0-->alert and calm   Restraint necessity: Clinical Justification: Removing medical devices, Climbing out of bed, Treatment Interference  B: Breathe   SBT: Not intubated   C: Coordinate A & B, analgesics/sedatives   Pain: managed    SAT: Not intubated  D: Delirium   CAM-ICU: Overall CAM-ICU: Negative  E: Early(intubated/ Progressive (non-intubated) Mobility   MOVE Screen: Fail   Activity: Activity Management: Rolling - L1, Arm raise - L1  FAS: Feeding/Nutrition   Diet order: Diet/Nutrition Received: regular,    T: Thrombus   DVT prophylaxis: VTE Required Core Measure: Pharmacological prophylaxis initiated/maintained  H: HOB Elevation   Head of Bed (HOB) Positioning: HOB at 30 degrees  U: Ulcer Prophylaxis   GI: yes  G: Glucose control   managed    S: Skin   Bathing/Skin Care: back care, bath, complete, dressed/undressed, foot care, incontinence care, linen changed  Device Skin Pressure Protection: absorbent pad utilized/changed, skin-to-device areas padded, skin-to-skin areas padded  Pressure Reduction Devices: foam padding utilized, specialty bed utilized  Pressure Reduction Techniques: frequent weight shift encouraged, weight shift assistance provided  Skin Protection: incontinence pads utilized, skin-to-device areas padded, skin-to-skin areas padded, tubing/devices free from skin contact  B: Bowel Function   no issues   I: Indwelling Catheters   [place] necessity:     CVC necessity: No  D: De-escalation Antibiotics   No    Family/Goals of care/Code Status   Code Status: Full Code    24H Vital Sign Range  Temp:  [97.9 °F (36.6 °C)-99 °F (37.2 °C)]   Pulse:  []   Resp:  [16-45]   BP: ()/(51-74)   SpO2:  [84 %-98 %]      Shift Events  No acute events overnight.    VS and assessment per flow sheet, patient progressing towards goals as tolerated, plan of care reviewed with  Ms. Gutierrez , all concerns  addressed.

## 2022-08-15 NOTE — ASSESSMENT & PLAN NOTE
Patient reportedly had tremors and hallucinations on the floor 8/5-8/7 concerning for withdrawal. Has history of complicated withdrawal hx requiring hospitalization at  per sister   - On [facility] protocol with PRN BZDs on arrival, no longer exhibiting sxs of alcohol withdrawal  - Thiamine 100mg q.d.

## 2022-08-15 NOTE — SUBJECTIVE & OBJECTIVE
Interval History/Significant Events: No acute events overnight. Patient is feeling well and states she would like to walk around.    Review of Systems   Constitutional:  Negative for chills and fever.   HENT:  Positive for rhinorrhea. Negative for congestion.    Eyes:  Negative for photophobia and visual disturbance.   Respiratory:  Positive for cough and shortness of breath.    Cardiovascular:  Negative for chest pain and leg swelling.   Gastrointestinal:  Positive for abdominal pain (TTP in LUQ). Negative for abdominal distention, diarrhea, nausea and vomiting.   Genitourinary:  Negative for dysuria.   Musculoskeletal:  Positive for back pain (chronic).   Skin:  Negative for rash.   Neurological:  Negative for light-headedness and headaches.   Psychiatric/Behavioral:  Negative for agitation and confusion.    Objective:     Vital Signs (Most Recent):  Temp: 98.3 °F (36.8 °C) (08/15/22 1100)  Pulse: 95 (08/15/22 1209)  Resp: (!) 47 (08/15/22 1209)  BP: 99/74 (08/15/22 1200)  SpO2: 98 % (08/15/22 1209)   Vital Signs (24h Range):  Temp:  [98.2 °F (36.8 °C)-99 °F (37.2 °C)] 98.3 °F (36.8 °C)  Pulse:  [] 95  Resp:  [16-53] 47  SpO2:  [88 %-98 %] 98 %  BP: ()/(51-74) 99/74   Weight: 90.3 kg (199 lb)  Body mass index is 34.16 kg/m².      Intake/Output Summary (Last 24 hours) at 8/15/2022 1334  Last data filed at 8/15/2022 1100  Gross per 24 hour   Intake 797.88 ml   Output 1500 ml   Net -702.12 ml       Physical Exam  Constitutional:       Appearance: She is obese.   HENT:      Head: Normocephalic and atraumatic.      Right Ear: External ear normal.      Left Ear: External ear normal.      Nose: Nose normal.      Mouth/Throat:      Mouth: Mucous membranes are moist.      Pharynx: Oropharynx is clear.   Eyes:      Extraocular Movements: Extraocular movements intact.      Conjunctiva/sclera: Conjunctivae normal.   Cardiovascular:      Rate and Rhythm: Normal rate and regular rhythm.   Pulmonary:      Breath  sounds: Wheezing and rales present.      Comments: Comfort flow 60L/min  Abdominal:      General: Abdomen is flat. Bowel sounds are normal.      Palpations: Abdomen is soft.      Tenderness: There is abdominal tenderness (TTP in LUQ\).   Musculoskeletal:         General: Normal range of motion.      Cervical back: Normal range of motion.      Right lower leg: No edema.      Left lower leg: No edema.   Skin:     General: Skin is warm and dry.   Neurological:      General: No focal deficit present.      Mental Status: She is alert.   Psychiatric:         Mood and Affect: Mood normal.         Behavior: Behavior normal.       Vents:  Oxygen Concentration (%): 64 (08/15/22 1209)  Lines/Drains/Airways       Peripheral Intravenous Line  Duration                  Peripheral IV - Single Lumen 08/07/22 2345 20 G Anterior;Right Forearm 7 days         Peripheral IV - Single Lumen 08/12/22 0938 20 G Anterior;Left Forearm 3 days                  Significant Labs:    CBC/Anemia Profile:  Recent Labs   Lab 08/14/22  0313 08/15/22  0305   WBC 6.24 12.07   HGB 8.3* 8.0*   HCT 28.7* 27.8*    393   MCV 82 83   RDW 16.8* 16.9*        Chemistries:  Recent Labs   Lab 08/14/22  0313 08/15/22  0305    143   K 4.8 3.6   CL 89* 90*   CO2 36* 40*   BUN 21 35*   CREATININE 0.8 1.1   CALCIUM 9.9 9.6   ALBUMIN 2.1* 2.1*   PROT 6.6 6.4   BILITOT 0.2 0.2   ALKPHOS 138* 113   ALT 12 10   AST 20 15   MG 1.6 2.0   PHOS 3.1 2.6*       All pertinent labs within the past 24 hours have been reviewed.    Significant Imaging:  I have reviewed all pertinent imaging results/findings within the past 24 hours.

## 2022-08-15 NOTE — CONSULTS
Consultation-Liaison Psychiatry Consult Note    8/15/2022 2:05 PM  Vale Gutierrez  MRN: [id number]    Chief Complaint / Reason for Consult: Level II PASSR evaluation    SUBJECTIVE     History of Present Illness:   Vale Gutierrez is a 61 y.o. female with a past psychiatric history of depression, anxiety and alcohol use disorder, currently presenting with Acute hypoxemic respiratory failure.  Hospital course complicated by pneumonia, alcohol withdrawal delirium, respiratory depression requiring ICU admission, pericardial effusion and A. Fib with RVR. Psychiatry was consulted for Level II PASSR evaluation.    Per Primary MD:  Ms. Gutierrez is a 62 y/o female with a PMH of etoh abuse and withdrawal, aspiration pneumonia, delirium, smoking, HTN, HLD, BPPV, alcohol abuse, depression and anxiety who was admitted to the ICU from the floor for acute hypoxic respiratory failure. She was having increased oxygen needs on the floor and was requiring valium to keep her presumed alcohol withdrawal under control. The patient states that her stomach hurts in the midepigastric region and endorses nausea, vomiting x 1 day. She is unsure how long she has been coughing or feeling ill, and says she cannot remember the last few days. She says she lives with her sister Angela who helps care for her.      Patient presented to the ED on 8/1 with a CC of hypoxia and dyspnea. She was initially admitted to the floor. She experienced hallucinations and tremor, consistent with alcohol withdrawal / delirium, and was put on CIWA protocol with valium PRN (shortage of ativan). She was treated with antibiotics (vancomycin, zosyn, and azithro) scheduled for 10 days and duonebs prn. On 8/7 she developed worsening hypoxic respiratory failure, was put on 50L 100% O2 bipap, and admitted to the ICU due to increased oxygen requirements. Work up revealed small pericardial effusion, cardiomegaly, normal LVEF, pleural effusions and opacities  Spoke to Jolanta nguyen granddaughter. Informed her of potential d/c today to [place]. She reported she does not need to be called prior to patient leaving.    "bilaterally, and atrial fibrillation. Patient has now completed courses of zosyn, vancomycin, and azithromycin. She is currently on diflucan  and meropenem out of concern for respiratory infection, and lasix BID for pulmonary edema. She was started on high dose steroids 8/13 as there was concern for autoimmune respiratory process, and labs were sent for C-ANCA and P-ANCA (currently pending). She previously had a +BRANDYN titer, but antiCCP, RF, and  anti-dsDNA were negative. Patient's CXR and respiratory status improving on 8/14, able to tolerate CF.     Per C-L Psych MD:  Patient is resting comfortably in bedside chair. States she has struggled with depression/anxiety throughout most of her life. Has been on psychiatric medications for years. Has been stable on Effexor and notes it improves depression/anxiety. Has noticed worsening depression when she medically decompensated. States her mood is improving along with her breathing status. She denies problems with sleep or appetite. States energy level is low but improving. Denies suicidal ideation/plan/intent. Denies excessive worries or difficulty controlling worries at this time. Denies signs or symptoms of eneida. Denies HI. Denies psychosis, delusions or paranoia. Notes daily alcohol use. Had one year of sobriety and relapsed about 3 months drink. Notes drinking "4 hefty liquor drinks per day." Notes history of alcohol withdrawal, denies known alcohol withdrawal seizures. Has been in inpatient rehab, [facility] in 2020. Follows outpatient with Dr. Ely and also had medications adjustments per primary care provider. Denies illicit substance use. Previous history of opiate use disorder due to back pain, current following with outpatient provider for pain management. Denies heroin or IVDU. Denies other illicit substance use. No current [facility]    Psychiatric Review Of Systems - Is patient experiencing or having changes in:  sleep: no  appetite: no  weight: no  energy/anergy: " "yes fatigued but improving  interest/pleasure/anhedonia: no  somatic symptoms: no  guilty/hopelessness: no  concentration: no  S.I.B.s/risky behavior: no  SI/SA:  no    anxiety/panic: no  Agoraphobia:  no  Social phobia:  no  Recurrent nightmares:  no  hyper startle response:  no  Avoidance: no  Recurrent thoughts:  no  Recurrent behaviors:  no    Irritability: no  Racing thoughts: no  Impulsive behaviors: no  Pressured speech:  no    Paranoia:no  Delusions: no  AVH:no    Medical Review of Symptoms:  History obtained from the patient VS unobtainable from patient due to mental status / lack of cooperation   General : NO chills or fever   Eyes: NO  visual changes   ENT: NO hearing change, nasal discharge or sore throat   Endocrine: NO weight changes or polydipsia/polyuria   Dermatological: NO rashes   Respiratory: NO cough, shortness of breath   Cardiovascular: NO chest pain, palpitations or racing heart   Gastrointestinal: NO nausea, vomiting, constipation or diarrhea   Musculoskeletal: NO muscle pain or stiffness   Neurological: NO confusion, dizziness, headaches or tremors   Psychiatric: please see HPI    Psychiatric History:  Diagnose(s): Yes - depression, anxiety, alcohol use disorder  Previous Medication Trials: Yes - Ativan, Effexor, Mirtazapine, Suboxone  Previous Psychiatric Hospitalizations: No  Previous Suicide Attempts: No  History of Violence: No  Outpatient Psychiatrist: Yes - Dr. Ely    Social History:  Marital Status: single  Children: 0   Employment Status: retired  Education: some college  Special Ed: no  Housing Status: Yes - lives with her sister    Substance Abuse History:  Recreational Drugs: no  Use of Alcohol: yes, daily use, "four hefty drinks daily"  Rehab History: Yes - last in 2020  Tobacco Use: Yes - previously    Legal History:  Past Charges/Incarcerations: No  Pending Charges: No    Family Psychiatric History:   No    Psychosocial Factors:  Psychosocial Stressors: health, " alcohol use   Functioning Relationships: good relationship with family      Scheduled Meds:   albuterol-ipratropium  3 mL Nebulization Q4H WAKE    amiodarone  400 mg Oral BID    Followed by    [START ON 8/23/2022] amiodarone  200 mg Oral Daily    enoxaparin  1 mg/kg Subcutaneous Q12H    furosemide (LASIX) injection  40 mg Intravenous Q12H    LIDOcaine  2 patch Transdermal Q24H    magnesium sulfate IVPB  2 g Intravenous Once    methylPREDNISolone sodium succinate injection  125 mg Intravenous Q4H    metoprolol tartrate  100 mg Oral BID    mirtazapine  7.5 mg Oral QHS    thiamine  100 mg Oral Daily    venlafaxine  225 mg Oral Daily     acetaminophen, busPIRone, butalbital-acetaminophen-caffeine -40 mg, loperamide, melatonin, ondansetron, oxyCODONE-acetaminophen, senna-docusate 8.6-50 mg, sodium chloride 0.9%  [employer] (From admission, onward)            Start     Stop Route Frequency Ordered    08/02/22 0900  venlafaxine 24 hr capsule 225 mg         -- Oral Daily 08/02/22 0048    08/02/22 0200  mirtazapine tablet 7.5 mg         -- Oral Nightly 08/02/22 0048    08/02/22 0147  busPIRone tablet 5 mg         -- Oral 2 times daily PRN 08/02/22 0048        PRN Meds:  acetaminophen, busPIRone, butalbital-acetaminophen-caffeine -40 mg, loperamide, melatonin, ondansetron, oxyCODONE-acetaminophen, senna-docusate 8.6-50 mg, sodium chloride 0.9%    Home Meds:  Prior to Admission medications    Medication Sig Start Date End Date Taking? Authorizing Provider   aspirin/salicylamide/caffeine (BC HEADACHE POWDER ORAL) Take 1 Package by mouth once daily. Hold medication one week prior to surgery   Yes Historical Provider   busPIRone (BUSPAR) 15 MG tablet Take 15 mg by mouth 2 (two) times daily. 6/4/22  Yes Historical Provider   busPIRone (BUSPAR) 5 MG Tab Take 1 tablet (5 mg total) by mouth 2 (two) times daily as needed (anxiety). 3/8/22 3/8/23 Yes Estela Mcdaniel MD   magnesium 30 mg Tab Take by  mouth once.   Yes Historical Provider   metoprolol tartrate (LOPRESSOR) 50 MG tablet Take 1 tablet (50 mg total) by mouth once daily. 3/8/22  Yes Estela Mcdaniel MD   mirtazapine (REMERON) 7.5 MG Tab Take 1 tablet (7.5 mg total) by mouth every evening. For insomnia, mood 3/8/22 3/8/23 Yes Estela Mcdaniel MD   oxyCODONE-acetaminophen (PERCOCET) 5-325 mg per tablet Take 1 tablet by mouth every 6 (six) hours as needed for Pain. 7/29/22 8/28/22 Yes Rubén Rico MD   thiamine 100 MG tablet Take 100 mg by mouth once daily.   Yes Historical Provider   tiZANidine (ZANAFLEX) 4 MG tablet Take 1 tablet (4 mg total) by mouth every 6 (six) hours as needed (spasms). 7/20/22  Yes Shena Lock PA-C   venlafaxine (EFFEXOR-XR) 150 MG Cp24 Take 150 mg by mouth once daily. Take am of surgery   Yes Historical Provider   venlafaxine (EFFEXOR-XR) 75 MG 24 hr capsule Take 75 mg by mouth once daily. Take am of surgery   Yes Historical Provider   vitamin D (VITAMIN D3) 1000 units Tab Take 5,000 Int'l Units by mouth. Hold am of surgery 6/11/17  Yes Historical Provider   dicyclomine (BENTYL) 10 MG capsule Take 1 capsule (10 mg total) by mouth before meals as needed. 2/21/22   Tino Neil MD   omeprazole (PRILOSEC) 20 MG capsule Take 1 capsule (20 mg total) by mouth 2 (two) times daily. 1/11/22 1/11/23  Estela Mcdaniel MD     Allergies:  Patient has no known allergies.  Past Medical/Surgical History:  Past Medical History:   Diagnosis Date    Alcohol abuse     Anxiety     Benign paroxysmal positional vertigo 1/17/2020    Depression     Hyperlipidemia     Hypertension      Past Surgical History:   Procedure Laterality Date    BREAST CYST ASPIRATION      CHOLECYSTECTOMY      complicated with bile leak, sepsis    COLONOSCOPY N/A 6/3/2020    Procedure: COLONOSCOPY Gol[place];  Surgeon: Tino Neil MD;  Location: [facility];  Service: Colon and Rectal;  Laterality: N/A;    EPIDURAL STEROID INJECTION N/A 5/27/2022     "Procedure: INJECTION, STEROID, EPIDURAL, CAUDAL CONTRAST;  Surgeon: Rubén Rico MD;  Location: [facility];  Service: Pain Management;  Laterality: N/A;  5/6 RESCHEDULE    ESOPHAGOGASTRODUODENOSCOPY N/A 6/3/2020    Procedure: EGD (ESOPHAGOGASTRODUODENOSCOPY);  Surgeon: Tino Neil MD;  Location: [facility] ENDO;  Service: Colon and Rectal;  Laterality: N/A;    INJECTION OF ANESTHETIC AGENT AROUND NERVE Bilateral 3/24/2022    Procedure: BLOCK, NERVE MEDIAL BRANCH BLOCK BL L2, L3, L4 and L5  1st, needs consent;  Surgeon: Rubén Rico MD;  Location: [facility];  Service: Pain Management;  Laterality: Bilateral;    TRANSFORAMINAL EPIDURAL INJECTION OF STEROID Bilateral 8/7/2020    Procedure: INJECTION, STEROID, EPIDURAL, TRANSFORAMINAL APPROACH, L4-L5 need consent;  Surgeon: Rubén Rico MD;  Location: [facility];  Service: Pain Management;  Laterality: Bilateral;    TRANSFORAMINAL EPIDURAL INJECTION OF STEROID Bilateral 9/4/2020    Procedure: LUMBAR TRANSFORAMINAL BILATERAL L4/5 DIRECT REFERRAL;  Surgeon: Rubén Rico MD;  Location: [facility];  Service: Pain Management;  Laterality: Bilateral;  NEEDS CONSENT    TRANSFORAMINAL EPIDURAL INJECTION OF STEROID Bilateral 3/4/2022    Procedure: Injection,steroid,epidural,transforaminal approach BILATERAL L3/4 DIRECT REFERRAL;  Surgeon: Rubén Rico MD;  Location: [facility];  Service: Pain Management;  Laterality: Bilateral;     OBJECTIVE     Vital Signs:  Temp:  [98.2 °F (36.8 °C)-99 °F (37.2 °C)]   Pulse:  []   Resp:  [16-53]   BP: ()/(51-74)   SpO2:  [88 %-98 %]     Modified CAM-ICU:  1. Acute change and/or fluctuating course of mental status: Yes, No  2. Inattention (SAVEAHAART): Yes, No  · "Squeeze my hand, only when you hear, the letter 'A'."  3. Altered Level of Consciousness: Yes, No  4. Disorganized Thinking (Errors >1/6): Yes, No  · "Will a stone float on water?"  · "Are there fish in the sea?"  · "Does one pound " "weigh more than two?"  · "Can you use a hammer to pound a nail?"  · Command(s):  · "Hold up 2 fingers."  · "Now do the same thing with the other hand."    Score: 1+2 AND, either 3 or 4 present = Positive CAM-ICU    Musculoskeletal Exam:  Muscle Strength and Tone: normal strength and tone  Gait and Station: ambulates with steady gait without assistance    Mental Status Exam:  Appearance: unremarkable, age appropriate, normal weight, casually dressed  Level of Consciousness: alert  Behavior/Cooperation: normal, cooperative, friendly and cooperative, cracking jokes, appropriate eye contact  Psychomotor: unremarkable   Speech: normal tone, normal rate, normal pitch, normal volume  Language: english, fluid  Orientation: grossly intact, person, place, situation, time/date  Attention Span/Concentration:intact, spelled "WORLD" forwards and backwards  Memory: Grossly intact  Mood: "it's getting better" Denies depression  Affect: normal and euthymic  Thought Process: linear, normal and logical  Associations: normal and logical  Thought Content: normal, no suicidality, no homicidality, delusions, or paranoia  Fund of Knowledge: Aware of current events  Abstraction: proverbs were abstract  Insight: fair  Judgment: fair    Laboratory Data:  Recent Results (from the past 48 hour(s))   ISTAT PROCEDURE    Collection Time: 08/14/22 12:19 AM   Result Value Ref Range    POC PH 7.426 7.35 - 7.45    POC PCO2 69.1 (HH) 35 - 45 mmHg    POC PO2 58 (LL) 80 - 100 mmHg    POC HCO3 45.5 (H) 24 - 28 mmol/L    POC BE 21 -2 to 2 mmol/L    POC SATURATED O2 89 (L) 95 - 100 %    POC TCO2 48 (H) 23 - 27 mmol/L    Rate 18     Sample ARTERIAL     Site LR     Allens Test Pass     DelSys CPAP/BiPAP     Mode BiPAP     FiO2 100     Spont Rate 18     Min Vol 14.8     Sp02 92     IP 22     EP 15    Phosphorus    Collection Time: 08/14/22  3:13 AM   Result Value Ref Range    Phosphorus 3.1 2.7 - 4.5 mg/dL   CBC auto differential    Collection Time: 08/14/22  " 3:13 AM   Result Value Ref Range    WBC 6.24 3.90 - 12.70 K/uL    RBC 3.52 (L) 4.00 - 5.40 M/uL    Hemoglobin 8.3 (L) 12.0 - 16.0 g/dL    Hematocrit 28.7 (L) 37.0 - 48.5 %    MCV 82 82 - 98 fL    MCH 23.6 (L) 27.0 - 31.0 pg    MCHC 28.9 (L) 32.0 - 36.0 g/dL    RDW 16.8 (H) 11.5 - 14.5 %    Platelets 347 150 - 450 K/uL    MPV 11.2 9.2 - 12.9 fL    Immature Granulocytes 0.3 0.0 - 0.5 %    Gran # (ANC) 5.5 1.8 - 7.7 K/uL    Immature Grans (Abs) 0.02 0.00 - 0.04 K/uL    Lymph # 0.6 (L) 1.0 - 4.8 K/uL    Mono # 0.1 (L) 0.3 - 1.0 K/uL    Eos # 0.0 0.0 - 0.5 K/uL    Baso # 0.02 0.00 - 0.20 K/uL    nRBC 0 0 /100 WBC    Gran % 87.5 (H) 38.0 - 73.0 %    Lymph % 9.9 (L) 18.0 - 48.0 %    Mono % 1.8 (L) 4.0 - 15.0 %    Eosinophil % 0.2 0.0 - 8.0 %    Basophil % 0.3 0.0 - 1.9 %    Differential Method Automated    Comprehensive metabolic panel    Collection Time: 08/14/22  3:13 AM   Result Value Ref Range    Sodium 140 136 - 145 mmol/L    Potassium 4.8 3.5 - 5.1 mmol/L    Chloride 89 (L) 95 - 110 mmol/L    CO2 36 (H) 23 - 29 mmol/L    Glucose 102 70 - 110 mg/dL    BUN 21 8 - 23 mg/dL    Creatinine 0.8 0.5 - 1.4 mg/dL    Calcium 9.9 8.7 - 10.5 mg/dL    Total Protein 6.6 6.0 - 8.4 g/dL    Albumin 2.1 (L) 3.5 - 5.2 g/dL    Total Bilirubin 0.2 0.1 - 1.0 mg/dL    Alkaline Phosphatase 138 (H) 55 - 135 U/L    AST 20 10 - 40 U/L    ALT 12 10 - 44 U/L    Anion Gap 15 8 - 16 mmol/L    eGFR >60.0 >60 mL/min/1.73 m^2   Magnesium    Collection Time: 08/14/22  3:13 AM   Result Value Ref Range    Magnesium 1.6 1.6 - 2.6 mg/dL   Phosphorus    Collection Time: 08/15/22  3:05 AM   Result Value Ref Range    Phosphorus 2.6 (L) 2.7 - 4.5 mg/dL   CBC auto differential    Collection Time: 08/15/22  3:05 AM   Result Value Ref Range    WBC 12.07 3.90 - 12.70 K/uL    RBC 3.36 (L) 4.00 - 5.40 M/uL    Hemoglobin 8.0 (L) 12.0 - 16.0 g/dL    Hematocrit 27.8 (L) 37.0 - 48.5 %    MCV 83 82 - 98 fL    MCH 23.8 (L) 27.0 - 31.0 pg    MCHC 28.8 (L) 32.0 - 36.0 g/dL     RDW 16.9 (H) 11.5 - 14.5 %    Platelets 393 150 - 450 K/uL    MPV 10.9 9.2 - 12.9 fL    Immature Granulocytes 0.4 0.0 - 0.5 %    Gran # (ANC) 10.9 (H) 1.8 - 7.7 K/uL    Immature Grans (Abs) 0.05 (H) 0.00 - 0.04 K/uL    Lymph # 0.6 (L) 1.0 - 4.8 K/uL    Mono # 0.5 0.3 - 1.0 K/uL    Eos # 0.0 0.0 - 0.5 K/uL    Baso # 0.01 0.00 - 0.20 K/uL    nRBC 0 0 /100 WBC    Gran % 90.2 (H) 38.0 - 73.0 %    Lymph % 4.9 (L) 18.0 - 48.0 %    Mono % 4.4 4.0 - 15.0 %    Eosinophil % 0.0 0.0 - 8.0 %    Basophil % 0.1 0.0 - 1.9 %    Differential Method Automated    Comprehensive metabolic panel    Collection Time: 08/15/22  3:05 AM   Result Value Ref Range    Sodium 143 136 - 145 mmol/L    Potassium 3.6 3.5 - 5.1 mmol/L    Chloride 90 (L) 95 - 110 mmol/L    CO2 40 (H) 23 - 29 mmol/L    Glucose 142 (H) 70 - 110 mg/dL    BUN 35 (H) 8 - 23 mg/dL    Creatinine 1.1 0.5 - 1.4 mg/dL    Calcium 9.6 8.7 - 10.5 mg/dL    Total Protein 6.4 6.0 - 8.4 g/dL    Albumin 2.1 (L) 3.5 - 5.2 g/dL    Total Bilirubin 0.2 0.1 - 1.0 mg/dL    Alkaline Phosphatase 113 55 - 135 U/L    AST 15 10 - 40 U/L    ALT 10 10 - 44 U/L    Anion Gap 13 8 - 16 mmol/L    eGFR 56.5 (A) >60 mL/min/1.73 m^2   Magnesium    Collection Time: 08/15/22  3:05 AM   Result Value Ref Range    Magnesium 2.0 1.6 - 2.6 mg/dL      No results found for: PHENYTOIN, PHENOBARB, VALPROATE, CBMZ  Imaging:  Imaging Results           CTA Chest Non-Coronary (PE Study) (Final result)  Result time 08/01/22 23:43:02    Final result by Christian Wise MD (08/01/22 23:43:02)                 Impression:      1. No pulmonary embolism to the segmental level.  2. Opacification in the lingula and right lower lobe with air bronchograms representing possibly early consolidation from infectious process or atelectasis.  3. Multifocal regions of ground-glass opacifications bilaterally suggestive of edema, infectious or inflammatory process.  4. Elevation of the right hemidiaphragm, similar in comparison to  oldest chest x-ray dating back to 02/11/2020.  5. Cardiomegaly with moderate pericardial effusion.  6. Fusiform aneurysmal dilation of the ascending aorta measuring up to 4.1 cm.  7. Enlargement of the main pulmonary artery, suggestive of pulmonary hypertension.  This report was flagged in Epic as abnormal.    Electronically signed by resident: Sybil Mora  Date:    08/01/2022  Time:    22:34    Electronically signed by: Christian Wise MD  Date:    08/01/2022  Time:    23:43             Narrative:    EXAMINATION:  CTA CHEST NON CORONARY    CLINICAL HISTORY:  Pulmonary embolism (PE) suspected, high prob;    TECHNIQUE:  Low dose axial images, sagittal and coronal reformations were obtained from the thoracic inlet to the lung bases following the IV administration of 100 mL of Omnipaque-350.  Scan technique was optimized to evaluate the pulmonary arteries.  MIP images were performed.    COMPARISON:  Chest x-ray 08/01/2022, 10/21/2021, and 02/11/2020.    FINDINGS:  Adequate timing of the contrast bolus.  No pulmonary artery filling defects to the segmental level.  Bilateral bandlike opacifications favored to represent scarring or atelectasis.  Opacification in the lingula and right lower lobe lung base with air bronchograms representing either consolidation or atelectasis.  Multifocal regions of ground-glass opacifications suggestive of infectious or inflammatory process.  Elevation of the right hemidiaphragm.  Trace bilateral pleural effusion.  No pneumothorax.    Cardiomegaly with moderate pericardial effusion.  There is fusiform aneurysmal dilation of the ascending aorta measuring 4.1 cm (coronal series 601, image 117).  There is minimal calcific atherosclerosis of the aorta.  The main pulmonary artery is enlarged measuring 4.6 cm in diameter, suggestive of possible pulmonary hypertension.  No mediastinal, hilar or axillary lymphadenopathy.  The visualized thyroid gland is normal.    Limited views of the upper  abdomen are normal.    Visualized bones and soft tissues are normal.                               CT Head Without Contrast (Final result)  Result time 08/01/22 21:31:23    Final result by Timothy Alvarado MD (08/01/22 21:31:23)                 Impression:      1. No acute intracranial process.  2. Involutional changes with chronic microvascular ischemic changes.  Small remote lacunar infarct of the left caudate head.      Electronically signed by: Timothy Alvarado  Date:    08/01/2022  Time:    21:31             Narrative:    EXAMINATION:  CT HEAD WITHOUT CONTRAST    CLINICAL HISTORY:  Mental status change, unknown cause;    TECHNIQUE:  Low dose axial CT images obtained throughout the head without intravenous contrast. Sagittal and coronal reconstructions were performed.    COMPARISON:  10/21/2021    FINDINGS:  Intracranial compartment:    Ventricles and sulci are normal in size for age without evidence of hydrocephalus. No extra-axial blood or fluid collections.    Moderate involutional changes and chronic microvascular ischemic changes in the periventricular white matter.  Small remote lacunar infarct of the left caudate head.    No parenchymal mass, hemorrhage, edema or major vascular distribution infarct.    Skull/extracranial contents (limited evaluation): No fracture. Mastoid air cells and paranasal sinuses are essentially clear.                               X-Ray Chest AP Portable (Final result)  Result time 08/01/22 18:42:25    Final result by Timothy Alvarado MD (08/01/22 18:42:25)                 Impression:      See above comments.  Recommend follow-up.      Electronically signed by: Timothy Alvarado  Date:    08/01/2022  Time:    18:42             Narrative:    EXAMINATION:  XR CHEST AP PORTABLE    CLINICAL HISTORY:  sob;    TECHNIQUE:  Single frontal view of the chest was performed.    COMPARISON:  10/21/2021    FINDINGS:  Suboptimal inspiration may limit characterization.    Cardiac silhouette is  borderline enlarged.    Mild perihilar interstitial changes may be associated with atelectasis or mild edema.  Mild bibasilar atelectasis.    Probable small bibasilar pleural effusions.  No evidence of pneumothorax.  No acute osseous abnormality.                                 ASSESSMENT     Vale Gutierrez is a 61 y.o. female with a past psychiatric history of depression, anxiety and alcohol use disorder, currently presenting with Acute hypoxemic respiratory failure.  Hospital course complicated by pneumonia, alcohol withdrawal delirium, respiratory depression requiring ICU admission, pericardial effusion and A. Fib with RVR. Psychiatry was consulted for Level II PASSR evaluation.    Patient appears stable from a depression and anxiety standpoint. She is currently stable on outpatient medications (effexor and mirtazapine), which continue to benefit her. She is established with outpatient psychiatrist. She denies previous psychiatric hospitalizations or suicide attempts. She denies current suicidal ideation/plan/intent. She does not appear acutely psychotic, delusional, or parnaoia. She denies signs or symptoms of eneida. She is actively drinking alcohol up until this hospitalization though is now outside the window of withdrawal. She does not meet criteria for inpatient psychiatric hospitalization and overall is stable from a mental health stand points.     IMPRESSION  MDD, recurrent, in remission  Anxiety  Alcohol use disorder  Delirium, improved    RECOMMENDATION(S)      1. Scheduled Medication(s):  - Continue Effexor 225mg daily  - Continue Mirtazapine 7.5mg nightly    2. PRN Medication(s):  - Continue Buspa 5mg BID PRN for anxiety    3. Monitor:  QTc 400 (8/1/2022)    4. Legal Status/Precaution(s): None    DELIRIUM BEHAVIOR MANAGEMENT   PLEASE utilize CHEMICAL restraints with PRN meds first for agitation. Minimize use of PHYSICAL restraints   Keep window shades open and room lit during day and room dim  at night in order to promote normal sleep-wake cycles   Encourage family at bedside. [place] patient often to situation, location, date.   Continue to Limit or Discontinue use of Narcotics, Benzos and Anti-cholinergic medications as they may worsen delirium.   Continue medical workup for causative etiology of Delirium.       Mona Kerr MD  [facility], [facility] Fellow

## 2022-08-15 NOTE — PLAN OF CARE
CM received request from [facility] for Level 2 assessment. Medical team notified to consult Psych for assessment. CM to fax request paperwork to [facility] once psych assessment is complete. CM to follow for completed psych assement.    Yarelis Alva RN     [phone]

## 2022-08-15 NOTE — PT/OT/SLP PROGRESS
Occupational Therapy   Co-Treatment with PT    Name: Vale Gutierrez  MRN: [id number]  Admitting Diagnosis:  Acute hypoxemic respiratory failure       Recommendations:     Discharge Recommendations: nursing facility, skilled  Discharge Equipment Recommendations:  walker, rolling, bedside commode  Barriers to discharge:  Decreased caregiver support    Assessment:     Vale Gutierrez is a 61 y.o. female with a medical diagnosis of Acute hypoxemic respiratory failure.  She presents with good participation in therapy session. Pt pleasantly confused, requiring cuing for safety. She is progressing towards OT goals, limited by B LE weakness and increased oxygen needs. Performance deficits affecting function are weakness, impaired endurance, impaired self care skills, impaired functional mobility, gait instability, impaired balance, decreased safety awareness. Pt would benefit from skilled OT services in order to maximize independence with ADLs and facilitate safe discharge. Pt would benefit from SNF upon discharge to return to PLOF and decrease burden of care.     Rehab Prognosis:  Good; patient would benefit from acute skilled OT services to address these deficits and reach maximum level of function.       Plan:     Patient to be seen 3 x/week to address the above listed problems via self-care/home management, therapeutic activities, therapeutic exercises, neuromuscular re-education  · Plan of Care Expires: 09/10/22  · Plan of Care Reviewed with: patient    Subjective     Pain/Comfort:  · Pain Rating 1: 0/10  · Pain Rating Post-Intervention 1: 0/10    Objective:     Communicated with: RN and PT prior to session.  Patient found HOB elevated with telemetry, peripheral IV, pulse ox (continuous), oxygen, blood pressure cuff, PureWick upon OT entry to room.    General Precautions: Standard, fall   Orthopedic Precautions:N/A   Braces: N/A  Respiratory Status: Comfort flow, flow 60 L/min, concentration 83%      Occupational Performance:     Bed Mobility:    · Patient completed Scooting/Bridging with stand by assistance  · Patient completed Supine to Sit with stand by assistance     Functional Mobility/Transfers:  · Patient completed Sit <> Stand Transfer with minimum assistance  with  hand-held assist   · Patient completed Bed <> Chair Transfer using Step Transfer technique with minimum assistance and of 2 persons with hand-held assist  · Functional Mobility: Pt ambulated ~3 ft bed>chair with min A and HHA in order to maximize functional activity tolerance and standing balance required for engagement in occupations of choice.    · Pt with uncontrolled descent to chair  · Instability noted    Activities of Daily Living:  · Grooming: minimum assistance to complete oral care seated in bedside chair; SBA to perform facial hygiene seated in bedside chair  · Lower Body Dressing: stand by assistance to don B  socks in long sitting      [facility] 6 Click ADL: 18    Treatment & Education:  -Therapist provided facilitation and instruction of proper body mechanics, energy conservation, and fall prevention strategies during tasks listed above.  -Pt educated on role of OT, POC and goals for therapy  -Pt educated on importance of OOB activities with staff member assistance and sitting OOB majority of the day.   -Pt verbalized understanding. Pt expressed no further concerns/questions  -Whiteboard updated  -Co-tx with PT performed due to need for education and assistance from two skilled therapy disciplines at pt's current functional level within the ICU setting    Patient left up in chair with all lines intact, call button in reach and RN notifiedEducation:      GOALS:   Multidisciplinary Problems     Occupational Therapy Goals        Problem: Occupational Therapy    Goal Priority Disciplines Outcome Interventions   Occupational Therapy Goal     OT, PT/OT Ongoing, Progressing    Description: Goals to be met by: 8/18/22    Patient will  increase functional independence with ADLs by performing:    LE Dressing with Stand-by Assistance.  Grooming while standing with Stand-by Assistance.  Toileting from bedside commode with Minimal Assistance for hygiene and clothing management.   Sitting at edge of bed with Supervision.  Supine to sit with Supervision.  Step transfer with Contact Guard Assistance  Toilet transfer to bedside commode with Contact Guard Assistance.                     Time Tracking:     OT Date of Treatment: 08/15/22  OT Start Time: 0931  OT Stop Time: 0954  OT Total Time (min): 23 min    Billable Minutes:Self Care/Home Management 13  Therapeutic Activity 10    OT/KIMBERLY: OT          8/15/2022

## 2022-08-15 NOTE — ASSESSMENT & PLAN NOTE
Patient stepped up to MICU for AHRF. Concern for aspiration pna vs autoimmune respiratory process vs pulmonary edema as etiology  - S/p courses of vanc/zosyn/azithromycin, meropenem/diflucan stopped 8/15  - Currently on 3d course of high dose steroids IV solumedrol 125mg q4h (today day 3)  - Patient with significant improvement in respiratory status 8/14, able to transition to CF from bipap, weaning O2 - now on 64%  -CXR 8/14 with improved aeration  -Lasix 40 mg IV BID for diuresis/pulmonary edema

## 2022-08-15 NOTE — SUBJECTIVE & OBJECTIVE
Review of patient's allergies indicates:  No Known Allergies    Current Facility-Administered Medications on File Prior to Encounter   Medication    0.9%  NaCl infusion     Current Outpatient Medications on File Prior to Encounter   Medication Sig    aspirin/salicylamide/caffeine (BC HEADACHE POWDER ORAL) Take 1 Package by mouth once daily. Hold medication one week prior to surgery    busPIRone (BUSPAR) 15 MG tablet Take 15 mg by mouth 2 (two) times daily.    busPIRone (BUSPAR) 5 MG Tab Take 1 tablet (5 mg total) by mouth 2 (two) times daily as needed (anxiety).    magnesium 30 mg Tab Take by mouth once.    metoprolol tartrate (LOPRESSOR) 50 MG tablet Take 1 tablet (50 mg total) by mouth once daily.    mirtazapine (REMERON) 7.5 MG Tab Take 1 tablet (7.5 mg total) by mouth every evening. For insomnia, mood    oxyCODONE-acetaminophen (PERCOCET) 5-325 mg per tablet Take 1 tablet by mouth every 6 (six) hours as needed for Pain.    thiamine 100 MG tablet Take 100 mg by mouth once daily.    tiZANidine (ZANAFLEX) 4 MG tablet Take 1 tablet (4 mg total) by mouth every 6 (six) hours as needed (spasms).    venlafaxine (EFFEXOR-XR) 150 MG Cp24 Take 150 mg by mouth once daily. Take am of surgery    venlafaxine (EFFEXOR-XR) 75 MG 24 hr capsule Take 75 mg by mouth once daily. Take am of surgery    vitamin D (VITAMIN D3) 1000 units Tab Take 5,000 Int'l Units by mouth. Hold am of surgery    dicyclomine (BENTYL) 10 MG capsule Take 1 capsule (10 mg total) by mouth before meals as needed.    omeprazole (PRILOSEC) 20 MG capsule Take 1 capsule (20 mg total) by mouth 2 (two) times daily.         Objective:     Vital Signs (Most Recent):  Temp: 98.3 °F (36.8 °C) (08/15/22 1100)  Pulse: 93 (08/15/22 1100)  Resp: (!) 23 (08/15/22 1100)  BP: 90/64 (08/15/22 1100)  SpO2: 97 % (08/15/22 1100)   Vital Signs (24h Range):  Temp:  [97.9 °F (36.6 °C)-99 °F (37.2 °C)] 98.3 °F (36.8 °C)  Pulse:  [] 93  Resp:  [16-53] 23  SpO2:  [84 %-98 %] 97  "%  BP: ()/(51-74) 90/64     Height: 5' 4" (162.6 cm)  Weight: 90.3 kg (199 lb)  Body mass index is 34.16 kg/m².      Intake/Output Summary (Last 24 hours) at 8/15/2022 1202  Last data filed at 8/15/2022 1100  Gross per 24 hour   Intake 797.88 ml   Output 1500 ml   Net -702.12 ml     Mental Status Exam:  Appearance: unremarkable, age appropriate, normal weight, dressed in hospital gown  Behavior/Cooperation: normal, cooperative, friendly and cooperative, cracking jokes, appropriate eye contact  Speech: normal tone, normal rate, normal pitch, normal volume  Mood: "it's getting better." Denies current depression, anhedonia  Affect: normal and euthymic  Thought Process: normal and logical, goal-directed for most part  Thought Content: normal, no suicidality, no homicidality, delusions, or paranoia ; does not appear to be actively responding to internal stimuli  Orientation: grossly intact, person, place, situation, time/date  Memory: Grossly intact  Attention Span/Concentration: Normal; CAM-ICU negative, able to spell WORLD forwards and backwards  Abstract: intact  Insight: fair  Judgment: fair      Significant Labs: Last 24 Hours:   Recent Lab Results         08/15/22  0305        Albumin 2.1       Alkaline Phosphatase 113       ALT 10       Anion Gap 13       AST 15       Baso # 0.01       Basophil % 0.1       BILIRUBIN TOTAL 0.2  Comment: For infants and newborns, interpretation of results should be based  on gestational age, weight and in agreement with clinical  observations.    Premature Infant recommended reference ranges:  Up to 24 hours.............<8.0 mg/dL  Up to 48 hours............<12.0 mg/dL  3-5 days..................<15.0 mg/dL  6-29 days.................<15.0 mg/dL         BUN 35       Calcium 9.6       Chloride 90       CO2 40       Creatinine 1.1       Differential Method Automated       eGFR 56.5       Eos # 0.0       Eosinophil % 0.0       Glucose 142       Gran # (ANC) 10.9       Gran % " 90.2       Hematocrit 27.8       Hemoglobin 8.0       Immature Grans (Abs) 0.05  Comment: Mild elevation in immature granulocytes is non specific and   can be seen in a variety of conditions including stress response,   acute inflammation, trauma and pregnancy. Correlation with other   laboratory and clinical findings is essential.         Immature Granulocytes 0.4       Lymph # 0.6       Lymph % 4.9       Magnesium 2.0       MCH 23.8       MCHC 28.8       MCV 83       Mono # 0.5       Mono % 4.4       MPV 10.9       nRBC 0       Phosphorus 2.6       Platelets 393       Potassium 3.6       PROTEIN TOTAL 6.4       RBC 3.36       RDW 16.9       Sodium 143       WBC 12.07               Significant Imaging:   Imaging Results               CTA Chest Non-Coronary (PE Study) (Final result)  Result time 08/01/22 23:43:02      Final result by Chrisitan Wise MD (08/01/22 23:43:02)                   Impression:      1. No pulmonary embolism to the segmental level.  2. Opacification in the lingula and right lower lobe with air bronchograms representing possibly early consolidation from infectious process or atelectasis.  3. Multifocal regions of ground-glass opacifications bilaterally suggestive of edema, infectious or inflammatory process.  4. Elevation of the right hemidiaphragm, similar in comparison to oldest chest x-ray dating back to 02/11/2020.  5. Cardiomegaly with moderate pericardial effusion.  6. Fusiform aneurysmal dilation of the ascending aorta measuring up to 4.1 cm.  7. Enlargement of the main pulmonary artery, suggestive of pulmonary hypertension.  This report was flagged in Epic as abnormal.    Electronically signed by resident: Sybil Mora  Date:    08/01/2022  Time:    22:34    Electronically signed by: Christian Wise MD  Date:    08/01/2022  Time:    23:43               Narrative:    EXAMINATION:  CTA CHEST NON CORONARY    CLINICAL HISTORY:  Pulmonary embolism (PE) suspected, high  prob;    TECHNIQUE:  Low dose axial images, sagittal and coronal reformations were obtained from the thoracic inlet to the lung bases following the IV administration of 100 mL of Omnipaque-350.  Scan technique was optimized to evaluate the pulmonary arteries.  MIP images were performed.    COMPARISON:  Chest x-ray 08/01/2022, 10/21/2021, and 02/11/2020.    FINDINGS:  Adequate timing of the contrast bolus.  No pulmonary artery filling defects to the segmental level.  Bilateral bandlike opacifications favored to represent scarring or atelectasis.  Opacification in the lingula and right lower lobe lung base with air bronchograms representing either consolidation or atelectasis.  Multifocal regions of ground-glass opacifications suggestive of infectious or inflammatory process.  Elevation of the right hemidiaphragm.  Trace bilateral pleural effusion.  No pneumothorax.    Cardiomegaly with moderate pericardial effusion.  There is fusiform aneurysmal dilation of the ascending aorta measuring 4.1 cm (coronal series 601, image 117).  There is minimal calcific atherosclerosis of the aorta.  The main pulmonary artery is enlarged measuring 4.6 cm in diameter, suggestive of possible pulmonary hypertension.  No mediastinal, hilar or axillary lymphadenopathy.  The visualized thyroid gland is normal.    Limited views of the upper abdomen are normal.    Visualized bones and soft tissues are normal.                                       CT Head Without Contrast (Final result)  Result time 08/01/22 21:31:23      Final result by Timothy Alvarado MD (08/01/22 21:31:23)                   Impression:      1. No acute intracranial process.  2. Involutional changes with chronic microvascular ischemic changes.  Small remote lacunar infarct of the left caudate head.      Electronically signed by: Timothy Alvarado  Date:    08/01/2022  Time:    21:31               Narrative:    EXAMINATION:  CT HEAD WITHOUT CONTRAST    CLINICAL HISTORY:  Mental  status change, unknown cause;    TECHNIQUE:  Low dose axial CT images obtained throughout the head without intravenous contrast. Sagittal and coronal reconstructions were performed.    COMPARISON:  10/21/2021    FINDINGS:  Intracranial compartment:    Ventricles and sulci are normal in size for age without evidence of hydrocephalus. No extra-axial blood or fluid collections.    Moderate involutional changes and chronic microvascular ischemic changes in the periventricular white matter.  Small remote lacunar infarct of the left caudate head.    No parenchymal mass, hemorrhage, edema or major vascular distribution infarct.    Skull/extracranial contents (limited evaluation): No fracture. Mastoid air cells and paranasal sinuses are essentially clear.                                       X-Ray Chest AP Portable (Final result)  Result time 08/01/22 18:42:25      Final result by Timothy Alvarado MD (08/01/22 18:42:25)                   Impression:      See above comments.  Recommend follow-up.      Electronically signed by: Timothy Alvarado  Date:    08/01/2022  Time:    18:42               Narrative:    EXAMINATION:  XR CHEST AP PORTABLE    CLINICAL HISTORY:  sob;    TECHNIQUE:  Single frontal view of the chest was performed.    COMPARISON:  10/21/2021    FINDINGS:  Suboptimal inspiration may limit characterization.    Cardiac silhouette is borderline enlarged.    Mild perihilar interstitial changes may be associated with atelectasis or mild edema.  Mild bibasilar atelectasis.    Probable small bibasilar pleural effusions.  No evidence of pneumothorax.  No acute osseous abnormality.

## 2022-08-16 LAB
1,3 BETA GLUCAN SER-MCNC: <31 PG/ML
ALBUMIN SERPL BCP-MCNC: 2.3 G/DL (ref 3.5–5.2)
ALLENS TEST: ABNORMAL
ALP SERPL-CCNC: 110 U/L (ref 55–135)
ALT SERPL W/O P-5'-P-CCNC: 14 U/L (ref 10–44)
ANION GAP SERPL CALC-SCNC: 12 MMOL/L (ref 8–16)
ANION GAP SERPL CALC-SCNC: 14 MMOL/L (ref 8–16)
AST SERPL-CCNC: 20 U/L (ref 10–40)
BASOPHILS # BLD AUTO: 0 K/UL (ref 0–0.2)
BASOPHILS NFR BLD: 0 % (ref 0–1.9)
BILIRUB SERPL-MCNC: 0.3 MG/DL (ref 0.1–1)
BUN SERPL-MCNC: 33 MG/DL (ref 8–23)
BUN SERPL-MCNC: 39 MG/DL (ref 8–23)
CALCIUM SERPL-MCNC: 9.7 MG/DL (ref 8.7–10.5)
CALCIUM SERPL-MCNC: 9.7 MG/DL (ref 8.7–10.5)
CHLORIDE SERPL-SCNC: 87 MMOL/L (ref 95–110)
CHLORIDE SERPL-SCNC: 89 MMOL/L (ref 95–110)
CO2 SERPL-SCNC: 40 MMOL/L (ref 23–29)
CO2 SERPL-SCNC: 40 MMOL/L (ref 23–29)
CREAT SERPL-MCNC: 0.8 MG/DL (ref 0.5–1.4)
CREAT SERPL-MCNC: 0.9 MG/DL (ref 0.5–1.4)
DELSYS: ABNORMAL
DIFFERENTIAL METHOD: ABNORMAL
EOSINOPHIL # BLD AUTO: 0 K/UL (ref 0–0.5)
EOSINOPHIL NFR BLD: 0 % (ref 0–8)
ERYTHROCYTE [DISTWIDTH] IN BLOOD BY AUTOMATED COUNT: 17.1 % (ref 11.5–14.5)
EST. GFR  (NO RACE VARIABLE): >60 ML/MIN/1.73 M^2
EST. GFR  (NO RACE VARIABLE): >60 ML/MIN/1.73 M^2
FIO2: 90
FLOW: 60
FUNGITELL COMMENTS: NEGATIVE
GLUCOSE SERPL-MCNC: 103 MG/DL (ref 70–110)
GLUCOSE SERPL-MCNC: 106 MG/DL (ref 70–110)
HCO3 UR-SCNC: 47.8 MMOL/L (ref 24–28)
HCT VFR BLD AUTO: 30.5 % (ref 37–48.5)
HGB BLD-MCNC: 8.7 G/DL (ref 12–16)
IMM GRANULOCYTES # BLD AUTO: 0.06 K/UL (ref 0–0.04)
IMM GRANULOCYTES NFR BLD AUTO: 0.6 % (ref 0–0.5)
LACTATE SERPL-SCNC: 2.6 MMOL/L (ref 0.5–2.2)
LYMPHOCYTES # BLD AUTO: 0.8 K/UL (ref 1–4.8)
LYMPHOCYTES NFR BLD: 7.7 % (ref 18–48)
MAGNESIUM SERPL-MCNC: 2 MG/DL (ref 1.6–2.6)
MCH RBC QN AUTO: 23.7 PG (ref 27–31)
MCHC RBC AUTO-ENTMCNC: 28.5 G/DL (ref 32–36)
MCV RBC AUTO: 83 FL (ref 82–98)
MODE: ABNORMAL
MONOCYTES # BLD AUTO: 0.3 K/UL (ref 0.3–1)
MONOCYTES NFR BLD: 3.4 % (ref 4–15)
NEUTROPHILS # BLD AUTO: 8.6 K/UL (ref 1.8–7.7)
NEUTROPHILS NFR BLD: 88.3 % (ref 38–73)
NRBC BLD-RTO: 0 /100 WBC
PCO2 BLDA: 56.8 MMHG (ref 35–45)
PH SMN: 7.53 [PH] (ref 7.35–7.45)
PHOSPHATE SERPL-MCNC: 3.3 MG/DL (ref 2.7–4.5)
PLATELET # BLD AUTO: 452 K/UL (ref 150–450)
PMV BLD AUTO: 11.1 FL (ref 9.2–12.9)
PO2 BLDA: 65 MMHG (ref 80–100)
POC BE: 25 MMOL/L
POC SATURATED O2: 94 % (ref 95–100)
POC TCO2: 50 MMOL/L (ref 23–27)
POTASSIUM SERPL-SCNC: 3.4 MMOL/L (ref 3.5–5.1)
POTASSIUM SERPL-SCNC: 4.2 MMOL/L (ref 3.5–5.1)
PROT SERPL-MCNC: 6.8 G/DL (ref 6–8.4)
RBC # BLD AUTO: 3.67 M/UL (ref 4–5.4)
SAMPLE: ABNORMAL
SITE: ABNORMAL
SODIUM SERPL-SCNC: 141 MMOL/L (ref 136–145)
SODIUM SERPL-SCNC: 141 MMOL/L (ref 136–145)
SP02: 94
WBC # BLD AUTO: 9.74 K/UL (ref 3.9–12.7)

## 2022-08-16 PROCEDURE — 84100 ASSAY OF PHOSPHORUS: CPT

## 2022-08-16 PROCEDURE — 80048 BASIC METABOLIC PNL TOTAL CA: CPT | Mod: XB

## 2022-08-16 PROCEDURE — 99233 PR SUBSEQUENT HOSPITAL CARE,LEVL III: ICD-10-PCS | Mod: GC,,, | Performed by: INTERNAL MEDICINE

## 2022-08-16 PROCEDURE — 63600175 PHARM REV CODE 636 W HCPCS

## 2022-08-16 PROCEDURE — 25000003 PHARM REV CODE 250: Performed by: NURSE PRACTITIONER

## 2022-08-16 PROCEDURE — 25000003 PHARM REV CODE 250

## 2022-08-16 PROCEDURE — 25000003 PHARM REV CODE 250: Performed by: HOSPITALIST

## 2022-08-16 PROCEDURE — 27100171 HC OXYGEN HIGH FLOW UP TO 24 HOURS

## 2022-08-16 PROCEDURE — 27000221 HC OXYGEN, UP TO 24 HOURS

## 2022-08-16 PROCEDURE — 25000242 PHARM REV CODE 250 ALT 637 W/ HCPCS: Performed by: HOSPITALIST

## 2022-08-16 PROCEDURE — 94761 N-INVAS EAR/PLS OXIMETRY MLT: CPT

## 2022-08-16 PROCEDURE — 99900035 HC TECH TIME PER 15 MIN (STAT)

## 2022-08-16 PROCEDURE — 20000000 HC ICU ROOM

## 2022-08-16 PROCEDURE — 94640 AIRWAY INHALATION TREATMENT: CPT

## 2022-08-16 PROCEDURE — 97530 THERAPEUTIC ACTIVITIES: CPT

## 2022-08-16 PROCEDURE — 63600175 PHARM REV CODE 636 W HCPCS: Performed by: NURSE PRACTITIONER

## 2022-08-16 PROCEDURE — 82803 BLOOD GASES ANY COMBINATION: CPT

## 2022-08-16 PROCEDURE — 83605 ASSAY OF LACTIC ACID: CPT

## 2022-08-16 PROCEDURE — 36600 WITHDRAWAL OF ARTERIAL BLOOD: CPT

## 2022-08-16 PROCEDURE — 99233 SBSQ HOSP IP/OBS HIGH 50: CPT | Mod: GC,,, | Performed by: INTERNAL MEDICINE

## 2022-08-16 PROCEDURE — 85025 COMPLETE CBC W/AUTO DIFF WBC: CPT | Performed by: STUDENT IN AN ORGANIZED HEALTH CARE EDUCATION/TRAINING PROGRAM

## 2022-08-16 PROCEDURE — 83735 ASSAY OF MAGNESIUM: CPT | Performed by: STUDENT IN AN ORGANIZED HEALTH CARE EDUCATION/TRAINING PROGRAM

## 2022-08-16 PROCEDURE — 80053 COMPREHEN METABOLIC PANEL: CPT | Performed by: STUDENT IN AN ORGANIZED HEALTH CARE EDUCATION/TRAINING PROGRAM

## 2022-08-16 RX ORDER — FUROSEMIDE 10 MG/ML
40 INJECTION INTRAMUSCULAR; INTRAVENOUS EVERY 8 HOURS
Status: DISCONTINUED | OUTPATIENT
Start: 2022-08-16 | End: 2022-08-17

## 2022-08-16 RX ORDER — METOCLOPRAMIDE HYDROCHLORIDE 5 MG/ML
10 INJECTION INTRAMUSCULAR; INTRAVENOUS ONCE
Status: COMPLETED | OUTPATIENT
Start: 2022-08-16 | End: 2022-08-16

## 2022-08-16 RX ORDER — PREDNISONE 20 MG/1
40 TABLET ORAL DAILY
Status: DISCONTINUED | OUTPATIENT
Start: 2022-08-16 | End: 2022-08-17

## 2022-08-16 RX ORDER — KETOROLAC TROMETHAMINE 15 MG/ML
15 INJECTION, SOLUTION INTRAMUSCULAR; INTRAVENOUS ONCE
Status: COMPLETED | OUTPATIENT
Start: 2022-08-16 | End: 2022-08-16

## 2022-08-16 RX ADMIN — POTASSIUM BICARBONATE 25 MEQ: 978 TABLET, EFFERVESCENT ORAL at 06:08

## 2022-08-16 RX ADMIN — FUROSEMIDE 40 MG: 10 INJECTION, SOLUTION INTRAMUSCULAR; INTRAVENOUS at 09:08

## 2022-08-16 RX ADMIN — FUROSEMIDE 40 MG: 10 INJECTION, SOLUTION INTRAMUSCULAR; INTRAVENOUS at 05:08

## 2022-08-16 RX ADMIN — MIRTAZAPINE 7.5 MG: 7.5 TABLET, FILM COATED ORAL at 09:08

## 2022-08-16 RX ADMIN — OXYCODONE HYDROCHLORIDE AND ACETAMINOPHEN 1 TABLET: 5; 325 TABLET ORAL at 07:08

## 2022-08-16 RX ADMIN — IPRATROPIUM BROMIDE AND ALBUTEROL SULFATE 3 ML: 2.5; .5 SOLUTION RESPIRATORY (INHALATION) at 12:08

## 2022-08-16 RX ADMIN — ONDANSETRON 4 MG: 4 TABLET, ORALLY DISINTEGRATING ORAL at 09:08

## 2022-08-16 RX ADMIN — THIAMINE HCL TAB 100 MG 100 MG: 100 TAB at 09:08

## 2022-08-16 RX ADMIN — OXYCODONE HYDROCHLORIDE AND ACETAMINOPHEN 1 TABLET: 5; 325 TABLET ORAL at 05:08

## 2022-08-16 RX ADMIN — IPRATROPIUM BROMIDE AND ALBUTEROL SULFATE 3 ML: 2.5; .5 SOLUTION RESPIRATORY (INHALATION) at 07:08

## 2022-08-16 RX ADMIN — POTASSIUM BICARBONATE 50 MEQ: 978 TABLET, EFFERVESCENT ORAL at 09:08

## 2022-08-16 RX ADMIN — METHYLPREDNISOLONE SODIUM SUCCINATE 125 MG: 125 INJECTION, POWDER, FOR SOLUTION INTRAMUSCULAR; INTRAVENOUS at 05:08

## 2022-08-16 RX ADMIN — METHYLPREDNISOLONE SODIUM SUCCINATE 125 MG: 125 INJECTION, POWDER, FOR SOLUTION INTRAMUSCULAR; INTRAVENOUS at 01:08

## 2022-08-16 RX ADMIN — VENLAFAXINE HYDROCHLORIDE 225 MG: 75 CAPSULE, EXTENDED RELEASE ORAL at 09:08

## 2022-08-16 RX ADMIN — METOPROLOL TARTRATE 100 MG: 100 TABLET, FILM COATED ORAL at 09:08

## 2022-08-16 RX ADMIN — IPRATROPIUM BROMIDE AND ALBUTEROL SULFATE 3 ML: 2.5; .5 SOLUTION RESPIRATORY (INHALATION) at 04:08

## 2022-08-16 RX ADMIN — LOPERAMIDE HYDROCHLORIDE 2 MG: 2 CAPSULE ORAL at 09:08

## 2022-08-16 RX ADMIN — ENOXAPARIN SODIUM 90 MG: 100 INJECTION SUBCUTANEOUS at 09:08

## 2022-08-16 RX ADMIN — FUROSEMIDE 40 MG: 10 INJECTION, SOLUTION INTRAMUSCULAR; INTRAVENOUS at 03:08

## 2022-08-16 RX ADMIN — KETOROLAC TROMETHAMINE 15 MG: 15 INJECTION, SOLUTION INTRAMUSCULAR; INTRAVENOUS at 09:08

## 2022-08-16 RX ADMIN — OLANZAPINE 10 MG: 5 TABLET, ORALLY DISINTEGRATING ORAL at 09:08

## 2022-08-16 RX ADMIN — METOCLOPRAMIDE 10 MG: 5 INJECTION, SOLUTION INTRAMUSCULAR; INTRAVENOUS at 09:08

## 2022-08-16 RX ADMIN — AMIODARONE HYDROCHLORIDE 400 MG: 200 TABLET ORAL at 09:08

## 2022-08-16 RX ADMIN — PREDNISONE 40 MG: 20 TABLET ORAL at 01:08

## 2022-08-16 NOTE — ASSESSMENT & PLAN NOTE
Patient reportedly had tremors and hallucinations on the floor 8/5-8/7 concerning for withdrawal. Has history of complicated withdrawal hx requiring hospitalization at  per sister   - On [facility] protocol with PRN BZDs on arrival, no longer exhibiting sxs of alcohol withdrawal  - Thiamine 100mg q.d.

## 2022-08-16 NOTE — PLAN OF CARE
CMICU DAILY GOALS     VSS on CF 60L/90%, satting > goal 90%. Pt required uptitration and increased oxygen requirements throughout shift to satisfy saturation goal. Intermittently confused but always remains AAOx4. Pt tolerated BiPAP all throughout night without issue or any acute distress. SB-NSR on monitor without ectopy or arrhythmia. Pt afebrile. MAPs > 65 without vasopressor requirements. Steroids continued to be administered. Pt continues to receive Lasix 40mg IV. K 3.4 this AM, Kaylee NP notified. 25mEq administered x1 for replacement. Assessment per flow sheet, patient progressing towards goals as tolerated, plan of care reviewed with patient, all concerns addressed.    Caio Ramos    A: Awake    RASS: Goal -    Actual - RASS (Goldman Agitation-Sedation Scale): 0-->alert and calm   Restraint necessity: Clinical Justification: Removing medical devices, Climbing out of bed, Treatment Interference  B: Breathe   SBT: Not intubated   C: Coordinate A & B, analgesics/sedatives   Pain: managed    SAT: Not intubated  D: Delirium   CAM-ICU: Overall CAM-ICU: Negative  E: Early(intubated/ Progressive (non-intubated) Mobility   MOVE Screen: Pass   Activity: Activity Management: Rolling - L1  FAS: Feeding/Nutrition   Diet order: Diet/Nutrition Received: regular, restrict fluids,    T: Thrombus   DVT prophylaxis: VTE Required Core Measure: Pharmacological prophylaxis initiated/maintained  H: HOB Elevation   Head of Bed (HOB) Positioning: HOB at 30-45 degrees  U: Ulcer Prophylaxis   GI: yes  G: Glucose control   managed    S: Skin   Bathing/Skin Care: bath, complete  Device Skin Pressure Protection: absorbent pad utilized/changed, adhesive use limited, positioning supports utilized, pressure points protected, skin-to-device areas padded, skin-to-skin areas padded  Pressure Reduction Devices: foam padding utilized, heel offloading device utilized, positioning supports utilized, specialty bed utilized  Pressure Reduction  Techniques: frequent weight shift encouraged, positioned off wounds, weight shift assistance provided  Skin Protection: incontinence pads utilized, adhesive use limited, silicone foam dressing in place, skin-to-skin areas padded, skin-to-device areas padded, transparent dressing maintained, tubing/devices free from skin contact  B: Bowel Function   no issues   I: Indwelling Catheters   [place] necessity:     CVC necessity: No  D: De-escalation Antibiotics   Yes    Family/Goals of care/Code Status   Code Status: Full Code    24H Vital Sign Range  Temp:  [97.9 °F (36.6 °C)-98.9 °F (37.2 °C)]   Pulse:  []   Resp:  [15-53]   BP: ()/(53-98)   SpO2:  [81 %-98 %]      Shift Events   No acute events throughout shift

## 2022-08-16 NOTE — ASSESSMENT & PLAN NOTE
Patient stepped up to MICU for AHRF. Concern for aspiration pna vs autoimmune respiratory process vs pulmonary edema as etiology  - S/p courses of vanc/zosyn/azithromycin, meropenem/diflucan stopped 8/15  - S/p 3d course of high dose steroids IV solumedrol 125mg q4h   - Patient with significant improvement in respiratory status 8/14, able to transition to CF from bipap - desaturations and confusion 8/16 AM, O2 increased to 90%  - Repeat ABG ordered to re-evaluate respiratory status  - Wheezing on exam - started prednisone 40mg qday x 5d  -CXR 8/15 with stable right cardiophrenic and left basilar-retrocardiac opacities  -Lasix 40 mg IV increased to TID for diuresis/pulmonary edema  -Updated Echo w/bubble study read: no intracardiac shunt identified

## 2022-08-16 NOTE — PROGRESS NOTES
[facility] - Card[facility] Medical [facility]  Critical Care Medicine  Progress Note    Patient Name: Vale Gutierrez  MRN: [id number]  Admission Date: 8/1/2022  Hospital Length of Stay: 15 days  Code Status: Full Code  Attending Provider: Patrice Burnett MD  Primary Care Provider: Estela Mcdaniel MD   Principal Problem: Acute hypoxemic respiratory failure    Subjective:      HPI:  Ms. Gutierrez is a 62 y/o female with a PMH of etoh abuse and withdrawal, aspiration pneumonia, delirium, smoking, HTN, HLD, BPPV, alcohol abuse, depression and anxiety who was admitted to the ICU from the floor for acute hypoxic respiratory failure. She was having increased oxygen needs on the floor and was requiring valium to keep her presumed alcohol withdrawal under control. The patient states that her stomach hurts in the midepigastric region and endorses nausea, vomiting x 1 day. She is unsure how long she has been coughing or feeling ill, and says she cannot remember the last few days. She says she lives with her sister Angela who helps care for her.       Hospital/ICU Course:  Patient presented to the ED on 8/1 with a CC of hypoxia and dyspnea. She was initially admitted to the floor. She experienced hallucinations and tremor, consistent with alcohol withdrawal / delirium, and was put on CIWA protocol with valium PRN (shortage of ativan). She was treated with antibiotics (vancomycin, zosyn, and azithro) scheduled for 10 days and duonebs prn. On 8/7 she developed worsening hypoxic respiratory failure, was put on 50L 100% O2 bipap, and admitted to the ICU due to increased oxygen requirements. Work up revealed small pericardial effusion, cardiomegaly, normal LVEF, pleural effusions and opacities bilaterally, and atrial fibrillation. Patient has now completed courses of zosyn, vancomycin, and azithromycin. She was subsequently given diflucan  and meropenem out of concern for respiratory infection, stopped 8/15. On lasix BID for pulmonary  edema. She was started on high dose steroids 8/13 as there was concern for autoimmune respiratory process, and labs were sent for C-ANCA and P-ANCA (currently pending). She previously had a +BRANDYN titer, but antiCCP, RF, and  anti-dsDNA were negative. Patient's CXR and respiratory status improving on 8/14, able to tolerate CF down to 64% O2. 8/16 patient O2 requirements increasing, O2 increased to 90%.  Repeat CXR 8/16 with right cardiophrenic and left basilar-retrocardiac opacities. Repeat ABG ordered.     Of note, discussed the potential need for endotracheal intubation and she expressed understanding and stated she would like to have that done if her respiratory  status declines to the point of needing it.           Interval History/Significant Events: CF oxygen increased to 90% overnight due to desaturation. Patient intermittently confused overnight but remained oriented, per nursing note.    Review of Systems   Constitutional:  Negative for chills and fever.   HENT:  Positive for rhinorrhea. Negative for congestion.    Eyes:  Negative for photophobia and visual disturbance.   Respiratory:  Positive for cough and shortness of breath.    Cardiovascular:  Negative for chest pain and leg swelling.   Gastrointestinal:  Positive for abdominal pain (TTP in LUQ). Negative for abdominal distention, diarrhea, nausea and vomiting.   Genitourinary:  Negative for dysuria.   Musculoskeletal:  Positive for back pain (chronic).   Skin:  Negative for rash.   Neurological:  Negative for light-headedness and headaches.   Psychiatric/Behavioral:  Negative for agitation and confusion.    Objective:     Vital Signs (Most Recent):  Temp: 98.8 °F (37.1 °C) (08/16/22 1100)  Pulse: 68 (08/16/22 1200)  Resp: (!) 21 (08/16/22 1200)  BP: 103/65 (08/16/22 1200)  SpO2: (!) 94 % (08/16/22 1200)   Vital Signs (24h Range):  Temp:  [97.9 °F (36.6 °C)-98.9 °F (37.2 °C)] 98.8 °F (37.1 °C)  Pulse:  [] 68  Resp:  [15-39] 21  SpO2:  [81 %-99 %]  94 %  BP: ()/() 103/65   Weight: 90.3 kg (199 lb)  Body mass index is 34.16 kg/m².      Intake/Output Summary (Last 24 hours) at 8/16/2022 1226  Last data filed at 8/16/2022 1000  Gross per 24 hour   Intake 1590 ml   Output 700 ml   Net 890 ml         Physical Exam  Constitutional:       Appearance: She is obese.   HENT:      Head: Normocephalic and atraumatic.      Right Ear: External ear normal.      Left Ear: External ear normal.      Nose: Nose normal.      Mouth/Throat:      Mouth: Mucous membranes are moist.      Pharynx: Oropharynx is clear.   Eyes:      Extraocular Movements: Extraocular movements intact.      Conjunctiva/sclera: Conjunctivae normal.   Cardiovascular:      Rate and Rhythm: Normal rate and regular rhythm.   Pulmonary:      Breath sounds: Wheezing and rales present.      Comments: Comfort flow 60L/min  Abdominal:      General: Abdomen is flat. Bowel sounds are normal.      Palpations: Abdomen is soft.      Tenderness: There is abdominal tenderness (TTP in LUQ\).   Musculoskeletal:         General: Normal range of motion.      Cervical back: Normal range of motion.      Right lower leg: No edema.      Left lower leg: No edema.   Skin:     General: Skin is warm and dry.   Neurological:      General: No focal deficit present.      Mental Status: She is alert.   Psychiatric:         Mood and Affect: Mood normal.         Behavior: Behavior normal.       Vents:  Oxygen Concentration (%): 90 (08/16/22 1200)  Lines/Drains/Airways       Peripheral Intravenous Line  Duration                  Peripheral IV - Single Lumen 08/12/22 0938 20 G Anterior;Left Forearm 4 days         Peripheral IV - Single Lumen 08/16/22 0330 20 G Anterior;Proximal;Right Forearm <1 day                  Significant Labs:    CBC/Anemia Profile:  Recent Labs   Lab 08/15/22  0305 08/16/22  0416   WBC 12.07 9.74   HGB 8.0* 8.7*   HCT 27.8* 30.5*    452*   MCV 83 83   RDW 16.9* 17.1*          Chemistries:  Recent  Labs   Lab 08/15/22  0305 08/16/22  0416    141   K 3.6 3.4*   CL 90* 89*   CO2 40* 40*   BUN 35* 33*   CREATININE 1.1 0.8   CALCIUM 9.6 9.7   ALBUMIN 2.1* 2.3*   PROT 6.4 6.8   BILITOT 0.2 0.3   ALKPHOS 113 110   ALT 10 14   AST 15 20   MG 2.0 2.0   PHOS 2.6* 3.3         All pertinent labs within the past 24 hours have been reviewed.    Significant Imaging:  I have reviewed all pertinent imaging results/findings within the past 24 hours.      ABG  Recent Labs   Lab 08/14/22  0019   PH 7.426   PO2 58*   PCO2 69.1*   HCO3 45.5*   BE 21     Assessment/Plan:     Neuro  Chronic pain disorder  Patient has a history of chronic back pain, for which she had surgery in October 2020. She has been followed by a pain management doctor and takes percocet daily for pain.   - Percocet 5mg q6h prn available    Psychiatric  Recurrent major depressive disorder, in full remission  - Continue home Mirtazapine 7.5mg qd and venlafaxine 225mg q.d.    Alcohol use disorder, severe, dependence  Patient reportedly had tremors and hallucinations on the floor 8/5-8/7 concerning for withdrawal. Has history of complicated withdrawal hx requiring hospitalization at  per sister   - On [facility] protocol with PRN BZDs on arrival, no longer exhibiting sxs of alcohol withdrawal  - Thiamine 100mg q.d.    Pulmonary  * Acute hypoxemic respiratory failure  Patient stepped up to MICU for AHRF. Concern for aspiration pna vs autoimmune respiratory process vs pulmonary edema as etiology. Transitioned to CF 8/14, CXR with improving aeration. 8/16 with increasing O2 requirements, placed on 90%. Repeat ABG and stat LA ordered, pending.  - S/p courses of vanc/zosyn/azithromycin, meropenem/diflucan stopped 8/15  - S/p 3d course of high dose steroids IV solumedrol 125mg q4h   - Patient with significant improvement in respiratory status 8/14, able to transition to CF from bipap - desaturations and confusion 8/16 AM, O2 increased to 90%  - Repeat ABG ordered to  re-evaluate respiratory status  - Wheezing on exam - started prednisone 40mg qday x 5d  -CXR 8/15 with stable right cardiophrenic and left basilar-retrocardiac opacities  -Lasix 40 mg IV increased to TID for diuresis/pulmonary edema  -Updated Echo w/bubble study read: no intracardiac shunt identified    Aspiration pneumonia  See AHRF         Acute on chronic respiratory failure with hypercapnia  Patient with Hypoxic Respiratory failure which is Acute on chronic.  she is not on home oxygen. Supplemental oxygen was provided and noted- Oxygen Concentration (%):  [] 100.   Signs/symptoms of respiratory failure include- tachypnea. Contributing diagnoses includes - ARDS, Aspiration, CHF, COPD, Interstitial lung disease, Pleural effusion, Pneumonia and Pulmonary Embolus Labs and images were reviewed. Patient Has recent ABG, which has been reviewed. Will treat underlying causes and adjust management of respiratory failure.     Community acquired pneumonia of right lower lobe of lung  See AHRF       Cardiac/Vascular  Paroxysmal atrial fibrillation  Patient with paroxysmal atrial fibrillation during admission  - Currently on Amiodarone 400mg BID x 10days, to transition to 200mg daily on 8/23  - Therapeutic lovenox 90mg BID for anticoagulation  - On lopressor 100mg BID for rate control    Acute on chronic diastolic congestive heart failure  Patient is identified as having Diastolic (HFpEF) heart failure that is Acute on chronic. CHF is currently uncontrolled due to Pulmonary edema/pleural effusion on CXR. Latest ECHO performed and demonstrates-     Echo  · The left ventricle is normal in size with concentric hypertrophy and normal systolic function.  · The estimated ejection fraction is 60-65%.  · Grade I left ventricular diastolic dysfunction.  · Mild aortic regurgitation.  · Normal right ventricular size with normal right ventricular systolic function.  · The estimated PA systolic pressure is 43 mmHg.  · Intermediate  central venous pressure (8 mmHg).  · There is pulmonary hypertension.  · Small circumferential pericardial effusion.  · Mild left atrial enlargement.    - Lasix 40mg TID for continued diuresis  - 1.5L fluid restriction  - Strict Is/Os, daily weights  - UOP 8/16 AM ~800cc            Essential hypertension  Home Med: Metoprolol 50mg po q.d.  - On lopressor 100mg BID for rate control and HTN     Critical Care Daily Checklist:    A: Awake: RASS Goal/Actual Goal:    Actual: Goldman Agitation Sedation Scale (RASS): Alert and calm   B: Spontaneous Breathing Trial Performed?     C: SAT & SBT Coordinated?  n/a                      D: Delirium: CAM-ICU Overall CAM-ICU: Negative   E: Early Mobility Performed? Yes   F: Feeding Goal: Goals: Meet % EEN, EPN by RD f/u date  Status: Nutrition Goal Status: new   Current Diet Order   Procedures    Diet Adult Regular (IDDSI Level 7) Fluid - 1500mL     Order Specific Question:   Fluid restriction:     Answer:   Fluid - 1500mL      AS: Analgesia/Sedation n/a   T: Thromboembolic Prophylaxis lovenox 90mg   H: HOB > 300 Yes   U: Stress Ulcer Prophylaxis (if needed) n/a   G: Glucose Control yes   B: Bowel Function Stool Occurrence: 1   I: Indwelling Catheter (Lines & [place]) Necessity Pureandres, CARMEN   D: De-escalation of Antimicrobials/Pharmacotherapies n/a    Plan for the day/ETD Workup increased O2 requirements    Code Status:  Family/Goals of Care: Full Code         Critical secondary to Patient has a condition that poses threat to life and bodily function: Severe Respiratory Distress      Critical care was time spent personally by me on the following activities: development of treatment plan with patient or surrogate and bedside caregivers, discussions with consultants, evaluation of patient's response to treatment, examination of patient, ordering and performing treatments and interventions, ordering and review of laboratory studies, ordering and review of radiographic studies,  pulse oximetry, re-evaluation of patient's condition. This critical care time did not overlap with that of any other provider or involve time for any procedures.     SILVIO BERMUDEZ MD  Critical Care Medicine  [facility] - Cardiac Medical ICU

## 2022-08-16 NOTE — PLAN OF CARE
Problem: Physical Therapy  Goal: Physical Therapy Goal  Description: Goals to be met by:22     Patient will increase functional independence with mobility by performin. Supine to sit with Stand-by Assistance -met   2. Sit to supine with Stand-by Assistance  3. Sit to stand transfer with Stand-by Assistance  4. Gait  x 50 feet with Contact Guard Assistance using LRAD, if needed.   5. Stand for 5 minutes with Stand-by Assistance using LRAD, if needed  6. Lower extremity exercise program x10 reps per handout, with independence    Outcome: Ongoing, Progressing   Goals remain appropriate. 2022

## 2022-08-16 NOTE — SUBJECTIVE & OBJECTIVE
Interval History/Significant Events: CF oxygen increased to 90% overnight due to desaturation. Patient intermittently confused overnight but remained oriented, per nursing note.    Review of Systems   Constitutional:  Negative for chills and fever.   HENT:  Positive for rhinorrhea. Negative for congestion.    Eyes:  Negative for photophobia and visual disturbance.   Respiratory:  Positive for cough and shortness of breath.    Cardiovascular:  Negative for chest pain and leg swelling.   Gastrointestinal:  Positive for abdominal pain (TTP in LUQ). Negative for abdominal distention, diarrhea, nausea and vomiting.   Genitourinary:  Negative for dysuria.   Musculoskeletal:  Positive for back pain (chronic).   Skin:  Negative for rash.   Neurological:  Negative for light-headedness and headaches.   Psychiatric/Behavioral:  Negative for agitation and confusion.    Objective:     Vital Signs (Most Recent):  Temp: 98.8 °F (37.1 °C) (08/16/22 1100)  Pulse: 68 (08/16/22 1200)  Resp: (!) 21 (08/16/22 1200)  BP: 103/65 (08/16/22 1200)  SpO2: (!) 94 % (08/16/22 1200)   Vital Signs (24h Range):  Temp:  [97.9 °F (36.6 °C)-98.9 °F (37.2 °C)] 98.8 °F (37.1 °C)  Pulse:  [] 68  Resp:  [15-39] 21  SpO2:  [81 %-99 %] 94 %  BP: ()/() 103/65   Weight: 90.3 kg (199 lb)  Body mass index is 34.16 kg/m².      Intake/Output Summary (Last 24 hours) at 8/16/2022 1226  Last data filed at 8/16/2022 1000  Gross per 24 hour   Intake 1590 ml   Output 700 ml   Net 890 ml         Physical Exam  Constitutional:       Appearance: She is obese.   HENT:      Head: Normocephalic and atraumatic.      Right Ear: External ear normal.      Left Ear: External ear normal.      Nose: Nose normal.      Mouth/Throat:      Mouth: Mucous membranes are moist.      Pharynx: Oropharynx is clear.   Eyes:      Extraocular Movements: Extraocular movements intact.      Conjunctiva/sclera: Conjunctivae normal.   Cardiovascular:      Rate and Rhythm: Normal  rate and regular rhythm.   Pulmonary:      Breath sounds: Wheezing and rales present.      Comments: Comfort flow 60L/min  Abdominal:      General: Abdomen is flat. Bowel sounds are normal.      Palpations: Abdomen is soft.      Tenderness: There is abdominal tenderness (TTP in LUQ\).   Musculoskeletal:         General: Normal range of motion.      Cervical back: Normal range of motion.      Right lower leg: No edema.      Left lower leg: No edema.   Skin:     General: Skin is warm and dry.   Neurological:      General: No focal deficit present.      Mental Status: She is alert.   Psychiatric:         Mood and Affect: Mood normal.         Behavior: Behavior normal.       Vents:  Oxygen Concentration (%): 90 (08/16/22 1200)  Lines/Drains/Airways       Peripheral Intravenous Line  Duration                  Peripheral IV - Single Lumen 08/12/22 0938 20 G Anterior;Left Forearm 4 days         Peripheral IV - Single Lumen 08/16/22 0330 20 G Anterior;Proximal;Right Forearm <1 day                  Significant Labs:    CBC/Anemia Profile:  Recent Labs   Lab 08/15/22  0305 08/16/22  0416   WBC 12.07 9.74   HGB 8.0* 8.7*   HCT 27.8* 30.5*    452*   MCV 83 83   RDW 16.9* 17.1*          Chemistries:  Recent Labs   Lab 08/15/22  0305 08/16/22  0416    141   K 3.6 3.4*   CL 90* 89*   CO2 40* 40*   BUN 35* 33*   CREATININE 1.1 0.8   CALCIUM 9.6 9.7   ALBUMIN 2.1* 2.3*   PROT 6.4 6.8   BILITOT 0.2 0.3   ALKPHOS 113 110   ALT 10 14   AST 15 20   MG 2.0 2.0   PHOS 2.6* 3.3         All pertinent labs within the past 24 hours have been reviewed.    Significant Imaging:  I have reviewed all pertinent imaging results/findings within the past 24 hours.

## 2022-08-16 NOTE — PT/OT/SLP PROGRESS
Physical Therapy Treatment    Patient Name:  Vale Gutierrez   MRN:  [id number]    Recommendations:     Discharge Recommendations:  nursing facility, skilled   Discharge Equipment Recommendations:  (will determine DME needs closer to discharge)   Barriers to discharge: Decreased caregiver support    Assessment:     Vale Gutierrez is a 61 y.o. female admitted with a medical diagnosis of Acute hypoxemic respiratory failure.  She presents with the following impairments/functional limitations:  impaired endurance, impaired functional mobility, gait instability, impaired balance, decreased safety awareness, decreased lower extremity function pt tolerated treatment better needing less assistance to sit on EOB. Pt will benefit from cont skilled PT 3x/wk to progress physically. Pt will benefit from SNF placement to maximize rehab potential.     Rehab Prognosis: Good; patient would benefit from acute skilled PT services to address these deficits and reach maximum level of function.    Recent Surgery: * No surgery found *      Plan:     During this hospitalization, patient to be seen 3 x/week to address the identified rehab impairments via gait training, therapeutic activities, therapeutic exercises and progress toward the following goals:    · Plan of Care Expires:  09/08/22    Subjective     Chief Complaint: pt c/o pain during treatment.   Patient/Family Comments/goals:  To get better and go more for himself.   Pain/Comfort:  · Pain Rating 1:  (8.5 abdomen)  · Pain Addressed 1: Distraction, Reposition  · Pain Rating Post-Intervention 1:  (see above)      Objective:     Communicated with nurse prior to session.  Patient found supine with telemetry, pulse ox (continuous), blood pressure cuff, oxygen upon PT entry to room.     General Precautions: Standard, fall   Orthopedic Precautions:N/A   Braces:    Respiratory Status: Comfort flow, flow 60 L/min, concentration 89%     Functional Mobility:  · Bed Mobility:   Pt  needed verbal cues for hand placement and sequencing for functional mobility.   · Rolling Left:  stand by assistance  · Supine to Sit: stand by assistance    · Transfers:     · Sit to Stand:  contact guard assistance with hand-held assist  · Bed to Chair: contact guard assistance with  hand-held assist  using  Stand Pivot  ·   · Balance: pt sat on EOB with SBA.       AM-PAC 6 CLICK MOBILITY  Turning over in bed (including adjusting bedclothes, sheets and blankets)?: 3  Sitting down on and standing up from a chair with arms (e.g., wheelchair, bedside commode, etc.): 3  Moving from lying on back to sitting on the side of the bed?: 3  Moving to and from a bed to a chair (including a wheelchair)?: 3  Need to walk in hospital room?: 2  Climbing 3-5 steps with a railing?: 1  Basic Mobility Total Score: 15       Therapeutic Activities and Exercises:   pt received verbal instructions for PT POC and verbally expressed understanding of such.     Patient left up in chair with all lines intact, call button in reach and RN notified..    GOALS:   Multidisciplinary Problems     Physical Therapy Goals        Problem: Physical Therapy    Goal Priority Disciplines Outcome Goal Variances Interventions   Physical Therapy Goal     PT, PT/OT Ongoing, Progressing     Description: Goals to be met by:22     Patient will increase functional independence with mobility by performin. Supine to sit with Stand-by Assistance -met   2. Sit to supine with Stand-by Assistance  3. Sit to stand transfer with Stand-by Assistance  4. Gait  x 50 feet with Contact Guard Assistance using LRAD, if needed.   5. Stand for 5 minutes with Stand-by Assistance using LRAD, if needed  6. Lower extremity exercise program x10 reps per handout, with independence                     Time Tracking:     PT Received On: 22  PT Start Time: 1111     PT Stop Time: 1130  PT Total Time (min): 19 min     Billable Minutes: Therapeutic Activity 19  min    Treatment Type: Treatment  PT/PTA: PT     PTA Visit Number: 0     08/16/2022

## 2022-08-16 NOTE — ASSESSMENT & PLAN NOTE
Patient stepped up to MICU for AHRF. Concern for aspiration pna vs autoimmune respiratory process vs pulmonary edema as etiology. Transitioned to CF 8/14, CXR with improving aeration. 8/16 with increasing O2 requirements, placed on 90%. Repeat ABG and stat LA ordered, pending.  - S/p courses of vanc/zosyn/azithromycin, meropenem/diflucan stopped 8/15  - S/p 3d course of high dose steroids IV solumedrol 125mg q4h   - Patient with significant improvement in respiratory status 8/14, able to transition to CF from bipap - desaturations and confusion 8/16 AM, O2 increased to 90%  - Repeat ABG ordered to re-evaluate respiratory status  - Wheezing on exam - started prednisone 40mg qday x 5d  -CXR 8/15 with stable right cardiophrenic and left basilar-retrocardiac opacities  -Lasix 40 mg IV increased to TID for diuresis/pulmonary edema  -Updated Echo w/bubble study read: no intracardiac shunt identified

## 2022-08-16 NOTE — PLAN OF CARE
SW called [facility] to confirm that they received the level 2.  They did.  A specialist will review and f/u with the SW/BUZZ.      Ivory Edwards LCSW   PRN

## 2022-08-16 NOTE — ASSESSMENT & PLAN NOTE
Patient is identified as having Diastolic (HFpEF) heart failure that is Acute on chronic. CHF is currently uncontrolled due to Pulmonary edema/pleural effusion on CXR. Latest ECHO performed and demonstrates-     Echo  · The left ventricle is normal in size with concentric hypertrophy and normal systolic function.  · The estimated ejection fraction is 60-65%.  · Grade I left ventricular diastolic dysfunction.  · Mild aortic regurgitation.  · Normal right ventricular size with normal right ventricular systolic function.  · The estimated PA systolic pressure is 43 mmHg.  · Intermediate central venous pressure (8 mmHg).  · There is pulmonary hypertension.  · Small circumferential pericardial effusion.  · Mild left atrial enlargement.    - Lasix 40mg TID for continued diuresis  - 1.5L fluid restriction  - Strict Is/Os, daily weights  - UOP 8/16 AM ~800cc

## 2022-08-16 NOTE — PLAN OF CARE
CMICU DAILY GOALS       A: Awake    RASS: Goal -    Actual - RASS (Goldman Agitation-Sedation Scale): 0-->alert and calm   Restraint necessity: Clinical Justification: Removing medical devices, Climbing out of bed, Treatment Interference  B: Breathe   SBT: Not intubated   C: Coordinate A & B, analgesics/sedatives   Pain: managed    SAT: Not intubated  D: Delirium   CAM-ICU: Overall CAM-ICU: Negative  E: Early(intubated/ Progressive (non-intubated) Mobility   MOVE Screen: Pass   Activity: Activity Management: Rolling - L1  FAS: Feeding/Nutrition   Diet order: Diet/Nutrition Received: regular, restrict fluids,    T: Thrombus   DVT prophylaxis: VTE Required Core Measure: Pharmacological prophylaxis initiated/maintained  H: HOB Elevation   Head of Bed (HOB) Positioning: HOB at 30-45 degrees  U: Ulcer Prophylaxis   GI: yes  G: Glucose control   managed    S: Skin   Bathing/Skin Care: bath, complete, dressed/undressed, linen changed, incontinence care  Device Skin Pressure Protection: absorbent pad utilized/changed, adhesive use limited, skin-to-device areas padded, skin-to-skin areas padded  Pressure Reduction Devices: specialty bed utilized  Pressure Reduction Techniques: frequent weight shift encouraged, heels elevated off bed, positioned off wounds  Skin Protection: adhesive use limited, incontinence pads utilized, tubing/devices free from skin contact  B: Bowel Function   no issues   I: Indwelling Catheters   [place] necessity:     CVC necessity: No  D: De-escalation Antibiotics   Yes    Family/Goals of care/Code Status   Code Status: Full Code    24H Vital Sign Range  Temp:  [97.9 °F (36.6 °C)-98.8 °F (37.1 °C)]   Pulse:  [56-91]   Resp:  [15-39]   BP: ()/()   SpO2:  [89 %-99 %]      Shift Events   No acute events throughout shift. Pt continues to have increased oxygen demands and was on Comfort Flow 60/90 all day with sats in the low to mid 90s. CTA completed this afternoon, results pending.     VS and  assessment per flow sheet, patient progressing towards goals as tolerated, plan of care reviewed with patient and family, all concerns addressed, will continue to monitor.    Reta Blancas

## 2022-08-17 LAB
ALBUMIN SERPL BCP-MCNC: 2.6 G/DL (ref 3.5–5.2)
ALP SERPL-CCNC: 119 U/L (ref 55–135)
ALT SERPL W/O P-5'-P-CCNC: 24 U/L (ref 10–44)
ANCA AB TITR SER IF: NORMAL TITER
ANCA AB TITR SER IF: NORMAL TITER
ANION GAP SERPL CALC-SCNC: 14 MMOL/L (ref 8–16)
AST SERPL-CCNC: 46 U/L (ref 10–40)
BASOPHILS # BLD AUTO: 0.01 K/UL (ref 0–0.2)
BASOPHILS NFR BLD: 0.1 % (ref 0–1.9)
BILIRUB SERPL-MCNC: 0.3 MG/DL (ref 0.1–1)
BUN SERPL-MCNC: 42 MG/DL (ref 8–23)
CALCIUM SERPL-MCNC: 9.7 MG/DL (ref 8.7–10.5)
CHLORIDE SERPL-SCNC: 87 MMOL/L (ref 95–110)
CO2 SERPL-SCNC: 42 MMOL/L (ref 23–29)
CREAT SERPL-MCNC: 1.2 MG/DL (ref 0.5–1.4)
DIFFERENTIAL METHOD: ABNORMAL
EOSINOPHIL # BLD AUTO: 0 K/UL (ref 0–0.5)
EOSINOPHIL NFR BLD: 0 % (ref 0–8)
ERYTHROCYTE [DISTWIDTH] IN BLOOD BY AUTOMATED COUNT: 17.2 % (ref 11.5–14.5)
EST. GFR  (NO RACE VARIABLE): 50.9 ML/MIN/1.73 M^2
GLUCOSE SERPL-MCNC: 91 MG/DL (ref 70–110)
HCT VFR BLD AUTO: 32.6 % (ref 37–48.5)
HGB BLD-MCNC: 9.2 G/DL (ref 12–16)
IMM GRANULOCYTES # BLD AUTO: 0.11 K/UL (ref 0–0.04)
IMM GRANULOCYTES NFR BLD AUTO: 1.3 % (ref 0–0.5)
LYMPHOCYTES # BLD AUTO: 1 K/UL (ref 1–4.8)
LYMPHOCYTES NFR BLD: 12 % (ref 18–48)
MAGNESIUM SERPL-MCNC: 2 MG/DL (ref 1.6–2.6)
MCH RBC QN AUTO: 22.9 PG (ref 27–31)
MCHC RBC AUTO-ENTMCNC: 28.2 G/DL (ref 32–36)
MCV RBC AUTO: 81 FL (ref 82–98)
MONOCYTES # BLD AUTO: 0.5 K/UL (ref 0.3–1)
MONOCYTES NFR BLD: 6.2 % (ref 4–15)
MYELOPEROXIDASE AB SER-ACNC: 3 UNITS
NEUTROPHILS # BLD AUTO: 6.8 K/UL (ref 1.8–7.7)
NEUTROPHILS NFR BLD: 80.4 % (ref 38–73)
NRBC BLD-RTO: 0 /100 WBC
P-ANCA TITR SER IF: NORMAL TITER
P-ANCA TITR SER IF: NORMAL TITER
PHOSPHATE SERPL-MCNC: 4.1 MG/DL (ref 2.7–4.5)
PLATELET # BLD AUTO: 472 K/UL (ref 150–450)
PMV BLD AUTO: 10.6 FL (ref 9.2–12.9)
POTASSIUM SERPL-SCNC: 3.8 MMOL/L (ref 3.5–5.1)
PROT SERPL-MCNC: 7 G/DL (ref 6–8.4)
RBC # BLD AUTO: 4.01 M/UL (ref 4–5.4)
SODIUM SERPL-SCNC: 143 MMOL/L (ref 136–145)
WBC # BLD AUTO: 8.42 K/UL (ref 3.9–12.7)

## 2022-08-17 PROCEDURE — 25000003 PHARM REV CODE 250

## 2022-08-17 PROCEDURE — 27000200 HC HIGH FLOW DEL DISP CIRCUIT

## 2022-08-17 PROCEDURE — 25000003 PHARM REV CODE 250: Performed by: HOSPITALIST

## 2022-08-17 PROCEDURE — 63600175 PHARM REV CODE 636 W HCPCS

## 2022-08-17 PROCEDURE — 27100171 HC OXYGEN HIGH FLOW UP TO 24 HOURS

## 2022-08-17 PROCEDURE — 94660 CPAP INITIATION&MGMT: CPT

## 2022-08-17 PROCEDURE — 99233 SBSQ HOSP IP/OBS HIGH 50: CPT | Mod: GC,,, | Performed by: INTERNAL MEDICINE

## 2022-08-17 PROCEDURE — 83735 ASSAY OF MAGNESIUM: CPT | Performed by: STUDENT IN AN ORGANIZED HEALTH CARE EDUCATION/TRAINING PROGRAM

## 2022-08-17 PROCEDURE — 94640 AIRWAY INHALATION TREATMENT: CPT

## 2022-08-17 PROCEDURE — 20000000 HC ICU ROOM

## 2022-08-17 PROCEDURE — 99233 PR SUBSEQUENT HOSPITAL CARE,LEVL III: ICD-10-PCS | Mod: GC,,, | Performed by: INTERNAL MEDICINE

## 2022-08-17 PROCEDURE — 84100 ASSAY OF PHOSPHORUS: CPT

## 2022-08-17 PROCEDURE — 25000003 PHARM REV CODE 250: Performed by: STUDENT IN AN ORGANIZED HEALTH CARE EDUCATION/TRAINING PROGRAM

## 2022-08-17 PROCEDURE — 99900035 HC TECH TIME PER 15 MIN (STAT)

## 2022-08-17 PROCEDURE — 27000221 HC OXYGEN, UP TO 24 HOURS

## 2022-08-17 PROCEDURE — 94799 UNLISTED PULMONARY SVC/PX: CPT

## 2022-08-17 PROCEDURE — 80053 COMPREHEN METABOLIC PANEL: CPT | Performed by: STUDENT IN AN ORGANIZED HEALTH CARE EDUCATION/TRAINING PROGRAM

## 2022-08-17 PROCEDURE — 25000242 PHARM REV CODE 250 ALT 637 W/ HCPCS: Performed by: HOSPITALIST

## 2022-08-17 PROCEDURE — 94761 N-INVAS EAR/PLS OXIMETRY MLT: CPT

## 2022-08-17 PROCEDURE — 85025 COMPLETE CBC W/AUTO DIFF WBC: CPT | Performed by: STUDENT IN AN ORGANIZED HEALTH CARE EDUCATION/TRAINING PROGRAM

## 2022-08-17 RX ORDER — POTASSIUM CHLORIDE 20 MEQ/1
20 TABLET, EXTENDED RELEASE ORAL ONCE
Status: COMPLETED | OUTPATIENT
Start: 2022-08-17 | End: 2022-08-17

## 2022-08-17 RX ORDER — FUROSEMIDE 10 MG/ML
40 INJECTION INTRAMUSCULAR; INTRAVENOUS DAILY
Status: DISCONTINUED | OUTPATIENT
Start: 2022-08-18 | End: 2022-08-18

## 2022-08-17 RX ADMIN — POTASSIUM CHLORIDE 20 MEQ: 1500 TABLET, EXTENDED RELEASE ORAL at 08:08

## 2022-08-17 RX ADMIN — THIAMINE HCL TAB 100 MG 100 MG: 100 TAB at 08:08

## 2022-08-17 RX ADMIN — MIRTAZAPINE 7.5 MG: 7.5 TABLET, FILM COATED ORAL at 08:08

## 2022-08-17 RX ADMIN — FUROSEMIDE 40 MG: 10 INJECTION, SOLUTION INTRAMUSCULAR; INTRAVENOUS at 06:08

## 2022-08-17 RX ADMIN — OLANZAPINE 10 MG: 5 TABLET, ORALLY DISINTEGRATING ORAL at 09:08

## 2022-08-17 RX ADMIN — VENLAFAXINE HYDROCHLORIDE 225 MG: 75 CAPSULE, EXTENDED RELEASE ORAL at 08:08

## 2022-08-17 RX ADMIN — IPRATROPIUM BROMIDE AND ALBUTEROL SULFATE 3 ML: 2.5; .5 SOLUTION RESPIRATORY (INHALATION) at 03:08

## 2022-08-17 RX ADMIN — METOPROLOL TARTRATE 100 MG: 100 TABLET, FILM COATED ORAL at 08:08

## 2022-08-17 RX ADMIN — OXYCODONE HYDROCHLORIDE AND ACETAMINOPHEN 1 TABLET: 5; 325 TABLET ORAL at 08:08

## 2022-08-17 RX ADMIN — IPRATROPIUM BROMIDE AND ALBUTEROL SULFATE 3 ML: 2.5; .5 SOLUTION RESPIRATORY (INHALATION) at 07:08

## 2022-08-17 RX ADMIN — ENOXAPARIN SODIUM 90 MG: 100 INJECTION SUBCUTANEOUS at 08:08

## 2022-08-17 RX ADMIN — OXYCODONE HYDROCHLORIDE AND ACETAMINOPHEN 1 TABLET: 5; 325 TABLET ORAL at 02:08

## 2022-08-17 RX ADMIN — PREDNISONE 40 MG: 20 TABLET ORAL at 08:08

## 2022-08-17 RX ADMIN — IPRATROPIUM BROMIDE AND ALBUTEROL SULFATE 3 ML: 2.5; .5 SOLUTION RESPIRATORY (INHALATION) at 09:08

## 2022-08-17 RX ADMIN — LOPERAMIDE HYDROCHLORIDE 2 MG: 2 CAPSULE ORAL at 10:08

## 2022-08-17 RX ADMIN — AMIODARONE HYDROCHLORIDE 400 MG: 200 TABLET ORAL at 08:08

## 2022-08-17 RX ADMIN — IPRATROPIUM BROMIDE AND ALBUTEROL SULFATE 3 ML: 2.5; .5 SOLUTION RESPIRATORY (INHALATION) at 12:08

## 2022-08-17 NOTE — PT/OT/SLP PROGRESS
Physical Therapy      Patient Name:  Vale Gutierrez   MRN:  [id number]    Patient not seen today secondary to  (Pt not seen due to being on comfort flow which limits functional mobility. PT spoke to RN and informed her that therapy was not working with pt today and ask for RN staff to get pt into chair. RN stated they would get pt up.). Will follow-up at a later date.    8/17/2022  .

## 2022-08-17 NOTE — PT/OT/SLP PROGRESS
Occupational Therapy      Patient Name:  Vale Gutierrez   MRN:  [id number]    Patient not seen today. Patient remains on comfort flow and is appropriate to transfer to bedside chair with nursing staff. Will follow up as able.    8/17/2022

## 2022-08-17 NOTE — PLAN OF CARE
CMICU DAILY GOALS       A: Awake    RASS: Goal -    Actual - RASS (Goldman Agitation-Sedation Scale): 0-->alert and calm   Restraint necessity: Clinical Justification: Removing medical devices, Climbing out of bed, Treatment Interference  B: Breathe   SBT: Not intubated   C: Coordinate A & B, analgesics/sedatives   Pain: managed    SAT: Not intubated  D: Delirium   CAM-ICU: Overall CAM-ICU: Negative  E: Early(intubated/ Progressive (non-intubated) Mobility   MOVE Screen: Pass   Activity: Activity Management: Arm raise - L1  FAS: Feeding/Nutrition   Diet order: Diet/Nutrition Received: regular, restrict fluids,    T: Thrombus   DVT prophylaxis: VTE Required Core Measure: Pharmacological prophylaxis initiated/maintained  H: HOB Elevation   Head of Bed (HOB) Positioning: HOB at 30-45 degrees  U: Ulcer Prophylaxis   GI: yes  G: Glucose control   managed    S: Skin   Bathing/Skin Care: incontinence care, linen changed  Device Skin Pressure Protection: absorbent pad utilized/changed, adhesive use limited, skin-to-device areas padded, skin-to-skin areas padded  Pressure Reduction Devices: specialty bed utilized  Pressure Reduction Techniques: frequent weight shift encouraged, positioned off wounds, pressure points protected  Skin Protection: adhesive use limited, incontinence pads utilized, tubing/devices free from skin contact  B: Bowel Function   no issues   I: Indwelling Catheters   [place] necessity:     CVC necessity: No  D: De-escalation Antibiotics   No    Family/Goals of care/Code Status   Code Status: Full Code    24H Vital Sign Range  Temp:  [97.3 °F (36.3 °C)-98.7 °F (37.1 °C)]   Pulse:  [56-86]   Resp:  [17-36]   BP: ()/(52-81)   SpO2:  [83 %-98 %]      Shift Events   No acute events throughout shift. Continuing to wean pt off oxygen requirements. Pt satting in the low to mid 90s on Comfort Flow 50/70. All VSS.     VS and assessment per flow sheet, patient progressing towards goals as tolerated, plan of  care reviewed with  patient and family , all concerns addressed, will continue to monitor.    Reta Blancas

## 2022-08-17 NOTE — ASSESSMENT & PLAN NOTE
Patient is identified as having Diastolic (HFpEF) heart failure that is Acute on chronic. CHF is currently uncontrolled due to Pulmonary edema/pleural effusion on CXR. Latest ECHO performed and demonstrates-     Echo  · The left ventricle is normal in size with concentric hypertrophy and normal systolic function.  · The estimated ejection fraction is 60-65%.  · Grade I left ventricular diastolic dysfunction.  · Mild aortic regurgitation.  · Normal right ventricular size with normal right ventricular systolic function.  · The estimated PA systolic pressure is 43 mmHg.  · Intermediate central venous pressure (8 mmHg).  · There is pulmonary hypertension.  · Small circumferential pericardial effusion.  · Mild left atrial enlargement.    - 1.5L fluid restriction  - Strict Is/Os, daily weights  - UOP 8/16 AM ~800cc  - Lasix 40 mg BID from TID today as pt is now developing contraction alkalosis and cr increased to 1.2 from 0.9

## 2022-08-17 NOTE — PLAN OF CARE
CMICU DAILY GOALS     VSS on CF 50L/%, satting > goal 90%. Pt tolerated BiPAP all throughout night without issue or any acute distress. SB-NSR on monitor without ectopy or arrhythmia. Pt afebrile. MAPs > 65 without vasopressor requirements. Critical CO2 on CMP resulted as 42. Kaylee NP notified, no new orders at this time. Pt continues to receive Lasix IVP 40mg TID. K this AM 3.8. Assessment per flow sheet, patient progressing towards goals as tolerated, plan of care reviewed with patient, all concerns addressed.     Caio Ramos    A: Awake    RASS: Goal -    Actual - RASS (Goldman Agitation-Sedation Scale): 0-->alert and calm   Restraint necessity: Clinical Justification: Removing medical devices, Climbing out of bed, Treatment Interference  B: Breathe   SBT: Not intubated   C: Coordinate A & B, analgesics/sedatives   Pain: managed    SAT: Not intubated  D: Delirium   CAM-ICU: Overall CAM-ICU: Negative  E: Early(intubated/ Progressive (non-intubated) Mobility   MOVE Screen: Pass   Activity: Activity Management: Rolling - L1  FAS: Feeding/Nutrition   Diet order: Diet/Nutrition Received: regular, restrict fluids,    T: Thrombus   DVT prophylaxis: VTE Required Core Measure: Pharmacological prophylaxis initiated/maintained  H: HOB Elevation   Head of Bed (HOB) Positioning: HOB at 45 degrees  U: Ulcer Prophylaxis   GI: yes  G: Glucose control   managed    S: Skin   Bathing/Skin Care: (S) dressed/undressed, incontinence care, linen changed, electrode patches/site rotation  Device Skin Pressure Protection: absorbent pad utilized/changed, adhesive use limited, pressure points protected, positioning supports utilized, skin-to-skin areas padded, skin-to-device areas padded  Pressure Reduction Devices: foam padding utilized, heel offloading device utilized, positioning supports utilized, specialty bed utilized  Pressure Reduction Techniques: frequent weight shift encouraged, rest period provided between sit times,  pressure points protected, weight shift assistance provided  Skin Protection: adhesive use limited, incontinence pads utilized, silicone foam dressing in place, skin-to-device areas padded, skin-to-skin areas padded, transparent dressing maintained, tubing/devices free from skin contact  B: Bowel Function   diarrhea   I: Indwelling Catheters   [place] necessity:     CVC necessity: No  D: De-escalation Antibiotics   Yes    Family/Goals of care/Code Status   Code Status: Full Code    24H Vital Sign Range  Temp:  [97.3 °F (36.3 °C)-98.8 °F (37.1 °C)]   Pulse:  [56-91]   Resp:  [17-34]   BP: ()/()   SpO2:  [83 %-99 %]      Shift Events   No acute events throughout shift

## 2022-08-17 NOTE — ASSESSMENT & PLAN NOTE
Patient with Hypoxic Respiratory failure which is Acute on chronic.  she is not on home oxygen. Supplemental oxygen was provided and noted- Oxygen Concentration (%):  [] 70.   Signs/symptoms of respiratory failure include- tachypnea. Contributing diagnoses includes - ARDS, Aspiration, CHF, COPD, Interstitial lung disease, Pleural effusion, Pneumonia and Pulmonary Embolus Labs and images were reviewed. Patient Has recent ABG, which has been reviewed. Will treat underlying causes and adjust management of respiratory failure.

## 2022-08-17 NOTE — PROGRESS NOTES
[facility] - Cardiac Medical [facility]  Critical Care Medicine  Progress Note    Patient Name: Vale Gutierrez  MRN: [id number]  Admission Date: 8/1/2022  Hospital Length of Stay: 16 days  Code Status: Full Code  Attending Provider: Patrice Burnett MD  Primary Care Provider: Estela Mcdaniel MD   Principal Problem: Acute hypoxemic respiratory failure    Subjective:     HPI:  Ms. Gutierrez is a 60 y/o female with a PMH of etoh abuse and withdrawal, aspiration pneumonia, delirium, smoking, HTN, HLD, BPPV, alcohol abuse, depression and anxiety who was admitted to the ICU from the floor for acute hypoxic respiratory failure. She was having increased oxygen needs on the floor and was requiring valium to keep her presumed alcohol withdrawal under control. The patient states that her stomach hurts in the midepigastric region and endorses nausea, vomiting x 1 day. She is unsure how long she has been coughing or feeling ill, and says she cannot remember the last few days. She says she lives with her sister Angela who helps care for her.       Hospital/ICU Course:  Patient presented to the ED on 8/1 with a CC of hypoxia and dyspnea. She was initially admitted to the floor. She experienced hallucinations and tremor, consistent with alcohol withdrawal / delirium, and was put on CIWA protocol with valium PRN (shortage of ativan). She was treated with antibiotics (vancomycin, zosyn, and azithro) scheduled for 10 days and duonebs prn. On 8/7 she developed worsening hypoxic respiratory failure, was put on 50L 100% O2 bipap, and admitted to the ICU due to increased oxygen requirements. Work up revealed small pericardial effusion, cardiomegaly, normal LVEF, pleural effusions and opacities bilaterally, and atrial fibrillation. Patient has now completed courses of zosyn, vancomycin, and azithromycin. She was subsequently given diflucan  and meropenem out of concern for respiratory infection, stopped 8/15. On lasix BID for pulmonary  edema. She was started on high dose steroids 8/13 as there was concern for autoimmune respiratory process, and labs were sent for C-ANCA and P-ANCA (currently pending). She previously had a +BRANDYN titer, but antiCCP, RF, and  anti-dsDNA were negative. Patient's CXR and respiratory status improving on 8/14, able to tolerate CF down to 64% O2. 8/16 patient O2 requirements increasing, O2 increased to 90%.  Repeat CXR 8/16 with right cardiophrenic and left basilar-retrocardiac opacities. Repeat CT chest with no remarkable changes, continues to have atelectasis.      Of note, discussed the potential need for endotracheal intubation and she expressed understanding and stated she would like to have that done if her respiratory  status declines to the point of needing it.           Interval History/Significant Events: No acute event overnight. Pt remains afebrile overnight with no other acute vital signs. Wore BiPAP overnight. On 50 L 70% CF now. Will decrease lasix back to BID as pt now has contraction alkalosis. Will obtain respiratory mechanics today to further evaluate    Review of Systems   Constitutional:  Negative for chills and fever.   HENT:  Positive for rhinorrhea. Negative for congestion.    Eyes:  Negative for photophobia and visual disturbance.   Respiratory:  Positive for cough and shortness of breath.    Cardiovascular:  Negative for chest pain and leg swelling.   Gastrointestinal:  Positive for abdominal pain (TTP in LUQ). Negative for abdominal distention, diarrhea, nausea and vomiting.   Genitourinary:  Negative for dysuria.   Musculoskeletal:  Positive for back pain (chronic).   Skin:  Negative for rash.   Neurological:  Negative for light-headedness and headaches.   Psychiatric/Behavioral:  Negative for agitation and confusion.    Objective:     Vital Signs (Most Recent):  Temp: 98.1 °F (36.7 °C) (08/17/22 1100)  Pulse: 69 (08/17/22 1238)  Resp: 19 (08/17/22 1238)  BP: 103/60 (08/17/22 1100)  SpO2: 95 %  (08/17/22 1238)   Vital Signs (24h Range):  Temp:  [97.3 °F (36.3 °C)-98.7 °F (37.1 °C)] 98.1 °F (36.7 °C)  Pulse:  [56-86] 69  Resp:  [17-36] 19  SpO2:  [83 %-98 %] 95 %  BP: ()/(52-81) 103/60   Weight: 90.3 kg (199 lb)  Body mass index is 34.16 kg/m².      Intake/Output Summary (Last 24 hours) at 8/17/2022 1251  Last data filed at 8/17/2022 1000  Gross per 24 hour   Intake 240 ml   Output 2100 ml   Net -1860 ml       Physical Exam  Constitutional:       Appearance: She is obese.   HENT:      Head: Normocephalic and atraumatic.      Right Ear: External ear normal.      Left Ear: External ear normal.      Nose: Nose normal.      Mouth/Throat:      Mouth: Mucous membranes are moist.      Pharynx: Oropharynx is clear.   Eyes:      Extraocular Movements: Extraocular movements intact.      Conjunctiva/sclera: Conjunctivae normal.   Cardiovascular:      Rate and Rhythm: Normal rate and regular rhythm.   Pulmonary:      Breath sounds: Wheezing and rales present.      Comments: Comfort flow 60L/min  Abdominal:      General: Abdomen is flat. Bowel sounds are normal.      Palpations: Abdomen is soft.      Tenderness: There is abdominal tenderness (TTP in LUQ\).   Musculoskeletal:         General: Normal range of motion.      Cervical back: Normal range of motion.      Right lower leg: No edema.      Left lower leg: No edema.   Skin:     General: Skin is warm and dry.   Neurological:      General: No focal deficit present.      Mental Status: She is alert.   Psychiatric:         Mood and Affect: Mood normal.         Behavior: Behavior normal.       Vents:  Oxygen Concentration (%): 70 (08/17/22 1238)  Lines/Drains/Airways       Drain  Duration             Female External Urinary Catheter 08/16/22 1653 <1 day              Peripheral Intravenous Line  Duration                  Peripheral IV - Single Lumen 08/15/22 0000 20 G Anterior;Left Forearm 2 days         Peripheral IV - Single Lumen 08/17/22 0430 20 G  Anterior;Proximal;Right Forearm <1 day                  Significant Labs:    CBC/Anemia Profile:  Recent Labs   Lab 08/16/22  0416 08/17/22  0412   WBC 9.74 8.42   HGB 8.7* 9.2*   HCT 30.5* 32.6*   * 472*   MCV 83 81*   RDW 17.1* 17.2*        Chemistries:  Recent Labs   Lab 08/16/22  0416 08/16/22  1508 08/17/22  0412    141 143   K 3.4* 4.2 3.8   CL 89* 87* 87*   CO2 40* 40* 42*   BUN 33* 39* 42*   CREATININE 0.8 0.9 1.2   CALCIUM 9.7 9.7 9.7   ALBUMIN 2.3*  --  2.6*   PROT 6.8  --  7.0   BILITOT 0.3  --  0.3   ALKPHOS 110  --  119   ALT 14  --  24   AST 20  --  46*   MG 2.0  --  2.0   PHOS 3.3  --  4.1       All pertinent labs within the past 24 hours have been reviewed.    Significant Imaging:  I have reviewed all pertinent imaging results/findings within the past 24 hours.      ABG  Recent Labs   Lab 08/16/22  1233   PH 7.533*   PO2 65*   PCO2 56.8*   HCO3 47.8*   BE 25     Assessment/Plan:     Neuro  Chronic pain disorder  Patient has a history of chronic back pain, for which she had surgery in October 2020. She has been followed by a pain management doctor and takes percocet daily for pain.   - Percocet 5mg q6h prn available    Psychiatric  Recurrent major depressive disorder, in full remission  - Continue home Mirtazapine 7.5mg qd and venlafaxine 225mg q.d.    Alcohol use disorder, severe, dependence  Patient reportedly had tremors and hallucinations on the floor 8/5-8/7 concerning for withdrawal. Has history of complicated withdrawal hx requiring hospitalization at  per sister   - On [facility] protocol with PRN BZDs on arrival, no longer exhibiting sxs of alcohol withdrawal  - Thiamine 100mg q.d.    Pulmonary  * Acute hypoxemic respiratory failure  Patient stepped up to MICU for AHRF. Concern for aspiration pna vs autoimmune respiratory process vs pulmonary edema as etiology. Transitioned to CF 8/14, CXR with improving aeration. 8/16 with increasing O2 requirements, placed on 90%. Repeat ABG and  stat LA ordered, pending.  - S/p courses of vanc/zosyn/azithromycin, meropenem/diflucan stopped 8/15  - S/p 3d course of high dose steroids IV solumedrol 125mg q4h   - Patient with significant improvement in respiratory status 8/14, able to transition to CF from bipap - desaturations and confusion 8/16 AM, O2 increased to 90%  - Repeat ABG ordered to re-evaluate respiratory status  - Wheezing on exam - started prednisone 40mg qday x 5d  -CXR 8/15 with stable right cardiophrenic and left basilar-retrocardiac opacities  -Lasix 40 mg IV increased to TID for diuresis/pulmonary edema  -Updated Echo w/bubble study read: no intracardiac shunt identified    Aspiration pneumonia  See AHRF         Acute on chronic respiratory failure with hypercapnia  Patient with Hypoxic Respiratory failure which is Acute on chronic.  she is not on home oxygen. Supplemental oxygen was provided and noted- Oxygen Concentration (%):  [] 70.   Signs/symptoms of respiratory failure include- tachypnea. Contributing diagnoses includes - ARDS, Aspiration, CHF, COPD, Interstitial lung disease, Pleural effusion, Pneumonia and Pulmonary Embolus Labs and images were reviewed. Patient Has recent ABG, which has been reviewed. Will treat underlying causes and adjust management of respiratory failure.     Community acquired pneumonia of right lower lobe of lung  See AHRF       Cardiac/Vascular  Paroxysmal atrial fibrillation  Patient with paroxysmal atrial fibrillation during admission  - Currently on Amiodarone 400mg BID x 10days, to transition to 200mg daily on 8/23  - Therapeutic lovenox 90mg BID for anticoagulation  - On lopressor 100mg BID for rate control    Acute on chronic diastolic congestive heart failure  Patient is identified as having Diastolic (HFpEF) heart failure that is Acute on chronic. CHF is currently uncontrolled due to Pulmonary edema/pleural effusion on CXR. Latest ECHO performed and demonstrates-     Echo  · The left ventricle  is normal in size with concentric hypertrophy and normal systolic function.  · The estimated ejection fraction is 60-65%.  · Grade I left ventricular diastolic dysfunction.  · Mild aortic regurgitation.  · Normal right ventricular size with normal right ventricular systolic function.  · The estimated PA systolic pressure is 43 mmHg.  · Intermediate central venous pressure (8 mmHg).  · There is pulmonary hypertension.  · Small circumferential pericardial effusion.  · Mild left atrial enlargement.    - 1.5L fluid restriction  - Strict Is/Os, daily weights  - UOP 8/16 AM ~800cc  - Lasix 40 mg BID from TID today as pt is now developing contraction alkalosis and cr increased to 1.2 from 0.9            Essential hypertension  Home Med: Metoprolol 50mg po q.d.  - On lopressor 100mg BID for rate control and HTN       Critical Care Daily Checklist:    A: Awake: RASS Goal/Actual Goal:    Actual: Goldman Agitation Sedation Scale (RASS): Alert and calm   B: Spontaneous Breathing Trial Performed?     C: SAT & SBT Coordinated?  N/A                      D: Delirium: CAM-ICU Overall CAM-ICU: Negative   E: Early Mobility Performed? Yes   F: Feeding Goal: Goals: Meet % EEN, EPN by RD f/u date  Status: Nutrition Goal Status: new   Current Diet Order   Procedures    Diet Adult Regular (IDDSI Level 7) Fluid - 1500mL     Order Specific Question:   Fluid restriction:     Answer:   Fluid - 1500mL      AS: Analgesia/Sedation N/A   T: Thromboembolic Prophylaxis Lovenox therapeutic    H: HOB > 300 Yes   U: Stress Ulcer Prophylaxis (if needed) N/A   G: Glucose Control Yes   B: Bowel Function Stool Occurrence: 1   I: Indwelling Catheter (Lines & [place]) Necessity CARMEN Lafleur   D: De-escalation of Antimicrobials/Pharmacotherapies N/A    Plan for the day/ETD Wean O2    Code Status:  Family/Goals of Care: Full Code  Discussed with patient at bedside        Critical secondary to Patient has a condition that poses threat to life and bodily  function: Severe Respiratory Distress      Critical care was time spent personally by me on the following activities: development of treatment plan with patient or surrogate and bedside caregivers, discussions with consultants, evaluation of patient's response to treatment, examination of patient, ordering and performing treatments and interventions, ordering and review of laboratory studies, ordering and review of radiographic studies, pulse oximetry, re-evaluation of patient's condition. This critical care time did not overlap with that of any other provider or involve time for any procedures.     Kristy Hernandez, DO  Critical Care Medicine  [facility] - Cardiac Medical ICU

## 2022-08-17 NOTE — ASSESSMENT & PLAN NOTE
Patient reportedly had tremors and hallucinations on the floor 8/5-8/7 concerning for withdrawal. Has history of complicated withdrawal hx requiring hospitalization at  per sister   - On [facility] protocol with PRN BZDs on arrival, no longer exhibiting sxs of alcohol withdrawal  - Thiamine 100mg q.d.

## 2022-08-17 NOTE — SUBJECTIVE & OBJECTIVE
Interval History/Significant Events: No acute event overnight. Pt remains afebrile overnight with no other acute vital signs. Wore BiPAP overnight. On 50 L 70% CF now. Will decrease lasix back to BID as pt now has contraction alkalosis. Will obtain respiratory mechanics today to further evaluate    Review of Systems   Constitutional:  Negative for chills and fever.   HENT:  Positive for rhinorrhea. Negative for congestion.    Eyes:  Negative for photophobia and visual disturbance.   Respiratory:  Positive for cough and shortness of breath.    Cardiovascular:  Negative for chest pain and leg swelling.   Gastrointestinal:  Positive for abdominal pain (TTP in LUQ). Negative for abdominal distention, diarrhea, nausea and vomiting.   Genitourinary:  Negative for dysuria.   Musculoskeletal:  Positive for back pain (chronic).   Skin:  Negative for rash.   Neurological:  Negative for light-headedness and headaches.   Psychiatric/Behavioral:  Negative for agitation and confusion.    Objective:     Vital Signs (Most Recent):  Temp: 98.1 °F (36.7 °C) (08/17/22 1100)  Pulse: 69 (08/17/22 1238)  Resp: 19 (08/17/22 1238)  BP: 103/60 (08/17/22 1100)  SpO2: 95 % (08/17/22 1238)   Vital Signs (24h Range):  Temp:  [97.3 °F (36.3 °C)-98.7 °F (37.1 °C)] 98.1 °F (36.7 °C)  Pulse:  [56-86] 69  Resp:  [17-36] 19  SpO2:  [83 %-98 %] 95 %  BP: ()/(52-81) 103/60   Weight: 90.3 kg (199 lb)  Body mass index is 34.16 kg/m².      Intake/Output Summary (Last 24 hours) at 8/17/2022 1251  Last data filed at 8/17/2022 1000  Gross per 24 hour   Intake 240 ml   Output 2100 ml   Net -1860 ml       Physical Exam  Constitutional:       Appearance: She is obese.   HENT:      Head: Normocephalic and atraumatic.      Right Ear: External ear normal.      Left Ear: External ear normal.      Nose: Nose normal.      Mouth/Throat:      Mouth: Mucous membranes are moist.      Pharynx: Oropharynx is clear.   Eyes:      Extraocular Movements: Extraocular  movements intact.      Conjunctiva/sclera: Conjunctivae normal.   Cardiovascular:      Rate and Rhythm: Normal rate and regular rhythm.   Pulmonary:      Breath sounds: Wheezing and rales present.      Comments: Comfort flow 60L/min  Abdominal:      General: Abdomen is flat. Bowel sounds are normal.      Palpations: Abdomen is soft.      Tenderness: There is abdominal tenderness (TTP in LUQ\).   Musculoskeletal:         General: Normal range of motion.      Cervical back: Normal range of motion.      Right lower leg: No edema.      Left lower leg: No edema.   Skin:     General: Skin is warm and dry.   Neurological:      General: No focal deficit present.      Mental Status: She is alert.   Psychiatric:         Mood and Affect: Mood normal.         Behavior: Behavior normal.       Vents:  Oxygen Concentration (%): 70 (08/17/22 1238)  Lines/Drains/Airways       Drain  Duration             Female External Urinary Catheter 08/16/22 1653 <1 day              Peripheral Intravenous Line  Duration                  Peripheral IV - Single Lumen 08/15/22 0000 20 G Anterior;Left Forearm 2 days         Peripheral IV - Single Lumen 08/17/22 0430 20 G Anterior;Proximal;Right Forearm <1 day                  Significant Labs:    CBC/Anemia Profile:  Recent Labs   Lab 08/16/22  0416 08/17/22  0412   WBC 9.74 8.42   HGB 8.7* 9.2*   HCT 30.5* 32.6*   * 472*   MCV 83 81*   RDW 17.1* 17.2*        Chemistries:  Recent Labs   Lab 08/16/22  0416 08/16/22  1508 08/17/22  0412    141 143   K 3.4* 4.2 3.8   CL 89* 87* 87*   CO2 40* 40* 42*   BUN 33* 39* 42*   CREATININE 0.8 0.9 1.2   CALCIUM 9.7 9.7 9.7   ALBUMIN 2.3*  --  2.6*   PROT 6.8  --  7.0   BILITOT 0.3  --  0.3   ALKPHOS 110  --  119   ALT 14  --  24   AST 20  --  46*   MG 2.0  --  2.0   PHOS 3.3  --  4.1       All pertinent labs within the past 24 hours have been reviewed.    Significant Imaging:  I have reviewed all pertinent imaging results/findings within the past 24  hours.

## 2022-08-18 LAB
ALBUMIN SERPL BCP-MCNC: 2.6 G/DL (ref 3.5–5.2)
ALLENS TEST: ABNORMAL
ALP SERPL-CCNC: 137 U/L (ref 55–135)
ALT SERPL W/O P-5'-P-CCNC: 98 U/L (ref 10–44)
ANION GAP SERPL CALC-SCNC: 14 MMOL/L (ref 8–16)
AST SERPL-CCNC: 158 U/L (ref 10–40)
BASOPHILS # BLD AUTO: 0.01 K/UL (ref 0–0.2)
BASOPHILS NFR BLD: 0.1 % (ref 0–1.9)
BILIRUB SERPL-MCNC: 0.4 MG/DL (ref 0.1–1)
BUN SERPL-MCNC: 47 MG/DL (ref 8–23)
CALCIUM SERPL-MCNC: 9.4 MG/DL (ref 8.7–10.5)
CHLORIDE SERPL-SCNC: 87 MMOL/L (ref 95–110)
CO2 SERPL-SCNC: 40 MMOL/L (ref 23–29)
CREAT SERPL-MCNC: 1 MG/DL (ref 0.5–1.4)
DELSYS: ABNORMAL
DIFFERENTIAL METHOD: ABNORMAL
EOSINOPHIL # BLD AUTO: 0 K/UL (ref 0–0.5)
EOSINOPHIL NFR BLD: 0.1 % (ref 0–8)
ERYTHROCYTE [DISTWIDTH] IN BLOOD BY AUTOMATED COUNT: 17 % (ref 11.5–14.5)
EST. GFR  (NO RACE VARIABLE): >60 ML/MIN/1.73 M^2
FIO2: 70
FLOW: 50
GLUCOSE SERPL-MCNC: 73 MG/DL (ref 70–110)
HCO3 UR-SCNC: 51.8 MMOL/L (ref 24–28)
HCT VFR BLD AUTO: 35.4 % (ref 37–48.5)
HGB BLD-MCNC: 10 G/DL (ref 12–16)
IMM GRANULOCYTES # BLD AUTO: 0.07 K/UL (ref 0–0.04)
IMM GRANULOCYTES NFR BLD AUTO: 0.9 % (ref 0–0.5)
LYMPHOCYTES # BLD AUTO: 1.7 K/UL (ref 1–4.8)
LYMPHOCYTES NFR BLD: 21.1 % (ref 18–48)
MAGNESIUM SERPL-MCNC: 2 MG/DL (ref 1.6–2.6)
MCH RBC QN AUTO: 23 PG (ref 27–31)
MCHC RBC AUTO-ENTMCNC: 28.2 G/DL (ref 32–36)
MCV RBC AUTO: 81 FL (ref 82–98)
MODE: ABNORMAL
MONOCYTES # BLD AUTO: 0.4 K/UL (ref 0.3–1)
MONOCYTES NFR BLD: 5.4 % (ref 4–15)
NEUTROPHILS # BLD AUTO: 6 K/UL (ref 1.8–7.7)
NEUTROPHILS NFR BLD: 72.4 % (ref 38–73)
NRBC BLD-RTO: 0 /100 WBC
PCO2 BLDA: 77.8 MMHG (ref 35–45)
PH SMN: 7.43 [PH] (ref 7.35–7.45)
PHOSPHATE SERPL-MCNC: 3.9 MG/DL (ref 2.7–4.5)
PLATELET # BLD AUTO: 503 K/UL (ref 150–450)
PMV BLD AUTO: 10.1 FL (ref 9.2–12.9)
PO2 BLDA: 18 MMHG (ref 40–60)
POC BE: 28 MMOL/L
POC SATURATED O2: 25 % (ref 95–100)
POC TCO2: >50 MMOL/L (ref 24–29)
POTASSIUM SERPL-SCNC: 3.7 MMOL/L (ref 3.5–5.1)
PROT SERPL-MCNC: 6.9 G/DL (ref 6–8.4)
RBC # BLD AUTO: 4.35 M/UL (ref 4–5.4)
SAMPLE: ABNORMAL
SITE: ABNORMAL
SODIUM SERPL-SCNC: 141 MMOL/L (ref 136–145)
SP02: 95
WBC # BLD AUTO: 8.21 K/UL (ref 3.9–12.7)

## 2022-08-18 PROCEDURE — 94761 N-INVAS EAR/PLS OXIMETRY MLT: CPT

## 2022-08-18 PROCEDURE — 97530 THERAPEUTIC ACTIVITIES: CPT

## 2022-08-18 PROCEDURE — 25000003 PHARM REV CODE 250

## 2022-08-18 PROCEDURE — 99233 SBSQ HOSP IP/OBS HIGH 50: CPT | Mod: GC,,, | Performed by: INTERNAL MEDICINE

## 2022-08-18 PROCEDURE — 63600175 PHARM REV CODE 636 W HCPCS

## 2022-08-18 PROCEDURE — 94640 AIRWAY INHALATION TREATMENT: CPT

## 2022-08-18 PROCEDURE — 99233 PR SUBSEQUENT HOSPITAL CARE,LEVL III: ICD-10-PCS | Mod: GC,,, | Performed by: INTERNAL MEDICINE

## 2022-08-18 PROCEDURE — 94799 UNLISTED PULMONARY SVC/PX: CPT

## 2022-08-18 PROCEDURE — 85025 COMPLETE CBC W/AUTO DIFF WBC: CPT | Performed by: STUDENT IN AN ORGANIZED HEALTH CARE EDUCATION/TRAINING PROGRAM

## 2022-08-18 PROCEDURE — 99900035 HC TECH TIME PER 15 MIN (STAT)

## 2022-08-18 PROCEDURE — 97535 SELF CARE MNGMENT TRAINING: CPT

## 2022-08-18 PROCEDURE — 83735 ASSAY OF MAGNESIUM: CPT | Performed by: STUDENT IN AN ORGANIZED HEALTH CARE EDUCATION/TRAINING PROGRAM

## 2022-08-18 PROCEDURE — 94010 BREATHING CAPACITY TEST: CPT

## 2022-08-18 PROCEDURE — 25000003 PHARM REV CODE 250: Performed by: HOSPITALIST

## 2022-08-18 PROCEDURE — 25000003 PHARM REV CODE 250: Performed by: STUDENT IN AN ORGANIZED HEALTH CARE EDUCATION/TRAINING PROGRAM

## 2022-08-18 PROCEDURE — 25000242 PHARM REV CODE 250 ALT 637 W/ HCPCS: Performed by: HOSPITALIST

## 2022-08-18 PROCEDURE — 63600175 PHARM REV CODE 636 W HCPCS: Performed by: STUDENT IN AN ORGANIZED HEALTH CARE EDUCATION/TRAINING PROGRAM

## 2022-08-18 PROCEDURE — 84100 ASSAY OF PHOSPHORUS: CPT

## 2022-08-18 PROCEDURE — 80053 COMPREHEN METABOLIC PANEL: CPT | Performed by: STUDENT IN AN ORGANIZED HEALTH CARE EDUCATION/TRAINING PROGRAM

## 2022-08-18 PROCEDURE — 20000000 HC ICU ROOM

## 2022-08-18 PROCEDURE — 94660 CPAP INITIATION&MGMT: CPT

## 2022-08-18 PROCEDURE — 94150 VITAL CAPACITY TEST: CPT

## 2022-08-18 PROCEDURE — 82803 BLOOD GASES ANY COMBINATION: CPT

## 2022-08-18 PROCEDURE — 27100171 HC OXYGEN HIGH FLOW UP TO 24 HOURS

## 2022-08-18 RX ORDER — FUROSEMIDE 10 MG/ML
40 INJECTION INTRAMUSCULAR; INTRAVENOUS DAILY
Status: DISCONTINUED | OUTPATIENT
Start: 2022-08-19 | End: 2022-08-19

## 2022-08-18 RX ADMIN — OXYCODONE HYDROCHLORIDE AND ACETAMINOPHEN 1 TABLET: 5; 325 TABLET ORAL at 04:08

## 2022-08-18 RX ADMIN — BUTALBITAL, ACETAMINOPHEN, AND CAFFEINE 1 TABLET: 50; 325; 40 TABLET ORAL at 07:08

## 2022-08-18 RX ADMIN — AMIODARONE HYDROCHLORIDE 400 MG: 200 TABLET ORAL at 08:08

## 2022-08-18 RX ADMIN — LOPERAMIDE HYDROCHLORIDE 2 MG: 2 CAPSULE ORAL at 10:08

## 2022-08-18 RX ADMIN — MIRTAZAPINE 7.5 MG: 7.5 TABLET, FILM COATED ORAL at 08:08

## 2022-08-18 RX ADMIN — METOPROLOL TARTRATE 100 MG: 100 TABLET, FILM COATED ORAL at 08:08

## 2022-08-18 RX ADMIN — IPRATROPIUM BROMIDE AND ALBUTEROL SULFATE 3 ML: 2.5; .5 SOLUTION RESPIRATORY (INHALATION) at 11:08

## 2022-08-18 RX ADMIN — ENOXAPARIN SODIUM 90 MG: 100 INJECTION SUBCUTANEOUS at 08:08

## 2022-08-18 RX ADMIN — OXYCODONE HYDROCHLORIDE AND ACETAMINOPHEN 1 TABLET: 5; 325 TABLET ORAL at 08:08

## 2022-08-18 RX ADMIN — IPRATROPIUM BROMIDE AND ALBUTEROL SULFATE 3 ML: 2.5; .5 SOLUTION RESPIRATORY (INHALATION) at 07:08

## 2022-08-18 RX ADMIN — THIAMINE HCL TAB 100 MG 100 MG: 100 TAB at 08:08

## 2022-08-18 RX ADMIN — VENLAFAXINE HYDROCHLORIDE 225 MG: 75 CAPSULE, EXTENDED RELEASE ORAL at 08:08

## 2022-08-18 RX ADMIN — POTASSIUM BICARBONATE 25 MEQ: 978 TABLET, EFFERVESCENT ORAL at 12:08

## 2022-08-18 RX ADMIN — OXYCODONE HYDROCHLORIDE AND ACETAMINOPHEN 1 TABLET: 5; 325 TABLET ORAL at 09:08

## 2022-08-18 RX ADMIN — FUROSEMIDE 40 MG: 40 INJECTION, SOLUTION INTRAMUSCULAR; INTRAVENOUS at 08:08

## 2022-08-18 RX ADMIN — IPRATROPIUM BROMIDE AND ALBUTEROL SULFATE 3 ML: 2.5; .5 SOLUTION RESPIRATORY (INHALATION) at 03:08

## 2022-08-18 RX ADMIN — LIDOCAINE 2 PATCH: 50 PATCH CUTANEOUS at 08:08

## 2022-08-18 NOTE — PT/OT/SLP PROGRESS
Physical Therapy  Co-Treatment with OT    Patient Name:  Vale Gutierrez   MRN:  [id number]    Recommendations:     Discharge Recommendations:  nursing facility, skilled   Discharge Equipment Recommendations:  (will determine DME needs closer to discharge)   Barriers to discharge: Decreased caregiver support family will not be able to assist at current functional level.     Assessment:     Vale Gutierrez is a 61 y.o. female admitted with a medical diagnosis of Acute hypoxemic respiratory failure.  She presents with the following impairments/functional limitations:  impaired endurance, impaired functional mobility, gait instability, impaired balance, decreased safety awareness, decreased lower extremity function pt tolerated treatment better being able to stand to perform part of ADLS and being able to begin gait training. Pt will benefit from cont skilled PT 3x/wk to progress physically. Pt will need SNF placement to maximize rehab potential.     Rehab Prognosis: Good; patient would benefit from acute skilled PT services to address these deficits and reach maximum level of function.    Recent Surgery: * No surgery found *      Plan:     During this hospitalization, patient to be seen 3 x/week to address the identified rehab impairments via gait training, therapeutic activities, therapeutic exercises and progress toward the following goals:    · Plan of Care Expires:  09/08/22    Subjective     Chief Complaint: pt c/o her legs feeling weak with standing grooming.   Patient/Family Comments/goals:  To get stronger and go home.   Pain/Comfort:  · Pain Rating 1: 0/10  · Pain Rating Post-Intervention 1: 0/10      Objective:     Communicated with nurse prior to session.  Patient found up in chair with telemetry, pulse ox (continuous), blood pressure cuff (hep lock IV) upon PT entry to room.     General Precautions: Standard, fall   Orthopedic Precautions:N/A   Braces:    Respiratory Status: Comfort flow, flow 50  L/min, concentration 69%     Functional Mobility:  · Transfers: pt needed verbal cues for hand placement and sequencing for functional mobility.     · Sit to Stand:  contact guard assistance with hand-held assist. Pt stood x 3 reps.  ·   · Gait: pt received gait training ~ 4 steps forward/backwards with HHA x 2. Distance pt gait trained limited by comfort flow.   ·   · Balance: pt stood at bedside x 1 min 45 sec to perform ADLS with OT. pt was not able to complete task in standing and needed to sit to complete task.     Due to pt complex medical condition, the skill of 2 licensed therapists is needed to maximize treatment session and progression towards goals    AM-PAC 6 CLICK MOBILITY  Turning over in bed (including adjusting bedclothes, sheets and blankets)?: 3  Sitting down on and standing up from a chair with arms (e.g., wheelchair, bedside commode, etc.): 3  Moving from lying on back to sitting on the side of the bed?: 3  Moving to and from a bed to a chair (including a wheelchair)?: 3  Need to walk in hospital room?: 3  Climbing 3-5 steps with a railing?: 1  Basic Mobility Total Score: 16       Therapeutic Activities and Exercises:   pt received demonstration of toes raises, heel raises, LAQ and marches to perform x 10 reps in sitting every 2 hours. Pt received verbal instructions in PT POC and verbally expressed understanding of such.     Patient left up in chair with all lines intact, call button in reach and RN notified..    GOALS:   Multidisciplinary Problems     Physical Therapy Goals        Problem: Physical Therapy    Goal Priority Disciplines Outcome Goal Variances Interventions   Physical Therapy Goal     PT, PT/OT Ongoing, Progressing     Description: Goals to be met by:22     Patient will increase functional independence with mobility by performin. Supine to sit with Stand-by Assistance -met   2. Sit to supine with Stand-by Assistance  3. Sit to stand transfer with Stand-by  Assistance  4. Gait  x 50 feet with Contact Guard Assistance using LRAD, if needed.   5. Stand for 5 minutes with Stand-by Assistance using LRAD, if needed  6. Lower extremity exercise program x10 reps per handout, with independence                     Time Tracking:     PT Received On: 08/18/22  PT Start Time: 1103     PT Stop Time: 1116  PT Total Time (min): 13 min     Billable Minutes: Therapeutic Activity 13 min    Treatment Type: Treatment  PT/PTA: PT     PTA Visit Number: 0     08/18/2022

## 2022-08-18 NOTE — PLAN OF CARE
[facility] has accepted patient per Agnes at [facility]. Agnes will submit for authorization.    Yarelis Alva RN     [phone]

## 2022-08-18 NOTE — ASSESSMENT & PLAN NOTE
Patient stepped up to MICU for AHRF. Concern for aspiration pna vs autoimmune respiratory process vs pulmonary edema as etiology. Transitioned to CF 8/14, CXR with improving aeration. 8/16 with increasing O2 requirements, placed on 90%. Repeat ABG 8/16 with pH 7.53, pCO2 56.8, pO2 65, HCO3 47.8, LA 2.6. Updated Echo w/bubble study read: no intracardiac shunt identified.  - S/p courses of vanc/zosyn/azithromycin, meropenem/diflucan stopped 8/15  - S/p 3d course of high dose steroids IV solumedrol 125mg q4h   - Patient with significant improvement in respiratory status 8/14, able to transition to CF from bipap - on 50L 70% O2  -CXR 8/15 with stable right cardiophrenic and left basilar-retrocardiac opacities  -Lasix 40 mg IV decreased to once daily 8/17 for diuresis/pulmonary edema

## 2022-08-18 NOTE — PROGRESS NOTES
[facility] - Cardiac Medical ICU  Critical Care Medicine  Progress Note    Patient Name: Vale Gutierrez  MRN: [id number]  Admission Date: 8/1/2022  Hospital Length of Stay: 17 days  Code Status: Full Code  Attending Provider: Edson Newman MD  Primary Care Provider: Estela Mcdaniel MD   Principal Problem: Acute hypoxemic respiratory failure    Subjective:     HPI:  Ms. Gutierrez is a 62 y/o female with a PMH of etoh abuse and withdrawal, aspiration pneumonia, delirium, smoking, HTN, HLD, BPPV, alcohol abuse, depression and anxiety who was admitted to the ICU from the floor for acute hypoxic respiratory failure. She was having increased oxygen needs on the floor and was requiring valium to keep her presumed alcohol withdrawal under control. The patient states that her stomach hurts in the midepigastric region and endorses nausea, vomiting x 1 day. She is unsure how long she has been coughing or feeling ill, and says she cannot remember the last few days. She says she lives with her sister Angela who helps care for her.       Hospital/ICU Course:  Patient presented to the ED on 8/1 with a CC of hypoxia and dyspnea. She was initially admitted to the floor. She experienced hallucinations and tremor, consistent with alcohol withdrawal / delirium, and was put on CIWA protocol with valium PRN (shortage of ativan). She was treated with antibiotics (vancomycin, zosyn, and azithro) scheduled for 10 days and duonebs prn. On 8/7 she developed worsening hypoxic respiratory failure, was put on 50L 100% O2 bipap, and admitted to the ICU due to increased oxygen requirements. Work up revealed small pericardial effusion, cardiomegaly, normal LVEF, pleural effusions and opacities bilaterally, and atrial fibrillation. Patient has now completed courses of zosyn, vancomycin, and azithromycin. She was subsequently given diflucan  and meropenem out of concern for respiratory infection, stopped 8/15. On lasix BID for pulmonary edema.  She was started on high dose steroids 8/13 as there was concern for autoimmune respiratory process, and labs were sent for C-ANCA and P-ANCA (currently pending). She previously had a +BRANDYN titer, but antiCCP, RF, and  anti-dsDNA were negative. Patient's CXR and respiratory status improving on 8/14, able to tolerate CF down to 64% O2. 8/16 patient O2 requirements increasing, O2 increased to 90%.  Repeat CXR 8/16 with right cardiophrenic and left basilar-retrocardiac opacities. Repeat CT chest with no remarkable changes, continues to have atelectasis. 8/17 weaned to 50L, 70% O2.     Of note, discussed the potential need for endotracheal intubation and she expressed understanding and stated she would like to have that done if her respiratory  status declines to the point of needing it.           Interval History/Significant Events: CF oxygen decreased to 70%, 50L. Patient wore bipap overnight. Feels well this morning but has had some loose stools.    Review of Systems   Constitutional:  Negative for chills and fever.   HENT:  Positive for rhinorrhea. Negative for congestion.    Eyes:  Negative for photophobia and visual disturbance.   Respiratory:  Positive for cough and shortness of breath.    Cardiovascular:  Negative for chest pain and leg swelling.   Gastrointestinal:  Positive for abdominal pain (TTP in LUQ) and diarrhea. Negative for abdominal distention, nausea and vomiting.   Genitourinary:  Negative for dysuria.   Musculoskeletal:  Positive for back pain (chronic).   Skin:  Negative for rash.   Neurological:  Negative for light-headedness and headaches.   Psychiatric/Behavioral:  Negative for agitation and confusion.    Objective:     Vital Signs (Most Recent):  Temp: 97.8 °F (36.6 °C) (08/18/22 0800)  Pulse: (!) 59 (08/18/22 1154)  Resp: (!) 27 (08/18/22 1154)  BP: 102/63 (08/18/22 1100)  SpO2: 97 % (08/18/22 1154)   Vital Signs (24h Range):  Temp:  [97.8 °F (36.6 °C)-98.3 °F (36.8 °C)] 97.8 °F (36.6  °C)  Pulse:  [] 59  Resp:  [15-34] 27  SpO2:  [92 %-100 %] 97 %  BP: ()/(52-83) 102/63   Weight: 90.3 kg (199 lb)  Body mass index is 34.16 kg/m².      Intake/Output Summary (Last 24 hours) at 8/18/2022 1212  Last data filed at 8/18/2022 0800  Gross per 24 hour   Intake 1675 ml   Output 1350 ml   Net 325 ml         Physical Exam  Constitutional:       Appearance: She is obese.   HENT:      Head: Normocephalic and atraumatic.      Right Ear: External ear normal.      Left Ear: External ear normal.      Nose: Nose normal.      Mouth/Throat:      Mouth: Mucous membranes are moist.      Pharynx: Oropharynx is clear.   Eyes:      Extraocular Movements: Extraocular movements intact.      Conjunctiva/sclera: Conjunctivae normal.   Cardiovascular:      Rate and Rhythm: Normal rate and regular rhythm.   Pulmonary:      Breath sounds: Rales present. No wheezing.      Comments: Comfort flow 60L/min  Abdominal:      General: Abdomen is flat. Bowel sounds are normal.      Palpations: Abdomen is soft.      Tenderness: There is abdominal tenderness (TTP in LUQ\).   Musculoskeletal:         General: Normal range of motion.      Cervical back: Normal range of motion.      Right lower leg: No edema.      Left lower leg: No edema.   Skin:     General: Skin is warm and dry.   Neurological:      General: No focal deficit present.      Mental Status: She is alert.   Psychiatric:         Mood and Affect: Mood normal.         Behavior: Behavior normal.       Vents:  Oxygen Concentration (%): 60 (08/18/22 1200)  Lines/Drains/Airways       Drain  Duration             Female External Urinary Catheter 08/18/22 0430 <1 day              Peripheral Intravenous Line  Duration                  Peripheral IV - Single Lumen 08/15/22 0000 20 G Anterior;Left Forearm 3 days         Peripheral IV - Single Lumen 08/18/22 0457 20 G Left Antecubital <1 day                  Significant Labs:    CBC/Anemia Profile:  Recent Labs   Lab  08/17/22  0412 08/18/22  0451   WBC 8.42 8.21   HGB 9.2* 10.0*   HCT 32.6* 35.4*   * 503*   MCV 81* 81*   RDW 17.2* 17.0*          Chemistries:  Recent Labs   Lab 08/16/22  1508 08/17/22  0412 08/18/22  0451    143 141   K 4.2 3.8 3.7   CL 87* 87* 87*   CO2 40* 42* 40*   BUN 39* 42* 47*   CREATININE 0.9 1.2 1.0   CALCIUM 9.7 9.7 9.4   ALBUMIN  --  2.6* 2.6*   PROT  --  7.0 6.9   BILITOT  --  0.3 0.4   ALKPHOS  --  119 137*   ALT  --  24 98*   AST  --  46* 158*   MG  --  2.0 2.0   PHOS  --  4.1 3.9         All pertinent labs within the past 24 hours have been reviewed.    Significant Imaging:  I have reviewed all pertinent imaging results/findings within the past 24 hours.      ABG  Recent Labs   Lab 08/18/22  0902   PH 7.432   PO2 18*   PCO2 77.8*   HCO3 51.8*   BE 28     Assessment/Plan:     Neuro  Chronic pain disorder  Patient has a history of chronic back pain, for which she had surgery in October 2020. She has been followed by a pain management doctor and takes percocet daily for pain.   - Percocet 5mg q6h prn available    Psychiatric  Recurrent major depressive disorder, in full remission  - Continue home Mirtazapine 7.5mg qd and venlafaxine 225mg q.d.    Alcohol use disorder, severe, dependence  Patient reportedly had tremors and hallucinations on the floor 8/5-8/7 concerning for withdrawal. Has history of complicated withdrawal hx requiring hospitalization at  per sister   - On [facility] protocol with PRN BZDs on arrival, no longer exhibiting sxs of alcohol withdrawal  - Thiamine 100mg q.d.    Pulmonary  * Acute hypoxemic respiratory failure  Patient stepped up to MICU for AHRF. Concern for aspiration pna vs autoimmune respiratory process vs pulmonary edema as etiology. Transitioned to CF 8/14, CXR with improving aeration. 8/16 with increasing O2 requirements, placed on 90%. Repeat ABG 8/16 with pH 7.53, pCO2 56.8, pO2 65, HCO3 47.8, LA 2.6. Updated Echo w/bubble study read: no intracardiac shunt  identified.  - S/p courses of vanc/zosyn/azithromycin, meropenem/diflucan stopped 8/15  - S/p 3d course of high dose steroids IV solumedrol 125mg q4h   - Patient with significant improvement in respiratory status 8/14, able to transition to CF from bipap - on 50L 70% O2  -CXR 8/15 with stable right cardiophrenic and left basilar-retrocardiac opacities  -Lasix 40 mg IV decreased to once daily 8/17 for diuresis/pulmonary edema      Aspiration pneumonia  See AHRF         Acute on chronic respiratory failure with hypercapnia  Patient with Hypoxic Respiratory failure which is Acute on chronic.  she is not on home oxygen. Supplemental oxygen was provided and noted- Oxygen Concentration (%):  [60-70] 60.   Signs/symptoms of respiratory failure include- tachypnea. Contributing diagnoses includes - ARDS, Aspiration, CHF, COPD, Interstitial lung disease, Pleural effusion, Pneumonia and Pulmonary Embolus Labs and images were reviewed. Patient Has recent ABG, which has been reviewed. Will treat underlying causes and adjust management of respiratory failure.     Community acquired pneumonia of right lower lobe of lung  See AHRF   S/p comprehensive antibiotic treatment and high-dose steroids, remains afebrile with mild improvements in lung imaging.      Cardiac/Vascular  Paroxysmal atrial fibrillation  Patient with paroxysmal atrial fibrillation during admission  - Currently on Amiodarone 400mg BID x 10days, to transition to 200mg daily on 8/23  - Therapeutic lovenox 90mg BID for anticoagulation  - On lopressor 100mg BID for rate control    Acute on chronic diastolic congestive heart failure  Patient is identified as having Diastolic (HFpEF) heart failure that is Acute on chronic. CHF is currently uncontrolled due to Pulmonary edema/pleural effusion on CXR. Latest ECHO performed and demonstrates-     Echo  · The left ventricle is normal in size with concentric hypertrophy and normal systolic function.  · The estimated ejection  fraction is 60-65%.  · Grade I left ventricular diastolic dysfunction.  · Mild aortic regurgitation.  · Normal right ventricular size with normal right ventricular systolic function.  · The estimated PA systolic pressure is 43 mmHg.  · Intermediate central venous pressure (8 mmHg).  · There is pulmonary hypertension.  · Small circumferential pericardial effusion.  · Mild left atrial enlargement.    - 1.5L fluid restriction  - Strict Is/Os, daily weights  - Lasix 40 mg qday            Essential hypertension  Home Med: Metoprolol 50mg po q.d.  - On lopressor 100mg BID for rate control and HTN       Critical Care Daily Checklist:    A: Awake: RASS Goal/Actual Goal: RASS Goal: 0-->alert and calm  Actual: Goldman Agitation Sedation Scale (RASS): Alert and calm   B: Spontaneous Breathing Trial Performed?     C: SAT & SBT Coordinated?  n/a                      D: Delirium: CAM-ICU Overall CAM-ICU: Negative   E: Early Mobility Performed? Yes   F: Feeding Goal: Goals: Meet % EEN, EPN by RD f/u date  Status: Nutrition Goal Status: new   Current Diet Order   Procedures    Diet Adult Regular (IDDSI Level 7) Fluid - 1500mL     Order Specific Question:   Fluid restriction:     Answer:   Fluid - 1500mL      AS: Analgesia/Sedation Sedation available   T: Thromboembolic Prophylaxis lovenox   H: HOB > 300 Yes   U: Stress Ulcer Prophylaxis (if needed) n/a   G: Glucose Control yes   B: Bowel Function Stool Occurrence: 1   I: Indwelling Catheter (Lines & [place]) Necessity PIV   D: De-escalation of Antimicrobials/Pharmacotherapies n/a    Plan for the day/ETD Weaning O2 as able    Code Status:  Family/Goals of Care: Full Code         Critical secondary to Patient has a condition that poses threat to life and bodily function: Severe Respiratory Distress      Critical care was time spent personally by me on the following activities: development of treatment plan with patient or surrogate and bedside caregivers, discussions with  consultants, evaluation of patient's response to treatment, examination of patient, ordering and performing treatments and interventions, ordering and review of laboratory studies, ordering and review of radiographic studies, pulse oximetry, re-evaluation of patient's condition. This critical care time did not overlap with that of any other provider or involve time for any procedures.     SILVIO BERMUDEZ MD  Critical Care Medicine  [facility] - Cardiac Medical ICU

## 2022-08-18 NOTE — PLAN OF CARE
POC-wean comfort flow and nightly bipap settings as tolerated. Patient reports feeling significantly better than previously in the week.      CMICU DAILY GOALS       A: Awake    RASS: Goal - RASS Goal: 0-->alert and calm  Actual - RASS (Goldman Agitation-Sedation Scale): 0-->alert and calm   Restraint necessity: Clinical Justification: Removing medical devices, Climbing out of bed, Treatment Interference  B: Breathe   SBT: Not intubated   C: Coordinate A & B, analgesics/sedatives   Pain: managed    SAT: Pass  D: Delirium   CAM-ICU: Overall CAM-ICU: Negative  E: Early(intubated/ Progressive (non-intubated) Mobility   MOVE Screen: Pass   Activity: Activity Management: Rolling - L1  FAS: Feeding/Nutrition   Diet order: Diet/Nutrition Received: regular, restrict fluids,    T: Thrombus   DVT prophylaxis: VTE Required Core Measure: Pharmacological prophylaxis initiated/maintained  H: HOB Elevation   Head of Bed (HOB) Positioning: HOB elevated  U: Ulcer Prophylaxis   GI: yes  G: Glucose control   managed    S: Skin   Bathing/Skin Care: bath, complete, dressed/undressed, incontinence care, linen changed, back care  Device Skin Pressure Protection: adhesive use limited, positioning supports utilized, pressure points protected, skin-to-device areas padded, skin-to-skin areas padded, absorbent pad utilized/changed  Pressure Reduction Devices: foam padding utilized, positioning supports utilized  Pressure Reduction Techniques: positioned off wounds, pressure points protected, frequent weight shift encouraged, weight shift assistance provided  Skin Protection: adhesive use limited, incontinence pads utilized, tubing/devices free from skin contact, transparent dressing maintained, skin-to-skin areas padded, skin-to-device areas padded, silicone foam dressing in place  B: Bowel Function   diarrhea   I: Indwelling Catheters   [place] necessity:     CVC necessity: No  D: De-escalation Antibiotics   Yes    Family/Goals of care/Code  Status   Code Status: Full Code    24H Vital Sign Range  Temp:  [97.7 °F (36.5 °C)-98.3 °F (36.8 °C)]   Pulse:  [55-81]   Resp:  [15-36]   BP: ()/(52-79)   SpO2:  [89 %-98 %]      Shift Events   No acute events throughout shift    VS and assessment per flow sheet, patient progressing towards goals as tolerated, plan of care reviewed with  patient , all concerns addressed, will continue to monitor.

## 2022-08-18 NOTE — RESPIRATORY THERAPY
Respiratory mechanics performed on patient.  Patient had a NIF -27;  ml.  Mechanics performed while sitting in chair.  Will continue to monitor.

## 2022-08-18 NOTE — ASSESSMENT & PLAN NOTE
Patient is identified as having Diastolic (HFpEF) heart failure that is Acute on chronic. CHF is currently uncontrolled due to Pulmonary edema/pleural effusion on CXR. Latest ECHO performed and demonstrates-     Echo  · The left ventricle is normal in size with concentric hypertrophy and normal systolic function.  · The estimated ejection fraction is 60-65%.  · Grade I left ventricular diastolic dysfunction.  · Mild aortic regurgitation.  · Normal right ventricular size with normal right ventricular systolic function.  · The estimated PA systolic pressure is 43 mmHg.  · Intermediate central venous pressure (8 mmHg).  · There is pulmonary hypertension.  · Small circumferential pericardial effusion.  · Mild left atrial enlargement.    - 1.5L fluid restriction  - Strict Is/Os, daily weights  - Lasix 40 mg qday

## 2022-08-18 NOTE — PT/OT/SLP PROGRESS
Occupational Therapy   Co-Treatment with PT    Name: Vale Gutierrez  MRN: [id number]  Admitting Diagnosis:  Acute hypoxemic respiratory failure       Recommendations:     Discharge Recommendations: nursing facility, skilled  Discharge Equipment Recommendations:   (TBD)  Barriers to discharge:  Decreased caregiver support    Assessment:     Vale Gutierrez is a 61 y.o. female with a medical diagnosis of Acute hypoxemic respiratory failure.  She presents with good motivation and participation. Pt tolerated session well, limited by increased oxygen needs. He is progressing towards OT goals. Performance deficits affecting function are impaired endurance, weakness, impaired self care skills, impaired functional mobility, gait instability, impaired balance, decreased lower extremity function, decreased safety awareness, impaired cardiopulmonary response to activity. Pt would benefit from skilled OT services in order to maximize independence with ADLs and facilitate safe discharge. Pt would benefit from SNF upon discharge to return to PLOF and decrease burden of care.     Rehab Prognosis:  Good; patient would benefit from acute skilled OT services to address these deficits and reach maximum level of function.       Plan:     Patient to be seen 3 x/week to address the above listed problems via self-care/home management, therapeutic activities, therapeutic exercises, neuromuscular re-education  · Plan of Care Expires: 09/10/22  · Plan of Care Reviewed with: patient    Subjective     Pain/Comfort:  · Pain Rating 1: 0/10  · Pain Rating Post-Intervention 1: 0/10    Objective:     Communicated with: RN and PT prior to session.  Patient found up in chair with telemetry, pulse ox (continuous), blood pressure cuff, oxygen upon OT entry to room.    General Precautions: Standard, fall   Orthopedic Precautions:N/A   Braces: N/A  Respiratory Status: Comfort flow, flow 50 L/min, concentration 70%     Occupational Performance:      Functional Mobility/Transfers:  · Patient completed Sit <> Stand Transfer with contact guard assistance  with  no assistive device    · x3 trials from bedside chair  · Functional Mobility: Pt ambulated ~4 steps forward/backwards with CGA and HHA in order to maximize functional activity tolerance and standing balance required for engagement in occupations of choice.      Activities of Daily Living:  · Grooming: contact guard assistance to perform oral care in standing. Pt stood for 1:45, then with c/o of B knee weakness. pt returned seated and completed task in sitting  · Toileting: maximal assistance posterior pericare in standing      [facility] 6 Click ADL: 17    Treatment & Education:  -Therapist provided facilitation and instruction of proper body mechanics, energy conservation, and fall prevention strategies during tasks listed above.  -Pt educated on role of OT, POC and goals for therapy  -Pt educated on importance of OOB activities with staff member assistance and sitting OOB majority of the day.   -Pt verbalized understanding. Pt expressed no further concerns/questions  -Whiteboard updated     Patient left up in chair with all lines intact, call button in reach and RN notifiedEducation:      GOALS:   Multidisciplinary Problems     Occupational Therapy Goals        Problem: Occupational Therapy    Goal Priority Disciplines Outcome Interventions   Occupational Therapy Goal     OT, PT/OT Ongoing, Progressing    Description: Goals to be met by: 8/18/22    Patient will increase functional independence with ADLs by performing:    LE Dressing with Stand-by Assistance.  Grooming while standing with Stand-by Assistance.  Toileting from bedside commode with Minimal Assistance for hygiene and clothing management.   Sitting at edge of bed with Supervision.  Supine to sit with Supervision.  Step transfer with Contact Guard Assistance  Toilet transfer to bedside commode with Contact Guard Assistance.                      Time Tracking:     OT Date of Treatment: 08/18/22  OT Start Time: 1103  OT Stop Time: 1118  OT Total Time (min): 15 min    Billable Minutes:Self Care/Home Management 15    OT/KIMBERLY: OT          8/18/2022

## 2022-08-18 NOTE — PLAN OF CARE
Recommendations    1. Continue current Regular diet.     2. Rec'd psyllium husk prn for loose stools.     3. RD to monitor & follow-up.    Goals: Meet % EEN, EPN by RD f/u date  Nutrition Goal Status: goal met  Communication of RD Recs: reviewed with RN

## 2022-08-18 NOTE — ASSESSMENT & PLAN NOTE
Patient reportedly had tremors and hallucinations on the floor 8/5-8/7 concerning for withdrawal. Has history of complicated withdrawal hx requiring hospitalization at  per sister   - On [facility] protocol with PRN BZDs on arrival, no longer exhibiting sxs of alcohol withdrawal  - Thiamine 100mg q.d.

## 2022-08-18 NOTE — SUBJECTIVE & OBJECTIVE
Interval History/Significant Events: CF oxygen decreased to 70%, 50L. Patient wore bipap overnight. Feels well this morning but has had some loose stools.    Review of Systems   Constitutional:  Negative for chills and fever.   HENT:  Positive for rhinorrhea. Negative for congestion.    Eyes:  Negative for photophobia and visual disturbance.   Respiratory:  Positive for cough and shortness of breath.    Cardiovascular:  Negative for chest pain and leg swelling.   Gastrointestinal:  Positive for abdominal pain (TTP in LUQ) and diarrhea. Negative for abdominal distention, nausea and vomiting.   Genitourinary:  Negative for dysuria.   Musculoskeletal:  Positive for back pain (chronic).   Skin:  Negative for rash.   Neurological:  Negative for light-headedness and headaches.   Psychiatric/Behavioral:  Negative for agitation and confusion.    Objective:     Vital Signs (Most Recent):  Temp: 97.8 °F (36.6 °C) (08/18/22 0800)  Pulse: (!) 59 (08/18/22 1154)  Resp: (!) 27 (08/18/22 1154)  BP: 102/63 (08/18/22 1100)  SpO2: 97 % (08/18/22 1154)   Vital Signs (24h Range):  Temp:  [97.8 °F (36.6 °C)-98.3 °F (36.8 °C)] 97.8 °F (36.6 °C)  Pulse:  [] 59  Resp:  [15-34] 27  SpO2:  [92 %-100 %] 97 %  BP: ()/(52-83) 102/63   Weight: 90.3 kg (199 lb)  Body mass index is 34.16 kg/m².      Intake/Output Summary (Last 24 hours) at 8/18/2022 1212  Last data filed at 8/18/2022 0800  Gross per 24 hour   Intake 1675 ml   Output 1350 ml   Net 325 ml         Physical Exam  Constitutional:       Appearance: She is obese.   HENT:      Head: Normocephalic and atraumatic.      Right Ear: External ear normal.      Left Ear: External ear normal.      Nose: Nose normal.      Mouth/Throat:      Mouth: Mucous membranes are moist.      Pharynx: Oropharynx is clear.   Eyes:      Extraocular Movements: Extraocular movements intact.      Conjunctiva/sclera: Conjunctivae normal.   Cardiovascular:      Rate and Rhythm: Normal rate and regular  rhythm.   Pulmonary:      Breath sounds: Rales present. No wheezing.      Comments: Comfort flow 60L/min  Abdominal:      General: Abdomen is flat. Bowel sounds are normal.      Palpations: Abdomen is soft.      Tenderness: There is abdominal tenderness (TTP in LUQ\).   Musculoskeletal:         General: Normal range of motion.      Cervical back: Normal range of motion.      Right lower leg: No edema.      Left lower leg: No edema.   Skin:     General: Skin is warm and dry.   Neurological:      General: No focal deficit present.      Mental Status: She is alert.   Psychiatric:         Mood and Affect: Mood normal.         Behavior: Behavior normal.       Vents:  Oxygen Concentration (%): 60 (08/18/22 1200)  Lines/Drains/Airways       Drain  Duration             Female External Urinary Catheter 08/18/22 0430 <1 day              Peripheral Intravenous Line  Duration                  Peripheral IV - Single Lumen 08/15/22 0000 20 G Anterior;Left Forearm 3 days         Peripheral IV - Single Lumen 08/18/22 0457 20 G Left Antecubital <1 day                  Significant Labs:    CBC/Anemia Profile:  Recent Labs   Lab 08/17/22  0412 08/18/22  0451   WBC 8.42 8.21   HGB 9.2* 10.0*   HCT 32.6* 35.4*   * 503*   MCV 81* 81*   RDW 17.2* 17.0*          Chemistries:  Recent Labs   Lab 08/16/22  1508 08/17/22  0412 08/18/22  0451    143 141   K 4.2 3.8 3.7   CL 87* 87* 87*   CO2 40* 42* 40*   BUN 39* 42* 47*   CREATININE 0.9 1.2 1.0   CALCIUM 9.7 9.7 9.4   ALBUMIN  --  2.6* 2.6*   PROT  --  7.0 6.9   BILITOT  --  0.3 0.4   ALKPHOS  --  119 137*   ALT  --  24 98*   AST  --  46* 158*   MG  --  2.0 2.0   PHOS  --  4.1 3.9         All pertinent labs within the past 24 hours have been reviewed.    Significant Imaging:  I have reviewed all pertinent imaging results/findings within the past 24 hours.

## 2022-08-18 NOTE — ASSESSMENT & PLAN NOTE
Patient with Hypoxic Respiratory failure which is Acute on chronic.  she is not on home oxygen. Supplemental oxygen was provided and noted- Oxygen Concentration (%):  [60-70] 60.   Signs/symptoms of respiratory failure include- tachypnea. Contributing diagnoses includes - ARDS, Aspiration, CHF, COPD, Interstitial lung disease, Pleural effusion, Pneumonia and Pulmonary Embolus Labs and images were reviewed. Patient Has recent ABG, which has been reviewed. Will treat underlying causes and adjust management of respiratory failure.

## 2022-08-18 NOTE — PROGRESS NOTES
"[facility]  Adult Nutrition  Progress Note    SUMMARY       Recommendations    1. Continue current Regular diet.     2. Rec'd psyllium husk prn for loose stools.     3. RD to monitor & follow-up.    Goals: Meet % EEN, EPN by RD f/u date  Nutrition Goal Status: goal met  Communication of RD Recs: reviewed with RN    Assessment and Plan    No nutritional dx at this time.    Reason for Assessment    Reason For Assessment: RD follow-up  Diagnosis: other (see comments) (Resp. fx)  Relevant Medical History: Etoh abuse, HTN, HLD  Interdisciplinary Rounds: did not attend    General Information Comments: Pt w/ other provider during attempted visit; per RN documentation, pt tolerating diet w/ % PO intake. Appears nourished; UBW: 200# per chart review. No indicators of malnutrition noted.  Nutrition Discharge Planning: Adequate PO intake    Nutrition Risk Screen    Nutrition Risk Screen: no indicators present    Nutrition/Diet History    Spiritual, Cultural Beliefs, [employer] Practices, Values that Affect Care: no  Factors Affecting Nutritional Intake: None identified at this time    Anthropometrics    Temp: 98 °F (36.7 °C)  Height Method: Stated  Height: 5' 4" (162.6 cm)  Height (inches): 64 in  Weight Method: Bed Scale  Weight: 90.3 kg (199 lb)  Weight (lb): 199 lb  Ideal Body Weight (IBW), Female: 120 lb  % Ideal Body Weight, Female (lb): 165.83 %  BMI (Calculated): 34.1  BMI Grade: 30 - 34.9- obesity - grade I    Lab/Procedures/Meds    Pertinent Labs Reviewed: reviewed  Pertinent Labs Comments: BUN 47, , ALT 98  Pertinent Medications Reviewed: reviewed  Pertinent Medications Comments: lasix, thiamine    Estimated/Assessed Needs    Weight Used For Calorie Calculations: 90.7 kg (199 lb 15.3 oz)  Energy Calorie Requirements (kcal): 1592 kcal/d  Energy Need Method: Power-St Andreaor (1.1 PAL)  Protein Requirements: 91 g/d (1 g/kg)  Weight Used For Protein Calculations: 90.7 kg (199 lb 15.3 " oz)  Estimated Fluid Requirement Method: other (see comments) (Per MD or 1 mL/kcal)  RDA Method (mL): 1592    Nutrition Prescription Ordered    Current Diet Order: Regular    Evaluation of Received Nutrient/Fluid Intake    I/O: -1.1L since 8/4  Comments: LBM: 8/18  Tolerance: tolerating  % Intake of Estimated Energy Needs: 75 - 100 %  % Meal Intake: 75 - 100 %    Nutrition Risk    Level of Risk/Frequency of Follow-up:  (1x/week)     Monitor and Evaluation    Food and Nutrient Intake: energy intake, food and beverage intake  Food and Nutrient Adminstration: diet order  Physical Activity and Function: nutrition-related ADLs and IADLs  Anthropometric Measurements: weight, weight change  Biochemical Data, Medical Tests and Procedures: inflammatory profile, lipid profile, glucose/endocrine profile, gastrointestinal profile, electrolyte and renal panel  Nutrition-Focused Physical Findings: overall appearance     Nutrition Follow-Up    RD Follow-up?: Yes

## 2022-08-18 NOTE — ASSESSMENT & PLAN NOTE
See AHRF   S/p comprehensive antibiotic treatment and high-dose steroids, remains afebrile with mild improvements in lung imaging.

## 2022-08-18 NOTE — PLAN OF CARE
CM sent 6 LTAC referrals out via [facility] to:    1) [facility]  2) [facility]  3) [facility]  [facility]) [facility]  [facility]) [facility]  5) [facility] He CM to follow for accepting facility.      Yarelis Alva RN     [phone]

## 2022-08-19 ENCOUNTER — TELEPHONE (OUTPATIENT)
Dept: PAIN MEDICINE | Facility: CLINIC | Age: 62
End: 2022-08-19
Payer: MEDICARE

## 2022-08-19 LAB
ALBUMIN SERPL BCP-MCNC: 2 G/DL (ref 3.5–5.2)
ALP SERPL-CCNC: 112 U/L (ref 55–135)
ALT SERPL W/O P-5'-P-CCNC: 120 U/L (ref 10–44)
ANION GAP SERPL CALC-SCNC: 7 MMOL/L (ref 8–16)
AST SERPL-CCNC: 196 U/L (ref 10–40)
BASOPHILS # BLD AUTO: 0.01 K/UL (ref 0–0.2)
BASOPHILS NFR BLD: 0.1 % (ref 0–1.9)
BILIRUB SERPL-MCNC: 0.3 MG/DL (ref 0.1–1)
BUN SERPL-MCNC: 43 MG/DL (ref 8–23)
CALCIUM SERPL-MCNC: 8.1 MG/DL (ref 8.7–10.5)
CHLORIDE SERPL-SCNC: 98 MMOL/L (ref 95–110)
CO2 SERPL-SCNC: 38 MMOL/L (ref 23–29)
CREAT SERPL-MCNC: 0.8 MG/DL (ref 0.5–1.4)
DIFFERENTIAL METHOD: ABNORMAL
EOSINOPHIL # BLD AUTO: 0.2 K/UL (ref 0–0.5)
EOSINOPHIL NFR BLD: 2.3 % (ref 0–8)
ERYTHROCYTE [DISTWIDTH] IN BLOOD BY AUTOMATED COUNT: 17.5 % (ref 11.5–14.5)
EST. GFR  (NO RACE VARIABLE): >60 ML/MIN/1.73 M^2
GLUCOSE SERPL-MCNC: 64 MG/DL (ref 70–110)
HCT VFR BLD AUTO: 29 % (ref 37–48.5)
HGB BLD-MCNC: 8.2 G/DL (ref 12–16)
IMM GRANULOCYTES # BLD AUTO: 0.04 K/UL (ref 0–0.04)
IMM GRANULOCYTES NFR BLD AUTO: 0.5 % (ref 0–0.5)
LYMPHOCYTES # BLD AUTO: 2.7 K/UL (ref 1–4.8)
LYMPHOCYTES NFR BLD: 32.4 % (ref 18–48)
MAGNESIUM SERPL-MCNC: 1.7 MG/DL (ref 1.6–2.6)
MCH RBC QN AUTO: 23.7 PG (ref 27–31)
MCHC RBC AUTO-ENTMCNC: 28.3 G/DL (ref 32–36)
MCV RBC AUTO: 84 FL (ref 82–98)
MONOCYTES # BLD AUTO: 0.4 K/UL (ref 0.3–1)
MONOCYTES NFR BLD: 4.5 % (ref 4–15)
NEUTROPHILS # BLD AUTO: 4.9 K/UL (ref 1.8–7.7)
NEUTROPHILS NFR BLD: 60.2 % (ref 38–73)
NRBC BLD-RTO: 0 /100 WBC
PHOSPHATE SERPL-MCNC: 3.5 MG/DL (ref 2.7–4.5)
PLATELET # BLD AUTO: 431 K/UL (ref 150–450)
PMV BLD AUTO: 10.7 FL (ref 9.2–12.9)
POCT GLUCOSE: 83 MG/DL (ref 70–110)
POTASSIUM SERPL-SCNC: 3.7 MMOL/L (ref 3.5–5.1)
PROT SERPL-MCNC: 5 G/DL (ref 6–8.4)
RBC # BLD AUTO: 3.46 M/UL (ref 4–5.4)
SODIUM SERPL-SCNC: 143 MMOL/L (ref 136–145)
WBC # BLD AUTO: 8.21 K/UL (ref 3.9–12.7)

## 2022-08-19 PROCEDURE — 94761 N-INVAS EAR/PLS OXIMETRY MLT: CPT

## 2022-08-19 PROCEDURE — 94799 UNLISTED PULMONARY SVC/PX: CPT

## 2022-08-19 PROCEDURE — 63600175 PHARM REV CODE 636 W HCPCS

## 2022-08-19 PROCEDURE — 85025 COMPLETE CBC W/AUTO DIFF WBC: CPT | Performed by: STUDENT IN AN ORGANIZED HEALTH CARE EDUCATION/TRAINING PROGRAM

## 2022-08-19 PROCEDURE — 83735 ASSAY OF MAGNESIUM: CPT | Performed by: STUDENT IN AN ORGANIZED HEALTH CARE EDUCATION/TRAINING PROGRAM

## 2022-08-19 PROCEDURE — 80053 COMPREHEN METABOLIC PANEL: CPT | Performed by: STUDENT IN AN ORGANIZED HEALTH CARE EDUCATION/TRAINING PROGRAM

## 2022-08-19 PROCEDURE — 25000003 PHARM REV CODE 250: Performed by: HOSPITALIST

## 2022-08-19 PROCEDURE — 27100171 HC OXYGEN HIGH FLOW UP TO 24 HOURS

## 2022-08-19 PROCEDURE — 25000003 PHARM REV CODE 250: Performed by: STUDENT IN AN ORGANIZED HEALTH CARE EDUCATION/TRAINING PROGRAM

## 2022-08-19 PROCEDURE — 99233 PR SUBSEQUENT HOSPITAL CARE,LEVL III: ICD-10-PCS | Mod: GC,,, | Performed by: INTERNAL MEDICINE

## 2022-08-19 PROCEDURE — 84100 ASSAY OF PHOSPHORUS: CPT

## 2022-08-19 PROCEDURE — 63600175 PHARM REV CODE 636 W HCPCS: Performed by: STUDENT IN AN ORGANIZED HEALTH CARE EDUCATION/TRAINING PROGRAM

## 2022-08-19 PROCEDURE — 94660 CPAP INITIATION&MGMT: CPT

## 2022-08-19 PROCEDURE — 93010 ELECTROCARDIOGRAM REPORT: CPT | Mod: ,,, | Performed by: INTERNAL MEDICINE

## 2022-08-19 PROCEDURE — 25000242 PHARM REV CODE 250 ALT 637 W/ HCPCS: Performed by: HOSPITALIST

## 2022-08-19 PROCEDURE — 93005 ELECTROCARDIOGRAM TRACING: CPT

## 2022-08-19 PROCEDURE — 99233 SBSQ HOSP IP/OBS HIGH 50: CPT | Mod: GC,,, | Performed by: INTERNAL MEDICINE

## 2022-08-19 PROCEDURE — 25000003 PHARM REV CODE 250

## 2022-08-19 PROCEDURE — 99900035 HC TECH TIME PER 15 MIN (STAT)

## 2022-08-19 PROCEDURE — 94640 AIRWAY INHALATION TREATMENT: CPT

## 2022-08-19 PROCEDURE — 20000000 HC ICU ROOM

## 2022-08-19 PROCEDURE — 93010 EKG 12-LEAD: ICD-10-PCS | Mod: ,,, | Performed by: INTERNAL MEDICINE

## 2022-08-19 PROCEDURE — 27000221 HC OXYGEN, UP TO 24 HOURS

## 2022-08-19 RX ORDER — MAGNESIUM SULFATE HEPTAHYDRATE 40 MG/ML
2 INJECTION, SOLUTION INTRAVENOUS ONCE
Status: COMPLETED | OUTPATIENT
Start: 2022-08-19 | End: 2022-08-19

## 2022-08-19 RX ORDER — METOPROLOL TARTRATE 50 MG/1
50 TABLET ORAL DAILY
Status: DISCONTINUED | OUTPATIENT
Start: 2022-08-19 | End: 2022-08-19

## 2022-08-19 RX ORDER — MIRTAZAPINE 7.5 MG/1
7.5 TABLET, FILM COATED ORAL NIGHTLY
Qty: 30 TABLET | Refills: 11 | OUTPATIENT
Start: 2022-08-19 | End: 2023-01-01

## 2022-08-19 RX ORDER — AMIODARONE HYDROCHLORIDE 200 MG/1
400 TABLET ORAL 2 TIMES DAILY
Qty: 120 TABLET | Refills: 11 | Status: ON HOLD | OUTPATIENT
Start: 2022-08-19 | End: 2022-09-07 | Stop reason: HOSPADM

## 2022-08-19 RX ORDER — METOPROLOL TARTRATE 50 MG/1
50 TABLET ORAL 2 TIMES DAILY
Status: DISCONTINUED | OUTPATIENT
Start: 2022-08-19 | End: 2022-08-19

## 2022-08-19 RX ORDER — ACETAMINOPHEN 325 MG/1
650 TABLET ORAL EVERY 8 HOURS PRN
Qty: 28 TABLET | Refills: 0 | OUTPATIENT
Start: 2022-08-19

## 2022-08-19 RX ORDER — ENOXAPARIN SODIUM 100 MG/ML
1 INJECTION SUBCUTANEOUS EVERY 12 HOURS
Qty: 9 ML | Refills: 0 | OUTPATIENT
Start: 2022-08-19

## 2022-08-19 RX ORDER — AMIODARONE HYDROCHLORIDE 400 MG/1
400 TABLET ORAL 2 TIMES DAILY
Qty: 60 TABLET | Refills: 11 | OUTPATIENT
Start: 2022-08-19 | End: 2023-01-01

## 2022-08-19 RX ORDER — METOPROLOL TARTRATE 50 MG/1
50 TABLET ORAL DAILY
Qty: 30 TABLET | Refills: 11 | Status: ON HOLD | OUTPATIENT
Start: 2022-08-19 | End: 2022-09-07 | Stop reason: SDUPTHER

## 2022-08-19 RX ORDER — AMOXICILLIN 250 MG
1 CAPSULE ORAL 2 TIMES DAILY PRN
Qty: 28 TABLET | Refills: 0 | OUTPATIENT
Start: 2022-08-19

## 2022-08-19 RX ORDER — LOPERAMIDE HYDROCHLORIDE 2 MG/1
2 CAPSULE ORAL 4 TIMES DAILY PRN
Qty: 28 CAPSULE | Refills: 0 | OUTPATIENT
Start: 2022-08-19 | End: 2022-08-29

## 2022-08-19 RX ORDER — ENOXAPARIN SODIUM 100 MG/ML
1 INJECTION SUBCUTANEOUS EVERY 12 HOURS
Start: 2022-08-19 | End: 2022-08-23 | Stop reason: HOSPADM

## 2022-08-19 RX ORDER — VENLAFAXINE HYDROCHLORIDE 75 MG/1
225 CAPSULE, EXTENDED RELEASE ORAL DAILY
Qty: 90 CAPSULE | Refills: 11 | OUTPATIENT
Start: 2022-08-20 | End: 2023-01-01

## 2022-08-19 RX ORDER — IPRATROPIUM BROMIDE AND ALBUTEROL SULFATE 2.5; .5 MG/3ML; MG/3ML
3 SOLUTION RESPIRATORY (INHALATION) EVERY 4 HOURS
Qty: 75 ML | Refills: 0 | OUTPATIENT
Start: 2022-08-19 | End: 2023-01-01

## 2022-08-19 RX ORDER — LIDOCAINE 50 MG/G
2 PATCH TOPICAL DAILY
Qty: 7 PATCH | Refills: 0 | OUTPATIENT
Start: 2022-08-19

## 2022-08-19 RX ORDER — OXYCODONE AND ACETAMINOPHEN 5; 325 MG/1; MG/1
1 TABLET ORAL EVERY 6 HOURS PRN
Qty: 28 TABLET | Refills: 0 | OUTPATIENT
Start: 2022-08-19

## 2022-08-19 RX ORDER — BUTALBITAL, ACETAMINOPHEN AND CAFFEINE 50; 325; 40 MG/1; MG/1; MG/1
1 TABLET ORAL EVERY 4 HOURS PRN
Qty: 28 TABLET | Refills: 0 | OUTPATIENT
Start: 2022-08-19 | End: 2022-09-18

## 2022-08-19 RX ORDER — TALC
6 POWDER (GRAM) TOPICAL NIGHTLY PRN
Qty: 28 TABLET | Refills: 0 | OUTPATIENT
Start: 2022-08-19

## 2022-08-19 RX ADMIN — POTASSIUM BICARBONATE 25 MEQ: 978 TABLET, EFFERVESCENT ORAL at 12:08

## 2022-08-19 RX ADMIN — MAGNESIUM SULFATE 2 G: 2 INJECTION INTRAVENOUS at 12:08

## 2022-08-19 RX ADMIN — THIAMINE HCL TAB 100 MG 100 MG: 100 TAB at 08:08

## 2022-08-19 RX ADMIN — LOPERAMIDE HYDROCHLORIDE 2 MG: 2 CAPSULE ORAL at 09:08

## 2022-08-19 RX ADMIN — LIDOCAINE 2 PATCH: 50 PATCH CUTANEOUS at 09:08

## 2022-08-19 RX ADMIN — ENOXAPARIN SODIUM 90 MG: 100 INJECTION SUBCUTANEOUS at 08:08

## 2022-08-19 RX ADMIN — BUTALBITAL, ACETAMINOPHEN, AND CAFFEINE 1 TABLET: 50; 325; 40 TABLET ORAL at 07:08

## 2022-08-19 RX ADMIN — SODIUM CHLORIDE, SODIUM LACTATE, POTASSIUM CHLORIDE, AND CALCIUM CHLORIDE 500 ML: .6; .31; .03; .02 INJECTION, SOLUTION INTRAVENOUS at 01:08

## 2022-08-19 RX ADMIN — IPRATROPIUM BROMIDE AND ALBUTEROL SULFATE 3 ML: 2.5; .5 SOLUTION RESPIRATORY (INHALATION) at 08:08

## 2022-08-19 RX ADMIN — IPRATROPIUM BROMIDE AND ALBUTEROL SULFATE 3 ML: 2.5; .5 SOLUTION RESPIRATORY (INHALATION) at 12:08

## 2022-08-19 RX ADMIN — MIRTAZAPINE 7.5 MG: 7.5 TABLET, FILM COATED ORAL at 09:08

## 2022-08-19 RX ADMIN — IPRATROPIUM BROMIDE AND ALBUTEROL SULFATE 3 ML: 2.5; .5 SOLUTION RESPIRATORY (INHALATION) at 07:08

## 2022-08-19 RX ADMIN — OXYCODONE HYDROCHLORIDE AND ACETAMINOPHEN 1 TABLET: 5; 325 TABLET ORAL at 11:08

## 2022-08-19 RX ADMIN — OXYCODONE HYDROCHLORIDE AND ACETAMINOPHEN 1 TABLET: 5; 325 TABLET ORAL at 04:08

## 2022-08-19 RX ADMIN — IPRATROPIUM BROMIDE AND ALBUTEROL SULFATE 3 ML: 2.5; .5 SOLUTION RESPIRATORY (INHALATION) at 04:08

## 2022-08-19 RX ADMIN — VENLAFAXINE HYDROCHLORIDE 225 MG: 75 CAPSULE, EXTENDED RELEASE ORAL at 08:08

## 2022-08-19 RX ADMIN — AMIODARONE HYDROCHLORIDE 400 MG: 200 TABLET ORAL at 09:08

## 2022-08-19 RX ADMIN — OXYCODONE HYDROCHLORIDE AND ACETAMINOPHEN 1 TABLET: 5; 325 TABLET ORAL at 09:08

## 2022-08-19 RX ADMIN — ENOXAPARIN SODIUM 90 MG: 100 INJECTION SUBCUTANEOUS at 09:08

## 2022-08-19 RX ADMIN — AMIODARONE HYDROCHLORIDE 400 MG: 200 TABLET ORAL at 08:08

## 2022-08-19 NOTE — PROGRESS NOTES
[facility] - Cardiac Medical [facility]  Critical Care Medicine  Progress Note    Patient Name: Vale Gutierrez  MRN: [id number]  Admission Date: 8/1/2022  Hospital Length of Stay: 18 days  Code Status: Full Code  Attending Provider: Edson Newman MD  Primary Care Provider: Estela Mcdaniel MD   Principal Problem: Acute hypoxemic respiratory failure    Subjective:     HPI:  Ms. Gutierrez is a 60 y/o female with a PMH of etoh abuse and withdrawal, aspiration pneumonia, delirium, smoking, HTN, HLD, BPPV, alcohol abuse, depression and anxiety who was admitted to the ICU from the floor for acute hypoxic respiratory failure. She was having increased oxygen needs on the floor and was requiring valium to keep her presumed alcohol withdrawal under control. The patient states that her stomach hurts in the midepigastric region and endorses nausea, vomiting x 1 day. She is unsure how long she has been coughing or feeling ill, and says she cannot remember the last few days. She says she lives with her sister Angela who helps care for her.       Hospital/ICU Course:  Patient presented to the ED on 8/1 with a CC of hypoxia and dyspnea. She was initially admitted to the floor. She experienced hallucinations and tremor, consistent with alcohol withdrawal / delirium, and was put on CIWA protocol with valium PRN (shortage of ativan). She was treated with antibiotics (vancomycin, zosyn, and azithro) scheduled for 10 days and duonebs prn. On 8/7 she developed worsening hypoxic respiratory failure, was put on 50L 100% O2 bipap, and admitted to the ICU due to increased oxygen requirements. Work up revealed small pericardial effusion, cardiomegaly, normal LVEF, pleural effusions and opacities bilaterally, and atrial fibrillation. Patient has now completed courses of zosyn, vancomycin, and azithromycin. She was subsequently given diflucan  and meropenem out of concern for respiratory infection, stopped 8/15. On lasix BID for pulmonary edema.  She was started on high dose steroids 8/13 as there was concern for autoimmune respiratory process, and labs were sent for C-ANCA and P-ANCA (currently pending). She previously had a +BRANDYN titer, but antiCCP, RF, and  anti-dsDNA were negative. Patient's CXR and respiratory status improving on 8/14, able to tolerate CF down to 64% O2. 8/16 patient O2 requirements increasing, O2 increased to 90%.  Repeat CXR 8/16 with right cardiophrenic and left basilar-retrocardiac opacities. Repeat CT chest with no remarkable changes, continues to have atelectasis. 8/17 weaned to 50L, 70% O2. 8/19 on 50L, 55% O2. Patient has been accepted to LTAC, pending insurance.     Of note, discussed the potential need for endotracheal intubation and she expressed understanding and stated she would like to have that done if her respiratory  status declines to the point of needing it.           Interval History/Significant Events: CF oxygen decreased to 55%, 50L. Patient wore bipap overnight. Feels well this morning. Hypotensive overnight requiring 500cc LR, denies any symptoms     Review of Systems   Constitutional:  Negative for chills and fever.   HENT:  Positive for rhinorrhea. Negative for congestion.    Eyes:  Negative for photophobia and visual disturbance.   Respiratory:  Positive for cough and shortness of breath (improved).    Cardiovascular:  Negative for chest pain and leg swelling.   Gastrointestinal:  Positive for abdominal pain (TTP in LUQ) and diarrhea. Negative for abdominal distention, nausea and vomiting.   Genitourinary:  Negative for dysuria.   Musculoskeletal:  Positive for back pain (chronic).   Skin:  Negative for rash.   Neurological:  Negative for light-headedness and headaches.   Psychiatric/Behavioral:  Negative for agitation and confusion.    Objective:     Vital Signs (Most Recent):  Temp: 98 °F (36.7 °C) (08/19/22 1100)  Pulse: 93 (08/19/22 1300)  Resp: (!) 23 (08/19/22 1300)  BP: (!) 111/59 (08/19/22 1300)  SpO2:  (!) 93 % (08/19/22 1300)   Vital Signs (24h Range):  Temp:  [98 °F (36.7 °C)-98.5 °F (36.9 °C)] 98 °F (36.7 °C)  Pulse:  [60-96] 93  Resp:  [16-43] 23  SpO2:  [89 %-99 %] 93 %  BP: ()/(49-82) 111/59   Weight: 90.5 kg (199 lb 8.3 oz)  Body mass index is 34.25 kg/m².      Intake/Output Summary (Last 24 hours) at 8/19/2022 1354  Last data filed at 8/19/2022 1300  Gross per 24 hour   Intake 1505.98 ml   Output 500 ml   Net 1005.98 ml         Physical Exam  Constitutional:       Appearance: She is obese.   HENT:      Head: Normocephalic and atraumatic.      Right Ear: External ear normal.      Left Ear: External ear normal.      Nose: Nose normal.      Mouth/Throat:      Mouth: Mucous membranes are moist.      Pharynx: Oropharynx is clear.   Eyes:      Extraocular Movements: Extraocular movements intact.      Conjunctiva/sclera: Conjunctivae normal.   Cardiovascular:      Rate and Rhythm: Normal rate and regular rhythm.   Pulmonary:      Breath sounds: Rales (bibasilar, improved) present. No wheezing.      Comments: Comfort flow 60L/min  Abdominal:      General: Abdomen is flat. Bowel sounds are normal. There is no distension.      Palpations: Abdomen is soft.   Musculoskeletal:         General: Normal range of motion.      Cervical back: Normal range of motion.      Right lower leg: No edema.      Left lower leg: No edema.   Skin:     General: Skin is warm and dry.   Neurological:      General: No focal deficit present.      Mental Status: She is alert.   Psychiatric:         Mood and Affect: Mood normal.         Behavior: Behavior normal.       Vents:  Oxygen Concentration (%): 45 (08/19/22 1233)  Lines/Drains/Airways       Peripheral Intravenous Line  Duration                  Peripheral IV - Single Lumen 08/18/22 0457 20 G Left Antecubital 1 day                  Significant Labs:    CBC/Anemia Profile:  Recent Labs   Lab 08/18/22  0451 08/19/22  0343   WBC 8.21 8.21   HGB 10.0* 8.2*   HCT 35.4* 29.0*   *  431   MCV 81* 84   RDW 17.0* 17.5*          Chemistries:  Recent Labs   Lab 08/18/22  0451 08/19/22  0343    143   K 3.7 3.7   CL 87* 98   CO2 40* 38*   BUN 47* 43*   CREATININE 1.0 0.8   CALCIUM 9.4 8.1*   ALBUMIN 2.6* 2.0*   PROT 6.9 5.0*   BILITOT 0.4 0.3   ALKPHOS 137* 112   ALT 98* 120*   * 196*   MG 2.0 1.7   PHOS 3.9 3.5         All pertinent labs within the past 24 hours have been reviewed.    Significant Imaging:  I have reviewed all pertinent imaging results/findings within the past 24 hours.      ABG  Recent Labs   Lab 08/18/22  0902   PH 7.432   PO2 18*   PCO2 77.8*   HCO3 51.8*   BE 28     Assessment/Plan:     Neuro  Chronic pain disorder  Patient has a history of chronic back pain, for which she had surgery in October 2020. She has been followed by a pain management doctor and takes percocet daily for pain.   - Percocet 5mg q6h prn available    Psychiatric  Recurrent major depressive disorder, in full remission  - Continue home Mirtazapine 7.5mg qd and venlafaxine 225mg q.d.    Alcohol use disorder, severe, dependence  Patient reportedly had tremors and hallucinations on the floor 8/5-8/7 concerning for withdrawal. Has history of complicated withdrawal hx requiring hospitalization at  per sister   - On [facility] protocol with PRN BZDs on arrival, no longer exhibiting sxs of alcohol withdrawal  - Thiamine 100mg q.d.    Pulmonary  * Acute hypoxemic respiratory failure  Patient stepped up to MICU for AHRF. Concern for aspiration pna vs autoimmune respiratory process vs pulmonary edema as etiology. Transitioned to CF 8/14, CXR with improving aeration. 8/16 with increasing O2 requirements, placed on 90%. Repeat ABG 8/16 with pH 7.53, pCO2 56.8, pO2 65, HCO3 47.8, LA 2.6. Updated Echo w/bubble study read: no intracardiac shunt identified.  - S/p courses of vanc/zosyn/azithromycin, meropenem/diflucan stopped 8/15  - S/p 3d course of high dose steroids IV solumedrol 125mg q4h   - Patient with  significant improvement in respiratory status 8/14, able to transition to CF from bipap - on 50L 55% O2  -CXR 8/15 with stable right cardiophrenic and left basilar-retrocardiac opacities  -Lasix held 8/19 due to hypotension overnight, will re-evaluate volume status      Aspiration pneumonia  See AHRF         Acute on chronic respiratory failure with hypercapnia  Patient with Hypoxic Respiratory failure which is Acute on chronic.  she is not on home oxygen. Supplemental oxygen was provided and noted- Oxygen Concentration (%):  [45-60] 45.   Signs/symptoms of respiratory failure include- tachypnea. Contributing diagnoses includes - ARDS, Aspiration, CHF, COPD, Interstitial lung disease, Pleural effusion, Pneumonia and Pulmonary Embolus Labs and images were reviewed. Patient Has recent ABG, which has been reviewed. Will treat underlying causes and adjust management of respiratory failure.     -CF 50L 55% O2, bipap qhs  -improving    Community acquired pneumonia of right lower lobe of lung  See AHRF   S/p comprehensive antibiotic treatment and high-dose steroids, remains afebrile with mild improvements in lung imaging.      Cardiac/Vascular  Paroxysmal atrial fibrillation  Patient with paroxysmal atrial fibrillation during admission  - Currently on Amiodarone 400mg BID x 10days, to transition to 200mg daily on 8/23  - Therapeutic lovenox 90mg BID for anticoagulation  - discontinued lopressor 100mg BID for rate control    Acute on chronic diastolic congestive heart failure  Patient is identified as having Diastolic (HFpEF) heart failure that is Acute on chronic. CHF is currently uncontrolled due to Pulmonary edema/pleural effusion on CXR. Latest ECHO performed and demonstrates-     Echo  · The left ventricle is normal in size with concentric hypertrophy and normal systolic function.  · The estimated ejection fraction is 60-65%.  · Grade I left ventricular diastolic dysfunction.  · Mild aortic regurgitation.  · Normal  right ventricular size with normal right ventricular systolic function.  · The estimated PA systolic pressure is 43 mmHg.  · Intermediate central venous pressure (8 mmHg).  · There is pulmonary hypertension.  · Small circumferential pericardial effusion.  · Mild left atrial enlargement.    - 1.5L fluid restriction  - Strict Is/Os, daily weights  - Lasix held due to hypotensive episode            Essential hypertension  Home Med: Metoprolol 50mg po q.d.  - discontinued lopressor 100mg BID for rate control 8/19 due to hypotension overnight    GI  Transaminitis  Patient with extensive hx EtOH use. , . Liver US 8/18 unremarkable.    -monitor with daily CMP       Critical Care Daily Checklist:    A: Awake: RASS Goal/Actual Goal: RASS Goal: 0-->alert and calm  Actual: Goldman Agitation Sedation Scale (RASS): Alert and calm   B: Spontaneous Breathing Trial Performed?     C: SAT & SBT Coordinated?  n/a                      D: Delirium: CAM-ICU Overall CAM-ICU: Negative   E: Early Mobility Performed? Yes   F: Feeding Goal: Goals: Meet % EEN, EPN by RD f/u date  Status: Nutrition Goal Status: goal met   Current Diet Order   Procedures    Diet Adult Regular (IDDSI Level 7) Fluid - 1500mL     Order Specific Question:   Fluid restriction:     Answer:   Fluid - 1500mL      AS: Analgesia/Sedation available   T: Thromboembolic Prophylaxis lovenox   H: HOB > 300 Yes   U: Stress Ulcer Prophylaxis (if needed) n/a   G: Glucose Control yes   B: Bowel Function Stool Occurrence: 1   I: Indwelling Catheter (Lines & [place]) Necessity PIV   D: De-escalation of Antimicrobials/Pharmacotherapies In progress    Plan for the day/ETD Wean O2, pending LTAC    Code Status:  Family/Goals of Care: Full Code         Critical secondary to Patient has a condition that poses threat to life and bodily function: Severe Respiratory Distress      Critical care was time spent personally by me on the following activities: development of  treatment plan with patient or surrogate and bedside caregivers, discussions with consultants, evaluation of patient's response to treatment, examination of patient, ordering and performing treatments and interventions, ordering and review of laboratory studies, ordering and review of radiographic studies, pulse oximetry, re-evaluation of patient's condition. This critical care time did not overlap with that of any other provider or involve time for any procedures.     SILVIO BERMUDEZ MD  Critical Care Medicine  [facility] - Cardiac Medical ICU

## 2022-08-19 NOTE — ASSESSMENT & PLAN NOTE
Patient with paroxysmal atrial fibrillation during admission  - Currently on Amiodarone 400mg BID x 10days, to transition to 200mg daily on 8/23  - Therapeutic lovenox 90mg BID for anticoagulation  - discontinued lopressor 100mg BID for rate control

## 2022-08-19 NOTE — ASSESSMENT & PLAN NOTE
Patient is identified as having Diastolic (HFpEF) heart failure that is Acute on chronic. CHF is currently uncontrolled due to Pulmonary edema/pleural effusion on CXR. Latest ECHO performed and demonstrates-     Echo  · The left ventricle is normal in size with concentric hypertrophy and normal systolic function.  · The estimated ejection fraction is 60-65%.  · Grade I left ventricular diastolic dysfunction.  · Mild aortic regurgitation.  · Normal right ventricular size with normal right ventricular systolic function.  · The estimated PA systolic pressure is 43 mmHg.  · Intermediate central venous pressure (8 mmHg).  · There is pulmonary hypertension.  · Small circumferential pericardial effusion.  · Mild left atrial enlargement.    - 1.5L fluid restriction  - Strict Is/Os, daily weights  - Lasix held due to hypotensive episode

## 2022-08-19 NOTE — PLAN OF CARE
BUZZ spoke with Agnes at [facility], She advised they are still waiting for authorization from [facility] for LTAC. BUZZ left message for Lora Walls, liaison for [facility]. CM to follow for when authorization is received.    Agnes with [facility] advised if they receive authorization late today, she will contact CM/IVAN tomorrow for transport set up.      Yarelis Alva RN     [phone]

## 2022-08-19 NOTE — PLAN OF CARE
BUZZ received call from Lucy [phone] at [facility]. She advised that her medical director is requesting a P2P for LTAC with attending MD for patient. Lucy advised it will either be tomorrow 8/20/2022 or Monday 8/22/2022.  CM gave Lucy Higginbotham's name and number for tomorrow 8/20/2022, and Dr. Ward's name and number for Monday 8/22/2022. CM to fax LTAC orders. Medical team and Agnes with [facility] notified of pending P2P.      Yarelis Alva RN     [phone]

## 2022-08-19 NOTE — TELEPHONE ENCOUNTER
This message is for patient in regards to his/her appointment 08/22/22 at 11:00a   With Bart Crenshaw Np.      [facility] Policy: For the safety of all patients and staff members.     Patient Visitor policy: During this visit we're informing all patients that face mask are required.     If you have any questions or concerns please contact [phone]      Staff joaquin sg

## 2022-08-19 NOTE — SUBJECTIVE & OBJECTIVE
Interval History/Significant Events: CF oxygen decreased to 55%, 50L. Patient wore bipap overnight. Feels well this morning. Hypotensive overnight requiring 500cc LR, denies any symptoms     Review of Systems   Constitutional:  Negative for chills and fever.   HENT:  Positive for rhinorrhea. Negative for congestion.    Eyes:  Negative for photophobia and visual disturbance.   Respiratory:  Positive for cough and shortness of breath (improved).    Cardiovascular:  Negative for chest pain and leg swelling.   Gastrointestinal:  Positive for abdominal pain (TTP in LUQ) and diarrhea. Negative for abdominal distention, nausea and vomiting.   Genitourinary:  Negative for dysuria.   Musculoskeletal:  Positive for back pain (chronic).   Skin:  Negative for rash.   Neurological:  Negative for light-headedness and headaches.   Psychiatric/Behavioral:  Negative for agitation and confusion.    Objective:     Vital Signs (Most Recent):  Temp: 98 °F (36.7 °C) (08/19/22 1100)  Pulse: 93 (08/19/22 1300)  Resp: (!) 23 (08/19/22 1300)  BP: (!) 111/59 (08/19/22 1300)  SpO2: (!) 93 % (08/19/22 1300)   Vital Signs (24h Range):  Temp:  [98 °F (36.7 °C)-98.5 °F (36.9 °C)] 98 °F (36.7 °C)  Pulse:  [60-96] 93  Resp:  [16-43] 23  SpO2:  [89 %-99 %] 93 %  BP: ()/(49-82) 111/59   Weight: 90.5 kg (199 lb 8.3 oz)  Body mass index is 34.25 kg/m².      Intake/Output Summary (Last 24 hours) at 8/19/2022 1354  Last data filed at 8/19/2022 1300  Gross per 24 hour   Intake 1505.98 ml   Output 500 ml   Net 1005.98 ml         Physical Exam  Constitutional:       Appearance: She is obese.   HENT:      Head: Normocephalic and atraumatic.      Right Ear: External ear normal.      Left Ear: External ear normal.      Nose: Nose normal.      Mouth/Throat:      Mouth: Mucous membranes are moist.      Pharynx: Oropharynx is clear.   Eyes:      Extraocular Movements: Extraocular movements intact.      Conjunctiva/sclera: Conjunctivae normal.    Cardiovascular:      Rate and Rhythm: Normal rate and regular rhythm.   Pulmonary:      Breath sounds: Rales (bibasilar, improved) present. No wheezing.      Comments: Comfort flow 60L/min  Abdominal:      General: Abdomen is flat. Bowel sounds are normal. There is no distension.      Palpations: Abdomen is soft.   Musculoskeletal:         General: Normal range of motion.      Cervical back: Normal range of motion.      Right lower leg: No edema.      Left lower leg: No edema.   Skin:     General: Skin is warm and dry.   Neurological:      General: No focal deficit present.      Mental Status: She is alert.   Psychiatric:         Mood and Affect: Mood normal.         Behavior: Behavior normal.       Vents:  Oxygen Concentration (%): 45 (08/19/22 1233)  Lines/Drains/Airways       Peripheral Intravenous Line  Duration                  Peripheral IV - Single Lumen 08/18/22 0457 20 G Left Antecubital 1 day                  Significant Labs:    CBC/Anemia Profile:  Recent Labs   Lab 08/18/22  0451 08/19/22  0343   WBC 8.21 8.21   HGB 10.0* 8.2*   HCT 35.4* 29.0*   * 431   MCV 81* 84   RDW 17.0* 17.5*          Chemistries:  Recent Labs   Lab 08/18/22  0451 08/19/22  0343    143   K 3.7 3.7   CL 87* 98   CO2 40* 38*   BUN 47* 43*   CREATININE 1.0 0.8   CALCIUM 9.4 8.1*   ALBUMIN 2.6* 2.0*   PROT 6.9 5.0*   BILITOT 0.4 0.3   ALKPHOS 137* 112   ALT 98* 120*   * 196*   MG 2.0 1.7   PHOS 3.9 3.5         All pertinent labs within the past 24 hours have been reviewed.    Significant Imaging:  I have reviewed all pertinent imaging results/findings within the past 24 hours.

## 2022-08-19 NOTE — PLAN OF CARE
[facility]    FACILITY TRANSFER ORDERS      Patient Name: Vale Gutierrez  YOB: 1960    PCP: Estela Mcdaniel MD   PCP Address: [address] [facility] [address] / [place] [address]  PCP Phone Number: [phone]  PCP Fax: [phone]    Encounter Date: 08/20/2022    Admit to:  [facility]     Vital Signs:  Routine    Diagnoses:   Active Hospital Problems    Diagnosis  POA    *Acute hypoxemic respiratory failure [J96.01]  Yes    Paroxysmal atrial fibrillation [I48.0]  Yes    Pericardial effusion [I31.3]  Yes     Mild-moderate size pericardial effusion seen on chest CT. Patient is currently hemodynamically stable, but follow up stat echo to evaluate for tamponade physiology has been ordered.       Tachycardia [R00.0]  Yes    Aspiration pneumonia [J69.0]  Yes    Hypomagnesemia [E83.42]  No    Aortic aneurysm [I71.9]  Yes    Deconditioned low back [R29.898]  Yes    Nicotine abuse [Z72.0]  Yes    Weakness [R53.1]  Yes    Transaminitis [R74.01]  Yes    Community acquired pneumonia of right lower lobe of lung [J18.9]  Yes    Asymptomatic bacteriuria [R82.71]  Yes    Encephalopathy, metabolic [G93.41]  Yes    Acute on chronic diastolic congestive heart failure [I50.33]  Yes    Acute on chronic respiratory failure with hypercapnia [J96.22]  Yes    Anxiety [F41.9]  Yes     Chronic    Chronic pain disorder [G89.4]  Yes     Chronic    Lumbosacral radiculopathy [M54.17]  Yes     Chronic    Recurrent major depressive disorder, in full remission [F33.42]  Yes    Alcohol use disorder, severe, dependence [F10.20]  Yes     Chronic    Essential hypertension [I10]  Yes     Chronic      Resolved Hospital Problems    Diagnosis Date Resolved POA    Altered mental status [R41.82] 08/17/2022 Yes    Hypokalemia [E87.6] 08/14/2022 No       Allergies:Review of patient's allergies indicates:  No Known Allergies    Diet: regular diet and fluid restriction: 1500 mL     Activities: Activity as  tolerated    Goals of Care Treatment Preferences:  Code Status: Full Code      Nursing: Per facility protocol      Labs: CBC and CMP per facilily protocol      CONSULTS:    Physical Therapy to evaluate and treat.  and Occupational Therapy to evaluate and treat.    MISCELLANEOUS CARE:  Routine Skin for Bedridden Patients: Apply moisture barrier cream to all skin folds and wet areas in perineal area daily and after baths and all bowel movements.    WOUND CARE ORDERS  None    Medications: Review discharge medications with patient and family and provide education.      Current Discharge Medication List      START taking these medications    Details   amiodarone (PACERONE) 200 MG Tab Take 2 tablets (400 mg total) by mouth 2 (two) times daily.Please take 400 mg twice a day until 8/23/2022 then 200 mg daily thereafter until you follow up with cardiology  Qty: 120 tablet, Refills: 11      enoxaparin (LOVENOX) 100 mg/mL Syrg Inject 0.9 mLs (90 mg total) into the skin every 12 (twelve) hours.         CONTINUE these medications which have CHANGED    Details   metoprolol tartrate (LOPRESSOR) 50 MG tablet Take 1 tablet (50 mg total) by mouth once daily. Please hold until you follow up with your primary care provider  Qty: 30 tablet, Refills: 11    Comments: .         CONTINUE these medications which have NOT CHANGED    Details   busPIRone (BUSPAR) 5 MG Tab Take 1 tablet (5 mg total) by mouth 2 (two) times daily as needed (anxiety).  Qty: 60 tablet, Refills: 11      mirtazapine (REMERON) 7.5 MG Tab Take 1 tablet (7.5 mg total) by mouth every evening. For insomnia, mood  Qty: 30 tablet, Refills: 11      oxyCODONE-acetaminophen (PERCOCET) 5-325 mg per tablet Take 1 tablet by mouth every 6 (six) hours as needed for Pain.  Qty: 120 tablet, Refills: 0    Comments: Quantity prescribed more than 7 day supply? Yes, quantity medically necessary  Associated Diagnoses: Postlaminectomy syndrome of lumbar region; Spinal stenosis of lumbar  region with neurogenic claudication      thiamine 100 MG tablet Take 100 mg by mouth once daily.      tiZANidine (ZANAFLEX) 4 MG tablet Take 1 tablet (4 mg total) by mouth every 6 (six) hours as needed (spasms).  Qty: 60 tablet, Refills: 0      venlafaxine (EFFEXOR-XR) 150 MG Cp24 Take 150 mg by mouth once daily. Take am of surgery      vitamin D (VITAMIN D3) 1000 units Tab Take 5,000 Int'l Units by mouth. Hold am of surgery      dicyclomine (BENTYL) 10 MG capsule Take 1 capsule (10 mg total) by mouth before meals as needed.  Qty: 60 capsule, Refills: 1    Associated Diagnoses: Chronic diarrhea      omeprazole (PRILOSEC) 20 MG capsule Take 1 capsule (20 mg total) by mouth 2 (two) times daily.  Qty: 60 capsule, Refills: 3         STOP taking these medications       aspirin/salicylamide/caffeine (BC HEADACHE POWDER ORAL) Comments:   Reason for Stopping:         magnesium 30 mg Tab Comments:   Reason for Stopping:         ondansetron (ZOFRAN-ODT) 4 MG TbDL Comments:   Reason for Stopping:                  Immunizations Administered as of 8/20/2022     Name Date Dose VIS Date Route Exp Date    COVID-19, MRNA, LN-S, PF (Moderna) 3/18/2021 -- -- Intramuscular --    Site: Left arm     Lot: [id number]           This patient has had both covid vaccinations    Some patients may experience side effects after vaccination.  These may include fever, headache, muscle or joint aches.  Most symptoms resolve with 24-48 hours and do not require urgent medical evaluation unless they persist for more than 72 hours or symptoms are concerning for an unrelated medical condition.          ________________________________Yolanda Hernandez DO  08/20/2022

## 2022-08-19 NOTE — PLAN OF CARE
CMICU DAILY GOALS       A: Awake    RASS: Goal - RASS Goal: 0-->alert and calm  Actual - RASS (Goldman Agitation-Sedation Scale): 0-->alert and calm   Restraint necessity: Clinical Justification: Removing medical devices, Climbing out of bed, Treatment Interference  B: Breathe   SBT: Not intubated   C: Coordinate A & B, analgesics/sedatives   Pain: managed    SAT: Not intubated  D: Delirium   CAM-ICU: Overall CAM-ICU: Negative  E: Early(intubated/ Progressive (non-intubated) Mobility   MOVE Screen: Pass   Activity: Activity Management: Arm raise - L1  FAS: Feeding/Nutrition   Diet order: Diet/Nutrition Received: regular,    T: Thrombus   DVT prophylaxis: VTE Required Core Measure: Pharmacological prophylaxis initiated/maintained  H: HOB Elevation   Head of Bed (HOB) Positioning: HOB at 30 degrees  U: Ulcer Prophylaxis   GI: yes  G: Glucose control   managed    S: Skin   Bathing/Skin Care: patient refused  Device Skin Pressure Protection: absorbent pad utilized/changed, adhesive use limited  Pressure Reduction Devices: foam padding utilized  Pressure Reduction Techniques: frequent weight shift encouraged, pressure points protected  Skin Protection: adhesive use limited, incontinence pads utilized  B: Bowel Function   no issues   I: Indwelling Catheters   [place] necessity:     CVC necessity: No  D: De-escalation Antibiotics   Yes    Family/Goals of care/Code Status   Code Status: Full Code    24H Vital Sign Range  Temp:  [97.8 °F (36.6 °C)-98.5 °F (36.9 °C)]   Pulse:  []   Resp:  [16-43]   BP: ()/(49-83)   SpO2:  [90 %-100 %]      Shift Events   Blood pressure systolic in 80s after metoprolol and amio. LR bolus of 500ml given IV. Maps greater than 65 post bolus. BG on am lab 64. OJ given, recheck 84.     VS and assessment per flow sheet, patient progressing towards goals as tolerated, plan of care reviewed with  Ms. Gutierrez , all concerns addressed, will continue to monitor.    Faye Mckeon

## 2022-08-19 NOTE — ASSESSMENT & PLAN NOTE
Patient reportedly had tremors and hallucinations on the floor 8/5-8/7 concerning for withdrawal. Has history of complicated withdrawal hx requiring hospitalization at  per sister   - On [facility] protocol with PRN BZDs on arrival, no longer exhibiting sxs of alcohol withdrawal  - Thiamine 100mg q.d.

## 2022-08-19 NOTE — ASSESSMENT & PLAN NOTE
Home Med: Metoprolol 50mg po q.d.  - discontinued lopressor 100mg BID for rate control 8/19 due to hypotension overnight

## 2022-08-19 NOTE — ASSESSMENT & PLAN NOTE
Patient with Hypoxic Respiratory failure which is Acute on chronic.  she is not on home oxygen. Supplemental oxygen was provided and noted- Oxygen Concentration (%):  [45-60] 45.   Signs/symptoms of respiratory failure include- tachypnea. Contributing diagnoses includes - ARDS, Aspiration, CHF, COPD, Interstitial lung disease, Pleural effusion, Pneumonia and Pulmonary Embolus Labs and images were reviewed. Patient Has recent ABG, which has been reviewed. Will treat underlying causes and adjust management of respiratory failure.     -CF 50L 55% O2, bipap qhs  -improving

## 2022-08-19 NOTE — ASSESSMENT & PLAN NOTE
Patient stepped up to MICU for AHRF. Concern for aspiration pna vs autoimmune respiratory process vs pulmonary edema as etiology. Transitioned to CF 8/14, CXR with improving aeration. 8/16 with increasing O2 requirements, placed on 90%. Repeat ABG 8/16 with pH 7.53, pCO2 56.8, pO2 65, HCO3 47.8, LA 2.6. Updated Echo w/bubble study read: no intracardiac shunt identified.  - S/p courses of vanc/zosyn/azithromycin, meropenem/diflucan stopped 8/15  - S/p 3d course of high dose steroids IV solumedrol 125mg q4h   - Patient with significant improvement in respiratory status 8/14, able to transition to CF from bipap - on 50L 55% O2  -CXR 8/15 with stable right cardiophrenic and left basilar-retrocardiac opacities  -Lasix held 8/19 due to hypotension overnight, will re-evaluate volume status

## 2022-08-20 PROBLEM — R82.71 ASYMPTOMATIC BACTERIURIA: Status: RESOLVED | Noted: 2022-08-02 | Resolved: 2022-08-20

## 2022-08-20 PROBLEM — M54.17 LUMBOSACRAL RADICULOPATHY: Chronic | Status: RESOLVED | Noted: 2020-07-28 | Resolved: 2022-08-20

## 2022-08-20 PROBLEM — R29.898 DECONDITIONED LOW BACK: Status: RESOLVED | Noted: 2022-08-04 | Resolved: 2022-08-20

## 2022-08-20 PROBLEM — G93.41 ENCEPHALOPATHY, METABOLIC: Status: RESOLVED | Noted: 2022-08-02 | Resolved: 2022-08-20

## 2022-08-20 PROBLEM — R00.0 TACHYCARDIA: Status: RESOLVED | Noted: 2022-08-06 | Resolved: 2022-08-20

## 2022-08-20 LAB
ALBUMIN SERPL BCP-MCNC: 2.1 G/DL (ref 3.5–5.2)
ALP SERPL-CCNC: 121 U/L (ref 55–135)
ALT SERPL W/O P-5'-P-CCNC: 89 U/L (ref 10–44)
ANION GAP SERPL CALC-SCNC: 9 MMOL/L (ref 8–16)
AST SERPL-CCNC: 47 U/L (ref 10–40)
BASOPHILS # BLD AUTO: 0.01 K/UL (ref 0–0.2)
BASOPHILS NFR BLD: 0.1 % (ref 0–1.9)
BILIRUB SERPL-MCNC: 0.3 MG/DL (ref 0.1–1)
BUN SERPL-MCNC: 27 MG/DL (ref 8–23)
CALCIUM SERPL-MCNC: 8.6 MG/DL (ref 8.7–10.5)
CHLORIDE SERPL-SCNC: 97 MMOL/L (ref 95–110)
CO2 SERPL-SCNC: 35 MMOL/L (ref 23–29)
CREAT SERPL-MCNC: 0.8 MG/DL (ref 0.5–1.4)
DIFFERENTIAL METHOD: ABNORMAL
EOSINOPHIL # BLD AUTO: 0.3 K/UL (ref 0–0.5)
EOSINOPHIL NFR BLD: 4 % (ref 0–8)
ERYTHROCYTE [DISTWIDTH] IN BLOOD BY AUTOMATED COUNT: 17.6 % (ref 11.5–14.5)
EST. GFR  (NO RACE VARIABLE): >60 ML/MIN/1.73 M^2
GLUCOSE SERPL-MCNC: 75 MG/DL (ref 70–110)
HCT VFR BLD AUTO: 28.9 % (ref 37–48.5)
HGB BLD-MCNC: 8.2 G/DL (ref 12–16)
IMM GRANULOCYTES # BLD AUTO: 0.05 K/UL (ref 0–0.04)
IMM GRANULOCYTES NFR BLD AUTO: 0.6 % (ref 0–0.5)
LYMPHOCYTES # BLD AUTO: 2.2 K/UL (ref 1–4.8)
LYMPHOCYTES NFR BLD: 26.2 % (ref 18–48)
MAGNESIUM SERPL-MCNC: 2.2 MG/DL (ref 1.6–2.6)
MCH RBC QN AUTO: 23.5 PG (ref 27–31)
MCHC RBC AUTO-ENTMCNC: 28.4 G/DL (ref 32–36)
MCV RBC AUTO: 83 FL (ref 82–98)
MONOCYTES # BLD AUTO: 0.6 K/UL (ref 0.3–1)
MONOCYTES NFR BLD: 7.3 % (ref 4–15)
NEUTROPHILS # BLD AUTO: 5.2 K/UL (ref 1.8–7.7)
NEUTROPHILS NFR BLD: 61.8 % (ref 38–73)
NRBC BLD-RTO: 0 /100 WBC
PHOSPHATE SERPL-MCNC: 3.2 MG/DL (ref 2.7–4.5)
PLATELET # BLD AUTO: 486 K/UL (ref 150–450)
PMV BLD AUTO: 10.8 FL (ref 9.2–12.9)
POTASSIUM SERPL-SCNC: 4.5 MMOL/L (ref 3.5–5.1)
PROT SERPL-MCNC: 5.3 G/DL (ref 6–8.4)
RBC # BLD AUTO: 3.49 M/UL (ref 4–5.4)
SODIUM SERPL-SCNC: 141 MMOL/L (ref 136–145)
WBC # BLD AUTO: 8.33 K/UL (ref 3.9–12.7)

## 2022-08-20 PROCEDURE — 99233 SBSQ HOSP IP/OBS HIGH 50: CPT | Mod: ,,, | Performed by: INTERNAL MEDICINE

## 2022-08-20 PROCEDURE — 20000000 HC ICU ROOM

## 2022-08-20 PROCEDURE — 84100 ASSAY OF PHOSPHORUS: CPT

## 2022-08-20 PROCEDURE — 99900035 HC TECH TIME PER 15 MIN (STAT)

## 2022-08-20 PROCEDURE — 99233 PR SUBSEQUENT HOSPITAL CARE,LEVL III: ICD-10-PCS | Mod: ,,, | Performed by: INTERNAL MEDICINE

## 2022-08-20 PROCEDURE — 94799 UNLISTED PULMONARY SVC/PX: CPT

## 2022-08-20 PROCEDURE — 25000003 PHARM REV CODE 250: Performed by: HOSPITALIST

## 2022-08-20 PROCEDURE — 25000003 PHARM REV CODE 250

## 2022-08-20 PROCEDURE — 27100171 HC OXYGEN HIGH FLOW UP TO 24 HOURS

## 2022-08-20 PROCEDURE — 94640 AIRWAY INHALATION TREATMENT: CPT

## 2022-08-20 PROCEDURE — 80053 COMPREHEN METABOLIC PANEL: CPT | Performed by: STUDENT IN AN ORGANIZED HEALTH CARE EDUCATION/TRAINING PROGRAM

## 2022-08-20 PROCEDURE — 63600175 PHARM REV CODE 636 W HCPCS

## 2022-08-20 PROCEDURE — 27000221 HC OXYGEN, UP TO 24 HOURS

## 2022-08-20 PROCEDURE — 27000249 HC VAPOTHERM CIRCUIT

## 2022-08-20 PROCEDURE — 25000242 PHARM REV CODE 250 ALT 637 W/ HCPCS: Performed by: HOSPITALIST

## 2022-08-20 PROCEDURE — 83735 ASSAY OF MAGNESIUM: CPT | Performed by: STUDENT IN AN ORGANIZED HEALTH CARE EDUCATION/TRAINING PROGRAM

## 2022-08-20 PROCEDURE — 94761 N-INVAS EAR/PLS OXIMETRY MLT: CPT

## 2022-08-20 PROCEDURE — 85025 COMPLETE CBC W/AUTO DIFF WBC: CPT | Performed by: STUDENT IN AN ORGANIZED HEALTH CARE EDUCATION/TRAINING PROGRAM

## 2022-08-20 RX ORDER — METOPROLOL TARTRATE 100 MG/1
100 TABLET ORAL 2 TIMES DAILY
Status: DISCONTINUED | OUTPATIENT
Start: 2022-08-20 | End: 2022-08-21

## 2022-08-20 RX ADMIN — THIAMINE HCL TAB 100 MG 100 MG: 100 TAB at 08:08

## 2022-08-20 RX ADMIN — ONDANSETRON 4 MG: 4 TABLET, ORALLY DISINTEGRATING ORAL at 10:08

## 2022-08-20 RX ADMIN — BUTALBITAL, ACETAMINOPHEN, AND CAFFEINE 1 TABLET: 50; 325; 40 TABLET ORAL at 08:08

## 2022-08-20 RX ADMIN — BUTALBITAL, ACETAMINOPHEN, AND CAFFEINE 1 TABLET: 50; 325; 40 TABLET ORAL at 10:08

## 2022-08-20 RX ADMIN — IPRATROPIUM BROMIDE AND ALBUTEROL SULFATE 3 ML: 2.5; .5 SOLUTION RESPIRATORY (INHALATION) at 07:08

## 2022-08-20 RX ADMIN — BUSPIRONE HYDROCHLORIDE 5 MG: 5 TABLET ORAL at 05:08

## 2022-08-20 RX ADMIN — MIRTAZAPINE 7.5 MG: 7.5 TABLET, FILM COATED ORAL at 08:08

## 2022-08-20 RX ADMIN — BUSPIRONE HYDROCHLORIDE 5 MG: 5 TABLET ORAL at 08:08

## 2022-08-20 RX ADMIN — IPRATROPIUM BROMIDE AND ALBUTEROL SULFATE 3 ML: 2.5; .5 SOLUTION RESPIRATORY (INHALATION) at 03:08

## 2022-08-20 RX ADMIN — OXYCODONE HYDROCHLORIDE AND ACETAMINOPHEN 1 TABLET: 5; 325 TABLET ORAL at 05:08

## 2022-08-20 RX ADMIN — ENOXAPARIN SODIUM 90 MG: 100 INJECTION SUBCUTANEOUS at 08:08

## 2022-08-20 RX ADMIN — Medication 6 MG: at 08:08

## 2022-08-20 RX ADMIN — OXYCODONE HYDROCHLORIDE AND ACETAMINOPHEN 1 TABLET: 5; 325 TABLET ORAL at 12:08

## 2022-08-20 RX ADMIN — AMIODARONE HYDROCHLORIDE 400 MG: 200 TABLET ORAL at 08:08

## 2022-08-20 RX ADMIN — VENLAFAXINE HYDROCHLORIDE 225 MG: 75 CAPSULE, EXTENDED RELEASE ORAL at 08:08

## 2022-08-20 RX ADMIN — IPRATROPIUM BROMIDE AND ALBUTEROL SULFATE 3 ML: 2.5; .5 SOLUTION RESPIRATORY (INHALATION) at 11:08

## 2022-08-20 RX ADMIN — METOPROLOL TARTRATE 100 MG: 100 TABLET, FILM COATED ORAL at 08:08

## 2022-08-20 RX ADMIN — OXYCODONE HYDROCHLORIDE AND ACETAMINOPHEN 1 TABLET: 5; 325 TABLET ORAL at 06:08

## 2022-08-20 NOTE — ASSESSMENT & PLAN NOTE
Patient with extensive hx EtOH use. , . Liver US 8/18 unremarkable. 8/20 AST 47, ALT 89 - improving.    -monitor with daily CMP

## 2022-08-20 NOTE — ASSESSMENT & PLAN NOTE
Patient stepped up to MICU for AHRF 8/7. Concern for aspiration pna vs autoimmune respiratory process vs pulmonary edema 2/2 HF as etiology. Transitioned to CF 8/14, CXR with improving aeration. 8/16 with increasing O2 requirements, placed on 90%. Repeat ABG 8/16 with pH 7.53, pCO2 56.8, pO2 65, HCO3 47.8, LA 2.6. Updated Echo w/bubble study read: no intracardiac shunt identified.    - S/p courses of vanc/zosyn/azithromycin, meropenem/diflucan stopped 8/15  - S/p 3d course of high dose steroids IV solumedrol 125mg q4h; low suspicion for vasculitis given lack of hematuria and weakly positive BRANDYN  - Patient with significant improvement in respiratory status 8/14, able to transition to CF from bipap - on 50L 53% O2  -CXR 8/15 with stable right cardiophrenic and left basilar-retrocardiac opacities  -Lasix held 8/19 due to hypotension overnight, euvolemic on exam 8/20 - will not re-start lasix at this time  - CF decreased to 30L/min 38% O2, will transition to bubble flow today 8/21

## 2022-08-20 NOTE — ASSESSMENT & PLAN NOTE
Patient with paroxysmal atrial fibrillation during admission  - Currently on Amiodarone 400mg BID x 10days, to transition to 200mg daily on 8/23  - Therapeutic lovenox 90mg BID for anticoagulation  - lopressor 50mg BID for rate control, decreased 8/21 from 100mg BID due to pulse rate in 60s

## 2022-08-20 NOTE — ASSESSMENT & PLAN NOTE
Patient with Hypoxic Respiratory failure which is Acute on chronic.  she is not on home oxygen.   Supplemental oxygen was provided and noted- Oxygen Concentration (%):  [50-55] 50.   Signs/symptoms of respiratory failure include- tachypnea. Contributing diagnoses includes - ARDS, Aspiration, CHF, COPD, Interstitial lung disease, Pleural effusion, Pneumonia and Pulmonary Embolus Labs and images were reviewed. Patient Has recent ABG, which has been reviewed. Will treat underlying causes and adjust management of respiratory failure.      -CF 50L 53% O2, bipap qhs  -improving

## 2022-08-20 NOTE — ASSESSMENT & PLAN NOTE
Patient is identified as having Diastolic (HFpEF) heart failure that is Acute on chronic. CHF is currently uncontrolled due to Pulmonary edema/pleural effusion on CXR. Latest ECHO performed and demonstrates-     Echo  · The left ventricle is normal in size with concentric remodeling and normal systolic function.  · The estimated ejection fraction is 68%.  · Indeterminate left ventricular diastolic function.  · Normal right ventricular size with normal right ventricular systolic function.  · Mild tricuspid regurgitation.  · Mild pulmonic regurgitation.  · Elevated central venous pressure (15 mmHg).  · The estimated PA systolic pressure is 49 mmHg.  · There is pulmonary hypertension.  · Small posterior pericardial effusion. Trivial lateral and under the atria.  · There is a small left pleural effusion.  · No shunt by Air Contrast Bubble.      - 1.5L fluid restriction  - Strict Is/Os, daily weights  - Lasix discontinued, patient euvolemic

## 2022-08-20 NOTE — PROGRESS NOTES
[facility] - Card[facility]  Critical Care Medicine  Progress Note    Patient Name: Vale Gutierrez  MRN: [id number]  Admission Date: 8/1/2022  Hospital Length of Stay: 19 days  Code Status: Full Code  Attending Provider: Luis Ward MD  Primary Care Provider: Estela Mcdaniel MD   Principal Problem: Acute hypoxemic respiratory failure    Subjective:     HPI:  Ms. Gutierrez is a 60 y/o female with a PMH of etoh abuse and withdrawal, aspiration pneumonia, delirium, smoking, HTN, HLD, BPPV, alcohol abuse, depression and anxiety who was admitted to the ICU from the floor for acute hypoxic respiratory failure. She was having increased oxygen needs on the floor and was requiring valium to keep her presumed alcohol withdrawal under control. The patient states that her stomach hurts in the midepigastric region and endorses nausea, vomiting x 1 day. She is unsure how long she has been coughing or feeling ill, and says she cannot remember the last few days. She says she lives with her sister Angela who helps care for her.       Hospital/ICU Course:  Patient presented to the ED on 8/1 with a CC of hypoxia and dyspnea. She was initially admitted to the floor. She experienced hallucinations and tremor, consistent with alcohol withdrawal / delirium, and was put on CIWA protocol with valium PRN (shortage of ativan). She was treated with antibiotics (vancomycin, zosyn, and azithro) scheduled for 10 days and duonebs prn. On 8/7 she developed worsening hypoxic respiratory failure, was put on 50L 100% O2 bipap, and admitted to the ICU due to increased oxygen requirements. Work up revealed small pericardial effusion, cardiomegaly, normal LVEF, pleural effusions and opacities bilaterally, and atrial fibrillation. Patient has now completed courses of zosyn, vancomycin, and azithromycin. She was subsequently given diflucan  and meropenem out of concern for respiratory infection, stopped 8/15. On lasix BID for pulmonary  edema. She was started on high dose steroids 8/13 as there was concern for autoimmune respiratory process, and labs were sent for C-ANCA and P-ANCA (currently pending). She previously had a +BRANDYN titer, but antiCCP, RF, and  anti-dsDNA were negative. Patient's CXR and respiratory status improving on 8/14, able to tolerate CF down to 64% O2. 8/16 patient O2 requirements increasing, O2 increased to 90%.  Repeat CXR 8/16 with right cardiophrenic and left basilar-retrocardiac opacities. Repeat CT chest with no remarkable changes, continues to have atelectasis. 8/17 weaned to 50L, 70% O2. 8/19 on 50L, 55% O2. Patient has been accepted to LTAC, pending insurance.     Of note, discussed the potential need for endotracheal intubation and she expressed understanding and stated she would like to have that done if her respiratory  status declines to the point of needing it.           Interval History/Significant Events: Patient feels well this morning and is ready to go to LTAC. She states that she has been urinating but notes that her external catheter leaked last night. She is on 50L/min 53% O2 CF.    Review of Systems   Constitutional:  Negative for chills and fever.   HENT:  Negative for congestion and rhinorrhea.    Eyes:  Negative for photophobia and visual disturbance.   Respiratory:  Positive for cough and shortness of breath (improved).    Cardiovascular:  Negative for chest pain and leg swelling.   Gastrointestinal:  Negative for abdominal distention, abdominal pain, diarrhea, nausea and vomiting.   Genitourinary:  Negative for dysuria.   Musculoskeletal:  Positive for back pain (chronic).   Skin:  Negative for rash.   Neurological:  Negative for light-headedness and headaches.   Psychiatric/Behavioral:  Negative for agitation and confusion.    Objective:     Vital Signs (Most Recent):  Temp: 99.1 °F (37.3 °C) (08/20/22 0330)  Pulse: 98 (08/20/22 0740)  Resp: (!) 28 (08/20/22 0740)  BP: (!) 112/57 (08/20/22  0600)  SpO2: 98 % (08/20/22 0740)   Vital Signs (24h Range):  Temp:  [98 °F (36.7 °C)-99.2 °F (37.3 °C)] 99.1 °F (37.3 °C)  Pulse:  [] 98  Resp:  [14-45] 28  SpO2:  [89 %-99 %] 98 %  BP: ()/(50-82) 112/57   Weight: 90.5 kg (199 lb 8.3 oz)  Body mass index is 34.25 kg/m².      Intake/Output Summary (Last 24 hours) at 8/20/2022 0759  Last data filed at 8/19/2022 2251  Gross per 24 hour   Intake 605.03 ml   Output 500 ml   Net 105.03 ml         Physical Exam  Constitutional:       Appearance: She is obese.   HENT:      Head: Normocephalic and atraumatic.      Right Ear: External ear normal.      Left Ear: External ear normal.      Nose: Nose normal.      Mouth/Throat:      Mouth: Mucous membranes are moist.      Pharynx: Oropharynx is clear.   Eyes:      Extraocular Movements: Extraocular movements intact.      Conjunctiva/sclera: Conjunctivae normal.   Cardiovascular:      Rate and Rhythm: Normal rate and regular rhythm.   Pulmonary:      Breath sounds: Rales (bibasilar, improved) present. No wheezing.      Comments: Comfort flow 50L/min  Abdominal:      General: Abdomen is flat. Bowel sounds are normal. There is no distension.      Palpations: Abdomen is soft.      Tenderness: There is no abdominal tenderness.   Musculoskeletal:         General: Normal range of motion.      Cervical back: Normal range of motion.      Right lower leg: No edema.      Left lower leg: No edema.   Skin:     General: Skin is warm and dry.   Neurological:      General: No focal deficit present.      Mental Status: She is alert.   Psychiatric:         Mood and Affect: Mood normal.         Behavior: Behavior normal.       Vents:  Oxygen Concentration (%): 53 (08/20/22 0740)  Lines/Drains/Airways       Peripheral Intravenous Line  Duration                  Peripheral IV - Single Lumen 08/18/22 8727 20 G Left Antecubital 2 days                  Significant Labs:    CBC/Anemia Profile:  Recent Labs   Lab 08/19/22  0343 08/20/22  9197    WBC 8.21 8.33   HGB 8.2* 8.2*   HCT 29.0* 28.9*    486*   MCV 84 83   RDW 17.5* 17.6*          Chemistries:  Recent Labs   Lab 08/19/22  0343 08/20/22  0437    141   K 3.7 4.5   CL 98 97   CO2 38* 35*   BUN 43* 27*   CREATININE 0.8 0.8   CALCIUM 8.1* 8.6*   ALBUMIN 2.0* 2.1*   PROT 5.0* 5.3*   BILITOT 0.3 0.3   ALKPHOS 112 121   * 89*   * 47*   MG 1.7 2.2   PHOS 3.5 3.2         All pertinent labs within the past 24 hours have been reviewed.    Significant Imaging:  I have reviewed all pertinent imaging results/findings within the past 24 hours.      ABG  Recent Labs   Lab 08/18/22  0902   PH 7.432   PO2 18*   PCO2 77.8*   HCO3 51.8*   BE 28     Assessment/Plan:     Neuro  Chronic pain disorder  Patient has a history of chronic back pain, for which she had surgery in October 2020. She has been followed by a pain management doctor and takes percocet daily for pain.   - Percocet 5mg q6h prn available    Psychiatric  Recurrent major depressive disorder, in full remission  - Continue home Mirtazapine 7.5mg qd and venlafaxine 225mg q.d.    Alcohol use disorder, severe, dependence  Patient reportedly had tremors and hallucinations on the floor 8/5-8/7 concerning for withdrawal. Has history of complicated withdrawal hx requiring hospitalization at  per sister   - On [facility] protocol with PRN BZDs on arrival, no longer exhibiting sxs of alcohol withdrawal  - Thiamine 100mg q.d.    Pulmonary  * Acute hypoxemic respiratory failure  Patient stepped up to MICU for AHRF. Concern for aspiration pna vs autoimmune respiratory process vs pulmonary edema as etiology. Transitioned to CF 8/14, CXR with improving aeration. 8/16 with increasing O2 requirements, placed on 90%. Repeat ABG 8/16 with pH 7.53, pCO2 56.8, pO2 65, HCO3 47.8, LA 2.6. Updated Echo w/bubble study read: no intracardiac shunt identified.  - S/p courses of vanc/zosyn/azithromycin, meropenem/diflucan stopped 8/15  - S/p 3d course of high  dose steroids IV solumedrol 125mg q4h   - Patient with significant improvement in respiratory status 8/14, able to transition to CF from bipap - on 50L 53% O2  -CXR 8/15 with stable right cardiophrenic and left basilar-retrocardiac opacities  -Lasix held 8/19 due to hypotension overnight, euvolemic on exam 8/20 - will not re-start lasix at this time      Aspiration pneumonia  See AHRF         Acute on chronic respiratory failure with hypercapnia  Patient with Hypoxic Respiratory failure which is Acute on chronic.  she is not on home oxygen. Supplemental oxygen was provided and noted- Oxygen Concentration (%):  [45-55] 53.   Signs/symptoms of respiratory failure include- tachypnea. Contributing diagnoses includes - ARDS, Aspiration, CHF, COPD, Interstitial lung disease, Pleural effusion, Pneumonia and Pulmonary Embolus Labs and images were reviewed. Patient Has recent ABG, which has been reviewed. Will treat underlying causes and adjust management of respiratory failure.     -CF 50L 53% O2, bipap qhs  -improving    Community acquired pneumonia of right lower lobe of lung  See AHRF   S/p comprehensive antibiotic treatment and high-dose steroids, remains afebrile with mild improvements in lung imaging.      Cardiac/Vascular  Paroxysmal atrial fibrillation  Patient with paroxysmal atrial fibrillation during admission  - Currently on Amiodarone 400mg BID x 10days, to transition to 200mg daily on 8/23  - Therapeutic lovenox 90mg BID for anticoagulation  - lopressor 100mg BID for rate control    Acute on chronic diastolic congestive heart failure  Patient is identified as having Diastolic (HFpEF) heart failure that is Acute on chronic. CHF is currently uncontrolled due to Pulmonary edema/pleural effusion on CXR. Latest ECHO performed and demonstrates-     Echo  · The left ventricle is normal in size with concentric remodeling and normal systolic function.  · The estimated ejection fraction is 68%.  · Indeterminate left  ventricular diastolic function.  · Normal right ventricular size with normal right ventricular systolic function.  · Mild tricuspid regurgitation.  · Mild pulmonic regurgitation.  · Elevated central venous pressure (15 mmHg).  · The estimated PA systolic pressure is 49 mmHg.  · There is pulmonary hypertension.  · Small posterior pericardial effusion. Trivial lateral and under the atria.  · There is a small left pleural effusion.  · No shunt by Air Contrast Bubble.      - 1.5L fluid restriction  - Strict Is/Os, daily weights  - Lasix discontinued, patient euvolemic            Essential hypertension  Home Med: Metoprolol 50mg po q.d.  - lopressor 100mg BID for rate control 8/19 due to hypotension overnight    GI  Transaminitis  Patient with extensive hx EtOH use. , . Liver US 8/18 unremarkable. 8/20 AST 47, ALT 89 - improving.    -monitor with daily CMP     Critical Care Daily Checklist:    A: Awake: RASS Goal/Actual Goal: RASS Goal: 0-->alert and calm  Actual: Goldman Agitation Sedation Scale (RASS): Drowsy   B: Spontaneous Breathing Trial Performed?     C: SAT & SBT Coordinated?  n/a                      D: Delirium: CAM-ICU Overall CAM-ICU: Negative   E: Early Mobility Performed? Yes   F: Feeding Goal: Goals: Meet % EEN, EPN by RD f/u date  Status: Nutrition Goal Status: goal met   Current Diet Order   Procedures    Diet Adult Regular (IDDSI Level 7) Fluid - 1500mL     Order Specific Question:   Fluid restriction:     Answer:   Fluid - 1500mL      AS: Analgesia/Sedation available   T: Thromboembolic Prophylaxis lovenox 90mg   H: HOB > 300 Yes   U: Stress Ulcer Prophylaxis (if needed) n/a   G: Glucose Control yes   B: Bowel Function Stool Occurrence: 1   I: Indwelling Catheter (Lines & [place]) Necessity PIV x1   D: De-escalation of Antimicrobials/Pharmacotherapies yes    Plan for the day/ETD Weaning O2, pending LTAC    Code Status:  Family/Goals of Care: Full Code  LTAC for PT       Critical  secondary to Patient has a condition that poses threat to life and bodily function: Severe Respiratory Distress      Critical care was time spent personally by me on the following activities: development of treatment plan with patient or surrogate and bedside caregivers, discussions with consultants, evaluation of patient's response to treatment, examination of patient, ordering and performing treatments and interventions, ordering and review of laboratory studies, ordering and review of radiographic studies, pulse oximetry, re-evaluation of patient's condition. This critical care time did not overlap with that of any other provider or involve time for any procedures.     SILVIO BERMUDEZ MD  Critical Care Medicine  [facility] - Cardiac Medical ICU

## 2022-08-20 NOTE — ASSESSMENT & PLAN NOTE
Lumbosacral radiculopathy  Patient has a history of chronic back pain, for which she had surgery in October 2020. She has been followed by a pain management doctor and takes percocet daily for pain.   - Percocet 5mg q6h prn available

## 2022-08-20 NOTE — ASSESSMENT & PLAN NOTE
Home Med: Metoprolol 50mg po q.d.  - llopressor per afib  -Will continue to monitor blood pressure trend closely and adjust antihypertensive regimen as clinically indicated and tolerated.

## 2022-08-20 NOTE — ASSESSMENT & PLAN NOTE
Patient with paroxysmal atrial fibrillation during admission  - Currently on Amiodarone 400mg BID x 10days, to transition to 200mg daily on 8/23  - Therapeutic lovenox 90mg BID for anticoagulation  - lopressor 100mg BID for rate control

## 2022-08-20 NOTE — SUBJECTIVE & OBJECTIVE
Interval History:  Patient feels well this morning and is ready to go to LTAC. She states that she has been urinating but notes that her external catheter leaked last night. She is on 50L/min 53% O2 CF.    Review of Systems   Constitutional:  Negative for chills and fever.   HENT:  Negative for congestion and rhinorrhea.    Eyes:  Negative for photophobia and visual disturbance.   Respiratory:  Positive for cough and shortness of breath (improved).    Cardiovascular:  Negative for chest pain and leg swelling.   Gastrointestinal:  Negative for abdominal distention, abdominal pain, diarrhea, nausea and vomiting.   Genitourinary:  Negative for dysuria.   Musculoskeletal:  Positive for back pain (chronic).   Skin:  Negative for rash.   Neurological:  Negative for light-headedness and headaches.   Psychiatric/Behavioral:  Negative for agitation and confusion.    Objective:     Vital Signs (Most Recent):  Temp: 97.7 °F (36.5 °C) (08/20/22 1500)  Pulse: 95 (08/20/22 1551)  Resp: (!) 24 (08/20/22 1551)  BP: (!) 111/59 (08/20/22 1500)  SpO2: 98 % (08/20/22 1551)   Vital Signs (24h Range):  Temp:  [97.7 °F (36.5 °C)-99.2 °F (37.3 °C)] 97.7 °F (36.5 °C)  Pulse:  [] 95  Resp:  [14-45] 24  SpO2:  [89 %-99 %] 98 %  BP: ()/(50-89) 111/59     Weight: 90.5 kg (199 lb 8.3 oz)  Body mass index is 34.25 kg/m².    Intake/Output Summary (Last 24 hours) at 8/20/2022 1636  Last data filed at 8/20/2022 1130  Gross per 24 hour   Intake 350 ml   Output 852 ml   Net -502 ml      Physical Exam  Constitutional:       Appearance: She is obese.   HENT:      Head: Normocephalic and atraumatic.      Right Ear: External ear normal.      Left Ear: External ear normal.      Nose: Nose normal.      Mouth/Throat:      Mouth: Mucous membranes are moist.      Pharynx: Oropharynx is clear.   Eyes:      Extraocular Movements: Extraocular movements intact.      Conjunctiva/sclera: Conjunctivae normal.   Cardiovascular:      Rate and Rhythm: Normal  rate and regular rhythm.   Pulmonary:      Breath sounds: Rales (bibasilar, improved) present. No wheezing.      Comments: Comfort flow 50L/min  Abdominal:      General: Abdomen is flat. Bowel sounds are normal. There is no distension.      Palpations: Abdomen is soft.      Tenderness: There is no abdominal tenderness.   Musculoskeletal:         General: Normal range of motion.      Cervical back: Normal range of motion.      Right lower leg: No edema.      Left lower leg: No edema.   Skin:     General: Skin is warm and dry.   Neurological:      General: No focal deficit present.      Mental Status: She is alert.   Psychiatric:         Mood and Affect: Mood normal.         Behavior: Behavior normal.       Significant Labs: All pertinent labs within the past 24 hours have been reviewed.    Recent Results (from the past 24 hour(s))   Phosphorus    Collection Time: 08/20/22  4:37 AM   Result Value Ref Range    Phosphorus 3.2 2.7 - 4.5 mg/dL   CBC auto differential    Collection Time: 08/20/22  4:37 AM   Result Value Ref Range    WBC 8.33 3.90 - 12.70 K/uL    RBC 3.49 (L) 4.00 - 5.40 M/uL    Hemoglobin 8.2 (L) 12.0 - 16.0 g/dL    Hematocrit 28.9 (L) 37.0 - 48.5 %    MCV 83 82 - 98 fL    MCH 23.5 (L) 27.0 - 31.0 pg    MCHC 28.4 (L) 32.0 - 36.0 g/dL    RDW 17.6 (H) 11.5 - 14.5 %    Platelets 486 (H) 150 - 450 K/uL    MPV 10.8 9.2 - 12.9 fL    Immature Granulocytes 0.6 (H) 0.0 - 0.5 %    Gran # (ANC) 5.2 1.8 - 7.7 K/uL    Immature Grans (Abs) 0.05 (H) 0.00 - 0.04 K/uL    Lymph # 2.2 1.0 - 4.8 K/uL    Mono # 0.6 0.3 - 1.0 K/uL    Eos # 0.3 0.0 - 0.5 K/uL    Baso # 0.01 0.00 - 0.20 K/uL    nRBC 0 0 /100 WBC    Gran % 61.8 38.0 - 73.0 %    Lymph % 26.2 18.0 - 48.0 %    Mono % 7.3 4.0 - 15.0 %    Eosinophil % 4.0 0.0 - 8.0 %    Basophil % 0.1 0.0 - 1.9 %    Differential Method Automated    Comprehensive metabolic panel    Collection Time: 08/20/22  4:37 AM   Result Value Ref Range    Sodium 141 136 - 145 mmol/L    Potassium  4.5 3.5 - 5.1 mmol/L    Chloride 97 95 - 110 mmol/L    CO2 35 (H) 23 - 29 mmol/L    Glucose 75 70 - 110 mg/dL    BUN 27 (H) 8 - 23 mg/dL    Creatinine 0.8 0.5 - 1.4 mg/dL    Calcium 8.6 (L) 8.7 - 10.5 mg/dL    Total Protein 5.3 (L) 6.0 - 8.4 g/dL    Albumin 2.1 (L) 3.5 - 5.2 g/dL    Total Bilirubin 0.3 0.1 - 1.0 mg/dL    Alkaline Phosphatase 121 55 - 135 U/L    AST 47 (H) 10 - 40 U/L    ALT 89 (H) 10 - 44 U/L    Anion Gap 9 8 - 16 mmol/L    eGFR >60.0 >60 mL/min/1.73 m^2   Magnesium    Collection Time: 08/20/22  4:37 AM   Result Value Ref Range    Magnesium 2.2 1.6 - 2.6 mg/dL         Significant Imaging: I have reviewed all pertinent imaging results/findings within the past 24 hours.    Imaging Results               CTA Chest Non-Coronary (PE Study) (Final result)  Result time 08/01/22 23:43:02      Final result by Christian Wise MD (08/01/22 23:43:02)                   Impression:      1. No pulmonary embolism to the segmental level.  2. Opacification in the lingula and right lower lobe with air bronchograms representing possibly early consolidation from infectious process or atelectasis.  3. Multifocal regions of ground-glass opacifications bilaterally suggestive of edema, infectious or inflammatory process.  4. Elevation of the right hemidiaphragm, similar in comparison to oldest chest x-ray dating back to 02/11/2020.  5. Cardiomegaly with moderate pericardial effusion.  6. Fusiform aneurysmal dilation of the ascending aorta measuring up to 4.1 cm.  7. Enlargement of the main pulmonary artery, suggestive of pulmonary hypertension.  This report was flagged in Epic as abnormal.    Electronically signed by resident: Sybil Mora  Date:    08/01/2022  Time:    22:34    Electronically signed by: Christian Wise MD  Date:    08/01/2022  Time:    23:43               Narrative:    EXAMINATION:  CTA CHEST NON CORONARY    CLINICAL HISTORY:  Pulmonary embolism (PE) suspected, high prob;    TECHNIQUE:  Low dose axial  images, sagittal and coronal reformations were obtained from the thoracic inlet to the lung bases following the IV administration of 100 mL of Omnipaque-350.  Scan technique was optimized to evaluate the pulmonary arteries.  MIP images were performed.    COMPARISON:  Chest x-ray 08/01/2022, 10/21/2021, and 02/11/2020.    FINDINGS:  Adequate timing of the contrast bolus.  No pulmonary artery filling defects to the segmental level.  Bilateral bandlike opacifications favored to represent scarring or atelectasis.  Opacification in the lingula and right lower lobe lung base with air bronchograms representing either consolidation or atelectasis.  Multifocal regions of ground-glass opacifications suggestive of infectious or inflammatory process.  Elevation of the right hemidiaphragm.  Trace bilateral pleural effusion.  No pneumothorax.    Cardiomegaly with moderate pericardial effusion.  There is fusiform aneurysmal dilation of the ascending aorta measuring 4.1 cm (coronal series 601, image 117).  There is minimal calcific atherosclerosis of the aorta.  The main pulmonary artery is enlarged measuring 4.6 cm in diameter, suggestive of possible pulmonary hypertension.  No mediastinal, hilar or axillary lymphadenopathy.  The visualized thyroid gland is normal.    Limited views of the upper abdomen are normal.    Visualized bones and soft tissues are normal.                                       CT Head Without Contrast (Final result)  Result time 08/01/22 21:31:23      Final result by Timothy Alvarado MD (08/01/22 21:31:23)                   Impression:      1. No acute intracranial process.  2. Involutional changes with chronic microvascular ischemic changes.  Small remote lacunar infarct of the left caudate head.      Electronically signed by: Timothy Alvarado  Date:    08/01/2022  Time:    21:31               Narrative:    EXAMINATION:  CT HEAD WITHOUT CONTRAST    CLINICAL HISTORY:  Mental status change, unknown  cause;    TECHNIQUE:  Low dose axial CT images obtained throughout the head without intravenous contrast. Sagittal and coronal reconstructions were performed.    COMPARISON:  10/21/2021    FINDINGS:  Intracranial compartment:    Ventricles and sulci are normal in size for age without evidence of hydrocephalus. No extra-axial blood or fluid collections.    Moderate involutional changes and chronic microvascular ischemic changes in the periventricular white matter.  Small remote lacunar infarct of the left caudate head.    No parenchymal mass, hemorrhage, edema or major vascular distribution infarct.    Skull/extracranial contents (limited evaluation): No fracture. Mastoid air cells and paranasal sinuses are essentially clear.                                       X-Ray Chest AP Portable (Final result)  Result time 08/01/22 18:42:25      Final result by Timothy Alvarado MD (08/01/22 18:42:25)                   Impression:      See above comments.  Recommend follow-up.      Electronically signed by: Timothy Alvarado  Date:    08/01/2022  Time:    18:42               Narrative:    EXAMINATION:  XR CHEST AP PORTABLE    CLINICAL HISTORY:  sob;    TECHNIQUE:  Single frontal view of the chest was performed.    COMPARISON:  10/21/2021    FINDINGS:  Suboptimal inspiration may limit characterization.    Cardiac silhouette is borderline enlarged.    Mild perihilar interstitial changes may be associated with atelectasis or mild edema.  Mild bibasilar atelectasis.    Probable small bibasilar pleural effusions.  No evidence of pneumothorax.  No acute osseous abnormality.

## 2022-08-20 NOTE — ASSESSMENT & PLAN NOTE
Patient stepped up to MICU for AHRF. Concern for aspiration pna vs autoimmune respiratory process vs pulmonary edema as etiology. Transitioned to CF 8/14, CXR with improving aeration. 8/16 with increasing O2 requirements, placed on 90%. Repeat ABG 8/16 with pH 7.53, pCO2 56.8, pO2 65, HCO3 47.8, LA 2.6. Updated Echo w/bubble study read: no intracardiac shunt identified.  - S/p courses of vanc/zosyn/azithromycin, meropenem/diflucan stopped 8/15  - S/p 3d course of high dose steroids IV solumedrol 125mg q4h   - Patient with significant improvement in respiratory status 8/14, able to transition to CF from bipap - on 50L 53% O2  -CXR 8/15 with stable right cardiophrenic and left basilar-retrocardiac opacities  -Lasix held 8/19 due to hypotension overnight, euvolemic on exam 8/20 - will not re-start lasix at this time

## 2022-08-20 NOTE — ASSESSMENT & PLAN NOTE
Patient is identified as having Diastolic (HFpEF) heart failure that is Acute on chronic. Latest ECHO performed and demonstrates- Results for orders placed during the hospital encounter of 08/01/22    Echo    Interpretation Summary  · The left ventricle is normal in size with concentric hypertrophy and normal systolic function.  · The estimated ejection fraction is 60-65%.  · Grade I left ventricular diastolic dysfunction.  · Mild aortic regurgitation.  · Normal right ventricular size with normal right ventricular systolic function.  · The estimated PA systolic pressure is 43 mmHg.  · Intermediate central venous pressure (8 mmHg).  · There is pulmonary hypertension.  · Small circumferential pericardial effusion.  · Mild left atrial enlargement.      1.5L fluid restriction  - Strict Is/Os, daily weights  - Lasix discontinued, patient euvolemic

## 2022-08-20 NOTE — PLAN OF CARE
CMICU DAILY GOALS       A: Awake    RASS: Goal - RASS Goal: 0-->alert and calm  Actual - RASS (Goldman Agitation-Sedation Scale): 1-->restless   Restraint necessity: Clinical Justification: Removing medical devices, Climbing out of bed, Treatment Interference  B: Breathe   SBT: Not intubated   C: Coordinate A & B, analgesics/sedatives   Pain: managed    SAT: Not intubated  D: Delirium   CAM-ICU: Overall CAM-ICU: Negative  E: Early(intubated/ Progressive (non-intubated) Mobility   MOVE Screen: Pass   Activity: Activity Management: Rolling - L1  FAS: Feeding/Nutrition   Diet order: Diet/Nutrition Received: regular,    T: Thrombus   DVT prophylaxis: VTE Required Core Measure: Pharmacological prophylaxis initiated/maintained  H: HOB Elevation   Head of Bed (HOB) Positioning: HOB at 30 degrees  U: Ulcer Prophylaxis   GI: yes  G: Glucose control   managed    S: Skin   Bathing/Skin Care: incontinence care, linen changed, dressed/undressed  Device Skin Pressure Protection: absorbent pad utilized/changed, adhesive use limited  Pressure Reduction Devices: foam padding utilized  Pressure Reduction Techniques: frequent weight shift encouraged, pressure points protected  Skin Protection: adhesive use limited  B: Bowel Function   no issues   I: Indwelling Catheters   [place] necessity:     CVC necessity: No  D: De-escalation Antibiotics   Yes    Family/Goals of care/Code Status   Code Status: Full Code    24H Vital Sign Range  Temp:  [98 °F (36.7 °C)-99.2 °F (37.3 °C)]   Pulse:  []   Resp:  [14-45]   BP: ()/(50-82)   SpO2:  [89 %-99 %]      Shift Events   No acute events throughout shift    VS and assessment per flow sheet, patient progressing towards goals as tolerated, plan of care reviewed with  Ms Gutierrez , all concerns addressed, will continue to monitor.    Faey Mckeon

## 2022-08-20 NOTE — ASSESSMENT & PLAN NOTE
Patient with Hypoxic Respiratory failure which is Acute on chronic.  she is not on home oxygen. Supplemental oxygen was provided and noted- Oxygen Concentration (%):  [45-55] 53.   Signs/symptoms of respiratory failure include- tachypnea. Contributing diagnoses includes - ARDS, Aspiration, CHF, COPD, Interstitial lung disease, Pleural effusion, Pneumonia and Pulmonary Embolus Labs and images were reviewed. Patient Has recent ABG, which has been reviewed. Will treat underlying causes and adjust management of respiratory failure.     -CF 50L 53% O2, bipap qhs  -improving

## 2022-08-20 NOTE — RESIDENT HANDOFF
Handoff     Primary Team: [facility] Room Number: [id number] A     Patient Name: Vale Gutierrez MRN: [id number]     Date of Birth: [id number] Allergies: Patient has no known allergies.     Age: 61 y.o. Admit Date: 8/1/2022     Sex: female  BMI: Body mass index is 34.25 kg/m².     Code Status: Full Code        Illness Level (current clinical status): Watcher - No    Reason for Admission: Acute hypoxemic respiratory failure    Brief HPI (pertinent PMH and diagnosis or differential diagnosis): Ms. Gutierrez is a 62 y/o female with a PMH of etoh abuse and withdrawal, aspiration pneumonia, delirium, smoking, HTN, HLD, BPPV, alcohol abuse, depression and anxiety who was admitted for dyspnea, CXR with consolidations concerning for possible aspiration pna. She experienced alcohol withdrawal hallucinations and tremor and was put on CIWA protocol with PRN valium. On 8/7 she developed increased O2 requirements and was  admitted to the ICU from the floor for acute hypoxic respiratory failure.         Procedure Date: none    Hospital Course (updated, brief assessment by system or problem, significant events): Patient has now completed courses of zosyn, vancomycin, and azithromycin. She was subsequently given diflucan and meropenem out of concern for respiratory infection, stopped 8/15. She was diuresed with Lasix for HFpEF and possible pulmonary edema, is now euvolemic. Patient has been tolerating CF and weaning O2 support, currently on 50L/min 53% O2.    Tasks (specific, using if-then statements):   -Chronic pain disorder: continue percocet 5mg q6h PRN - patient follows with pain management outpatient  -MDD: continue home mirtazapine and venlafaxine  -AHRF: weaning O2, currently on CF 50L/min 53%, bipap qhs  -HFpEF: s/p diuresis, euvolemic; monitor volume status  -pAFib: amio load 400mg PO BID until 8/23, then 200mg PO qday    Contingency Plan (special circumstances anticipated and plan): if patient develops  hemodynamic instability or worsening respiratory failure, please call MICU    Estimated Discharge Date: 8/22/2022    Discharge Disposition: Long Term Care    Mentored By: Dr. Demetrice Higginbotham

## 2022-08-20 NOTE — PLAN OF CARE
CMICU DAILY GOALS       A: Awake    RASS: Goal - RASS Goal: 0-->alert and calm  Actual - RASS (Goldman Agitation-Sedation Scale): 1-->restless   Restraint necessity: Clinical Justification: Removing medical devices, Climbing out of bed, Treatment Interference  B: Breathe   SBT: NA   C: Coordinate A & B, analgesics/sedatives   Pain: managed    SAT: Not attempted  D: Delirium   CAM-ICU: Overall CAM-ICU: Negative  E: Early(intubated/ Progressive (non-intubated) Mobility   MOVE Screen:  Patient encouraged get out of bed as much as possible. Patient is encouraged to use commode not [place]qu[place] to void   Activity: Activity Management: Walking in place - L3  FAS: Feeding/Nutrition   Diet order: Diet/Nutrition Received: regular,  Patient's appetite has been good all day  T: Thrombus   DVT prophylaxis: VTE Required Core Measure: Pharmacological prophylaxis initiated/maintained  H: HOB Elevation   Head of Bed (HOB) Positioning: HOB at 30 degrees  U: Ulcer Prophylaxis   GI: yes  G: Glucose control   Does not receive glucose checks     S: Skin   Bathing/Skin Care: incontinence care, linen changed (oral care)  Device Skin Pressure Protection: adhesive use limited  Pressure Reduction Devices: foam padding utilized  Pressure Reduction Techniques: frequent weight shift encouraged  Skin Protection: adhesive use limited  B: Bowel Function   Patient has had 3 soft brown stools today. Yari care given frequent.    I: Indwelling Catheters   [place] necessity:     CVC necessity: Yes  D: De-escalation Antibiotics   Yes    Family/Goals of care/Code Status   Code Status: Full Code    24H Vital Sign Range  Temp:  [97.7 °F (36.5 °C)-99.2 °F (37.3 °C)]   Pulse:  []   Resp:  [14-45]   BP: ()/(50-89)   SpO2:  [89 %-99 %]      Shift Events   Patient requires intense emotional support. Patient also very forgetful, she also repeats self frequent. She is hard of hearing. Patient desaturates with minimal movement.Patient also starts requesting  "pain med within 2 hours after does. Encouraged to deep breath, close eyes and relax. Patient stated at 1810, " I will call you again at 1830 for my pain medication" Continue to encourage patient to deep breathe and relax.     VS and assessment per flow sheet, patient progressing towards goals as tolerated, plan of care reviewed with  no family present today , all concerns addressed, will continue to monitor.    Addie Gandara   "

## 2022-08-20 NOTE — ASSESSMENT & PLAN NOTE
Home Med: Metoprolol 50mg po q.d.  - lopressor 100mg BID for rate control 8/19 due to hypotension overnight

## 2022-08-20 NOTE — ASSESSMENT & PLAN NOTE
Noted on imaging and evaluated by Cardiology 8/8: In the absence of clinically significant tamponade physiology no interventions recommended at this time

## 2022-08-20 NOTE — ASSESSMENT & PLAN NOTE
Patient reportedly had tremors and hallucinations on the floor 8/5-8/7 concerning for withdrawal. Has history of complicated withdrawal hx requiring hospitalization at  per sister   - On [facility] protocol with PRN BZDs on arrival, no longer exhibiting sxs of alcohol withdrawal  - Thiamine 100mg q.d.

## 2022-08-20 NOTE — ASSESSMENT & PLAN NOTE
Patient reportedly had tremors and hallucinations on the floor 8/5-8/7 concerning for withdrawal. Has history of complicated withdrawal hx requiring hospitalization at  per sister.    On CIWA protocol with PRN BZDs on arrival, no longer exhibiting sxs of alcohol withdrawal  Continue MVI, Thiamine and Folate daily.   Ethanol level on presentation was negative.   Trend hepatic function; AST to ALT ratio consistent with recent alcohol intake.

## 2022-08-20 NOTE — SUBJECTIVE & OBJECTIVE
Interval History/Significant Events: Patient feels well this morning and is ready to go to LTAC. She states that she has been urinating but notes that her external catheter leaked last night. She is on 50L/min 53% O2 CF.    Review of Systems   Constitutional:  Negative for chills and fever.   HENT:  Negative for congestion and rhinorrhea.    Eyes:  Negative for photophobia and visual disturbance.   Respiratory:  Positive for cough and shortness of breath (improved).    Cardiovascular:  Negative for chest pain and leg swelling.   Gastrointestinal:  Negative for abdominal distention, abdominal pain, diarrhea, nausea and vomiting.   Genitourinary:  Negative for dysuria.   Musculoskeletal:  Positive for back pain (chronic).   Skin:  Negative for rash.   Neurological:  Negative for light-headedness and headaches.   Psychiatric/Behavioral:  Negative for agitation and confusion.    Objective:     Vital Signs (Most Recent):  Temp: 99.1 °F (37.3 °C) (08/20/22 0330)  Pulse: 98 (08/20/22 0740)  Resp: (!) 28 (08/20/22 0740)  BP: (!) 112/57 (08/20/22 0600)  SpO2: 98 % (08/20/22 0740)   Vital Signs (24h Range):  Temp:  [98 °F (36.7 °C)-99.2 °F (37.3 °C)] 99.1 °F (37.3 °C)  Pulse:  [] 98  Resp:  [14-45] 28  SpO2:  [89 %-99 %] 98 %  BP: ()/(50-82) 112/57   Weight: 90.5 kg (199 lb 8.3 oz)  Body mass index is 34.25 kg/m².      Intake/Output Summary (Last 24 hours) at 8/20/2022 6659  Last data filed at 8/19/2022 2251  Gross per 24 hour   Intake 605.03 ml   Output 500 ml   Net 105.03 ml         Physical Exam  Constitutional:       Appearance: She is obese.   HENT:      Head: Normocephalic and atraumatic.      Right Ear: External ear normal.      Left Ear: External ear normal.      Nose: Nose normal.      Mouth/Throat:      Mouth: Mucous membranes are moist.      Pharynx: Oropharynx is clear.   Eyes:      Extraocular Movements: Extraocular movements intact.      Conjunctiva/sclera: Conjunctivae normal.   Cardiovascular:       Rate and Rhythm: Normal rate and regular rhythm.   Pulmonary:      Breath sounds: Rales (bibasilar, improved) present. No wheezing.      Comments: Comfort flow 50L/min  Abdominal:      General: Abdomen is flat. Bowel sounds are normal. There is no distension.      Palpations: Abdomen is soft.      Tenderness: There is no abdominal tenderness.   Musculoskeletal:         General: Normal range of motion.      Cervical back: Normal range of motion.      Right lower leg: No edema.      Left lower leg: No edema.   Skin:     General: Skin is warm and dry.   Neurological:      General: No focal deficit present.      Mental Status: She is alert.   Psychiatric:         Mood and Affect: Mood normal.         Behavior: Behavior normal.       Vents:  Oxygen Concentration (%): 53 (08/20/22 0740)  Lines/Drains/Airways       Peripheral Intravenous Line  Duration                  Peripheral IV - Single Lumen 08/18/22 0457 20 G Left Antecubital 2 days                  Significant Labs:    CBC/Anemia Profile:  Recent Labs   Lab 08/19/22  0343 08/20/22  0437   WBC 8.21 8.33   HGB 8.2* 8.2*   HCT 29.0* 28.9*    486*   MCV 84 83   RDW 17.5* 17.6*          Chemistries:  Recent Labs   Lab 08/19/22  0343 08/20/22  0437    141   K 3.7 4.5   CL 98 97   CO2 38* 35*   BUN 43* 27*   CREATININE 0.8 0.8   CALCIUM 8.1* 8.6*   ALBUMIN 2.0* 2.1*   PROT 5.0* 5.3*   BILITOT 0.3 0.3   ALKPHOS 112 121   * 89*   * 47*   MG 1.7 2.2   PHOS 3.5 3.2         All pertinent labs within the past 24 hours have been reviewed.    Significant Imaging:  I have reviewed all pertinent imaging results/findings within the past 24 hours.

## 2022-08-20 NOTE — PLAN OF CARE
CM contacted by Dr. Kristy Hernandez requested P2P update. Discharge plan, [facility], auth pending. CM has not received any calls today from [facility]. Regular CM, SKYLER Santoyo note 8/19 states she provided N the attending physician name and phone number to reach for P2P today.    CM updated Dr. Hernandez on status, will await call from [facility] and try to step-down the patient..      Naty Boston RN   - [facility]  [phone]

## 2022-08-21 LAB
ALBUMIN SERPL BCP-MCNC: 2.2 G/DL (ref 3.5–5.2)
ALP SERPL-CCNC: 116 U/L (ref 55–135)
ALT SERPL W/O P-5'-P-CCNC: 60 U/L (ref 10–44)
ANION GAP SERPL CALC-SCNC: 5 MMOL/L (ref 8–16)
AST SERPL-CCNC: 21 U/L (ref 10–40)
BASOPHILS # BLD AUTO: 0.01 K/UL (ref 0–0.2)
BASOPHILS NFR BLD: 0.1 % (ref 0–1.9)
BILIRUB SERPL-MCNC: 0.3 MG/DL (ref 0.1–1)
BUN SERPL-MCNC: 22 MG/DL (ref 8–23)
CALCIUM SERPL-MCNC: 8.8 MG/DL (ref 8.7–10.5)
CHLORIDE SERPL-SCNC: 99 MMOL/L (ref 95–110)
CO2 SERPL-SCNC: 32 MMOL/L (ref 23–29)
CREAT SERPL-MCNC: 0.8 MG/DL (ref 0.5–1.4)
DIFFERENTIAL METHOD: ABNORMAL
EOSINOPHIL # BLD AUTO: 0.4 K/UL (ref 0–0.5)
EOSINOPHIL NFR BLD: 4.3 % (ref 0–8)
ERYTHROCYTE [DISTWIDTH] IN BLOOD BY AUTOMATED COUNT: 17.8 % (ref 11.5–14.5)
EST. GFR  (NO RACE VARIABLE): >60 ML/MIN/1.73 M^2
GLUCOSE SERPL-MCNC: 91 MG/DL (ref 70–110)
HCT VFR BLD AUTO: 28.4 % (ref 37–48.5)
HGB BLD-MCNC: 8.2 G/DL (ref 12–16)
IMM GRANULOCYTES # BLD AUTO: 0.06 K/UL (ref 0–0.04)
IMM GRANULOCYTES NFR BLD AUTO: 0.6 % (ref 0–0.5)
LYMPHOCYTES # BLD AUTO: 2.3 K/UL (ref 1–4.8)
LYMPHOCYTES NFR BLD: 23.5 % (ref 18–48)
MAGNESIUM SERPL-MCNC: 2 MG/DL (ref 1.6–2.6)
MCH RBC QN AUTO: 24.3 PG (ref 27–31)
MCHC RBC AUTO-ENTMCNC: 28.9 G/DL (ref 32–36)
MCV RBC AUTO: 84 FL (ref 82–98)
MONOCYTES # BLD AUTO: 0.7 K/UL (ref 0.3–1)
MONOCYTES NFR BLD: 6.6 % (ref 4–15)
NEUTROPHILS # BLD AUTO: 6.4 K/UL (ref 1.8–7.7)
NEUTROPHILS NFR BLD: 64.9 % (ref 38–73)
NRBC BLD-RTO: 0 /100 WBC
PHOSPHATE SERPL-MCNC: 3.6 MG/DL (ref 2.7–4.5)
PLATELET # BLD AUTO: 418 K/UL (ref 150–450)
PMV BLD AUTO: 10.4 FL (ref 9.2–12.9)
POTASSIUM SERPL-SCNC: 4.5 MMOL/L (ref 3.5–5.1)
PROT SERPL-MCNC: 5.5 G/DL (ref 6–8.4)
RBC # BLD AUTO: 3.38 M/UL (ref 4–5.4)
SODIUM SERPL-SCNC: 136 MMOL/L (ref 136–145)
WBC # BLD AUTO: 9.92 K/UL (ref 3.9–12.7)

## 2022-08-21 PROCEDURE — 99233 PR SUBSEQUENT HOSPITAL CARE,LEVL III: ICD-10-PCS | Mod: ,,, | Performed by: INTERNAL MEDICINE

## 2022-08-21 PROCEDURE — 94640 AIRWAY INHALATION TREATMENT: CPT

## 2022-08-21 PROCEDURE — 25000003 PHARM REV CODE 250

## 2022-08-21 PROCEDURE — 94660 CPAP INITIATION&MGMT: CPT

## 2022-08-21 PROCEDURE — 84100 ASSAY OF PHOSPHORUS: CPT

## 2022-08-21 PROCEDURE — 99233 SBSQ HOSP IP/OBS HIGH 50: CPT | Mod: ,,, | Performed by: INTERNAL MEDICINE

## 2022-08-21 PROCEDURE — 27100171 HC OXYGEN HIGH FLOW UP TO 24 HOURS

## 2022-08-21 PROCEDURE — 27000221 HC OXYGEN, UP TO 24 HOURS

## 2022-08-21 PROCEDURE — 83735 ASSAY OF MAGNESIUM: CPT | Performed by: STUDENT IN AN ORGANIZED HEALTH CARE EDUCATION/TRAINING PROGRAM

## 2022-08-21 PROCEDURE — 25000242 PHARM REV CODE 250 ALT 637 W/ HCPCS: Performed by: HOSPITALIST

## 2022-08-21 PROCEDURE — 25000003 PHARM REV CODE 250: Performed by: INTERNAL MEDICINE

## 2022-08-21 PROCEDURE — 80053 COMPREHEN METABOLIC PANEL: CPT | Performed by: STUDENT IN AN ORGANIZED HEALTH CARE EDUCATION/TRAINING PROGRAM

## 2022-08-21 PROCEDURE — 94799 UNLISTED PULMONARY SVC/PX: CPT

## 2022-08-21 PROCEDURE — 63600175 PHARM REV CODE 636 W HCPCS

## 2022-08-21 PROCEDURE — 20000000 HC ICU ROOM

## 2022-08-21 PROCEDURE — 25000003 PHARM REV CODE 250: Performed by: HOSPITALIST

## 2022-08-21 PROCEDURE — 85025 COMPLETE CBC W/AUTO DIFF WBC: CPT | Performed by: STUDENT IN AN ORGANIZED HEALTH CARE EDUCATION/TRAINING PROGRAM

## 2022-08-21 PROCEDURE — 99900035 HC TECH TIME PER 15 MIN (STAT)

## 2022-08-21 PROCEDURE — 94761 N-INVAS EAR/PLS OXIMETRY MLT: CPT

## 2022-08-21 RX ORDER — METOPROLOL TARTRATE 50 MG/1
50 TABLET ORAL 2 TIMES DAILY
Status: DISCONTINUED | OUTPATIENT
Start: 2022-08-21 | End: 2022-08-23 | Stop reason: HOSPADM

## 2022-08-21 RX ADMIN — AMIODARONE HYDROCHLORIDE 400 MG: 200 TABLET ORAL at 08:08

## 2022-08-21 RX ADMIN — ACETAMINOPHEN 650 MG: 325 TABLET ORAL at 08:08

## 2022-08-21 RX ADMIN — BUSPIRONE HYDROCHLORIDE 5 MG: 5 TABLET ORAL at 09:08

## 2022-08-21 RX ADMIN — AMIODARONE HYDROCHLORIDE 400 MG: 200 TABLET ORAL at 09:08

## 2022-08-21 RX ADMIN — IPRATROPIUM BROMIDE AND ALBUTEROL SULFATE 3 ML: 2.5; .5 SOLUTION RESPIRATORY (INHALATION) at 07:08

## 2022-08-21 RX ADMIN — METOPROLOL TARTRATE 100 MG: 100 TABLET, FILM COATED ORAL at 08:08

## 2022-08-21 RX ADMIN — ENOXAPARIN SODIUM 90 MG: 100 INJECTION SUBCUTANEOUS at 09:08

## 2022-08-21 RX ADMIN — MIRTAZAPINE 7.5 MG: 7.5 TABLET, FILM COATED ORAL at 09:08

## 2022-08-21 RX ADMIN — ENOXAPARIN SODIUM 90 MG: 100 INJECTION SUBCUTANEOUS at 08:08

## 2022-08-21 RX ADMIN — THIAMINE HCL TAB 100 MG 100 MG: 100 TAB at 08:08

## 2022-08-21 RX ADMIN — BUTALBITAL, ACETAMINOPHEN, AND CAFFEINE 1 TABLET: 50; 325; 40 TABLET ORAL at 06:08

## 2022-08-21 RX ADMIN — OXYCODONE HYDROCHLORIDE AND ACETAMINOPHEN 1 TABLET: 5; 325 TABLET ORAL at 06:08

## 2022-08-21 RX ADMIN — OXYCODONE HYDROCHLORIDE AND ACETAMINOPHEN 1 TABLET: 5; 325 TABLET ORAL at 10:08

## 2022-08-21 RX ADMIN — Medication 6 MG: at 09:08

## 2022-08-21 RX ADMIN — IPRATROPIUM BROMIDE AND ALBUTEROL SULFATE 3 ML: 2.5; .5 SOLUTION RESPIRATORY (INHALATION) at 03:08

## 2022-08-21 RX ADMIN — OXYCODONE HYDROCHLORIDE AND ACETAMINOPHEN 1 TABLET: 5; 325 TABLET ORAL at 12:08

## 2022-08-21 RX ADMIN — IPRATROPIUM BROMIDE AND ALBUTEROL SULFATE 3 ML: 2.5; .5 SOLUTION RESPIRATORY (INHALATION) at 11:08

## 2022-08-21 RX ADMIN — BUTALBITAL, ACETAMINOPHEN, AND CAFFEINE 1 TABLET: 50; 325; 40 TABLET ORAL at 03:08

## 2022-08-21 RX ADMIN — OXYCODONE HYDROCHLORIDE AND ACETAMINOPHEN 1 TABLET: 5; 325 TABLET ORAL at 04:08

## 2022-08-21 RX ADMIN — LOPERAMIDE HYDROCHLORIDE 2 MG: 2 CAPSULE ORAL at 11:08

## 2022-08-21 RX ADMIN — VENLAFAXINE HYDROCHLORIDE 225 MG: 75 CAPSULE, EXTENDED RELEASE ORAL at 08:08

## 2022-08-21 RX ADMIN — METOPROLOL TARTRATE 50 MG: 50 TABLET, FILM COATED ORAL at 09:08

## 2022-08-21 NOTE — PLAN OF CARE
Problem: Adult Inpatient Plan of Care  Goal: Plan of Care Review  Outcome: Ongoing, Not Progressing  Goal: Patient-Specific Goal (Individualized)  Outcome: Ongoing, Not Progressing  Goal: Absence of Hospital-Acquired Illness or Injury  Outcome: Ongoing, Not Progressing  Goal: Optimal Comfort and Wellbeing  Outcome: Ongoing, Not Progressing  Goal: Readiness for Transition of Care  Outcome: Ongoing, Not Progressing     Problem: Fluid Imbalance (Pneumonia)  Goal: Fluid Balance  Outcome: Ongoing, Not Progressing     Problem: Infection (Pneumonia)  Goal: Resolution of Infection Signs and Symptoms  Outcome: Ongoing, Not Progressing     Problem: Respiratory Compromise (Pneumonia)  Goal: Effective Oxygenation and Ventilation  Outcome: Ongoing, Not Progressing     Problem: Skin Injury Risk Increased  Goal: Skin Health and Integrity  Outcome: Ongoing, Not Progressing     Problem: Fall Injury Risk  Goal: Absence of Fall and Fall-Related Injury  Outcome: Ongoing, Not Progressing     Problem: Restraint, Nonbehavioral (Nonviolent)  Goal: Absence of Harm or Injury  Outcome: Ongoing, Not Progressing     Problem: Impaired Wound Healing  Goal: Optimal Wound Healing  Outcome: Ongoing, Not Progressing

## 2022-08-21 NOTE — ASSESSMENT & PLAN NOTE
Patient with Hypoxic Respiratory failure which is Acute on chronic.  she is not on home oxygen. Supplemental oxygen was provided and noted- Oxygen Concentration (%):  [35-50] 35.   Signs/symptoms of respiratory failure include- tachypnea. Contributing diagnoses includes - ARDS, Aspiration, CHF, COPD, Interstitial lung disease, Pleural effusion, Pneumonia and Pulmonary Embolus Labs and images were reviewed. Patient Has recent ABG, which has been reviewed. Will treat underlying causes and adjust management of respiratory failure.     -improving

## 2022-08-21 NOTE — ASSESSMENT & PLAN NOTE
Patient stepped up to MICU for AHRF. Concern for aspiration pna vs autoimmune respiratory process vs pulmonary edema as etiology. Transitioned to CF 8/14, CXR with improving aeration. 8/16 with increasing O2 requirements, placed on 90%. Repeat ABG 8/16 with pH 7.53, pCO2 56.8, pO2 65, HCO3 47.8, LA 2.6. Updated Echo w/bubble study read: no intracardiac shunt identified.  - S/p courses of vanc/zosyn/azithromycin, meropenem/diflucan stopped 8/15  - S/p 3d course of high dose steroids IV solumedrol 125mg q4h   - Patient with significant improvement in respiratory status 8/14, able to transition to CF from bipap   -CXR 8/15 with stable right cardiophrenic and left basilar-retrocardiac opacities  -Lasix held 8/19 due to hypotension overnight, euvolemic on exam 8/20 - will not re-start lasix at this time  - weaned to 7L HFNC

## 2022-08-21 NOTE — ASSESSMENT & PLAN NOTE
Patient stepped up to MICU for AHRF. Concern for aspiration pna vs autoimmune respiratory process vs pulmonary edema as etiology. Transitioned to CF 8/14, CXR with improving aeration. 8/16 with increasing O2 requirements, placed on 90%. Repeat ABG 8/16 with pH 7.53, pCO2 56.8, pO2 65, HCO3 47.8, LA 2.6. Updated Echo w/bubble study read: no intracardiac shunt identified.  - S/p courses of vanc/zosyn/azithromycin, meropenem/diflucan stopped 8/15  - S/p 3d course of high dose steroids IV solumedrol 125mg q4h   - Patient with significant improvement in respiratory status 8/14, able to transition to CF from bipap - on 50L 45% O2  -CXR 8/15 with stable right cardiophrenic and left basilar-retrocardiac opacities  -Lasix held 8/19 due to hypotension overnight, euvolemic on exam 8/20 - will not re-start lasix at this time

## 2022-08-21 NOTE — ASSESSMENT & PLAN NOTE
Patient with Hypoxic Respiratory failure which is Acute on chronic.  she is not on home oxygen. Supplemental oxygen was provided and noted- Oxygen Concentration (%):  [45-50] 45.   Signs/symptoms of respiratory failure include- tachypnea. Contributing diagnoses includes - ARDS, Aspiration, CHF, COPD, Interstitial lung disease, Pleural effusion, Pneumonia and Pulmonary Embolus Labs and images were reviewed. Patient Has recent ABG, which has been reviewed. Will treat underlying causes and adjust management of respiratory failure.     -CF 50L 45% O2, bipap qhs  -improving

## 2022-08-21 NOTE — SUBJECTIVE & OBJECTIVE
Interval History/Significant Events: Patient feels well this morning and is ready to go to LTAC.  She is on 50L/min 45% O2 CF.    Review of Systems   Constitutional:  Negative for chills and fever.   HENT:  Negative for congestion and rhinorrhea.    Eyes:  Negative for photophobia and visual disturbance.   Respiratory:  Positive for cough and shortness of breath (improved).    Cardiovascular:  Negative for chest pain and leg swelling.   Gastrointestinal:  Negative for abdominal distention, abdominal pain, diarrhea, nausea and vomiting.   Genitourinary:  Negative for dysuria.   Musculoskeletal:  Positive for back pain (chronic).   Skin:  Negative for rash.   Neurological:  Negative for light-headedness and headaches.   Psychiatric/Behavioral:  Negative for agitation and confusion.    Objective:     Vital Signs (Most Recent):  Temp: 98 °F (36.7 °C) (08/21/22 0800)  Pulse: 78 (08/21/22 0800)  Resp: (!) 23 (08/21/22 0800)  BP: (!) 96/54 (08/21/22 0800)  SpO2: (!) 92 % (08/21/22 0800)   Vital Signs (24h Range):  Temp:  [97.7 °F (36.5 °C)-99.5 °F (37.5 °C)] 98 °F (36.7 °C)  Pulse:  [] 78  Resp:  [12-36] 23  SpO2:  [83 %-99 %] 92 %  BP: ()/(38-89) 96/54   Weight: 90.5 kg (199 lb 8.3 oz)  Body mass index is 34.25 kg/m².      Intake/Output Summary (Last 24 hours) at 8/21/2022 0858  Last data filed at 8/21/2022 0600  Gross per 24 hour   Intake 850 ml   Output 1828 ml   Net -978 ml         Physical Exam  Constitutional:       Appearance: She is obese.   HENT:      Head: Normocephalic and atraumatic.      Right Ear: External ear normal.      Left Ear: External ear normal.      Nose: Nose normal.      Mouth/Throat:      Mouth: Mucous membranes are moist.      Pharynx: Oropharynx is clear.   Eyes:      Extraocular Movements: Extraocular movements intact.      Conjunctiva/sclera: Conjunctivae normal.   Cardiovascular:      Rate and Rhythm: Normal rate and regular rhythm.   Pulmonary:      Breath sounds: Rales  Per pharmacy, PA needed for the following medication:    •  BD PEN NEEDLE RUFUS 2ND GEN 32G X 4 MM Does not apply Misc, USE AS DIRECTED, Disp: 100 each, Rfl: 6    Please call plan at [phone] to initiate PA or contact pharmacy to change medication.  Pt I (bibasilar, improved) present. No wheezing.      Comments: Comfort flow 50L/min  Abdominal:      General: Abdomen is flat. Bowel sounds are normal. There is no distension.      Palpations: Abdomen is soft.      Tenderness: There is no abdominal tenderness.   Musculoskeletal:         General: Normal range of motion.      Cervical back: Normal range of motion.      Right lower leg: No edema.      Left lower leg: No edema.   Skin:     General: Skin is warm and dry.   Neurological:      General: No focal deficit present.      Mental Status: She is alert.   Psychiatric:         Mood and Affect: Mood normal.         Behavior: Behavior normal.       Vents:  Oxygen Concentration (%): 45 (08/21/22 0741)  Lines/Drains/Airways       Peripheral Intravenous Line  Duration                  Peripheral IV - Single Lumen 08/18/22 0457 20 G Left Antecubital 3 days                  Significant Labs:    CBC/Anemia Profile:  Recent Labs   Lab 08/20/22  0437 08/21/22  0300   WBC 8.33 9.92   HGB 8.2* 8.2*   HCT 28.9* 28.4*   * 418   MCV 83 84   RDW 17.6* 17.8*          Chemistries:  Recent Labs   Lab 08/20/22  0437 08/21/22  0300    136   K 4.5 4.5   CL 97 99   CO2 35* 32*   BUN 27* 22   CREATININE 0.8 0.8   CALCIUM 8.6* 8.8   ALBUMIN 2.1* 2.2*   PROT 5.3* 5.5*   BILITOT 0.3 0.3   ALKPHOS 121 116   ALT 89* 60*   AST 47* 21   MG 2.2 2.0   PHOS 3.2 3.6         All pertinent labs within the past 24 hours have been reviewed.    Significant Imaging:  I have reviewed all pertinent imaging results/findings within the past 24 hours.

## 2022-08-21 NOTE — PROGRESS NOTES
[facility] - [facility]  Critical Care Medicine  Progress Note    Patient Name: Vale Gutierrez  MRN: [id number]  Admission Date: 8/1/2022  Hospital Length of Stay: 20 days  Code Status: Full Code  Attending Provider: Demetrice Higginbotham MD  Primary Care Provider: Estela Mcdaniel MD   Principal Problem: Acute hypoxemic respiratory failure    Subjective:     HPI:  Ms. Gutierrez is a 62 y/o female with a PMH of etoh abuse and withdrawal, aspiration pneumonia, delirium, smoking, HTN, HLD, BPPV, alcohol abuse, depression and anxiety who was admitted to the ICU from the floor for acute hypoxic respiratory failure. She was having increased oxygen needs on the floor and was requiring valium to keep her presumed alcohol withdrawal under control. The patient states that her stomach hurts in the midepigastric region and endorses nausea, vomiting x 1 day. She is unsure how long she has been coughing or feeling ill, and says she cannot remember the last few days. She says she lives with her sister Angela who helps care for her.       Hospital/ICU Course:  Patient presented to the ED on 8/1 with a CC of hypoxia and dyspnea. She was initially admitted to the floor. She experienced hallucinations and tremor, consistent with alcohol withdrawal / delirium, and was put on CIWA protocol with valium PRN (shortage of ativan). She was treated with antibiotics (vancomycin, zosyn, and azithro) scheduled for 10 days and duonebs prn. On 8/7 she developed worsening hypoxic respiratory failure, was put on 50L 100% O2 bipap, and admitted to the ICU due to increased oxygen requirements. Work up revealed small pericardial effusion, cardiomegaly, normal LVEF, pleural effusions and opacities bilaterally, and atrial fibrillation. Patient has now completed courses of zosyn, vancomycin, and azithromycin. She was subsequently given diflucan  and meropenem out of concern for respiratory infection, stopped 8/15. On lasix BID for pulmonary edema. She  was started on high dose steroids 8/13 as there was concern for autoimmune respiratory process, and labs were sent for C-ANCA and P-ANCA (currently pending). She previously had a +BRANDYN titer, but antiCCP, RF, and  anti-dsDNA were negative. Patient's CXR and respiratory status improving on 8/14, able to tolerate CF down to 64% O2. 8/16 patient O2 requirements increasing, O2 increased to 90%.  Repeat CXR 8/16 with right cardiophrenic and left basilar-retrocardiac opacities. Repeat CT chest with no remarkable changes, continues to have atelectasis. 8/17 weaned to 50L, 70% O2. 8/19 on 50L, 55% O2. Patient has been accepted to LTAC, pending insurance.     Of note, discussed the potential need for endotracheal intubation and she expressed understanding and stated she would like to have that done if her respiratory  status declines to the point of needing it.           Interval History/Significant Events: Patient feels well this morning and is ready to go to LTAC.  She is on 50L/min 45% O2 CF.    Review of Systems   Constitutional:  Negative for chills and fever.   HENT:  Negative for congestion and rhinorrhea.    Eyes:  Negative for photophobia and visual disturbance.   Respiratory:  Positive for cough and shortness of breath (improved).    Cardiovascular:  Negative for chest pain and leg swelling.   Gastrointestinal:  Negative for abdominal distention, abdominal pain, diarrhea, nausea and vomiting.   Genitourinary:  Negative for dysuria.   Musculoskeletal:  Positive for back pain (chronic).   Skin:  Negative for rash.   Neurological:  Negative for light-headedness and headaches.   Psychiatric/Behavioral:  Negative for agitation and confusion.    Objective:     Vital Signs (Most Recent):  Temp: 98 °F (36.7 °C) (08/21/22 0800)  Pulse: 78 (08/21/22 0800)  Resp: (!) 23 (08/21/22 0800)  BP: (!) 96/54 (08/21/22 0800)  SpO2: (!) 92 % (08/21/22 0800)   Vital Signs (24h Range):  Temp:  [97.7 °F (36.5 °C)-99.5 °F (37.5 °C)] 98 °F  (36.7 °C)  Pulse:  [] 78  Resp:  [12-36] 23  SpO2:  [83 %-99 %] 92 %  BP: ()/(38-89) 96/54   Weight: 90.5 kg (199 lb 8.3 oz)  Body mass index is 34.25 kg/m².      Intake/Output Summary (Last 24 hours) at 8/21/2022 0858  Last data filed at 8/21/2022 0600  Gross per 24 hour   Intake 850 ml   Output 1828 ml   Net -978 ml         Physical Exam  Constitutional:       Appearance: She is obese.   HENT:      Head: Normocephalic and atraumatic.      Right Ear: External ear normal.      Left Ear: External ear normal.      Nose: Nose normal.      Mouth/Throat:      Mouth: Mucous membranes are moist.      Pharynx: Oropharynx is clear.   Eyes:      Extraocular Movements: Extraocular movements intact.      Conjunctiva/sclera: Conjunctivae normal.   Cardiovascular:      Rate and Rhythm: Normal rate and regular rhythm.   Pulmonary:      Breath sounds: Rales (bibasilar, improved) present. No wheezing.      Comments: Comfort flow 50L/min  Abdominal:      General: Abdomen is flat. Bowel sounds are normal. There is no distension.      Palpations: Abdomen is soft.      Tenderness: There is no abdominal tenderness.   Musculoskeletal:         General: Normal range of motion.      Cervical back: Normal range of motion.      Right lower leg: No edema.      Left lower leg: No edema.   Skin:     General: Skin is warm and dry.   Neurological:      General: No focal deficit present.      Mental Status: She is alert.   Psychiatric:         Mood and Affect: Mood normal.         Behavior: Behavior normal.       Vents:  Oxygen Concentration (%): 45 (08/21/22 0741)  Lines/Drains/Airways       Peripheral Intravenous Line  Duration                  Peripheral IV - Single Lumen 08/18/22 0457 20 G Left Antecubital 3 days                  Significant Labs:    CBC/Anemia Profile:  Recent Labs   Lab 08/20/22  0437 08/21/22  0300   WBC 8.33 9.92   HGB 8.2* 8.2*   HCT 28.9* 28.4*   * 418   MCV 83 84   RDW 17.6* 17.8*           Chemistries:  Recent Labs   Lab 08/20/22  0437 08/21/22  0300    136   K 4.5 4.5   CL 97 99   CO2 35* 32*   BUN 27* 22   CREATININE 0.8 0.8   CALCIUM 8.6* 8.8   ALBUMIN 2.1* 2.2*   PROT 5.3* 5.5*   BILITOT 0.3 0.3   ALKPHOS 121 116   ALT 89* 60*   AST 47* 21   MG 2.2 2.0   PHOS 3.2 3.6         All pertinent labs within the past 24 hours have been reviewed.    Significant Imaging:  I have reviewed all pertinent imaging results/findings within the past 24 hours.      ABG  Recent Labs   Lab 08/18/22  0902   PH 7.432   PO2 18*   PCO2 77.8*   HCO3 51.8*   BE 28     Assessment/Plan:     Neuro  Chronic pain disorder  Patient has a history of chronic back pain, for which she had surgery in October 2020. She has been followed by a pain management doctor and takes percocet daily for pain.   - Percocet 5mg q6h prn available    Psychiatric  Recurrent major depressive disorder, in full remission  - Continue home Mirtazapine 7.5mg qd and venlafaxine 225mg q.d.    Alcohol use disorder, severe, dependence  Patient reportedly had tremors and hallucinations on the floor 8/5-8/7 concerning for withdrawal. Has history of complicated withdrawal hx requiring hospitalization at  per sister   - On [facility] protocol with PRN BZDs on arrival, no longer exhibiting sxs of alcohol withdrawal  - Thiamine 100mg q.d.    Pulmonary  * Acute hypoxemic respiratory failure  Patient stepped up to MICU for AHRF. Concern for aspiration pna vs autoimmune respiratory process vs pulmonary edema as etiology. Transitioned to CF 8/14, CXR with improving aeration. 8/16 with increasing O2 requirements, placed on 90%. Repeat ABG 8/16 with pH 7.53, pCO2 56.8, pO2 65, HCO3 47.8, LA 2.6. Updated Echo w/bubble study read: no intracardiac shunt identified.  - S/p courses of vanc/zosyn/azithromycin, meropenem/diflucan stopped 8/15  - S/p 3d course of high dose steroids IV solumedrol 125mg q4h   - Patient with significant improvement in respiratory status 8/14,  able to transition to CF from bipap   -CXR 8/15 with stable right cardiophrenic and left basilar-retrocardiac opacities  -Lasix held 8/19 due to hypotension overnight, euvolemic on exam 8/20 - will not re-start lasix at this time  - CF decreased to 30L/min 38% O2, will transition to bubble flow today 8/21      Aspiration pneumonia  See AHRF         Acute on chronic respiratory failure with hypercapnia  Patient with Hypoxic Respiratory failure which is Acute on chronic.  she is not on home oxygen. Supplemental oxygen was provided and noted- Oxygen Concentration (%):  [35-50] 35.   Signs/symptoms of respiratory failure include- tachypnea. Contributing diagnoses includes - ARDS, Aspiration, CHF, COPD, Interstitial lung disease, Pleural effusion, Pneumonia and Pulmonary Embolus Labs and images were reviewed. Patient Has recent ABG, which has been reviewed. Will treat underlying causes and adjust management of respiratory failure.     -improving    Community acquired pneumonia of right lower lobe of lung  See AHRF   S/p comprehensive antibiotic treatment and high-dose steroids, remains afebrile with mild improvements in lung imaging.      Cardiac/Vascular  Paroxysmal atrial fibrillation  Patient with paroxysmal atrial fibrillation during admission  - Currently on Amiodarone 400mg BID x 10days, to transition to 200mg daily on 8/23  - Therapeutic lovenox 90mg BID for anticoagulation  - lopressor 50mg BID for rate control, decreased 8/21 from 100mg BID due to pulse rate in 60s    Acute on chronic diastolic congestive heart failure  Patient is identified as having Diastolic (HFpEF) heart failure that is Acute on chronic. CHF is currently uncontrolled due to Pulmonary edema/pleural effusion on CXR. Latest ECHO performed and demonstrates-     Echo  · The left ventricle is normal in size with concentric remodeling and normal systolic function.  · The estimated ejection fraction is 68%.  · Indeterminate left ventricular  diastolic function.  · Normal right ventricular size with normal right ventricular systolic function.  · Mild tricuspid regurgitation.  · Mild pulmonic regurgitation.  · Elevated central venous pressure (15 mmHg).  · The estimated PA systolic pressure is 49 mmHg.  · There is pulmonary hypertension.  · Small posterior pericardial effusion. Trivial lateral and under the atria.  · There is a small left pleural effusion.  · No shunt by Air Contrast Bubble.      - 1.5L fluid restriction  - Strict Is/Os, daily weights  - Lasix discontinued, patient euvolemic            Essential hypertension  Home Med: Metoprolol 50mg po q.d.  - lopressor 50mg BID for rate control     GI  Transaminitis  Patient with extensive hx EtOH use. , . Liver US 8/18 unremarkable. 8/20 AST 47, ALT 89 - improving.    -monitor with daily CMP     Critical Care Daily Checklist:    A: Awake: RASS Goal/Actual Goal: RASS Goal: 0-->alert and calm  Actual: Goldman Agitation Sedation Scale (RASS): (P) Drowsy   B: Spontaneous Breathing Trial Performed?     C: SAT & SBT Coordinated?  n/a                      D: Delirium: CAM-ICU Overall CAM-ICU: Negative   E: Early Mobility Performed? Yes   F: Feeding Goal: Goals: Meet % EEN, EPN by RD f/u date  Status: Nutrition Goal Status: goal met   Current Diet Order   Procedures    Diet Adult Regular (IDDSI Level 7) Fluid - 1500mL     Order Specific Question:   Fluid restriction:     Answer:   Fluid - 1500mL      AS: Analgesia/Sedation available   T: Thromboembolic Prophylaxis yes   H: HOB > 300 Yes   U: Stress Ulcer Prophylaxis (if needed) n/a   G: Glucose Control yes   B: Bowel Function Stool Occurrence: 1   I: Indwelling Catheter (Lines & [place]) Necessity PIV   D: De-escalation of Antimicrobials/Pharmacotherapies yes    Plan for the day/ETD Step down    Code Status:  Family/Goals of Care: Full Code         Critical secondary to Patient has a condition that poses threat to life and bodily function:  Severe Respiratory Distress      Critical care was time spent personally by me on the following activities: development of treatment plan with patient or surrogate and bedside caregivers, discussions with consultants, evaluation of patient's response to treatment, examination of patient, ordering and performing treatments and interventions, ordering and review of laboratory studies, ordering and review of radiographic studies, pulse oximetry, re-evaluation of patient's condition. This critical care time did not overlap with that of any other provider or involve time for any procedures.     SILVIO BERMUDEZ MD  Critical Care Medicine  [facility] - Cardiac Medical ICU

## 2022-08-22 LAB
ALBUMIN SERPL BCP-MCNC: 2.1 G/DL (ref 3.5–5.2)
ALP SERPL-CCNC: 113 U/L (ref 55–135)
ALT SERPL W/O P-5'-P-CCNC: 45 U/L (ref 10–44)
ANION GAP SERPL CALC-SCNC: 7 MMOL/L (ref 8–16)
AST SERPL-CCNC: 16 U/L (ref 10–40)
BASOPHILS # BLD AUTO: 0.02 K/UL (ref 0–0.2)
BASOPHILS NFR BLD: 0.2 % (ref 0–1.9)
BILIRUB SERPL-MCNC: 0.2 MG/DL (ref 0.1–1)
BUN SERPL-MCNC: 23 MG/DL (ref 8–23)
CALCIUM SERPL-MCNC: 9 MG/DL (ref 8.7–10.5)
CHLORIDE SERPL-SCNC: 100 MMOL/L (ref 95–110)
CO2 SERPL-SCNC: 31 MMOL/L (ref 23–29)
CREAT SERPL-MCNC: 1.1 MG/DL (ref 0.5–1.4)
DIFFERENTIAL METHOD: ABNORMAL
EOSINOPHIL # BLD AUTO: 0.5 K/UL (ref 0–0.5)
EOSINOPHIL NFR BLD: 4.8 % (ref 0–8)
ERYTHROCYTE [DISTWIDTH] IN BLOOD BY AUTOMATED COUNT: 17.4 % (ref 11.5–14.5)
EST. GFR  (NO RACE VARIABLE): 56.5 ML/MIN/1.73 M^2
GLUCOSE SERPL-MCNC: 102 MG/DL (ref 70–110)
HCT VFR BLD AUTO: 28.3 % (ref 37–48.5)
HGB BLD-MCNC: 8.1 G/DL (ref 12–16)
IMM GRANULOCYTES # BLD AUTO: 0.06 K/UL (ref 0–0.04)
IMM GRANULOCYTES NFR BLD AUTO: 0.6 % (ref 0–0.5)
LYMPHOCYTES # BLD AUTO: 2.1 K/UL (ref 1–4.8)
LYMPHOCYTES NFR BLD: 19.8 % (ref 18–48)
MAGNESIUM SERPL-MCNC: 1.8 MG/DL (ref 1.6–2.6)
MCH RBC QN AUTO: 23.4 PG (ref 27–31)
MCHC RBC AUTO-ENTMCNC: 28.6 G/DL (ref 32–36)
MCV RBC AUTO: 82 FL (ref 82–98)
MONOCYTES # BLD AUTO: 0.9 K/UL (ref 0.3–1)
MONOCYTES NFR BLD: 8.4 % (ref 4–15)
NEUTROPHILS # BLD AUTO: 7 K/UL (ref 1.8–7.7)
NEUTROPHILS NFR BLD: 66.2 % (ref 38–73)
NRBC BLD-RTO: 0 /100 WBC
PHOSPHATE SERPL-MCNC: 4.6 MG/DL (ref 2.7–4.5)
PLATELET # BLD AUTO: 427 K/UL (ref 150–450)
PMV BLD AUTO: 9.9 FL (ref 9.2–12.9)
POTASSIUM SERPL-SCNC: 4.5 MMOL/L (ref 3.5–5.1)
PROT SERPL-MCNC: 5.7 G/DL (ref 6–8.4)
RBC # BLD AUTO: 3.46 M/UL (ref 4–5.4)
SODIUM SERPL-SCNC: 138 MMOL/L (ref 136–145)
WBC # BLD AUTO: 10.48 K/UL (ref 3.9–12.7)

## 2022-08-22 PROCEDURE — 80053 COMPREHEN METABOLIC PANEL: CPT | Performed by: STUDENT IN AN ORGANIZED HEALTH CARE EDUCATION/TRAINING PROGRAM

## 2022-08-22 PROCEDURE — 94660 CPAP INITIATION&MGMT: CPT

## 2022-08-22 PROCEDURE — 25000003 PHARM REV CODE 250

## 2022-08-22 PROCEDURE — 99900035 HC TECH TIME PER 15 MIN (STAT)

## 2022-08-22 PROCEDURE — 94761 N-INVAS EAR/PLS OXIMETRY MLT: CPT

## 2022-08-22 PROCEDURE — 25000003 PHARM REV CODE 250: Performed by: HOSPITALIST

## 2022-08-22 PROCEDURE — 99233 PR SUBSEQUENT HOSPITAL CARE,LEVL III: ICD-10-PCS | Mod: GC,,, | Performed by: INTERNAL MEDICINE

## 2022-08-22 PROCEDURE — 83735 ASSAY OF MAGNESIUM: CPT | Performed by: STUDENT IN AN ORGANIZED HEALTH CARE EDUCATION/TRAINING PROGRAM

## 2022-08-22 PROCEDURE — 25000242 PHARM REV CODE 250 ALT 637 W/ HCPCS: Performed by: HOSPITALIST

## 2022-08-22 PROCEDURE — 27000221 HC OXYGEN, UP TO 24 HOURS

## 2022-08-22 PROCEDURE — 20000000 HC ICU ROOM

## 2022-08-22 PROCEDURE — 63600175 PHARM REV CODE 636 W HCPCS

## 2022-08-22 PROCEDURE — 85025 COMPLETE CBC W/AUTO DIFF WBC: CPT | Performed by: STUDENT IN AN ORGANIZED HEALTH CARE EDUCATION/TRAINING PROGRAM

## 2022-08-22 PROCEDURE — 99233 SBSQ HOSP IP/OBS HIGH 50: CPT | Mod: GC,,, | Performed by: INTERNAL MEDICINE

## 2022-08-22 PROCEDURE — 94799 UNLISTED PULMONARY SVC/PX: CPT

## 2022-08-22 PROCEDURE — 25000003 PHARM REV CODE 250: Performed by: INTERNAL MEDICINE

## 2022-08-22 PROCEDURE — 84100 ASSAY OF PHOSPHORUS: CPT

## 2022-08-22 RX ORDER — IPRATROPIUM BROMIDE AND ALBUTEROL SULFATE 2.5; .5 MG/3ML; MG/3ML
3 SOLUTION RESPIRATORY (INHALATION) EVERY 8 HOURS
Status: DISCONTINUED | OUTPATIENT
Start: 2022-08-22 | End: 2022-08-23 | Stop reason: HOSPADM

## 2022-08-22 RX ADMIN — BUTALBITAL, ACETAMINOPHEN, AND CAFFEINE 1 TABLET: 50; 325; 40 TABLET ORAL at 01:08

## 2022-08-22 RX ADMIN — OXYCODONE HYDROCHLORIDE AND ACETAMINOPHEN 1 TABLET: 5; 325 TABLET ORAL at 08:08

## 2022-08-22 RX ADMIN — ACETAMINOPHEN 650 MG: 325 TABLET ORAL at 03:08

## 2022-08-22 RX ADMIN — APIXABAN 5 MG: 5 TABLET, FILM COATED ORAL at 08:08

## 2022-08-22 RX ADMIN — OXYCODONE HYDROCHLORIDE AND ACETAMINOPHEN 1 TABLET: 5; 325 TABLET ORAL at 02:08

## 2022-08-22 RX ADMIN — IPRATROPIUM BROMIDE AND ALBUTEROL SULFATE 3 ML: 2.5; .5 SOLUTION RESPIRATORY (INHALATION) at 11:08

## 2022-08-22 RX ADMIN — ONDANSETRON 4 MG: 4 TABLET, ORALLY DISINTEGRATING ORAL at 02:08

## 2022-08-22 RX ADMIN — ENOXAPARIN SODIUM 90 MG: 100 INJECTION SUBCUTANEOUS at 08:08

## 2022-08-22 RX ADMIN — METOPROLOL TARTRATE 50 MG: 50 TABLET, FILM COATED ORAL at 08:08

## 2022-08-22 RX ADMIN — ACETAMINOPHEN 650 MG: 325 TABLET ORAL at 08:08

## 2022-08-22 RX ADMIN — VENLAFAXINE HYDROCHLORIDE 225 MG: 75 CAPSULE, EXTENDED RELEASE ORAL at 08:08

## 2022-08-22 RX ADMIN — AMIODARONE HYDROCHLORIDE 400 MG: 200 TABLET ORAL at 08:08

## 2022-08-22 RX ADMIN — MIRTAZAPINE 7.5 MG: 7.5 TABLET, FILM COATED ORAL at 08:08

## 2022-08-22 RX ADMIN — THIAMINE HCL TAB 100 MG 100 MG: 100 TAB at 08:08

## 2022-08-22 RX ADMIN — ONDANSETRON 4 MG: 4 TABLET, ORALLY DISINTEGRATING ORAL at 12:08

## 2022-08-22 NOTE — PLAN OF CARE
CMICU DAILY GOALS       A: Awake    RASS: Goal - RASS Goal: 0-->alert and calm  Actual - RASS (Goldman Agitation-Sedation Scale): 0-->alert and calm   Restraint necessity: Clinical Justification: Removing medical devices, Climbing out of bed, Treatment Interference  B: Breathe   SBT: Not intubated   C: Coordinate A & B, analgesics/sedatives   Pain: managed    SAT: Not intubated  D: Delirium   CAM-ICU: Overall CAM-ICU: Negative  E: Early(intubated/ Progressive (non-intubated) Mobility   MOVE Screen: Pass   Activity: Activity Management: Rolling - L1  FAS: Feeding/Nutrition   Diet order: Diet/Nutrition Received: regular,    T: Thrombus   DVT prophylaxis: VTE Required Core Measure: Pharmacological prophylaxis initiated/maintained  H: HOB Elevation   Head of Bed (HOB) Positioning: HOB at 30-45 degrees  U: Ulcer Prophylaxis   GI: no  G: Glucose control   managed    S: Skin   Bathing/Skin Care: bath, complete  Device Skin Pressure Protection: absorbent pad utilized/changed, adhesive use limited, positioning supports utilized, pressure points protected, skin-to-device areas padded  Pressure Reduction Devices: pressure-redistributing mattress utilized, foam padding utilized, heel offloading device utilized, positioning supports utilized  Pressure Reduction Techniques: frequent weight shift encouraged, positioned off wounds, heels elevated off bed, pressure points protected  Skin Protection: adhesive use limited, incontinence pads utilized, protective footwear used, silicone foam dressing in place, skin-to-device areas padded, transparent dressing maintained, tubing/devices free from skin contact  B: Bowel Function   no issues   I: Indwelling Catheters   [place] necessity:     CVC necessity: No  D: De-escalation Antibiotics   No    Family/Goals of care/Code Status   Code Status: Full Code    24H Vital Sign Range  Temp:  [98 °F (36.7 °C)-98.8 °F (37.1 °C)]   Pulse:  [66-89]   Resp:  [11-38]   BP: ()/(50-86)   SpO2:  [78  %-100 %]      Shift Events   No acute events throughout shift. Pt has Stepdown orders; also waiting on INS approval for LTAC. Pain controled with PRN meds. Up to bedside commode with minimal assist. On HFNC.   VS and assessment per flow sheet, patient progressing towards goals as tolerated, plan of care reviewed with  Pt , all concerns addressed, will continue to monitor.    Chriss Chandler

## 2022-08-22 NOTE — PLAN OF CARE
CM left message for Lora Walls liaison with [facility] to get update on P2P that was going to either happen Sat 8/20/2022 or today  Mon 8/22/2022. CM to follow for call back.      Yarelis Alva RN     [phone]

## 2022-08-22 NOTE — PROGRESS NOTES
[facility]  Critical Care Medicine  Progress Note    Patient Name: Vale Gutierrez  MRN: [id number]  Admission Date: 8/1/2022  Hospital Length of Stay: 21 days  Code Status: Full Code  Attending Provider: Tashia Simmons MD  Primary Care Provider: Estela Mcdaniel MD   Principal Problem: Acute hypoxemic respiratory failure    Subjective:     HPI:  Ms. Gutierrez is a 62 y/o female with a PMH of etoh abuse and withdrawal, aspiration pneumonia, delirium, smoking, HTN, HLD, BPPV, alcohol abuse, depression and anxiety who was admitted to the ICU from the floor for acute hypoxic respiratory failure. She was having increased oxygen needs on the floor and was requiring valium to keep her presumed alcohol withdrawal under control. The patient states that her stomach hurts in the midepigastric region and endorses nausea, vomiting x 1 day. She is unsure how long she has been coughing or feeling ill, and says she cannot remember the last few days. She says she lives with her sister Angela who helps care for her.       Hospital/ICU Course:  Patient presented to the ED on 8/1 with a CC of hypoxia and dyspnea. She was initially admitted to the floor. She experienced hallucinations and tremor, consistent with alcohol withdrawal / delirium, and was put on CIWA protocol with valium PRN (shortage of ativan). She was treated with antibiotics (vancomycin, zosyn, and azithro) scheduled for 10 days and duonebs prn. On 8/7 she developed worsening hypoxic respiratory failure, was put on 50L 100% O2 bipap, and admitted to the ICU due to increased oxygen requirements. Work up revealed small pericardial effusion, cardiomegaly, normal LVEF, pleural effusions and opacities bilaterally, and atrial fibrillation. Patient has now completed courses of zosyn, vancomycin, and azithromycin. She was subsequently given diflucan  and meropenem out of concern for respiratory infection, stopped 8/15. On lasix BID for pulmonary edema.  She was started on high dose steroids 8/13 as there was concern for autoimmune respiratory process, and labs were sent for C-ANCA and P-ANCA (currently pending). She previously had a +BRANDYN titer, but antiCCP, RF, and  anti-dsDNA were negative. Patient's CXR and respiratory status improving on 8/14, able to tolerate CF down to 64% O2. 8/16 patient O2 requirements increasing, O2 increased to 90%.  Repeat CXR 8/16 with right cardiophrenic and left basilar-retrocardiac opacities. Repeat CT chest with no remarkable changes, continues to have atelectasis. 8/17 weaned to 50L, 70% O2. 8/19 on 50L, 55% O2. Patient has been accepted to LTAC, pending insurance.     Of note, discussed the potential need for endotracheal intubation and she expressed understanding and stated she would like to have that done if her respiratory  status declines to the point of needing it.           Interval History/Significant Events: Patient feels well this morning and is ready to go to LTAC.  She is on 50L/min 45% O2 CF.    Review of Systems   Constitutional:  Negative for chills and fever.   HENT:  Negative for congestion and rhinorrhea.    Eyes:  Negative for photophobia and visual disturbance.   Respiratory:  Positive for cough and shortness of breath (improved).    Cardiovascular:  Negative for chest pain and leg swelling.   Gastrointestinal:  Negative for abdominal distention, abdominal pain, diarrhea, nausea and vomiting.   Genitourinary:  Negative for dysuria.   Musculoskeletal:  Positive for back pain (chronic).   Skin:  Negative for rash.   Neurological:  Negative for light-headedness and headaches.   Psychiatric/Behavioral:  Negative for agitation and confusion.    Objective:     Vital Signs (Most Recent):  Temp: 98.5 °F (36.9 °C) (08/22/22 0701)  Pulse: 87 (08/22/22 0825)  Resp: 18 (08/22/22 0827)  BP: 112/65 (08/22/22 0825)  SpO2: (!) 87 % (08/22/22 0825)   Vital Signs (24h Range):  Temp:  [97.8 °F (36.6 °C)-98.8 °F (37.1 °C)] 98.5 °F  (36.9 °C)  Pulse:  [60-89] 87  Resp:  [11-38] 18  SpO2:  [78 %-99 %] 87 %  BP: ()/(50-76) 112/65   Weight: 90.5 kg (199 lb 8.3 oz)  Body mass index is 34.25 kg/m².      Intake/Output Summary (Last 24 hours) at 8/22/2022 0859  Last data filed at 8/22/2022 0600  Gross per 24 hour   Intake 960 ml   Output --   Net 960 ml         Physical Exam  Constitutional:       Appearance: She is obese.   HENT:      Head: Normocephalic and atraumatic.      Right Ear: External ear normal.      Left Ear: External ear normal.      Nose: Nose normal.      Mouth/Throat:      Mouth: Mucous membranes are moist.      Pharynx: Oropharynx is clear.   Eyes:      Extraocular Movements: Extraocular movements intact.      Conjunctiva/sclera: Conjunctivae normal.   Cardiovascular:      Rate and Rhythm: Normal rate and regular rhythm.   Pulmonary:      Breath sounds: Rales (bibasilar, improved) present. No wheezing.      Comments: Comfort flow 50L/min  Abdominal:      General: Abdomen is flat. Bowel sounds are normal. There is no distension.      Palpations: Abdomen is soft.      Tenderness: There is no abdominal tenderness.   Musculoskeletal:         General: Normal range of motion.      Cervical back: Normal range of motion.      Right lower leg: No edema.      Left lower leg: No edema.   Skin:     General: Skin is warm and dry.   Neurological:      General: No focal deficit present.      Mental Status: She is alert.   Psychiatric:         Mood and Affect: Mood normal.         Behavior: Behavior normal.       Vents:  Oxygen Concentration (%): 40 (08/22/22 0200)  Lines/Drains/Airways       Peripheral Intravenous Line  Duration                  Peripheral IV - Single Lumen 08/18/22 0457 20 G Left Antecubital 4 days                  Significant Labs:    CBC/Anemia Profile:  Recent Labs   Lab 08/21/22  0300 08/22/22  0245   WBC 9.92 10.48   HGB 8.2* 8.1*   HCT 28.4* 28.3*    427   MCV 84 82   RDW 17.8* 17.4*           Chemistries:  Recent Labs   Lab 08/21/22  0300 08/22/22  0245    138   K 4.5 4.5   CL 99 100   CO2 32* 31*   BUN 22 23   CREATININE 0.8 1.1   CALCIUM 8.8 9.0   ALBUMIN 2.2* 2.1*   PROT 5.5* 5.7*   BILITOT 0.3 0.2   ALKPHOS 116 113   ALT 60* 45*   AST 21 16   MG 2.0 1.8   PHOS 3.6 4.6*         All pertinent labs within the past 24 hours have been reviewed.    Significant Imaging:  I have reviewed all pertinent imaging results/findings within the past 24 hours.      ABG  Recent Labs   Lab 08/18/22  0902   PH 7.432   PO2 18*   PCO2 77.8*   HCO3 51.8*   BE 28     Assessment/Plan:     Neuro  Chronic pain disorder  Patient has a history of chronic back pain, for which she had surgery in October 2020. She has been followed by a pain management doctor and takes percocet daily for pain.   - Percocet 5mg q6h prn available    Psychiatric  Recurrent major depressive disorder, in full remission  - Continue home Mirtazapine 7.5mg qd and venlafaxine 225mg q.d.    Alcohol use disorder, severe, dependence  Patient reportedly had tremors and hallucinations on the floor 8/5-8/7 concerning for withdrawal. Has history of complicated withdrawal hx requiring hospitalization at  per sister   - On [facility] protocol with PRN BZDs on arrival, no longer exhibiting sxs of alcohol withdrawal  - Thiamine 100mg q.d.    Pulmonary  * Acute hypoxemic respiratory failure  Patient stepped up to MICU for AHRF. Concern for aspiration pna vs autoimmune respiratory process vs pulmonary edema as etiology. Transitioned to CF 8/14, CXR with improving aeration. 8/16 with increasing O2 requirements, placed on 90%. Repeat ABG 8/16 with pH 7.53, pCO2 56.8, pO2 65, HCO3 47.8, LA 2.6. Updated Echo w/bubble study read: no intracardiac shunt identified.  - S/p courses of vanc/zosyn/azithromycin, meropenem/diflucan stopped 8/15  - S/p 3d course of high dose steroids IV solumedrol 125mg q4h   - Patient with significant improvement in respiratory status 8/14, able  to transition to CF from bipap   -CXR 8/15 with stable right cardiophrenic and left basilar-retrocardiac opacities  -Lasix held 8/19 due to hypotension overnight, euvolemic on exam 8/20 - will not re-start lasix at this time  - weaned to 7L HFNC      Aspiration pneumonia  See AHRF         Acute on chronic respiratory failure with hypercapnia  Patient with Hypoxic Respiratory failure which is Acute on chronic.  she is not on home oxygen. Supplemental oxygen was provided and noted- Oxygen Concentration (%):  [35-50] 35.   Signs/symptoms of respiratory failure include- tachypnea. Contributing diagnoses includes - ARDS, Aspiration, CHF, COPD, Interstitial lung disease, Pleural effusion, Pneumonia and Pulmonary Embolus Labs and images were reviewed. Patient Has recent ABG, which has been reviewed. Will treat underlying causes and adjust management of respiratory failure.     -improving    Community acquired pneumonia of right lower lobe of lung  See AHRF   S/p comprehensive antibiotic treatment and high-dose steroids, remains afebrile with mild improvements in lung imaging.      Cardiac/Vascular  Paroxysmal atrial fibrillation  Patient with paroxysmal atrial fibrillation during admission  - Currently on Amiodarone 400mg BID x 10days, to transition to 200mg daily on 8/23  - Therapeutic lovenox 90mg BID for anticoagulation  - lopressor 50mg BID for rate control, decreased 8/21 from 100mg BID due to pulse rate in 60s    Acute on chronic diastolic congestive heart failure  Patient is identified as having Diastolic (HFpEF) heart failure that is Acute on chronic. CHF is currently uncontrolled due to Pulmonary edema/pleural effusion on CXR. Latest ECHO performed and demonstrates-     Echo  · The left ventricle is normal in size with concentric remodeling and normal systolic function.  · The estimated ejection fraction is 68%.  · Indeterminate left ventricular diastolic function.  · Normal right ventricular size with normal  right ventricular systolic function.  · Mild tricuspid regurgitation.  · Mild pulmonic regurgitation.  · Elevated central venous pressure (15 mmHg).  · The estimated PA systolic pressure is 49 mmHg.  · There is pulmonary hypertension.  · Small posterior pericardial effusion. Trivial lateral and under the atria.  · There is a small left pleural effusion.  · No shunt by Air Contrast Bubble.      - 1.5L fluid restriction  - Strict Is/Os, daily weights  - Lasix discontinued, patient euvolemic            Essential hypertension  Home Med: Metoprolol 50mg po q.d.  - lopressor decreased to 50mg BID 8/21 for rate control     GI  Transaminitis  Patient with extensive hx EtOH use. , . Liver US 8/18 unremarkable. 8/20 AST 47, ALT 89 - improving.    -monitor with daily CMP       Critical Care Daily Checklist:    A: Awake: RASS Goal/Actual Goal: RASS Goal: 0-->alert and calm  Actual: Goldman Agitation Sedation Scale (RASS): Alert and calm   B: Spontaneous Breathing Trial Performed?     C: SAT & SBT Coordinated?  n/a                      D: Delirium: CAM-ICU Overall CAM-ICU: Negative   E: Early Mobility Performed? Yes   F: Feeding Goal: Goals: Meet % EEN, EPN by RD f/u date  Status: Nutrition Goal Status: goal met   Current Diet Order   Procedures    Diet Adult Regular (IDDSI Level 7) Fluid - 1500mL     Order Specific Question:   Fluid restriction:     Answer:   Fluid - 1500mL      AS: Analgesia/Sedation available   T: Thromboembolic Prophylaxis yes   H: HOB > 300 Yes   U: Stress Ulcer Prophylaxis (if needed) n/a   G: Glucose Control yes   B: Bowel Function Stool Occurrence: 1   I: Indwelling Catheter (Lines & [place]) Necessity PIV   D: De-escalation of Antimicrobials/Pharmacotherapies progressing    Plan for the day/ETD Step down    Code Status:  Family/Goals of Care: Full Code         Critical secondary to Patient has a condition that poses threat to life and bodily function: Severe Respiratory Distress       Critical care was time spent personally by me on the following activities: development of treatment plan with patient or surrogate and bedside caregivers, discussions with consultants, evaluation of patient's response to treatment, examination of patient, ordering and performing treatments and interventions, ordering and review of laboratory studies, ordering and review of radiographic studies, pulse oximetry, re-evaluation of patient's condition. This critical care time did not overlap with that of any other provider or involve time for any procedures.     SILVIO BERMUDEZ MD  Critical Care Medicine  [facility] - Cardiac Medical ICU

## 2022-08-22 NOTE — SUBJECTIVE & OBJECTIVE
Interval History/Significant Events: Patient feels well this morning and is ready to go to LTAC.  She is on 50L/min 45% O2 CF.    Review of Systems   Constitutional:  Negative for chills and fever.   HENT:  Negative for congestion and rhinorrhea.    Eyes:  Negative for photophobia and visual disturbance.   Respiratory:  Positive for cough and shortness of breath (improved).    Cardiovascular:  Negative for chest pain and leg swelling.   Gastrointestinal:  Negative for abdominal distention, abdominal pain, diarrhea, nausea and vomiting.   Genitourinary:  Negative for dysuria.   Musculoskeletal:  Positive for back pain (chronic).   Skin:  Negative for rash.   Neurological:  Negative for light-headedness and headaches.   Psychiatric/Behavioral:  Negative for agitation and confusion.    Objective:     Vital Signs (Most Recent):  Temp: 98.5 °F (36.9 °C) (08/22/22 0701)  Pulse: 87 (08/22/22 0825)  Resp: 18 (08/22/22 0827)  BP: 112/65 (08/22/22 0825)  SpO2: (!) 87 % (08/22/22 0825)   Vital Signs (24h Range):  Temp:  [97.8 °F (36.6 °C)-98.8 °F (37.1 °C)] 98.5 °F (36.9 °C)  Pulse:  [60-89] 87  Resp:  [11-38] 18  SpO2:  [78 %-99 %] 87 %  BP: ()/(50-76) 112/65   Weight: 90.5 kg (199 lb 8.3 oz)  Body mass index is 34.25 kg/m².      Intake/Output Summary (Last 24 hours) at 8/22/2022 0859  Last data filed at 8/22/2022 0600  Gross per 24 hour   Intake 960 ml   Output --   Net 960 ml         Physical Exam  Constitutional:       Appearance: She is obese.   HENT:      Head: Normocephalic and atraumatic.      Right Ear: External ear normal.      Left Ear: External ear normal.      Nose: Nose normal.      Mouth/Throat:      Mouth: Mucous membranes are moist.      Pharynx: Oropharynx is clear.   Eyes:      Extraocular Movements: Extraocular movements intact.      Conjunctiva/sclera: Conjunctivae normal.   Cardiovascular:      Rate and Rhythm: Normal rate and regular rhythm.   Pulmonary:      Breath sounds: Rales (bibasilar,  improved) present. No wheezing.      Comments: Comfort flow 50L/min  Abdominal:      General: Abdomen is flat. Bowel sounds are normal. There is no distension.      Palpations: Abdomen is soft.      Tenderness: There is no abdominal tenderness.   Musculoskeletal:         General: Normal range of motion.      Cervical back: Normal range of motion.      Right lower leg: No edema.      Left lower leg: No edema.   Skin:     General: Skin is warm and dry.   Neurological:      General: No focal deficit present.      Mental Status: She is alert.   Psychiatric:         Mood and Affect: Mood normal.         Behavior: Behavior normal.       Vents:  Oxygen Concentration (%): 40 (08/22/22 0200)  Lines/Drains/Airways       Peripheral Intravenous Line  Duration                  Peripheral IV - Single Lumen 08/18/22 0457 20 G Left Antecubital 4 days                  Significant Labs:    CBC/Anemia Profile:  Recent Labs   Lab 08/21/22  0300 08/22/22  0245   WBC 9.92 10.48   HGB 8.2* 8.1*   HCT 28.4* 28.3*    427   MCV 84 82   RDW 17.8* 17.4*          Chemistries:  Recent Labs   Lab 08/21/22  0300 08/22/22  0245    138   K 4.5 4.5   CL 99 100   CO2 32* 31*   BUN 22 23   CREATININE 0.8 1.1   CALCIUM 8.8 9.0   ALBUMIN 2.2* 2.1*   PROT 5.5* 5.7*   BILITOT 0.3 0.2   ALKPHOS 116 113   ALT 60* 45*   AST 21 16   MG 2.0 1.8   PHOS 3.6 4.6*         All pertinent labs within the past 24 hours have been reviewed.    Significant Imaging:  I have reviewed all pertinent imaging results/findings within the past 24 hours.

## 2022-08-23 VITALS
DIASTOLIC BLOOD PRESSURE: 53 MMHG | TEMPERATURE: 98 F | RESPIRATION RATE: 24 BRPM | SYSTOLIC BLOOD PRESSURE: 82 MMHG | HEIGHT: 64 IN | OXYGEN SATURATION: 95 % | WEIGHT: 199.5 LBS | HEART RATE: 73 BPM | BODY MASS INDEX: 34.06 KG/M2

## 2022-08-23 LAB
ALBUMIN SERPL BCP-MCNC: 2.4 G/DL (ref 3.5–5.2)
ALP SERPL-CCNC: 129 U/L (ref 55–135)
ALT SERPL W/O P-5'-P-CCNC: 39 U/L (ref 10–44)
ANION GAP SERPL CALC-SCNC: 7 MMOL/L (ref 8–16)
AST SERPL-CCNC: 15 U/L (ref 10–40)
BASOPHILS # BLD AUTO: 0.01 K/UL (ref 0–0.2)
BASOPHILS NFR BLD: 0.1 % (ref 0–1.9)
BILIRUB SERPL-MCNC: 0.2 MG/DL (ref 0.1–1)
BUN SERPL-MCNC: 18 MG/DL (ref 8–23)
CALCIUM SERPL-MCNC: 9.4 MG/DL (ref 8.7–10.5)
CHLORIDE SERPL-SCNC: 102 MMOL/L (ref 95–110)
CO2 SERPL-SCNC: 30 MMOL/L (ref 23–29)
CREAT SERPL-MCNC: 0.8 MG/DL (ref 0.5–1.4)
DIFFERENTIAL METHOD: ABNORMAL
EOSINOPHIL # BLD AUTO: 0.4 K/UL (ref 0–0.5)
EOSINOPHIL NFR BLD: 3.3 % (ref 0–8)
ERYTHROCYTE [DISTWIDTH] IN BLOOD BY AUTOMATED COUNT: 17.5 % (ref 11.5–14.5)
EST. GFR  (NO RACE VARIABLE): >60 ML/MIN/1.73 M^2
GLUCOSE SERPL-MCNC: 92 MG/DL (ref 70–110)
HCT VFR BLD AUTO: 31.7 % (ref 37–48.5)
HGB BLD-MCNC: 8.9 G/DL (ref 12–16)
IMM GRANULOCYTES # BLD AUTO: 0.07 K/UL (ref 0–0.04)
IMM GRANULOCYTES NFR BLD AUTO: 0.7 % (ref 0–0.5)
LYMPHOCYTES # BLD AUTO: 2 K/UL (ref 1–4.8)
LYMPHOCYTES NFR BLD: 19.2 % (ref 18–48)
MAGNESIUM SERPL-MCNC: 1.8 MG/DL (ref 1.6–2.6)
MCH RBC QN AUTO: 23.9 PG (ref 27–31)
MCHC RBC AUTO-ENTMCNC: 28.1 G/DL (ref 32–36)
MCV RBC AUTO: 85 FL (ref 82–98)
MONOCYTES # BLD AUTO: 1.2 K/UL (ref 0.3–1)
MONOCYTES NFR BLD: 11.2 % (ref 4–15)
NEUTROPHILS # BLD AUTO: 6.8 K/UL (ref 1.8–7.7)
NEUTROPHILS NFR BLD: 65.5 % (ref 38–73)
NRBC BLD-RTO: 0 /100 WBC
PHOSPHATE SERPL-MCNC: 4.2 MG/DL (ref 2.7–4.5)
PLATELET # BLD AUTO: 425 K/UL (ref 150–450)
PMV BLD AUTO: 10.1 FL (ref 9.2–12.9)
POTASSIUM SERPL-SCNC: 5 MMOL/L (ref 3.5–5.1)
PROT SERPL-MCNC: 5.9 G/DL (ref 6–8.4)
RBC # BLD AUTO: 3.73 M/UL (ref 4–5.4)
SODIUM SERPL-SCNC: 139 MMOL/L (ref 136–145)
WBC # BLD AUTO: 10.45 K/UL (ref 3.9–12.7)

## 2022-08-23 PROCEDURE — 94640 AIRWAY INHALATION TREATMENT: CPT

## 2022-08-23 PROCEDURE — 83735 ASSAY OF MAGNESIUM: CPT | Performed by: STUDENT IN AN ORGANIZED HEALTH CARE EDUCATION/TRAINING PROGRAM

## 2022-08-23 PROCEDURE — 25000003 PHARM REV CODE 250: Performed by: INTERNAL MEDICINE

## 2022-08-23 PROCEDURE — 25000003 PHARM REV CODE 250

## 2022-08-23 PROCEDURE — 97530 THERAPEUTIC ACTIVITIES: CPT

## 2022-08-23 PROCEDURE — 99232 SBSQ HOSP IP/OBS MODERATE 35: CPT | Mod: GC,,, | Performed by: INTERNAL MEDICINE

## 2022-08-23 PROCEDURE — 85025 COMPLETE CBC W/AUTO DIFF WBC: CPT | Performed by: STUDENT IN AN ORGANIZED HEALTH CARE EDUCATION/TRAINING PROGRAM

## 2022-08-23 PROCEDURE — 97116 GAIT TRAINING THERAPY: CPT

## 2022-08-23 PROCEDURE — 80053 COMPREHEN METABOLIC PANEL: CPT | Performed by: STUDENT IN AN ORGANIZED HEALTH CARE EDUCATION/TRAINING PROGRAM

## 2022-08-23 PROCEDURE — 99232 PR SUBSEQUENT HOSPITAL CARE,LEVL II: ICD-10-PCS | Mod: GC,,, | Performed by: INTERNAL MEDICINE

## 2022-08-23 PROCEDURE — 84100 ASSAY OF PHOSPHORUS: CPT

## 2022-08-23 PROCEDURE — 25000242 PHARM REV CODE 250 ALT 637 W/ HCPCS: Performed by: INTERNAL MEDICINE

## 2022-08-23 PROCEDURE — 94799 UNLISTED PULMONARY SVC/PX: CPT

## 2022-08-23 PROCEDURE — 99900035 HC TECH TIME PER 15 MIN (STAT)

## 2022-08-23 PROCEDURE — 27000221 HC OXYGEN, UP TO 24 HOURS

## 2022-08-23 PROCEDURE — 94761 N-INVAS EAR/PLS OXIMETRY MLT: CPT

## 2022-08-23 PROCEDURE — 25000003 PHARM REV CODE 250: Performed by: HOSPITALIST

## 2022-08-23 RX ORDER — ALUMINUM HYDROXIDE, MAGNESIUM HYDROXIDE, AND SIMETHICONE 2400; 240; 2400 MG/30ML; MG/30ML; MG/30ML
30 SUSPENSION ORAL EVERY 6 HOURS PRN
Status: DISCONTINUED | OUTPATIENT
Start: 2022-08-23 | End: 2022-08-23 | Stop reason: HOSPADM

## 2022-08-23 RX ORDER — LIDOCAINE 50 MG/G
2 PATCH TOPICAL
Status: CANCELLED | OUTPATIENT
Start: 2022-08-23

## 2022-08-23 RX ORDER — METOPROLOL TARTRATE 50 MG/1
50 TABLET ORAL 2 TIMES DAILY
Status: CANCELLED | OUTPATIENT
Start: 2022-08-23

## 2022-08-23 RX ORDER — IPRATROPIUM BROMIDE AND ALBUTEROL SULFATE 2.5; .5 MG/3ML; MG/3ML
3 SOLUTION RESPIRATORY (INHALATION) EVERY 8 HOURS
Status: CANCELLED | OUTPATIENT
Start: 2022-08-23

## 2022-08-23 RX ORDER — THIAMINE HCL 100 MG
100 TABLET ORAL DAILY
Status: CANCELLED | OUTPATIENT
Start: 2022-08-24

## 2022-08-23 RX ORDER — BUSPIRONE HYDROCHLORIDE 5 MG/1
5 TABLET ORAL 2 TIMES DAILY PRN
Status: CANCELLED | OUTPATIENT
Start: 2022-08-23

## 2022-08-23 RX ORDER — IBUPROFEN 400 MG/1
400 TABLET ORAL EVERY 6 HOURS PRN
Status: DISCONTINUED | OUTPATIENT
Start: 2022-08-23 | End: 2022-08-23 | Stop reason: HOSPADM

## 2022-08-23 RX ORDER — VENLAFAXINE HYDROCHLORIDE 75 MG/1
225 CAPSULE, EXTENDED RELEASE ORAL DAILY
Status: CANCELLED | OUTPATIENT
Start: 2022-08-24

## 2022-08-23 RX ORDER — LOPERAMIDE HYDROCHLORIDE 2 MG/1
2 CAPSULE ORAL 4 TIMES DAILY PRN
Status: CANCELLED | OUTPATIENT
Start: 2022-08-23

## 2022-08-23 RX ORDER — IPRATROPIUM BROMIDE AND ALBUTEROL SULFATE 2.5; .5 MG/3ML; MG/3ML
3 SOLUTION RESPIRATORY (INHALATION) EVERY 8 HOURS
Qty: 90 ML | Refills: 0 | Status: ON HOLD | OUTPATIENT
Start: 2022-08-23 | End: 2022-09-07 | Stop reason: HOSPADM

## 2022-08-23 RX ORDER — OXYCODONE AND ACETAMINOPHEN 5; 325 MG/1; MG/1
1 TABLET ORAL EVERY 6 HOURS PRN
Status: CANCELLED | OUTPATIENT
Start: 2022-08-23

## 2022-08-23 RX ORDER — BUTALBITAL, ACETAMINOPHEN AND CAFFEINE 50; 325; 40 MG/1; MG/1; MG/1
1 TABLET ORAL EVERY 4 HOURS PRN
Status: CANCELLED | OUTPATIENT
Start: 2022-08-23

## 2022-08-23 RX ORDER — MIRTAZAPINE 7.5 MG/1
7.5 TABLET, FILM COATED ORAL NIGHTLY
Status: CANCELLED | OUTPATIENT
Start: 2022-08-23

## 2022-08-23 RX ORDER — ONDANSETRON 4 MG/1
4 TABLET, ORALLY DISINTEGRATING ORAL EVERY 8 HOURS PRN
Status: CANCELLED | OUTPATIENT
Start: 2022-08-23

## 2022-08-23 RX ORDER — AMIODARONE HYDROCHLORIDE 200 MG/1
200 TABLET ORAL DAILY
Status: CANCELLED | OUTPATIENT
Start: 2022-08-24

## 2022-08-23 RX ORDER — AMOXICILLIN 250 MG
1 CAPSULE ORAL 2 TIMES DAILY PRN
Status: CANCELLED | OUTPATIENT
Start: 2022-08-23

## 2022-08-23 RX ORDER — TALC
6 POWDER (GRAM) TOPICAL NIGHTLY PRN
Status: CANCELLED | OUTPATIENT
Start: 2022-08-23

## 2022-08-23 RX ORDER — ACETAMINOPHEN 325 MG/1
650 TABLET ORAL EVERY 8 HOURS PRN
Status: CANCELLED | OUTPATIENT
Start: 2022-08-23

## 2022-08-23 RX ORDER — SODIUM CHLORIDE 0.9 % (FLUSH) 0.9 %
10 SYRINGE (ML) INJECTION
Status: CANCELLED | OUTPATIENT
Start: 2022-08-23

## 2022-08-23 RX ADMIN — ACETAMINOPHEN 650 MG: 325 TABLET ORAL at 05:08

## 2022-08-23 RX ADMIN — AMIODARONE HYDROCHLORIDE 200 MG: 200 TABLET ORAL at 07:08

## 2022-08-23 RX ADMIN — APIXABAN 5 MG: 5 TABLET, FILM COATED ORAL at 07:08

## 2022-08-23 RX ADMIN — IPRATROPIUM BROMIDE AND ALBUTEROL SULFATE 3 ML: 2.5; .5 SOLUTION RESPIRATORY (INHALATION) at 07:08

## 2022-08-23 RX ADMIN — Medication 6 MG: at 01:08

## 2022-08-23 RX ADMIN — BUTALBITAL, ACETAMINOPHEN, AND CAFFEINE 1 TABLET: 50; 325; 40 TABLET ORAL at 12:08

## 2022-08-23 RX ADMIN — THIAMINE HCL TAB 100 MG 100 MG: 100 TAB at 07:08

## 2022-08-23 RX ADMIN — OXYCODONE HYDROCHLORIDE AND ACETAMINOPHEN 1 TABLET: 5; 325 TABLET ORAL at 02:08

## 2022-08-23 RX ADMIN — METOPROLOL TARTRATE 50 MG: 50 TABLET, FILM COATED ORAL at 07:08

## 2022-08-23 RX ADMIN — VENLAFAXINE HYDROCHLORIDE 225 MG: 75 CAPSULE, EXTENDED RELEASE ORAL at 07:08

## 2022-08-23 RX ADMIN — OXYCODONE HYDROCHLORIDE AND ACETAMINOPHEN 1 TABLET: 5; 325 TABLET ORAL at 07:08

## 2022-08-23 RX ADMIN — ONDANSETRON 4 MG: 4 TABLET, ORALLY DISINTEGRATING ORAL at 11:08

## 2022-08-23 RX ADMIN — IPRATROPIUM BROMIDE AND ALBUTEROL SULFATE 3 ML: 2.5; .5 SOLUTION RESPIRATORY (INHALATION) at 12:08

## 2022-08-23 NOTE — PLAN OF CARE
IVAN was informed [facility] will auth [facility]. IVAN notified Agnes with [facility] and requested orders from primary team.    IVAN updated pts nurse.    TYLER TorresW  PRN

## 2022-08-23 NOTE — ASSESSMENT & PLAN NOTE
Patient with Hypoxic Respiratory failure which is Acute on chronic.  she is not on home oxygen. Supplemental oxygen was provided and noted-  .   Signs/symptoms of respiratory failure include- tachypnea. Contributing diagnoses includes - ARDS, Aspiration, CHF, COPD, Interstitial lung disease, Pleural effusion, Pneumonia and Pulmonary Embolus Labs and images were reviewed. Patient Has recent ABG, which has been reviewed. Will treat underlying causes and adjust management of respiratory failure.     -improving

## 2022-08-23 NOTE — ASSESSMENT & PLAN NOTE
Patient with paroxysmal atrial fibrillation during admission  - s/p Amiodarone loading 400mg BID x 10days, now on 200mg daily   - Eliquis 5mg BID for anticoagulation started 8/22  - lopressor 50mg BID for rate control, decreased 8/21 from 100mg BID due to pulse rate in 60s

## 2022-08-23 NOTE — ASSESSMENT & PLAN NOTE
Patient reportedly had tremors and hallucinations on the floor 8/5-8/7 concerning for withdrawal. Has history of complicated withdrawal hx requiring hospitalization at  per sister   - On [facility] protocol with PRN BZDs on arrival, no longer exhibiting sxs of alcohol withdrawal  - Thiamine 100mg q.d.

## 2022-08-23 NOTE — PLAN OF CARE
[facility]    FACILITY TRANSFER ORDERS      Patient Name: Vale Gutierrez  YOB: 1960    PCP: Estela Mcdaniel MD   PCP Address: [address] [place] [address] / [place] [id number]  PCP Phone Number: [phone]  PCP Fax: [phone]    Encounter Date: 08/23/2022    Admit to: LTAC    Vital Signs:  Routine    Diagnoses:   Active Hospital Problems    Diagnosis  POA    *Acute hypoxemic respiratory failure [J96.01]  Yes    Paroxysmal atrial fibrillation [I48.0]  Yes    Pericardial effusion [I31.3]  Yes     Mild-moderate size pericardial effusion seen on chest CT. Patient is currently hemodynamically stable, but follow up stat echo to evaluate for tamponade physiology has been ordered.       Aspiration pneumonia [J69.0]  Yes    Hypomagnesemia [E83.42]  No    Aortic aneurysm [I71.9]  Yes    Nicotine abuse [Z72.0]  Yes    Weakness [R53.1]  Yes    Transaminitis [R74.01]  Yes    Community acquired pneumonia of right lower lobe of lung [J18.9]  Yes    Acute on chronic diastolic congestive heart failure [I50.33]  Yes    Acute on chronic respiratory failure with hypercapnia [J96.22]  Yes    Anxiety [F41.9]  Yes     Chronic    Chronic pain disorder [G89.4]  Yes     Chronic    Recurrent major depressive disorder, in full remission [F33.42]  Yes    Alcohol use disorder, severe, dependence [F10.20]  Yes     Chronic    Essential hypertension [I10]  Yes     Chronic      Resolved Hospital Problems    Diagnosis Date Resolved POA    Altered mental status [R41.82] 08/17/2022 Yes    Tachycardia [R00.0] 08/20/2022 Yes    Hypokalemia [E87.6] 08/14/2022 No    Deconditioned low back [R29.898] 08/20/2022 Yes    Asymptomatic bacteriuria [R82.71] 08/20/2022 Yes    Encephalopathy, metabolic [G93.41] 08/20/2022 Yes    Lumbosacral radiculopathy [M54.17] 08/20/2022 Yes     Chronic       Allergies:Review of patient's allergies indicates:  No Known Allergies    Diet: regular diet fluid restriction  1500mL    Activities: Activity as tolerated    Goals of Care Treatment Preferences:  Code Status: Full Code      Nursing: Per facility protocol     Labs: CBC and BMP per facility protocol     CONSULTS:    Physical Therapy to evaluate and treat.  and Occupational Therapy to evaluate and treat.    MISCELLANEOUS CARE:  NA    WOUND CARE ORDERS  None    Medications: Review discharge medications with patient and family and provide education.      Current Discharge Medication List      START taking these medications    Details   albuterol-ipratropium (DUO-NEB) 2.5 mg-0.5 mg/3 mL nebulizer solution Take 3 mLs by nebulization every 8 (eight) hours. Rescue  Qty: 75 mL, Refills: 0      amiodarone (PACERONE) 200 MG Tab Take 2 tablets (400 mg total) by mouth 2 (two) times daily.Please take 400 mg twice a day until 8/23/2022 then 200 mg daily thereafter until you follow up with cardiology  Qty: 120 tablet, Refills: 11      apixaban (ELIQUIS) 5 mg Tab Take 1 tablet (5 mg total) by mouth 2 (two) times daily.         CONTINUE these medications which have CHANGED    Details   metoprolol tartrate (LOPRESSOR) 50 MG tablet Take 1 tablet (50 mg total) by mouth once daily. Please hold until you follow up with your primary care provider  Qty: 30 tablet, Refills: 11    Comments: .         CONTINUE these medications which have NOT CHANGED    Details   busPIRone (BUSPAR) 5 MG Tab Take 1 tablet (5 mg total) by mouth 2 (two) times daily as needed (anxiety).  Qty: 60 tablet, Refills: 11      mirtazapine (REMERON) 7.5 MG Tab Take 1 tablet (7.5 mg total) by mouth every evening. For insomnia, mood  Qty: 30 tablet, Refills: 11      oxyCODONE-acetaminophen (PERCOCET) 5-325 mg per tablet Take 1 tablet by mouth every 6 (six) hours as needed for Pain.  Qty: 120 tablet, Refills: 0    Comments: Quantity prescribed more than 7 day supply? Yes, quantity medically necessary  Associated Diagnoses: Postlaminectomy syndrome of lumbar region; Spinal stenosis of  lumbar region with neurogenic claudication      thiamine 100 MG tablet Take 100 mg by mouth once daily.      tiZANidine (ZANAFLEX) 4 MG tablet Take 1 tablet (4 mg total) by mouth every 6 (six) hours as needed (spasms).  Qty: 60 tablet, Refills: 0      venlafaxine (EFFEXOR-XR) 150 MG Cp24 Take 150 mg by mouth once daily. Take am of surgery      vitamin D (VITAMIN D3) 1000 units Tab Take 5,000 Int'l Units by mouth. Hold am of surgery      dicyclomine (BENTYL) 10 MG capsule Take 1 capsule (10 mg total) by mouth before meals as needed.  Qty: 60 capsule, Refills: 1    Associated Diagnoses: Chronic diarrhea      omeprazole (PRILOSEC) 20 MG capsule Take 1 capsule (20 mg total) by mouth 2 (two) times daily.  Qty: 60 capsule, Refills: 3         STOP taking these medications       aspirin/salicylamide/caffeine (BC HEADACHE POWDER ORAL) Comments:   Reason for Stopping:         magnesium 30 mg Tab Comments:   Reason for Stopping:         ondansetron (ZOFRAN-ODT) 4 MG TbDL Comments:   Reason for Stopping:                  Immunizations Administered as of 8/23/2022     Name Date Dose VIS Date Route Exp Date    COVID-19, MRNA, LN-S, PF (Moderna) 3/18/2021 -- -- Intramuscular --    Site: Left arm     Lot: [id number]           Second dose to be given in 21 Days    Some patients may experience side effects after vaccination.  These may include fever, headache, muscle or joint aches.  Most symptoms resolve with 24-48 hours and do not require urgent medical evaluation unless they persist for more than 72 hours or symptoms are concerning for an unrelated medical condition.          _________________________________  SILVIO BERMUDEZ MD  08/23/2022

## 2022-08-23 NOTE — PLAN OF CARE
[facility] - Cardiac Medical [facility]  Discharge Final Note    Primary Care Provider: Estela Mcdaniel MD    Expected Discharge Date: 8/23/2022     Pt will dc today to [facility]. Nurse to call report to [phone]. Ambulance ordered for 1pm  with 8L HFNC.     SW updated pts nurse and pt. Pt has notified her family of dc.    Final Discharge Note (most recent)     Final Note - 08/23/22 1252        Final Note    Assessment Type Final Discharge Note     Anticipated Discharge Disposition Long Term Acute Care        Post-Acute Status    Post-Acute Authorization Placement     Post-Acute Placement Status Set-up Complete/Auth obtained     Discharge Delays None known at this time                 Valentine Barr LCSW  PRN

## 2022-08-23 NOTE — PLAN OF CARE
Problem: Adult Inpatient Plan of Care  Goal: Plan of Care Review  Outcome: Ongoing, Progressing     Problem: Adult Inpatient Plan of Care  Goal: Patient-Specific Goal (Individualized)  Outcome: Ongoing, Progressing     Problem: Adult Inpatient Plan of Care  Goal: Absence of Hospital-Acquired Illness or Injury  Outcome: Ongoing, Progressing     Problem: Adult Inpatient Plan of Care  Goal: Optimal Comfort and Wellbeing  Outcome: Ongoing, Progressing     Problem: Adult Inpatient Plan of Care  Goal: Readiness for Transition of Care  Outcome: Ongoing, Progressing     Problem: Fluid Imbalance (Pneumonia)  Goal: Fluid Balance  Outcome: Ongoing, Progressing     Problem: Fall Injury Risk  Goal: Absence of Fall and Fall-Related Injury  Outcome: Ongoing, Progressing

## 2022-08-23 NOTE — PLAN OF CARE
Patient is ready for discharge. Patient stable alert and oriented. IVs removed. No complaints of pain. Discussed discharge plan. Reviewed medications and side effects, appointments, and answered questions with patient. RX to be filled at LT[facility]. Reported called and transport setup for 1300.

## 2022-08-23 NOTE — SUBJECTIVE & OBJECTIVE
Interval History/Significant Events: Patient feels well this morning and is ready to go to LTAC.  She is on 8L/min NC.    Review of Systems   Constitutional:  Negative for chills and fever.   HENT:  Negative for congestion and rhinorrhea.    Eyes:  Negative for photophobia and visual disturbance.   Respiratory:  Positive for cough and shortness of breath (improved).    Cardiovascular:  Negative for chest pain and leg swelling.   Gastrointestinal:  Negative for abdominal distention, abdominal pain, diarrhea, nausea and vomiting.   Genitourinary:  Negative for dysuria.   Musculoskeletal:  Positive for back pain (chronic).   Skin:  Negative for rash.   Neurological:  Negative for light-headedness and headaches.   Psychiatric/Behavioral:  Negative for agitation and confusion.    Objective:     Vital Signs (Most Recent):  Temp: 98 °F (36.7 °C) (08/23/22 0515)  Pulse: 101 (08/23/22 0728)  Resp: (!) 22 (08/23/22 0742)  BP: 119/64 (08/23/22 0515)  SpO2: 97 % (08/23/22 0728)   Vital Signs (24h Range):  Temp:  [97 °F (36.1 °C)-98.6 °F (37 °C)] 98 °F (36.7 °C)  Pulse:  [] 101  Resp:  [17-32] 22  SpO2:  [73 %-100 %] 97 %  BP: (102-137)/(52-86) 119/64   Weight: 90.5 kg (199 lb 8.3 oz)  Body mass index is 34.25 kg/m².      Intake/Output Summary (Last 24 hours) at 8/23/2022 0811  Last data filed at 8/23/2022 0500  Gross per 24 hour   Intake 400 ml   Output 700 ml   Net -300 ml         Physical Exam  Constitutional:       Appearance: She is obese.   HENT:      Head: Normocephalic and atraumatic.      Right Ear: External ear normal.      Left Ear: External ear normal.      Nose: Nose normal.      Mouth/Throat:      Mouth: Mucous membranes are moist.      Pharynx: Oropharynx is clear.   Eyes:      Extraocular Movements: Extraocular movements intact.      Conjunctiva/sclera: Conjunctivae normal.   Cardiovascular:      Rate and Rhythm: Normal rate and regular rhythm.   Pulmonary:      Breath sounds: No wheezing or rales.       Comments: Comfort flow 50L/min  Abdominal:      General: Abdomen is flat. Bowel sounds are normal. There is no distension.      Palpations: Abdomen is soft.      Tenderness: There is no abdominal tenderness.   Musculoskeletal:         General: Normal range of motion.      Cervical back: Normal range of motion.      Right lower leg: No edema.      Left lower leg: No edema.   Skin:     General: Skin is warm and dry.   Neurological:      General: No focal deficit present.      Mental Status: She is alert.   Psychiatric:         Mood and Affect: Mood normal.         Behavior: Behavior normal.       Vents:  Oxygen Concentration (%): 40 (08/22/22 0200)  Lines/Drains/Airways       Peripheral Intravenous Line  Duration                  Peripheral IV - Single Lumen 08/23/22 0557 20 G Left;Posterior Hand <1 day                  Significant Labs:    CBC/Anemia Profile:  Recent Labs   Lab 08/22/22 0245 08/23/22  0258   WBC 10.48 10.45   HGB 8.1* 8.9*   HCT 28.3* 31.7*    425   MCV 82 85   RDW 17.4* 17.5*          Chemistries:  Recent Labs   Lab 08/22/22 0245 08/23/22  0258    139   K 4.5 5.0    102   CO2 31* 30*   BUN 23 18   CREATININE 1.1 0.8   CALCIUM 9.0 9.4   ALBUMIN 2.1* 2.4*   PROT 5.7* 5.9*   BILITOT 0.2 0.2   ALKPHOS 113 129   ALT 45* 39   AST 16 15   MG 1.8 1.8   PHOS 4.6* 4.2         All pertinent labs within the past 24 hours have been reviewed.    Significant Imaging:  I have reviewed all pertinent imaging results/findings within the past 24 hours.

## 2022-08-23 NOTE — PROGRESS NOTES
[facility] - Cardiac Medical ICU  Critical Care Medicine  Progress Note    Patient Name: Vale Gutierrez  MRN: [id number]  Admission Date: 8/1/2022  Hospital Length of Stay: 22 days  Code Status: Full Code  Attending Provider: Tashia Simmons MD  Primary Care Provider: Estela Mcdaniel MD   Principal Problem: Acute hypoxemic respiratory failure    Subjective:     HPI:  Ms. Gutierrez is a 60 y/o female with a PMH of etoh abuse and withdrawal, aspiration pneumonia, delirium, smoking, HTN, HLD, BPPV, alcohol abuse, depression and anxiety who was admitted to the ICU from the floor for acute hypoxic respiratory failure. She was having increased oxygen needs on the floor and was requiring valium to keep her presumed alcohol withdrawal under control. The patient states that her stomach hurts in the midepigastric region and endorses nausea, vomiting x 1 day. She is unsure how long she has been coughing or feeling ill, and says she cannot remember the last few days. She says she lives with her sister Angela who helps care for her.       Hospital/ICU Course:  Patient presented to the ED on 8/1 with a CC of hypoxia and dyspnea. She was initially admitted to the floor. She experienced hallucinations and tremor, consistent with alcohol withdrawal / delirium, and was put on CIWA protocol with valium PRN (shortage of ativan). She was treated with antibiotics (vancomycin, zosyn, and azithro) scheduled for 10 days and duonebs prn. On 8/7 she developed worsening hypoxic respiratory failure, was put on 50L 100% O2 bipap, and admitted to the ICU due to increased oxygen requirements. Work up revealed small pericardial effusion, cardiomegaly, normal LVEF, pleural effusions and opacities bilaterally, and atrial fibrillation. Patient has now completed courses of zosyn, vancomycin, and azithromycin. She was subsequently given diflucan  and meropenem out of concern for respiratory infection, stopped 8/15. On lasix BID for pulmonary edema.  She was started on high dose steroids 8/13 as there was concern for autoimmune respiratory process, and labs were sent for C-ANCA and P-ANCA (currently pending). She previously had a +BRANDYN titer, but antiCCP, RF, and  anti-dsDNA were negative. Patient's CXR and respiratory status improving on 8/14, able to tolerate CF down to 64% O2. 8/16 patient O2 requirements increasing, O2 increased to 90%.  Repeat CXR 8/16 with right cardiophrenic and left basilar-retrocardiac opacities. Repeat CT chest with no remarkable changes, continues to have atelectasis. 8/17 weaned to 50L, 70% O2. 8/19 on 50L, 55% O2. Patient has been accepted to LTAC, pending insurance.     Of note, discussed the potential need for endotracheal intubation and she expressed understanding and stated she would like to have that done if her respiratory  status declines to the point of needing it.           Interval History/Significant Events: Patient feels well this morning and is ready to go to LTAC.  She is on 8L/min NC.    Review of Systems   Constitutional:  Negative for chills and fever.   HENT:  Negative for congestion and rhinorrhea.    Eyes:  Negative for photophobia and visual disturbance.   Respiratory:  Positive for cough and shortness of breath (improved).    Cardiovascular:  Negative for chest pain and leg swelling.   Gastrointestinal:  Negative for abdominal distention, abdominal pain, diarrhea, nausea and vomiting.   Genitourinary:  Negative for dysuria.   Musculoskeletal:  Positive for back pain (chronic).   Skin:  Negative for rash.   Neurological:  Negative for light-headedness and headaches.   Psychiatric/Behavioral:  Negative for agitation and confusion.    Objective:     Vital Signs (Most Recent):  Temp: 98 °F (36.7 °C) (08/23/22 0515)  Pulse: 101 (08/23/22 0728)  Resp: (!) 22 (08/23/22 0742)  BP: 119/64 (08/23/22 0515)  SpO2: 97 % (08/23/22 0728)   Vital Signs (24h Range):  Temp:  [97 °F (36.1 °C)-98.6 °F (37 °C)] 98 °F (36.7  °C)  Pulse:  [] 101  Resp:  [17-32] 22  SpO2:  [73 %-100 %] 97 %  BP: (102-137)/(52-86) 119/64   Weight: 90.5 kg (199 lb 8.3 oz)  Body mass index is 34.25 kg/m².      Intake/Output Summary (Last 24 hours) at 8/23/2022 0811  Last data filed at 8/23/2022 0500  Gross per 24 hour   Intake 400 ml   Output 700 ml   Net -300 ml         Physical Exam  Constitutional:       Appearance: She is obese.   HENT:      Head: Normocephalic and atraumatic.      Right Ear: External ear normal.      Left Ear: External ear normal.      Nose: Nose normal.      Mouth/Throat:      Mouth: Mucous membranes are moist.      Pharynx: Oropharynx is clear.   Eyes:      Extraocular Movements: Extraocular movements intact.      Conjunctiva/sclera: Conjunctivae normal.   Cardiovascular:      Rate and Rhythm: Normal rate and regular rhythm.   Pulmonary:      Breath sounds: No wheezing or rales.      Comments: Comfort flow 50L/min  Abdominal:      General: Abdomen is flat. Bowel sounds are normal. There is no distension.      Palpations: Abdomen is soft.      Tenderness: There is no abdominal tenderness.   Musculoskeletal:         General: Normal range of motion.      Cervical back: Normal range of motion.      Right lower leg: No edema.      Left lower leg: No edema.   Skin:     General: Skin is warm and dry.   Neurological:      General: No focal deficit present.      Mental Status: She is alert.   Psychiatric:         Mood and Affect: Mood normal.         Behavior: Behavior normal.       Vents:  Oxygen Concentration (%): 40 (08/22/22 0200)  Lines/Drains/Airways       Peripheral Intravenous Line  Duration                  Peripheral IV - Single Lumen 08/23/22 0557 20 G Left;Posterior Hand <1 day                  Significant Labs:    CBC/Anemia Profile:  Recent Labs   Lab 08/22/22  0245 08/23/22  0258   WBC 10.48 10.45   HGB 8.1* 8.9*   HCT 28.3* 31.7*    425   MCV 82 85   RDW 17.4* 17.5*          Chemistries:  Recent Labs   Lab  08/22/22  0245 08/23/22  0258    139   K 4.5 5.0    102   CO2 31* 30*   BUN 23 18   CREATININE 1.1 0.8   CALCIUM 9.0 9.4   ALBUMIN 2.1* 2.4*   PROT 5.7* 5.9*   BILITOT 0.2 0.2   ALKPHOS 113 129   ALT 45* 39   AST 16 15   MG 1.8 1.8   PHOS 4.6* 4.2         All pertinent labs within the past 24 hours have been reviewed.    Significant Imaging:  I have reviewed all pertinent imaging results/findings within the past 24 hours.      ABG  Recent Labs   Lab 08/18/22  0902   PH 7.432   PO2 18*   PCO2 77.8*   HCO3 51.8*   BE 28     Assessment/Plan:     Neuro  Chronic pain disorder  Patient has a history of chronic back pain, for which she had surgery in October 2020. She has been followed by a pain management doctor and takes percocet daily for pain.   - Percocet 5mg q6h prn available    Psychiatric  Recurrent major depressive disorder, in full remission  - Continue home Mirtazapine 7.5mg qd and venlafaxine 225mg q.d.    Alcohol use disorder, severe, dependence  Patient reportedly had tremors and hallucinations on the floor 8/5-8/7 concerning for withdrawal. Has history of complicated withdrawal hx requiring hospitalization at  per sister   - On [facility] protocol with PRN BZDs on arrival, no longer exhibiting sxs of alcohol withdrawal  - Thiamine 100mg q.d.    Pulmonary  * Acute hypoxemic respiratory failure  Patient stepped up to MICU for AHRF. Concern for aspiration pna vs autoimmune respiratory process vs pulmonary edema as etiology. Transitioned to CF 8/14, CXR with improving aeration. 8/16 with increasing O2 requirements, placed on 90%. Repeat ABG 8/16 with pH 7.53, pCO2 56.8, pO2 65, HCO3 47.8, LA 2.6. Updated Echo w/bubble study read: no intracardiac shunt identified.  - S/p courses of vanc/zosyn/azithromycin, meropenem/diflucan stopped 8/15  - S/p 3d course of high dose steroids IV solumedrol 125mg q4h   - Patient with significant improvement in respiratory status 8/14, able to transition to CF from bipap    -CXR 8/15 with stable right cardiophrenic and left basilar-retrocardiac opacities  -Lasix held 8/19 due to hypotension overnight, euvolemic on exam 8/20 - will not re-start lasix at this time  - weaned to 7L HFNC      [facility] pneumonia  [facility]         Acute on chronic respiratory failure with hypercapnia  Patient with Hypoxic Respiratory failure which is Acute on chronic.  she is not on home oxygen. Supplemental oxygen was provided and noted-  .   Signs/symptoms of respiratory failure include- tachypnea. Contributing diagnoses includes - ARDS, Aspiration, CHF, COPD, Interstitial lung disease, Pleural effusion, Pneumonia and Pulmonary Embolus Labs and images were reviewed. Patient Has recent ABG, which has been reviewed. Will treat underlying causes and adjust management of respiratory failure.     -improving    Community acquired pneumonia of right lower lobe of lung  [facility]   S/p comprehensive antibiotic treatment and high-dose steroids, remains afebrile with mild improvements in lung imaging.      Cardiac/Vascular  Paroxysmal atrial fibrillation  Patient with paroxysmal atrial fibrillation during admission  - s/p Amiodarone loading 400mg BID x 10days, now on 200mg daily   - Eliquis 5mg BID for anticoagulation started 8/22  - lopressor 50mg BID for rate control, decreased 8/21 from 100mg BID due to pulse rate in 60s    Acute on chronic diastolic congestive heart failure  Patient is identified as having Diastolic (HFpEF) heart failure that is Acute on chronic. CHF is currently uncontrolled due to Pulmonary edema/pleural effusion on CXR. Latest ECHO performed and demonstrates-     Echo  · The left ventricle is normal in size with concentric remodeling and normal systolic function.  · The estimated ejection fraction is 68%.  · Indeterminate left ventricular diastolic function.  · Normal right ventricular size with normal right ventricular systolic function.  · Mild tricuspid regurgitation.  · Mild pulmonic  regurgitation.  · Elevated central venous pressure (15 mmHg).  · The estimated PA systolic pressure is 49 mmHg.  · There is pulmonary hypertension.  · Small posterior pericardial effusion. Trivial lateral and under the atria.  · There is a small left pleural effusion.  · No shunt by Air Contrast Bubble.      - 1.5L fluid restriction  - Strict Is/Os, daily weights  - Lasix discontinued, patient euvolemic            Essential hypertension  Home Med: Metoprolol 50mg po q.d.  - lopressor decreased to 50mg BID 8/21 for rate control     GI  Transaminitis  Patient with extensive hx EtOH use. , . Liver US 8/18 unremarkable. 8/20 AST 47, ALT 89 - improving.    -monitor with daily CMP       Critical Care Daily Checklist:    A: Awake: RASS Goal/Actual Goal: RASS Goal: 0-->alert and calm  Actual: Goldman Agitation Sedation Scale (RASS): Alert and calm   B: Spontaneous Breathing Trial Performed?     C: SAT & SBT Coordinated?  n/a                      D: Delirium: CAM-ICU Overall CAM-ICU: Negative   E: Early Mobility Performed? Yes   F: Feeding Goal: Goals: Meet % EEN, EPN by RD f/u date  Status: Nutrition Goal Status: goal met   Current Diet Order   Procedures    Diet Adult Regular (IDDSI Level 7) Fluid - 1500mL     Order Specific Question:   Fluid restriction:     Answer:   Fluid - 1500mL      AS: Analgesia/Sedation available   T: Thromboembolic Prophylaxis eliquis   H: HOB > 300 Yes   U: Stress Ulcer Prophylaxis (if needed) n/a   G: Glucose Control yes   B: Bowel Function Stool Occurrence: 1   I: Indwelling Catheter (Lines & [place]) Necessity PIV   D: De-escalation of Antimicrobials/Pharmacotherapies progressing    Plan for the day/ETD Step down/LTAC    Code Status:  Family/Goals of Care: Full Code  LTAC, then home       Critical secondary to Patient has a condition that poses threat to life and bodily function: Severe Respiratory Distress      Critical care was time spent personally by me on the following  activities: development of treatment plan with patient or surrogate and bedside caregivers, discussions with consultants, evaluation of patient's response to treatment, examination of patient, ordering and performing treatments and interventions, ordering and review of laboratory studies, ordering and review of radiographic studies, pulse oximetry, re-evaluation of patient's condition. This critical care time did not overlap with that of any other provider or involve time for any procedures.     SILVIO BERMUDEZ MD  Critical Care Medicine  [facility] - Cardiac Medical ICU

## 2022-08-23 NOTE — PT/OT/SLP PROGRESS
"Physical Therapy Treatment    Patient Name:  Vale Gutierrez   MRN:  [id number]    Recommendations:     Discharge Recommendations:  nursing facility, skilled   Discharge Equipment Recommendations: walker, rolling, bedside commode   Barriers to discharge: Decreased caregiver support    Assessment:     Vale Gutierrez is a 61 y.o. female admitted with a medical diagnosis of Acute hypoxemic respiratory failure. Patient tolerated session well, demonstrates improved activity tolerance as noted by increased gait distance.     She presents with the following impairments/functional limitations: weakness, impaired endurance, impaired functional mobility, gait instability, impaired balance, decreased safety awareness, decreased lower extremity function. Once medically stable, recommending pt discharge to nursing facility, skilled.    Rehab Prognosis: Good; patient continues to benefit from acute skilled PT services to address these deficits and reach maximum level of function.  Recent Surgery: * No surgery found *      Plan:     During this hospitalization, patient to be seen 3 x/week to address the identified rehab impairments via gait training, therapeutic activities, therapeutic exercises and progress toward the following goals:    · Plan of Care Expires:  09/08/22    Subjective     Chief Complaint: Back pain  Patient/Family Comments/Goals: "I think I like you", "In case you haven't noticed, I'm procrastinating a bit", "Oh! A seatbelt!"  Pain/Comfort:  · Pain Rating 1:  (8.5/10)  · Location - Orientation 1: generalized  · Location 1: back  · Pain Addressed 1: Distraction, Cessation of Activity, Reposition  · Pain Rating Post-Intervention 1:  (not rated)    Objective:     Communicated with RN prior to session. Patient found HOB elevated with telemetry, blood pressure cuff, pulse ox (continuous), peripheral IV, oxygen upon PT entry to room.     General Precautions: Standard, fall   Orthopedic Precautions:N/A "   Braces: N/A    Functional Mobility:  · Bed Mobility:     · Supine to Sit: stand by assistance  · Transfers:     · Sit to Stand: contact guard assistance with rolling walker with cues for hand placement  · Bed to Chair: contact guard assistance with rolling walker with cues for hand placement using Step Transfer  · Gait: Patient ambulated 6 ft forward/back x2 with rolling walker and contact guard assistance. Patient demonstrates ambulates outside EMMANUELLE of RW and flexed posture. Cuing to ambulate within EMMANUELLE of RW. All lines remained intact throughout ambulation trial, gait belt utilized.  · Balance:   · Static Sitting: Good, able to maintain for 8 minute(s) with stand by assistance  · Dynamic Sitting: not assessed this visit  · Static Standing: Good, able to maintain for 15 seconds with contact guard assistance  · Dynamic Standing: Fair: Patient accepts minimal challenge, contact guard assistance    AM-PAC 6 CLICK MOBILITY  Turning over in bed (including adjusting bedclothes, sheets and blankets)?: 3  Sitting down on and standing up from a chair with arms (e.g., wheelchair, bedside commode, etc.): 3  Moving from lying on back to sitting on the side of the bed?: 3  Moving to and from a bed to a chair (including a wheelchair)?: 3  Need to walk in hospital room?: 3  Climbing 3-5 steps with a railing?: 2  Basic Mobility Total Score: 17     Therapeutic Activities and Exercises:  Patient educated on role of acute care PT and PT POC, safety while in hospital including calling nurse for mobility and call light usage  Educated about importance of OOB mobility and remaining UIC most of the day   Provided therapeutic listening regarding functional mobility    Patient left up in chair with all lines intact, call button in reach and RN notified.    GOALS:   Multidisciplinary Problems     Physical Therapy Goals        Problem: Physical Therapy    Goal Priority Disciplines Outcome Goal Variances Interventions   Physical Therapy Goal      PT, PT/OT Ongoing, Progressing     Description: Goals to be met by:22     Patient will increase functional independence with mobility by performin. Supine to sit with Stand-by Assistance -met   2. Sit to supine with Stand-by Assistance  3. Sit to stand transfer with Stand-by Assistance  4. Gait  x 50 feet with Contact Guard Assistance using LRAD, if needed.   5. Stand for 5 minutes with Stand-by Assistance using LRAD, if needed  6. Lower extremity exercise program x10 reps per handout, with independence                     Time Tracking:     PT Received On: 22  PT Start Time: 818     PT Stop Time: 841  PT Total Time (min): 23 min     Billable Minutes: Gait Training 15 min and Therapeutic Activity 8 min     Treatment Type: Treatment  PT/PTA: PT     PTA Visit Number: 0     2022

## 2022-08-23 NOTE — PT/OT/SLP PROGRESS
Occupational Therapy      Patient Name:  Vale Gutierrez   MRN:  [id number]    Patient not seen today secondary to pt with discharge orders to t/f to LTAC; seen by PT in AM. Will follow-up if pt fails to discharge.    8/23/2022

## 2022-08-25 NOTE — ASSESSMENT & PLAN NOTE
Patient reportedly had tremors and hallucinations on the floor 8/5-8/7 concerning for withdrawal. Has history of complicated withdrawal hx requiring hospitalization at  per sister   - On [facility] protocol with PRN BZDs on arrival, no longer exhibiting sxs of alcohol withdrawal  - Thiamine 100mg q.d.

## 2022-08-25 NOTE — DISCHARGE SUMMARY
[facility] - Card[facility] Medical [facility]  Critical Care Medicine  Discharge Summary      Patient Name: Vale Gutierrez  MRN: [id number]  Admission Date: 8/1/2022  Hospital Length of Stay: 22 days  Discharge Date and Time: 8/23/2022  2:39 PM  Attending Physician: No att. providers found   Discharging Provider: SILVIO BERMUDEZ MD  Primary Care Provider: Estela Mcdaniel MD  Reason for Admission: hypoxic and hypercapnic respiratory failure    HPI:   Ms. Gutierrez is a 60 y/o female with a PMH of etoh abuse and withdrawal, aspiration pneumonia, delirium, smoking, HTN, HLD, BPPV, alcohol abuse, depression and anxiety who was admitted to the ICU from the floor for acute hypoxic respiratory failure. She was having increased oxygen needs on the floor and was requiring valium to keep her presumed alcohol withdrawal under control. The patient states that her stomach hurts in the midepigastric region and endorses nausea, vomiting x 1 day. She is unsure how long she has been coughing or feeling ill, and says she cannot remember the last few days. She says she lives with her sister Angela who helps care for her.       * No surgery found *    Indwelling Lines/Drains at Time of Discharge:   Lines/Drains/Airways     None               Hospital Course:   Patient presented to the ED on 8/1 with a CC of hypoxia and dyspnea. She was initially admitted to the floor. She experienced hallucinations and tremor, consistent with alcohol withdrawal / delirium, and was put on CIWA protocol with valium PRN (shortage of ativan). She was treated with antibiotics (vancomycin, zosyn, and azithro) scheduled for 10 days and duonebs prn. On 8/7 she developed worsening hypoxic respiratory failure, was put on 50L 100% O2 bipap, and admitted to the ICU due to increased oxygen requirements. Work up revealed small pericardial effusion, cardiomegaly, normal LVEF, pleural effusions and opacities bilaterally, and atrial fibrillation. Patient has now completed courses  "of zosyn, vancomycin, and azithromycin. She was subsequently given diflucan  and meropenem out of concern for respiratory infection, stopped 8/15. On lasix BID for pulmonary edema. She was started on high dose steroids 8/13 as there was concern for autoimmune respiratory process, and labs were sent for C-ANCA and P-ANCA (currently pending). She previously had a +BRANDYN titer, but antiCCP, RF, and  anti-dsDNA were negative. Patient's CXR and respiratory status improving on 8/14, able to tolerate CF down to 64% O2. 8/16 patient O2 requirements increasing, O2 increased to 90%.  Repeat CXR 8/16 with right cardiophrenic and left basilar-retrocardiac opacities. Repeat CT chest with no remarkable changes, continues to have atelectasis. 8/17 weaned to 50L, 70% O2. 8/19 on 50L, 55% O2. Patient weaned to nasal cannula 7L on 8/20. Patient has been accepted to LTAC.             Consults (From admission, onward)        Status Ordering Provider     Inpatient consult to Psychiatry  Once        Provider:  (Not yet assigned)    Completed CAROLIN COPE     Inpatient consult to Infectious Diseases  Once        Provider:  (Not yet assigned)    Completed CAMACHO HATCH     Inpatient consult to Cardiology  Once        Provider:  (Not yet assigned)    Completed ANUEL SMITH     Inpatient consult to Critical Care Medicine  Once        Provider:  (Not yet assigned)    Completed TORRIE BLACKMON     Pharmacy to dose Vancomycin consult  Once        Provider:  (Not yet assigned)   "And" Linked Group Details    Completed URBAN BUNDY        BP (!) 82/53 (BP Location: Right arm, Patient Position: Sitting)   Pulse 73   Temp 98.1 °F (36.7 °C) (Oral)   Resp (!) 24   Ht 5' 4" (1.626 m)   Wt 90.5 kg (199 lb 8.3 oz)   SpO2 95%   BMI 34.25 kg/m²     Physical Exam  Constitutional:       Appearance: She is obese.   HENT:      Head: Normocephalic and atraumatic.      Right Ear: External ear normal.      Left Ear: External ear normal.     "  Nose: Nose normal.      Mouth/Throat:      Mouth: Mucous membranes are moist.      Pharynx: Oropharynx is clear.   Eyes:      Extraocular Movements: Extraocular movements intact.      Conjunctiva/sclera: Conjunctivae normal.   Cardiovascular:      Rate and Rhythm: Normal rate and regular rhythm.   Pulmonary:      Breath sounds: No wheezing or rales.      Comments: Comfort flow 50L/min  Abdominal:      General: Abdomen is flat. Bowel sounds are normal. There is no distension.      Palpations: Abdomen is soft.      Tenderness: There is no abdominal tenderness.   Musculoskeletal:         General: Normal range of motion.      Cervical back: Normal range of motion.      Right lower leg: No edema.      Left lower leg: No edema.   Skin:     General: Skin is warm and dry.   Neurological:      General: No focal deficit present.      Mental Status: She is alert.   Psychiatric:         Mood and Affect: Mood normal.         Behavior: Behavior normal.     Significant Labs:  All pertinent labs within the past 24 hours have been reviewed.    Significant Imaging:  I have reviewed all pertinent imaging results/findings within the past 24 hours.    Pending Diagnostic Studies:     None        Final Active Diagnoses:    Diagnosis Date Noted POA    PRINCIPAL PROBLEM:  Acute hypoxemic respiratory failure [J96.01] 08/02/2022 Yes    Paroxysmal atrial fibrillation [I48.0] 08/14/2022 Yes    Pericardial effusion [I31.3] 08/07/2022 Yes    Aspiration pneumonia [J69.0] 08/05/2022 Yes    Hypomagnesemia [E83.42] 08/04/2022 No    Aortic aneurysm [I71.9] 08/04/2022 Yes    Nicotine abuse [Z72.0] 08/04/2022 Yes    Weakness [R53.1] 08/04/2022 Yes    Transaminitis [R74.01] 08/02/2022 Yes    Community acquired pneumonia of right lower lobe of lung [J18.9] 08/02/2022 Yes    Acute on chronic diastolic congestive heart failure [I50.33] 08/02/2022 Yes    Acute on chronic respiratory failure with hypercapnia [J96.22] 08/02/2022 Yes    Anxiety  [F41.9] 03/08/2022 Yes     Chronic    Chronic pain disorder [G89.4] 07/28/2020 Yes     Chronic    Recurrent major depressive disorder, in full remission [F33.42] 08/29/2019 Yes    Alcohol use disorder, severe, dependence [F10.20] 08/29/2019 Yes     Chronic    Essential hypertension [I10] 08/29/2019 Yes     Chronic      Problems Resolved During this Admission:    Diagnosis Date Noted Date Resolved POA    Altered mental status [R41.82]  08/17/2022 Yes    Tachycardia [R00.0] 08/06/2022 08/20/2022 Yes    Hypokalemia [E87.6] 08/04/2022 08/14/2022 No    Deconditioned low back [R29.898] 08/04/2022 08/20/2022 Yes    Asymptomatic bacteriuria [R82.71] 08/02/2022 08/20/2022 Yes    Encephalopathy, metabolic [G93.41] 08/02/2022 08/20/2022 Yes    Lumbosacral radiculopathy [M54.17] 07/28/2020 08/20/2022 Yes     Chronic     Neuro  Chronic pain disorder  Patient has a history of chronic back pain, for which she had surgery in October 2020. She has been followed by a pain management doctor and takes percocet daily for pain.   - Percocet 5mg q6h prn available    Psychiatric  Recurrent major depressive disorder, in full remission  - Continue home Mirtazapine 7.5mg qd and venlafaxine 225mg q.d.    Alcohol use disorder, severe, dependence  Patient reportedly had tremors and hallucinations on the floor 8/5-8/7 concerning for withdrawal. Has history of complicated withdrawal hx requiring hospitalization at  per sister   - On CIWA protocol with PRN BZDs on arrival, no longer exhibiting sxs of alcohol withdrawal  - Thiamine 100mg q.d.    Pulmonary  * Acute hypoxemic respiratory failure  Patient stepped up to MICU for AHRF. Concern for aspiration pna vs autoimmune respiratory process vs pulmonary edema as etiology. Transitioned to CF 8/14, CXR with improving aeration. 8/16 with increasing O2 requirements, placed on 90%. Repeat ABG 8/16 with pH 7.53, pCO2 56.8, pO2 65, HCO3 47.8, LA 2.6. Updated Echo w/bubble study read: no  intracardiac shunt identified.  - S/p courses of vanc/zosyn/azithromycin, meropenem/diflucan stopped 8/15  - S/p 3d course of high dose steroids IV solumedrol 125mg q4h   - Patient with significant improvement in respiratory status 8/14, able to transition to CF from bipap   -CXR 8/15 with stable right cardiophrenic and left basilar-retrocardiac opacities  -Lasix held 8/19 due to hypotension overnight, euvolemic on exam 8/20 - will not re-start lasix at this time  - weaned to 7L HFNC      Acute on chronic respiratory failure with hypercapnia  Patient with Hypoxic Respiratory failure which is Acute on chronic.  she is not on home oxygen. Supplemental oxygen was provided and noted-  .   Signs/symptoms of respiratory failure include- tachypnea. Contributing diagnoses includes - ARDS, Aspiration, CHF, COPD, Interstitial lung disease, Pleural effusion, Pneumonia and Pulmonary Embolus Labs and images were reviewed. Patient Has recent ABG, which has been reviewed. Will treat underlying causes and adjust management of respiratory failure.     -improving    Community acquired pneumonia of right lower lobe of lung  See AHRF   S/p comprehensive antibiotic treatment and high-dose steroids, remains afebrile with mild improvements in lung imaging.      Cardiac/Vascular  Paroxysmal atrial fibrillation  Patient with paroxysmal atrial fibrillation during admission  - s/p Amiodarone loading 400mg BID x 10days, now on 200mg daily   - Eliquis 5mg BID for anticoagulation started 8/22  - lopressor 50mg BID for rate control, decreased 8/21 from 100mg BID due to pulse rate in 60s    Acute on chronic diastolic congestive heart failure  Patient is identified as having Diastolic (HFpEF) heart failure that is Acute on chronic. CHF is currently uncontrolled due to Pulmonary edema/pleural effusion on CXR. Latest ECHO performed and demonstrates-     Echo  · The left ventricle is normal in size with concentric remodeling and normal systolic  function.  · The estimated ejection fraction is 68%.  · Indeterminate left ventricular diastolic function.  · Normal right ventricular size with normal right ventricular systolic function.  · Mild tricuspid regurgitation.  · Mild pulmonic regurgitation.  · Elevated central venous pressure (15 mmHg).  · The estimated PA systolic pressure is 49 mmHg.  · There is pulmonary hypertension.  · Small posterior pericardial effusion. Trivial lateral and under the atria.  · There is a small left pleural effusion.  · No shunt by Air Contrast Bubble.      - 1.5L fluid restriction  - Strict Is/Os, daily weights  - Lasix discontinued, patient euvolemic            Essential hypertension  Home Med: Metoprolol 50mg po q.d.  - lopressor decreased to 50mg BID 8/21 for rate control     GI  Transaminitis  Patient with extensive hx EtOH use. , . Liver US 8/18 unremarkable. 8/20 AST 47, ALT 89 - improving.    -monitor with daily CMP      Discharged Condition: stable    Disposition: Long Term Care      Patient Instructions:   No discharge procedures on file.  Medications:  Reconciled Home Medications:      Medication List      START taking these medications    albuterol-ipratropium 2.5 mg-0.5 mg/3 mL nebulizer solution  Commonly known as: DUO-NEB  Take 3 mLs by nebulization every 8 (eight) hours. Rescue     amiodarone 200 MG Tab  Commonly known as: PACERONE  Take 2 tablets (400 mg total) by mouth 2 (two) times daily.Please take 400 mg twice a day until 8/23/2022 then 200 mg daily thereafter until you follow up with cardiology     apixaban 5 mg Tab  Commonly known as: ELIQUIS  Take 1 tablet (5 mg total) by mouth 2 (two) times daily.        CHANGE how you take these medications    busPIRone 5 MG Tab  Commonly known as: BUSPAR  Take 1 tablet (5 mg total) by mouth 2 (two) times daily as needed (anxiety).  What changed: Another medication with the same name was removed. Continue taking this medication, and follow the directions you  see here.     metoprolol tartrate 50 MG tablet  Commonly known as: LOPRESSOR  Take 1 tablet (50 mg total) by mouth once daily. Please hold until you follow up with your primary care provider  What changed: additional instructions     venlafaxine 150 MG Cp24  Commonly known as: EFFEXOR-XR  Take 150 mg by mouth once daily. Take am of surgery  What changed: Another medication with the same name was removed. Continue taking this medication, and follow the directions you see here.        CONTINUE taking these medications    dicyclomine 10 MG capsule  Commonly known as: BENTYL  Take 1 capsule (10 mg total) by mouth before meals as needed.     mirtazapine 7.5 MG Tab  Commonly known as: REMERON  Take 1 tablet (7.5 mg total) by mouth every evening. For insomnia, mood     omeprazole 20 MG capsule  Commonly known as: PRILOSEC  Take 1 capsule (20 mg total) by mouth 2 (two) times daily.     oxyCODONE-acetaminophen 5-325 mg per tablet  Commonly known as: PERCOCET  Take 1 tablet by mouth every 6 (six) hours as needed for Pain.     thiamine 100 MG tablet  Take 100 mg by mouth once daily.     tiZANidine 4 MG tablet  Commonly known as: ZANAFLEX  Take 1 tablet (4 mg total) by mouth every 6 (six) hours as needed (spasms).     vitamin D 1000 units Tab  Commonly known as: VITAMIN D3  Take 5,000 Int'l Units by mouth. Hold am of surgery        STOP taking these medications    BC HEADACHE POWDER ORAL     magnesium 30 mg Tab             SILVIO BERMUDEZ MD  Critical Care Medicine  [facility] - Cardiac Medical ICU

## 2022-09-01 ENCOUNTER — TELEPHONE (OUTPATIENT)
Dept: PAIN MEDICINE | Facility: CLINIC | Age: 62
End: 2022-09-01
Payer: MEDICARE

## 2022-09-01 NOTE — TELEPHONE ENCOUNTER
----- Message from Lidia Banegas sent at 9/1/2022 11:49 AM CDT -----  Name of Who is Calling: MICAH MANCINI [[id number]]           What is the request in detail: Patient is requesting a call back to discuss back pain.  Please assist.           Can the clinic reply by [facility]: No           What Number to Call Back if not in [facility]: [phone]

## 2022-09-07 ENCOUNTER — TELEPHONE (OUTPATIENT)
Dept: INTERNAL MEDICINE | Facility: CLINIC | Age: 62
End: 2022-09-07
Payer: MEDICARE

## 2022-09-07 PROBLEM — J96.01 ACUTE HYPOXEMIC RESPIRATORY FAILURE: Status: RESOLVED | Noted: 2022-08-02 | Resolved: 2022-09-07

## 2022-09-07 PROBLEM — J96.22 ACUTE ON CHRONIC RESPIRATORY FAILURE WITH HYPERCAPNIA: Status: RESOLVED | Noted: 2022-08-02 | Resolved: 2022-09-07

## 2022-09-07 NOTE — TELEPHONE ENCOUNTER
----- Message from Estela Mcdaniel MD sent at 9/7/2022  3:57 PM CDT -----  Regarding: pt d/cd home today. needs hospital f/u appointment scheduled in 1-2 weeks  pt d/cd home today. needs hospital f/u appointment scheduled in 1-2 weeks

## 2022-09-08 ENCOUNTER — TELEPHONE (OUTPATIENT)
Dept: PAIN MEDICINE | Facility: CLINIC | Age: 62
End: 2022-09-08
Payer: MEDICARE

## 2022-09-08 NOTE — TELEPHONE ENCOUNTER
----- Message from Niranjan Aguirre sent at 9/8/2022  3:00 PM CDT -----  Name of Who is Calling: MICAH MANCINI [[id number]]            What is the request in detail: Patient is requesting call back to speak with Dr. Rico about a medicine he put her on oxyCODONE-acetaminophen (PERCOCET) 5-325 mg per tablet and maybe get an alterative               Can the clinic reply by [facility]: yes              What [facility]:   [phone]

## 2022-09-09 ENCOUNTER — PATIENT MESSAGE (OUTPATIENT)
Dept: PAIN MEDICINE | Facility: CLINIC | Age: 62
End: 2022-09-09
Payer: MEDICARE

## 2022-09-12 ENCOUNTER — OFFICE VISIT (OUTPATIENT)
Dept: PAIN MEDICINE | Facility: CLINIC | Age: 62
End: 2022-09-12
Payer: MEDICARE

## 2022-09-12 DIAGNOSIS — M50.30 DDD (DEGENERATIVE DISC DISEASE), CERVICAL: ICD-10-CM

## 2022-09-12 DIAGNOSIS — M96.1 POST LAMINECTOMY SYNDROME: Primary | ICD-10-CM

## 2022-09-12 DIAGNOSIS — G89.4 CHRONIC PAIN DISORDER: ICD-10-CM

## 2022-09-12 DIAGNOSIS — M54.17 LUMBOSACRAL RADICULOPATHY: ICD-10-CM

## 2022-09-12 PROCEDURE — 3044F HG A1C LEVEL LT 7.0%: CPT | Mod: CPTII,95,, | Performed by: ANESTHESIOLOGY

## 2022-09-12 PROCEDURE — 1111F PR DISCHARGE MEDS RECONCILED W/ CURRENT OUTPATIENT MED LIST: ICD-10-PCS | Mod: CPTII,95,, | Performed by: ANESTHESIOLOGY

## 2022-09-12 PROCEDURE — 1160F RVW MEDS BY RX/DR IN RCRD: CPT | Mod: CPTII,95,, | Performed by: ANESTHESIOLOGY

## 2022-09-12 PROCEDURE — 1111F DSCHRG MED/CURRENT MED MERGE: CPT | Mod: CPTII,95,, | Performed by: ANESTHESIOLOGY

## 2022-09-12 PROCEDURE — 99499 RISK ADDL DX/OHS AUDIT: ICD-10-PCS | Mod: 95,,, | Performed by: ANESTHESIOLOGY

## 2022-09-12 PROCEDURE — 3044F PR MOST RECENT HEMOGLOBIN A1C LEVEL <7.0%: ICD-10-PCS | Mod: CPTII,95,, | Performed by: ANESTHESIOLOGY

## 2022-09-12 PROCEDURE — 1159F PR MEDICATION LIST DOCUMENTED IN MEDICAL RECORD: ICD-10-PCS | Mod: CPTII,95,, | Performed by: ANESTHESIOLOGY

## 2022-09-12 PROCEDURE — 1159F MED LIST DOCD IN RCRD: CPT | Mod: CPTII,95,, | Performed by: ANESTHESIOLOGY

## 2022-09-12 PROCEDURE — 99213 OFFICE O/P EST LOW 20 MIN: CPT | Mod: 95,GC,, | Performed by: ANESTHESIOLOGY

## 2022-09-12 PROCEDURE — 99213 PR OFFICE/OUTPT VISIT, EST, LEVL III, 20-29 MIN: ICD-10-PCS | Mod: 95,GC,, | Performed by: ANESTHESIOLOGY

## 2022-09-12 PROCEDURE — 99499 UNLISTED E&M SERVICE: CPT | Mod: 95,,, | Performed by: ANESTHESIOLOGY

## 2022-09-12 PROCEDURE — 1160F PR REVIEW ALL MEDS BY PRESCRIBER/CLIN PHARMACIST DOCUMENTED: ICD-10-PCS | Mod: CPTII,95,, | Performed by: ANESTHESIOLOGY

## 2022-09-12 NOTE — PROGRESS NOTES
Chronic Pain-Tele-Medicine-Established Note (Follow up visit)        The patient location is: Her home in [place]  The chief complaint leading to consultation is: Back Pain  Visit type: Virtual visit with synchronous audio and video  Total time spent with patient: 20 minutes  Each patient to whom he or she provides medical services by telemedicine is:  (1) informed of the relationship between the physician and patient and the respective role of any other health care provider with respect to management of the patient; and (2) notified that he or she may decline to receive medical services by telemedicine and may withdraw from such care at any time.        SUBJECTIVE:    History Today 9/12/22  Vale Gutierrez for telemedicine follow up and consideration of SCI. Since last visit, she was admitted to [facility] for 38 days with acute on chronic respiratory failure 2/2 CAP. She expressed concern over opioids as this can affect her breathing and would like to try other alternatives. Her pain continues to be in her low back with radiation down the side of her legs. Pain limiting ambulation. She has not been to PT for sometime. She reports that aquatherapy had no availability for a few months. She would like physical therapy, but she says they would need home health as she does not drive. Has not completed psyche eval. She has no hx of cardiac disease, denies blood in your stool and black tarry stool.     History Today 6/20/22  Patient presents for tele-medicine appointment for follow up s/p caudal WERO on 5/27/22. She reports good relief (70-80%) for about 3 weeks. Her pain is now back. She does find relief with Percocet 5/325 but she finds that she is requiring extra doses. She has not started aquatic therapy yet due to scheduling. She did participate in home PT in the past that she enjoyed. She is interested in discussing a spinal cord stimulator today.     History today 5/02/2022:  Vale Gutierrez presents  tele-medicine appointment for a follow-up appointment for back pain. Since the last visit, Vale Gutierrez states the pain has been stable. Current pain intensity is 6/10. She is still hasnt made her mind completely about SCS.     History today 4/11:  Vale Gutierrez presents tele-medicine appointment for a follow-up appointment for back pain. Since the last visit, Vale Gutierrez states the pain has been worsening. Had episode where she knocked over a drink and tried to clean it up. Worsened the pain. Current pain intensity is 7/10.  Was considering SCS at last visit. Has not fully decided. Pain is increasing, norco no longer helps, taking it TID Interested in switching to Percoset. Laying on the side and rest helps.   Has not started aqua therapy, 2 month waiting list.      + pain raditing back of legs  No bowel or bowels  No numbness or tingling of LE     Interval History 3/28/22:  Patient with a history of L4-5 fusion presents in follow-up of low back pain s/p bilateral L3/4 TFESI on 3/4/22 with 2 days of relief then bilateral L2,3,4,5 MBB on 3/24/22 with 20% relief. Today, she reports persistent chronic low back pain with occasional radiation down the posterior legs to the knees. Back pain is more bothersome than the legs. Pain is worse with any activity including prolonged standing, sitting, bending, and coughing/sneezing. Pain is improved with lying down.  Takes Norco 5-325 bid, last filled 3/15/22. Takes tizanidine every 6 hours.     Interval History 3/15/2022:  Vale Gutierrez presents tele-medicine appointment for a follow-up appointment for low back pain radiating to lower extremitiy R>L.  She is S/P Bilateral  L3/4 Lumbar Transforaminal Epidural Steroid Injection under Fluoroscopic Guidance on 3/4/2022 with only 2 days of relief.      Interval History 07/28/2020:  Vale Gutierrez presents tele-medicine appointment for a follow-up appointment for low back pain radiating to  "lower extremitiy R>L. Since the last visit, Vale Gutierrez states the pain has been persistant. Current pain intensity is 0/10.  Pain is worst when standing and moving and relieved with sitting, laying, and stretched out.  At the worst she says it is a 10/10.  The pain radiates to back of thighs and calves.  She describes the pain as sharp, burning in nature.  She states that the flexeril 5mg prn helps somewhat but not much. She started diclofenac yesterday (prescribed by her PCP) which helps with the pain, decreases frequency and intensity. She recently had an MRI lumbar spine done and would like to review the results.     Interval History 07/13/2020  Vale Gutierrez presents to the clinic for the evaluation of low back pain and bilateral calf pain. The calf pain started 5-6 weeks ago following non related injury and symptoms have been worsening.The pain is located in the low back area and radiates to the legs. She has suffered from lower back pain "for years."  The pain is described as aching and burning with intermittent numbness and tingling in the lower extremities. The pain is rated as 9/10. The pain is rated with a score of  3/10 on the BEST day and a score of 10/10 on the WORST day.  Symptoms interfere with daily activity. The pain is exacerbated by Standing, Walking, and Lifting.  The pain is mitigated by massage, medications and rest. She reports spending 8 hours per day reclining. The patient reports 3-4 hours of uninterrupted sleep per night. She presents for referral from Urgent Care where she was evaluated and believed to have intermittent neurogenic claudication.        Pain Medications:  - Opioids: Hydrocodone- Acetaminophen 5-325mg TID. Percocet 5/325 TID  - NSAIDs: BC powder BID  - Anti-Depressants: Venlafaxine 150 mg + 75 mg in AM  - Anti-Convulsants: Zanaflex BID  - Others: NA     Physical Therapy/Home Exercise: Not currently but in the past, finished in January 2022. Two month wait " for water therapy in [place].       report:  Reviewed and consistent with medication use as prescribed.        Pain Procedures:  Patient says she has had 9 ESIs 15-20 years ago with minimal pain relief  3/4/22 Bilateral L3/4 TFESI 3/4 - 2 days of relief  3/24/22 Bl L2,3,4,5 MBB - 20% relief  5/27/22 Caudal WERO - 70-80% relief        Imaging:      MRI LUMBAR SPINE WITHOUT CONTRAST 3/28  FINDINGS:  Postoperative change of L4-L5 decompressive laminectomy, discectomy and posterior fusion with bilateral pedicular screws and an intervertebral disc spacer.     Lumbar spine alignment demonstrates mild levoscoliosis with stable mild grade 1 anterolisthesis of L4 on L5 and L5 on S1.  Vertebral body heights are well maintained without evidence for fracture.  No marrow signal abnormality to suggest an infiltrative process.     There is advanced degenerative disc space narrowing and desiccation at T11-T12, L3-L4 and L5-S1 with chronic associated endplate changes, similar when compared to the previous MRI.  There is disc desiccation at L2-L3 with a posterior annular fissure.     Distal spinal cord demonstrates normal contour and signal intensity.  Cauda equina appears normal without findings to suggest arachnoiditis.  Conus medullaris terminates at T12-L1.  Fatty filum terminale.     Coronal T1 weighted images demonstrate a partially exophytic left renal cortical lesion, incompletely evaluated on this exam.  Limited evaluation of the remaining visualized abdominal organs appears normal.  There is mild to moderate fatty atrophy of the posterior paraspinal musculature.  SI joints are symmetric.     T12-L1: No spinal canal stenosis or neural foraminal narrowing.     L1-L2: No spinal canal stenosis or neural foraminal narrowing.     L2-L3: Bilateral facet arthropathy and bilateral ligamentum flavum buckling.  No spinal canal stenosis or neural foraminal narrowing.     L3-L4: Circumferential disc bulge slightly encroaches into the  bilateral foraminal zones, right greater than left.  Bilateral facet arthropathy and bilateral ligamentum flavum buckling.  Findings contribute to mild right neural foraminal narrowing.  No spinal canal stenosis.     L4-L5: Grade 1 anterolisthesis.  Bilateral facet arthropathy.  No spinal canal stenosis or neural foraminal narrowing.     L5-S1: Grade 1 anterolisthesis.  Circumferential disc bulge.  Bilateral facet arthropathy.  No spinal canal stenosis or neural foraminal narrowing.     Impression:     1. Remote postoperative change of L4-L5 decompressive laminectomy, discectomy and posterior fusion.  2. Multilevel lumbar degenerative changes contributing to mild neural foraminal narrowing at L3-L4.  No spinal canal stenosis.  3. Left renal cortical lesion, incompletely evaluated on this exam.  Recommend further characterization with a dedicated renal ultrasound.       Allergies: Review of patient's allergies indicates:  No Known Allergies    Current Medications:   Current Outpatient Medications   Medication Sig Dispense Refill    albuterol-ipratropium (DUO-NEB) 2.5 mg-0.5 mg/3 mL nebulizer solution Take 3 mLs by nebulization every 8 (eight) hours. Rescue 75 mL 2    amiodarone (PACERONE) 200 MG Tab Take 1 tablet (200 mg total) by mouth once daily. 30 tablet 11    apixaban (ELIQUIS) 5 mg Tab Take 1 tablet (5 mg total) by mouth 2 (two) times daily. 60 tablet 2    busPIRone (BUSPAR) 5 MG Tab Take 1 tablet (5 mg total) by mouth 2 (two) times daily as needed (anxiety). 60 tablet 11    metoprolol tartrate (LOPRESSOR) 50 MG tablet Take 1 tablet (50 mg total) by mouth once daily. 30 tablet 11    mirtazapine (REMERON) 7.5 MG Tab Take 1 tablet (7.5 mg total) by mouth every evening. For insomnia, mood 30 tablet 11    oxyCODONE-acetaminophen (PERCOCET) 5-325 mg per tablet Take 1 tablet by mouth every 6 (six) hours as needed for Pain. 20 each 0    thiamine 100 MG tablet Take 100 mg by mouth once daily.      venlafaxine  (EFFEXOR-XR) 75 MG 24 hr capsule Take 3 capsules (225 mg total) by mouth once daily. 90 capsule 11     No current facility-administered medications for this visit.     Facility-Administered Medications Ordered in Other Visits   Medication Dose Route Frequency Provider Last Rate Last Admin    0.9%  NaCl infusion   Intravenous Continuous Adrianna Velazquez  50 mL/hr at 05/27/22 1452 50 mL/hr at 05/27/22 1452       REVIEW OF SYSTEMS:    GENERAL:  No weight loss, malaise or fevers.  HEENT:  Negative for frequent or significant headaches.  NECK:  Negative for lumps, goiter, pain and significant neck swelling.  RESPIRATORY:  Negative for cough, wheezing or shortness of breath.  CARDIOVASCULAR:  Negative for chest pain, leg swelling or palpitations.  GI:  Negative for abdominal discomfort, blood in stools or black stools or change in bowel habits.  MUSCULOSKELETAL:  See HPI.  SKIN:  Negative for lesions, rash, and itching.  PSYCH:  Negative for sleep disturbance, mood disorder and recent psychosocial stressors.  HEMATOLOGY/LYMPHOLOGY:  Negative for prolonged bleeding, bruising easily or swollen nodes.  NEURO:   No history of headaches, syncope, paralysis, seizures or tremors.  All other reviewed and negative other than HPI.    Past Medical History:  Past Medical History:   Diagnosis Date    Alcohol abuse     Anxiety     Benign paroxysmal positional vertigo 1/17/2020    Depression     Hyperlipidemia     Hypertension        Past Surgical History:  Past Surgical History:   Procedure Laterality Date    BREAST CYST ASPIRATION      CHOLECYSTECTOMY      complicated with bile leak, sepsis    COLONOSCOPY N/A 6/3/2020    Procedure: COLONOSCOPY Golytely;  Surgeon: Tino Neil MD;  Location: [facility] ENDO;  Service: Colon and Rectal;  Laterality: N/A;    EPIDURAL STEROID INJECTION N/A 5/27/2022    Procedure: INJECTION, STEROID, EPIDURAL, CAUDAL CONTRAST;  Surgeon: Rubén Rico MD;  Location: [facility] PAIN MGT;  Service: Pain  Management;  Laterality: N/A;  5/6 RESCHEDULE    ESOPHAGOGASTRODUODENOSCOPY N/A 6/3/2020    Procedure: EGD (ESOPHAGOGASTRODUODENOSCOPY);  Surgeon: Tino Neil MD;  Location: [facility];  Service: Colon and Rectal;  Laterality: N/A;    INJECTION OF ANESTHETIC AGENT AROUND NERVE Bilateral 3/24/2022    Procedure: BLOCK, NERVE MEDIAL BRANCH BLOCK BL L2, L3, L4 and L5  1st, needs consent;  Surgeon: Rubén Rico MD;  Location: [facility] PAIN MGT;  Service: Pain Management;  Laterality: Bilateral;    TRANSFORAMINAL EPIDURAL INJECTION OF STEROID Bilateral 8/7/2020    Procedure: INJECTION, STEROID, EPIDURAL, TRANSFORAMINAL APPROACH, L4-L5 need consent;  Surgeon: Rubén Rico MD;  Location: [facility] PAIN MGT;  Service: Pain Management;  Laterality: Bilateral;    TRANSFORAMINAL EPIDURAL INJECTION OF STEROID Bilateral 9/4/2020    Procedure: LUMBAR TRANSFORAMINAL BILATERAL L4/5 DIRECT REFERRAL;  Surgeon: Rubén Rico MD;  Location: [facility] PAIN MGT;  Service: Pain Management;  Laterality: Bilateral;  NEEDS CONSENT    TRANSFORAMINAL EPIDURAL INJECTION OF STEROID Bilateral 3/4/2022    Procedure: Injection,steroid,epidural,transforaminal approach BILATERAL L3/4 DIRECT REFERRAL;  Surgeon: Rubén Rico MD;  Location: [facility] PAIN MGT;  Service: Pain Management;  Laterality: Bilateral;       Family History:  Family History   Problem Relation Age of Onset    Atrial fibrillation Mother     Heart failure Mother     Arthritis Mother     Macular degeneration Mother     Stroke Father     Cancer Maternal Uncle         lung    Cataracts Paternal Grandfather     Glaucoma Paternal Grandfather        Social History:  Social History     Socioeconomic History    Marital status: Single   Tobacco Use    Smoking status: Former    Smokeless tobacco: Never   Substance and Sexual Activity    Alcohol use: Yes     Alcohol/week: 12.0 standard drinks     Types: 12 Shots of liquor per week     Comment: three 12-14 oz cocktail drinks.     Drug use: No     Sexual activity: Never     Social Determinants of Health     Financial Resource Strain: Medium Risk    Difficulty of Paying Living Expenses: Somewhat hard   Food Insecurity: Food Insecurity Present    Worried About Running Out of Food in the Last Year: Sometimes true    Ran Out of Food in the Last Year: Never true   Transportation Needs: Unmet Transportation Needs    Lack of Transportation (Medical): Yes    Lack of Transportation (Non-Medical): Yes   Physical Activity: Inactive    Days of Exercise per Week: 0 days    Minutes of Exercise per Session: 0 min   Stress: Stress Concern Present    Feeling of Stress : Rather much   Social Connections: Unknown    Frequency of Communication with Friends and Family: Twice a week    Frequency of Social Gatherings with Friends and Family: Three times a week    Active Member of Clubs or Organizations: No    Attends Club or Organization Meetings: Never    Marital Status: Never    Housing Stability: Low Risk     Unable to Pay for Housing in the Last Year: No    Number of Places Lived in the Last Year: 1    Unstable Housing in the Last Year: No       OBJECTIVE:  General appearance: Well appearing, in no acute distress, alert and oriented x3.  Psych:  Mood and affect appropriate.    ASSESSMENT: 61 y.o. year old female with low back pain, consistent with      1. Post laminectomy syndrome        2. Chronic pain disorder        3. DDD (degenerative disc disease), cervical        4. Lumbosacral radiculopathy                PLAN:     - I have stressed the importance of physical activity and a home exercise plan to help with pain and improve health.  - Referral to Home Health Physical Therapy  - Patient can continue with medications for now since they are providing benefits, using them appropriately, and without side effects.  - Educated on the safety of taking medications as prescribed.   - Will continue Percocet frequency to 5/325 mg QID, will work to decrease 5/325 TID. Patient  to call for refill  - Given recent prolonged hospitalization and compromised pulmonary function, will postpone plan for trial of spinal cord stimulator with [employer] until patient is further along in her medical recovery and her general health improves  - RTC in 8 weeks for further assessment. Follow up to be in person  - Counseled patient regarding the importance of activity modification, constant sleeping habits and physical therapy.    The above plan and management options were discussed at length with patient. Patient is in agreement with the above and verbalized understanding.    I performed a history, review of systems, and physical exam with the patient. The assessment and plan were discussed and agreed upon with Dr. Rico, the attending of record, before sharing with the patient. The face to face encounter time, including answering all patient questions, was 20 minutes.     Quinn Baeza MD  [employer] PM[facility] PGY3  09/12/2022    I have personally reviewed the encounter with resident/fellow/NP and personally spoke with patient and addressed all questions and concerns. The encounter was initiated by patient who consented and verbalized understanding to the type of encounter not related to any office visit or other encounter in the past 7 days.    Rubén Rico MD   9/12/2022

## 2022-09-13 PROCEDURE — G0180 PR HOME HEALTH MD CERTIFICATION: ICD-10-PCS | Mod: ,,, | Performed by: HOSPITALIST

## 2022-09-13 PROCEDURE — G0180 MD CERTIFICATION HHA PATIENT: HCPCS | Mod: ,,, | Performed by: HOSPITALIST

## 2022-09-20 ENCOUNTER — LAB VISIT (OUTPATIENT)
Dept: LAB | Facility: HOSPITAL | Age: 62
End: 2022-09-20
Attending: HOSPITALIST
Payer: MEDICARE

## 2022-09-20 ENCOUNTER — OFFICE VISIT (OUTPATIENT)
Dept: INTERNAL MEDICINE | Facility: CLINIC | Age: 62
End: 2022-09-20
Payer: MEDICARE

## 2022-09-20 VITALS
SYSTOLIC BLOOD PRESSURE: 122 MMHG | RESPIRATION RATE: 20 BRPM | OXYGEN SATURATION: 93 % | TEMPERATURE: 98 F | DIASTOLIC BLOOD PRESSURE: 76 MMHG | HEART RATE: 70 BPM | WEIGHT: 176.13 LBS | BODY MASS INDEX: 29.34 KG/M2 | HEIGHT: 65 IN

## 2022-09-20 DIAGNOSIS — I10 ESSENTIAL HYPERTENSION: ICD-10-CM

## 2022-09-20 DIAGNOSIS — I71.20 THORACIC AORTIC ANEURYSM WITHOUT RUPTURE: ICD-10-CM

## 2022-09-20 DIAGNOSIS — I50.32 CHRONIC DIASTOLIC CONGESTIVE HEART FAILURE: ICD-10-CM

## 2022-09-20 DIAGNOSIS — K76.0 HEPATIC STEATOSIS: ICD-10-CM

## 2022-09-20 DIAGNOSIS — I48.0 PAROXYSMAL ATRIAL FIBRILLATION: ICD-10-CM

## 2022-09-20 DIAGNOSIS — F10.20 ALCOHOL USE DISORDER, SEVERE, DEPENDENCE: ICD-10-CM

## 2022-09-20 DIAGNOSIS — Z74.09 IMPAIRED FUNCTIONAL MOBILITY, BALANCE, GAIT, AND ENDURANCE: ICD-10-CM

## 2022-09-20 DIAGNOSIS — E55.9 VITAMIN D DEFICIENCY: ICD-10-CM

## 2022-09-20 DIAGNOSIS — I31.39 PERICARDIAL EFFUSION: ICD-10-CM

## 2022-09-20 DIAGNOSIS — G89.4 CHRONIC PAIN DISORDER: ICD-10-CM

## 2022-09-20 DIAGNOSIS — R11.0 NAUSEA: ICD-10-CM

## 2022-09-20 DIAGNOSIS — Z09 HOSPITAL DISCHARGE FOLLOW-UP: Primary | ICD-10-CM

## 2022-09-20 LAB
25(OH)D3+25(OH)D2 SERPL-MCNC: 74 NG/ML (ref 30–96)
ALBUMIN SERPL BCP-MCNC: 3.4 G/DL (ref 3.5–5.2)
ALP SERPL-CCNC: 119 U/L (ref 55–135)
ALT SERPL W/O P-5'-P-CCNC: 9 U/L (ref 10–44)
ANION GAP SERPL CALC-SCNC: 9 MMOL/L (ref 8–16)
AST SERPL-CCNC: 16 U/L (ref 10–40)
BILIRUB SERPL-MCNC: 0.2 MG/DL (ref 0.1–1)
BUN SERPL-MCNC: 15 MG/DL (ref 8–23)
CALCIUM SERPL-MCNC: 9.8 MG/DL (ref 8.7–10.5)
CHLORIDE SERPL-SCNC: 101 MMOL/L (ref 95–110)
CO2 SERPL-SCNC: 29 MMOL/L (ref 23–29)
CREAT SERPL-MCNC: 0.9 MG/DL (ref 0.5–1.4)
EST. GFR  (NO RACE VARIABLE): >60 ML/MIN/1.73 M^2
GLUCOSE SERPL-MCNC: 80 MG/DL (ref 70–110)
POTASSIUM SERPL-SCNC: 5.1 MMOL/L (ref 3.5–5.1)
PROT SERPL-MCNC: 6.9 G/DL (ref 6–8.4)
SODIUM SERPL-SCNC: 139 MMOL/L (ref 136–145)

## 2022-09-20 PROCEDURE — 99214 PR OFFICE/OUTPT VISIT, EST, LEVL IV, 30-39 MIN: ICD-10-PCS | Mod: S$GLB,,, | Performed by: HOSPITALIST

## 2022-09-20 PROCEDURE — 1159F PR MEDICATION LIST DOCUMENTED IN MEDICAL RECORD: ICD-10-PCS | Mod: CPTII,S$GLB,, | Performed by: HOSPITALIST

## 2022-09-20 PROCEDURE — 1160F PR REVIEW ALL MEDS BY PRESCRIBER/CLIN PHARMACIST DOCUMENTED: ICD-10-PCS | Mod: CPTII,S$GLB,, | Performed by: HOSPITALIST

## 2022-09-20 PROCEDURE — 99499 RISK ADDL DX/OHS AUDIT: ICD-10-PCS | Mod: S$GLB,,, | Performed by: HOSPITALIST

## 2022-09-20 PROCEDURE — 99214 OFFICE O/P EST MOD 30 MIN: CPT | Mod: S$GLB,,, | Performed by: HOSPITALIST

## 2022-09-20 PROCEDURE — 36415 COLL VENOUS BLD VENIPUNCTURE: CPT | Mod: PO | Performed by: HOSPITALIST

## 2022-09-20 PROCEDURE — 99999 PR PBB SHADOW E&M-EST. PATIENT-LVL IV: CPT | Mod: PBBFAC,,, | Performed by: HOSPITALIST

## 2022-09-20 PROCEDURE — 99999 PR PBB SHADOW E&M-EST. PATIENT-LVL IV: ICD-10-PCS | Mod: PBBFAC,,, | Performed by: HOSPITALIST

## 2022-09-20 PROCEDURE — 3078F PR MOST RECENT DIASTOLIC BLOOD PRESSURE < 80 MM HG: ICD-10-PCS | Mod: CPTII,S$GLB,, | Performed by: HOSPITALIST

## 2022-09-20 PROCEDURE — 1159F MED LIST DOCD IN RCRD: CPT | Mod: CPTII,S$GLB,, | Performed by: HOSPITALIST

## 2022-09-20 PROCEDURE — 3074F PR MOST RECENT SYSTOLIC BLOOD PRESSURE < 130 MM HG: ICD-10-PCS | Mod: CPTII,S$GLB,, | Performed by: HOSPITALIST

## 2022-09-20 PROCEDURE — 99499 UNLISTED E&M SERVICE: CPT | Mod: S$GLB,,, | Performed by: HOSPITALIST

## 2022-09-20 PROCEDURE — 3008F BODY MASS INDEX DOCD: CPT | Mod: CPTII,S$GLB,, | Performed by: HOSPITALIST

## 2022-09-20 PROCEDURE — 3044F PR MOST RECENT HEMOGLOBIN A1C LEVEL <7.0%: ICD-10-PCS | Mod: CPTII,S$GLB,, | Performed by: HOSPITALIST

## 2022-09-20 PROCEDURE — 80053 COMPREHEN METABOLIC PANEL: CPT | Performed by: HOSPITALIST

## 2022-09-20 PROCEDURE — 1160F RVW MEDS BY RX/DR IN RCRD: CPT | Mod: CPTII,S$GLB,, | Performed by: HOSPITALIST

## 2022-09-20 PROCEDURE — 3044F HG A1C LEVEL LT 7.0%: CPT | Mod: CPTII,S$GLB,, | Performed by: HOSPITALIST

## 2022-09-20 PROCEDURE — 3078F DIAST BP <80 MM HG: CPT | Mod: CPTII,S$GLB,, | Performed by: HOSPITALIST

## 2022-09-20 PROCEDURE — 3008F PR BODY MASS INDEX (BMI) DOCUMENTED: ICD-10-PCS | Mod: CPTII,S$GLB,, | Performed by: HOSPITALIST

## 2022-09-20 PROCEDURE — 82306 VITAMIN D 25 HYDROXY: CPT | Performed by: HOSPITALIST

## 2022-09-20 PROCEDURE — 3074F SYST BP LT 130 MM HG: CPT | Mod: CPTII,S$GLB,, | Performed by: HOSPITALIST

## 2022-09-20 RX ORDER — METHYLPREDNISOLONE 4 MG/1
TABLET ORAL
Qty: 21 TABLET | Refills: 0 | Status: SHIPPED | OUTPATIENT
Start: 2022-09-20 | End: 2022-10-07 | Stop reason: SDUPTHER

## 2022-09-20 RX ORDER — OMEPRAZOLE 20 MG/1
20 CAPSULE, DELAYED RELEASE ORAL DAILY PRN
Qty: 30 CAPSULE | Refills: 5 | Status: SHIPPED | OUTPATIENT
Start: 2022-09-20 | End: 2023-01-01

## 2022-09-20 RX ORDER — ONDANSETRON 4 MG/1
4 TABLET, ORALLY DISINTEGRATING ORAL EVERY 8 HOURS PRN
Qty: 30 TABLET | Refills: 0 | Status: SHIPPED | OUTPATIENT
Start: 2022-09-20 | End: 2023-01-01

## 2022-09-20 NOTE — PROGRESS NOTES
Transitional Care Note    Family and/or Caretaker present at visit?  Yes.  Diagnostic tests reviewed/disposition: No diagnosic tests pending after this hospitalization.  Disease/illness education: yes  Home health/community services discussion/referrals: Patient has home health established at home .   Establishment or re-establishment of referral orders for community resources: No other necessary community resources.   Discussion with other health care providers: No discussion with other health care providers necessary.   Discharge Medication Review:    Medication reconciliation performed Yes   If no, state reason why not performed: N/A         Subjective:     @Patient ID: Vale Gutierrez is a 61 y.o. female.    Chief Complaint: hospital f/u     HPI    62 y/o female HTN, anxiety, depression, etoh abuse, memory deficits, idiopathic progressive polyneuropathy, lumbar spondylosis, chronic pain presents for hospital f/u. [place] [facility] [place] hypoxia and alcohol withdrawals. Found to have pna and pericardial effusion during stay. Treated with abx, valium. Had increasing o2 requirements and ended up in the ICU. Also went into Afib during hospital stay. She is on metoprolol and started on amiodarone and Eliquis. Had multiple echos in the hospital; latest 2d echo EF 68%. She was treated for chf exacerbation and diuresed with lasix. She was noted to have elevated LFTs during stay. Additionally treated with high dose steroids for possible autoimmune process.       Pt was [facility] to [facility] and stayed there from 8/23 - 9/7/22  Pt reports she is doing ok since hospital stay. Does feel weak and continue to have chronic pain. Reports increased pain in back, legs since discharge. She has home health with PT at home now. Reports she has remained off alcohol. Not yet ready to start attending AA meetings due to feeling weak           Review of Systems   Constitutional:  Negative for chills and fever.    HENT:  Negative for congestion and sore throat.    Eyes:  Negative for pain and visual disturbance.   Respiratory:  Negative for cough and shortness of breath.    Cardiovascular:  Negative for chest pain and leg swelling.   Gastrointestinal:  Negative for abdominal pain, nausea and vomiting.   Endocrine: Negative for polydipsia and polyuria.   Genitourinary:  Negative for difficulty urinating and dysuria.   Musculoskeletal:  Positive for arthralgias and back pain.   Skin:  Negative for rash and wound.   Neurological:  Negative for dizziness, weakness and headaches.   Psychiatric/Behavioral:  Negative for agitation and confusion.         + memory deficits    Past medical history, surgical history, and family medical history reviewed and updated as appropriate.    Medications and allergies reviewed.     Objective:     There were no vitals filed for this visit.  There is no height or weight on file to calculate BMI.  Physical Exam  Constitutional:       Appearance: Normal appearance.   HENT:      Head: Normocephalic and atraumatic.   Eyes:      General:         Right eye: No discharge.         Left eye: No discharge.      Conjunctiva/sclera: Conjunctivae normal.   Cardiovascular:      Rate and Rhythm: Normal rate and regular rhythm.      Heart sounds: No murmur heard.  Pulmonary:      Effort: Pulmonary effort is normal.      Breath sounds: Normal breath sounds.   Abdominal:      General: Bowel sounds are normal. There is no distension.      Palpations: Abdomen is soft.      Tenderness: There is no abdominal tenderness.   Musculoskeletal:      Cervical back: Normal range of motion and neck supple.      Right lower leg: No edema.      Left lower leg: No edema.   Skin:     General: Skin is warm and dry.   Neurological:      Mental Status: She is alert and oriented to person, place, and time.   Psychiatric:         Mood and Affect: Mood normal.         Behavior: Behavior normal.       Lab Results   Component Value Date     WBC 5.37 09/05/2022    HGB 10.3 (L) 09/05/2022    HCT 35.7 (L) 09/05/2022     09/05/2022    CHOL 208 (H) 01/13/2022    TRIG 81 01/13/2022    HDL 63 01/13/2022    ALT 56 (H) 09/05/2022    AST 66 (H) 09/05/2022     09/05/2022    K 3.8 09/05/2022    CL 98 09/05/2022    CREATININE 0.8 09/05/2022    BUN 14 09/05/2022    CO2 26 09/05/2022    TSH 2.754 01/13/2022    INR 1.1 08/02/2022    HGBA1C 5.3 01/13/2022      2d echo bubble study:    8/12/22  The left ventricle is normal in size with concentric remodeling and normal systolic function.  The estimated ejection fraction is 68%.  Indeterminate left ventricular diastolic function.  Normal right ventricular size with normal right ventricular systolic function.  Mild tricuspid regurgitation.  Mild pulmonic regurgitation.  Elevated central venous pressure (15 mmHg).  The estimated PA systolic pressure is 49 mmHg.  There is pulmonary hypertension.  Small posterior pericardial effusion. Trivial lateral and under the atria.  There is a small left pleural effusion.  No shunt by Air Contrast Bubble    Assessment:     1. Hospital discharge follow-up    2. Essential hypertension    3. Hepatic steatosis    4. Paroxysmal atrial fibrillation    5. Thoracic aortic aneurysm without rupture    6. Chronic diastolic congestive heart failure    7. Pericardial effusion    8. Vitamin D deficiency    9. Nausea    10. Alcohol use disorder, severe, dependence    11. Chronic pain disorder    12. Impaired functional mobility, balance, gait, and endurance      Plan:   Vale was seen today for hospital f/u .    Diagnoses and all orders for this visit:    Hospital discharge follow-up  - pt stable from hospital stay.   - will continue current medications. Will consider PFTs at next visit to r/o copd   - counseled pt to go to AA meetings asap   - will f/u with cardiology regarding afib, aortic aneurysms (dx on CTA 8/2022), chf.    Essential hypertension  - Bp controlled. Continue  metoprolol   -     COMPREHENSIVE METABOLIC PANEL; Future    Hepatic steatosis  -     COMPREHENSIVE METABOLIC PANEL; Future    Paroxysmal atrial fibrillation  -  On amiodarone, eliquis and metoprolol   -     Ambulatory referral/consult to Cardiology; Future    Thoracic aortic aneurysm without rupture  -     Ambulatory referral/consult to Cardiology; Future    Chronic diastolic congestive heart failure  -     Ambulatory referral/consult to Cardiology; Future    Pericardial effusion  - mild on 2d echo.     Vitamin D deficiency  -     Vitamin D; Future    Nausea  -     ondansetron (ZOFRAN-ODT) 4 MG TbDL; Take 1 tablet (4 mg total) by mouth every 8 (eight) hours as needed (nausea).    Alcohol use disorder, severe, dependence  - encouraged cessation  - counseled pt to go to AA meetings asap     Chronic pain disorder  - medrol pack ordered for acute increase in baseline pain    Impaired functional mobility, balance, gait, and endurance  - continue home health pt     Other orders  -     methylPREDNISolone (MEDROL DOSEPACK) 4 mg tablet; use as directed  -     omeprazole (PRILOSEC) 20 MG capsule; Take 1 capsule (20 mg total) by mouth daily as needed.      Rtc 3 months     Estela Mcdaniel MD  Internal Medicine    9/20/2022

## 2022-09-27 ENCOUNTER — PATIENT MESSAGE (OUTPATIENT)
Dept: PAIN MEDICINE | Facility: CLINIC | Age: 62
End: 2022-09-27
Payer: MEDICARE

## 2022-09-27 DIAGNOSIS — M48.062 SPINAL STENOSIS OF LUMBAR REGION WITH NEUROGENIC CLAUDICATION: ICD-10-CM

## 2022-09-27 DIAGNOSIS — M96.1 POSTLAMINECTOMY SYNDROME OF LUMBAR REGION: Primary | ICD-10-CM

## 2022-09-28 ENCOUNTER — PATIENT MESSAGE (OUTPATIENT)
Dept: PAIN MEDICINE | Facility: CLINIC | Age: 62
End: 2022-09-28
Payer: MEDICARE

## 2022-09-28 RX ORDER — OXYCODONE AND ACETAMINOPHEN 5; 325 MG/1; MG/1
1 TABLET ORAL EVERY 6 HOURS PRN
Qty: 20 EACH | Refills: 0 | Status: SHIPPED | OUTPATIENT
Start: 2022-09-28 | End: 2022-09-29 | Stop reason: SDUPTHER

## 2022-09-28 NOTE — TELEPHONE ENCOUNTER
Patient requesting refill on Oxycodone 5-325mg  Last office visit 09/12/22   shows last refill on 07/29/22  Patient does not have a pain contract on file with [facility] department  Patient last UDS none was not consistent with current therapy

## 2022-09-29 DIAGNOSIS — M48.062 SPINAL STENOSIS OF LUMBAR REGION WITH NEUROGENIC CLAUDICATION: ICD-10-CM

## 2022-09-29 DIAGNOSIS — M96.1 POSTLAMINECTOMY SYNDROME OF LUMBAR REGION: ICD-10-CM

## 2022-09-29 RX ORDER — OXYCODONE AND ACETAMINOPHEN 5; 325 MG/1; MG/1
1 TABLET ORAL EVERY 6 HOURS PRN
Qty: 120 EACH | Refills: 0 | Status: SHIPPED | OUTPATIENT
Start: 2022-10-02 | End: 2022-01-01 | Stop reason: SDUPTHER

## 2022-09-29 NOTE — TELEPHONE ENCOUNTER
Patient requesting refill on Oxycodone 5-325mg  Last office visit 09/12/22   shows last refill on 09/28/22  Patient does not have a pain contract on file with [facility]  Patient last UDS none was not consistent with current therapy

## 2022-09-29 NOTE — TELEPHONE ENCOUNTER
----- Message from Rubén Rico MD sent at 9/29/2022  8:46 AM CDT -----  Has she already refilled it?      ----- Message -----  From: Jolene De MA  Sent: 9/29/2022   8:25 AM CDT  To: Rubén Rico MD, Lisandra Bradley MA    Good Morning Lisandra,      Patient forwarded a message to staff reporting that her medication quantity is incorrect. I reviewed patient's  her last fill was for 120 with .       Please correct the following prescription that was sent incorrectly to patient's pharmacy.      Jolene

## 2022-10-05 ENCOUNTER — TELEPHONE (OUTPATIENT)
Dept: INTERNAL MEDICINE | Facility: CLINIC | Age: 62
End: 2022-10-05
Payer: MEDICARE

## 2022-10-05 NOTE — TELEPHONE ENCOUNTER
The patient has been taking remeron and buspar together with out complications. Is it ok to continue taking it this way?  Requesting a 2nd dose of prednisone. Please advise.

## 2022-10-05 NOTE — TELEPHONE ENCOUNTER
----- Message from Ed Bailey sent at 10/4/2022  3:06 PM CDT -----  Contact: pt [phone]  Patient would like a call back to discuss medications. Please call and advise.    Thank you and have a great day.

## 2022-10-07 ENCOUNTER — PATIENT MESSAGE (OUTPATIENT)
Dept: INTERNAL MEDICINE | Facility: CLINIC | Age: 62
End: 2022-10-07
Payer: MEDICARE

## 2022-10-07 ENCOUNTER — PES CALL (OUTPATIENT)
Dept: ADMINISTRATIVE | Facility: CLINIC | Age: 62
End: 2022-10-07
Payer: MEDICARE

## 2022-10-07 RX ORDER — METHYLPREDNISOLONE 4 MG/1
TABLET ORAL
Qty: 21 TABLET | Refills: 0 | Status: SHIPPED | OUTPATIENT
Start: 2022-10-07 | End: 2022-01-01 | Stop reason: SDUPTHER

## 2022-10-10 ENCOUNTER — PATIENT MESSAGE (OUTPATIENT)
Dept: INTERNAL MEDICINE | Facility: CLINIC | Age: 62
End: 2022-10-10
Payer: MEDICARE

## 2022-10-11 ENCOUNTER — EXTERNAL HOME HEALTH (OUTPATIENT)
Dept: HOME HEALTH SERVICES | Facility: HOSPITAL | Age: 62
End: 2022-10-11
Payer: MEDICARE

## 2022-10-13 ENCOUNTER — DOCUMENT SCAN (OUTPATIENT)
Dept: HOME HEALTH SERVICES | Facility: HOSPITAL | Age: 62
End: 2022-10-13
Payer: MEDICARE

## 2022-10-27 NOTE — TELEPHONE ENCOUNTER
Patient requesting refill on Oxycodone 5-325mg  Last office visit 09/12/22   shows last refill on 10/03/22  Patient does not have a pain contract on file with [facility]  Patient last UDS none was not consistent with current therapy

## 2022-11-07 NOTE — TELEPHONE ENCOUNTER
I spoke to the patient and she had questions regarding long term usage of Prednisone.  Questions answered. The patient will call back in a month for refill.    The patient cancelled cardiology appointment. MD name and # given to the patient to reschedule.

## 2022-11-11 NOTE — TELEPHONE ENCOUNTER
----- Message from Ed Bailey sent at 11/11/2022 11:16 AM CST -----  Contact: pt [phone]  Patient would like a call back concerning Prednisone.  Patient would like a refill of Prednisone but would like a call to discuss before RX is filled. Please call and advise.    Thank you and have a great day.

## 2022-11-16 NOTE — TELEPHONE ENCOUNTER
I called the patient and informed her that I am waiting on a response from Dr Mcdaniel.   She verbalized understanding.

## 2022-11-21 PROBLEM — J69.0 ASPIRATION PNEUMONIA: Status: RESOLVED | Noted: 2022-08-05 | Resolved: 2022-01-01

## 2022-11-21 PROBLEM — J18.9 COMMUNITY ACQUIRED PNEUMONIA OF RIGHT LOWER LOBE OF LUNG: Status: RESOLVED | Noted: 2022-08-02 | Resolved: 2022-01-01

## 2022-11-29 NOTE — TELEPHONE ENCOUNTER
Patient requesting refill on Oxycodone 5-325mg  Last office visit 09/12/22   shows last refill on 11/01/22  Patient does not have a pain contract on file with [facility]  Patient last UDS none was not consistent with current therapy

## 2022-11-29 NOTE — ASSESSMENT & PLAN NOTE
Known hx but expressed some worsenig symtoms to PT today. Will assess if needs psych consultation or if having acute stress response from withdrawal/acute illness       Closure 2 Information: This tab is for additional flaps and grafts, including complex repair and grafts and complex repair and flaps. You can also specify a different location for the additional defect, if the location is the same you do not need to select a new one. We will insert the automated text for the repair you select below just as we do for solitary flaps and grafts. Please note that at this time if you select a location with a different insurance zone you will need to override the ICD10 and CPT if appropriate.

## 2022-12-12 NOTE — PROGRESS NOTES
Chronic Pain-Tele-Medicine-Established Note (Follow up visit)        The patient location is: Her home in [place]  The chief complaint leading to consultation is: Back Pain  Visit type: Virtual visit with synchronous audio and video  Total time spent with patient: 20 minutes  Each patient to whom he or she provides medical services by telemedicine is:  (1) informed of the relationship between the physician and patient and the respective role of any other health care provider with respect to management of the patient; and (2) notified that he or she may decline to receive medical services by telemedicine and may withdraw from such care at any time.        SUBJECTIVE:    History Today 12/12/22  Vale Gutierrez for telemedicine follow up and consideration of SCS trial.  She is waiting on an appointment with Psychiatry.  Once she has an appointment scheduled with Psychiatry for SCS evaluation, she will schedule an in-person appointment in the Pain clinic.  She continues to have low back pain and she feels as though it is not well controlled on her current medications.  She desires to increase her Oxycodone to 7.5mg tabs.  She continues to take Oxycodone 5/325 QID.    History Today 9/12/22  Vale Gutierrez for telemedicine follow up and consideration of SCI. Since last visit, she was admitted to [facility] for 38 days with acute on chronic respiratory failure 2/2 CAP. She expressed concern over opioids as this can affect her breathing and would like to try other alternatives. Her pain continues to be in her low back with radiation down the side of her legs. Pain limiting ambulation. She has not been to PT for sometime. She reports that aquatherapy had no availability for a few months. She would like physical therapy, but she says they would need home health as she does not drive. Has not completed psyche eval. She has no hx of cardiac disease, denies blood in your stool and black tarry stool.     History Today  6/20/22  Patient presents for tele-medicine appointment for follow up s/p caudal WERO on 5/27/22. She reports good relief (70-80%) for about 3 weeks. Her pain is now back. She does find relief with Percocet 5/325 but she finds that she is requiring extra doses. She has not started aquatic therapy yet due to scheduling. She did participate in home PT in the past that she enjoyed. She is interested in discussing a spinal cord stimulator today.     History today 5/02/2022:  Vale Gutierrez presents tele-medicine appointment for a follow-up appointment for back pain. Since the last visit, Vale Gutierrez states the pain has been stable. Current pain intensity is 6/10. She is still hasnt made her mind completely about SCS.     History today 4/11:  Vale Gutierrez presents tele-medicine appointment for a follow-up appointment for back pain. Since the last visit, Vale Gutierrez states the pain has been worsening. Had episode where she knocked over a drink and tried to clean it up. Worsened the pain. Current pain intensity is 7/10.  Was considering SCS at last visit. Has not fully decided. Pain is increasing, norco no longer helps, taking it TID Interested in switching to Percoset. Laying on the side and rest helps.   Has not started aqua therapy, 2 month waiting list.      + pain raditing back of legs  No bowel or bowels  No numbness or tingling of LE     Interval History 3/28/22:  Patient with a history of L4-5 fusion presents in follow-up of low back pain s/p bilateral L3/4 TFESI on 3/4/22 with 2 days of relief then bilateral L2,3,4,5 MBB on 3/24/22 with 20% relief. Today, she reports persistent chronic low back pain with occasional radiation down the posterior legs to the knees. Back pain is more bothersome than the legs. Pain is worse with any activity including prolonged standing, sitting, bending, and coughing/sneezing. Pain is improved with lying down.  Takes Norco 5-325 bid, last filled  "3/15/22. Takes tizanidine every 6 hours.     Interval History 3/15/2022:  Vale Gutierrez presents tele-medicine appointment for a follow-up appointment for low back pain radiating to lower extremitiy R>L.  She is S/P Bilateral  L3/4 Lumbar Transforaminal Epidural Steroid Injection under Fluoroscopic Guidance on 3/4/2022 with only 2 days of relief.      Interval History 07/28/2020:  Vale Gutirerez presents tele-medicine appointment for a follow-up appointment for low back pain radiating to lower extremitiy R>L. Since the last visit, Vale Gutierrez states the pain has been persistant. Current pain intensity is 0/10.  Pain is worst when standing and moving and relieved with sitting, laying, and stretched out.  At the worst she says it is a 10/10.  The pain radiates to back of thighs and calves.  She describes the pain as sharp, burning in nature.  She states that the flexeril 5mg prn helps somewhat but not much. She started diclofenac yesterday (prescribed by her PCP) which helps with the pain, decreases frequency and intensity. She recently had an MRI lumbar spine done and would like to review the results.     Interval History 07/13/2020  Vale Gutierrez presents to the clinic for the evaluation of low back pain and bilateral calf pain. The calf pain started 5-6 weeks ago following non related injury and symptoms have been worsening.The pain is located in the low back area and radiates to the legs. She has suffered from lower back pain "for years."  The pain is described as aching and burning with intermittent numbness and tingling in the lower extremities. The pain is rated as 9/10. The pain is rated with a score of  3/10 on the BEST day and a score of 10/10 on the WORST day.  Symptoms interfere with daily activity. The pain is exacerbated by Standing, Walking, and Lifting.  The pain is mitigated by massage, medications and rest. She reports spending 8 hours per day reclining. The patient " reports 3-4 hours of uninterrupted sleep per night. She presents for referral from Urgent Care where she was evaluated and believed to have intermittent neurogenic claudication.        Pain Medications:  - Opioids: Oxycodone 5/325 QID  - NSAIDs: BC powder BID  - Anti-Depressants: Venlafaxine 150 mg + 75 mg in AM  - Anti-Convulsants: Zanaflex BID  - Others: NA     Physical Therapy/Home Exercise: Not currently but in the past, finished in January 2022. Still on waiting list for aquatherapy      report:  Reviewed and consistent with medication use as prescribed.        Pain Procedures:  Patient says she has had 9 ESIs 15-20 years ago with minimal pain relief  3/4/22 Bilateral L3/4 TFESI 3/4 - 2 days of relief  3/24/22 Bl L2,3,4,5 MBB - 20% relief  5/27/22 Caudal WERO - 70-80% relief        Imaging:      MRI LUMBAR SPINE WITHOUT CONTRAST 3/28  FINDINGS:  Postoperative change of L4-L5 decompressive laminectomy, discectomy and posterior fusion with bilateral pedicular screws and an intervertebral disc spacer.     Lumbar spine alignment demonstrates mild levoscoliosis with stable mild grade 1 anterolisthesis of L4 on L5 and L5 on S1.  Vertebral body heights are well maintained without evidence for fracture.  No marrow signal abnormality to suggest an infiltrative process.     There is advanced degenerative disc space narrowing and desiccation at T11-T12, L3-L4 and L5-S1 with chronic associated endplate changes, similar when compared to the previous MRI.  There is disc desiccation at L2-L3 with a posterior annular fissure.     Distal spinal cord demonstrates normal contour and signal intensity.  Cauda equina appears normal without findings to suggest arachnoiditis.  Conus medullaris terminates at T12-L1.  Fatty filum terminale.     Coronal T1 weighted images demonstrate a partially exophytic left renal cortical lesion, incompletely evaluated on this exam.  Limited evaluation of the remaining visualized abdominal organs  appears normal.  There is mild to moderate fatty atrophy of the posterior paraspinal musculature.  SI joints are symmetric.     T12-L1: No spinal canal stenosis or neural foraminal narrowing.     L1-L2: No spinal canal stenosis or neural foraminal narrowing.     L2-L3: Bilateral facet arthropathy and bilateral ligamentum flavum buckling.  No spinal canal stenosis or neural foraminal narrowing.     L3-L4: Circumferential disc bulge slightly encroaches into the bilateral foraminal zones, right greater than left.  Bilateral facet arthropathy and bilateral ligamentum flavum buckling.  Findings contribute to mild right neural foraminal narrowing.  No spinal canal stenosis.     L4-L5: Grade 1 anterolisthesis.  Bilateral facet arthropathy.  No spinal canal stenosis or neural foraminal narrowing.     L5-S1: Grade 1 anterolisthesis.  Circumferential disc bulge.  Bilateral facet arthropathy.  No spinal canal stenosis or neural foraminal narrowing.     Impression:     1. Remote postoperative change of L4-L5 decompressive laminectomy, discectomy and posterior fusion.  2. Multilevel lumbar degenerative changes contributing to mild neural foraminal narrowing at L3-L4.  No spinal canal stenosis.  3. Left renal cortical lesion, incompletely evaluated on this exam.  Recommend further characterization with a dedicated renal ultrasound.       Allergies: Review of patient's allergies indicates:  No Known Allergies    Current Medications:   Current Outpatient Medications   Medication Sig Dispense Refill    albuterol-ipratropium (DUO-NEB) 2.5 mg-0.5 mg/3 mL nebulizer solution Take 3 mLs by nebulization every 8 (eight) hours. Rescue 75 mL 2    amiodarone (PACERONE) 200 MG Tab Take 1 tablet (200 mg total) by mouth once daily. 30 tablet 11    apixaban (ELIQUIS) 5 mg Tab Take 1 tablet (5 mg total) by mouth 2 (two) times daily. 60 tablet 2    busPIRone (BUSPAR) 5 MG Tab Take 1 tablet (5 mg total) by mouth 2 (two) times daily as needed  (anxiety). 60 tablet 11    metoprolol tartrate (LOPRESSOR) 50 MG tablet Take 1 tablet (50 mg total) by mouth once daily. 30 tablet 11    mirtazapine (REMERON) 7.5 MG Tab Take 1 tablet (7.5 mg total) by mouth every evening. For insomnia, mood 30 tablet 11    omeprazole (PRILOSEC) 20 MG capsule Take 1 capsule (20 mg total) by mouth daily as needed. 30 capsule 5    ondansetron (ZOFRAN-ODT) 4 MG TbDL Take 1 tablet (4 mg total) by mouth every 8 (eight) hours as needed (nausea). 30 tablet 0    oxyCODONE-acetaminophen (PERCOCET) 5-325 mg per tablet Take 1 tablet by mouth every 6 (six) hours as needed for Pain. 120 each 0    thiamine 100 MG tablet Take 100 mg by mouth once daily.      venlafaxine (EFFEXOR-XR) 75 MG 24 hr capsule Take 3 capsules (225 mg total) by mouth once daily. 90 capsule 11     No current facility-administered medications for this visit.     Facility-Administered Medications Ordered in Other Visits   Medication Dose Route Frequency Provider Last Rate Last Admin    0.9%  NaCl infusion   Intravenous Continuous Adrianna Velazquez DO 50 mL/hr at 05/27/22 1452 50 mL/hr at 05/27/22 1452       REVIEW OF SYSTEMS:    GENERAL:  No weight loss, malaise or fevers.  HEENT:  Negative for frequent or significant headaches.  NECK:  Negative for lumps, goiter, pain and significant neck swelling.  RESPIRATORY:  Negative for cough, wheezing or shortness of breath.  CARDIOVASCULAR:  Negative for chest pain, leg swelling or palpitations.  GI:  Negative for abdominal discomfort, blood in stools or black stools or change in bowel habits.  MUSCULOSKELETAL:  See HPI.  SKIN:  Negative for lesions, rash, and itching.  PSYCH:  Negative for sleep disturbance, mood disorder and recent psychosocial stressors.  HEMATOLOGY/LYMPHOLOGY:  Negative for prolonged bleeding, bruising easily or swollen nodes.  NEURO:   No history of headaches, syncope, paralysis, seizures or tremors.  All other reviewed and negative other than HPI.    Past  Medical History:  Past Medical History:   Diagnosis Date    Alcohol abuse     Anxiety     Benign paroxysmal positional vertigo 1/17/2020    Depression     Hyperlipidemia     Hypertension        Past Surgical History:  Past Surgical History:   Procedure Laterality Date    BREAST CYST ASPIRATION      CHOLECYSTECTOMY      complicated with bile leak, sepsis    COLONOSCOPY N/A 6/3/2020    Procedure: COLONOSCOPY Golytely;  Surgeon: Tino Neil MD;  Location: [facility];  Service: Colon and Rectal;  Laterality: N/A;    EPIDURAL STEROID INJECTION N/A 5/27/2022    Procedure: INJECTION, STEROID, EPIDURAL, CAUDAL CONTRAST;  Surgeon: Rubén Rico MD;  Location: [facility];  Service: Pain Management;  Laterality: N/A;  5/6 RESCHEDULE    ESOPHAGOGASTRODUODENOSCOPY N/A 6/3/2020    Procedure: EGD (ESOPHAGOGASTRODUODENOSCOPY);  Surgeon: Tino Neil MD;  Location: [facility];  Service: Colon and Rectal;  Laterality: N/A;    INJECTION OF ANESTHETIC AGENT AROUND NERVE Bilateral 3/24/2022    Procedure: BLOCK, NERVE MEDIAL BRANCH BLOCK BL L2, L3, L4 and L5  1st, needs consent;  Surgeon: Rubén Rico MD;  Location: [facility];  Service: Pain Management;  Laterality: Bilateral;    TRANSFORAMINAL EPIDURAL INJECTION OF STEROID Bilateral 8/7/2020    Procedure: INJECTION, STEROID, EPIDURAL, TRANSFORAMINAL APPROACH, L4-L5 need consent;  Surgeon: Rubén Rico MD;  Location: [facility];  Service: Pain Management;  Laterality: Bilateral;    TRANSFORAMINAL EPIDURAL INJECTION OF STEROID Bilateral 9/4/2020    Procedure: LUMBAR TRANSFORAMINAL BILATERAL L4/5 DIRECT REFERRAL;  Surgeon: Rubén Rico MD;  Location: [facility];  Service: Pain Management;  Laterality: Bilateral;  NEEDS CONSENT    TRANSFORAMINAL EPIDURAL INJECTION OF STEROID Bilateral 3/4/2022    Procedure: Injection,steroid,epidural,transforaminal approach BILATERAL L3/4 DIRECT REFERRAL;  Surgeon: Rubén Rico MD;  Location: [facility];  Service:  Pain Management;  Laterality: Bilateral;       Family History:  Family History   Problem Relation Age of Onset    Atrial fibrillation Mother     Heart failure Mother     Arthritis Mother     Macular degeneration Mother     Stroke Father     Cancer Maternal Uncle         lung    Cataracts Paternal Grandfather     Glaucoma Paternal Grandfather        Social History:  Social History     Socioeconomic History    Marital status: Single   Tobacco Use    Smoking status: Former    Smokeless tobacco: Never   Substance and Sexual Activity    Alcohol use: Yes     Alcohol/week: 12.0 standard drinks     Types: 12 Shots of liquor per week     Comment: three 12-14 oz cocktail drinks.     Drug use: No    Sexual activity: Never     Social Determinants of Health     Financial Resource Strain: Medium Risk    Difficulty of Paying Living Expenses: Somewhat hard   Food Insecurity: Food Insecurity Present    Worried About Running Out of Food in the Last Year: Sometimes true    Ran Out of Food in the Last Year: Never true   Transportation Needs: Unmet Transportation Needs    Lack of Transportation (Medical): Yes    Lack of Transportation (Non-Medical): Yes   Physical Activity: Inactive    Days of Exercise per Week: 0 days    Minutes of Exercise per Session: 0 min   Stress: Stress Concern Present    Feeling of Stress : Rather much   Social Connections: Unknown    Frequency of Communication with Friends and Family: Twice a week    Frequency of Social Gatherings with Friends and Family: Three times a week    Active Member of Clubs or Organizations: No    Attends Club or Organization Meetings: Never    Marital Status: Never    Housing Stability: Low Risk     Unable to Pay for Housing in the Last Year: No    Number of Places Lived in the Last Year: 1    Unstable Housing in the Last Year: No       OBJECTIVE:  General appearance: Well appearing, in no acute distress, alert and oriented x3.  Psych:  Mood and affect  appropriate.    ASSESSMENT: 62 y.o. year old female with low back pain, consistent with      1. Post laminectomy syndrome        2. Chronic pain disorder        3. Chronic, continuous use of opioids        4. Lumbar radiculopathy                  PLAN:     - I have stressed the importance of physical activity and a home exercise plan to help with pain and improve health.  - Patient can continue with medications for now since they are providing benefits, using them appropriately, and without side effects.  - Educated on the safety of taking medications as prescribed.   - Will continue Percocet frequency to 5/325 mg QID.  Will not increase dose of Percocet.  Explained in detail to the patient about the natural progression of opioid dependance.  - RTC in 8 weeks for further assessment. Follow up to be in person  - Obtain UDS at next clinic visit  - Consider SCS trial after Psych clearance and patient stops smoking  - Counseled patient regarding the importance of activity modification, constant sleeping habits and physical therapy.    The above plan and management options were discussed at length with patient. Patient is in agreement with the above and verbalized understanding.     Luis Aldrich D.O. ([facility])  12/12/2022      I have personally reviewed the encounter with resident/fellow/NP and personally spoke with patient and addressed all questions and concerns. The encounter was initiated by patient who consented and verbalized understanding to the type of encounter not related to any office visit or other encounter in the past 7 days.    Rubén Rico MD   12/12/2022

## 2022-12-15 NOTE — TELEPHONE ENCOUNTER
----- Message from Tresa Braxton sent at 12/15/2022  1:42 PM CST -----  Contact: [phone]  Requesting an RX refill or new RX.  Is this a refill or new RX: refill  RX name and strength (copy/paste from chart):  apixaban (ELIQUIS) 5 mg Tab  Is this a 30 day or 90 day RX: 30  Pharmacy name and phone # (copy/paste from chart):    [facility] - [place] - [address]  [address]  [place] [address]  Phone: [phone] Fax: [phone]  The doctors have asked that we provide their patients with the following 2 reminders -- prescription refills can take up to 72 hours, and a friendly reminder that in the future you can use your [facility] account to request refills: yes        Pt states she needs to discuss her prednisone script with David but declined further info.

## 2022-12-20 PROBLEM — E66.3 OVERWEIGHT (BMI 25.0-29.9): Status: ACTIVE | Noted: 2022-01-01

## 2022-12-20 NOTE — PROGRESS NOTES
Virtual Visit  The patient location is: [place]   The chief complaint leading to consultation is:  back pain  Visit type: Virtual visit with synchronous audio and video  Total time spent with patient: 5 min  Each patient to whom he or she provides medical services by telemedicine is:  (1) informed of the relationship between the physician and patient and the respective role of any other health care provider with respect to management of the patient; and (2) notified that he or she may decline to receive medical services by telemedicine and may withdraw from such care at any time.    Subjective:         @Patient ID: Vale Gutierrez is a 62 y.o. female.    Chief Complaint: Back Pain    HPI  61 yo F with HTN, anxiety, depression, etoh abuse, memory deficits, idiopathic progressive polyneuropathy, lumbar spondylosis, chronic pain presents with c/o low back pain. Reports current meds are not helping     Review of Systems   Constitutional:  Negative for chills and fever.   Musculoskeletal:  Positive for back pain.   Psychiatric/Behavioral:  Negative for agitation and confusion.    Past medical history, surgical history, and family medical history reviewed and updated as appropriate.    Medications and allergies reviewed.     Objective:     There were no vitals filed for this visit.  There is no height or weight on file to calculate BMI.  Physical Exam  Constitutional:       Appearance: Normal appearance.   HENT:      Head: Normocephalic and atraumatic.   Pulmonary:      Effort: Pulmonary effort is normal.   Neurological:      Mental Status: She is alert and oriented to person, place, and time.   Psychiatric:         Mood and Affect: Mood normal.         Behavior: Behavior normal.       Lab Results   Component Value Date    WBC 5.37 09/05/2022    HGB 10.3 (L) 09/05/2022    HCT 35.7 (L) 09/05/2022     09/05/2022    CHOL 208 (H) 01/13/2022    TRIG 81 01/13/2022    HDL 63 01/13/2022    ALT 9 (L) 09/20/2022     AST 16 09/20/2022     09/20/2022    K 5.1 09/20/2022     09/20/2022    CREATININE 0.9 09/20/2022    BUN 15 09/20/2022    CO2 29 09/20/2022    TSH 2.754 01/13/2022    INR 1.1 08/02/2022    HGBA1C 5.3 01/13/2022       Assessment:     1. Chronic low back pain, unspecified back pain laterality, unspecified whether sciatica present    2. Encounter for screening mammogram for breast cancer      Plan:   Vale was seen today for back pain.    Diagnoses and all orders for this visit:    Chronic low back pain, unspecified back pain laterality, unspecified whether sciatica present  - start medrol     Encounter for screening mammogram for breast cancer  -     Mammo Digital Screening Bilat w/ [facility]; Future    Other orders  -     methylPREDNISolone (MEDROL DOSEPACK) 4 mg tablet; use as directed          No follow-ups on file.    Estela Mcdaniel MD  Internal Medicine    12/20/2022

## 2022-12-27 NOTE — TELEPHONE ENCOUNTER
Patient requesting refill on Percocet 5-325mg  Last office visit 12/12/22   shows last refill on 11/30/22  Patient does have a pain contract on file with [facility] department  Patient last UDS NO UDS ON FILE was not consistent with current therapy

## 2023-01-01 ENCOUNTER — PATIENT MESSAGE (OUTPATIENT)
Dept: ORTHOPEDICS | Facility: CLINIC | Age: 63
End: 2023-01-01
Payer: MEDICARE

## 2023-01-01 ENCOUNTER — OFFICE VISIT (OUTPATIENT)
Dept: PAIN MEDICINE | Facility: CLINIC | Age: 63
End: 2023-01-01
Payer: MEDICARE

## 2023-01-01 ENCOUNTER — TELEPHONE (OUTPATIENT)
Dept: INTERNAL MEDICINE | Facility: CLINIC | Age: 63
End: 2023-01-01
Payer: MEDICARE

## 2023-01-01 ENCOUNTER — TELEPHONE (OUTPATIENT)
Dept: ENDOSCOPY | Facility: HOSPITAL | Age: 63
End: 2023-01-01
Payer: MEDICARE

## 2023-01-01 ENCOUNTER — PATIENT MESSAGE (OUTPATIENT)
Dept: PSYCHIATRY | Facility: CLINIC | Age: 63
End: 2023-01-01

## 2023-01-01 ENCOUNTER — TELEPHONE (OUTPATIENT)
Dept: OPTOMETRY | Facility: CLINIC | Age: 63
End: 2023-01-01
Payer: MEDICARE

## 2023-01-01 ENCOUNTER — TELEPHONE (OUTPATIENT)
Dept: INTERNAL MEDICINE | Facility: CLINIC | Age: 63
End: 2023-01-01

## 2023-01-01 ENCOUNTER — HOSPITAL ENCOUNTER (EMERGENCY)
Facility: HOSPITAL | Age: 63
End: 2023-10-20
Attending: EMERGENCY MEDICINE
Payer: MEDICARE

## 2023-01-01 ENCOUNTER — PATIENT MESSAGE (OUTPATIENT)
Dept: INTERNAL MEDICINE | Facility: CLINIC | Age: 63
End: 2023-01-01

## 2023-01-01 ENCOUNTER — PATIENT MESSAGE (OUTPATIENT)
Dept: PAIN MEDICINE | Facility: CLINIC | Age: 63
End: 2023-01-01
Payer: MEDICARE

## 2023-01-01 ENCOUNTER — PATIENT MESSAGE (OUTPATIENT)
Dept: INTERNAL MEDICINE | Facility: CLINIC | Age: 63
End: 2023-01-01
Payer: MEDICARE

## 2023-01-01 ENCOUNTER — HOSPITAL ENCOUNTER (OUTPATIENT)
Dept: RADIOLOGY | Facility: HOSPITAL | Age: 63
Discharge: HOME | End: 2023-06-30
Attending: HOSPITALIST
Payer: MEDICARE

## 2023-01-01 ENCOUNTER — TELEPHONE (OUTPATIENT)
Dept: GASTROENTEROLOGY | Facility: CLINIC | Age: 63
End: 2023-01-01
Payer: MEDICARE

## 2023-01-01 ENCOUNTER — OFFICE VISIT (OUTPATIENT)
Dept: NEUROLOGY | Facility: CLINIC | Age: 63
End: 2023-01-01
Payer: MEDICARE

## 2023-01-01 ENCOUNTER — PATIENT MESSAGE (OUTPATIENT)
Dept: SPINE | Facility: CLINIC | Age: 63
End: 2023-01-01
Payer: MEDICARE

## 2023-01-01 ENCOUNTER — PATIENT OUTREACH (OUTPATIENT)
Dept: ADMINISTRATIVE | Facility: CLINIC | Age: 63
End: 2023-01-01
Payer: MEDICARE

## 2023-01-01 ENCOUNTER — PATIENT MESSAGE (OUTPATIENT)
Dept: PSYCHIATRY | Facility: CLINIC | Age: 63
End: 2023-01-01
Payer: MEDICARE

## 2023-01-01 ENCOUNTER — TELEPHONE (OUTPATIENT)
Dept: PAIN MEDICINE | Facility: CLINIC | Age: 63
End: 2023-01-01
Payer: MEDICARE

## 2023-01-01 ENCOUNTER — PES CALL (OUTPATIENT)
Dept: ADMINISTRATIVE | Facility: CLINIC | Age: 63
End: 2023-01-01
Payer: MEDICARE

## 2023-01-01 ENCOUNTER — OFFICE VISIT (OUTPATIENT)
Dept: INTERNAL MEDICINE | Facility: CLINIC | Age: 63
End: 2023-01-01
Payer: MEDICARE

## 2023-01-01 ENCOUNTER — HOSPITAL ENCOUNTER (INPATIENT)
Facility: HOSPITAL | Age: 63
LOS: 3 days | Discharge: HOME | DRG: 189 | End: 2023-08-13
Attending: EMERGENCY MEDICINE | Admitting: HOSPITALIST
Payer: MEDICARE

## 2023-01-01 ENCOUNTER — OFFICE VISIT (OUTPATIENT)
Dept: PSYCHIATRY | Facility: CLINIC | Age: 63
End: 2023-01-01
Payer: MEDICARE

## 2023-01-01 ENCOUNTER — TELEPHONE (OUTPATIENT)
Dept: ADMINISTRATIVE | Facility: CLINIC | Age: 63
End: 2023-01-01
Payer: MEDICARE

## 2023-01-01 ENCOUNTER — TELEPHONE (OUTPATIENT)
Dept: GASTROENTEROLOGY | Facility: HOSPITAL | Age: 63
End: 2023-01-01
Payer: MEDICARE

## 2023-01-01 ENCOUNTER — PATIENT MESSAGE (OUTPATIENT)
Dept: GASTROENTEROLOGY | Facility: CLINIC | Age: 63
End: 2023-01-01
Payer: MEDICARE

## 2023-01-01 ENCOUNTER — PATIENT OUTREACH (OUTPATIENT)
Dept: ADMINISTRATIVE | Facility: OTHER | Age: 63
End: 2023-01-01
Payer: MEDICARE

## 2023-01-01 ENCOUNTER — PATIENT OUTREACH (OUTPATIENT)
Dept: ADMINISTRATIVE | Facility: HOSPITAL | Age: 63
End: 2023-01-01
Payer: MEDICARE

## 2023-01-01 ENCOUNTER — OFFICE VISIT (OUTPATIENT)
Dept: GASTROENTEROLOGY | Facility: CLINIC | Age: 63
End: 2023-01-01
Payer: MEDICARE

## 2023-01-01 ENCOUNTER — DOCUMENTATION ONLY (OUTPATIENT)
Dept: PSYCHIATRY | Facility: CLINIC | Age: 63
End: 2023-01-01

## 2023-01-01 ENCOUNTER — TELEPHONE (OUTPATIENT)
Dept: SPINE | Facility: CLINIC | Age: 63
End: 2023-01-01
Payer: MEDICARE

## 2023-01-01 VITALS
HEART RATE: 94 BPM | BODY MASS INDEX: 36.19 KG/M2 | RESPIRATION RATE: 19 BRPM | OXYGEN SATURATION: 90 % | DIASTOLIC BLOOD PRESSURE: 69 MMHG | SYSTOLIC BLOOD PRESSURE: 145 MMHG | TEMPERATURE: 98 F | HEIGHT: 64 IN | WEIGHT: 212 LBS

## 2023-01-01 VITALS
BODY MASS INDEX: 34.44 KG/M2 | RESPIRATION RATE: 20 BRPM | TEMPERATURE: 98 F | WEIGHT: 201.75 LBS | HEART RATE: 65 BPM | SYSTOLIC BLOOD PRESSURE: 88 MMHG | OXYGEN SATURATION: 95 % | DIASTOLIC BLOOD PRESSURE: 62 MMHG | HEIGHT: 64 IN

## 2023-01-01 VITALS — WEIGHT: 212.06 LBS | HEIGHT: 64 IN | BODY MASS INDEX: 36.2 KG/M2

## 2023-01-01 VITALS
BODY MASS INDEX: 36.39 KG/M2 | WEIGHT: 212 LBS | SYSTOLIC BLOOD PRESSURE: 65 MMHG | OXYGEN SATURATION: 81 % | TEMPERATURE: 107 F | HEART RATE: 81 BPM | RESPIRATION RATE: 7 BRPM | DIASTOLIC BLOOD PRESSURE: 44 MMHG

## 2023-01-01 DIAGNOSIS — Z97.8 ENDOTRACHEAL TUBE PRESENT: ICD-10-CM

## 2023-01-01 DIAGNOSIS — I10 ESSENTIAL HYPERTENSION: Primary | ICD-10-CM

## 2023-01-01 DIAGNOSIS — M96.1 POSTLAMINECTOMY SYNDROME OF LUMBAR REGION: Primary | ICD-10-CM

## 2023-01-01 DIAGNOSIS — Z98.1 S/P LUMBAR FUSION: ICD-10-CM

## 2023-01-01 DIAGNOSIS — M48.062 SPINAL STENOSIS OF LUMBAR REGION WITH NEUROGENIC CLAUDICATION: ICD-10-CM

## 2023-01-01 DIAGNOSIS — I48.0 PAROXYSMAL ATRIAL FIBRILLATION: ICD-10-CM

## 2023-01-01 DIAGNOSIS — J96.01 ACUTE HYPOXEMIC RESPIRATORY FAILURE: ICD-10-CM

## 2023-01-01 DIAGNOSIS — M96.1 POSTLAMINECTOMY SYNDROME OF LUMBAR REGION: ICD-10-CM

## 2023-01-01 DIAGNOSIS — E55.9 VITAMIN D DEFICIENCY: ICD-10-CM

## 2023-01-01 DIAGNOSIS — I71.20 THORACIC AORTIC ANEURYSM WITHOUT RUPTURE, UNSPECIFIED PART: ICD-10-CM

## 2023-01-01 DIAGNOSIS — Z74.09 IMPAIRED FUNCTIONAL MOBILITY, BALANCE, GAIT, AND ENDURANCE: ICD-10-CM

## 2023-01-01 DIAGNOSIS — Z01.419 WELL WOMAN EXAM WITH ROUTINE GYNECOLOGICAL EXAM: ICD-10-CM

## 2023-01-01 DIAGNOSIS — Z09 HOSPITAL DISCHARGE FOLLOW-UP: Primary | ICD-10-CM

## 2023-01-01 DIAGNOSIS — G89.4 CHRONIC PAIN DISORDER: ICD-10-CM

## 2023-01-01 DIAGNOSIS — I62.9 INTRACRANIAL HEMORRHAGE: Primary | ICD-10-CM

## 2023-01-01 DIAGNOSIS — Z12.31 ENCOUNTER FOR SCREENING MAMMOGRAM FOR BREAST CANCER: ICD-10-CM

## 2023-01-01 DIAGNOSIS — R06.02 SHORTNESS OF BREATH: Primary | ICD-10-CM

## 2023-01-01 DIAGNOSIS — I50.9 CHF (CONGESTIVE HEART FAILURE): ICD-10-CM

## 2023-01-01 DIAGNOSIS — G89.4 CHRONIC PAIN DISORDER: Chronic | ICD-10-CM

## 2023-01-01 DIAGNOSIS — G89.4 CHRONIC PAIN DISORDER: Primary | ICD-10-CM

## 2023-01-01 DIAGNOSIS — F10.21 ALCOHOL USE DISORDER, SEVERE, IN EARLY REMISSION: ICD-10-CM

## 2023-01-01 DIAGNOSIS — N17.9 AKI (ACUTE KIDNEY INJURY): ICD-10-CM

## 2023-01-01 DIAGNOSIS — F33.1 MAJOR DEPRESSIVE DISORDER, RECURRENT, MODERATE: ICD-10-CM

## 2023-01-01 DIAGNOSIS — G60.3 IDIOPATHIC PROGRESSIVE POLYNEUROPATHY: ICD-10-CM

## 2023-01-01 DIAGNOSIS — Z72.0 NICOTINE ABUSE: ICD-10-CM

## 2023-01-01 DIAGNOSIS — J96.01 ACUTE HYPOXEMIC RESPIRATORY FAILURE: Primary | ICD-10-CM

## 2023-01-01 DIAGNOSIS — F11.90 CHRONIC, CONTINUOUS USE OF OPIOIDS: ICD-10-CM

## 2023-01-01 DIAGNOSIS — D50.8 IRON DEFICIENCY ANEMIA SECONDARY TO INADEQUATE DIETARY IRON INTAKE: ICD-10-CM

## 2023-01-01 DIAGNOSIS — R25.1 TREMOR: ICD-10-CM

## 2023-01-01 DIAGNOSIS — I50.32 CHRONIC DIASTOLIC CONGESTIVE HEART FAILURE: ICD-10-CM

## 2023-01-01 DIAGNOSIS — M54.16 LUMBAR RADICULOPATHY: ICD-10-CM

## 2023-01-01 DIAGNOSIS — R07.9 CHEST PAIN: ICD-10-CM

## 2023-01-01 DIAGNOSIS — M47.816 LUMBAR SPONDYLOSIS: Primary | ICD-10-CM

## 2023-01-01 DIAGNOSIS — K52.9 CHRONIC DIARRHEA: ICD-10-CM

## 2023-01-01 DIAGNOSIS — R11.0 NAUSEA: ICD-10-CM

## 2023-01-01 DIAGNOSIS — G95.19 INTERMITTENT SPINAL CLAUDICATION: ICD-10-CM

## 2023-01-01 DIAGNOSIS — J96.91 RESPIRATORY FAILURE WITH HYPOXIA, UNSPECIFIED CHRONICITY: Primary | ICD-10-CM

## 2023-01-01 DIAGNOSIS — R06.02 SOB (SHORTNESS OF BREATH): ICD-10-CM

## 2023-01-01 DIAGNOSIS — M17.0 PRIMARY OSTEOARTHRITIS OF BOTH KNEES: Primary | ICD-10-CM

## 2023-01-01 DIAGNOSIS — R13.19 ESOPHAGEAL DYSPHAGIA: ICD-10-CM

## 2023-01-01 DIAGNOSIS — R10.13 DYSPEPSIA: ICD-10-CM

## 2023-01-01 DIAGNOSIS — I63.9 STROKE: ICD-10-CM

## 2023-01-01 DIAGNOSIS — M79.604 RIGHT LEG PAIN: ICD-10-CM

## 2023-01-01 DIAGNOSIS — Z00.00 ANNUAL PHYSICAL EXAM: ICD-10-CM

## 2023-01-01 DIAGNOSIS — Z00.00 ENCOUNTER FOR PREVENTIVE HEALTH EXAMINATION: Primary | ICD-10-CM

## 2023-01-01 DIAGNOSIS — M54.9 DORSALGIA, UNSPECIFIED: ICD-10-CM

## 2023-01-01 DIAGNOSIS — Z01.818 PREOPERATIVE EVALUATION TO RULE OUT SURGICAL CONTRAINDICATION: Primary | ICD-10-CM

## 2023-01-01 DIAGNOSIS — I10 ESSENTIAL HYPERTENSION: Chronic | ICD-10-CM

## 2023-01-01 DIAGNOSIS — R73.9 ELEVATED BLOOD SUGAR: ICD-10-CM

## 2023-01-01 DIAGNOSIS — F41.9 ANXIETY: Chronic | ICD-10-CM

## 2023-01-01 DIAGNOSIS — Z74.09 IMPAIRED FUNCTIONAL MOBILITY, BALANCE, GAIT, AND ENDURANCE: Primary | ICD-10-CM

## 2023-01-01 DIAGNOSIS — M96.1 POST LAMINECTOMY SYNDROME: ICD-10-CM

## 2023-01-01 DIAGNOSIS — K21.9 GASTROESOPHAGEAL REFLUX DISEASE WITHOUT ESOPHAGITIS: Primary | ICD-10-CM

## 2023-01-01 DIAGNOSIS — F10.20 ALCOHOL USE DISORDER, SEVERE, DEPENDENCE: Chronic | ICD-10-CM

## 2023-01-01 DIAGNOSIS — D50.8 IRON DEFICIENCY ANEMIA SECONDARY TO INADEQUATE DIETARY IRON INTAKE: Primary | ICD-10-CM

## 2023-01-01 LAB
ABO + RH BLD: NORMAL
ALBUMIN SERPL BCP-MCNC: 2.7 G/DL (ref 3.5–5.2)
ALBUMIN SERPL BCP-MCNC: 2.8 G/DL (ref 3.5–5.2)
ALBUMIN SERPL BCP-MCNC: 2.8 G/DL (ref 3.5–5.2)
ALBUMIN SERPL BCP-MCNC: 2.9 G/DL (ref 3.5–5.2)
ALBUMIN SERPL BCP-MCNC: 3 G/DL (ref 3.5–5.2)
ALBUMIN SERPL BCP-MCNC: 3.1 G/DL (ref 3.5–5.2)
ALBUMIN SERPL BCP-MCNC: 3.2 G/DL (ref 3.5–5.2)
ALBUMIN SERPL BCP-MCNC: 3.3 G/DL (ref 3.5–5.2)
ALLENS TEST: ABNORMAL
ALLENS TEST: ABNORMAL
ALP SERPL-CCNC: 107 U/L (ref 55–135)
ALP SERPL-CCNC: 109 U/L (ref 55–135)
ALP SERPL-CCNC: 110 U/L (ref 55–135)
ALP SERPL-CCNC: 113 U/L (ref 55–135)
ALP SERPL-CCNC: 116 U/L (ref 55–135)
ALP SERPL-CCNC: 123 U/L (ref 55–135)
ALP SERPL-CCNC: 146 U/L (ref 55–135)
ALP SERPL-CCNC: 181 U/L (ref 55–135)
ALT SERPL W/O P-5'-P-CCNC: 10 U/L (ref 10–44)
ALT SERPL W/O P-5'-P-CCNC: 12 U/L (ref 10–44)
ALT SERPL W/O P-5'-P-CCNC: 13 U/L (ref 10–44)
ALT SERPL W/O P-5'-P-CCNC: 17 U/L (ref 10–44)
ALT SERPL W/O P-5'-P-CCNC: 23 U/L (ref 10–44)
ALT SERPL W/O P-5'-P-CCNC: 29 U/L (ref 10–44)
ALT SERPL W/O P-5'-P-CCNC: 40 U/L (ref 10–44)
ALT SERPL W/O P-5'-P-CCNC: 52 U/L (ref 10–44)
ANION GAP SERPL CALC-SCNC: 10 MMOL/L (ref 8–16)
ANION GAP SERPL CALC-SCNC: 11 MMOL/L (ref 8–16)
ANION GAP SERPL CALC-SCNC: 12 MMOL/L (ref 8–16)
ANION GAP SERPL CALC-SCNC: 13 MMOL/L (ref 8–16)
ANION GAP SERPL CALC-SCNC: 14 MMOL/L (ref 8–16)
ANION GAP SERPL CALC-SCNC: 15 MMOL/L (ref 8–16)
ANION GAP SERPL CALC-SCNC: 15 MMOL/L (ref 8–16)
ASCENDING AORTA: 3.7 CM
AST SERPL-CCNC: 100 U/L (ref 10–40)
AST SERPL-CCNC: 14 U/L (ref 10–40)
AST SERPL-CCNC: 14 U/L (ref 10–40)
AST SERPL-CCNC: 15 U/L (ref 10–40)
AST SERPL-CCNC: 15 U/L (ref 10–40)
AST SERPL-CCNC: 16 U/L (ref 10–40)
AST SERPL-CCNC: 20 U/L (ref 10–40)
AST SERPL-CCNC: 36 U/L (ref 10–40)
AV INDEX (PROSTH): 0.63
AV MEAN GRADIENT: 5 MMHG
AV PEAK GRADIENT: 8 MMHG
AV VALVE AREA BY VELOCITY RATIO: 3.37 CM²
AV VALVE AREA: 2.44 CM²
AV VELOCITY RATIO: 0.87
BASOPHILS # BLD AUTO: 0.01 K/UL (ref 0–0.2)
BASOPHILS # BLD AUTO: 0.05 K/UL (ref 0–0.2)
BASOPHILS # BLD AUTO: 0.05 K/UL (ref 0–0.2)
BASOPHILS # BLD AUTO: 0.08 K/UL (ref 0–0.2)
BASOPHILS NFR BLD: 0.1 % (ref 0–1.9)
BASOPHILS NFR BLD: 0.3 % (ref 0–1.9)
BASOPHILS NFR BLD: 0.6 % (ref 0–1.9)
BASOPHILS NFR BLD: 0.8 % (ref 0–1.9)
BASOPHILS NFR BLD: 0.9 % (ref 0–1.9)
BILIRUB SERPL-MCNC: 0.1 MG/DL (ref 0.1–1)
BILIRUB SERPL-MCNC: 0.2 MG/DL (ref 0.1–1)
BILIRUB SERPL-MCNC: 0.3 MG/DL (ref 0.1–1)
BILIRUB UR QL STRIP: NEGATIVE
BLD GP AB SCN CELLS X3 SERPL QL: NORMAL
BNP SERPL-MCNC: 706 PG/ML (ref 0–99)
BSA FOR ECHO PROCEDURE: 2.08 M2
BUN SERPL-MCNC: 16 MG/DL (ref 8–23)
BUN SERPL-MCNC: 18 MG/DL (ref 8–23)
BUN SERPL-MCNC: 20 MG/DL (ref 6–30)
BUN SERPL-MCNC: 20 MG/DL (ref 8–23)
BUN SERPL-MCNC: 26 MG/DL (ref 8–23)
BUN SERPL-MCNC: 27 MG/DL (ref 8–23)
BUN SERPL-MCNC: 30 MG/DL (ref 8–23)
BUN SERPL-MCNC: 32 MG/DL (ref 8–23)
BUN SERPL-MCNC: 32 MG/DL (ref 8–23)
BUN SERPL-MCNC: 33 MG/DL (ref 8–23)
BUN SERPL-MCNC: 34 MG/DL (ref 8–23)
BUN SERPL-MCNC: 35 MG/DL (ref 8–23)
BUN SERPL-MCNC: 40 MG/DL (ref 8–23)
BUN SERPL-MCNC: 42 MG/DL (ref 8–23)
BUN SERPL-MCNC: 42 MG/DL (ref 8–23)
BUN SERPL-MCNC: 43 MG/DL (ref 8–23)
BUN SERPL-MCNC: 44 MG/DL (ref 8–23)
CALCIUM SERPL-MCNC: 8.4 MG/DL (ref 8.7–10.5)
CALCIUM SERPL-MCNC: 8.7 MG/DL (ref 8.7–10.5)
CALCIUM SERPL-MCNC: 8.8 MG/DL (ref 8.7–10.5)
CALCIUM SERPL-MCNC: 8.9 MG/DL (ref 8.7–10.5)
CALCIUM SERPL-MCNC: 8.9 MG/DL (ref 8.7–10.5)
CALCIUM SERPL-MCNC: 9 MG/DL (ref 8.7–10.5)
CALCIUM SERPL-MCNC: 9.2 MG/DL (ref 8.7–10.5)
CALCIUM SERPL-MCNC: 9.3 MG/DL (ref 8.7–10.5)
CALCIUM SERPL-MCNC: 9.4 MG/DL (ref 8.7–10.5)
CALCIUM SERPL-MCNC: 9.5 MG/DL (ref 8.7–10.5)
CHLORIDE SERPL-SCNC: 103 MMOL/L (ref 95–110)
CHLORIDE SERPL-SCNC: 104 MMOL/L (ref 95–110)
CHLORIDE SERPL-SCNC: 89 MMOL/L (ref 95–110)
CHLORIDE SERPL-SCNC: 89 MMOL/L (ref 95–110)
CHLORIDE SERPL-SCNC: 90 MMOL/L (ref 95–110)
CHLORIDE SERPL-SCNC: 90 MMOL/L (ref 95–110)
CHLORIDE SERPL-SCNC: 91 MMOL/L (ref 95–110)
CHLORIDE SERPL-SCNC: 92 MMOL/L (ref 95–110)
CHLORIDE SERPL-SCNC: 93 MMOL/L (ref 95–110)
CHLORIDE SERPL-SCNC: 94 MMOL/L (ref 95–110)
CHLORIDE SERPL-SCNC: 95 MMOL/L (ref 95–110)
CHLORIDE SERPL-SCNC: 96 MMOL/L (ref 95–110)
CHLORIDE SERPL-SCNC: 96 MMOL/L (ref 95–110)
CHLORIDE SERPL-SCNC: 98 MMOL/L (ref 95–110)
CHOLEST SERPL-MCNC: 190 MG/DL (ref 120–199)
CHOLEST/HDLC SERPL: 3.5 {RATIO} (ref 2–5)
CLARITY UR REFRACT.AUTO: CLEAR
CO2 SERPL-SCNC: 27 MMOL/L (ref 23–29)
CO2 SERPL-SCNC: 28 MMOL/L (ref 23–29)
CO2 SERPL-SCNC: 29 MMOL/L (ref 23–29)
CO2 SERPL-SCNC: 30 MMOL/L (ref 23–29)
CO2 SERPL-SCNC: 31 MMOL/L (ref 23–29)
CO2 SERPL-SCNC: 32 MMOL/L (ref 23–29)
CO2 SERPL-SCNC: 33 MMOL/L (ref 23–29)
CO2 SERPL-SCNC: 34 MMOL/L (ref 23–29)
CO2 SERPL-SCNC: 35 MMOL/L (ref 23–29)
CO2 SERPL-SCNC: 36 MMOL/L (ref 23–29)
CO2 SERPL-SCNC: 37 MMOL/L (ref 23–29)
CO2 SERPL-SCNC: 37 MMOL/L (ref 23–29)
CO2 SERPL-SCNC: 38 MMOL/L (ref 23–29)
COLOR UR AUTO: COLORLESS
CREAT SERPL-MCNC: 0.9 MG/DL (ref 0.5–1.4)
CREAT SERPL-MCNC: 0.9 MG/DL (ref 0.5–1.4)
CREAT SERPL-MCNC: 1 MG/DL (ref 0.5–1.4)
CREAT SERPL-MCNC: 1.1 MG/DL (ref 0.5–1.4)
CREAT SERPL-MCNC: 1.2 MG/DL (ref 0.5–1.4)
CREAT SERPL-MCNC: 1.3 MG/DL (ref 0.5–1.4)
CREAT SERPL-MCNC: 1.4 MG/DL (ref 0.5–1.4)
CREAT SERPL-MCNC: 1.5 MG/DL (ref 0.5–1.4)
CREAT SERPL-MCNC: 1.5 MG/DL (ref 0.5–1.4)
CREAT SERPL-MCNC: 1.6 MG/DL (ref 0.5–1.4)
CREAT SERPL-MCNC: 1.7 MG/DL (ref 0.5–1.4)
CREAT SERPL-MCNC: 1.8 MG/DL (ref 0.5–1.4)
CREAT SERPL-MCNC: 1.9 MG/DL (ref 0.5–1.4)
CREAT SERPL-MCNC: 1.9 MG/DL (ref 0.5–1.4)
CREAT SERPL-MCNC: 2 MG/DL (ref 0.5–1.4)
CREAT UR-MCNC: 37 MG/DL (ref 15–325)
CV ECHO LV RWT: 0.29 CM
DELSYS: ABNORMAL
DIFFERENTIAL METHOD: ABNORMAL
DOP CALC AO PEAK VEL: 1.4 M/S
DOP CALC AO VTI: 26.67 CM
DOP CALC LVOT AREA: 3.9 CM2
DOP CALC LVOT DIAMETER: 2.22 CM
DOP CALC LVOT PEAK VEL: 1.22 M/S
DOP CALC LVOT STROKE VOLUME: 65.07 CM3
DOP CALCLVOT PEAK VEL VTI: 16.82 CM
E WAVE DECELERATION TIME: 184.1 MSEC
E/A RATIO: 0.71
E/E' RATIO: 5.58 M/S
ECHO LV POSTERIOR WALL: 0.76 CM (ref 0.6–1.1)
EJECTION FRACTION: 65 %
EOSINOPHIL # BLD AUTO: 0 K/UL (ref 0–0.5)
EOSINOPHIL # BLD AUTO: 0.1 K/UL (ref 0–0.5)
EOSINOPHIL # BLD AUTO: 0.1 K/UL (ref 0–0.5)
EOSINOPHIL # BLD AUTO: 0.2 K/UL (ref 0–0.5)
EOSINOPHIL NFR BLD: 0 % (ref 0–8)
EOSINOPHIL NFR BLD: 0 % (ref 0–8)
EOSINOPHIL NFR BLD: 0.1 % (ref 0–8)
EOSINOPHIL NFR BLD: 0.2 % (ref 0–8)
EOSINOPHIL NFR BLD: 0.4 % (ref 0–8)
EOSINOPHIL NFR BLD: 0.9 % (ref 0–8)
EOSINOPHIL NFR BLD: 1.9 % (ref 0–8)
EOSINOPHIL NFR BLD: 1.9 % (ref 0–8)
EOSINOPHIL URNS QL WRIGHT STN: NORMAL
ERYTHROCYTE [DISTWIDTH] IN BLOOD BY AUTOMATED COUNT: 17.5 % (ref 11.5–14.5)
ERYTHROCYTE [DISTWIDTH] IN BLOOD BY AUTOMATED COUNT: 17.7 % (ref 11.5–14.5)
ERYTHROCYTE [DISTWIDTH] IN BLOOD BY AUTOMATED COUNT: 17.7 % (ref 11.5–14.5)
ERYTHROCYTE [DISTWIDTH] IN BLOOD BY AUTOMATED COUNT: 17.8 % (ref 11.5–14.5)
ERYTHROCYTE [DISTWIDTH] IN BLOOD BY AUTOMATED COUNT: 18 % (ref 11.5–14.5)
EST. GFR  (NO RACE VARIABLE): 27.4 ML/MIN/1.73 M^2
EST. GFR  (NO RACE VARIABLE): 29.1 ML/MIN/1.73 M^2
EST. GFR  (NO RACE VARIABLE): 29.1 ML/MIN/1.73 M^2
EST. GFR  (NO RACE VARIABLE): 31.1 ML/MIN/1.73 M^2
EST. GFR  (NO RACE VARIABLE): 33.3 ML/MIN/1.73 M^2
EST. GFR  (NO RACE VARIABLE): 35.8 ML/MIN/1.73 M^2
EST. GFR  (NO RACE VARIABLE): 38.7 ML/MIN/1.73 M^2
EST. GFR  (NO RACE VARIABLE): 38.7 ML/MIN/1.73 M^2
EST. GFR  (NO RACE VARIABLE): 42 ML/MIN/1.73 M^2
EST. GFR  (NO RACE VARIABLE): 45.9 ML/MIN/1.73 M^2
EST. GFR  (NO RACE VARIABLE): 50.5 ML/MIN/1.73 M^2
EST. GFR  (NO RACE VARIABLE): 56.1 ML/MIN/1.73 M^2
EST. GFR  (NO RACE VARIABLE): 56.1 ML/MIN/1.73 M^2
EST. GFR  (NO RACE VARIABLE): >60 ML/MIN/1.73 M^2
FRACTIONAL SHORTENING: 27 % (ref 28–44)
GLUCOSE SERPL-MCNC: 104 MG/DL (ref 70–110)
GLUCOSE SERPL-MCNC: 107 MG/DL (ref 70–110)
GLUCOSE SERPL-MCNC: 112 MG/DL (ref 70–110)
GLUCOSE SERPL-MCNC: 113 MG/DL (ref 70–110)
GLUCOSE SERPL-MCNC: 115 MG/DL (ref 70–110)
GLUCOSE SERPL-MCNC: 116 MG/DL (ref 70–110)
GLUCOSE SERPL-MCNC: 121 MG/DL (ref 70–110)
GLUCOSE SERPL-MCNC: 122 MG/DL (ref 70–110)
GLUCOSE SERPL-MCNC: 129 MG/DL (ref 70–110)
GLUCOSE SERPL-MCNC: 130 MG/DL (ref 70–110)
GLUCOSE SERPL-MCNC: 138 MG/DL (ref 70–110)
GLUCOSE SERPL-MCNC: 68 MG/DL (ref 70–110)
GLUCOSE SERPL-MCNC: 68 MG/DL (ref 70–110)
GLUCOSE SERPL-MCNC: 70 MG/DL (ref 70–110)
GLUCOSE SERPL-MCNC: 73 MG/DL (ref 70–110)
GLUCOSE SERPL-MCNC: 73 MG/DL (ref 70–110)
GLUCOSE SERPL-MCNC: 76 MG/DL (ref 70–110)
GLUCOSE SERPL-MCNC: 77 MG/DL (ref 70–110)
GLUCOSE SERPL-MCNC: 78 MG/DL (ref 70–110)
GLUCOSE SERPL-MCNC: 79 MG/DL (ref 70–110)
GLUCOSE SERPL-MCNC: 95 MG/DL (ref 70–110)
GLUCOSE SERPL-MCNC: 96 MG/DL (ref 70–110)
GLUCOSE UR QL STRIP: NEGATIVE
HCO3 UR-SCNC: 32.7 MMOL/L (ref 24–28)
HCT VFR BLD AUTO: 32.2 % (ref 37–48.5)
HCT VFR BLD AUTO: 33.3 % (ref 37–48.5)
HCT VFR BLD AUTO: 33.6 % (ref 37–48.5)
HCT VFR BLD AUTO: 34 % (ref 37–48.5)
HCT VFR BLD AUTO: 34.2 % (ref 37–48.5)
HCT VFR BLD AUTO: 34.6 % (ref 37–48.5)
HCT VFR BLD AUTO: 35.9 % (ref 37–48.5)
HCT VFR BLD AUTO: 36.8 % (ref 37–48.5)
HCT VFR BLD CALC: 39 %PCV (ref 36–54)
HCV AB SERPL QL IA: NORMAL
HDLC SERPL-MCNC: 55 MG/DL (ref 40–75)
HDLC SERPL: 28.9 % (ref 20–50)
HGB BLD-MCNC: 10 G/DL (ref 12–16)
HGB BLD-MCNC: 8.7 G/DL (ref 12–16)
HGB BLD-MCNC: 8.9 G/DL (ref 12–16)
HGB BLD-MCNC: 9.2 G/DL (ref 12–16)
HGB BLD-MCNC: 9.4 G/DL (ref 12–16)
HGB BLD-MCNC: 9.5 G/DL (ref 12–16)
HGB BLD-MCNC: 9.6 G/DL (ref 12–16)
HGB BLD-MCNC: 9.7 G/DL (ref 12–16)
HGB UR QL STRIP: NEGATIVE
HIV 1+2 AB+HIV1 P24 AG SERPL QL IA: NORMAL
IMM GRANULOCYTES # BLD AUTO: 0.02 K/UL (ref 0–0.04)
IMM GRANULOCYTES # BLD AUTO: 0.02 K/UL (ref 0–0.04)
IMM GRANULOCYTES # BLD AUTO: 0.03 K/UL (ref 0–0.04)
IMM GRANULOCYTES # BLD AUTO: 0.04 K/UL (ref 0–0.04)
IMM GRANULOCYTES # BLD AUTO: 0.05 K/UL (ref 0–0.04)
IMM GRANULOCYTES NFR BLD AUTO: 0.2 % (ref 0–0.5)
IMM GRANULOCYTES NFR BLD AUTO: 0.3 % (ref 0–0.5)
IMM GRANULOCYTES NFR BLD AUTO: 0.3 % (ref 0–0.5)
IMM GRANULOCYTES NFR BLD AUTO: 0.4 % (ref 0–0.5)
IMM GRANULOCYTES NFR BLD AUTO: 0.6 % (ref 0–0.5)
IMM GRANULOCYTES NFR BLD AUTO: 0.8 % (ref 0–0.5)
INR PPP: 1 (ref 0.8–1.2)
INR PPP: 1 (ref 0.8–1.2)
INTERVENTRICULAR SEPTUM: 0.75 CM (ref 0.6–1.1)
KETONES UR QL STRIP: NEGATIVE
LA MAJOR: 5.17 CM
LA MINOR: 4.73 CM
LA WIDTH: 3.38 CM
LACTATE SERPL-SCNC: 0.7 MMOL/L (ref 0.5–2.2)
LDH SERPL L TO P-CCNC: 2.29 MMOL/L (ref 0.5–2.2)
LDLC SERPL CALC-MCNC: 104.6 MG/DL (ref 63–159)
LEFT ATRIUM SIZE: 3.86 CM
LEFT ATRIUM VOLUME INDEX MOD: 16.6 ML/M2
LEFT ATRIUM VOLUME INDEX: 27.3 ML/M2
LEFT ATRIUM VOLUME MOD: 33.36 CM3
LEFT ATRIUM VOLUME: 54.79 CM3
LEFT INTERNAL DIMENSION IN SYSTOLE: 3.8 CM (ref 2.1–4)
LEFT VENTRICLE DIASTOLIC VOLUME INDEX: 65.35 ML/M2
LEFT VENTRICLE DIASTOLIC VOLUME: 131.36 ML
LEFT VENTRICLE MASS INDEX: 68 G/M2
LEFT VENTRICLE SYSTOLIC VOLUME INDEX: 30.8 ML/M2
LEFT VENTRICLE SYSTOLIC VOLUME: 61.91 ML
LEFT VENTRICULAR INTERNAL DIMENSION IN DIASTOLE: 5.23 CM (ref 3.5–6)
LEFT VENTRICULAR MASS: 136.32 G
LEUKOCYTE ESTERASE UR QL STRIP: ABNORMAL
LV LATERAL E/E' RATIO: 5.3 M/S
LV SEPTAL E/E' RATIO: 5.89 M/S
LYMPHOCYTES # BLD AUTO: 0.4 K/UL (ref 1–4.8)
LYMPHOCYTES # BLD AUTO: 1.3 K/UL (ref 1–4.8)
LYMPHOCYTES # BLD AUTO: 1.4 K/UL (ref 1–4.8)
LYMPHOCYTES # BLD AUTO: 1.6 K/UL (ref 1–4.8)
LYMPHOCYTES # BLD AUTO: 1.8 K/UL (ref 1–4.8)
LYMPHOCYTES # BLD AUTO: 1.8 K/UL (ref 1–4.8)
LYMPHOCYTES # BLD AUTO: 1.9 K/UL (ref 1–4.8)
LYMPHOCYTES # BLD AUTO: 3.3 K/UL (ref 1–4.8)
LYMPHOCYTES NFR BLD: 12 % (ref 18–48)
LYMPHOCYTES NFR BLD: 13.3 % (ref 18–48)
LYMPHOCYTES NFR BLD: 14.3 % (ref 18–48)
LYMPHOCYTES NFR BLD: 19.3 % (ref 18–48)
LYMPHOCYTES NFR BLD: 23.5 % (ref 18–48)
LYMPHOCYTES NFR BLD: 24.1 % (ref 18–48)
LYMPHOCYTES NFR BLD: 26.2 % (ref 18–48)
LYMPHOCYTES NFR BLD: 37.1 % (ref 18–48)
MAGNESIUM SERPL-MCNC: 1.7 MG/DL (ref 1.6–2.6)
MAGNESIUM SERPL-MCNC: 1.9 MG/DL (ref 1.6–2.6)
MAGNESIUM SERPL-MCNC: 2 MG/DL (ref 1.6–2.6)
MAGNESIUM SERPL-MCNC: 2 MG/DL (ref 1.6–2.6)
MAGNESIUM SERPL-MCNC: 2.1 MG/DL (ref 1.6–2.6)
MAGNESIUM SERPL-MCNC: 2.2 MG/DL (ref 1.6–2.6)
MCH RBC QN AUTO: 20.9 PG (ref 27–31)
MCH RBC QN AUTO: 21 PG (ref 27–31)
MCH RBC QN AUTO: 21.1 PG (ref 27–31)
MCH RBC QN AUTO: 21.1 PG (ref 27–31)
MCH RBC QN AUTO: 21.2 PG (ref 27–31)
MCH RBC QN AUTO: 21.4 PG (ref 27–31)
MCH RBC QN AUTO: 21.5 PG (ref 27–31)
MCH RBC QN AUTO: 22.3 PG (ref 27–31)
MCHC RBC AUTO-ENTMCNC: 26.5 G/DL (ref 32–36)
MCHC RBC AUTO-ENTMCNC: 26.7 G/DL (ref 32–36)
MCHC RBC AUTO-ENTMCNC: 27 G/DL (ref 32–36)
MCHC RBC AUTO-ENTMCNC: 27.1 G/DL (ref 32–36)
MCHC RBC AUTO-ENTMCNC: 27.2 G/DL (ref 32–36)
MCHC RBC AUTO-ENTMCNC: 27.8 G/DL (ref 32–36)
MCHC RBC AUTO-ENTMCNC: 28 G/DL (ref 32–36)
MCHC RBC AUTO-ENTMCNC: 28.2 G/DL (ref 32–36)
MCV RBC AUTO: 76 FL (ref 82–98)
MCV RBC AUTO: 76 FL (ref 82–98)
MCV RBC AUTO: 78 FL (ref 82–98)
MCV RBC AUTO: 79 FL (ref 82–98)
MCV RBC AUTO: 79 FL (ref 82–98)
MCV RBC AUTO: 80 FL (ref 82–98)
MICROSCOPIC COMMENT: NORMAL
MODE: ABNORMAL
MONOCYTES # BLD AUTO: 0.1 K/UL (ref 0.3–1)
MONOCYTES # BLD AUTO: 0.5 K/UL (ref 0.3–1)
MONOCYTES # BLD AUTO: 0.6 K/UL (ref 0.3–1)
MONOCYTES # BLD AUTO: 0.6 K/UL (ref 0.3–1)
MONOCYTES # BLD AUTO: 0.7 K/UL (ref 0.3–1)
MONOCYTES # BLD AUTO: 0.7 K/UL (ref 0.3–1)
MONOCYTES NFR BLD: 1.4 % (ref 4–15)
MONOCYTES NFR BLD: 4.8 % (ref 4–15)
MONOCYTES NFR BLD: 6.2 % (ref 4–15)
MONOCYTES NFR BLD: 6.8 % (ref 4–15)
MONOCYTES NFR BLD: 7 % (ref 4–15)
MONOCYTES NFR BLD: 7.5 % (ref 4–15)
MONOCYTES NFR BLD: 7.7 % (ref 4–15)
MONOCYTES NFR BLD: 8.3 % (ref 4–15)
MV PEAK A VEL: 0.75 M/S
MV PEAK E VEL: 0.53 M/S
MV STENOSIS PRESSURE HALF TIME: 53.39 MS
MV VALVE AREA P 1/2 METHOD: 4.12 CM2
NEUTROPHILS # BLD AUTO: 3.2 K/UL (ref 1.8–7.7)
NEUTROPHILS # BLD AUTO: 3.9 K/UL (ref 1.8–7.7)
NEUTROPHILS # BLD AUTO: 4.6 K/UL (ref 1.8–7.7)
NEUTROPHILS # BLD AUTO: 5.4 K/UL (ref 1.8–7.7)
NEUTROPHILS # BLD AUTO: 5.5 K/UL (ref 1.8–7.7)
NEUTROPHILS # BLD AUTO: 6.6 K/UL (ref 1.8–7.7)
NEUTROPHILS # BLD AUTO: 7.2 K/UL (ref 1.8–7.7)
NEUTROPHILS # BLD AUTO: 8.3 K/UL (ref 1.8–7.7)
NEUTROPHILS NFR BLD: 51.5 % (ref 38–73)
NEUTROPHILS NFR BLD: 63.1 % (ref 38–73)
NEUTROPHILS NFR BLD: 68.2 % (ref 38–73)
NEUTROPHILS NFR BLD: 69.4 % (ref 38–73)
NEUTROPHILS NFR BLD: 72.8 % (ref 38–73)
NEUTROPHILS NFR BLD: 76.8 % (ref 38–73)
NEUTROPHILS NFR BLD: 81.4 % (ref 38–73)
NEUTROPHILS NFR BLD: 85.5 % (ref 38–73)
NITRITE UR QL STRIP: NEGATIVE
NONHDLC SERPL-MCNC: 135 MG/DL
NRBC BLD-RTO: 0 /100 WBC
NRBC BLD-RTO: 1 /100 WBC
NRBC BLD-RTO: 1 /100 WBC
PCO2 BLDA: 47 MMHG (ref 35–45)
PH SMN: 7.45 [PH] (ref 7.35–7.45)
PH UR STRIP: 6 [PH] (ref 5–8)
PHOSPHATE SERPL-MCNC: 2.5 MG/DL (ref 2.7–4.5)
PHOSPHATE SERPL-MCNC: 2.9 MG/DL (ref 2.7–4.5)
PHOSPHATE SERPL-MCNC: 3 MG/DL (ref 2.7–4.5)
PHOSPHATE SERPL-MCNC: 3 MG/DL (ref 2.7–4.5)
PHOSPHATE SERPL-MCNC: 3.1 MG/DL (ref 2.7–4.5)
PHOSPHATE SERPL-MCNC: 3.2 MG/DL (ref 2.7–4.5)
PHOSPHATE SERPL-MCNC: 3.4 MG/DL (ref 2.7–4.5)
PHOSPHATE SERPL-MCNC: 3.5 MG/DL (ref 2.7–4.5)
PHOSPHATE SERPL-MCNC: 3.6 MG/DL (ref 2.7–4.5)
PHOSPHATE SERPL-MCNC: 3.6 MG/DL (ref 2.7–4.5)
PHOSPHATE SERPL-MCNC: 4 MG/DL (ref 2.7–4.5)
PHOSPHATE SERPL-MCNC: 4 MG/DL (ref 2.7–4.5)
PHOSPHATE SERPL-MCNC: 4.1 MG/DL (ref 2.7–4.5)
PHOSPHATE SERPL-MCNC: 4.3 MG/DL (ref 2.7–4.5)
PHOSPHATE SERPL-MCNC: 4.5 MG/DL (ref 2.7–4.5)
PHOSPHATE SERPL-MCNC: 4.5 MG/DL (ref 2.7–4.5)
PLATELET # BLD AUTO: 241 K/UL (ref 150–450)
PLATELET # BLD AUTO: 255 K/UL (ref 150–450)
PLATELET # BLD AUTO: 280 K/UL (ref 150–450)
PLATELET # BLD AUTO: 289 K/UL (ref 150–450)
PLATELET # BLD AUTO: 335 K/UL (ref 150–450)
PLATELET # BLD AUTO: 336 K/UL (ref 150–450)
PLATELET # BLD AUTO: 354 K/UL (ref 150–450)
PLATELET # BLD AUTO: 376 K/UL (ref 150–450)
PMV BLD AUTO: 10 FL (ref 9.2–12.9)
PMV BLD AUTO: 10 FL (ref 9.2–12.9)
PMV BLD AUTO: 10.2 FL (ref 9.2–12.9)
PMV BLD AUTO: 9.6 FL (ref 9.2–12.9)
PMV BLD AUTO: 9.6 FL (ref 9.2–12.9)
PMV BLD AUTO: 9.7 FL (ref 9.2–12.9)
PMV BLD AUTO: 9.7 FL (ref 9.2–12.9)
PMV BLD AUTO: 9.8 FL (ref 9.2–12.9)
PO2 BLDA: 146 MMHG (ref 40–60)
POC BE: 9 MMOL/L
POC IONIZED CALCIUM: 1.09 MMOL/L (ref 1.06–1.42)
POC PTINR: 1.2 (ref 0.9–1.2)
POC PTWBT: 14.1 SEC (ref 9.7–14.3)
POC SATURATED O2: 99 % (ref 95–100)
POC TCO2 (MEASURED): 34 MMOL/L (ref 23–29)
POC TCO2: 34 MMOL/L (ref 24–29)
POTASSIUM BLD-SCNC: 4.9 MMOL/L (ref 3.5–5.1)
POTASSIUM SERPL-SCNC: 3.7 MMOL/L (ref 3.5–5.1)
POTASSIUM SERPL-SCNC: 3.9 MMOL/L (ref 3.5–5.1)
POTASSIUM SERPL-SCNC: 4 MMOL/L (ref 3.5–5.1)
POTASSIUM SERPL-SCNC: 4.1 MMOL/L (ref 3.5–5.1)
POTASSIUM SERPL-SCNC: 4.2 MMOL/L (ref 3.5–5.1)
POTASSIUM SERPL-SCNC: 4.2 MMOL/L (ref 3.5–5.1)
POTASSIUM SERPL-SCNC: 4.4 MMOL/L (ref 3.5–5.1)
POTASSIUM SERPL-SCNC: 4.5 MMOL/L (ref 3.5–5.1)
POTASSIUM SERPL-SCNC: 4.6 MMOL/L (ref 3.5–5.1)
POTASSIUM SERPL-SCNC: 4.7 MMOL/L (ref 3.5–5.1)
POTASSIUM SERPL-SCNC: 4.9 MMOL/L (ref 3.5–5.1)
POTASSIUM SERPL-SCNC: 5 MMOL/L (ref 3.5–5.1)
POTASSIUM SERPL-SCNC: 5.4 MMOL/L (ref 3.5–5.1)
PROT SERPL-MCNC: 5.9 G/DL (ref 6–8.4)
PROT SERPL-MCNC: 6 G/DL (ref 6–8.4)
PROT SERPL-MCNC: 6.6 G/DL (ref 6–8.4)
PROT SERPL-MCNC: 6.6 G/DL (ref 6–8.4)
PROT SERPL-MCNC: 6.7 G/DL (ref 6–8.4)
PROT SERPL-MCNC: 6.8 G/DL (ref 6–8.4)
PROT SERPL-MCNC: 6.8 G/DL (ref 6–8.4)
PROT SERPL-MCNC: 6.9 G/DL (ref 6–8.4)
PROT UR QL STRIP: NEGATIVE
PROTHROMBIN TIME: 10.4 SEC (ref 9–12.5)
PROTHROMBIN TIME: 11.1 SEC (ref 9–12.5)
RA MAJOR: 5.06 CM
RA PRESSURE ESTIMATED: 3 MMHG
RA WIDTH: 3.35 CM
RBC # BLD AUTO: 4.05 M/UL (ref 4–5.4)
RBC # BLD AUTO: 4.21 M/UL (ref 4–5.4)
RBC # BLD AUTO: 4.25 M/UL (ref 4–5.4)
RBC # BLD AUTO: 4.36 M/UL (ref 4–5.4)
RBC # BLD AUTO: 4.49 M/UL (ref 4–5.4)
RBC # BLD AUTO: 4.53 M/UL (ref 4–5.4)
RBC # BLD AUTO: 4.58 M/UL (ref 4–5.4)
RBC # BLD AUTO: 4.73 M/UL (ref 4–5.4)
RBC #/AREA URNS AUTO: 0 /HPF (ref 0–4)
RIGHT VENTRICULAR END-DIASTOLIC DIMENSION: 4.15 CM
SAMPLE: ABNORMAL
SAMPLE: NORMAL
SAMPLE: NORMAL
SARS-COV-2 RDRP RESP QL NAA+PROBE: NEGATIVE
SINUS: 2.87 CM
SITE: ABNORMAL
SITE: ABNORMAL
SODIUM BLD-SCNC: 141 MMOL/L (ref 136–145)
SODIUM SERPL-SCNC: 136 MMOL/L (ref 136–145)
SODIUM SERPL-SCNC: 137 MMOL/L (ref 136–145)
SODIUM SERPL-SCNC: 138 MMOL/L (ref 136–145)
SODIUM SERPL-SCNC: 139 MMOL/L (ref 136–145)
SODIUM SERPL-SCNC: 139 MMOL/L (ref 136–145)
SODIUM SERPL-SCNC: 140 MMOL/L (ref 136–145)
SODIUM SERPL-SCNC: 141 MMOL/L (ref 136–145)
SODIUM SERPL-SCNC: 142 MMOL/L (ref 136–145)
SODIUM UR-SCNC: 18 MMOL/L (ref 20–250)
SP GR UR STRIP: 1.02 (ref 1–1.03)
SPECIMEN OUTDATE: NORMAL
SQUAMOUS #/AREA URNS AUTO: 0 /HPF
STJ: 2.75 CM
T4 FREE SERPL-MCNC: 1.04 NG/DL (ref 0.71–1.51)
TDI LATERAL: 0.1 M/S
TDI SEPTAL: 0.09 M/S
TDI: 0.1 M/S
TRICUSPID ANNULAR PLANE SYSTOLIC EXCURSION: 1.12 CM
TRIGL SERPL-MCNC: 152 MG/DL (ref 30–150)
TROPONIN I SERPL DL<=0.01 NG/ML-MCNC: 0.01 NG/ML (ref 0–0.03)
TSH SERPL DL<=0.005 MIU/L-ACNC: 0.16 UIU/ML (ref 0.4–4)
URN SPEC COLLECT METH UR: ABNORMAL
UUN UR-MCNC: 466 MG/DL (ref 140–1050)
WBC # BLD AUTO: 10.14 K/UL (ref 3.9–12.7)
WBC # BLD AUTO: 3.68 K/UL (ref 3.9–12.7)
WBC # BLD AUTO: 6.23 K/UL (ref 3.9–12.7)
WBC # BLD AUTO: 7.79 K/UL (ref 3.9–12.7)
WBC # BLD AUTO: 8.05 K/UL (ref 3.9–12.7)
WBC # BLD AUTO: 8.83 K/UL (ref 3.9–12.7)
WBC # BLD AUTO: 9.08 K/UL (ref 3.9–12.7)
WBC # BLD AUTO: 9.37 K/UL (ref 3.9–12.7)
WBC #/AREA URNS AUTO: 1 /HPF (ref 0–5)
Z-SCORE OF LEFT VENTRICULAR DIMENSION IN END DIASTOLE: -1.19
Z-SCORE OF LEFT VENTRICULAR DIMENSION IN END SYSTOLE: 0.4

## 2023-01-01 PROCEDURE — 1160F RVW MEDS BY RX/DR IN RCRD: CPT | Mod: CPTII,95,, | Performed by: NURSE PRACTITIONER

## 2023-01-01 PROCEDURE — 27000221 HC OXYGEN, UP TO 24 HOURS

## 2023-01-01 PROCEDURE — 87205 SMEAR GRAM STAIN: CPT | Performed by: HOSPITALIST

## 2023-01-01 PROCEDURE — 83605 ASSAY OF LACTIC ACID: CPT | Performed by: EMERGENCY MEDICINE

## 2023-01-01 PROCEDURE — 99233 PR SUBSEQUENT HOSPITAL CARE,LEVL III: ICD-10-PCS | Mod: ,,, | Performed by: HOSPITALIST

## 2023-01-01 PROCEDURE — 80053 COMPREHEN METABOLIC PANEL: CPT | Performed by: EMERGENCY MEDICINE

## 2023-01-01 PROCEDURE — 25000003 PHARM REV CODE 250: Performed by: HOSPITALIST

## 2023-01-01 PROCEDURE — 1160F PR REVIEW ALL MEDS BY PRESCRIBER/CLIN PHARMACIST DOCUMENTED: ICD-10-PCS | Mod: CPTII,95,, | Performed by: ANESTHESIOLOGY

## 2023-01-01 PROCEDURE — 83735 ASSAY OF MAGNESIUM: CPT | Performed by: HOSPITALIST

## 2023-01-01 PROCEDURE — 99215 OFFICE O/P EST HI 40 MIN: CPT | Mod: 95,,, | Performed by: INTERNAL MEDICINE

## 2023-01-01 PROCEDURE — 84100 ASSAY OF PHOSPHORUS: CPT | Performed by: HOSPITALIST

## 2023-01-01 PROCEDURE — 99214 OFFICE O/P EST MOD 30 MIN: CPT | Mod: 95,,, | Performed by: HOSPITALIST

## 2023-01-01 PROCEDURE — 31500 INSERT EMERGENCY AIRWAY: CPT

## 2023-01-01 PROCEDURE — 99900026 HC AIRWAY MAINTENANCE (STAT)

## 2023-01-01 PROCEDURE — 90791 PR PSYCHIATRIC DIAGNOSTIC EVALUATION: ICD-10-PCS | Mod: 95,,, | Performed by: PSYCHOLOGIST

## 2023-01-01 PROCEDURE — 99213 PR OFFICE/OUTPT VISIT, EST, LEVL III, 20-29 MIN: ICD-10-PCS | Mod: 95,,, | Performed by: ANESTHESIOLOGY

## 2023-01-01 PROCEDURE — 1159F MED LIST DOCD IN RCRD: CPT | Mod: CPTII,95,, | Performed by: NURSE PRACTITIONER

## 2023-01-01 PROCEDURE — 99285 EMERGENCY DEPT VISIT HI MDM: CPT | Mod: FS,,, | Performed by: STUDENT IN AN ORGANIZED HEALTH CARE EDUCATION/TRAINING PROGRAM

## 2023-01-01 PROCEDURE — 80053 COMPREHEN METABOLIC PANEL: CPT | Performed by: HOSPITALIST

## 2023-01-01 PROCEDURE — 1111F PR DISCHARGE MEDS RECONCILED W/ CURRENT OUTPATIENT MED LIST: ICD-10-PCS | Mod: CPTII,95,, | Performed by: HOSPITALIST

## 2023-01-01 PROCEDURE — 3044F HG A1C LEVEL LT 7.0%: CPT | Mod: CPTII,95,, | Performed by: ANESTHESIOLOGY

## 2023-01-01 PROCEDURE — 94640 AIRWAY INHALATION TREATMENT: CPT | Mod: XB

## 2023-01-01 PROCEDURE — 1160F PR REVIEW ALL MEDS BY PRESCRIBER/CLIN PHARMACIST DOCUMENTED: ICD-10-PCS | Mod: CPTII,S$GLB,, | Performed by: HOSPITALIST

## 2023-01-01 PROCEDURE — 96366 THER/PROPH/DIAG IV INF ADDON: CPT | Mod: 59

## 2023-01-01 PROCEDURE — 99213 OFFICE O/P EST LOW 20 MIN: CPT | Mod: 95,,, | Performed by: ANESTHESIOLOGY

## 2023-01-01 PROCEDURE — 94002 VENT MGMT INPAT INIT DAY: CPT

## 2023-01-01 PROCEDURE — 96130 PSYCL TST EVAL PHYS/QHP 1ST: CPT | Mod: 95,,, | Performed by: PSYCHOLOGIST

## 2023-01-01 PROCEDURE — 1159F MED LIST DOCD IN RCRD: CPT | Mod: CPTII,95,, | Performed by: INTERNAL MEDICINE

## 2023-01-01 PROCEDURE — 93005 ELECTROCARDIOGRAM TRACING: CPT

## 2023-01-01 PROCEDURE — 83605 ASSAY OF LACTIC ACID: CPT

## 2023-01-01 PROCEDURE — 96376 TX/PRO/DX INJ SAME DRUG ADON: CPT

## 2023-01-01 PROCEDURE — 97530 THERAPEUTIC ACTIVITIES: CPT

## 2023-01-01 PROCEDURE — 73590 X-RAY EXAM OF LOWER LEG: CPT | Mod: TC,PO,RT

## 2023-01-01 PROCEDURE — 85025 COMPLETE CBC W/AUTO DIFF WBC: CPT | Performed by: HOSPITALIST

## 2023-01-01 PROCEDURE — 94761 N-INVAS EAR/PLS OXIMETRY MLT: CPT

## 2023-01-01 PROCEDURE — 63600175 PHARM REV CODE 636 W HCPCS: Performed by: EMERGENCY MEDICINE

## 2023-01-01 PROCEDURE — 85025 COMPLETE CBC W/AUTO DIFF WBC: CPT | Performed by: EMERGENCY MEDICINE

## 2023-01-01 PROCEDURE — 3044F PR MOST RECENT HEMOGLOBIN A1C LEVEL <7.0%: ICD-10-PCS | Mod: CPTII,S$GLB,, | Performed by: HOSPITALIST

## 2023-01-01 PROCEDURE — 63600175 PHARM REV CODE 636 W HCPCS: Performed by: HOSPITALIST

## 2023-01-01 PROCEDURE — 3044F HG A1C LEVEL LT 7.0%: CPT | Mod: CPTII,S$GLB,, | Performed by: HOSPITALIST

## 2023-01-01 PROCEDURE — 3044F PR MOST RECENT HEMOGLOBIN A1C LEVEL <7.0%: ICD-10-PCS | Mod: CPTII,95,, | Performed by: NURSE PRACTITIONER

## 2023-01-01 PROCEDURE — G0378 HOSPITAL OBSERVATION PER HR: HCPCS

## 2023-01-01 PROCEDURE — 93010 EKG 12-LEAD: ICD-10-PCS | Mod: ,,, | Performed by: INTERNAL MEDICINE

## 2023-01-01 PROCEDURE — 99284 PR EMERGENCY DEPT VISIT,LEVEL IV: ICD-10-PCS | Mod: ,,, | Performed by: PHYSICIAN ASSISTANT

## 2023-01-01 PROCEDURE — 99900035 HC TECH TIME PER 15 MIN (STAT)

## 2023-01-01 PROCEDURE — 99223 PR INITIAL HOSPITAL CARE,LEVL III: ICD-10-PCS | Mod: ,,, | Performed by: HOSPITALIST

## 2023-01-01 PROCEDURE — 1160F RVW MEDS BY RX/DR IN RCRD: CPT | Mod: CPTII,95,, | Performed by: HOSPITALIST

## 2023-01-01 PROCEDURE — 3044F HG A1C LEVEL LT 7.0%: CPT | Mod: CPTII,95,, | Performed by: INTERNAL MEDICINE

## 2023-01-01 PROCEDURE — 85610 PROTHROMBIN TIME: CPT | Performed by: EMERGENCY MEDICINE

## 2023-01-01 PROCEDURE — 1159F PR MEDICATION LIST DOCUMENTED IN MEDICAL RECORD: ICD-10-PCS | Mod: CPTII,95,, | Performed by: HOSPITALIST

## 2023-01-01 PROCEDURE — 25000003 PHARM REV CODE 250

## 2023-01-01 PROCEDURE — 82803 BLOOD GASES ANY COMBINATION: CPT

## 2023-01-01 PROCEDURE — 99499 UNLISTED E&M SERVICE: CPT | Mod: S$GLB,,, | Performed by: HOSPITALIST

## 2023-01-01 PROCEDURE — 99285 EMERGENCY DEPT VISIT HI MDM: CPT | Mod: 25

## 2023-01-01 PROCEDURE — 96139 PR PSYCH/NEUROPSYCH TEST ADMIN/SCORING, BY TECH, 2+ TESTS, EA ADDTL 30 MIN: ICD-10-PCS | Mod: 95,,, | Performed by: PSYCHOLOGIST

## 2023-01-01 PROCEDURE — 3074F PR MOST RECENT SYSTOLIC BLOOD PRESSURE < 130 MM HG: ICD-10-PCS | Mod: CPTII,S$GLB,, | Performed by: HOSPITALIST

## 2023-01-01 PROCEDURE — 51702 INSERT TEMP BLADDER CATH: CPT

## 2023-01-01 PROCEDURE — 36415 COLL VENOUS BLD VENIPUNCTURE: CPT | Performed by: HOSPITALIST

## 2023-01-01 PROCEDURE — 99214 PR OFFICE/OUTPT VISIT, EST, LEVL IV, 30-39 MIN: ICD-10-PCS | Mod: 95,,, | Performed by: HOSPITALIST

## 2023-01-01 PROCEDURE — 1159F PR MEDICATION LIST DOCUMENTED IN MEDICAL RECORD: ICD-10-PCS | Mod: CPTII,95,, | Performed by: ANESTHESIOLOGY

## 2023-01-01 PROCEDURE — 82570 ASSAY OF URINE CREATININE: CPT | Performed by: HOSPITALIST

## 2023-01-01 PROCEDURE — 99232 SBSQ HOSP IP/OBS MODERATE 35: CPT | Mod: ,,, | Performed by: INTERNAL MEDICINE

## 2023-01-01 PROCEDURE — 99999 PR PBB SHADOW E&M-EST. PATIENT-LVL V: ICD-10-PCS | Mod: PBBFAC,,, | Performed by: HOSPITALIST

## 2023-01-01 PROCEDURE — 94640 AIRWAY INHALATION TREATMENT: CPT

## 2023-01-01 PROCEDURE — 99233 SBSQ HOSP IP/OBS HIGH 50: CPT | Mod: ,,, | Performed by: HOSPITALIST

## 2023-01-01 PROCEDURE — 1160F PR REVIEW ALL MEDS BY PRESCRIBER/CLIN PHARMACIST DOCUMENTED: ICD-10-PCS | Mod: CPTII,95,, | Performed by: HOSPITALIST

## 2023-01-01 PROCEDURE — 80061 LIPID PANEL: CPT | Performed by: EMERGENCY MEDICINE

## 2023-01-01 PROCEDURE — 80069 RENAL FUNCTION PANEL: CPT | Performed by: HOSPITALIST

## 2023-01-01 PROCEDURE — 1160F PR REVIEW ALL MEDS BY PRESCRIBER/CLIN PHARMACIST DOCUMENTED: ICD-10-PCS | Mod: CPTII,95,, | Performed by: NURSE PRACTITIONER

## 2023-01-01 PROCEDURE — 21400001 HC TELEMETRY ROOM

## 2023-01-01 PROCEDURE — 3044F PR MOST RECENT HEMOGLOBIN A1C LEVEL <7.0%: ICD-10-PCS | Mod: CPTII,95,, | Performed by: ANESTHESIOLOGY

## 2023-01-01 PROCEDURE — 25000242 PHARM REV CODE 250 ALT 637 W/ HCPCS: Performed by: EMERGENCY MEDICINE

## 2023-01-01 PROCEDURE — 97116 GAIT TRAINING THERAPY: CPT

## 2023-01-01 PROCEDURE — 96375 TX/PRO/DX INJ NEW DRUG ADDON: CPT

## 2023-01-01 PROCEDURE — 99213 OFFICE O/P EST LOW 20 MIN: CPT | Mod: 95,GC,, | Performed by: ANESTHESIOLOGY

## 2023-01-01 PROCEDURE — 1159F PR MEDICATION LIST DOCUMENTED IN MEDICAL RECORD: ICD-10-PCS | Mod: CPTII,95,, | Performed by: INTERNAL MEDICINE

## 2023-01-01 PROCEDURE — 99999 PR PBB SHADOW E&M-EST. PATIENT-LVL V: CPT | Mod: PBBFAC,,, | Performed by: HOSPITALIST

## 2023-01-01 PROCEDURE — 63600175 PHARM REV CODE 636 W HCPCS: Mod: JZ,JG

## 2023-01-01 PROCEDURE — 90791 PSYCH DIAGNOSTIC EVALUATION: CPT | Mod: 95,,, | Performed by: PSYCHOLOGIST

## 2023-01-01 PROCEDURE — 85610 PROTHROMBIN TIME: CPT

## 2023-01-01 PROCEDURE — 3008F PR BODY MASS INDEX (BMI) DOCUMENTED: ICD-10-PCS | Mod: CPTII,S$GLB,, | Performed by: HOSPITALIST

## 2023-01-01 PROCEDURE — C9248 INJ, CLEVIDIPINE BUTYRATE: HCPCS | Mod: JZ,JG

## 2023-01-01 PROCEDURE — 3044F HG A1C LEVEL LT 7.0%: CPT | Mod: CPTII,95,, | Performed by: HOSPITALIST

## 2023-01-01 PROCEDURE — 99214 OFFICE O/P EST MOD 30 MIN: CPT | Mod: S$GLB,,, | Performed by: HOSPITALIST

## 2023-01-01 PROCEDURE — 81001 URINALYSIS AUTO W/SCOPE: CPT | Performed by: EMERGENCY MEDICINE

## 2023-01-01 PROCEDURE — 1160F RVW MEDS BY RX/DR IN RCRD: CPT | Mod: CPTII,S$GLB,, | Performed by: HOSPITALIST

## 2023-01-01 PROCEDURE — 1159F MED LIST DOCD IN RCRD: CPT | Mod: CPTII,95,, | Performed by: HOSPITALIST

## 2023-01-01 PROCEDURE — 3078F PR MOST RECENT DIASTOLIC BLOOD PRESSURE < 80 MM HG: ICD-10-PCS | Mod: CPTII,S$GLB,, | Performed by: HOSPITALIST

## 2023-01-01 PROCEDURE — 1159F MED LIST DOCD IN RCRD: CPT | Mod: CPTII,S$GLB,, | Performed by: HOSPITALIST

## 2023-01-01 PROCEDURE — 97165 OT EVAL LOW COMPLEX 30 MIN: CPT

## 2023-01-01 PROCEDURE — 63600531 PHARM REV CODE 636 NO ALT 250 W HCPCS: Mod: JZ,JG

## 2023-01-01 PROCEDURE — 3044F HG A1C LEVEL LT 7.0%: CPT | Mod: CPTII,95,, | Performed by: NURSE PRACTITIONER

## 2023-01-01 PROCEDURE — 93010 ELECTROCARDIOGRAM REPORT: CPT | Mod: ,,, | Performed by: INTERNAL MEDICINE

## 2023-01-01 PROCEDURE — 3078F DIAST BP <80 MM HG: CPT | Mod: CPTII,S$GLB,, | Performed by: HOSPITALIST

## 2023-01-01 PROCEDURE — 99204 PR OFFICE/OUTPT VISIT, NEW, LEVL IV, 45-59 MIN: ICD-10-PCS | Mod: 95,,, | Performed by: NURSE PRACTITIONER

## 2023-01-01 PROCEDURE — 99499 RISK ADDL DX/OHS AUDIT: ICD-10-PCS | Mod: S$GLB,,, | Performed by: HOSPITALIST

## 2023-01-01 PROCEDURE — 93005 ELECTROCARDIOGRAM TRACING: CPT | Mod: 59

## 2023-01-01 PROCEDURE — 1160F RVW MEDS BY RX/DR IN RCRD: CPT | Mod: CPTII,95,, | Performed by: ANESTHESIOLOGY

## 2023-01-01 PROCEDURE — 96139 PSYCL/NRPSYC TST TECH EA: CPT | Mod: 95,,, | Performed by: PSYCHOLOGIST

## 2023-01-01 PROCEDURE — 99284 EMERGENCY DEPT VISIT MOD MDM: CPT | Mod: ,,, | Performed by: PHYSICIAN ASSISTANT

## 2023-01-01 PROCEDURE — 25500020 PHARM REV CODE 255: Performed by: EMERGENCY MEDICINE

## 2023-01-01 PROCEDURE — 96131 PSYCL TST EVAL PHYS/QHP EA: CPT | Mod: 95,,, | Performed by: PSYCHOLOGIST

## 2023-01-01 PROCEDURE — 3044F PR MOST RECENT HEMOGLOBIN A1C LEVEL <7.0%: ICD-10-PCS | Mod: CPTII,95,, | Performed by: HOSPITALIST

## 2023-01-01 PROCEDURE — 96138 PR PSYCH/NEUROPSYCH TEST ADMIN/SCORING, BY TECH, 2+ TESTS, 1ST 30 MIN: ICD-10-PCS | Mod: 95,,, | Performed by: PSYCHOLOGIST

## 2023-01-01 PROCEDURE — 1111F DSCHRG MED/CURRENT MED MERGE: CPT | Mod: CPTII,95,, | Performed by: HOSPITALIST

## 2023-01-01 PROCEDURE — 84443 ASSAY THYROID STIM HORMONE: CPT | Performed by: EMERGENCY MEDICINE

## 2023-01-01 PROCEDURE — 84484 ASSAY OF TROPONIN QUANT: CPT | Performed by: EMERGENCY MEDICINE

## 2023-01-01 PROCEDURE — 3008F BODY MASS INDEX DOCD: CPT | Mod: CPTII,95,, | Performed by: INTERNAL MEDICINE

## 2023-01-01 PROCEDURE — 99213 PR OFFICE/OUTPT VISIT, EST, LEVL III, 20-29 MIN: ICD-10-PCS | Mod: 95,GC,, | Performed by: ANESTHESIOLOGY

## 2023-01-01 PROCEDURE — 99214 PR OFFICE/OUTPT VISIT, EST, LEVL IV, 30-39 MIN: ICD-10-PCS | Mod: S$GLB,,, | Performed by: HOSPITALIST

## 2023-01-01 PROCEDURE — 84439 ASSAY OF FREE THYROXINE: CPT | Performed by: EMERGENCY MEDICINE

## 2023-01-01 PROCEDURE — 96365 THER/PROPH/DIAG IV INF INIT: CPT | Mod: 59

## 2023-01-01 PROCEDURE — 96368 THER/DIAG CONCURRENT INF: CPT

## 2023-01-01 PROCEDURE — 86803 HEPATITIS C AB TEST: CPT | Performed by: PHYSICIAN ASSISTANT

## 2023-01-01 PROCEDURE — 84300 ASSAY OF URINE SODIUM: CPT | Performed by: HOSPITALIST

## 2023-01-01 PROCEDURE — 96375 TX/PRO/DX INJ NEW DRUG ADDON: CPT | Mod: 59

## 2023-01-01 PROCEDURE — 86900 BLOOD TYPING SEROLOGIC ABO: CPT | Performed by: EMERGENCY MEDICINE

## 2023-01-01 PROCEDURE — 97110 THERAPEUTIC EXERCISES: CPT | Mod: CQ

## 2023-01-01 PROCEDURE — 73590 X-RAY EXAM OF LOWER LEG: CPT | Mod: 26,RT,, | Performed by: RADIOLOGY

## 2023-01-01 PROCEDURE — 63600175 PHARM REV CODE 636 W HCPCS

## 2023-01-01 PROCEDURE — 97535 SELF CARE MNGMENT TRAINING: CPT

## 2023-01-01 PROCEDURE — 99285 PR EMERGENCY DEPT VISIT,LEVEL V: ICD-10-PCS | Mod: FS,,, | Performed by: STUDENT IN AN ORGANIZED HEALTH CARE EDUCATION/TRAINING PROGRAM

## 2023-01-01 PROCEDURE — 99239 HOSP IP/OBS DSCHRG MGMT >30: CPT | Mod: ,,, | Performed by: HOSPITALIST

## 2023-01-01 PROCEDURE — 99232 PR SUBSEQUENT HOSPITAL CARE,LEVL II: ICD-10-PCS | Mod: ,,, | Performed by: INTERNAL MEDICINE

## 2023-01-01 PROCEDURE — 1159F PR MEDICATION LIST DOCUMENTED IN MEDICAL RECORD: ICD-10-PCS | Mod: CPTII,S$GLB,, | Performed by: HOSPITALIST

## 2023-01-01 PROCEDURE — U0002 COVID-19 LAB TEST NON-CDC: HCPCS | Performed by: EMERGENCY MEDICINE

## 2023-01-01 PROCEDURE — 3008F PR BODY MASS INDEX (BMI) DOCUMENTED: ICD-10-PCS | Mod: CPTII,95,, | Performed by: INTERNAL MEDICINE

## 2023-01-01 PROCEDURE — 87389 HIV-1 AG W/HIV-1&-2 AB AG IA: CPT | Performed by: PHYSICIAN ASSISTANT

## 2023-01-01 PROCEDURE — 25000242 PHARM REV CODE 250 ALT 637 W/ HCPCS: Performed by: HOSPITALIST

## 2023-01-01 PROCEDURE — 1159F MED LIST DOCD IN RCRD: CPT | Mod: CPTII,95,, | Performed by: ANESTHESIOLOGY

## 2023-01-01 PROCEDURE — 3074F SYST BP LT 130 MM HG: CPT | Mod: CPTII,S$GLB,, | Performed by: HOSPITALIST

## 2023-01-01 PROCEDURE — 96374 THER/PROPH/DIAG INJ IV PUSH: CPT

## 2023-01-01 PROCEDURE — 97161 PT EVAL LOW COMPLEX 20 MIN: CPT

## 2023-01-01 PROCEDURE — 99215 PR OFFICE/OUTPT VISIT, EST, LEVL V, 40-54 MIN: ICD-10-PCS | Mod: 95,,, | Performed by: INTERNAL MEDICINE

## 2023-01-01 PROCEDURE — 73590 XR TIBIA FIBULA 2 VIEW RIGHT: ICD-10-PCS | Mod: 26,RT,, | Performed by: RADIOLOGY

## 2023-01-01 PROCEDURE — 99223 1ST HOSP IP/OBS HIGH 75: CPT | Mod: ,,, | Performed by: HOSPITALIST

## 2023-01-01 PROCEDURE — 99204 OFFICE O/P NEW MOD 45 MIN: CPT | Mod: 95,,, | Performed by: NURSE PRACTITIONER

## 2023-01-01 PROCEDURE — 96130 PR PSYCHOLOGIC TEST EVAL SVCS, 1ST HR: ICD-10-PCS | Mod: 95,,, | Performed by: PSYCHOLOGIST

## 2023-01-01 PROCEDURE — 99291 CRITICAL CARE FIRST HOUR: CPT

## 2023-01-01 PROCEDURE — 96138 PSYCL/NRPSYC TECH 1ST: CPT | Mod: 95,,, | Performed by: PSYCHOLOGIST

## 2023-01-01 PROCEDURE — 3044F PR MOST RECENT HEMOGLOBIN A1C LEVEL <7.0%: ICD-10-PCS | Mod: CPTII,95,, | Performed by: INTERNAL MEDICINE

## 2023-01-01 PROCEDURE — 1159F PR MEDICATION LIST DOCUMENTED IN MEDICAL RECORD: ICD-10-PCS | Mod: CPTII,95,, | Performed by: NURSE PRACTITIONER

## 2023-01-01 PROCEDURE — 82565 ASSAY OF CREATININE: CPT | Mod: 91

## 2023-01-01 PROCEDURE — 83880 ASSAY OF NATRIURETIC PEPTIDE: CPT | Performed by: EMERGENCY MEDICINE

## 2023-01-01 PROCEDURE — 80069 RENAL FUNCTION PANEL: CPT | Mod: 91 | Performed by: HOSPITALIST

## 2023-01-01 PROCEDURE — 3008F BODY MASS INDEX DOCD: CPT | Mod: CPTII,S$GLB,, | Performed by: HOSPITALIST

## 2023-01-01 PROCEDURE — 96131 PR PSYCHOLOGIC TEST EVAL SVCS, EA ADDTL HR: ICD-10-PCS | Mod: 95,,, | Performed by: PSYCHOLOGIST

## 2023-01-01 PROCEDURE — 84540 ASSAY OF URINE/UREA-N: CPT | Performed by: HOSPITALIST

## 2023-01-01 PROCEDURE — 99239 PR HOSPITAL DISCHARGE DAY,>30 MIN: ICD-10-PCS | Mod: ,,, | Performed by: HOSPITALIST

## 2023-01-01 RX ORDER — IPRATROPIUM BROMIDE 0.5 MG/2.5ML
0.5 SOLUTION RESPIRATORY (INHALATION)
Status: COMPLETED | OUTPATIENT
Start: 2023-01-01 | End: 2023-01-01

## 2023-01-01 RX ORDER — LIDOCAINE 50 MG/G
1 PATCH TOPICAL DAILY
Qty: 30 PATCH | Refills: 2 | Status: SHIPPED | OUTPATIENT
Start: 2023-01-01

## 2023-01-01 RX ORDER — LORAZEPAM 2 MG/ML
0.5 INJECTION INTRAMUSCULAR EVERY 30 MIN PRN
Status: DISCONTINUED | OUTPATIENT
Start: 2023-01-01 | End: 2023-01-01

## 2023-01-01 RX ORDER — OXYCODONE AND ACETAMINOPHEN 5; 325 MG/1; MG/1
1 TABLET ORAL EVERY 6 HOURS PRN
Status: DISCONTINUED | OUTPATIENT
Start: 2023-01-01 | End: 2023-01-01

## 2023-01-01 RX ORDER — ONDANSETRON 4 MG/1
4 TABLET, ORALLY DISINTEGRATING ORAL EVERY 8 HOURS PRN
Qty: 30 TABLET | Refills: 0 | Status: SHIPPED | OUTPATIENT
Start: 2023-01-01 | End: 2023-01-01

## 2023-01-01 RX ORDER — ACETAMINOPHEN 325 MG/1
650 TABLET ORAL EVERY 4 HOURS PRN
Status: DISCONTINUED | OUTPATIENT
Start: 2023-01-01 | End: 2023-01-01

## 2023-01-01 RX ORDER — GLYCOPYRROLATE 0.2 MG/ML
0.1 INJECTION INTRAMUSCULAR; INTRAVENOUS 3 TIMES DAILY PRN
Status: DISCONTINUED | OUTPATIENT
Start: 2023-01-01 | End: 2023-01-01

## 2023-01-01 RX ORDER — IPRATROPIUM BROMIDE AND ALBUTEROL SULFATE 2.5; .5 MG/3ML; MG/3ML
3 SOLUTION RESPIRATORY (INHALATION) EVERY 8 HOURS PRN
Qty: 75 ML | Refills: 0 | Status: SHIPPED | OUTPATIENT
Start: 2023-01-01 | End: 2024-08-11

## 2023-01-01 RX ORDER — ALBUTEROL SULFATE 2.5 MG/.5ML
2.5 SOLUTION RESPIRATORY (INHALATION)
Status: COMPLETED | OUTPATIENT
Start: 2023-01-01 | End: 2023-01-01

## 2023-01-01 RX ORDER — AMOXICILLIN 250 MG
1 CAPSULE ORAL DAILY PRN
Status: DISCONTINUED | OUTPATIENT
Start: 2023-01-01 | End: 2023-01-01 | Stop reason: HOSPADM

## 2023-01-01 RX ORDER — SUCCINYLCHOLINE CHLORIDE 20 MG/ML
INJECTION INTRAMUSCULAR; INTRAVENOUS
Status: DISPENSED
Start: 2023-01-01 | End: 2023-01-01

## 2023-01-01 RX ORDER — APIXABAN 5 MG/1
TABLET, FILM COATED ORAL
Qty: 60 TABLET | Refills: 5 | Status: SHIPPED | OUTPATIENT
Start: 2023-01-01

## 2023-01-01 RX ORDER — METHYLPREDNISOLONE 4 MG/1
TABLET ORAL
Qty: 21 TABLET | Refills: 0 | OUTPATIENT
Start: 2023-01-01

## 2023-01-01 RX ORDER — ETOMIDATE 2 MG/ML
INJECTION INTRAVENOUS
Status: COMPLETED
Start: 2023-01-01 | End: 2023-01-01

## 2023-01-01 RX ORDER — FUROSEMIDE 10 MG/ML
40 INJECTION INTRAMUSCULAR; INTRAVENOUS ONCE
Status: COMPLETED | OUTPATIENT
Start: 2023-01-01 | End: 2023-01-01

## 2023-01-01 RX ORDER — MORPHINE SULFATE 4 MG/ML
4 INJECTION, SOLUTION INTRAMUSCULAR; INTRAVENOUS
Status: DISCONTINUED | OUTPATIENT
Start: 2023-01-01 | End: 2023-01-01

## 2023-01-01 RX ORDER — FUROSEMIDE 40 MG/1
40 TABLET ORAL
Qty: 60 TABLET | Refills: 2 | Status: SHIPPED | OUTPATIENT
Start: 2023-01-01 | End: 2024-08-11

## 2023-01-01 RX ORDER — THIAMINE HCL 100 MG
100 TABLET ORAL DAILY
Status: DISCONTINUED | OUTPATIENT
Start: 2023-01-01 | End: 2023-01-01 | Stop reason: HOSPADM

## 2023-01-01 RX ORDER — OXYMETAZOLINE HCL 0.05 %
1 SPRAY, NON-AEROSOL (ML) NASAL DAILY PRN
COMMUNITY

## 2023-01-01 RX ORDER — PROCHLORPERAZINE EDISYLATE 5 MG/ML
5 INJECTION INTRAMUSCULAR; INTRAVENOUS EVERY 6 HOURS PRN
Status: DISCONTINUED | OUTPATIENT
Start: 2023-01-01 | End: 2023-01-01 | Stop reason: HOSPADM

## 2023-01-01 RX ORDER — OXYCODONE AND ACETAMINOPHEN 5; 325 MG/1; MG/1
1 TABLET ORAL EVERY 6 HOURS PRN
Qty: 120 EACH | Refills: 0 | Status: SHIPPED | OUTPATIENT
Start: 2023-01-01 | End: 2023-01-01 | Stop reason: SDUPTHER

## 2023-01-01 RX ORDER — BUSPIRONE HYDROCHLORIDE 5 MG/1
5 TABLET ORAL DAILY
Status: DISCONTINUED | OUTPATIENT
Start: 2023-01-01 | End: 2023-01-01 | Stop reason: HOSPADM

## 2023-01-01 RX ORDER — NALOXONE HCL 0.4 MG/ML
0.02 VIAL (ML) INJECTION
Status: DISCONTINUED | OUTPATIENT
Start: 2023-01-01 | End: 2023-01-01 | Stop reason: HOSPADM

## 2023-01-01 RX ORDER — HYDROMORPHONE HYDROCHLORIDE 1 MG/ML
2 INJECTION, SOLUTION INTRAMUSCULAR; INTRAVENOUS; SUBCUTANEOUS
Status: DISCONTINUED | OUTPATIENT
Start: 2023-01-01 | End: 2023-01-01 | Stop reason: HOSPADM

## 2023-01-01 RX ORDER — SODIUM CHLORIDE 0.9 % (FLUSH) 0.9 %
10 SYRINGE (ML) INJECTION EVERY 6 HOURS PRN
Status: DISCONTINUED | OUTPATIENT
Start: 2023-01-01 | End: 2023-01-01 | Stop reason: HOSPADM

## 2023-01-01 RX ORDER — PREDNISONE 20 MG/1
40 TABLET ORAL DAILY
Status: COMPLETED | OUTPATIENT
Start: 2023-01-01 | End: 2023-01-01

## 2023-01-01 RX ORDER — TIZANIDINE 4 MG/1
4 TABLET ORAL EVERY 6 HOURS PRN
Qty: 60 TABLET | Refills: 0 | OUTPATIENT
Start: 2023-01-01

## 2023-01-01 RX ORDER — NICARDIPINE HYDROCHLORIDE 0.2 MG/ML
0-15 INJECTION INTRAVENOUS CONTINUOUS
Status: DISCONTINUED | OUTPATIENT
Start: 2023-01-01 | End: 2023-01-01 | Stop reason: HOSPADM

## 2023-01-01 RX ORDER — MIRTAZAPINE 15 MG/1
15 TABLET, FILM COATED ORAL NIGHTLY
Status: DISCONTINUED | OUTPATIENT
Start: 2023-01-01 | End: 2023-01-01 | Stop reason: HOSPADM

## 2023-01-01 RX ORDER — DICYCLOMINE HYDROCHLORIDE 10 MG/1
10 CAPSULE ORAL
Qty: 120 CAPSULE | Refills: 2 | Status: SHIPPED | OUTPATIENT
Start: 2023-01-01 | End: 2023-01-01

## 2023-01-01 RX ORDER — FOLIC ACID 1 MG/1
1 TABLET ORAL DAILY
Status: DISCONTINUED | OUTPATIENT
Start: 2023-01-01 | End: 2023-01-01 | Stop reason: HOSPADM

## 2023-01-01 RX ORDER — VENLAFAXINE HYDROCHLORIDE 75 MG/1
225 CAPSULE, EXTENDED RELEASE ORAL DAILY
Status: DISCONTINUED | OUTPATIENT
Start: 2023-01-01 | End: 2023-01-01 | Stop reason: HOSPADM

## 2023-01-01 RX ORDER — ROCURONIUM BROMIDE 10 MG/ML
INJECTION, SOLUTION INTRAVENOUS
Status: COMPLETED
Start: 2023-01-01 | End: 2023-01-01

## 2023-01-01 RX ORDER — SUCCINYLCHOLINE CHLORIDE 20 MG/ML
100 INJECTION INTRAMUSCULAR; INTRAVENOUS
Status: DISPENSED | OUTPATIENT
Start: 2023-01-01 | End: 2023-01-01

## 2023-01-01 RX ORDER — OXYCODONE AND ACETAMINOPHEN 5; 325 MG/1; MG/1
1 TABLET ORAL EVERY 6 HOURS PRN
Qty: 120 EACH | Refills: 0 | Status: ON HOLD | OUTPATIENT
Start: 2023-01-01 | End: 2023-01-01 | Stop reason: HOSPADM

## 2023-01-01 RX ORDER — OXYCODONE HYDROCHLORIDE 5 MG/1
5 TABLET ORAL EVERY 6 HOURS PRN
Status: DISCONTINUED | OUTPATIENT
Start: 2023-01-01 | End: 2023-01-01 | Stop reason: HOSPADM

## 2023-01-01 RX ORDER — AMIODARONE HYDROCHLORIDE 200 MG/1
200 TABLET ORAL DAILY
Qty: 90 TABLET | Refills: 3 | OUTPATIENT
Start: 2023-01-01

## 2023-01-01 RX ORDER — NICARDIPINE HYDROCHLORIDE 0.2 MG/ML
INJECTION INTRAVENOUS
Status: COMPLETED
Start: 2023-01-01 | End: 2023-01-01

## 2023-01-01 RX ORDER — FUROSEMIDE 10 MG/ML
80 INJECTION INTRAMUSCULAR; INTRAVENOUS DAILY
Status: COMPLETED | OUTPATIENT
Start: 2023-01-01 | End: 2023-01-01

## 2023-01-01 RX ORDER — IPRATROPIUM BROMIDE AND ALBUTEROL SULFATE 2.5; .5 MG/3ML; MG/3ML
3 SOLUTION RESPIRATORY (INHALATION) EVERY 8 HOURS PRN
Status: DISCONTINUED | OUTPATIENT
Start: 2023-01-01 | End: 2023-01-01 | Stop reason: HOSPADM

## 2023-01-01 RX ORDER — FOLIC ACID 1 MG/1
1 TABLET ORAL DAILY
Qty: 30 TABLET | Refills: 2 | Status: SHIPPED | OUTPATIENT
Start: 2023-01-01 | End: 2024-08-11

## 2023-01-01 RX ORDER — ONDANSETRON 4 MG/1
4 TABLET, ORALLY DISINTEGRATING ORAL EVERY 8 HOURS PRN
Qty: 30 TABLET | Refills: 0 | Status: SHIPPED | OUTPATIENT
Start: 2023-01-01

## 2023-01-01 RX ORDER — LORAZEPAM 2 MG/ML
1 INJECTION INTRAMUSCULAR EVERY 4 HOURS PRN
Status: DISCONTINUED | OUTPATIENT
Start: 2023-01-01 | End: 2023-01-01 | Stop reason: HOSPADM

## 2023-01-01 RX ORDER — ACETAMINOPHEN 500 MG
1000 TABLET ORAL EVERY 12 HOURS
Qty: 40 TABLET | Refills: 0 | Status: SHIPPED | OUTPATIENT
Start: 2023-01-01 | End: 2023-01-01 | Stop reason: HOSPADM

## 2023-01-01 RX ORDER — MIRTAZAPINE 15 MG/1
15 TABLET, FILM COATED ORAL NIGHTLY
Qty: 30 TABLET | Refills: 11 | Status: SHIPPED | OUTPATIENT
Start: 2023-01-01 | End: 2023-01-01

## 2023-01-01 RX ORDER — OXYCODONE HYDROCHLORIDE 5 MG/1
5 TABLET ORAL EVERY 12 HOURS PRN
Qty: 14 TABLET | Refills: 0 | Status: SHIPPED | OUTPATIENT
Start: 2023-01-01 | End: 2023-01-01 | Stop reason: HOSPADM

## 2023-01-01 RX ORDER — METHYLPREDNISOLONE SOD SUCC 125 MG
125 VIAL (EA) INJECTION
Status: COMPLETED | OUTPATIENT
Start: 2023-01-01 | End: 2023-01-01

## 2023-01-01 RX ORDER — METOPROLOL TARTRATE 50 MG/1
50 TABLET ORAL DAILY
Qty: 90 TABLET | Refills: 3 | Status: SHIPPED | OUTPATIENT
Start: 2023-01-01

## 2023-01-01 RX ORDER — SODIUM CHLORIDE 9 MG/ML
INJECTION, SOLUTION INTRAVENOUS CONTINUOUS
Status: DISCONTINUED | OUTPATIENT
Start: 2023-01-01 | End: 2023-01-01

## 2023-01-01 RX ORDER — PANTOPRAZOLE SODIUM 40 MG/1
40 TABLET, DELAYED RELEASE ORAL
Status: DISCONTINUED | OUTPATIENT
Start: 2023-01-01 | End: 2023-01-01 | Stop reason: HOSPADM

## 2023-01-01 RX ORDER — ETOMIDATE 2 MG/ML
20 INJECTION INTRAVENOUS
Status: COMPLETED | OUTPATIENT
Start: 2023-01-01 | End: 2023-01-01

## 2023-01-01 RX ORDER — METOPROLOL TARTRATE 50 MG/1
50 TABLET ORAL DAILY
Qty: 90 TABLET | Refills: 3 | OUTPATIENT
Start: 2023-01-01

## 2023-01-01 RX ORDER — METOPROLOL TARTRATE 50 MG/1
50 TABLET ORAL DAILY
Status: DISCONTINUED | OUTPATIENT
Start: 2023-01-01 | End: 2023-01-01 | Stop reason: HOSPADM

## 2023-01-01 RX ORDER — OXYCODONE AND ACETAMINOPHEN 10; 325 MG/1; MG/1
1 TABLET ORAL EVERY 6 HOURS PRN
Qty: 120 TABLET | Refills: 0 | Status: SHIPPED | OUTPATIENT
Start: 2023-01-01 | End: 2023-11-02

## 2023-01-01 RX ORDER — PROPOFOL 10 MG/ML
0-50 INJECTION, EMULSION INTRAVENOUS CONTINUOUS
Status: DISCONTINUED | OUTPATIENT
Start: 2023-01-01 | End: 2023-01-01 | Stop reason: HOSPADM

## 2023-01-01 RX ORDER — POLYETHYLENE GLYCOL 3350 17 G/17G
17 POWDER, FOR SOLUTION ORAL 2 TIMES DAILY PRN
Qty: 30 PACKET | Refills: 1 | Status: SHIPPED | OUTPATIENT
Start: 2023-01-01

## 2023-01-01 RX ORDER — TALC
6 POWDER (GRAM) TOPICAL NIGHTLY PRN
Status: DISCONTINUED | OUTPATIENT
Start: 2023-01-01 | End: 2023-01-01 | Stop reason: HOSPADM

## 2023-01-01 RX ORDER — AMIODARONE HYDROCHLORIDE 200 MG/1
200 TABLET ORAL DAILY
Qty: 30 TABLET | Refills: 1 | Status: SHIPPED | OUTPATIENT
Start: 2023-01-01 | End: 2024-10-16

## 2023-01-01 RX ORDER — MIRTAZAPINE 30 MG/1
30 TABLET, FILM COATED ORAL NIGHTLY
Qty: 30 TABLET | Refills: 11 | Status: SHIPPED | OUTPATIENT
Start: 2023-01-01

## 2023-01-01 RX ORDER — OXYCODONE AND ACETAMINOPHEN 5; 325 MG/1; MG/1
1 TABLET ORAL EVERY 6 HOURS PRN
Refills: 0
Start: 2023-01-01 | End: 2023-01-01 | Stop reason: SDUPTHER

## 2023-01-01 RX ORDER — AMIODARONE HYDROCHLORIDE 200 MG/1
200 TABLET ORAL DAILY
Status: DISCONTINUED | OUTPATIENT
Start: 2023-01-01 | End: 2023-01-01 | Stop reason: HOSPADM

## 2023-01-01 RX ORDER — DICYCLOMINE HYDROCHLORIDE 10 MG/1
10 CAPSULE ORAL
Status: DISCONTINUED | OUTPATIENT
Start: 2023-01-01 | End: 2023-01-01 | Stop reason: HOSPADM

## 2023-01-01 RX ORDER — POLYETHYLENE GLYCOL 3350 17 G/17G
17 POWDER, FOR SOLUTION ORAL 2 TIMES DAILY PRN
Status: DISCONTINUED | OUTPATIENT
Start: 2023-01-01 | End: 2023-01-01 | Stop reason: HOSPADM

## 2023-01-01 RX ORDER — LIDOCAINE 50 MG/G
1 PATCH TOPICAL
Status: DISCONTINUED | OUTPATIENT
Start: 2023-01-01 | End: 2023-01-01 | Stop reason: HOSPADM

## 2023-01-01 RX ORDER — ONDANSETRON 2 MG/ML
4 INJECTION INTRAMUSCULAR; INTRAVENOUS EVERY 8 HOURS PRN
Status: DISCONTINUED | OUTPATIENT
Start: 2023-01-01 | End: 2023-01-01 | Stop reason: HOSPADM

## 2023-01-01 RX ORDER — METHYLPREDNISOLONE 4 MG/1
TABLET ORAL
Qty: 21 TABLET | Refills: 0 | Status: SHIPPED | OUTPATIENT
Start: 2023-01-01 | End: 2023-01-01

## 2023-01-01 RX ORDER — ACETAMINOPHEN 500 MG
1000 TABLET ORAL EVERY 12 HOURS
Status: DISCONTINUED | OUTPATIENT
Start: 2023-01-01 | End: 2023-01-01 | Stop reason: HOSPADM

## 2023-01-01 RX ORDER — OXYCODONE AND ACETAMINOPHEN 5; 325 MG/1; MG/1
1 TABLET ORAL EVERY 6 HOURS PRN
Qty: 120 TABLET | Refills: 0 | Status: SHIPPED | OUTPATIENT
Start: 2023-01-01 | End: 2023-01-01

## 2023-01-01 RX ORDER — NALOXONE HYDROCHLORIDE 4 MG/.1ML
SPRAY NASAL
Qty: 1 EACH | Refills: 11 | Status: SHIPPED | OUTPATIENT
Start: 2023-01-01

## 2023-01-01 RX ORDER — IPRATROPIUM BROMIDE AND ALBUTEROL SULFATE 2.5; .5 MG/3ML; MG/3ML
3 SOLUTION RESPIRATORY (INHALATION) EVERY 8 HOURS
Status: DISCONTINUED | OUTPATIENT
Start: 2023-01-01 | End: 2023-01-01

## 2023-01-01 RX ORDER — BUSPIRONE HYDROCHLORIDE 15 MG/1
15 TABLET ORAL DAILY
COMMUNITY
End: 2023-01-01 | Stop reason: SDUPTHER

## 2023-01-01 RX ORDER — BUSPIRONE HYDROCHLORIDE 15 MG/1
15 TABLET ORAL 2 TIMES DAILY
Qty: 60 TABLET | Refills: 11 | Status: SHIPPED | OUTPATIENT
Start: 2023-01-01

## 2023-01-01 RX ORDER — METHYLPREDNISOLONE 4 MG/1
TABLET ORAL
Qty: 21 TABLET | Refills: 0 | Status: SHIPPED | OUTPATIENT
Start: 2023-01-01 | End: 2023-01-01 | Stop reason: ALTCHOICE

## 2023-01-01 RX ADMIN — DICYCLOMINE HYDROCHLORIDE 10 MG: 10 CAPSULE ORAL at 05:08

## 2023-01-01 RX ADMIN — DICYCLOMINE HYDROCHLORIDE 10 MG: 10 CAPSULE ORAL at 06:08

## 2023-01-01 RX ADMIN — APIXABAN 5 MG: 5 TABLET, FILM COATED ORAL at 08:08

## 2023-01-01 RX ADMIN — PANTOPRAZOLE SODIUM 40 MG: 40 TABLET, DELAYED RELEASE ORAL at 06:08

## 2023-01-01 RX ADMIN — VENLAFAXINE HYDROCHLORIDE 225 MG: 75 CAPSULE, EXTENDED RELEASE ORAL at 08:08

## 2023-01-01 RX ADMIN — SODIUM CHLORIDE: 9 INJECTION, SOLUTION INTRAVENOUS at 07:08

## 2023-01-01 RX ADMIN — ETOMIDATE 20 MG: 2 INJECTION INTRAVENOUS at 09:10

## 2023-01-01 RX ADMIN — PANTOPRAZOLE SODIUM 40 MG: 40 TABLET, DELAYED RELEASE ORAL at 05:08

## 2023-01-01 RX ADMIN — BUSPIRONE HYDROCHLORIDE 5 MG: 5 TABLET ORAL at 08:08

## 2023-01-01 RX ADMIN — ALBUTEROL SULFATE 2.5 MG: 2.5 SOLUTION RESPIRATORY (INHALATION) at 02:08

## 2023-01-01 RX ADMIN — DICYCLOMINE HYDROCHLORIDE 10 MG: 10 CAPSULE ORAL at 11:08

## 2023-01-01 RX ADMIN — NICARDIPINE HYDROCHLORIDE 2.5 MG/HR: 0.2 INJECTION INTRAVENOUS at 02:10

## 2023-01-01 RX ADMIN — OXYCODONE HYDROCHLORIDE 5 MG: 5 TABLET ORAL at 04:08

## 2023-01-01 RX ADMIN — ACETAMINOPHEN 1000 MG: 500 TABLET ORAL at 09:08

## 2023-01-01 RX ADMIN — IOHEXOL 100 ML: 350 INJECTION, SOLUTION INTRAVENOUS at 07:08

## 2023-01-01 RX ADMIN — ROCURONIUM BROMIDE 50 MG: 10 INJECTION, SOLUTION INTRAVENOUS at 09:10

## 2023-01-01 RX ADMIN — OXYCODONE HYDROCHLORIDE 5 MG: 5 TABLET ORAL at 10:08

## 2023-01-01 RX ADMIN — MIRTAZAPINE 15 MG: 15 TABLET, FILM COATED ORAL at 11:08

## 2023-01-01 RX ADMIN — Medication 100 MG: at 08:08

## 2023-01-01 RX ADMIN — PREDNISONE 40 MG: 20 TABLET ORAL at 09:08

## 2023-01-01 RX ADMIN — BUSPIRONE HYDROCHLORIDE 5 MG: 5 TABLET ORAL at 09:08

## 2023-01-01 RX ADMIN — NICARDIPINE HYDROCHLORIDE 2.5 MG/HR: 0.2 INJECTION, SOLUTION INTRAVENOUS at 02:10

## 2023-01-01 RX ADMIN — IPRATROPIUM BROMIDE 0.5 MG: 0.5 SOLUTION RESPIRATORY (INHALATION) at 02:08

## 2023-01-01 RX ADMIN — OXYCODONE HYDROCHLORIDE 5 MG: 5 TABLET ORAL at 03:08

## 2023-01-01 RX ADMIN — FUROSEMIDE 40 MG: 10 INJECTION, SOLUTION INTRAVENOUS at 09:08

## 2023-01-01 RX ADMIN — GLYCOPYRROLATE 0.1 MG: 0.2 INJECTION INTRAMUSCULAR; INTRAVENOUS at 03:10

## 2023-01-01 RX ADMIN — APIXABAN 5 MG: 5 TABLET, FILM COATED ORAL at 09:08

## 2023-01-01 RX ADMIN — FUROSEMIDE 80 MG: 10 INJECTION, SOLUTION INTRAVENOUS at 11:08

## 2023-01-01 RX ADMIN — DICYCLOMINE HYDROCHLORIDE 10 MG: 10 CAPSULE ORAL at 09:08

## 2023-01-01 RX ADMIN — OXYCODONE HYDROCHLORIDE AND ACETAMINOPHEN 1 TABLET: 5; 325 TABLET ORAL at 05:08

## 2023-01-01 RX ADMIN — AMIODARONE HYDROCHLORIDE 200 MG: 200 TABLET ORAL at 08:08

## 2023-01-01 RX ADMIN — AMIODARONE HYDROCHLORIDE 200 MG: 200 TABLET ORAL at 09:08

## 2023-01-01 RX ADMIN — VENLAFAXINE HYDROCHLORIDE 225 MG: 75 CAPSULE, EXTENDED RELEASE ORAL at 09:08

## 2023-01-01 RX ADMIN — DICYCLOMINE HYDROCHLORIDE 10 MG: 10 CAPSULE ORAL at 08:08

## 2023-01-01 RX ADMIN — OXYCODONE HYDROCHLORIDE 5 MG: 5 TABLET ORAL at 05:08

## 2023-01-01 RX ADMIN — METOPROLOL TARTRATE 50 MG: 50 TABLET, FILM COATED ORAL at 08:08

## 2023-01-01 RX ADMIN — DICYCLOMINE HYDROCHLORIDE 10 MG: 10 CAPSULE ORAL at 10:08

## 2023-01-01 RX ADMIN — METOPROLOL TARTRATE 50 MG: 50 TABLET, FILM COATED ORAL at 09:08

## 2023-01-01 RX ADMIN — FOLIC ACID 1 MG: 1 TABLET ORAL at 08:08

## 2023-01-01 RX ADMIN — OXYCODONE HYDROCHLORIDE AND ACETAMINOPHEN 1 TABLET: 5; 325 TABLET ORAL at 09:08

## 2023-01-01 RX ADMIN — Medication 100 MG: at 09:08

## 2023-01-01 RX ADMIN — DICYCLOMINE HYDROCHLORIDE 10 MG: 10 CAPSULE ORAL at 04:08

## 2023-01-01 RX ADMIN — PREDNISONE 40 MG: 20 TABLET ORAL at 08:08

## 2023-01-01 RX ADMIN — IOHEXOL 75 ML: 350 INJECTION, SOLUTION INTRAVENOUS at 10:10

## 2023-01-01 RX ADMIN — CLEVIPIDINE 1 MG/HR: 0.5 EMULSION INTRAVENOUS at 01:10

## 2023-01-01 RX ADMIN — IPRATROPIUM BROMIDE 0.5 MG: 0.5 SOLUTION RESPIRATORY (INHALATION) at 06:08

## 2023-01-01 RX ADMIN — OXYCODONE HYDROCHLORIDE AND ACETAMINOPHEN 1 TABLET: 5; 325 TABLET ORAL at 04:08

## 2023-01-01 RX ADMIN — LORAZEPAM 0.5 MG: 2 INJECTION INTRAMUSCULAR; INTRAVENOUS at 03:10

## 2023-01-01 RX ADMIN — Medication 4851 UNITS: at 11:10

## 2023-01-01 RX ADMIN — OXYCODONE HYDROCHLORIDE AND ACETAMINOPHEN 1 TABLET: 5; 325 TABLET ORAL at 10:08

## 2023-01-01 RX ADMIN — Medication 6 MG: at 09:08

## 2023-01-01 RX ADMIN — OXYCODONE HYDROCHLORIDE AND ACETAMINOPHEN 1 TABLET: 5; 325 TABLET ORAL at 06:08

## 2023-01-01 RX ADMIN — IPRATROPIUM BROMIDE 0.5 MG: 0.5 SOLUTION RESPIRATORY (INHALATION) at 07:08

## 2023-01-01 RX ADMIN — ALBUTEROL SULFATE 2.5 MG: 2.5 SOLUTION RESPIRATORY (INHALATION) at 06:08

## 2023-01-01 RX ADMIN — SODIUM ZIRCONIUM CYCLOSILICATE 10 G: 10 POWDER, FOR SUSPENSION ORAL at 08:08

## 2023-01-01 RX ADMIN — ONDANSETRON 4 MG: 2 INJECTION INTRAMUSCULAR; INTRAVENOUS at 05:08

## 2023-01-01 RX ADMIN — OXYCODONE HYDROCHLORIDE AND ACETAMINOPHEN 1 TABLET: 5; 325 TABLET ORAL at 11:08

## 2023-01-01 RX ADMIN — OXYCODONE HYDROCHLORIDE 5 MG: 5 TABLET ORAL at 09:08

## 2023-01-01 RX ADMIN — FOLIC ACID 1 MG: 1 TABLET ORAL at 09:08

## 2023-01-01 RX ADMIN — DICYCLOMINE HYDROCHLORIDE 10 MG: 10 CAPSULE ORAL at 12:08

## 2023-01-01 RX ADMIN — MIRTAZAPINE 15 MG: 15 TABLET, FILM COATED ORAL at 08:08

## 2023-01-01 RX ADMIN — APIXABAN 5 MG: 5 TABLET, FILM COATED ORAL at 11:08

## 2023-01-01 RX ADMIN — ONDANSETRON 4 MG: 2 INJECTION INTRAMUSCULAR; INTRAVENOUS at 09:08

## 2023-01-01 RX ADMIN — MIRTAZAPINE 15 MG: 15 TABLET, FILM COATED ORAL at 09:08

## 2023-01-01 RX ADMIN — FUROSEMIDE 80 MG: 10 INJECTION, SOLUTION INTRAVENOUS at 08:08

## 2023-01-01 RX ADMIN — ALBUTEROL SULFATE 2.5 MG: 2.5 SOLUTION RESPIRATORY (INHALATION) at 07:08

## 2023-01-01 RX ADMIN — METHYLPREDNISOLONE SODIUM SUCCINATE 125 MG: 125 INJECTION, POWDER, FOR SOLUTION INTRAMUSCULAR; INTRAVENOUS at 02:08

## 2023-01-01 RX ADMIN — MORPHINE SULFATE 4 MG: 4 INJECTION INTRAVENOUS at 04:10

## 2023-01-04 NOTE — TELEPHONE ENCOUNTER
----- Message from Adenike Gilliam sent at 1/4/2023 12:05 PM CST -----  Regarding: rx consult  Contact: MICAH MANCINI [[id number]]  Name of Who is Calling: Micah Mancini            What is the request in detail: Pt states she is requesting to speak with CHIARA Fry in regards to previous conversation about rx .  Please advise           Can the clinic reply by [facility]: No          What Number to Call Back if not in [facility]:[phone]

## 2023-01-04 NOTE — TELEPHONE ENCOUNTER
patient was informed that her medication will be sent once provider signs.      Verbalized understanding

## 2023-01-04 NOTE — TELEPHONE ENCOUNTER
Patient was informed that Doctor Jacky, does not prescribe Tizanidine for her.      Verbalized understanding

## 2023-01-06 NOTE — TELEPHONE ENCOUNTER
Patient was informed that our Nps in pain does not prescribe her Tizanidine medication. She can follow up with her primary care.    Verbalized understanding

## 2023-01-25 NOTE — TELEPHONE ENCOUNTER
Patient requesting refill on Percocet 5/325mg   Last office visit 12/12/22   shows last refill on 12/29/22  [facility]  Patient has no UDs on file

## 2023-03-02 PROBLEM — F33.1 MAJOR DEPRESSIVE DISORDER, RECURRENT, MODERATE: Status: ACTIVE | Noted: 2019-08-29

## 2023-03-02 NOTE — LETTER
March 2, 2023        Pain Management             [facility] [address]  [address]  [facility] [address]  Phone: [phone]   Patient: Vale Gutierrez   MR Number: [id number]   YOB: 1960   Date of Visit: 3/2/2023       Dear Dr. Rico:    Thank you for referring Vale Gutierrez to me for evaluation. Below are the relevant portions of my assessment and plan of care.    Based on this evaluation, Ms. Gutierrez is seen as a moderate-risk candidate for SCS from a psychological perspective due to testing risks, depression and anxiety, and history of substance abuse. There were no absolute contraindications to SCS, but it is recommended she enhance protective and mitigating factors while moving forward with SCS. She was referred to Stress Management psychoeducational group starting on March 7, and was encouraged to increase visits with her psychiatrist and/or establish with a therapist in his office. She will also be provided with a summary of the evaluation and strategies to consider moving forward.    Overall, Ms. Gutierrez was very open to feedback and recommendations throughout the evaluation, and seemed eager to improve her candidacy and engage in strategies to improve outcomes. If she and her providers choose to proceed with SCS at this time, it is recommended to ensure overall stability and enhancement of protective and mitigating factors during this process.       If you have questions, please do not hesitate to call me.     Sincerely,      Kathleen Justin, PhD  Clinical Psychologist   Kathleen Justin, PhD

## 2023-03-02 NOTE — PSYCH TESTING
[facility] [address] [place] [address]  [phone]    REPORT OF PRESURGICAL PSYCHOLOGICAL EVALUATION    NAME: Vale Gutierrez  MRN: [id number]  : 1960     REFERRED BY: Rubén Rico M.D.    REASON FOR REFERRAL: Psychological Evaluation prior to surgical implantation of a spinal cord stimulator (SCS) to address chronic pain    EVALUATED BY:  Kathleen Justin, Ph.D., Clinical Psychologist  LLUVIA Nuñez, Psychometrician    DATES OF EVALUATION: 02/10/2023, 2023    EVALUATION PROCEDURES AND TIMES:  Conducted by Psychologist (2 hours):  Integration of patient data, interpretation of standardized test results and clinical data, clinical decision-making, treatment planning and report, and interactive feedback to the patient  CPT Codes: 48673 - 1 hour; 00274 - 1 hour  Conducted by Technician (1 hour, 30 minutes):  Psychological test administration and scoring by technician, two or more tests, any method: [place] Multiphasic Personality Inventory - 3 (MMPI-3); Pain and Impairment Relationship Scale (PAIRS); Pineda Pain Catastrophizing Scale (PCS)  CPT Codes: 36222 - 30 minutes; 81264 - 30 minutes, 54224 - 30 minutes    EVALUATION FINDINGS:  The diagnostic interview revealed that Ms. Vale Gutierrez is a 62-year-old female referred for Psychological Evaluation prior to surgical implantation of a spinal cord stimulator (SCS) to address chronic pain in her lower back. Her pain has been present since  but worsened in  without sufficient relief despite multiple pain management treatments. Her activities are greatly limited. Ms. Gutierrez is now interested in a trial of SCS. She understood risks of surgery and indicated no significant concerns. She has reasonable expectations for SCS and will consider other treatment options in the future if it is ineffective.    Regarding mental health history, Ms. Gutierrez acknowledged a history of inpatient rehabilitation and  outpatient pharmacotherapy and psychotherapy for issues related to substance abuse, depression, and anxiety. While substance abuse is in remission, she continues to experience moderate symptoms of depression and anxiety. She reports benefit from psychotropic medication prescribed by her psychiatrist, but is interested in returning to psychotherapy at his clinic. She identifies pain as her main stressor and uses some protective factors to help her manage (including social support, spiritual alma, financial and home stability). She has limited adaptive coping and engagement in recreation. She denied active problems with substance abuse, suicidal or homicidal ideation, psychotic symptoms, and serious cognitive impairment.    The medical record also revealed the following diagnoses relevant to this evaluation: Post laminectomy syndrome; Chronic pain disorder; Chronic, continuous use of opioids; Lumbar radiculopathy.    TEST DATA:  All tests were administered according to standardized procedures and were selected based on the reason for referral. Effort on all tests was satisfactory to produce valid results.    MMPI-3. The MMPI-3 provides an assessment of personality and psychopathology with specific evaluation of psychosocial risk factors associated with outcomes of spinal cord stimulation. Ms. Gutierrez produced a valid and interpretable MMPI-3 profile, answering items relevantly based on content. Therefore, her responses should be considered a relatively accurate reflection of her current psychological functioning.  TEST RESULTS. Ms. Gutierrez reports emotional distress, somatic complaints, and behavioral problems. She denied disordered thinking and interpersonal problems.  Emotional. She reports significant emotional distress including feeling sad and unhappy. She has a lack of positive emotional experiences and lack of interest. She has multiple fears that restrict activity in and out of her home.  Somatic. She  reports poor health and feeling tired, weak, and incapacitated. She reports cognitive difficulties including memory and concentration problems.  Behavioral. She reports significant past and current substance abuse and likely has a history of problematic substance use.  GROUP COMPARISONS. Compared to spine surgery candidates, Ms. Gutierrez reports greater emotional distress (demoralization, low positive emotions, fearfulness, self-doubt, helplessness), behavioral issues (antisocial behavior, substance abuse, juvenile conduct problems), interpersonal issues (dominance, introversion, aggression), somatic complaints (malaise, cognitive concerns, eating issues).   RISK ASSESSMENTS. The following likelihoods are based on test results and research, and are correlational rather than causational. Her profile is associated with moderate risks for adverse postsurgical outcomes, specifically due to elevations in emotional distress, demoralization, low positive emotions, and substance abuse. Post-surgery, Ms. Gutierrez is at increased risk for reporting greater levels of pain, disability, pain-related interference in life, unmet expectations with SCS, and dissatisfaction with SCS.    PAIRS and PCS. The PAIRS and PCS reveal beliefs and attitudes related to pain that may impact outcomes of spinal cord stimulation. Elevated scores indicate the need to query the ability to cope with the pain experience, high levels of emotional distress when pain occurs, and a negative mental set and persistent attention brought to bear during the experience of pain.   The PAIRS indicated significant complications related to perceptions of impairment with a total score of 80 (a score over 75 is clinically significant). These results suggest a tendency toward negative beliefs and attitudes about the ability to function and enjoy life despite discomfort from pain, which is more likely to contribute to greater functional impairments.  The PCS indicated  non-significant catastrophizing of pain with a total score of 28, which is in the 69th percentile (a score over 30 is in the 75th percentile and is clinically significant). Her subscale scores indicated significant Rumination (75th percentile), non-significant Magnification (63rd percentile), and significant Helplessness (79th percentile). These results do NOT suggest an overall tendency toward elevated pain perception and a tendency to focus on one's pain.    FEEDBACK. Ms. Gutierrez was provided with test results, and offered the opportunity to respond to feedback and clarify results if needed. She acknowledged the test results as being largely consistent with her overall functioning and well-being. She identified with negative perceptions of pain impairment, low positive emotions, and demoralization. She was forthcoming about her history of substance abuse that is in remission at this time. She was very open to feedback and recommendations regarding her results. She is very hopeful about SCS and is willing to engage in strategies to mitigate risks.    DIAGNOSTIC IMPRESSIONS:    ICD-10-CM ICD-9-CM   1. Preoperative evaluation to rule out surgical contraindication  Z01.818 V72.83   2. Lumbar radiculopathy  M54.16 724.4   3. Chronic pain disorder  G89.4 338.4   4. Post laminectomy syndrome  M96.1 722.80   5. Major depressive disorder, recurrent, moderate  F33.1 296.32   6. Anxiety  F41.9 300.00   7. Alcohol use disorder, severe, in early remission  F10.21 303.93        SUMMARY AND RECOMMENDATIONS:  Ms. Gutierrez has a long history of severe pain and is pursuing the spinal cord stimulator (SCS) in an effort to improve pain and quality of life. Test results revealed significant risks for adverse postsurgical outcomes due to elevations in emotional distress, demoralization, low positive emotions, substance abuse, and pain impairment. She is at increased risk for negative outcomes including greater reports of pain,  disability, pain-related interference, and dissatisfaction with SCS.    Ms. Gutierrez's testing profile was largely consistent with her reports in the clinical interview. In the clinical interview, she endorsed a history of psychiatric treatment for depression, anxiety, and substance abuse. She reports benefit from pharmacotherapy with her psychiatrist, but admits that she could use more psychotherapy and coping skills. She has good social support and spiritual alma, but has limited adaptive coping and engagement in recreation.    Based on this evaluation, Ms. Gutierrez is seen as a moderate-risk candidate for SCS from a psychological perspective due to testing risks, depression and anxiety, and history of substance abuse. She has some protective factors (social support, spiritual alma, stability), but would benefit from greater protective and mitigating factors. She is recommended to improve adaptive coping, engagement in recreation, and psychiatric treatment (either increased visits with her psychiatrist and/or psychotherapy). Ms. Gutierrez was referred to Stress Management psychoeducational group starting March 7 and will also be provided with a summary of this evaluation with recommendations.    Ms. Gutierrze is motivated to pursue SCS despite the presence of risks, and appeared very willing to engage in strategies and regimens to mitigate risks. She was very open to feedback and recommendations through the evaluation, and seemed eager to improve her candidacy for SCS. Although risks should be considered, there were no absolute contraindications from a psychological perspective at this time. If she and her providers choose to proceed with SCS at this time, it is recommended to ensure overall stability and enhancement of protective and mitigating factors during this process.        Report and interpretation and coding were completed on 03/02/2023.               Kathleen Justin, PhD  Clinical Psychologist
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
## 2023-03-02 NOTE — PROGRESS NOTES
Based on this evaluation, Ms. Gutierrez is seen as a moderate-risk candidate for SCS from a psychological perspective due to testing risks, depression and anxiety, and history of substance abuse. There were no absolute contraindications to SCS, but it is recommended she enhance protective and mitigating factors while moving forward with SCS. She was referred to Stress Management psychoeducational group starting on March 7, and was encouraged to increase visits with her psychiatrist and/or establish with a therapist in his office. She will also be provided with a summary of the evaluation and strategies to consider moving forward.    Overall, Ms. Gutierrez was very open to feedback and recommendations throughout the evaluation, and seemed eager to improve her candidacy and engage in strategies to improve outcomes. If she and her providers choose to proceed with SCS at this time, it is recommended to ensure overall stability and enhancement of protective and mitigating factors during this process.

## 2023-03-17 NOTE — TELEPHONE ENCOUNTER
----- Message from Cami Moran sent at 3/17/2023  1:42 PM CDT -----  Contact: [phone]  Jared Colibr[facility] is calling for the eliquist 5mg they are neding this filled the pt is out of medication please send to pharmacy     Requesting an RX refill or new RX.  Is this a refill or new RX: refill  RX name and strength (copy/paste from chart):  apixaban (ELIQUIS) 5 mg Tab  Is this a 30 day or 90 day RX: 30  Pharmacy name and phone # (copy/paste from chart):    [facility] - [place] - [address]  [address]  [place] [address]  Phone: [phone] Fax: [phone]     The doctors have asked that we provide their patients with the following 2 reminders -- prescription refills can take up to 72 hours, and a friendly reminder that in the future you can use your [facility] account to request refills: pharm

## 2023-03-28 NOTE — TELEPHONE ENCOUNTER
Patient requesting refill on Percocet 5-325mg  Last office visit 12/12//23   shows last refill on 2/27/23  Patient does have a pain contract [facility]  Patient last UDS NO UDS ON FILE

## 2023-04-04 NOTE — PROGRESS NOTES
Chronic Pain-Tele-Medicine-Established Note (Follow up visit)        The patient location is: Her home in [place]  The chief complaint leading to consultation is: Back Pain  Visit type: Virtual visit with synchronous audio and video  Total time spent with patient: 20 minutes  Each patient to whom he or she provides medical services by telemedicine is:  (1) informed of the relationship between the physician and patient and the respective role of any other health care provider with respect to management of the patient; and (2) notified that he or she may decline to receive medical services by telemedicine and may withdraw from such care at any time.        SUBJECTIVE:      History Today 4/5/23:  Vale nAdresreno for telemedicine follow up and consideration of SCS trial.  She did Psychiatry evaluation and therapy was suggested.   She continues to report low back pain and reports no adverse effect from her current pain regimen.     History Today 12/12/22  Vale Quezadapauline for telemedicine follow up and consideration of SCS trial.  She is waiting on an appointment with Psychiatry.  Once she has an appointment scheduled with Psychiatry for SCS evaluation, she will schedule an in-person appointment in the Pain clinic.  She continues to have low back pain and she feels as though it is not well controlled on her current medications.  She desires to increase her Oxycodone to 7.5mg tabs.  She continues to take Oxycodone 5/325 QID.    History Today 9/12/22  Vale Adnresreno for telemedicine follow up and consideration of SCI. Since last visit, she was admitted to [facility] for 38 days with acute on chronic respiratory failure 2/2 CAP. She expressed concern over opioids as this can affect her breathing and would like to try other alternatives. Her pain continues to be in her low back with radiation down the side of her legs. Pain limiting ambulation. She has not been to PT for sometime. She reports that aquatherapy  had no availability for a few months. She would like physical therapy, but she says they would need home health as she does not drive. Has not completed psyche eval. She has no hx of cardiac disease, denies blood in your stool and black tarry stool.     History Today 6/20/22  Patient presents for tele-medicine appointment for follow up s/p caudal WERO on 5/27/22. She reports good relief (70-80%) for about 3 weeks. Her pain is now back. She does find relief with Percocet 5/325 but she finds that she is requiring extra doses. She has not started aquatic therapy yet due to scheduling. She did participate in home PT in the past that she enjoyed. She is interested in discussing a spinal cord stimulator today.     History today 5/02/2022:  Vale Gutierrez presents tele-medicine appointment for a follow-up appointment for back pain. Since the last visit, Vale Gutierrez states the pain has been stable. Current pain intensity is 6/10. She is still hasnt made her mind completely about SCS.     History today 4/11:  Vale Gutierrez presents tele-medicine appointment for a follow-up appointment for back pain. Since the last visit, Vale Gutierrez states the pain has been worsening. Had episode where she knocked over a drink and tried to clean it up. Worsened the pain. Current pain intensity is 7/10.  Was considering SCS at last visit. Has not fully decided. Pain is increasing, norco no longer helps, taking it TID Interested in switching to Percoset. Laying on the side and rest helps.   Has not started aqua therapy, 2 month waiting list.      + pain raditing back of legs  No bowel or bowels  No numbness or tingling of LE     Interval History 3/28/22:  Patient with a history of L4-5 fusion presents in follow-up of low back pain s/p bilateral L3/4 TFESI on 3/4/22 with 2 days of relief then bilateral L2,3,4,5 MBB on 3/24/22 with 20% relief. Today, she reports persistent chronic low back pain with occasional  "radiation down the posterior legs to the knees. Back pain is more bothersome than the legs. Pain is worse with any activity including prolonged standing, sitting, bending, and coughing/sneezing. Pain is improved with lying down.  Takes Norco 5-325 bid, last filled 3/15/22. Takes tizanidine every 6 hours.     Interval History 3/15/2022:  Vale Gutierrez presents tele-medicine appointment for a follow-up appointment for low back pain radiating to lower extremitiy R>L.  She is S/P Bilateral  L3/4 Lumbar Transforaminal Epidural Steroid Injection under Fluoroscopic Guidance on 3/4/2022 with only 2 days of relief.      Interval History 07/28/2020:  Vale Gutierrez presents tele-medicine appointment for a follow-up appointment for low back pain radiating to lower extremitiy R>L. Since the last visit, Vale Gutierrez states the pain has been persistant. Current pain intensity is 0/10.  Pain is worst when standing and moving and relieved with sitting, laying, and stretched out.  At the worst she says it is a 10/10.  The pain radiates to back of thighs and calves.  She describes the pain as sharp, burning in nature.  She states that the flexeril 5mg prn helps somewhat but not much. She started diclofenac yesterday (prescribed by her PCP) which helps with the pain, decreases frequency and intensity. She recently had an MRI lumbar spine done and would like to review the results.     Interval History 07/13/2020  Vale Gutierrez presents to the clinic for the evaluation of low back pain and bilateral calf pain. The calf pain started 5-6 weeks ago following non related injury and symptoms have been worsening.The pain is located in the low back area and radiates to the legs. She has suffered from lower back pain "for years."  The pain is described as aching and burning with intermittent numbness and tingling in the lower extremities. The pain is rated as 9/10. The pain is rated with a score of  3/10 on the " BEST day and a score of 10/10 on the WORST day.  Symptoms interfere with daily activity. The pain is exacerbated by Standing, Walking, and Lifting.  The pain is mitigated by massage, medications and rest. She reports spending 8 hours per day reclining. The patient reports 3-4 hours of uninterrupted sleep per night. She presents for referral from Urgent Care where she was evaluated and believed to have intermittent neurogenic claudication.        Pain Medications:  - Opioids: Oxycodone 5/325 QID  - NSAIDs: BC powder BID  - Anti-Depressants: Venlafaxine 150 mg + 75 mg in AM  - Anti-Convulsants: Zanaflex BID  - Others: NA     Physical Therapy/Home Exercise: Not currently but in the past, finished in January 2022. Still on waiting list for aquatherapy      report:  Reviewed and consistent with medication use as prescribed.        Pain Procedures:  Patient says she has had 9 ESIs 15-20 years ago with minimal pain relief  3/4/22 Bilateral L3/4 TFESI 3/4 - 2 days of relief  3/24/22 Bl L2,3,4,5 MBB - 20% relief  5/27/22 Caudal WERO - 70-80% relief        Imaging:      MRI LUMBAR SPINE WITHOUT CONTRAST 3/28  FINDINGS:  Postoperative change of L4-L5 decompressive laminectomy, discectomy and posterior fusion with bilateral pedicular screws and an intervertebral disc spacer.     Lumbar spine alignment demonstrates mild levoscoliosis with stable mild grade 1 anterolisthesis of L4 on L5 and L5 on S1.  Vertebral body heights are well maintained without evidence for fracture.  No marrow signal abnormality to suggest an infiltrative process.     There is advanced degenerative disc space narrowing and desiccation at T11-T12, L3-L4 and L5-S1 with chronic associated endplate changes, similar when compared to the previous MRI.  There is disc desiccation at L2-L3 with a posterior annular fissure.     Distal spinal cord demonstrates normal contour and signal intensity.  Cauda equina appears normal without findings to suggest  arachnoiditis.  Conus medullaris terminates at T12-L1.  Fatty filum terminale.     Coronal T1 weighted images demonstrate a partially exophytic left renal cortical lesion, incompletely evaluated on this exam.  Limited evaluation of the remaining visualized abdominal organs appears normal.  There is mild to moderate fatty atrophy of the posterior paraspinal musculature.  SI joints are symmetric.     T12-L1: No spinal canal stenosis or neural foraminal narrowing.     L1-L2: No spinal canal stenosis or neural foraminal narrowing.     L2-L3: Bilateral facet arthropathy and bilateral ligamentum flavum buckling.  No spinal canal stenosis or neural foraminal narrowing.     L3-L4: Circumferential disc bulge slightly encroaches into the bilateral foraminal zones, right greater than left.  Bilateral facet arthropathy and bilateral ligamentum flavum buckling.  Findings contribute to mild right neural foraminal narrowing.  No spinal canal stenosis.     L4-L5: Grade 1 anterolisthesis.  Bilateral facet arthropathy.  No spinal canal stenosis or neural foraminal narrowing.     L5-S1: Grade 1 anterolisthesis.  Circumferential disc bulge.  Bilateral facet arthropathy.  No spinal canal stenosis or neural foraminal narrowing.     Impression:     1. Remote postoperative change of L4-L5 decompressive laminectomy, discectomy and posterior fusion.  2. Multilevel lumbar degenerative changes contributing to mild neural foraminal narrowing at L3-L4.  No spinal canal stenosis.  3. Left renal cortical lesion, incompletely evaluated on this exam.  Recommend further characterization with a dedicated renal ultrasound.       Allergies: Review of patient's allergies indicates:  No Known Allergies    Current Medications:   Current Outpatient Medications   Medication Sig Dispense Refill    omeprazole (PRILOSEC) 20 MG capsule Take 1 capsule (20 mg total) by mouth daily as needed. 90 capsule 2    albuterol-ipratropium (DUO-NEB) 2.5 mg-0.5 mg/3 mL  nebulizer solution Take 3 mLs by nebulization every 8 (eight) hours. Rescue 75 mL 2    amiodarone (PACERONE) 200 MG Tab Take 1 tablet (200 mg total) by mouth once daily. 30 tablet 11    apixaban (ELIQUIS) 5 mg Tab Take 1 tablet (5 mg total) by mouth 2 (two) times daily. 60 tablet 5    busPIRone (BUSPAR) 5 MG Tab Take 1 tablet (5 mg total) by mouth 2 (two) times daily as needed (anxiety). 60 tablet 11    metoprolol tartrate (LOPRESSOR) 50 MG tablet Take 1 tablet (50 mg total) by mouth once daily. 30 tablet 11    mirtazapine (REMERON) 7.5 MG Tab Take 1 tablet (7.5 mg total) by mouth every evening. For insomnia, mood 30 tablet 11    ondansetron (ZOFRAN-ODT) 4 MG TbDL Take 1 tablet (4 mg total) by mouth every 8 (eight) hours as needed (nausea). 30 tablet 0    oxyCODONE-acetaminophen (PERCOCET) 5-325 mg per tablet Take 1 tablet by mouth every 6 (six) hours as needed for Pain. 120 each 0    thiamine 100 MG tablet Take 100 mg by mouth once daily.      venlafaxine (EFFEXOR-XR) 75 MG 24 hr capsule Take 3 capsules (225 mg total) by mouth once daily. 90 capsule 11     No current facility-administered medications for this visit.     Facility-Administered Medications Ordered in Other Visits   Medication Dose Route Frequency Provider Last Rate Last Admin    0.9%  NaCl infusion   Intravenous Continuous Adrianna Velazquez DO 50 mL/hr at 05/27/22 1452 50 mL/hr at 05/27/22 1452       REVIEW OF SYSTEMS:    GENERAL:  No weight loss, malaise or fevers.  HEENT:  Negative for frequent or significant headaches.  NECK:  Negative for lumps, goiter, pain and significant neck swelling.  RESPIRATORY:  Negative for cough, wheezing or shortness of breath.  CARDIOVASCULAR:  Negative for chest pain, leg swelling or palpitations.  GI:  Negative for abdominal discomfort, blood in stools or black stools or change in bowel habits.  MUSCULOSKELETAL:  See HPI.  SKIN:  Negative for lesions, rash, and itching.  PSYCH:  Negative for sleep disturbance,  mood disorder and recent psychosocial stressors.  HEMATOLOGY/LYMPHOLOGY:  Negative for prolonged bleeding, bruising easily or swollen nodes.  NEURO:   No history of headaches, syncope, paralysis, seizures or tremors.  All other reviewed and negative other than HPI.    Past Medical History:  Past Medical History:   Diagnosis Date    Alcohol abuse     Anxiety     Benign paroxysmal positional vertigo 1/17/2020    Depression     Hyperlipidemia     Hypertension        Past Surgical History:  Past Surgical History:   Procedure Laterality Date    BREAST CYST ASPIRATION      CHOLECYSTECTOMY      complicated with bile leak, sepsis    COLONOSCOPY N/A 6/3/2020    Procedure: COLONOSCOPY Golytely;  Surgeon: Tino Neil MD;  Location: [facility] ENDO;  Service: Colon and Rectal;  Laterality: N/A;    EPIDURAL STEROID INJECTION N/A 5/27/2022    Procedure: INJECTION, STEROID, EPIDURAL, CAUDAL CONTRAST;  Surgeon: Rubén Rico MD;  Location: [facility] PAIN MGT;  Service: Pain Management;  Laterality: N/A;  5/6 RESCHEDULE    ESOPHAGOGASTRODUODENOSCOPY N/A 6/3/2020    Procedure: EGD (ESOPHAGOGASTRODUODENOSCOPY);  Surgeon: Tino Neil MD;  Location: [facility];  Service: Colon and Rectal;  Laterality: N/A;    INJECTION OF ANESTHETIC AGENT AROUND NERVE Bilateral 3/24/2022    Procedure: BLOCK, NERVE MEDIAL BRANCH BLOCK BL L2, L3, L4 and L5  1st, needs consent;  Surgeon: Rubén Rico MD;  Location: [facility] PAIN MGT;  Service: Pain Management;  Laterality: Bilateral;    TRANSFORAMINAL EPIDURAL INJECTION OF STEROID Bilateral 8/7/2020    Procedure: INJECTION, STEROID, EPIDURAL, TRANSFORAMINAL APPROACH, L4-L5 need consent;  Surgeon: Rubén Rico MD;  Location: [facility] PAIN MGT;  Service: Pain Management;  Laterality: Bilateral;    TRANSFORAMINAL EPIDURAL INJECTION OF STEROID Bilateral 9/4/2020    Procedure: LUMBAR TRANSFORAMINAL BILATERAL L4/5 DIRECT REFERRAL;  Surgeon: Rubén Rico MD;  Location: [facility] PAIN MGT;  Service: Pain  Management;  Laterality: Bilateral;  NEEDS CONSENT    TRANSFORAMINAL EPIDURAL INJECTION OF STEROID Bilateral 3/4/2022    Procedure: Injection,steroid,epidural,transforaminal approach BILATERAL L3/4 DIRECT REFERRAL;  Surgeon: Rubén Rico MD;  Location: [facility] PAIN [facility]GT;  Service: Pain Management;  Laterality: Bilateral;       Family History:  Family History   Problem Relation Age of Onset    Atrial fibrillation Mother     Heart failure Mother     Arthritis Mother     Macular degeneration Mother     Stroke Father     Cancer Maternal Uncle         lung    Cataracts Paternal Grandfather     Glaucoma Paternal Grandfather        Social History:  Social History     Socioeconomic History    Marital status: Single   Tobacco Use    Smoking status: Former    Smokeless tobacco: Never   Substance and Sexual Activity    Alcohol use: Yes     Alcohol/week: 12.0 standard drinks     Types: 12 Shots of liquor per week     Comment: three 12-14 oz cocktail drinks.     Drug use: No    Sexual activity: Never     Social Determinants of Health     Financial Resource Strain: Medium Risk    Difficulty of Paying Living Expenses: Somewhat hard   Food Insecurity: Food Insecurity Present    Worried About Running Out of Food in the Last Year: Sometimes true    Ran Out of Food in the Last Year: Never true   Transportation Needs: Unmet Transportation Needs    Lack of Transportation (Medical): Yes    Lack of Transportation (Non-Medical): Yes   Physical Activity: Inactive    Days of Exercise per Week: 0 days    Minutes of Exercise per Session: 0 min   Stress: Stress Concern Present    Feeling of Stress : To some extent   Social Connections: Unknown    Frequency of Communication with Friends and Family: More than three times a week    Frequency of Social Gatherings with Friends and Family: Once a week    Active Member of Clubs or Organizations: No    Attends Club or Organization Meetings: Never    Marital Status: Never    Housing Stability:  Low Risk     Unable to Pay for Housing in the Last Year: No    Number of Places Lived in the Last Year: 1    Unstable Housing in the Last Year: No       OBJECTIVE:  General appearance: Well appearing, in no acute distress, alert and oriented x3.  Psych:  Mood and affect appropriate.    ASSESSMENT: 62 y.o. year old female with low back pain, consistent with      1. Primary osteoarthritis of both knees  X-Ray Knee 3 View Bilateral      2. Chronic pain disorder  Ambulatory referral/consult to Psychiatry      3. Major depressive disorder, recurrent, moderate  Ambulatory referral/consult to Psychiatry      4. S/P lumbar fusion        5. Idiopathic progressive polyneuropathy                  PLAN:     - I have stressed the importance of physical activity and a home exercise plan to help with pain and improve health.  - Patient can continue with medications for now since they are providing benefits, using them appropriately, and without side effects.  - Educated on the safety of taking medications as prescribed.   - Will continue Percocet frequency to 5/325 mg QID.  Will not increase dose of Percocet.  Explained in detail to the patient about the natural progression of opioid dependance.  - RTC in 4 weeks.  -Ordered knee xray to further evaluate the increased knee pain.  We may consider knee joint injection.  - Obtain UDS at next clinic visit  - Consider SCS trial after patient stops smoking  - Counseled patient regarding the importance of activity modification, constant sleeping habits and physical therapy.    The above plan and management options were discussed at length with patient. Patient is in agreement with the above and verbalized understanding.       Rubén Rico MD   4/4/2023

## 2023-04-28 NOTE — TELEPHONE ENCOUNTER
Patient requesting refill on oxycodone 5 mg   Last office visit 4/4/23   shows last refill on 3/28/23  Patient does have a pain contract on file with [facility] Management department  Patient last UDS none on file was not consistent with current therapy

## 2023-05-25 NOTE — TELEPHONE ENCOUNTER
----- Message from Luluketty Culp sent at 5/25/2023  1:17 PM CDT -----  Contact: Pt [phone]  Pt called and stated that she is tired weak dizzt right leg pain and wants to see you only tomorrow.     Please call and advise

## 2023-05-25 NOTE — TELEPHONE ENCOUNTER
Spoke to pt. Pt advised that nothing available in clinic today or tomorrow with any of the providers, and Dr. Mcdaniel out the rest of the week.  Pt advised to be evaluated in any [facility].  Pt ok with this.

## 2023-05-29 NOTE — TELEPHONE ENCOUNTER
Patient requesting refill on Percocet 5-325mg   Last office visit 4/4/23   shows last refill on 4/28/23  Patient does have a pain contract on file with [facility] department  Patient last UDS NO UDS ON FILE

## 2023-06-12 NOTE — TELEPHONE ENCOUNTER
----- Message from Olga Crenshaw sent at 6/7/2023  4:43 PM CDT -----  Contact: [phone]  Pt would like to schedule an appt for an annual and blood work. No available appts for this month or next month. Pt has a few health concerns that's urgent and would like to be scheduled anytime after 3 if possible.

## 2023-06-16 NOTE — PROGRESS NOTES
Health Maintenance Due   Topic Date Due    TETANUS VACCINE  Never done    Sign Pain Contract  Never done    Complete Opioid Risk Tool  Never done    Urine Drug Screen  Never done    High Dose Statin  Never done    Shingles Vaccine (1 of 2) Never done    COVID-19 Vaccine (2 - Moderna series) 05/13/2021    Mammogram  01/13/2023    Cervical Cancer Screening  06/05/2023     Chart reviewed.   Immunizations: Reconciled  Orders placed: N/A  Upcoming appts to satisfy DONNIE topics: N/A

## 2023-06-30 NOTE — PROGRESS NOTES
Subjective:     @Patient ID: Vale Gutierrez is a 62 y.o. female.    Chief Complaint: Annual Exam    HPI    63 yo F with HTN, anxiety, depression, etoh abuse, memory deficits, idiopathic progressive polyneuropathy, lumbar spondylosis, chronic pain, aortic aneurysm presents for annual:      Reports chronic back pain. Limited ability to ambulate. Reports it affects her mood  C/o R lower leg pain    Lipid disorders/ASCVD risk (ages >/= 45 or >/= 20 if increased risk ): ordered  DM (>45y yearly or if obese, HTN): A1c ordered     Breast Cancer (40-50y discretion of pt, 50-74y every 1-2 years): Mammogram DUE    Cervical Cancer (Pap Smear ages 21-65 every 3 years or Pap + HPV q5 years after 30 years of age):  DUE   Colorectal Cancer (normal risk 50-75yr): Colonoscopy utd 6/2020. Due in 6/2025  Lung Cancer (low dose CT for ages 55-80 if 30 pack year history and currently smoking/ quit within 15 years):    DEXA (F>64 yo, M >71yo, M&F 50-68 yo with risk factors (smoking, previous fx, wt <70kg; etoh abuse, chronic steroids, RA)):          Vaccines:   Influenza (yearly)    Tetanus (every 10 yrs - 1st tdap) due     Pna:   Zoster (>61yo)  due  Covid19: due for booster            Review of Systems   Constitutional:  Negative for chills and fever.   Musculoskeletal:  Positive for arthralgias and back pain.   Psychiatric/Behavioral:  Positive for dysphoric mood. Negative for agitation.    Past medical history, surgical history, and family medical history reviewed and updated as appropriate.    Medications and allergies reviewed.     Objective:     There were no vitals filed for this visit.  There is no height or weight on file to calculate BMI.  Physical Exam  Constitutional:       Appearance: Normal appearance.   HENT:      Head: Normocephalic and atraumatic.   Eyes:      General:         Right eye: No discharge.         Left eye: No discharge.      Conjunctiva/sclera: Conjunctivae normal.   Cardiovascular:      Rate and  Rhythm: Normal rate and regular rhythm.      Heart sounds: No murmur heard.  Pulmonary:      Effort: Pulmonary effort is normal.      Breath sounds: Normal breath sounds.   Abdominal:      General: Bowel sounds are normal. There is no distension.      Palpations: Abdomen is soft.      Tenderness: There is no abdominal tenderness.   Musculoskeletal:      Cervical back: Normal range of motion and neck supple.      Right lower leg: No edema.      Left lower leg: No edema.   Skin:     General: Skin is warm and dry.   Neurological:      Mental Status: She is alert and oriented to person, place, and time.   Psychiatric:         Mood and Affect: Mood is depressed.         Behavior: Behavior normal.          Assessment:     1. Encounter for preventive health examination    2. Essential hypertension    3. Encounter for screening mammogram for breast cancer    4. Well woman exam with routine gynecological exam    5. Right leg pain    6. Idiopathic progressive polyneuropathy    7. Chronic pain disorder    8. Paroxysmal atrial fibrillation    9. Chronic diastolic congestive heart failure    10. Nicotine abuse    11. Major depressive disorder, recurrent, moderate    12. Intermittent spinal claudication    13. Thoracic aortic aneurysm without rupture, unspecified part      Plan:   Vale was seen today for annual exam.    Diagnoses and all orders for this visit:    Encounter for preventive health examination  - labs ordered    Essential hypertension  - bp stable. Continue to monitor     Encounter for screening mammogram for breast cancer  -     Mammo Digital Screening Bilat w/ Jose De Jesus; Future    Well woman exam with routine gynecological exam  -     Ambulatory referral/consult to Obstetrics / Gynecology; Future    Right leg pain  -     X-Ray Tibia Fibula 2 View Right; Future    Idiopathic progressive polyneuropathy  Chronic pain disorder  - chronic. Continue f/u with pain management     Paroxysmal atrial fibrillation  Chronic  diastolic congestive heart failure  - stable. Pt has not yet followed up with cardiology    Nicotine abuse  - chronic. encouraged cessation.     Major Depressive disorder, recurrent, moderate   -     mirtazapine (REMERON) 15 MG tablet; Take 1 tablet (15 mg total) by mouth every evening. For insomnia, mood    Intermittent spinal claudication   - chronic. Continue f/u with pain management     Thoracic aortic aneurysm   - chronic. Pt has not yet f/u with cardiology    Estela Mcdaniel MD  Internal Medicine    6/30/2023

## 2023-06-30 NOTE — TELEPHONE ENCOUNTER
Patient requesting refill on Percocet 5/325mg  Last office visit 04.04.23   shows last refill on 05.29.23  Patient does have a pain contract on file with [facility]  Patient last UDS No UDS on file was not consistent with current therapy

## 2023-07-05 NOTE — TELEPHONE ENCOUNTER
Please review 6/30 labs and send my chart msg with your comments.  She is inquiring. I told her you are out of town this week

## 2023-07-07 PROBLEM — D50.8 IRON DEFICIENCY ANEMIA SECONDARY TO INADEQUATE DIETARY IRON INTAKE: Status: ACTIVE | Noted: 2023-01-01

## 2023-07-19 NOTE — TELEPHONE ENCOUNTER
I spoke to the patient. She states that she has been getting dizzy, weak and just not feeling well at times.  It's not all the time. She denies fever, body aches, and or diarrhea.  The patient encouraged to eat and stay well hydrated.   When symptoms resume she is to check b/p. The patient verbalized understanding.

## 2023-07-20 NOTE — TELEPHONE ENCOUNTER
----- Message from Jimmie Moran sent at 7/20/2023  1:25 PM CDT -----  Who Called: Patient         What is the reqeust in detail: Requesting call back to discuss virtual appt, pt states she has been sick and nauseated          Can the clinic reply by [facility]? No         Best Call Back Number: [phone]           Additional Information:

## 2023-07-27 NOTE — TELEPHONE ENCOUNTER
I spoke to the patient regarding how she feels. She still have several issues like feeling weak, nauseated.  Not feeling herself.  The patient has an upcoming Gastro appointment to address stomach/nausea issues.  The patient has an upcoming pain management an ortho appt to address back pain.  The patient has an upcoming cardiology appt t address heart issues/SOB.  The patient has an upcoming therapy appt as well.    We will see  what the outcome is once she has had all these issues addressed.

## 2023-07-31 NOTE — TELEPHONE ENCOUNTER
Spoke to patient and she just was call back ,her condition seem to be getting worst. She wanted to know if she should go to ER or urgent care   to be seen.   Told her that she should it would address her health concern sooner rather that latter

## 2023-07-31 NOTE — TELEPHONE ENCOUNTER
No care due was identified.  [facility] Embedded Care Due Messages. Reference number: [id number].   7/31/2023 3:10:23 PM CDT

## 2023-07-31 NOTE — TELEPHONE ENCOUNTER
Spoke with pt and let her know  is on vacation and there aren't any earlier appt available. Advised pt that if symptoms worsen she should go tot he ER.Verbal understanding

## 2023-07-31 NOTE — TELEPHONE ENCOUNTER
----- Message from Jaylene Marroquin sent at 7/31/2023 10:31 AM CDT -----  Contact: [phone]  Patient is returning a phone call.  Who left a message for the patient: Vale  Does patient know what this is regarding:still not feeling well   Would you like a call back, or a response through your [facility] portal?:   call back  Comment Please call back and advise

## 2023-07-31 NOTE — TELEPHONE ENCOUNTER
Patient requesting refill on oxycodone on 7/31/23  Last office visit 4/4/23 but she has an appointment on 8/1   shows last refill on 7/3/23  Patient does have a pain contract on file with [facility] department  Patient has no UDS on file

## 2023-08-01 NOTE — TELEPHONE ENCOUNTER
----- Message from Betzy Hay sent at 8/1/2023 11:52 AM CDT -----  Regarding: Patient call back  Who called:LIVE MICAH GREER [[id number]]    What is the request in detail: Patient is requesting a call back. She would like to know if  there is anyway she can r/s her appt today to some time next week. Soonest appt available is 8/22. She would like to further discuss, and she would like to see dr. Rico specifically.   Please advise.    Can the clinic reply by [facility]? No    Best call back number: [phone]    Additional Information: N/A

## 2023-08-01 NOTE — TELEPHONE ENCOUNTER
Contacted patient in regards to her appointment . She reports she has cancelled twice and wants to reschedule to an earlier appointment to discuss medications.     Appointment date offered, accepted and scheduled.

## 2023-08-02 NOTE — TELEPHONE ENCOUNTER
----- Message from Moon Jacome MA sent at 8/1/2023  1:17 PM CDT -----  Regarding: FW: Refills  Contact: [phone]    ----- Message -----  From: Deonna Frias  Sent: 8/1/2023  11:20 AM CDT  To: [facility] Staff  Subject: Refills                                          Type:  RX Refill Request    Who Called: PT   Refill or New Rx: Refills   RX Name and Strength: dicyclomine (BENTYL) 10 MG capsule 60 capsule   How is the patient currently taking it? (ex. 1XDay): Take 1 capsule (10 mg total) by mouth before meals as needed  Is this a 30 day or 90 day RX: 60   Preferred Pharmacy with phone number: [facility] - [place], [place]  [address] Phone: [phone] Fax:  [phone]

## 2023-08-07 NOTE — ED PROVIDER NOTES
Encounter Date: 8/7/2023       History     Chief Complaint   Patient presents with    Shortness of Breath     X2 week worse today. 88% on RA per EMS     HPI patient is a 62-year-old female with a previous history of HFpEF with last EF 68% with indeterminate diastolic dysfunction, alcohol use with last drink over 1 year prior to, hypertension, hyperlipidemia, smoker who presents to the emergency department from home with 2 weeks of worsening shortness of breath and dyspnea on exertion without associated cough, no fevers chills, no change in sputum.  The patient denies any lower extremity edema but does note some epigastric abdominal pain over the previous 2 weeks.  The patient denies any known COVID contacts, no diarrhea and has had similar episodes in the past but does not carry a diagnosis of HFrEF nor COPD or emphysema.  Review of patient's allergies indicates:  No Known Allergies  Past Medical History:   Diagnosis Date    Alcohol abuse     Anxiety     Benign paroxysmal positional vertigo 1/17/2020    Depression     Hyperlipidemia     Hypertension      Past Surgical History:   Procedure Laterality Date    BREAST CYST ASPIRATION      CHOLECYSTECTOMY      complicated with bile leak, sepsis    COLONOSCOPY N/A 6/3/2020    Procedure: COLONOSCOPY Golytely;  Surgeon: Tino Neil MD;  Location: [facility] ENDO;  Service: Colon and Rectal;  Laterality: N/A;    EPIDURAL STEROID INJECTION N/A 5/27/2022    Procedure: INJECTION, STEROID, EPIDURAL, CAUDAL CONTRAST;  Surgeon: Rubén Rico MD;  Location: [facility] PAIN MGT;  Service: Pain Management;  Laterality: N/A;  5/6 RESCHEDULE    ESOPHAGOGASTRODUODENOSCOPY N/A 6/3/2020    Procedure: EGD (ESOPHAGOGASTRODUODENOSCOPY);  Surgeon: Tino Neil MD;  Location: [facility] ENDO;  Service: Colon and Rectal;  Laterality: N/A;    INJECTION OF ANESTHETIC AGENT AROUND NERVE Bilateral 3/24/2022    Procedure: BLOCK, NERVE MEDIAL BRANCH BLOCK BL L2, L3, L4 and L5  1st, needs consent;  Surgeon:  Rubén Rico MD;  Location: [facility] PAIN MGT;  Service: Pain Management;  Laterality: Bilateral;    TRANSFORAMINAL EPIDURAL INJECTION OF STEROID Bilateral 8/7/2020    Procedure: INJECTION, STEROID, EPIDURAL, TRANSFORAMINAL APPROACH, L4-L5 need consent;  Surgeon: Rubén Rico MD;  Location: [facility] PAIN MGT;  Service: Pain Management;  Laterality: Bilateral;    TRANSFORAMINAL EPIDURAL INJECTION OF STEROID Bilateral 9/4/2020    Procedure: LUMBAR TRANSFORAMINAL BILATERAL L4/5 DIRECT REFERRAL;  Surgeon: Rubén Rico MD;  Location: [facility] PAIN MGT;  Service: Pain Management;  Laterality: Bilateral;  NEEDS CONSENT    TRANSFORAMINAL EPIDURAL INJECTION OF STEROID Bilateral 3/4/2022    Procedure: Injection,steroid,epidural,transforaminal approach BILATERAL L3/4 DIRECT REFERRAL;  Surgeon: Rubén Rico MD;  Location: [facility] PAIN MGT;  Service: Pain Management;  Laterality: Bilateral;     Family History   Problem Relation Age of Onset    Atrial fibrillation Mother     Heart failure Mother     Arthritis Mother     Macular degeneration Mother     Stroke Father     Cancer Maternal Uncle         lung    Cataracts Paternal Grandfather     Glaucoma Paternal Grandfather      Social History     Tobacco Use    Smoking status: Former     Current packs/day: 0.00    Smokeless tobacco: Never   Substance Use Topics    Alcohol use: Yes     Alcohol/week: 12.0 standard drinks of alcohol     Types: 12 Shots of liquor per week     Comment: three 12-14 oz cocktail drinks.     Drug use: No     Review of Systems  10 point review of systems reviewed with patient otherwise negative.     Physical Exam     Initial Vitals [08/07/23 1404]   BP Pulse Resp Temp SpO2   (!) 148/76 93 20 98.3 °F (36.8 °C) 95 %      MAP       --         Physical Exam     Nursing note and vitals reviewed.  Constitutional: Patient appears well-developed and well-nourished. No distress.   HENT:   Head: Normocephalic and atraumatic.   Eyes: Conjunctivae and EOM are normal.  Pupils are equal, round, and reactive to light.   Neck: Neck supple.   Normal range of motion.  Cardiovascular: Normal rate, regular rhythm, normal heart sounds and intact distal pulses.   Pulmonary/Chest: On arrival: patient with diffuse decreased BS and scattered wheezes  Abdominal: Abdomen is soft. Patient exhibits no distension. There is no abdominal tenderness.   Musculoskeletal:      Cervical back: Normal range of motion and neck supple.   Neurological: Patient is alert and oriented to person, place, and time. No cranial nerve deficit. Gait normal. GCS score is 15.    Skin: Skin is warm and dry.  Psych: Normal mood/affect      ED Course   Procedures  Labs Reviewed   B-TYPE NATRIURETIC PEPTIDE - Abnormal; Notable for the following components:       Result Value     (*)     All other components within normal limits   CBC W/ AUTO DIFFERENTIAL - Abnormal; Notable for the following components:    Hemoglobin 8.7 (*)     Hematocrit 32.2 (*)     MCV 80 (*)     MCH 21.5 (*)     MCHC 27.0 (*)     RDW 18.0 (*)     nRBC 1 (*)     All other components within normal limits   COMPREHENSIVE METABOLIC PANEL - Abnormal; Notable for the following components:    Calcium 8.4 (*)     Total Protein 5.9 (*)     Albumin 2.7 (*)     All other components within normal limits   HIV 1 / 2 ANTIBODY    Narrative:     Release to patient->Immediate   HEPATITIS C ANTIBODY    Narrative:     Release to patient->Immediate   LACTIC ACID, PLASMA   PROTIME-INR   TROPONIN I   SARS-COV-2 RNA AMPLIFICATION, QUAL   URINALYSIS, REFLEX TO URINE CULTURE        ECG Results              EKG 12-lead (Final result)  Result time 08/07/23 15:34:48      Final result by Interface, Lab In [facility] (08/07/23 15:34:48)                   Narrative:    Test Reason : R06.02,    Vent. Rate : 064 BPM     Atrial Rate : 064 BPM     P-R Int : 148 ms          QRS Dur : 092 ms      QT Int : 446 ms       P-R-T Axes : 064 002 069 degrees     QTc Int : 460 ms    Normal  sinus rhythm  Nonspecific ST and T wave abnormality  Abnormal ECG  When compared with ECG of 19-AUG-2022 12:51,  Incomplete right bundle branch block is no longer Present    Confirmed by CARMENCITA GUSMAN MD ([id number]) on 8/7/2023 3:34:33 PM    Referred By: System System           Confirmed By:CARMENCITA GUSMAN MD                                  Imaging Results              X-Ray Chest AP Portable (Final result)  Result time 08/07/23 16:10:06      Final result by Ricky Velasquez MD (08/07/23 16:10:06)                   Impression:      Decreasing compression atelectasis right lung base, chest otherwise stable.      Electronically signed by: Ricky Velasquez MD  Date:    08/07/2023  Time:    16:10               Narrative:    EXAMINATION:  XR CHEST AP PORTABLE    CLINICAL HISTORY:  shortness of breath;    TECHNIQUE:  Single frontal view of the chest was performed.    COMPARISON:  08/25/2022    FINDINGS:  Incomplete inspiration, large body size, apical lordotic positioning, partial rotation of chest degrades exam.  Moderate elevation right hemidiaphragm, decrease adjacent compression atelectasis basilar segment right lower lobe, high positioning hepatic flexure superolateral beneath hemidiaphragm.  Some compression atelectasis vague infiltrates again question left lung base without additional obscuring of dome left hemidiaphragm.  Pulmonary vascular tree appears intact and no new infiltrate or effusion confirmed.                                       Medications   albuterol sulfate nebulizer solution 2.5 mg (2.5 mg Nebulization Given 8/7/23 1459)     And   ipratropium 0.02 % nebulizer solution 0.5 mg (0.5 mg Nebulization Given 8/7/23 1459)   methylPREDNISolone sodium succinate injection 125 mg (125 mg Intravenous Given 8/7/23 1453)                 ED Course as of 08/07/23 1807   Mon Aug 07, 2023   1753 CTA Chest Non-Coronary (PE Studies) [AA]      ED Course User Index  [AA] Ann Severino MD          I have  personally reviewed and interpreted the patients laboratory studies:  Hemoglobin 8.7 from 10 approximately 1 month prior to arrival, BNP greater than 700, lactate 0.7, troponin negative, INR 1.0, chemistry unremarkable, COVID negative  I have personally reviewed and interpreted imaging studies:  Chest x-ray with slight cardiomegaly, known thoracic aneurysm  I have personally reviewed and interpreted the patient's EKG: Sinus bradycardia with ST depression inf, TWI V1-3, new from pervious      I have also reviewed the following:     old EKGs, 8/22:  AFib with RVR at 131 bpm  old imaging and results:  Echocardiogram 8/22:  EF 68%, concentric remodeling, indeterminate diastolic dysfunction       Patient's presentation was initially concerning for COPD although patient does not carry a diagnosis, she is a smoker and arrived with diffuse wheezing and decreased breath sounds diffusely which improved with DuoNeb however patient was persistently hypoxic.    The patient was noted to have no significant pulmonary edema on chest x-ray, no pneumothorax and after oxygen was stopped, patient was noted to desaturate to 83% on room air at rest.  Exact cause of the patient's hypoxia is unclear not the patient has clear lungs, no significant pulmonary edema despite BNP greater than 700 and patient was also noted to have rightward deviation of trachea.  Patient reportedly has a known thoracic aneurysm, will plan for CTA chest to evaluate for aneurysm versus dissection as well as PE       Clinical Impression:   Final diagnoses:  [R06.02] Shortness of breath               Ann Severino MD  08/08/23 1153       Ann Severino MD  08/08/23 1156

## 2023-08-07 NOTE — ED TRIAGE NOTES
63 y/o F presents to ER with c/c SOB x 3 weeks. Pt states that increasing SOB, denies LE edema, COPD, c/p, N/V/D, fevers and chills.

## 2023-08-07 NOTE — ED NOTES
Patient identifiers for Vale Gutierrez 62 y.o. female checked and correct.  Chief Complaint   Patient presents with    Shortness of Breath     X2 week worse today. 88% on RA per EMS     Past Medical History:   Diagnosis Date    Alcohol abuse     Anxiety     Benign paroxysmal positional vertigo 1/17/2020    Depression     Hyperlipidemia     Hypertension      Allergies reported: Review of patient's allergies indicates:  No Known Allergies      LOC: Patient is awake, alert, and aware of environment with an appropriate affect. Patient is oriented x 4 and speaking appropriately.  APPEARANCE: Patient resting comfortably and in no acute distress. Patient is clean and well groomed, patient's clothing is properly fastened.  HEENT: - JVD, + midline trach  SKIN: The skin is warm and dry. Patient has normal skin turgor and moist mucus membranes.   MUSKULOSKELETAL: Patient is moving all extremities well, no obvious deformities noted. Pulses intact. PMS x 4  RESPIRATORY: Airway is open and patent. Respirations are spontaneous and non-labored with normal effort and rate. Diminished breath sounds with expiratory wheezing.  CARDIAC: Patient has a normal rate and rhythm. 64 on cardiac monitor. No peripheral edema noted. Denies c/p. + 2 bilateral pedal and radial pulses, < 3 s cap refill  ABDOMEN: No distention noted. Soft and non-tender upon palpation.  NEUROLOGICAL: pupils 3 mm, PERRL. Facial expression is symmetrical. Hand grasps are equal bilaterally. Normal sensation in all extremities when touched with finger.

## 2023-08-08 NOTE — ED NOTES
Received bedside report from JOSEHP León  Pt AAOx4, resting comfortably in bed, NAD, respirations E/UL, updated on POC, wheels locked and in low position, call bell with in reach, Comfort positioning and restroom needs were addressed. Necessary items were placed with in reach and was advised when a reassessment would take place.

## 2023-08-08 NOTE — PLAN OF CARE
Discharge Planning   Case Management Assessment:     Patient admitted on 8-7-23  Chart reviewed today  Care plan discussed with treatment team,    attending Dr Tucker     Current dispo: pending  Home health vs Snf (PH[facility] primary)  [facility] packet shared via careport, await for PT/OT evals  Transportation: has reliable   Consults following: case mgt., PT, OT  Case management  to follow

## 2023-08-08 NOTE — ASSESSMENT & PLAN NOTE
Patient with Persistent (7 days or more) atrial fibrillation which is controlled currently with Beta Blocker and Amiodarone. Patient is currently in sinus rhythm.[id number] Score: 1. HASBLED Score: . Anticoagulation indicated. Anticoagulation done with apixaban.

## 2023-08-08 NOTE — ASSESSMENT & PLAN NOTE
Patient is identified as having Diastolic (HFpEF) heart failure that is Acute on chronic. CHF is currently uncontrolled due to Rales/crackles on pulmonary exam and Pulmonary edema/pleural effusion on CXR. Latest ECHO performed and demonstrates- Results for orders placed during the hospital encounter of 08/01/22    Echo Saline Bubble? Yes    Interpretation Summary  · The left ventricle is normal in size with concentric remodeling and normal systolic function.  · The estimated ejection fraction is 68%.  · Indeterminate left ventricular diastolic function.  · Normal right ventricular size with normal right ventricular systolic function.  · Mild tricuspid regurgitation.  · Mild pulmonic regurgitation.  · Elevated central venous pressure (15 mmHg).  · The estimated PA systolic pressure is 49 mmHg.  · There is pulmonary hypertension.  · Small posterior pericardial effusion. Trivial lateral and under the atria.  · There is a small left pleural effusion.  · No shunt by Air Contrast Bubble.  . Continue Furosemide and monitor clinical status closely. Monitor on telemetry. Patient is off CHF pathway.  Monitor strict Is&Os and daily weights.  Place on fluid restriction of 1.5 L. Cardiology has not been any consulted. Continue to stress to patient importance of self efficacy and  on diet for CHF. Last BNP reviewed- and noted below   Recent Labs   Lab 08/07/23  1454   *   .

## 2023-08-08 NOTE — SUBJECTIVE & OBJECTIVE
Past Medical History:   Diagnosis Date    Alcohol abuse     Anxiety     Benign paroxysmal positional vertigo 1/17/2020    Depression     Hyperlipidemia     Hypertension        Past Surgical History:   Procedure Laterality Date    BREAST CYST ASPIRATION      CHOLECYSTECTOMY      complicated with bile leak, sepsis    COLONOSCOPY N/A 6/3/2020    Procedure: COLONOSCOPY Golytely;  Surgeon: Tino Neil MD;  Location: [facility];  Service: Colon and Rectal;  Laterality: N/A;    EPIDURAL STEROID INJECTION N/A 5/27/2022    Procedure: INJECTION, STEROID, EPIDURAL, CAUDAL CONTRAST;  Surgeon: Rubén Rico MD;  Location: [facility];  Service: Pain Management;  Laterality: N/A;  5/6 RESCHEDULE    ESOPHAGOGASTRODUODENOSCOPY N/A 6/3/2020    Procedure: EGD (ESOPHAGOGASTRODUODENOSCOPY);  Surgeon: Tino Neil MD;  Location: [facility];  Service: Colon and Rectal;  Laterality: N/A;    INJECTION OF ANESTHETIC AGENT AROUND NERVE Bilateral 3/24/2022    Procedure: BLOCK, NERVE MEDIAL BRANCH BLOCK BL L2, L3, L4 and L5  1st, needs consent;  Surgeon: Rubné Rico MD;  Location: [facility];  Service: Pain Management;  Laterality: Bilateral;    TRANSFORAMINAL EPIDURAL INJECTION OF STEROID Bilateral 8/7/2020    Procedure: INJECTION, STEROID, EPIDURAL, TRANSFORAMINAL APPROACH, L4-L5 need consent;  Surgeon: Rubén Rico MD;  Location: [facility];  Service: Pain Management;  Laterality: Bilateral;    TRANSFORAMINAL EPIDURAL INJECTION OF STEROID Bilateral 9/4/2020    Procedure: LUMBAR TRANSFORAMINAL BILATERAL L4/5 DIRECT REFERRAL;  Surgeon: Rubén Rico MD;  Location: [facility];  Service: Pain Management;  Laterality: Bilateral;  NEEDS CONSENT    TRANSFORAMINAL EPIDURAL INJECTION OF STEROID Bilateral 3/4/2022    Procedure: Injection,steroid,epidural,transforaminal approach BILATERAL L3/4 DIRECT REFERRAL;  Surgeon: Rubén Rico MD;  Location: [facility];  Service: Pain Management;  Laterality: Bilateral;        Review of patient's allergies indicates:  No Known Allergies    No current facility-administered medications on file prior to encounter.     Current Outpatient Medications on File Prior to Encounter   Medication Sig    albuterol-ipratropium (DUO-NEB) 2.5 mg-0.5 mg/3 mL nebulizer solution Take 3 mLs by nebulization every 8 (eight) hours. Rescue    amiodarone (PACERONE) 200 MG Tab Take 1 tablet (200 mg total) by mouth once daily.    apixaban (ELIQUIS) 5 mg Tab Take 1 tablet (5 mg total) by mouth 2 (two) times daily.    busPIRone (BUSPAR) 5 MG Tab Take 1 tablet (5 mg total) by mouth 2 (two) times daily as needed (anxiety).    dicyclomine (BENTYL) 10 MG capsule Take 1 capsule (10 mg total) by mouth 4 (four) times daily before meals and nightly.    metoprolol tartrate (LOPRESSOR) 50 MG tablet Take 1 tablet (50 mg total) by mouth once daily.    mirtazapine (REMERON) 15 MG tablet Take 1 tablet (15 mg total) by mouth every evening. For insomnia, mood    omeprazole (PRILOSEC) 20 MG capsule Take 1 capsule (20 mg total) by mouth daily as needed.    ondansetron (ZOFRAN-ODT) 4 MG TbDL Take 1 tablet (4 mg total) by mouth every 8 (eight) hours as needed (nausea).    oxyCODONE-acetaminophen (PERCOCET) 5-325 mg per tablet Take 1 tablet by mouth every 6 (six) hours as needed for Pain.    thiamine 100 MG tablet Take 100 mg by mouth once daily.    venlafaxine (EFFEXOR-XR) 75 MG 24 hr capsule Take 3 capsules (225 mg total) by mouth once daily.     Family History       Problem Relation (Age of Onset)    Arthritis Mother    Atrial fibrillation Mother    Cancer Maternal Uncle    Cataracts Paternal Grandfather    Glaucoma Paternal Grandfather    Heart failure Mother    Macular degeneration Mother    Stroke Father          Tobacco Use    Smoking status: Former     Current packs/day: 0.00    Smokeless tobacco: Never   Substance and Sexual Activity    Alcohol use: Yes     Alcohol/week: 12.0 standard drinks of alcohol     Types: 12 Shots  of liquor per week     Comment: three 12-14 oz cocktail drinks.     Drug use: No    Sexual activity: Never     Review of Systems   Constitutional:  Positive for activity change. Negative for appetite change, chills and fever.   HENT:  Negative for sore throat.    Respiratory:  Positive for cough and shortness of breath.    Cardiovascular:  Negative for chest pain, palpitations and leg swelling.   Gastrointestinal:  Negative for abdominal pain and nausea.   Genitourinary:  Negative for dysuria.   Musculoskeletal:  Negative for back pain.   Skin:  Negative for rash.   Neurological:  Positive for weakness.     Objective:     Vital Signs (Most Recent):  Temp: 98.7 °F (37.1 °C) (08/08/23 0249)  Pulse: 78 (08/08/23 0249)  Resp: 16 (08/07/23 2351)  BP: (!) 198/93 (08/08/23 0249)  SpO2: (!) 91 % (08/08/23 0249) Vital Signs (24h Range):  Temp:  [98.3 °F (36.8 °C)-98.7 °F (37.1 °C)] 98.7 °F (37.1 °C)  Pulse:  [63-93] 78  Resp:  [16-24] 16  SpO2:  [88 %-98 %] 91 %  BP: (136-198)/(76-98) 198/93     Weight: 91.5 kg (201 lb 11.5 oz)  Body mass index is 34.63 kg/m².     Physical Exam  Constitutional:       Appearance: She is obese. She is ill-appearing.      Interventions: Nasal cannula in place.   HENT:      Head: Normocephalic and atraumatic.   Eyes:      General: No scleral icterus.     Pupils: Pupils are equal, round, and reactive to light.   Cardiovascular:      Rate and Rhythm: Normal rate and regular rhythm.      Pulses: Normal pulses.      Heart sounds: No murmur heard.  Pulmonary:      Effort: Pulmonary effort is normal. No respiratory distress.      Breath sounds: Rales present. No wheezing.   Abdominal:      General: Bowel sounds are normal. There is no distension.      Palpations: Abdomen is soft.      Tenderness: There is no abdominal tenderness. There is no guarding.   Musculoskeletal:      Cervical back: Neck supple.      Right lower leg: No edema.      Left lower leg: No edema.   Skin:     General: Skin is warm and  "dry.   Neurological:      General: No focal deficit present.   Psychiatric:         Mood and Affect: Mood normal.         Behavior: Behavior normal.              CRANIAL NERVES     CN III, IV, VI   Pupils are equal, round, and reactive to light.       Significant Labs: All pertinent labs within the past 24 hours have been reviewed.  CBC:   Recent Labs   Lab 08/07/23  1454   WBC 6.23   HGB 8.7*   HCT 32.2*        CMP:   Recent Labs   Lab 08/07/23  1454      K 4.6   CL 98   CO2 28   GLU 76   BUN 18   CREATININE 0.9   CALCIUM 8.4*   PROT 5.9*   ALBUMIN 2.7*   BILITOT 0.2   ALKPHOS 107   AST 16   ALT 10   ANIONGAP 10     Cardiac Markers:   Recent Labs   Lab 08/07/23  1454   *     Coagulation:   Recent Labs   Lab 08/07/23  1454   INR 1.0     Lactic Acid:   Recent Labs   Lab 08/07/23  1454   LACTATE 0.7     Magnesium: No results for input(s): "MG" in the last 48 hours.  Troponin:   Recent Labs   Lab 08/07/23  1454   TROPONINI 0.014     Urine Studies:   Recent Labs   Lab 08/07/23  2237   COLORU Colorless*   APPEARANCEUA Clear   PHUR 6.0   SPECGRAV 1.025   PROTEINUA Negative   GLUCUA Negative   KETONESU Negative   BILIRUBINUA Negative   OCCULTUA Negative   NITRITE Negative   LEUKOCYTESUR 1+*   RBCUA 0   WBCUA 1   SQUAMEPITHEL 0       Significant Imaging: I have reviewed all pertinent imaging results/findings within the past 24 hours.    CTA CHEST NON CORONARY (PE STUDIES)     CLINICAL HISTORY:  Pulmonary embolism (PE) suspected, high prob;     TECHNIQUE:  Low dose axial images, sagittal and coronal reformations were obtained from the thoracic inlet to the lung bases following the IV administration of 100 mL of Omnipaque 350.  Contrast timing was optimized to evaluate the pulmonary arteries.  MIP images were performed.     COMPARISON:  Radiograph 08/07/2023, CT chest 08/16/2022     FINDINGS:  The structures at the base of the neck are grossly unremarkable.  No significant mediastinal lymphadenopathy.  The " heart is not enlarged noting prominent epicardial fat.  The thoracic aorta tapers normally.  Allowing for motion artifact and contrast phase, the visualized portion is of the left kidney, adrenal glands, spleen, pancreas and liver are grossly unremarkable.  There is surgical change of cholecystectomy noting stable appearance of the common duct.  There is right nonobstructive nephrolithiasis.  The stomach is distended with ingested content noting small hiatal hernia.  There are a few scattered shotty abdominal lymph nodes.     Motion artifact limits evaluation of the airways and pulmonary parenchyma.  Allowing for this, the central airways are patent.  The distal airways are grossly patent noting the distal airways to the bilateral lower lobes are suboptimally evaluated.     There is biapical atelectasis or scarring.  There is scattered interlobular septal thickening suggesting edema.  There is bilateral basilar dependent atelectasis.  No large focal consolidation.  No pneumothorax.  No pleural effusion.  There are a few scattered regions of bandlike atelectasis or scarring.     Bolus timing is adequate for evaluation of pulmonary thromboembolism however evaluation is limited secondary to artifact from patient motion.  Allowing for this, no convincing pulmonary arterial filling defect to the level of the proximal segmental branches bilaterally.  Distal embolus cannot be excluded on the basis of this exam, particularly within the segmental branches of the bilateral lower lobes.  Correlation of these findings with D-dimer and/or lower extremity ultrasound as warranted.     There are degenerative changes of the spine.  No significant axillary lymphadenopathy.     Impression:     1. Allowing for exam limitations above, no convincing pulmonary thromboembolism to the level of the distal lobar branches.  Distal embolism cannot be excluded, particularly within the segmental and subsegmental branches to the bilateral lower  lobes.  Correlation of these findings with D-dimer and/or lower extremity ultrasound as warranted.  2. Interlobular septal thickening may reflect edema.  3. Please see above for several additional findings.        Electronically signed by: Bart Silva MD  Date:                                            08/07/2023  Time:                                           20:00

## 2023-08-08 NOTE — PHARMACY MED REC
"Admission Medication History     The home medication history was taken by Rima Lee.    You may go to "Admission" then "Reconcile Home Medications" tabs to review and/or act upon these items.     The home medication list has been updated by the Pharmacy department.   Please read ALL comments highlighted in yellow.   Please address this information as you see fit.    Feel free to contact us if you have any questions or require assistance.      The medications listed below were removed from the home medication list. Please reorder if appropriate:  Patient reports no longer taking the following medication(s):  ALBUTEROL-IPRATROPIUM 2.5 MG-0.5 MG/3 ML NEB SOL    Medications listed below were obtained from: Patient  Current Outpatient Medications on File Prior to Encounter   Medication Sig    amiodarone (PACERONE) 200 MG Tab   Take 1 tablet (200 mg total) by mouth once daily.    apixaban (ELIQUIS) 5 mg Tab Take 1 tablet (5 mg total) by mouth 2 (two) times daily.      aspirin/salicylamide/caffeine (BC HEADACHE POWDER ORAL)   Take 1 packet by mouth 2 (two) times daily as needed (Headache).    busPIRone (BUSPAR) 15 MG tablet   Take 15 mg by mouth once daily.    calcium carbonate (TUMS ORAL)   Take 2 tablets by mouth daily as needed (Heartburn).    dicyclomine (BENTYL) 10 MG capsule Take 1 capsule (10 mg total) by mouth 4 (four) times daily before meals and nightly.      docusate sodium (COLACE ORAL)   Take 1 capsule by mouth daily as needed (Constipation).    MAGNESIUM ORAL Take 1 capsule by mouth once daily.      metoprolol tartrate (LOPRESSOR) 50 MG tablet   Take 1 tablet (50 mg total) by mouth once daily.    mirtazapine (REMERON) 15 MG tablet Take 1 tablet (15 mg total) by mouth every evening. For insomnia, mood      omeprazole (PRILOSEC) 20 MG capsule   Take 1 capsule (20 mg total) by mouth daily as needed.    ondansetron (ZOFRAN-ODT) 4 MG TbDL Take 1 tablet (4 mg total) by mouth every 8 (eight) hours as needed " (nausea).      oxyCODONE-acetaminophen (PERCOCET) 5-325 mg per tablet   Take 1 tablet by mouth every 6 (six) hours as needed for Pain.    oxymetazoline (NO DRIP) 0.05 % nasal spray   1 spray by Nasal route daily as needed for Congestion.    thiamine 100 MG tablet   Take 100 mg by mouth once daily.    venlafaxine (EFFEXOR-XR) 75 MG 24 hr capsule   Take 3 capsules (225 mg total) by mouth once daily.               Rima Lee  EXT [id number]                  .

## 2023-08-08 NOTE — PLAN OF CARE
08/08/23 1845   Post-Acute Status   Post-Acute Authorization Other   Discharge Delays None known at this time   Discharge Plan   Discharge Plan A Home Health   Discharge Plan B Skilled Nursing Facility

## 2023-08-08 NOTE — H&P
[facility] - Emergency Dept  [facility] Medicine  History & Physical    Patient Name: Vale Gutierrez  MRN: [id number]  Patient Class: OP- Observation  Admission Date: 8/7/2023  Attending Physician: Anton Hernandez MD [facility] HOSP MED T  Admitting Physician: Mat Ovalles MD  Primary Care Provider: Estela Mcdaniel MD    Patient information was obtained from patient, past medical records and ER records.     Subjective:     Principal Problem:Acute hypoxemic respiratory failure    Chief Complaint:   Chief Complaint   Patient presents with    Shortness of Breath     X2 week worse today. 88% on RA per EMS        HPI: 63 y/o woman hx of HFpEF, Afib, HTN, Obesity, Chronic back pain, presents to the ED with complaints of shortness of breath.     She has had shortness of breath for 2 weeks. And dyspnea on exertion.  She has chronic debility related to her chronic back pain and as of late has had decreased ADl capability - she requires assistance to toilet, using a bedside commode more.  Sister helps her to bathe/shower, ambulates with a walker in the home, can feed herself, manages her meds, sister preps food.  She is not driving.     She does not track her weight at home, no LE edema but does note some increased abdominal girth.  She is smoking 1 pack cigarettes per week, trying to quit.  No alcohol use in the last 1 years. Adherent to home medications.       Past Medical History:   Diagnosis Date    Alcohol abuse     Anxiety     Benign paroxysmal positional vertigo 1/17/2020    Depression     Hyperlipidemia     Hypertension        Past Surgical History:   Procedure Laterality Date    BREAST CYST ASPIRATION      CHOLECYSTECTOMY      complicated with bile leak, sepsis    COLONOSCOPY N/A 6/3/2020    Procedure: COLONOSCOPY [place];  Surgeon: Tino Neil MD;  Location: [facility];  Service: Colon and Rectal;  Laterality: N/A;    EPIDURAL STEROID INJECTION N/A 5/27/2022    Procedure: INJECTION, STEROID,  EPIDURAL, CAUDAL CONTRAST;  Surgeon: Rubén Rico MD;  Location: [facility] PAIN MGT;  Service: Pain Management;  Laterality: N/A;  5/6 RESCHEDULE    ESOPHAGOGASTRODUODENOSCOPY N/A 6/3/2020    Procedure: EGD (ESOPHAGOGASTRODUODENOSCOPY);  Surgeon: Tino Neil MD;  Location: [facility] ENDO;  Service: Colon and Rectal;  Laterality: N/A;    INJECTION OF ANESTHETIC AGENT AROUND NERVE Bilateral 3/24/2022    Procedure: BLOCK, NERVE MEDIAL BRANCH BLOCK BL L2, L3, L4 and L5  1st, needs consent;  Surgeon: Rubén Rico MD;  Location: [facility] PAIN MGT;  Service: Pain Management;  Laterality: Bilateral;    TRANSFORAMINAL EPIDURAL INJECTION OF STEROID Bilateral 8/7/2020    Procedure: INJECTION, STEROID, EPIDURAL, TRANSFORAMINAL APPROACH, L4-L5 need consent;  Surgeon: Rubén Rico MD;  Location: [facility] PAIN MGT;  Service: Pain Management;  Laterality: Bilateral;    TRANSFORAMINAL EPIDURAL INJECTION OF STEROID Bilateral 9/4/2020    Procedure: LUMBAR TRANSFORAMINAL BILATERAL L4/5 DIRECT REFERRAL;  Surgeon: Rubén Rico MD;  Location: [facility] PAIN MGT;  Service: Pain Management;  Laterality: Bilateral;  NEEDS CONSENT    TRANSFORAMINAL EPIDURAL INJECTION OF STEROID Bilateral 3/4/2022    Procedure: Injection,steroid,epidural,transforaminal approach BILATERAL L3/4 DIRECT REFERRAL;  Surgeon: Rubén Rico MD;  Location: [facility] PAIN MGT;  Service: Pain Management;  Laterality: Bilateral;       Review of patient's allergies indicates:  No Known Allergies    No current facility-administered medications on file prior to encounter.     Current Outpatient Medications on File Prior to Encounter   Medication Sig    albuterol-ipratropium (DUO-NEB) 2.5 mg-0.5 mg/3 mL nebulizer solution Take 3 mLs by nebulization every 8 (eight) hours. Rescue    amiodarone (PACERONE) 200 MG Tab Take 1 tablet (200 mg total) by mouth once daily.    apixaban (ELIQUIS) 5 mg Tab Take 1 tablet (5 mg total) by mouth 2 (two) times daily.    busPIRone (BUSPAR)  5 MG Tab Take 1 tablet (5 mg total) by mouth 2 (two) times daily as needed (anxiety).    dicyclomine (BENTYL) 10 MG capsule Take 1 capsule (10 mg total) by mouth 4 (four) times daily before meals and nightly.    metoprolol tartrate (LOPRESSOR) 50 MG tablet Take 1 tablet (50 mg total) by mouth once daily.    mirtazapine (REMERON) 15 MG tablet Take 1 tablet (15 mg total) by mouth every evening. For insomnia, mood    omeprazole (PRILOSEC) 20 MG capsule Take 1 capsule (20 mg total) by mouth daily as needed.    ondansetron (ZOFRAN-ODT) 4 MG TbDL Take 1 tablet (4 mg total) by mouth every 8 (eight) hours as needed (nausea).    oxyCODONE-acetaminophen (PERCOCET) 5-325 mg per tablet Take 1 tablet by mouth every 6 (six) hours as needed for Pain.    thiamine 100 MG tablet Take 100 mg by mouth once daily.    venlafaxine (EFFEXOR-XR) 75 MG 24 hr capsule Take 3 capsules (225 mg total) by mouth once daily.     Family History       Problem Relation (Age of Onset)    Arthritis Mother    Atrial fibrillation Mother    Cancer Maternal Uncle    Cataracts Paternal Grandfather    Glaucoma Paternal Grandfather    Heart failure Mother    Macular degeneration Mother    Stroke Father          Tobacco Use    Smoking status: Former     Current packs/day: 0.00    Smokeless tobacco: Never   Substance and Sexual Activity    Alcohol use: Yes     Alcohol/week: 12.0 standard drinks of alcohol     Types: 12 Shots of liquor per week     Comment: three 12-14 oz cocktail drinks.     Drug use: No    Sexual activity: Never     Review of Systems   Constitutional:  Positive for activity change. Negative for appetite change, chills and fever.   HENT:  Negative for sore throat.    Respiratory:  Positive for cough and shortness of breath.    Cardiovascular:  Negative for chest pain, palpitations and leg swelling.   Gastrointestinal:  Negative for abdominal pain and nausea.   Genitourinary:  Negative for dysuria.   Musculoskeletal:  Negative for  back pain.   Skin:  Negative for rash.   Neurological:  Positive for weakness.     Objective:     Vital Signs (Most Recent):  Temp: 98.7 °F (37.1 °C) (08/08/23 0249)  Pulse: 78 (08/08/23 0249)  Resp: 16 (08/07/23 2351)  BP: (!) 198/93 (08/08/23 0249)  SpO2: (!) 91 % (08/08/23 0249) Vital Signs (24h Range):  Temp:  [98.3 °F (36.8 °C)-98.7 °F (37.1 °C)] 98.7 °F (37.1 °C)  Pulse:  [63-93] 78  Resp:  [16-24] 16  SpO2:  [88 %-98 %] 91 %  BP: (136-198)/(76-98) 198/93     Weight: 91.5 kg (201 lb 11.5 oz)  Body mass index is 34.63 kg/m².     Physical Exam  Constitutional:       Appearance: She is obese. She is ill-appearing.      Interventions: Nasal cannula in place.   HENT:      Head: Normocephalic and atraumatic.   Eyes:      General: No scleral icterus.     Pupils: Pupils are equal, round, and reactive to light.   Cardiovascular:      Rate and Rhythm: Normal rate and regular rhythm.      Pulses: Normal pulses.      Heart sounds: No murmur heard.  Pulmonary:      Effort: Pulmonary effort is normal. No respiratory distress.      Breath sounds: Rales present. No wheezing.   Abdominal:      General: Bowel sounds are normal. There is no distension.      Palpations: Abdomen is soft.      Tenderness: There is no abdominal tenderness. There is no guarding.   Musculoskeletal:      Cervical back: Neck supple.      Right lower leg: No edema.      Left lower leg: No edema.   Skin:     General: Skin is warm and dry.   Neurological:      General: No focal deficit present.   Psychiatric:         Mood and Affect: Mood normal.         Behavior: Behavior normal.              CRANIAL NERVES     CN III, IV, VI   Pupils are equal, round, and reactive to light.       Significant Labs: All pertinent labs within the past 24 hours have been reviewed.  CBC:   Recent Labs   Lab 08/07/23  1454   WBC 6.23   HGB 8.7*   HCT 32.2*        CMP:   Recent Labs   Lab 08/07/23  1454      K 4.6   CL 98   CO2 28   GLU 76   BUN 18   CREATININE 0.9  "  CALCIUM 8.4*   PROT 5.9*   ALBUMIN 2.7*   BILITOT 0.2   ALKPHOS 107   AST 16   ALT 10   ANIONGAP 10     Cardiac Markers:   Recent Labs   Lab 08/07/23  1454   *     Coagulation:   Recent Labs   Lab 08/07/23  1454   INR 1.0     Lactic Acid:   Recent Labs   Lab 08/07/23  1454   LACTATE 0.7     Magnesium: No results for input(s): "MG" in the last 48 hours.  Troponin:   Recent Labs   Lab 08/07/23  1454   TROPONINI 0.014     Urine Studies:   Recent Labs   Lab 08/07/23  2237   COLORU Colorless*   APPEARANCEUA Clear   PHUR 6.0   SPECGRAV 1.025   PROTEINUA Negative   GLUCUA Negative   KETONESU Negative   BILIRUBINUA Negative   OCCULTUA Negative   NITRITE Negative   LEUKOCYTESUR 1+*   RBCUA 0   WBCUA 1   SQUAMEPITHEL 0       Significant Imaging: I have reviewed all pertinent imaging results/findings within the past 24 hours.    CTA CHEST NON CORONARY (PE STUDIES)     CLINICAL HISTORY:  Pulmonary embolism (PE) suspected, high prob;     TECHNIQUE:  Low dose axial images, sagittal and coronal reformations were obtained from the thoracic inlet to the lung bases following the IV administration of 100 mL of Omnipaque 350.  Contrast timing was optimized to evaluate the pulmonary arteries.  MIP images were performed.     COMPARISON:  Radiograph 08/07/2023, CT chest 08/16/2022     FINDINGS:  The structures at the base of the neck are grossly unremarkable.  No significant mediastinal lymphadenopathy.  The heart is not enlarged noting prominent epicardial fat.  The thoracic aorta tapers normally.  Allowing for motion artifact and contrast phase, the visualized portion is of the left kidney, adrenal glands, spleen, pancreas and liver are grossly unremarkable.  There is surgical change of cholecystectomy noting stable appearance of the common duct.  There is right nonobstructive nephrolithiasis.  The stomach is distended with ingested content noting small hiatal hernia.  There are a few scattered shotty abdominal lymph nodes.   "   Motion artifact limits evaluation of the airways and pulmonary parenchyma.  Allowing for this, the central airways are patent.  The distal airways are grossly patent noting the distal airways to the bilateral lower lobes are suboptimally evaluated.     There is biapical atelectasis or scarring.  There is scattered interlobular septal thickening suggesting edema.  There is bilateral basilar dependent atelectasis.  No large focal consolidation.  No pneumothorax.  No pleural effusion.  There are a few scattered regions of bandlike atelectasis or scarring.     Bolus timing is adequate for evaluation of pulmonary thromboembolism however evaluation is limited secondary to artifact from patient motion.  Allowing for this, no convincing pulmonary arterial filling defect to the level of the proximal segmental branches bilaterally.  Distal embolus cannot be excluded on the basis of this exam, particularly within the segmental branches of the bilateral lower lobes.  Correlation of these findings with D-dimer and/or lower extremity ultrasound as warranted.     There are degenerative changes of the spine.  No significant axillary lymphadenopathy.     Impression:     1. Allowing for exam limitations above, no convincing pulmonary thromboembolism to the level of the distal lobar branches.  Distal embolism cannot be excluded, particularly within the segmental and subsegmental branches to the bilateral lower lobes.  Correlation of these findings with D-dimer and/or lower extremity ultrasound as warranted.  2. Interlobular septal thickening may reflect edema.  3. Please see above for several additional findings.        Electronically signed by: Bart Silva MD  Date:                                            08/07/2023  Time:                                           20:00        Assessment/Plan:     * Acute hypoxemic respiratory failure  Acute hypoxic respiratory failure, not on home oxygen, etiology suspected potential  congestive heart failure related or lung disease (cOPD/emphysema)   -give lasix now and schedule 2 daily doses  -continue scheduled nebulizers, schedule glucocorticoids  -wean oxygen as tolerated.   -Prior admission last year with similar presentation    Chronic diastolic congestive heart failure  Patient is identified as having Diastolic (HFpEF) heart failure that is Acute on chronic. CHF is currently uncontrolled due to Rales/crackles on pulmonary exam and Pulmonary edema/pleural effusion on CXR. Latest ECHO performed and demonstrates- Results for orders placed during the hospital encounter of 08/01/22    Echo Saline Bubble? Yes    Interpretation Summary  · The left ventricle is normal in size with concentric remodeling and normal systolic function.  · The estimated ejection fraction is 68%.  · Indeterminate left ventricular diastolic function.  · Normal right ventricular size with normal right ventricular systolic function.  · Mild tricuspid regurgitation.  · Mild pulmonic regurgitation.  · Elevated central venous pressure (15 mmHg).  · The estimated PA systolic pressure is 49 mmHg.  · There is pulmonary hypertension.  · Small posterior pericardial effusion. Trivial lateral and under the atria.  · There is a small left pleural effusion.  · No shunt by Air Contrast Bubble.  . Continue Furosemide and monitor clinical status closely. Monitor on telemetry. Patient is off CHF pathway.  Monitor strict Is&Os and daily weights.  Place on fluid restriction of 1.5 L. Cardiology has not been any consulted. Continue to stress to patient importance of self efficacy and  on diet for CHF. Last BNP reviewed- and noted below   Recent Labs   Lab 08/07/23  1454   *   .    Paroxysmal atrial fibrillation  Patient with Persistent (7 days or more) atrial fibrillation which is controlled currently with Beta Blocker and Amiodarone. Patient is currently in sinus rhythm.[id number] Score: 1. HASBLED Score: . Anticoagulation  indicated. Anticoagulation done with apixaban.    Chronic pain disorder  Continue multimodal pain meds and home percocet      Impaired functional mobility, balance, gait, and endurance  Consult PT/OT   Fall precautions.       Major depressive disorder, recurrent, moderate  Chronic stable - continue buspirone and venlafaxine home medications.         Alcohol use disorder, severe, dependence  Reports sobriety x 1 year, continue thiamine        VTE Risk Mitigation (From admission, onward)         Ordered     apixaban tablet 5 mg  2 times daily         08/07/23 2335     Reason for No Pharmacological VTE Prophylaxis  Once        Question:  Reasons:  Answer:  Already adequately anticoagulated on oral Anticoagulants    08/07/23 2335     IP VTE HIGH RISK PATIENT  Once         08/07/23 2335     Place sequential compression device  Until discontinued         08/07/23 2335                   On 08/08/2023, patient should be placed in hospital observation services under my care.        Mat Ovalles MD  Department of Hospital Medicine  [facility] - Emergency Dept

## 2023-08-08 NOTE — PROGRESS NOTES
[facility] - Emergency Dept  [facility] Medicine  Progress Note    Patient Name: Vale Gutierrez  MRN: [id number]  Patient Class: OP- Observation   Admission Date: 8/7/2023  Length of Stay: 0 days  Attending Physician: No att. providers found  Primary Care Provider: Estela Mcadniel MD        Subjective:     Principal Problem:Acute hypoxemic respiratory failure        HPI:  63 y/o woman hx of HFpEF, Afib, HTN, Obesity, Chronic back pain, presents to the ED with complaints of shortness of breath.     She has had shortness of breath for 2 weeks. And dyspnea on exertion.  She has chronic debility related to her chronic back pain and as of late has had decreased ADl capability - she requires assistance to toilet, using a bedside commode more.  Sister helps her to bathe/shower, ambulates with a walker in the home, can feed herself, manages her meds, sister preps food.  She is not driving.     She does not track her weight at home, no LE edema but does note some increased abdominal girth.  She is smoking 1 pack cigarettes per week, trying to quit.  No alcohol use in the last 1 years. Adherent to home medications.       Overview/Hospital Course:  No notes on file    Interval History: Feeling better, may dc to SNF, C/W current care    Review of Systems   Constitutional:  Positive for activity change. Negative for appetite change, chills and fever.   HENT:  Negative for sore throat.    Respiratory:  Positive for cough and shortness of breath.    Cardiovascular:  Negative for chest pain, palpitations and leg swelling.   Gastrointestinal:  Negative for abdominal pain and nausea.   Genitourinary:  Negative for dysuria.   Musculoskeletal:  Negative for back pain.   Skin:  Negative for rash.   Neurological:  Positive for weakness.     Objective:     Vital Signs (Most Recent):  Temp: 99 °F (37.2 °C) (08/08/23 0925)  Pulse: 103 (08/08/23 0925)  Resp: 20 (08/08/23 0925)  BP: 128/66 (08/08/23 0925)  SpO2: (!) 91 % (08/08/23 0925)  Vital Signs (24h Range):  Temp:  [98.3 °F (36.8 °C)-99 °F (37.2 °C)] 99 °F (37.2 °C)  Pulse:  [] 103  Resp:  [16-24] 20  SpO2:  [88 %-98 %] 91 %  BP: (128-198)/(66-98) 128/66     Weight: 91.5 kg (201 lb 11.5 oz)  Body mass index is 34.63 kg/m².    Intake/Output Summary (Last 24 hours) at 8/8/2023 1351  Last data filed at 8/7/2023 2358  Gross per 24 hour   Intake --   Output 500 ml   Net -500 ml         Physical Exam  Constitutional:       Appearance: She is obese. She is ill-appearing.      Interventions: Nasal cannula in place.   HENT:      Head: Normocephalic and atraumatic.   Eyes:      General: No scleral icterus.     Pupils: Pupils are equal, round, and reactive to light.   Cardiovascular:      Rate and Rhythm: Normal rate and regular rhythm.      Pulses: Normal pulses.      Heart sounds: No murmur heard.  Pulmonary:      Effort: Pulmonary effort is normal. No respiratory distress.      Breath sounds: Rales present. No wheezing.   Abdominal:      General: Bowel sounds are normal. There is no distension.      Palpations: Abdomen is soft.      Tenderness: There is no abdominal tenderness. There is no guarding.   Musculoskeletal:      Cervical back: Neck supple.      Right lower leg: No edema.      Left lower leg: No edema.   Skin:     General: Skin is warm and dry.   Neurological:      General: No focal deficit present.   Psychiatric:         Mood and Affect: Mood normal.         Behavior: Behavior normal.             Significant Labs: All pertinent labs within the past 24 hours have been reviewed.  CBC:   Recent Labs   Lab 08/07/23  1454 08/08/23  0316   WBC 6.23 3.68*   HGB 8.7* 9.7*   HCT 32.2* 34.6*    255       Significant Imaging: I have reviewed all pertinent imaging results/findings within the past 24 hours.      Assessment/Plan:      * Acute hypoxemic respiratory failure  Acute hypoxic respiratory failure, not on home oxygen, etiology suspected potential congestive heart failure related or  lung disease (cOPD/emphysema)   -give lasix now and schedule 2 daily doses  -continue scheduled nebulizers, schedule glucocorticoids  -wean oxygen as tolerated.   -Prior admission last year with similar presentation    Paroxysmal atrial fibrillation  Patient with Persistent (7 days or more) atrial fibrillation which is controlled currently with Beta Blocker and Amiodarone. Patient is currently in sinus rhythm.[id number] Score: 1. HASBLED Score: . Anticoagulation indicated. Anticoagulation done with apixaban.    Chronic diastolic congestive heart failure  Patient is identified as having Diastolic (HFpEF) heart failure that is Acute on chronic. CHF is currently uncontrolled due to Rales/crackles on pulmonary exam and Pulmonary edema/pleural effusion on CXR. Latest ECHO performed and demonstrates- Results for orders placed during the hospital encounter of 08/01/22    Echo Saline Bubble? Yes    Interpretation Summary  · The left ventricle is normal in size with concentric remodeling and normal systolic function.  · The estimated ejection fraction is 68%.  · Indeterminate left ventricular diastolic function.  · Normal right ventricular size with normal right ventricular systolic function.  · Mild tricuspid regurgitation.  · Mild pulmonic regurgitation.  · Elevated central venous pressure (15 mmHg).  · The estimated PA systolic pressure is 49 mmHg.  · There is pulmonary hypertension.  · Small posterior pericardial effusion. Trivial lateral and under the atria.  · There is a small left pleural effusion.  · No shunt by Air Contrast Bubble.  . Continue Furosemide and monitor clinical status closely. Monitor on telemetry. Patient is off CHF pathway.  Monitor strict Is&Os and daily weights.  Place on fluid restriction of 1.5 L. Cardiology has not been any consulted. Continue to stress to patient importance of self efficacy and  on diet for CHF. Last BNP reviewed- and noted below   Recent Labs   Lab 08/07/23  7910   BNP  706*   .    Chronic pain disorder  Continue multimodal pain meds and home percocet      Impaired functional mobility, balance, gait, and endurance  Consult PT/OT   Fall precautions.       Major depressive disorder, recurrent, moderate  Chronic stable - continue buspirone and venlafaxine home medications.         Alcohol use disorder, severe, dependence  Reports sobriety x 1 year, continue thiamine        VTE Risk Mitigation (From admission, onward)         Ordered     apixaban tablet 5 mg  2 times daily         08/07/23 2335     Reason for No Pharmacological VTE Prophylaxis  Once        Question:  Reasons:  Answer:  Already adequately anticoagulated on oral Anticoagulants    08/07/23 2335     IP VTE HIGH RISK PATIENT  Once         08/07/23 2335     Place sequential compression device  Until discontinued         08/07/23 2335                Discharge Planning   DIXON:      Code Status: Full Code   Is the patient medically ready for discharge?: No    Reason for patient still in hospital (select all that apply): Patient unstable                     Tino Tucker MD  Department of Hospital Medicine   [facility] - Emergency Dept

## 2023-08-08 NOTE — PLAN OF CARE
Initial Discharge Planning Case Management Assessment:    Patient admitted on 8-7-23  Chart reviewed today  Care plan discussed with treatment team,    attending Dr Tucker    Current dispo: pending  Home health vs Snf (PHN primary)  SnF packet shared via careport, await for PT/OT evals  Transportation: has reliable   Consults following: case mgt., PT, OT  Case management  to follow    [facility] - Emergency Dept  Initial Discharge Assessment       Primary Care Provider: Estela Mcdaniel MD    Admission Diagnosis: Shortness of breath [R06.02]    Admission Date: 8/7/2023  Expected Discharge Date:     Transition of Care Barriers: (P) None    Payor: [employer] MEDICARE / Plan: [employer] SECURE COMPLETE / Product Type: Medicare Advantage /     Extended Emergency Contact Information  Primary Emergency Contact: Aggie Adames  Address: [address] [place] [address]  [address]           FRANCY Ludwig [address] [facility]  Home Phone: [phone]  Mobile Phone: [phone]  Relation: Sister    Discharge Plan A: (P) Home Health  Discharge Plan B: (P) Skilled Nursing Facility      [facility] - [place], [place] - [address]  [address]  [place] [address]  Phone: [phone] Fax: [phone]      Initial Assessment (most recent)       Adult Discharge Assessment - 08/08/23 1840          Discharge Assessment    Assessment Type Discharge Planning Assessment (P)      Confirmed/corrected address, phone number and insurance Yes (P)      Confirmed Demographics Correct on Facesheet (P)      Source of Information patient;health record (P)      People in Home sibling(s) (P)      Do you expect to return to your current living situation? No (P)      Prior to hospitilization cognitive status: Alert/Oriented (P)      Current cognitive status: Alert/Oriented (P)      Readmission within 30 days? No (P)      Patient currently being followed by outpatient case management? No (P)      Do you currently have  service(s) that help you manage your care at home? No (P)      Do you take prescription medications? Yes (P)      Do you have prescription coverage? Yes (P)      Do you have any problems affording any of your prescribed medications? No (P)      Is the patient taking medications as prescribed? yes (P)      How do you get to doctors appointments? family or friend will provide;health plan transportation (P)      Discharge Plan A Home Health (P)      Discharge Plan B Skilled Nursing Facility (P)      Discharge Plan discussed with: Patient (P)      Transition of Care Barriers None (P)         Financial Resource Strain    How hard is it for you to pay for the very basics like food, housing, medical care, and heating? Not very hard (P)         Housing Stability    In the last 12 months, was there a time when you were not able to pay the mortgage or rent on time? No (P)      In the last 12 months, how many places have you lived? 1 (P)      In the last 12 months, was there a time when you did not have a steady place to sleep or slept in a shelter (including now)? No (P)         Transportation Needs    In the past 12 months, has lack of transportation kept you from medical appointments or from getting medications? No (P)      In the past 12 months, has lack of transportation kept you from meetings, work, or from getting things needed for daily living? No (P)         Food Insecurity    Within the past 12 months, you worried that your food would run out before you got the money to buy more. Never true (P)         Social Connections    In a typical week, how many times do you talk on the phone with family, friends, or neighbors? Twice a week (P)      How often do you get together with friends or relatives? Twice a week (P)      How often do you attend meetings of the clubs or organizations you belong to? Never (P)         Alcohol Use    Q1: How often do you have a drink containing alcohol? Never (P)      Q2: How many drinks  containing alcohol do you have on a typical day when you are drinking? Patient does not drink (P)

## 2023-08-08 NOTE — HPI
63 y/o woman hx of HFpEF, Afib, HTN, Obesity, Chronic back pain, presents to the ED with complaints of shortness of breath.     She has had shortness of breath for 2 weeks. And dyspnea on exertion.  She has chronic debility related to her chronic back pain and as of late has had decreased ADl capability - she requires assistance to toilet, using a bedside commode more.  Sister helps her to bathe/shower, ambulates with a walker in the home, can feed herself, manages her meds, sister preps food.  She is not driving.     She does not track her weight at home, no LE edema but does note some increased abdominal girth.  She is smoking 1 pack cigarettes per week, trying to quit.  No alcohol use in the last 1 years. Adherent to home medications.

## 2023-08-08 NOTE — ASSESSMENT & PLAN NOTE
Acute hypoxic respiratory failure, not on home oxygen, etiology suspected potential congestive heart failure related or lung disease (cOPD/emphysema)   -give lasix now and schedule 2 daily doses  -continue scheduled nebulizers, schedule glucocorticoids  -wean oxygen as tolerated.   -Prior admission last year with similar presentation

## 2023-08-09 NOTE — PROGRESS NOTES
[facility] Medicine  Progress Note    Patient Name: Vale Gutierrez  MRN: [id number]  Patient Class: OP- Observation   Admission Date: 8/7/2023  Length of Stay: 0 days  Attending Physician: Tino Tucker MD  Primary Care Provider: Estela Mcdaniel MD        Subjective:     Principal Problem:Acute hypoxemic respiratory failure        HPI:  63 y/o woman hx of HFpEF, Afib, HTN, Obesity, Chronic back pain, presents to the ED with complaints of shortness of breath.     She has had shortness of breath for 2 weeks. And dyspnea on exertion.  She has chronic debility related to her chronic back pain and as of late has had decreased ADl capability - she requires assistance to toilet, using a bedside commode more.  Sister helps her to bathe/shower, ambulates with a walker in the home, can feed herself, manages her meds, sister preps food.  She is not driving.     She does not track her weight at home, no LE edema but does note some increased abdominal girth.  She is smoking 1 pack cigarettes per week, trying to quit.  No alcohol use in the last 1 years. Adherent to home medications.       Overview/Hospital Course:  No notes on file    Interval History: Feeling better, has intention tremor. PT evaluation is pending, may like SNF.    Review of Systems  Objective:     Vital Signs (Most Recent):  Temp: 97.8 °F (36.6 °C) (08/09/23 0719)  Pulse: 101 (08/09/23 0719)  Resp: 18 (08/09/23 0719)  BP: (!) 148/87 (08/09/23 0719)  SpO2: (!) 90 % (08/09/23 0719) Vital Signs (24h Range):  Temp:  [96.6 °F (35.9 °C)-99 °F (37.2 °C)] 97.8 °F (36.6 °C)  Pulse:  [] 101  Resp:  [16-20] 18  SpO2:  [90 %-97 %] 90 %  BP: (128-167)/(66-87) 148/87     Weight: 96.6 kg (212 lb 15.4 oz)  Body mass index is 36.56 kg/m².    Intake/Output Summary (Last 24 hours) at 8/9/2023 0904  Last data filed at 8/9/2023 0857  Gross per 24 hour   Intake --   Output 350 ml   Net -350 ml         Physical Exam  Constitutional:        Appearance: She is obese. She is ill-appearing.      Interventions: Nasal cannula in place.   HENT:      Head: Normocephalic and atraumatic.   Eyes:      General: No scleral icterus.     Pupils: Pupils are equal, round, and reactive to light.   Cardiovascular:      Rate and Rhythm: Normal rate and regular rhythm.      Pulses: Normal pulses.      Heart sounds: No murmur heard.  Pulmonary:      Effort: Pulmonary effort is normal. No respiratory distress.      Breath sounds: Rales present. No wheezing.   Abdominal:      General: Bowel sounds are normal. There is no distension.      Palpations: Abdomen is soft.      Tenderness: There is no abdominal tenderness. There is no guarding.   Musculoskeletal:      Cervical back: Neck supple.      Right lower leg: No edema.      Left lower leg: No edema.   Skin:     General: Skin is warm and dry.   Neurological:      General: No focal deficit present.   Psychiatric:         Mood and Affect: Mood normal.         Behavior: Behavior normal.             Significant Labs: All pertinent labs within the past 24 hours have been reviewed.  CBC:   Recent Labs   Lab 08/07/23  1454 08/08/23  0316 08/09/23  0413   WBC 6.23 3.68* 10.14   HGB 8.7* 9.7* 9.5*   HCT 32.2* 34.6* 34.2*    255 289       Significant Imaging: I have reviewed all pertinent imaging results/findings within the past 24 hours.      Assessment/Plan:      * Acute hypoxemic respiratory failure  Acute hypoxic respiratory failure, not on home oxygen, etiology suspected potential congestive heart failure related or lung disease (cOPD/emphysema)   -give lasix now and schedule 2 daily doses  -continue scheduled nebulizers, schedule glucocorticoids  -wean oxygen as tolerated.   -Prior admission last year with similar presentation    Paroxysmal atrial fibrillation  Patient with Persistent (7 days or more) atrial fibrillation which is controlled currently with Beta Blocker and Amiodarone. Patient is currently in sinus  rhythm.[id number] Score: 1. HASBLED Score: . Anticoagulation indicated. Anticoagulation done with apixaban.    Chronic diastolic congestive heart failure  Patient is identified as having Diastolic (HFpEF) heart failure that is Acute on chronic. CHF is currently uncontrolled due to Rales/crackles on pulmonary exam and Pulmonary edema/pleural effusion on CXR. Latest ECHO performed and demonstrates- Results for orders placed during the hospital encounter of 08/01/22    Echo Saline Bubble? Yes    Interpretation Summary  · The left ventricle is normal in size with concentric remodeling and normal systolic function.  · The estimated ejection fraction is 68%.  · Indeterminate left ventricular diastolic function.  · Normal right ventricular size with normal right ventricular systolic function.  · Mild tricuspid regurgitation.  · Mild pulmonic regurgitation.  · Elevated central venous pressure (15 mmHg).  · The estimated PA systolic pressure is 49 mmHg.  · There is pulmonary hypertension.  · Small posterior pericardial effusion. Trivial lateral and under the atria.  · There is a small left pleural effusion.  · No shunt by Air Contrast Bubble.  . Continue Furosemide and monitor clinical status closely. Monitor on telemetry. Patient is off CHF pathway.  Monitor strict Is&Os and daily weights.  Place on fluid restriction of 1.5 L. Cardiology has not been any consulted. Continue to stress to patient importance of self efficacy and  on diet for CHF. Last BNP reviewed- and noted below   Recent Labs   Lab 08/07/23  1454   *   .    Chronic pain disorder  Continue multimodal pain meds and home percocet      Impaired functional mobility, balance, gait, and endurance  Consult PT/OT   Fall precautions.       Major depressive disorder, recurrent, moderate  Chronic stable - continue buspirone and venlafaxine home medications.         Alcohol use disorder, severe, dependence  Reports sobriety x 1 year, continue  thiamine        VTE Risk Mitigation (From admission, onward)         Ordered     apixaban tablet 5 mg  2 times daily         08/07/23 2335     Reason for No Pharmacological VTE Prophylaxis  Once        Question:  Reasons:  Answer:  Already adequately anticoagulated on oral Anticoagulants    08/07/23 2335     IP VTE HIGH RISK PATIENT  Once         08/07/23 2335     Place sequential compression device  Until discontinued         08/07/23 2335                Discharge Planning   DIXON: 8/11/2023     Code Status: Full Code   Is the patient medically ready for discharge?: No    Reason for patient still in hospital (select all that apply): Patient unstable  Discharge Plan A: Home Health   Discharge Delays: None known at this time              Tino Tucker MD  Department of Hospital Medicine   [facility]

## 2023-08-09 NOTE — NURSING
Nurses Note -- 4 Eyes      8/9/2023   12:45 AM      Skin assessed during: Admit      [x] No Altered Skin Integrity Present    []Prevention Measures Documented      [] Yes- Altered Skin Integrity Present or Discovered   [] LDA Added if Not in Epic (Describe Wound)   [] New Altered Skin Integrity was Present on Admit and Documented in LDA   [] Wound Image Taken    Wound Care Consulted? No    Attending Nurse:  Alisha Bledsoe RN/Staff Member:   JOSEPH Arguello

## 2023-08-09 NOTE — SUBJECTIVE & OBJECTIVE
Interval History: Feeling better, has intention tremor. PT evaluation is pending, may like SNF.    Review of Systems  Objective:     Vital Signs (Most Recent):  Temp: 97.8 °F (36.6 °C) (08/09/23 0719)  Pulse: 101 (08/09/23 0719)  Resp: 18 (08/09/23 0719)  BP: (!) 148/87 (08/09/23 0719)  SpO2: (!) 90 % (08/09/23 0719) Vital Signs (24h Range):  Temp:  [96.6 °F (35.9 °C)-99 °F (37.2 °C)] 97.8 °F (36.6 °C)  Pulse:  [] 101  Resp:  [16-20] 18  SpO2:  [90 %-97 %] 90 %  BP: (128-167)/(66-87) 148/87     Weight: 96.6 kg (212 lb 15.4 oz)  Body mass index is 36.56 kg/m².    Intake/Output Summary (Last 24 hours) at 8/9/2023 0904  Last data filed at 8/9/2023 0857  Gross per 24 hour   Intake --   Output 350 ml   Net -350 ml         Physical Exam  Constitutional:       Appearance: She is obese. She is ill-appearing.      Interventions: Nasal cannula in place.   HENT:      Head: Normocephalic and atraumatic.   Eyes:      General: No scleral icterus.     Pupils: Pupils are equal, round, and reactive to light.   Cardiovascular:      Rate and Rhythm: Normal rate and regular rhythm.      Pulses: Normal pulses.      Heart sounds: No murmur heard.  Pulmonary:      Effort: Pulmonary effort is normal. No respiratory distress.      Breath sounds: Rales present. No wheezing.   Abdominal:      General: Bowel sounds are normal. There is no distension.      Palpations: Abdomen is soft.      Tenderness: There is no abdominal tenderness. There is no guarding.   Musculoskeletal:      Cervical back: Neck supple.      Right lower leg: No edema.      Left lower leg: No edema.   Skin:     General: Skin is warm and dry.   Neurological:      General: No focal deficit present.   Psychiatric:         Mood and Affect: Mood normal.         Behavior: Behavior normal.             Significant Labs: All pertinent labs within the past 24 hours have been reviewed.  CBC:   Recent Labs   Lab 08/07/23  1454 08/08/23  0316 08/09/23  0413   WBC 6.23 3.68* 10.14    HGB 8.7* 9.7* 9.5*   HCT 32.2* 34.6* 34.2*    255 289       Significant Imaging: I have reviewed all pertinent imaging results/findings within the past 24 hours.

## 2023-08-09 NOTE — PT/OT/SLP EVAL
"Occupational Therapy   Evaluation    Name: Vale Gutierrez  MRN: [id number]  Admitting Diagnosis: Acute hypoxemic respiratory failure  Recent Surgery: * No surgery found *      Recommendations:     Discharge Recommendations: outpatient OT, outpatient PT  Discharge Equipment Recommendations:  none  Barriers to discharge:   (Pt requires O2 NC for maintainign spO2 >89%; desat to ~86-87% w/ exertion on RA; not on home O2 PTA.)    Assessment:     Vale Gutierrez is a 62 y.o. female with a medical diagnosis of Acute hypoxemic respiratory failure.  Performance deficits affecting function: weakness, impaired endurance, impaired cardiopulmonary response to activity, impaired functional mobility, impaired self care skills.      Pt very pleasant and willing to participate in tx session this date. Pt was able to ambulate functional distances in room w/ CGA-SBA and use of RW for completing functional transfers and ADLs. Pt w/ spO2 of 95% at rest on 3L and 87-91% on RA w/ exertion. Pt required cueing for implementing PLB and energy conservation strategies into activities for preventing SOB and over-exertion. Pt tolerated tx session well and will continue to benefit from skilled acute OT services to maximize functional capacity and increase overall strength/endurance for safe performance w/ ADLs and functional mobility.     Rehab Prognosis: Good; patient would benefit from acute skilled OT services to address these deficits and reach maximum level of function.       Plan:     Patient to be seen 3 x/week to address the above listed problems via self-care/home management, therapeutic activities, therapeutic exercises  Plan of Care Expires: 08/23/23  Plan of Care Reviewed with: patient    Subjective     Chief Complaint: "I feel like I am not going to get any better."   Patient/Family Comments/goals: To get stronger.     Occupational Profile:  Living Environment: Pt lives w/ sister in [facility] w/ 0 [address]; pt uses a t/s combo with " DME.   Previous level of function: Pt was mod I w/ use of rollator; not using home O2. Reports her sister would need to sometimes assist w/ thorough toileting and bathing. Also required assist foe safely transferring in and out of tub. Pt reports her sister works from 7 AM - 2:30 PM; during this date, pt wakes up at 10 AM and remained in room w/ access to her BSC until her sister is home.   Roles and Routines: as receiving HH.   Equipment Used at Home: rollator, bath bench, bedside commode, wheelchair, walker, rolling  Assistance upon Discharge: Sister     Pain/Comfort:  Pain Rating 1: 0/10  Pain Rating Post-Intervention 1: 0/10    Patients cultural, spiritual, Religion conflicts given the current situation: no    Objective:     Communicated with: Nurse prior to session.  Patient found HOB elevated with oxygen, PureWick upon OT entry to room.    General Precautions: Standard, fall  Orthopedic Precautions: N/A  Braces: N/A  Respiratory Status: Nasal cannula, flow 3 L/min    Occupational Performance:    Bed Mobility:    Patient completed Scooting/Bridging with stand by assistance  Patient completed Supine to Sit with stand by assistance  Patient completed Sit to Supine with stand by assistance    Functional Mobility/Transfers:  Patient completed Sit <> Stand Transfer with stand by assistance and contact guard assistance  with  rolling walker   Patient completed Toilet Transfer Step Transfer technique with stand by assistance and contact guard assistance with  rolling walker  Functional Mobility: Pt was able to ambulate from EOB>bathroom and returned to EOB w/ CGA-SBA and use of RW; no LOB occurred. Pt w/ SPO2 87-90% and -116 bpm.     Activities of Daily Living:  Upper Body Dressing: minimum assistance for donning gown over back   Lower Body Dressing: stand by assistance for using figure-4 position at EOB to indria socks for BLEs  Toileting: total assistance for performing posterior filemon-care w/ pt in standing  using BUE support on RW    Cognitive/Visual Perceptual:  Cognitive/Psychosocial Skills:     -       Oriented to: Person, Place, Time, and Situation   -       Follows Commands/attention:Follows multistep  commands  -       Communication: clear/fluent  -       Memory: No Deficits noted  -       Safety awareness/insight to disability: intact     Physical Exam:  Balance:    -       good sitting balance; fair + standing   Postural examination/scapula alignment:    -       Rounded shoulders  -       Forward head  Skin integrity: Visible skin intact  Edema:  None noted  Sensation:    -       Intact  Upper Extremity Range of Motion:     -       Right Upper Extremity: WFL  -       Left Upper Extremity: WFL  Upper Extremity Strength:    -       Right Upper Extremity: WFL  -       Left Upper Extremity: WFL   Strength:    -       Right Upper Extremity: WFL  -       Left Upper Extremity: WFL    AMPAC 6 Click ADL:  AMPAC Total Score: 20    Treatment & Education:  -Initial eval complete.   -Pt educated on role of OT and POC.   -Pt educated on safe functional mobility and ADL performance.   -Pt educated on importance of PLB and energy conservation.   -Pt educated on importance of OOB activity w/ assist from staff.   -Questions and concerns addressed within scope.     Patient left HOB elevated with all lines intact and call button in reach    GOALS:   Multidisciplinary Problems       Occupational Therapy Goals          Problem: Occupational Therapy    Goal Priority Disciplines Outcome Interventions   Occupational Therapy Goal     OT, PT/OT Ongoing, Progressing    Description: Goals to be met by: 8/23/23     Patient will increase functional independence with ADLs by performing:    LE Dressing with Modified [place].  Grooming while standing at sink (w/ seated rest breaks as needed) with Modified [place].  Toileting from toilet with Modified [place] for hygiene and clothing management.   Step transfer with Modified  [place]  Toilet transfer to toilet with Modified [place].  Upper extremity exercise program x15 reps per handout, with independence.                         History:     Past Medical History:   Diagnosis Date    Alcohol abuse     Anxiety     Benign paroxysmal positional vertigo 1/17/2020    Depression     Hyperlipidemia     Hypertension          Past Surgical History:   Procedure Laterality Date    BREAST CYST ASPIRATION      CHOLECYSTECTOMY      complicated with bile leak, sepsis    COLONOSCOPY N/A 6/3/2020    Procedure: COLONOSCOPY [place];  Surgeon: Tino Neil MD;  Location: [facility] ENDO;  Service: Colon and Rectal;  Laterality: N/A;    EPIDURAL STEROID INJECTION N/A 5/27/2022    Procedure: INJECTION, STEROID, EPIDURAL, CAUDAL CONTRAST;  Surgeon: Rubén Rico MD;  Location: [facility] PAIN MGT;  Service: Pain Management;  Laterality: N/A;  5/6 RESCHEDULE    ESOPHAGOGASTRODUODENOSCOPY N/A 6/3/2020    Procedure: EGD (ESOPHAGOGASTRODUODENOSCOPY);  Surgeon: Tino Neil MD;  Location: [facility];  Service: Colon and Rectal;  Laterality: N/A;    INJECTION OF ANESTHETIC AGENT AROUND NERVE Bilateral 3/24/2022    Procedure: BLOCK, NERVE MEDIAL BRANCH BLOCK BL L2, L3, L4 and L5  1st, needs consent;  Surgeon: Rubén Rico MD;  Location: [facility] PAIN [facility]GT;  Service: Pain Management;  Laterality: Bilateral;    TRANSFORAMINAL EPIDURAL INJECTION OF STEROID Bilateral 8/7/2020    Procedure: INJECTION, STEROID, EPIDURAL, TRANSFORAMINAL APPROACH, L4-L5 need consent;  Surgeon: Rubén Rico MD;  Location: [facility] PAIN [facility]GT;  Service: Pain Management;  Laterality: Bilateral;    TRANSFORAMINAL EPIDURAL INJECTION OF STEROID Bilateral 9/4/2020    Procedure: LUMBAR TRANSFORAMINAL BILATERAL L4/5 DIRECT REFERRAL;  Surgeon: Rubén Rico MD;  Location: [facility] PAIN [facility]GT;  Service: Pain Management;  Laterality: Bilateral;  NEEDS CONSENT    TRANSFORAMINAL EPIDURAL INJECTION OF STEROID Bilateral 3/4/2022    Procedure:  Injection,steroid,epidural,transforaminal approach BILATERAL L3/4 DIRECT REFERRAL;  Surgeon: Rubén Rico MD;  Location: [facility] PAIN [facility]GT;  Service: Pain Management;  Laterality: Bilateral;       Time Tracking:     OT Date of Treatment: 08/09/23  OT Start Time: 0915  OT Stop Time: 0932  OT Total Time (min): 17 min    Billable Minutes:Evaluation 17  Total Time 17    8/9/2023

## 2023-08-09 NOTE — NURSING
Home Oxygen Evaluation    Date Performed: 2023    1) Patient's Home O2 Sat on room air, while at rest: 89        If O2 sats on room air at rest are 88% or below, patient qualifies. No additional testing needed. Document N/A in steps 2 and 3. If 89% or above, complete steps 2.      2) Patient's O2 Sat on room air while exercisin        If O2 sats on room air while exercising remain 89% or above patient does not qualify, no further testing needed Document N/A in step 3. If O2 sats on room air while exercising are 88% or below, continue to step 3.      3) Patient's O2 Sat while exercising on O2: 96 at 3 LPM         (Must show improvement from #2 for patients to qualify)    If O2 sats improve on oxygen, patient qualifies for portable oxygen. If not, the patient does not qualify.

## 2023-08-09 NOTE — HOSPITAL COURSE
- Breathing is near baseline, PT consult recomends home health, qualified for home . May dc home in am  8/10  LYLE Cr 0.9 -->13 COPD/CHF admit,  6 min walk test- Patient's O2 Sat on room air while exercisin. not on home 02.  Qualified or home . CTA chest -Allowing for exam limitations above, no convincing pulmonary thromboembolism to the level of the distal lobar branches.  Distal embolism cannot be excluded, particularly within the segmental and subsegmental branches to the bilateral lower lobes.  Correlation of these findings with D-dimer and/or lower extremity ultrasound as warranted.  Interlobular septal thickening may reflect edema. Echo - Left Ventricle: The left ventricle is normal in size. Ventricular mass is normal. Normal wall thickness. Normal wall motion. There is normal systolic function. Ejection fraction by visual approximation is 65%. Grade I diastolic dysfunction.Right Ventricle: Systolic function is normal. IVC/SVC: Normal venous pressure at 3 mmHg.   FeNa of 0.1. started on NS gentle hydration at 60ml/h . LYLE improving. Encourage PO intake 1500ml/24h .   previously received lasix IV x 3 doses. sats 98% on 2LNC. tylenol and lidocaine patch for low back ache  . completed steroids. Cr at 1.4 . encourage PO hydration. sats 93% on 1LNC . needs outpaient pulm f/u and PFTs. feels weak with ambulation. discussed PT recs for outpatient PT.     discharge today with outpatient PT

## 2023-08-09 NOTE — PLAN OF CARE
PT Evaluation completed and PT POC established.    Problem: Physical Therapy  Goal: Physical Therapy Goal  Description: Goals to be met by: 2023     Patient will increase functional independence with mobility by performin. Supine to sit with [place]  2. Sit to supine with [place]  3. Sit to stand transfer with Supervision  4. Bed to chair transfer with Supervision using LRAD  5. Gait  x 30 feet with Supervision using LRAD.     Outcome: Ongoing, Progressing

## 2023-08-09 NOTE — PLAN OF CARE
Problem: Occupational Therapy  Goal: Occupational Therapy Goal  Description: Goals to be met by: 8/23/23     Patient will increase functional independence with ADLs by performing:    LE Dressing with Modified [place].  Grooming while standing at sink (w/ seated rest breaks as needed) with Modified [place].  Toileting from toilet with Modified [place] for hygiene and clothing management.   Step transfer with Modified [place]  Toilet transfer to toilet with Modified [place].  Upper extremity exercise program x15 reps per handout, with independence.    Outcome: Ongoing, Progressing

## 2023-08-09 NOTE — PT/OT/SLP EVAL
"Physical Therapy Evaluation    Patient Name:  Vale Gutierrez   MRN:  [id number]    Recommendations:     Discharge Recommendations: outpatient PT, outpatient OT   Discharge Equipment Recommendations: none   Barriers to discharge: None    Assessment:     Vale Gutierrez is a 62 y.o. female admitted with a medical diagnosis of Acute hypoxemic respiratory failure.  She presents with the following impairments/functional limitations: impaired endurance, weakness, decreased lower extremity function.    Pt cooperative and pleasant, however expressed decreased motivation for therapy and exercise. Pt amb 20' RW SBA and presented with good denice, visible fatigue, and decreased RW management for safety. Educated on benefits of adhering to therapy POC and exercises, pt verbalized understanding. Pt will benefit from skilled PT services while in house in order to address the aforementioned deficits.    Rehab Prognosis: Good; patient would benefit from acute skilled PT services to address these deficits and reach maximum level of function.    Recent Surgery: * No surgery found *      Plan:     During this hospitalization, patient to be seen 3 x/week to address the identified rehab impairments via gait training, therapeutic activities, therapeutic exercises, neuromuscular re-education and progress toward the following goals:    Plan of Care Expires:  09/09/23    Subjective     "My sister and I share everything together"    Pain/Comfort:  Pain Rating 1: 0/10    Patients cultural, spiritual, Hoahaoism conflicts given the current situation: no    Living Environment:  Per pt, pt and sister reside in first floor apt. Pt has tub/shower combo  Prior to admission, patients level of function was mod I rollator.  Equipment used at home: rollator, bath bench, bedside commode, wheelchair, walker, rolling.  DME owned (not currently used): none.  Upon discharge, patient will have assistance from sister, she works til " 1-2pm.    Objective:     Communicated with RN prior to session.  Patient found HOB elevated with oxygen, PureWick  upon PT entry to room.    General Precautions: Standard, fall  Orthopedic Precautions:N/A   Braces: N/A  Respiratory Status: Nasal cannula, flow 3 L/min    Exams:  RLE ROM: WFL  RLE Strength: WFL  LLE ROM: WFL  LLE Strength: WFL    Functional Mobility:  Bed Mobility:     Scooting: stand by assistance  Supine to Sit: stand by assistance  Sit to Supine: stand by assistance  Transfers:     Sit to Stand:  stand by assistance with rolling walker  Gait: Pt amb 20' RW SBA and presented with good denice, slight trunk flexion, and decreased RW management  Balance:   Good sitting balance  Fair-good standing balance      AM-PAC 6 CLICK MOBILITY  Total Score:18       Treatment & Education:  Discussed sitting up EOB and/or UIC with RW and RN/staff outside of therapy services  Discussed RW management, fall prevention, and safety  Discussed using RW over rollator due to safety concern    Educated pt on PT role/POC  Educated pt on importance of OOB activity and daily ambulation   Pt educated on proper body mechanics, safety techniques, and energy conservation with PT facilitation and cueing throughout session   Pt verbalized understanding      Patient left HOB elevated with all lines intact, call button in reach, RN notified, and PCT present.    GOALS:   Multidisciplinary Problems       Physical Therapy Goals          Problem: Physical Therapy    Goal Priority Disciplines Outcome Goal Variances Interventions   Physical Therapy Goal     PT, PT/OT Ongoing, Progressing     Description: Goals to be met by: 2023     Patient will increase functional independence with mobility by performin. Supine to sit with [place]  2. Sit to supine with [place]  3. Sit to stand transfer with Supervision  4. Bed to chair transfer with Supervision using LRAD  5. Gait  x 30 feet with Supervision using LRAD.                           History:     Past Medical History:   Diagnosis Date    Alcohol abuse     Anxiety     Benign paroxysmal positional vertigo 1/17/2020    Depression     Hyperlipidemia     Hypertension        Past Surgical History:   Procedure Laterality Date    BREAST CYST ASPIRATION      CHOLECYSTECTOMY      complicated with bile leak, sepsis    COLONOSCOPY N/A 6/3/2020    Procedure: COLONOSCOPY [place];  Surgeon: Tino Neil MD;  Location: [facility];  Service: Colon and Rectal;  Laterality: N/A;    EPIDURAL STEROID INJECTION N/A 5/27/2022    Procedure: INJECTION, STEROID, EPIDURAL, CAUDAL CONTRAST;  Surgeon: Rubén Rico MD;  Location: [facility];  Service: Pain Management;  Laterality: N/A;  5/6 RESCHEDULE    ESOPHAGOGASTRODUODENOSCOPY N/A 6/3/2020    Procedure: EGD (ESOPHAGOGASTRODUODENOSCOPY);  Surgeon: Tino Neil MD;  Location: [facility];  Service: Colon and Rectal;  Laterality: N/A;    INJECTION OF ANESTHETIC AGENT AROUND NERVE Bilateral 3/24/2022    Procedure: BLOCK, NERVE MEDIAL BRANCH BLOCK BL L2, L3, L4 and L5  1st, needs consent;  Surgeon: Rubén Rico MD;  Location: [facility];  Service: Pain Management;  Laterality: Bilateral;    TRANSFORAMINAL EPIDURAL INJECTION OF STEROID Bilateral 8/7/2020    Procedure: INJECTION, STEROID, EPIDURAL, TRANSFORAMINAL APPROACH, L4-L5 need consent;  Surgeon: Rubén Rico MD;  Location: [facility];  Service: Pain Management;  Laterality: Bilateral;    TRANSFORAMINAL EPIDURAL INJECTION OF STEROID Bilateral 9/4/2020    Procedure: LUMBAR TRANSFORAMINAL BILATERAL L4/5 DIRECT REFERRAL;  Surgeon: Rubén Rico MD;  Location: [facility];  Service: Pain Management;  Laterality: Bilateral;  NEEDS CONSENT    TRANSFORAMINAL EPIDURAL INJECTION OF STEROID Bilateral 3/4/2022    Procedure: Injection,steroid,epidural,transforaminal approach BILATERAL L3/4 DIRECT REFERRAL;  Surgeon: Rubén Rico MD;  Location: [facility];  Service: Pain Management;   Laterality: Bilateral;       Time Tracking:     PT Received On: 08/09/23  PT Start Time: 1008     PT Stop Time: 1048  PT Total Time (min): 40 min     Billable Minutes: Evaluation 15, Gait Training 15, and Therapeutic Activity 10      08/09/2023

## 2023-08-10 PROBLEM — N17.9 AKI (ACUTE KIDNEY INJURY): Status: ACTIVE | Noted: 2023-01-01

## 2023-08-10 PROBLEM — E87.5 HYPERKALEMIA: Status: ACTIVE | Noted: 2023-01-01

## 2023-08-10 NOTE — ASSESSMENT & PLAN NOTE
Patient with Persistent (7 days or more) atrial fibrillation which is controlled currently with Beta Blocker and Amiodarone. Patient is currently in sinus rhythm. Anticoagulation indicated. Anticoagulation done with apixaban.

## 2023-08-10 NOTE — ASSESSMENT & PLAN NOTE
Patient with  acute kidney injury.Serum BUN/Cr uptrending.  Strict I/O monitor . Obtain urine lytes , renal sonogram  and bladder scans q 6h  Recent Labs   Lab 08/08/23  0316 08/09/23  0413 08/10/23  0531   BUN 20 26* 30*   CREATININE 0.9 1.3 1.3      encourage PO hydration

## 2023-08-10 NOTE — PLAN OF CARE
APPOINTMENT:    Patient Appointment(s) scheduled with Estela Mcdaniel MD on Tuesday Aug 29, 2023 9:00 AM

## 2023-08-10 NOTE — ASSESSMENT & PLAN NOTE
Chronic, controlled.  Latest blood pressure and vitals reviewed-   Temp:  [97.2 °F (36.2 °C)-98.6 °F (37 °C)]   Pulse:  [65-82]   Resp:  [17-18]   BP: (116-162)/(67-90)   SpO2:  [91 %-94 %] .   Home meds for hypertension were reviewed and noted below. Hospital anti-hypertensive changes were made as shown below.  Hypertension Medications             metoprolol tartrate (LOPRESSOR) 50 MG tablet Take 1 tablet (50 mg total) by mouth once daily.        PRN meds if BP> 180/110 mm HG

## 2023-08-10 NOTE — ASSESSMENT & PLAN NOTE
Acute hypoxic respiratory failure, not on home oxygen, etiology suspected potential congestive heart failure related or lung disease (cOPD/emphysema)   -give lasix now and schedule 2 daily doses  -continue scheduled nebulizers, schedule glucocorticoids  -wean oxygen as tolerated.   -Prior admission last year with similar presentation  8/10 6 min walk test- Patient's O2 Sat on room air while exercisin. not on home 02.  Qualified or home . CTA chest -Allowing for exam limitations above, no convincing pulmonary thromboembolism to the level of the distal lobar branches.  Distal embolism cannot be excluded, particularly within the segmental and subsegmental branches to the bilateral lower lobes.  Correlation of these findings with D-dimer and/or lower extremity ultrasound as warranted.  Interlobular septal thickening may reflect edema. Echo ordered

## 2023-08-10 NOTE — PLAN OF CARE
Problem: Adult Inpatient Plan of Care  Goal: Plan of Care Review  Outcome: Ongoing, Progressing  Goal: Patient-Specific Goal (Individualized)  Outcome: Ongoing, Progressing  Goal: Absence of Hospital-Acquired Illness or Injury  Outcome: Ongoing, Progressing  Goal: Optimal Comfort and Wellbeing  Outcome: Ongoing, Progressing  Goal: Readiness for Transition of Care  Outcome: Ongoing, Progressing     Problem: Infection  Goal: Absence of Infection Signs and Symptoms  Outcome: Ongoing, Progressing     Problem: Fall Injury Risk  Goal: Absence of Fall and Fall-Related Injury  Outcome: Ongoing, Progressing     Problem: Fatigue  Goal: Improved Activity Tolerance  Outcome: Ongoing, Progressing     Problem: Gas Exchange Impaired  Goal: Optimal Gas Exchange  Outcome: Ongoing, Progressing       Problem: Respiratory Compromise   Goal: Effective Oxygenation and Ventilation  Outcome: Ongoing, Progressing     Problem: Skin Injury Risk Increased  Goal: Skin Health and Integrity  Outcome: Ongoing, Progressing     Problem: Coping Ineffective  Goal: Effective Coping  Outcome: Ongoing, Progressing     Problem: Pain Acute  Goal: Acceptable Pain Control and Functional Ability  Outcome: Ongoing, Progressing     Good hours noted this shift. Review POC with patient at this time. Verbalize understanding and denies needs at this time. Frequent repositioning per self and staff throughout shift to reach POC goal of comfort. Safety barriers remain intact and alarms audible at this time.

## 2023-08-10 NOTE — PROGRESS NOTES
[facility]  Progress Note    Patient Name: Vale Gutierrez  MRN: [id number]  Patient Class: IP- Inpatient   Admission Date: 2023  Length of Stay: 0 days  Attending Physician: Tio Lopez MD  Primary Care Provider: Estela Mcdaniel MD        Subjective:     Principal Problem:Acute hypoxemic respiratory failure        HPI:  63 y/o woman hx of HFpEF, Afib, HTN, Obesity, Chronic back pain, presents to the ED with complaints of shortness of breath.     She has had shortness of breath for 2 weeks. And dyspnea on exertion.  She has chronic debility related to her chronic back pain and as of late has had decreased ADl capability - she requires assistance to toilet, using a bedside commode more.  Sister helps her to bathe/shower, ambulates with a walker in the home, can feed herself, manages her meds, sister preps food.  She is not driving.     She does not track her weight at home, no LE edema but does note some increased abdominal girth.  She is smoking 1 pack cigarettes per week, trying to quit.  No alcohol use in the last 1 years. Adherent to home medications.       Overview/Hospital Course:   - Breathing is near baseline, PT consult recomends home health, qualified for home 02. May dc home in am  10  LYLE Cr 0.9 -->13 COPD/CHF admit,  6 min walk test- Patient's O2 Sat on room air while exercisin. not on home 02.  Qualified or home 02. CTA chest -Allowing for exam limitations above, no convincing pulmonary thromboembolism to the level of the distal lobar branches.  Distal embolism cannot be excluded, particularly within the segmental and subsegmental branches to the bilateral lower lobes.  Correlation of these findings with D-dimer and/or lower extremity ultrasound as warranted.  Interlobular septal thickening may reflect edema. Echo - Left Ventricle: The left ventricle is normal in size. Ventricular mass is normal. Normal wall thickness. Normal wall motion. There is  normal systolic function. Ejection fraction by visual approximation is 65%. Grade I diastolic dysfunction.Right Ventricle: Systolic function is normal. IVC/SVC: Normal venous pressure at 3 mmHg.             Review of Systems:   Pain scale:  Constitutional:  fever,  chills, headache, vision loss, hearing loss, weight loss, Generalized weakness, falls, loss of smell, loss of taste, poor appetite,  sore throat  Respiratory: cough, shortness of breath.   Cardiovascular: chest pain, dizziness, palpitations, orthopnea, swelling of feet, syncope  Gastrointestinal: nausea, vomiting, abdominal pain, diarrhea, black stool,  blood in stool, change in bowel habits  Genitourinary: hematuria, dysuria, urgency, frequency  Integument/Breast: rash,  pruritis  Hematologic/Lymphatic: easy bruising, lymphadenopathy  Musculoskeletal: arthralgias , myalgias, back pain, neck pain, knee pain  Neurological: confusion, seizures, tremors, slurred speech  Behavioral/Psych:  depression, anxiety, auditory or visual hallucinations     OBJECTIVE:     Physical Exam:  Body mass index is 36.39 kg/m².    Constitutional: Appears well-developed and well-nourished. obesity   Head: Normocephalic and atraumatic.   Neck: Normal range of motion. Neck supple.   Cardiovascular: Normal heart rate.  Regular heart rhythm.  Pulmonary/Chest: Effort normal.   Abdominal: No distension.  No tenderness  Musculoskeletal: Normal range of motion. No edema.   Neurological: Alert and oriented to person, place, and time.   Skin: Skin is warm and dry.   Psychiatric: Normal mood and affect. Behavior is normal.                  Vital Signs  Temp: 98.6 °F (37 °C) (08/10/23 1526)  Pulse: 72 (08/10/23 1526)  Resp: 17 (08/10/23 1703)  BP: (Abnormal) 174/84 (08/10/23 1526)  SpO2: (Abnormal) 93 % (08/10/23 1526)     24 Hour VS Range    Temp:  [97.2 °F (36.2 °C)-98.6 °F (37 °C)]   Pulse:  [71-90]   Resp:  [17-18]   BP: (133-174)/(81-90)   SpO2:  [90 %-94 %]     Intake/Output Summary  "(Last 24 hours) at 8/10/2023 1859  Last data filed at 8/10/2023 0031  Gross per 24 hour   Intake 540 ml   Output 450 ml   Net 90 ml         I/O This Shift:  No intake/output data recorded.    Wt Readings from Last 3 Encounters:   08/10/23 96.2 kg (212 lb)   06/30/23 91.5 kg (201 lb 11.5 oz)   09/20/22 79.9 kg (176 lb 2.4 oz)       I have personally reviewed the vitals and recorded Intake/Output     Laboratory/Diagnostic Data:    CBC/Anemia Labs: Coags:    Recent Labs   Lab 08/08/23 0316 08/09/23  0413 08/10/23  0531   WBC 3.68* 10.14 7.79   HGB 9.7* 9.5* 10.0*   HCT 34.6* 34.2* 36.8*    289 280   MCV 76* 76* 78*   RDW 17.7* 17.8* 17.8*    Recent Labs   Lab 08/07/23  1454   INR 1.0        Chemistries: ABG:   Recent Labs   Lab 08/08/23 0316 08/09/23  0413 08/10/23  0531 08/10/23  1733    136 136 136   K 4.4 4.0 5.0 5.4*   CL 95 90* 89* 90*   CO2 31* 35* 32* 34*   BUN 20 26* 30* 42*   CREATININE 0.9 1.3 1.3 1.9*   CALCIUM 9.2 9.4 9.0 8.8   PROT 6.7 6.8 6.8  --    BILITOT 0.2 0.2 0.3  --    ALKPHOS 116 110 146*  --    ALT 13 12 52*  --    AST 14 14 100*  --    MG 1.7 2.0 2.0  --    PHOS 3.5 4.5 4.5 4.3    No results for input(s): "PH", "PCO2", "PO2", "HCO3", "POCSATURATED", "BE" in the last 168 hours.     POCT Glucose: HbA1c:    No results for input(s): "POCTGLUCOSE" in the last 168 hours. Hemoglobin A1C   Date Value Ref Range Status   06/30/2023 5.3 4.0 - 5.6 % Final     Comment:     ADA Screening Guidelines:  5.7-6.4%  Consistent with prediabetes  >or=6.5%  Consistent with diabetes    High levels of fetal hemoglobin interfere with the HbA1C  assay. Heterozygous hemoglobin variants (HbS, HgC, etc)do  not significantly interfere with this assay.   However, presence of multiple variants may affect accuracy.     01/13/2022 5.3 4.0 - 5.6 % Final     Comment:     ADA Screening Guidelines:  5.7-6.4%  Consistent with prediabetes  >or=6.5%  Consistent with diabetes    High levels of fetal hemoglobin interfere " "with the HbA1C  assay. Heterozygous hemoglobin variants (HbS, HgC, etc)do  not significantly interfere with this assay.   However, presence of multiple variants may affect accuracy.     10/23/2020 5.1 4.0 - 5.6 % Final     Comment:     ADA Screening Guidelines:  5.7-6.4%  Consistent with prediabetes  >or=6.5%  Consistent with diabetes  High levels of fetal hemoglobin interfere with the HbA1C  assay. Heterozygous hemoglobin variants (HbS, HgC, etc)do  not significantly interfere with this assay.   However, presence of multiple variants may affect accuracy.          Cardiac Enzymes: Ejection Fractions:    No results for input(s): "CPK", "CPKMB", "MB", "TROPONINI" in the last 72 hours.   EF   Date Value Ref Range Status   08/10/2023 65 % Final   08/12/2022 68 % Final          No results for input(s): "COLORU", "APPEARANCEUA", "PHUR", "SPECGRAV", "PROTEINUA", "GLUCUA", "KETONESU", "BILIRUBINUA", "OCCULTUA", "NITRITE", "UROBILINOGEN", "LEUKOCYTESUR", "RBCUA", "WBCUA", "BACTERIA", "SQUAMEPITHEL", "HYALINECASTS" in the last 48 hours.    Invalid input(s): "WRIGHTSUR"    Procalcitonin (ng/mL)   Date Value   08/02/2022 0.10     Lactate (Lactic Acid) (mmol/L)   Date Value   08/07/2023 0.7   08/16/2022 2.6 (H)   08/01/2022 1.3     BNP (pg/mL)   Date Value   08/07/2023 706 (H)   08/08/2022 90   08/01/2022 504 (H)     CRP (mg/L)   Date Value   08/13/2022 311.4 (H)   05/18/2020 4.0     Sed Rate (mm/Hr)   Date Value   08/13/2022 >120 (H)     No results found for: "DDIMER"  Ferritin (ng/mL)   Date Value   08/13/2022 218     No results found for: "LDH"  Troponin I (ng/mL)   Date Value   08/07/2023 0.014   08/10/2022 <0.006   08/02/2022 0.020   08/01/2022 0.027 (H)   02/18/2020 <0.012     Results for orders placed or performed in visit on 06/30/23   Vitamin D   Result Value Ref Range    Vit D, 25-Hydroxy 48 30 - 96 ng/mL   Results for orders placed or performed in visit on 09/20/22   Vitamin D   Result Value Ref Range    Vit D, " 25-Hydroxy 74 30 - 96 ng/mL   Results for orders placed or performed in visit on 01/13/22   Vitamin D   Result Value Ref Range    Vit D, 25-Hydroxy 109 (H) 30 - 96 ng/mL     SARS-CoV2 (COVID-19) Qualitative PCR (no units)   Date Value   08/07/2022 Not Detected   09/28/2020 Not Detected   09/01/2020 Not Detected   06/01/2020 Not Detected     SARS-CoV-2 RNA, Amplification, Qual (no units)   Date Value   08/07/2023 Negative   08/01/2022 Negative     POC Rapid COVID (no units)   Date Value   10/21/2021 Negative   12/06/2020 Negative       Microbiology labs for the last week  Microbiology Results (last 7 days)       ** No results found for the last 168 hours. **            Reviewed and noted in plan where applicable- Please see chart for full lab data.    Lines/Drains:       Peripheral IV - Single Lumen 08/10/23 0809 20 G Anterior;Distal;Left Forearm (Active)   Number of days: 0       Imaging  ECG Results              EKG 12-lead (Final result)  Result time 08/07/23 15:34:48      Final result by Interface, Lab In [facility] (08/07/23 15:34:48)               Narrative:    Test Reason : R06.02,    Vent. Rate : 064 BPM     Atrial Rate : 064 BPM     P-R Int : 148 ms          QRS Dur : 092 ms      QT Int : 446 ms       P-R-T Axes : 064 002 069 degrees     QTc Int : 460 ms    Normal sinus rhythm  Nonspecific ST and T wave abnormality  Abnormal ECG  When compared with ECG of 19-AUG-2022 12:51,  Incomplete right bundle branch block is no longer Present    Confirmed by CARMENCITA GUSMAN MD ([id number]) on 8/7/2023 3:34:33 PM    Referred By: System System           Confirmed By:CARMENCITA GUSMAN MD                                  Results for orders placed during the hospital encounter of 08/01/22    Echo Saline Bubble? Yes    Interpretation Summary  · The left ventricle is normal in size with concentric remodeling and normal systolic function.  · The estimated ejection fraction is 68%.  · Indeterminate left ventricular diastolic  function.  · Normal right ventricular size with normal right ventricular systolic function.  · Mild tricuspid regurgitation.  · Mild pulmonic regurgitation.  · Elevated central venous pressure (15 mmHg).  · The estimated PA systolic pressure is 49 mmHg.  · There is pulmonary hypertension.  · Small posterior pericardial effusion. Trivial lateral and under the atria.  · There is a small left pleural effusion.  · No shunt by Air Contrast Bubble.      Echo    Left Ventricle: The left ventricle is normal in size. Ventricular mass   is normal. Normal wall thickness. Normal wall motion. There is normal   systolic function. Ejection fraction by visual approximation is 65%. Grade   I diastolic dysfunction.    Right Ventricle: Systolic function is normal.    IVC/SVC: Normal venous pressure at 3 mmHg.      Labs, Imaging, EKG and Diagnostic results from 8/10/2023 were reviewed.    Medications:  Medication list was reviewed and changes noted under Assessment/Plan.  No current facility-administered medications on file prior to encounter.     Current Outpatient Medications on File Prior to Encounter   Medication Sig Dispense Refill    amiodarone (PACERONE) 200 MG Tab Take 1 tablet (200 mg total) by mouth once daily. 30 tablet 11    apixaban (ELIQUIS) 5 mg Tab Take 1 tablet (5 mg total) by mouth 2 (two) times daily. 60 tablet 5    aspirin/salicylamide/caffeine (BC HEADACHE POWDER ORAL) Take 1 packet by mouth 2 (two) times daily as needed (Headache).      busPIRone (BUSPAR) 15 MG tablet Take 15 mg by mouth once daily.      calcium carbonate (TUMS ORAL) Take 2 tablets by mouth daily as needed (Heartburn).      dicyclomine (BENTYL) 10 MG capsule Take 1 capsule (10 mg total) by mouth 4 (four) times daily before meals and nightly. 120 capsule 2    docusate sodium (COLACE ORAL) Take 1 capsule by mouth daily as needed (Constipation).      MAGNESIUM ORAL Take 1 capsule by mouth once daily.      metoprolol tartrate (LOPRESSOR) 50 MG tablet  Take 1 tablet (50 mg total) by mouth once daily. 30 tablet 11    mirtazapine (REMERON) 15 MG tablet Take 1 tablet (15 mg total) by mouth every evening. For insomnia, mood 30 tablet 11    omeprazole (PRILOSEC) 20 MG capsule Take 1 capsule (20 mg total) by mouth daily as needed. 90 capsule 2    ondansetron (ZOFRAN-ODT) 4 MG TbDL Take 1 tablet (4 mg total) by mouth every 8 (eight) hours as needed (nausea). 30 tablet 0    oxyCODONE-acetaminophen (PERCOCET) 5-325 mg per tablet Take 1 tablet by mouth every 6 (six) hours as needed for Pain. 120 each 0    oxymetazoline (NO DRIP) 0.05 % nasal spray 1 spray by Nasal route daily as needed for Congestion.      thiamine 100 MG tablet Take 100 mg by mouth once daily.      venlafaxine (EFFEXOR-XR) 75 MG 24 hr capsule Take 3 capsules (225 mg total) by mouth once daily. 90 capsule 11     Scheduled Medications:  amiodarone, 200 mg, Oral, Daily  apixaban, 5 mg, Oral, BID  busPIRone, 5 mg, Oral, Daily  dicyclomine, 10 mg, Oral, QID (AC & HS)  metoprolol tartrate, 50 mg, Oral, Daily  mirtazapine, 15 mg, Oral, QHS  pantoprazole, 40 mg, Oral, Before breakfast  predniSONE, 40 mg, Oral, Daily  sodium zirconium cyclosilicate, 10 g, Oral, Once  thiamine, 100 mg, Oral, Daily  venlafaxine, 225 mg, Oral, Daily      PRN: acetaminophen, albuterol-ipratropium, melatonin, naloxone, ondansetron, oxyCODONE-acetaminophen, polyethylene glycol, prochlorperazine, senna-docusate 8.6-50 mg, sodium chloride 0.9%  Infusions:   Estimated Creatinine Clearance: 33.7 mL/min (A) (based on SCr of 1.9 mg/dL (H)).    Assessment/Plan:      * Acute hypoxemic respiratory failure  Acute hypoxic respiratory failure, not on home oxygen, etiology suspected potential congestive heart failure related or lung disease (cOPD/emphysema)   -give lasix now and schedule 2 daily doses  -continue scheduled nebulizers, schedule glucocorticoids  -wean oxygen as tolerated.   -Prior admission last year with similar presentation  8/10 6  min walk test- Patient's O2 Sat on room air while exercisin. not on home 02.  Qualified or home 02. CTA chest -Allowing for exam limitations above, no convincing pulmonary thromboembolism to the level of the distal lobar branches.  Distal embolism cannot be excluded, particularly within the segmental and subsegmental branches to the bilateral lower lobes.  Correlation of these findings with D-dimer and/or lower extremity ultrasound as warranted.  Interlobular septal thickening may reflect edema. Echo ordered     Hyperkalemia     K  at Recent Labs   Lab 23  0413 08/10/23  0531 08/10/23  1733   K 4.0 5.0 5.4*   . received Lokelma . BMP q 4h      LYLE (acute kidney injury)    Patient with  acute kidney injury.Serum BUN/Cr uptrending.  Strict I/O monitor . Obtain urine lytes , renal sonogram  and bladder scans q 6h  Recent Labs   Lab 23  0316 23  0413 08/10/23  0531   BUN 20 26* 30*   CREATININE 0.9 1.3 1.3      encourage PO hydration     Iron deficiency anemia secondary to inadequate dietary iron intake  - stable, at baseline      Paroxysmal atrial fibrillation  Patient with Persistent (7 days or more) atrial fibrillation which is controlled currently with Beta Blocker and Amiodarone. Patient is currently in sinus rhythm. Anticoagulation indicated. Anticoagulation done with apixaban.    Chronic diastolic congestive heart failure  Patient is identified as having Diastolic (HFpEF) heart failure that is Acute on chronic. CHF is currently uncontrolled due to Rales/crackles on pulmonary exam and Pulmonary edema/pleural effusion on CXR. Latest ECHO performed and demonstrates- Results for orders placed during the hospital encounter of 22    Echo Saline Bubble? Yes    Interpretation Summary  · The left ventricle is normal in size with concentric remodeling and normal systolic function.  · The estimated ejection fraction is 68%.  · Indeterminate left ventricular diastolic function.  · Normal right  ventricular size with normal right ventricular systolic function.  · Mild tricuspid regurgitation.  · Mild pulmonic regurgitation.  · Elevated central venous pressure (15 mmHg).  · The estimated PA systolic pressure is 49 mmHg.  · There is pulmonary hypertension.  · Small posterior pericardial effusion. Trivial lateral and under the atria.  · There is a small left pleural effusion.  · No shunt by Air Contrast Bubble.  . Continue Furosemide and monitor clinical status closely. Monitor on telemetry. Patient is off CHF pathway.  Monitor strict Is&Os and daily weights.  Place on fluid restriction of 1.5 L. Cardiology has not been any consulted. Continue to stress to patient importance of self efficacy and  on diet for CHF. Last BNP reviewed- and noted below   Recent Labs   Lab 08/07/23  1454   *   .    Chronic pain disorder  Continue multimodal pain meds and home percocet      Impaired functional mobility, balance, gait, and endurance  Consult PT/OT   Fall precautions.   8/10 PT recs outpatient PT     Essential hypertension    Chronic, controlled.  Latest blood pressure and vitals reviewed-   Temp:  [97.2 °F (36.2 °C)-98.6 °F (37 °C)]   Pulse:  [65-82]   Resp:  [17-18]   BP: (116-162)/(67-90)   SpO2:  [91 %-94 %] .   Home meds for hypertension were reviewed and noted below. Hospital anti-hypertensive changes were made as shown below.  Hypertension Medications               metoprolol tartrate (LOPRESSOR) 50 MG tablet Take 1 tablet (50 mg total) by mouth once daily.          PRN meds if BP> 180/110 mm HG    Major depressive disorder, recurrent, moderate  Chronic stable - continue buspirone and venlafaxine home medications.         Alcohol use disorder, severe, dependence  Reports sobriety x 1 year, continue thiamine        VTE Risk Mitigation (From admission, onward)           Ordered     apixaban tablet 5 mg  2 times daily         08/07/23 0081     Reason for No Pharmacological VTE Prophylaxis  Once         Question:  Reasons:  Answer:  Already adequately anticoagulated on oral Anticoagulants    08/07/23 2335     IP VTE HIGH RISK PATIENT  Once         08/07/23 2335     Place sequential compression device  Until discontinued         08/07/23 2335                    Discharge Planning   DIXON: 8/11/2023     Code Status: Full Code   Is the patient medically ready for discharge?: No    Reason for patient still in hospital (select all that apply): Treatment  Discharge Plan A: Home Health   Discharge Delays: None known at this time              Tio Lopez MD  Department of Hospital Medicine   [facility]

## 2023-08-11 NOTE — ASSESSMENT & PLAN NOTE
Patient with  acute kidney injury.Serum BUN/Cr uptrending.  Strict I/O monitor . Obtain urine lytes , renal sonogram  and bladder scans q 6h  Recent Labs   Lab 08/11/23 2026 08/11/23  2322 08/12/23  0246   BUN 43* 42* 40*  40*   CREATININE 1.9* 1.7* 1.4  1.4      encourage PO hydration   8/11 FeNa of 0.1. started on NS gentle hydration at 60ml/h . Encourage PO intake 1500ml/24h

## 2023-08-11 NOTE — ASSESSMENT & PLAN NOTE
Acute hypoxic respiratory failure, not on home oxygen, etiology suspected potential congestive heart failure related or lung disease (cOPD/emphysema)   -give lasix now and schedule 2 daily doses  -continue scheduled nebulizers, schedule glucocorticoids  -wean oxygen as tolerated.   -Prior admission last year with similar presentation  8/10 6 min walk test- Patient's O2 Sat on room air while exercisin. not on home 02.  Qualified or home 02. CTA chest -Allowing for exam limitations above, no convincing pulmonary thromboembolism to the level of the distal lobar branches.  Distal embolism cannot be excluded, particularly within the segmental and subsegmental branches to the bilateral lower lobes.  Correlation of these findings with D-dimer and/or lower extremity ultrasound as warranted.  Interlobular septal thickening may reflect edema. Echo ordered    sats 98% on 2LNC. previously received lasix IV x 3 doses   . completed steroids. Cr at 1.4 . encourage PO hydration. sats 93% on 2LNC . needs outpaient pulm f/u and PFTs. likely discharge with home oxygen

## 2023-08-11 NOTE — PLAN OF CARE
Problem: Adult Inpatient Plan of Care  Goal: Plan of Care Review  Outcome: Ongoing, Progressing  Goal: Patient-Specific Goal (Individualized)  Outcome: Ongoing, Progressing  Goal: Absence of Hospital-Acquired Illness or Injury  Outcome: Ongoing, Progressing  Goal: Optimal Comfort and Wellbeing  Outcome: Ongoing, Progressing  Goal: Readiness for Transition of Care  Outcome: Ongoing, Progressing     Problem: Infection  Goal: Absence of Infection Signs and Symptoms  Outcome: Ongoing, Progressing     Problem: Fall Injury Risk  Goal: Absence of Fall and Fall-Related Injury  Outcome: Ongoing, Progressing     Problem: Fatigue  Goal: Improved Activity Tolerance  Outcome: Ongoing, Progressing     Problem: Gas Exchange Impaired  Goal: Optimal Gas Exchange  Outcome: Ongoing, Progressing     Problem: Fluid and Electrolyte Imbalance (Acute Kidney Injury/Impairment)  Goal: Fluid and Electrolyte Balance  Outcome: Ongoing, Progressing     Problem: Oral Intake Inadequate (Acute Kidney Injury/Impairment)  Goal: Optimal Nutrition Intake  Outcome: Ongoing, Progressing     Problem: Renal Function Impairment (Acute Kidney Injury/Impairment)  Goal: Effective Renal Function  Outcome: Ongoing, Progressing

## 2023-08-11 NOTE — ASSESSMENT & PLAN NOTE
62 year old woman with HFpEF (68%), Afib, HTN, obesity, chronic back pain admitted for AHRF 2/2 COPD vs decompensated heart failure with elevated . Patient was admitted at baseline Cr (0.9). Cr trend: 0.9 -> 1.3 -> 1.9 -> 2.0 -> 1.8 -> now 1.4.  She was admitted with volume overload but also received iohexol contrast while inpatient. UA negative. Leif 18, Ucr 37. LYLE currently improving.     Acid/base: Bicarb of 37, likely compensating for respiratory acidosis  Volume: Euvolemic  LYLE: Improving    Recommendations:  - Continue supportive treatment  - Renally dose medications  - Avoid nephrotoxic meds  - Strict I/Os  - Stabilize blood pressures with MAP > 65

## 2023-08-11 NOTE — PLAN OF CARE
Problem: Adult Inpatient Plan of Care  Goal: Plan of Care Review  Outcome: Ongoing, Progressing  Goal: Patient-Specific Goal (Individualized)  Outcome: Ongoing, Progressing  Goal: Absence of Hospital-Acquired Illness or Injury  Outcome: Ongoing, Progressing  Goal: Optimal Comfort and Wellbeing  Outcome: Ongoing, Progressing     Problem: Infection  Goal: Absence of Infection Signs and Symptoms  Outcome: Ongoing, Progressing     Problem: Fall Injury Risk  Goal: Absence of Fall and Fall-Related Injury  Outcome: Ongoing, Progressing     Problem: Fatigue  Goal: Improved Activity Tolerance  Outcome: Ongoing, Progressing        Received patient, not in any form of distress. Fall prevention protocol maintained.

## 2023-08-11 NOTE — TELEPHONE ENCOUNTER
----- Message from Cami Moran sent at 8/10/2023 11:24 AM CDT -----  Contact: [phone]  [facility] is calling for a HFU for the pt discharging tomorrow 08/11 please advise

## 2023-08-11 NOTE — PT/OT/SLP PROGRESS
"Physical Therapy Treatment    Patient Name:  Vale Gutierrez   MRN:  [id number]    Recommendations:     Discharge Recommendations: outpatient PT, outpatient OT  Discharge Equipment Recommendations: none  Barriers to discharge: None    Assessment:     Vale Gutierrez is a 62 y.o. female admitted with a medical diagnosis of Acute hypoxemic respiratory failure.  She presents with the following impairments/functional limitations: weakness, impaired endurance, gait instability, impaired balance, impaired cardiopulmonary response to activity. Pt declined gait, however agreeable to seated therex.     Rehab Prognosis: Good; patient would benefit from acute skilled PT services to address these deficits and reach maximum level of function.    Recent Surgery: * No surgery found *      Plan:     During this hospitalization, patient to be seen 3 x/week to address the identified rehab impairments via gait training, therapeutic activities, therapeutic exercises, neuromuscular re-education and progress toward the following goals:    Plan of Care Expires:  09/09/23    Subjective     Chief Complaint: "I just got comfortable"  Patient/Family Comments/goals: return to PLOF  Pain/Comfort:  Pain Rating 1: 0/10      Objective:     Communicated with RN prior to session.  Patient found up in chair with oxygen, PureWick upon PT entry to room.     General Precautions: Standard, fall  Orthopedic Precautions: N/A  Braces: N/A  Respiratory Status: nasal cannula     Functional Mobility:  Pt declined mobility at this session      AM-PAC 6 CLICK MOBILITY  Turning over in bed (including adjusting bedclothes, sheets and blankets)?: 4  Sitting down on and standing up from a chair with arms (e.g., wheelchair, bedside commode, etc.): 3  Moving from lying on back to sitting on the side of the bed?: 3  Moving to and from a bed to a chair (including a wheelchair)?: 3  Need to walk in hospital room?: 3  Climbing 3-5 steps with a railing?: 2  Basic " Mobility Total Score: 18       Treatment & Education:  Seated: TR, HR, LAQ, hip flexion x15 each  Glut sets x10 with 3 sec hold  Education provided on safety, calling for assistance, benefit of therex for BLE strength and gait.   Bedside table in front of patient and area set up for function, convenience, and safety. RN aware of patient's mobility needs and status. Questions/concerns addressed within PTA scope of practice; patient  with no further questions. Time was provided for active listening, discussion of health disposition, and discussion of safe discharge.      Patient left up in chair with all lines intact and call button in reach..    GOALS:   Multidisciplinary Problems       Physical Therapy Goals          Problem: Physical Therapy    Goal Priority Disciplines Outcome Goal Variances Interventions   Physical Therapy Goal     PT, PT/OT Ongoing, Progressing     Description: Goals to be met by: 2023     Patient will increase functional independence with mobility by performin. Supine to sit with [place]  2. Sit to supine with [place]  3. Sit to stand transfer with Supervision  4. Bed to chair transfer with Supervision using LRAD  5. Gait  x 30 feet with Supervision using LRAD.                          Time Tracking:     PT Received On: 23  PT Start Time: 952     PT Stop Time: 101  PT Total Time (min): 20 min     Billable Minutes: Therapeutic Exercise 20    Treatment Type: Treatment  PT/PTA: PTA     Number of PTA visits since last PT visit: 2023

## 2023-08-11 NOTE — TELEPHONE ENCOUNTER
----- Message from Cami Moran sent at 8/10/2023 11:24 AM CDT -----  Contact: [phone]  [facility] is calling for a HFU for the pt discharging tomorrow 08/11 please advise

## 2023-08-11 NOTE — PLAN OF CARE
08/11/23 1038   Post-Acute Status   Post-Acute Authorization Other   Other Status No Post-Acute Service Needs   Hospital Resources/Appts/Education Provided Appointments scheduled and added to AVS   Discharge Delays None known at this time   Discharge Plan   Discharge Plan A Home   Discharge Plan B Home with family

## 2023-08-11 NOTE — PT/OT/SLP PROGRESS
Occupational Therapy   Treatment    Name: Vale Gutierrez  MRN: [id number]  Admitting Diagnosis:  Acute hypoxemic respiratory failure       Recommendations:     Discharge Recommendations: outpatient OT, outpatient PT  Discharge Equipment Recommendations:  none  Barriers to discharge:       Assessment:     Vale Gutierrez is a 62 y.o. female with a medical diagnosis of Acute hypoxemic respiratory failure. Pt presents with decreased endurance and impaired mobility performance as limited by cardiovascular status and generalized weakness. Pt found upright in bed and agreeable for therapy. Pt session focused on OOB mobility with setup in chair provided by this therapist for upright ADLs. Pt was CGA to mobilize to chair using a RW. Pt would benefit from continued OT skilled services 3x/wk to improve daily living skills to optimize QOL. Pt is recommended to discharge to OP  at this time. Performance deficits affecting function are weakness, gait instability, impaired endurance, impaired balance, impaired cardiopulmonary response to activity, impaired self care skills, impaired functional mobility.     Rehab Prognosis:  Good; patient would benefit from acute skilled OT services to address these deficits and reach maximum level of function.       Plan:     Patient to be seen 3 x/week to address the above listed problems via self-care/home management, therapeutic activities, therapeutic exercises  Plan of Care Expires: 08/23/23  Plan of Care Reviewed with: patient    Subjective     Chief Complaint: feeling like she had an odor  Patient/Family Comments/goals: sit upright in chair  Pain/Comfort:  Pain Rating 1: 0/10  Pain Rating Post-Intervention 1: 0/10    Objective:     Communicated with: RN prior to session.  Patient found HOB elevated with oxygen, PureWick upon OT entry to room.    General Precautions: Standard, fall    Orthopedic Precautions:N/A  Braces: N/A  Respiratory Status: Nasal cannula, flow 2 L/min      Occupational Performance:     Bed Mobility:    Patient completed Rolling/Turning to Left with  stand by assistance  Patient completed Scooting/Bridging with stand by assistance  Patient completed Supine to Sit with stand by assistance     Functional Mobility/Transfers:  Patient completed Sit <> Stand Transfer with contact guard assistance  with  rolling walker   Patient completed Bed <> Chair Transfer using Step Transfer technique with contact guard assistance with rolling walker  Functional Mobility: Pt stood and took 5-6 steps with pivot to bedside chair using RW       Activities of Daily Living:  Feeding:  setup A of meal upright in chair  Bathing: setup A  of all bed bath items; PCT notified   Upper Body Dressing: minimum assistance donning gown       [facility] 6 Click ADL: 20    Treatment & Education:  Pt educated on role of occupational therapy, POC, and safety during ADLs and functional mobility. Pt and OT discussed importance of safe, continued mobility to optimize daily living skills. Pt verbalized understanding.     White board updated during session. Pt given instruction to call for medical staff/nurse for assistance.       Patient left up in chair with all lines intact, call button in reach, and RN and PCT notified    GOALS:   Multidisciplinary Problems       Occupational Therapy Goals          Problem: Occupational Therapy    Goal Priority Disciplines Outcome Interventions   Occupational Therapy Goal     OT, PT/OT Ongoing, Progressing    Description: Goals to be met by: 8/23/23     Patient will increase functional independence with ADLs by performing:    LE Dressing with Modified [place].  Grooming while standing at sink (w/ seated rest breaks as needed) with Modified [place].  Toileting from toilet with Modified [place] for hygiene and clothing management.   Step transfer with Modified [place]  Toilet transfer to toilet with Modified [place].  Upper extremity exercise program x15  reps per handout, with independence.                         Time Tracking:     OT Date of Treatment: 08/11/23  OT Start Time: 0807  OT Stop Time: 0830  OT Total Time (min): 23 min    Billable Minutes:Self Care/Home Management 13 min  Therapeutic Activity 10 min    OT/KIMBERLY: OT          8/11/2023

## 2023-08-11 NOTE — PROGRESS NOTES
Asad Fonseca - [facility] Medicine  Progress Note    Patient Name: Vale Gutierrez  MRN: [id number]  Patient Class: IP- Inpatient   Admission Date: 2023  Length of Stay: 1 days  Attending Physician: Tio Lopez MD  Primary Care Provider: Estela Mcdaniel MD        Subjective:     Principal Problem:Acute hypoxemic respiratory failure        HPI:  61 y/o woman hx of HFpEF, Afib, HTN, Obesity, Chronic back pain, presents to the ED with complaints of shortness of breath.     She has had shortness of breath for 2 weeks. And dyspnea on exertion.  She has chronic debility related to her chronic back pain and as of late has had decreased ADl capability - she requires assistance to toilet, using a bedside commode more.  Sister helps her to bathe/shower, ambulates with a walker in the home, can feed herself, manages her meds, sister preps food.  She is not driving.     She does not track her weight at home, no LE edema but does note some increased abdominal girth.  She is smoking 1 pack cigarettes per week, trying to quit.  No alcohol use in the last 1 years. Adherent to home medications.       Overview/Hospital Course:   - Breathing is near baseline, PT consult recomends home health, qualified for home 02. May dc home in am  10  LYLE Cr 0.9 -->13 COPD/CHF admit,  6 min walk test- Patient's O2 Sat on room air while exercisin. not on home 02.  Qualified or home 02. CTA chest -Allowing for exam limitations above, no convincing pulmonary thromboembolism to the level of the distal lobar branches.  Distal embolism cannot be excluded, particularly within the segmental and subsegmental branches to the bilateral lower lobes.  Correlation of these findings with D-dimer and/or lower extremity ultrasound as warranted.  Interlobular septal thickening may reflect edema. Echo - Left Ventricle: The left ventricle is normal in size. Ventricular mass is normal. Normal wall thickness. Normal wall motion. There is  normal systolic function. Ejection fraction by visual approximation is 65%. Grade I diastolic dysfunction.Right Ventricle: Systolic function is normal. IVC/SVC: Normal venous pressure at 3 mmHg.  8/11 FeNa of 0.1. started on NS gentle hydration at 60ml/h . LYLE improving. Encourage PO intake 1500ml/24h .   previously received lasix IV x 3 doses. sats 98% on 2LNC. tylenol and lidocaine patch for low back ache               Review of Systems:   Pain scale:  Constitutional:  fever,  chills, headache, vision loss, hearing loss, weight loss, Generalized weakness, falls, loss of smell, loss of taste, poor appetite,  sore throat  Respiratory: cough, shortness of breath.   Cardiovascular: chest pain, dizziness, palpitations, orthopnea, swelling of feet, syncope  Gastrointestinal: nausea, vomiting, abdominal pain, diarrhea, black stool,  blood in stool, change in bowel habits  Genitourinary: hematuria, dysuria, urgency, frequency  Integument/Breast: rash,  pruritis  Hematologic/Lymphatic: easy bruising, lymphadenopathy  Musculoskeletal: arthralgias , myalgias, back pain, neck pain, knee pain  Neurological: confusion, seizures, tremors, slurred speech  Behavioral/Psych:  depression, anxiety, auditory or visual hallucinations     OBJECTIVE:     Physical Exam:  Body mass index is 36.39 kg/m².    Constitutional: Appears well-developed and well-nourished. obesity   Head: Normocephalic and atraumatic.   Neck: Normal range of motion. Neck supple.   Cardiovascular: Normal heart rate.  Regular heart rhythm.  Pulmonary/Chest: Effort normal.   Abdominal: No distension.  No tenderness  Musculoskeletal: Normal range of motion. No edema.   Neurological: Alert and oriented to person, place, and time.   Skin: Skin is warm and dry.   Psychiatric: Normal mood and affect. Behavior is normal.                  Vital Signs  Temp: 97.2 °F (36.2 °C) (08/11/23 1051)  Pulse: 69 (08/11/23 1503)  Resp: 18 (08/11/23 1051)  BP: 104/63 (08/11/23  "1051)  SpO2: (Abnormal) 81 % (08/11/23 1051)     24 Hour VS Range    Temp:  [97.2 °F (36.2 °C)-98.4 °F (36.9 °C)]   Pulse:  [65-83]   Resp:  [17-18]   BP: (104-160)/(61-85)   SpO2:  [81 %-98 %]     Intake/Output Summary (Last 24 hours) at 8/11/2023 1541  Last data filed at 8/11/2023 0544  Gross per 24 hour   Intake 200 ml   Output 600 ml   Net -400 ml         I/O This Shift:  No intake/output data recorded.    Wt Readings from Last 3 Encounters:   08/10/23 96.2 kg (212 lb)   08/11/23 96.2 kg (212 lb 1.3 oz)   06/30/23 91.5 kg (201 lb 11.5 oz)       I have personally reviewed the vitals and recorded Intake/Output     Laboratory/Diagnostic Data:    CBC/Anemia Labs: Coags:    Recent Labs   Lab 08/09/23  0413 08/10/23  0531 08/11/23  0639   WBC 10.14 7.79 8.05   HGB 9.5* 10.0* 9.6*   HCT 34.2* 36.8* 35.9*    280 335   MCV 76* 78* 78*   RDW 17.8* 17.8* 17.8*    Recent Labs   Lab 08/07/23  1454   INR 1.0        Chemistries: ABG:   Recent Labs   Lab 08/09/23  0413 08/10/23  0531 08/10/23  1733 08/11/23  0639 08/11/23  0753 08/11/23  1304    136   < > 142  142 142 138   K 4.0 5.0   < > 4.4  4.4 3.9 4.1   CL 90* 89*   < > 94*  94* 94* 93*   CO2 35* 32*   < > 37*  37* 38* 33*   BUN 26* 30*   < > 35*  35* 34* 40*   CREATININE 1.3 1.3   < > 1.4  1.4 1.1 1.5*   CALCIUM 9.4 9.0   < > 9.3  9.3 9.2 9.0   PROT 6.8 6.8  --  6.6  --   --    BILITOT 0.2 0.3  --  0.2  --   --    ALKPHOS 110 146*  --  123  --   --    ALT 12 52*  --  29  --   --    AST 14 100*  --  20  --   --    MG 2.0 2.0  --  2.2  --   --    PHOS 4.5 4.5   < > 3.5 3.4 2.9    < > = values in this interval not displayed.    No results for input(s): "PH", "PCO2", "PO2", "HCO3", "POCSATURATED", "BE" in the last 168 hours.     POCT Glucose: HbA1c:    No results for input(s): "POCTGLUCOSE" in the last 168 hours. Hemoglobin A1C   Date Value Ref Range Status   06/30/2023 5.3 4.0 - 5.6 % Final     Comment:     ADA Screening Guidelines:  5.7-6.4%  Consistent " "with prediabetes  >or=6.5%  Consistent with diabetes    High levels of fetal hemoglobin interfere with the HbA1C  assay. Heterozygous hemoglobin variants (HbS, HgC, etc)do  not significantly interfere with this assay.   However, presence of multiple variants may affect accuracy.     01/13/2022 5.3 4.0 - 5.6 % Final     Comment:     ADA Screening Guidelines:  5.7-6.4%  Consistent with prediabetes  >or=6.5%  Consistent with diabetes    High levels of fetal hemoglobin interfere with the HbA1C  assay. Heterozygous hemoglobin variants (HbS, HgC, etc)do  not significantly interfere with this assay.   However, presence of multiple variants may affect accuracy.     10/23/2020 5.1 4.0 - 5.6 % Final     Comment:     ADA Screening Guidelines:  5.7-6.4%  Consistent with prediabetes  >or=6.5%  Consistent with diabetes  High levels of fetal hemoglobin interfere with the HbA1C  assay. Heterozygous hemoglobin variants (HbS, HgC, etc)do  not significantly interfere with this assay.   However, presence of multiple variants may affect accuracy.          Cardiac Enzymes: Ejection Fractions:    No results for input(s): "CPK", "CPKMB", "MB", "TROPONINI" in the last 72 hours.   EF   Date Value Ref Range Status   08/10/2023 65 % Final   08/12/2022 68 % Final          No results for input(s): "COLORU", "APPEARANCEUA", "PHUR", "SPECGRAV", "PROTEINUA", "GLUCUA", "KETONESU", "BILIRUBINUA", "OCCULTUA", "NITRITE", "UROBILINOGEN", "LEUKOCYTESUR", "RBCUA", "WBCUA", "BACTERIA", "SQUAMEPITHEL", "HYALINECASTS" in the last 48 hours.    Invalid input(s): "WRIGHTSUR"    Procalcitonin (ng/mL)   Date Value   08/02/2022 0.10     Lactate (Lactic Acid) (mmol/L)   Date Value   08/07/2023 0.7   08/16/2022 2.6 (H)   08/01/2022 1.3     BNP (pg/mL)   Date Value   08/07/2023 706 (H)   08/08/2022 90   08/01/2022 504 (H)     CRP (mg/L)   Date Value   08/13/2022 311.4 (H)   05/18/2020 4.0     Sed Rate (mm/Hr)   Date Value   08/13/2022 >120 (H)     No results found " "for: "DDIMER"  Ferritin (ng/mL)   Date Value   08/13/2022 218     No results found for: "LDH"  Troponin I (ng/mL)   Date Value   08/07/2023 0.014   08/10/2022 <0.006   08/02/2022 0.020   08/01/2022 0.027 (H)   02/18/2020 <0.012     Results for orders placed or performed in visit on 06/30/23   Vitamin D   Result Value Ref Range    Vit D, 25-Hydroxy 48 30 - 96 ng/mL   Results for orders placed or performed in visit on 09/20/22   Vitamin D   Result Value Ref Range    Vit D, 25-Hydroxy 74 30 - 96 ng/mL   Results for orders placed or performed in visit on 01/13/22   Vitamin D   Result Value Ref Range    Vit D, 25-Hydroxy 109 (H) 30 - 96 ng/mL     SARS-CoV2 (COVID-19) Qualitative PCR (no units)   Date Value   08/07/2022 Not Detected   09/28/2020 Not Detected   09/01/2020 Not Detected   06/01/2020 Not Detected     SARS-CoV-2 RNA, Amplification, Qual (no units)   Date Value   08/07/2023 Negative   08/01/2022 Negative     POC Rapid COVID (no units)   Date Value   10/21/2021 Negative   12/06/2020 Negative       Microbiology labs for the last week  Microbiology Results (last 7 days)       ** No results found for the last 168 hours. **            Reviewed and noted in plan where applicable- Please see chart for full lab data.    Lines/Drains:       Peripheral IV - Single Lumen 08/10/23 0809 20 G Anterior;Distal;Left Forearm (Active)   Number of days: 0       Imaging  ECG Results              EKG 12-lead (Final result)  Result time 08/07/23 15:34:48      Final result by Interface, Lab In [place] (08/07/23 15:34:48)               Narrative:    Test Reason : R06.02,    Vent. Rate : 064 BPM     Atrial Rate : 064 BPM     P-R Int : 148 ms          QRS Dur : 092 ms      QT Int : 446 ms       P-R-T Axes : 064 002 069 degrees     QTc Int : 460 ms    Normal sinus rhythm  Nonspecific ST and T wave abnormality  Abnormal ECG  When compared with ECG of 19-AUG-2022 12:51,  Incomplete right bundle branch block is no longer Present    Confirmed " by CARMENCITA GUSMAN MD ([id number]) on 8/7/2023 3:34:33 PM    Referred By: System System           Confirmed By:CARMENCITA GUSMAN MD                                  Results for orders placed during the hospital encounter of 08/01/22    Echo Saline Bubble? Yes    Interpretation Summary  · The left ventricle is normal in size with concentric remodeling and normal systolic function.  · The estimated ejection fraction is 68%.  · Indeterminate left ventricular diastolic function.  · Normal right ventricular size with normal right ventricular systolic function.  · Mild tricuspid regurgitation.  · Mild pulmonic regurgitation.  · Elevated central venous pressure (15 mmHg).  · The estimated PA systolic pressure is 49 mmHg.  · There is pulmonary hypertension.  · Small posterior pericardial effusion. Trivial lateral and under the atria.  · There is a small left pleural effusion.  · No shunt by Air Contrast Bubble.      US Retroperitoneal Complete  Narrative: EXAMINATION:  US RETROPERITONEAL COMPLETE    CLINICAL HISTORY:  LYLE;    TECHNIQUE:  Ultrasound of the kidneys and urinary bladder was performed including color flow and Doppler evaluation of the kidneys.    COMPARISON:  CT chest 08/07/2020, ultrasound retroperitoneal 04/16/2022, MRI lumbar spine 04/07/2022    FINDINGS:  Right kidney: The right kidney measures 9.3 cm. No cortical thinning. No loss of corticomedullary distinction. Resistive index measures 0.74.  No renal stone. No hydronephrosis.    Left kidney: The left kidney measures 10.5 cm. No cortical thinning. No loss of corticomedullary distinction. Resistive index measures 0.68.  Inferior pole 2.6 x 2.1 x 2.2 cm simple cyst increased in size compared to prior, previously 1.8 x 1.9 x 1.8 cm.  No renal stone. No hydronephrosis.    Splenic resistive index is 0.69.    The bladder is partially distended at the time of scanning and has an unremarkable appearance.  Impression: No hydronephrosis.    Left renal  cyst.    Electronically signed by resident: Charlie Archuleta  Date:    08/11/2023  Time:    07:37    Electronically signed by: Jaswant Avitia MD  Date:    08/11/2023  Time:    08:13      Labs, Imaging, EKG and Diagnostic results from 8/11/2023 were reviewed.    Medications:  Medication list was reviewed and changes noted under Assessment/Plan.  No current facility-administered medications on file prior to encounter.     Current Outpatient Medications on File Prior to Encounter   Medication Sig Dispense Refill    amiodarone (PACERONE) 200 MG Tab Take 1 tablet (200 mg total) by mouth once daily. 30 tablet 11    apixaban (ELIQUIS) 5 mg Tab Take 1 tablet (5 mg total) by mouth 2 (two) times daily. 60 tablet 5    aspirin/salicylamide/caffeine (BC HEADACHE POWDER ORAL) Take 1 packet by mouth 2 (two) times daily as needed (Headache).      busPIRone (BUSPAR) 15 MG tablet Take 15 mg by mouth once daily.      calcium carbonate (TUMS ORAL) Take 2 tablets by mouth daily as needed (Heartburn).      dicyclomine (BENTYL) 10 MG capsule Take 1 capsule (10 mg total) by mouth 4 (four) times daily before meals and nightly. 120 capsule 2    docusate sodium (COLACE ORAL) Take 1 capsule by mouth daily as needed (Constipation).      MAGNESIUM ORAL Take 1 capsule by mouth once daily.      metoprolol tartrate (LOPRESSOR) 50 MG tablet Take 1 tablet (50 mg total) by mouth once daily. 30 tablet 11    mirtazapine (REMERON) 15 MG tablet Take 1 tablet (15 mg total) by mouth every evening. For insomnia, mood 30 tablet 11    omeprazole (PRILOSEC) 20 MG capsule Take 1 capsule (20 mg total) by mouth daily as needed. 90 capsule 2    ondansetron (ZOFRAN-ODT) 4 MG TbDL Take 1 tablet (4 mg total) by mouth every 8 (eight) hours as needed (nausea). 30 tablet 0    oxyCODONE-acetaminophen (PERCOCET) 5-325 mg per tablet Take 1 tablet by mouth every 6 (six) hours as needed for Pain. 120 each 0    oxymetazoline (NO DRIP) 0.05 % nasal spray 1 spray by Nasal route  daily as needed for Congestion.      thiamine 100 MG tablet Take 100 mg by mouth once daily.      venlafaxine (EFFEXOR-XR) 75 MG 24 hr capsule Take 3 capsules (225 mg total) by mouth once daily. 90 capsule 11     Scheduled Medications:  acetaminophen, 1,000 mg, Oral, Q12H  amiodarone, 200 mg, Oral, Daily  apixaban, 5 mg, Oral, BID  busPIRone, 5 mg, Oral, Daily  dicyclomine, 10 mg, Oral, QID (AC & HS)  LIDOcaine, 1 patch, Transdermal, Q24H  metoprolol tartrate, 50 mg, Oral, Daily  mirtazapine, 15 mg, Oral, QHS  pantoprazole, 40 mg, Oral, Before breakfast  thiamine, 100 mg, Oral, Daily  venlafaxine, 225 mg, Oral, Daily      PRN: albuterol-ipratropium, melatonin, naloxone, ondansetron, oxyCODONE, polyethylene glycol, prochlorperazine, senna-docusate 8.6-50 mg, sodium chloride 0.9%  Infusions:       Estimated Creatinine Clearance: 42.6 mL/min (A) (based on SCr of 1.5 mg/dL (H)).    Assessment/Plan:      * Acute hypoxemic respiratory failure  Acute hypoxic respiratory failure, not on home oxygen, etiology suspected potential congestive heart failure related or lung disease (cOPD/emphysema)   -give lasix now and schedule 2 daily doses  -continue scheduled nebulizers, schedule glucocorticoids  -wean oxygen as tolerated.   -Prior admission last year with similar presentation  8/10 6 min walk test- Patient's O2 Sat on room air while exercisin. not on home 02.  Qualified or home . CTA chest -Allowing for exam limitations above, no convincing pulmonary thromboembolism to the level of the distal lobar branches.  Distal embolism cannot be excluded, particularly within the segmental and subsegmental branches to the bilateral lower lobes.  Correlation of these findings with D-dimer and/or lower extremity ultrasound as warranted.  Interlobular septal thickening may reflect edema. Echo ordered    sats 98% on 2LNC. previously received lasix IV x 3 doses     Hyperkalemia     K  at   Recent Labs   Lab 08/10/23  6784  08/10/23  2018 08/10/23  2355   K 5.4* 4.9 4.5   . received Lokelma . BMP q 4h      LYLE (acute kidney injury)    Patient with  acute kidney injury.Serum BUN/Cr uptrending.  Strict I/O monitor . Obtain urine lytes , renal sonogram  and bladder scans q 6h  Recent Labs   Lab 08/10/23  2018 08/10/23  2355 08/11/23  0639   BUN 44* 43* 35*  35*   CREATININE 2.0* 1.8* 1.4  1.4      encourage PO hydration   8/11 FeNa of 0.1. started on NS gentle hydration at 60ml/h . Encourage PO intake 1500ml/24h     Iron deficiency anemia secondary to inadequate dietary iron intake  - stable, at baseline      Paroxysmal atrial fibrillation  Patient with Persistent (7 days or more) atrial fibrillation which is controlled currently with Beta Blocker and Amiodarone. Patient is currently in sinus rhythm. Anticoagulation indicated. Anticoagulation done with apixaban.    Chronic diastolic congestive heart failure  Patient is identified as having Diastolic (HFpEF) heart failure that is Acute on chronic. CHF is currently uncontrolled due to Rales/crackles on pulmonary exam and Pulmonary edema/pleural effusion on CXR. Latest ECHO performed and demonstrates- Results for orders placed during the hospital encounter of 08/01/22    Echo Saline Bubble? Yes    Interpretation Summary  · The left ventricle is normal in size with concentric remodeling and normal systolic function.  · The estimated ejection fraction is 68%.  · Indeterminate left ventricular diastolic function.  · Normal right ventricular size with normal right ventricular systolic function.  · Mild tricuspid regurgitation.  · Mild pulmonic regurgitation.  · Elevated central venous pressure (15 mmHg).  · The estimated PA systolic pressure is 49 mmHg.  · There is pulmonary hypertension.  · Small posterior pericardial effusion. Trivial lateral and under the atria.  · There is a small left pleural effusion.  · No shunt by Air Contrast Bubble.  . Continue Furosemide and monitor clinical status  closely. Monitor on telemetry. Patient is off CHF pathway.  Monitor strict Is&Os and daily weights.  Place on fluid restriction of 1.5 L. Cardiology has not been any consulted. Continue to stress to patient importance of self efficacy and  on diet for CHF. Last BNP reviewed- and noted below   Recent Labs   Lab 08/07/23  1454   *   .    Chronic pain disorder  Continue multimodal pain meds and home percocet      Impaired functional mobility, balance, gait, and endurance  Consult PT/OT   Fall precautions.   8/10 PT recs outpatient PT     Essential hypertension    Chronic, controlled.  Latest blood pressure and vitals reviewed-   Temp:  [97.2 °F (36.2 °C)-98.6 °F (37 °C)]   Pulse:  [65-82]   Resp:  [17-18]   BP: (116-162)/(67-90)   SpO2:  [91 %-94 %] .   Home meds for hypertension were reviewed and noted below. Hospital anti-hypertensive changes were made as shown below.  Hypertension Medications               metoprolol tartrate (LOPRESSOR) 50 MG tablet Take 1 tablet (50 mg total) by mouth once daily.          PRN meds if BP> 180/110 mm HG    Major depressive disorder, recurrent, moderate  Chronic stable - continue buspirone and venlafaxine home medications.         Alcohol use disorder, severe, dependence  Reports sobriety x 1 year, continue thiamine        VTE Risk Mitigation (From admission, onward)           Ordered     apixaban tablet 5 mg  2 times daily         08/07/23 2335     Reason for No Pharmacological VTE Prophylaxis  Once        Question:  Reasons:  Answer:  Already adequately anticoagulated on oral Anticoagulants    08/07/23 2335     IP VTE HIGH RISK PATIENT  Once         08/07/23 2335     Place sequential compression device  Until discontinued         08/07/23 2335                    Discharge Planning   DIXON: 8/13/2023     Code Status: Full Code   Is the patient medically ready for discharge?: No    Reason for patient still in hospital (select all that apply): Treatment  Discharge Plan A:  Home   Discharge Delays: None known at this time              Tio Lopez MD  Department of Hospital Medicine   [facility]

## 2023-08-11 NOTE — PROGRESS NOTES
The patient location is: [place], [place]   The chief complaint leading to consultation is: diarrhea /gerd  Visit type: audiovisual  Total time spent with patient: 14 min  Each patient to whom he or she provides medical services by telemedicine is:  (1) informed of the relationship between the physician and patient and the respective role of any other health care provider with respect to management of the patient; and (2) notified that he or she may decline to receive medical services by telemedicine and may withdraw from such care at any time.         GASTROENTEROLOGY CLINIC NOTE    Reason for visit: The primary encounter diagnosis was Gastroesophageal reflux disease without esophagitis. Diagnoses of Chronic diarrhea, Esophageal dysphagia, and Dyspepsia were also pertinent to this visit.  Referring provider/PCP: Estela Mcdaniel MD    HPI:  Vale Gutierrez is a 62 y.o. female virtual visit follow up  Hx of dustin in 2017 (had bile leak and complications)     Interval  Virtual follow up today  She is in the hospital , currently admitted. At main campus, investigating her heart.  Ongoing symptoms of gerd, heartburn.  Sometimes regurg.  Using the bentyl is helping - she thinks it is 'lifesaver'  Still eating ice cream , she isnt sure she is lactose intolerance. Intermittent diarrhea but bentyl mostly helps with her symptoms.      =================================  Initial clinic visit 5/2020  Reports having diarrhea consistently about 3 months. Sometimes 1-2x/day and othertimes 7-8x/day. Very rare nocturnal stools.  Took some lomotil which helped.  Reports started after she stopped drinking, although she isnt quite sure of exact timing. She mentions terrible diet. Eats lots of ice cream. Assoc incontinence , with urge. sometimes appears oily, sometimes cramps. Has memory issues. Taking omeprazole for gerd and this helps.    Treatments tried: lomotil    New medications: diclofenac since 4/28 (tried naprosyn  prior)  Antibiotics: none  Travel: no  Sick contacts: no  Blood / mucus: none  Gallbladder: removed  Prior GI surgeries: none  Imaging:none        Prior Endoscopy:  EGD:   6/2020 with me  Impression:           - Normal esophagus.                         - Enlarged gastric folds. Biopsied.                         - Normal examined duodenum. Biopsied.   [facility] - [facility]  Chronic gastritis  Negative micro colitis  Negative sprue like changes    Colon:   6/2020 with me  Impression:           - The examined portion of the ileum was normal.                         - One 6 mm polyp in the descending colon, removed                         with a cold snare. Resected and retrieved.                         - The entire examined colon is normal. Biopsied.                         - The examination was otherwise normal on direct                         and retrof                        - Resume previous diet.                         - Continue present medications.                         - Await pathology results.                         - Repeat colonoscopy in 5 years for surveillance.    (Portions of this note were dictated using voice recognition software and may contain dictation related errors in spelling/grammar/syntax not found on text review)    Review of Systems   Constitutional:  Negative for fever and malaise/fatigue.   Respiratory:  Negative for cough and shortness of breath.    Cardiovascular:  Negative for chest pain and palpitations.   Gastrointestinal:  Positive for diarrhea, heartburn and nausea. Negative for abdominal pain, blood in stool and vomiting.   Neurological:  Negative for dizziness and headaches.       Past Medical History: has a past medical history of Alcohol abuse, Anxiety, Benign paroxysmal positional vertigo, Depression, Hyperlipidemia, and Hypertension.    Past Surgical History: has a past surgical history that includes Cholecystectomy; Breast cyst aspiration; Esophagogastroduodenoscopy (N/A,  6/3/2020); Colonoscopy (N/A, 6/3/2020); Transforaminal epidural injection of steroid (Bilateral, 8/7/2020); Transforaminal epidural injection of steroid (Bilateral, 9/4/2020); Transforaminal epidural injection of steroid (Bilateral, 3/4/2022); Injection of anesthetic agent around nerve (Bilateral, 3/24/2022); and Epidural steroid injection (N/A, 5/27/2022).    Family History:family history includes Arthritis in her mother; Atrial fibrillation in her mother; Cancer in her maternal uncle; Cataracts in her paternal grandfather; Glaucoma in her paternal grandfather; Heart failure in her mother; Macular degeneration in her mother; Stroke in her father.    Allergies: Review of patient's allergies indicates:  No Known Allergies    Social History: reports that she has quit smoking. She has never used smokeless tobacco. She reports current alcohol use of about 12.0 standard drinks of alcohol per week. She reports that she does not use drugs.    Home medications:   Current Facility-Administered Medications on File Prior to Visit   Medication Dose Route Frequency Provider Last Rate Last Admin    acetaminophen tablet 1,000 mg  1,000 mg Oral Q12H Tio Lopez MD   1,000 mg at 08/12/23 0928    albuterol-ipratropium 2.5 mg-0.5 mg/3 mL nebulizer solution 3 mL  3 mL Nebulization Q8H PRN Tino Tucker MD        amiodarone tablet 200 mg  200 mg Oral Daily Mat Ovalles MD   200 mg at 08/12/23 0928    apixaban tablet 5 mg  5 mg Oral BID Mat Ovalles MD   5 mg at 08/12/23 0928    busPIRone tablet 5 mg  5 mg Oral Daily Mat Ovalles MD   5 mg at 08/12/23 0928    dicyclomine capsule 10 mg  10 mg Oral QID (AC & HS) Mat Ovalles MD   10 mg at 08/12/23 1153    folic acid tablet 1 mg  1 mg Oral Daily Tio Lopez MD   1 mg at 08/12/23 0928    LIDOcaine 5 % patch 1 patch  1 patch Transdermal Q24H Tio Lopez MD        melatonin tablet 6 mg  6 mg Oral Nightly Mat Barlow MD   6 mg at  08/09/23 2145    metoprolol tartrate (LOPRESSOR) tablet 50 mg  50 mg Oral Daily Mat Ovalles MD   50 mg at 08/12/23 0928    mirtazapine tablet 15 mg  15 mg Oral QHS Mat Ovalles MD   15 mg at 08/11/23 2033    naloxone 0.4 mg/mL injection 0.02 mg  0.02 mg Intravenous PRN Mat Ovalles MD        ondansetron injection 4 mg  4 mg Intravenous Q8H PRN Mat Ovalles MD   4 mg at 08/09/23 2146    oxyCODONE immediate release tablet 5 mg  5 mg Oral Q6H PRN Tio Lopez MD   5 mg at 08/12/23 0531    pantoprazole EC tablet 40 mg  40 mg Oral Before breakfast Mat Ovalles MD   40 mg at 08/12/23 0528    polyethylene glycol packet 17 g  17 g Oral BID PRN Mat Ovalles MD        prochlorperazine injection Soln 5 mg  5 mg Intravenous Q6H PRN Mat Ovalles MD        senna-docusate 8.6-50 mg per tablet 1 tablet  1 tablet Oral Daily PRN Mat Ovalles MD        sodium chloride 0.9% flush 10 mL  10 mL Intravenous Q6H PRN Mat Ovalles MD        thiamine tablet 100 mg  100 mg Oral Daily Mat Ovalles MD   100 mg at 08/12/23 0928    venlafaxine 24 hr capsule 225 mg  225 mg Oral Daily Mat Ovalles MD   225 mg at 08/12/23 0926     Current Outpatient Medications on File Prior to Visit   Medication Sig Dispense Refill    amiodarone (PACERONE) 200 MG Tab Take 1 tablet (200 mg total) by mouth once daily. 30 tablet 11    apixaban (ELIQUIS) 5 mg Tab Take 1 tablet (5 mg total) by mouth 2 (two) times daily. 60 tablet 5    aspirin/salicylamide/caffeine (BC HEADACHE POWDER ORAL) Take 1 packet by mouth 2 (two) times daily as needed (Headache).      busPIRone (BUSPAR) 15 MG tablet Take 15 mg by mouth once daily.      calcium carbonate (TUMS ORAL) Take 2 tablets by mouth daily as needed (Heartburn).      dicyclomine (BENTYL) 10 MG capsule Take 1 capsule (10 mg total) by mouth 4 (four) times daily before meals and nightly. 120 capsule 2    metoprolol tartrate (LOPRESSOR) 50 MG tablet Take 1  tablet (50 mg total) by mouth once daily. 30 tablet 11    mirtazapine (REMERON) 15 MG tablet Take 1 tablet (15 mg total) by mouth every evening. For insomnia, mood 30 tablet 11    omeprazole (PRILOSEC) 20 MG capsule Take 1 capsule (20 mg total) by mouth daily as needed. 90 capsule 2    ondansetron (ZOFRAN-ODT) 4 MG TbDL Take 1 tablet (4 mg total) by mouth every 8 (eight) hours as needed (nausea). 30 tablet 0    oxymetazoline (NO DRIP) 0.05 % nasal spray 1 spray by Nasal route daily as needed for Congestion.      thiamine 100 MG tablet Take 100 mg by mouth once daily.      venlafaxine (EFFEXOR-XR) 75 MG 24 hr capsule Take 3 capsules (225 mg total) by mouth once daily. 90 capsule 11    [DISCONTINUED] docusate sodium (COLACE ORAL) Take 1 capsule by mouth daily as needed (Constipation).      [DISCONTINUED] MAGNESIUM ORAL Take 1 capsule by mouth once daily.      [DISCONTINUED] oxyCODONE-acetaminophen (PERCOCET) 5-325 mg per tablet Take 1 tablet by mouth every 6 (six) hours as needed for Pain. 120 each 0    albuterol-ipratropium (DUO-NEB) 2.5 mg-0.5 mg/3 mL nebulizer solution Take 3 mLs by nebulization every 8 (eight) hours as needed for Wheezing. Rescue 75 mL 0    folic acid (FOLVITE) 1 MG tablet Take 1 tablet (1 mg total) by mouth once daily. 30 tablet 2    furosemide (LASIX) 40 MG tablet Take 1 tablet (40 mg total) by mouth as needed. for weight gain of > 2lb or lower extremity edema 60 tablet 2    LIDOcaine (LIDODERM) 5 % Place 1 patch onto the skin once daily. Remove & Discard patch within 12 hours or as directed by MD 30 patch 2    oxyCODONE-acetaminophen (PERCOCET) 5-325 mg per tablet Take 1 tablet by mouth every 6 (six) hours as needed for Pain.  0    polyethylene glycol (GLYCOLAX) 17 gram PwPk Take 17 g by mouth 2 (two) times daily as needed. 30 packet 1    [DISCONTINUED] acetaminophen (TYLENOL) 500 MG tablet Take 2 tablets (1,000 mg total) by mouth every 12 (twelve) hours. 40 tablet 0    [DISCONTINUED] oxyCODONE  "(ROXICODONE) 5 MG immediate release tablet Take 1 tablet (5 mg total) by mouth every 12 (twelve) hours as needed for Pain. 14 tablet 0       Vital signs:  Ht 5' 4" (1.626 m)   Wt 96.2 kg (212 lb 1.3 oz)   BMI 36.40 kg/m²     Physical Exam    NO PHYSICAL EXAM PERFORMED GIVEN VIRTUAL VISIT      Routine labs:  Lab Results   Component Value Date    WBC 9.08 08/12/2023    HGB 9.2 (L) 08/12/2023    HCT 34.0 (L) 08/12/2023    MCV 78 (L) 08/12/2023     08/12/2023     Lab Results   Component Value Date    INR 1.0 08/07/2023     Lab Results   Component Value Date    IRON 26 (L) 08/13/2022    FERRITIN 218 08/13/2022    TIBC 371 08/13/2022    FESATURATED 7 (L) 08/13/2022     Lab Results   Component Value Date     08/12/2023    K 4.0 08/12/2023    CL 95 08/12/2023    CO2 33 (H) 08/12/2023    BUN 33 (H) 08/12/2023    CREATININE 1.2 08/12/2023     Lab Results   Component Value Date    ALBUMIN 3.0 (L) 08/12/2023    ALT 23 08/12/2023    AST 15 08/12/2023    ALKPHOS 113 08/12/2023    BILITOT 0.1 08/12/2023     No results found for: "GLUCOSE"    I have reviewed prior labs, imaging, notes from last month      Assessment:  1. Gastroesophageal reflux disease without esophagitis    2. Chronic diarrhea    3. Esophageal dysphagia    4. Dyspepsia      Worsening heartburn and regurg.    Plan:  Orders Placed This Encounter    Case Request Endoscopy: EGD (ESOPHAGOGASTRODUODENOSCOPY)       EGD for gerd, diarrhea / dysphagia dyspepsia  With pan biopsies   - will need clearance for holding eliquis , higher than avg risk    RTC after above complete.      Tino Neil MD  [facility] [place]            "

## 2023-08-11 NOTE — PLAN OF CARE
[facility]  Discharge Reassessment    Primary Care Provider: Estela Mcdaniel MD    Expected Discharge Date: 8/13/2023    Reassessment (most recent)       Discharge Reassessment - 08/11/23 1039          Discharge Reassessment    Assessment Type Discharge Planning Reassessment     Did the patient's condition or plan change since previous assessment? No     Discharge Plan discussed with: Patient     Communicated DIXON with patient/caregiver Yes     Discharge Plan A Home     Discharge Plan B Home with family     DME Needed Upon Discharge  none     Transition of Care Barriers None     Why the patient remains in the hospital Requires continued medical care        Post-Acute Status    Post-Acute Authorization Other     Other Status No Post-Acute Service Needs     Hospital Resources/Appts/Education Provided Appointments scheduled and added to AVS     Discharge Delays None known at this time

## 2023-08-11 NOTE — SUBJECTIVE & OBJECTIVE
Past Medical History:   Diagnosis Date    Alcohol abuse     Anxiety     Benign paroxysmal positional vertigo 1/17/2020    Depression     Hyperlipidemia     Hypertension        Past Surgical History:   Procedure Laterality Date    BREAST CYST ASPIRATION      CHOLECYSTECTOMY      complicated with bile leak, sepsis    COLONOSCOPY N/A 6/3/2020    Procedure: COLONOSCOPY Golytely;  Surgeon: Tino Neil MD;  Location: [facility];  Service: Colon and Rectal;  Laterality: N/A;    EPIDURAL STEROID INJECTION N/A 5/27/2022    Procedure: INJECTION, STEROID, EPIDURAL, CAUDAL CONTRAST;  Surgeon: Rubén Rico MD;  Location: [facility];  Service: Pain Management;  Laterality: N/A;  5/6 RESCHEDULE    ESOPHAGOGASTRODUODENOSCOPY N/A 6/3/2020    Procedure: EGD (ESOPHAGOGASTRODUODENOSCOPY);  Surgeon: Tino Neil MD;  Location: [facility];  Service: Colon and Rectal;  Laterality: N/A;    INJECTION OF ANESTHETIC AGENT AROUND NERVE Bilateral 3/24/2022    Procedure: BLOCK, NERVE MEDIAL BRANCH BLOCK BL L2, L3, L4 and L5  1st, needs consent;  Surgeon: Rubén Rico MD;  Location: [facility];  Service: Pain Management;  Laterality: Bilateral;    TRANSFORAMINAL EPIDURAL INJECTION OF STEROID Bilateral 8/7/2020    Procedure: INJECTION, STEROID, EPIDURAL, TRANSFORAMINAL APPROACH, L4-L5 need consent;  Surgeon: Rubén Rico MD;  Location: [facility];  Service: Pain Management;  Laterality: Bilateral;    TRANSFORAMINAL EPIDURAL INJECTION OF STEROID Bilateral 9/4/2020    Procedure: LUMBAR TRANSFORAMINAL BILATERAL L4/5 DIRECT REFERRAL;  Surgeon: Rubén Rico MD;  Location: [facility];  Service: Pain Management;  Laterality: Bilateral;  NEEDS CONSENT    TRANSFORAMINAL EPIDURAL INJECTION OF STEROID Bilateral 3/4/2022    Procedure: Injection,steroid,epidural,transforaminal approach BILATERAL L3/4 DIRECT REFERRAL;  Surgeon: Rubén Rico MD;  Location: [facility];  Service: Pain Management;  Laterality: Bilateral;        Review of patient's allergies indicates:  No Known Allergies  Current Facility-Administered Medications   Medication Frequency    0.9%  NaCl infusion Continuous    acetaminophen tablet 650 mg Q4H PRN    albuterol-ipratropium 2.5 mg-0.5 mg/3 mL nebulizer solution 3 mL Q8H PRN    amiodarone tablet 200 mg Daily    apixaban tablet 5 mg BID    busPIRone tablet 5 mg Daily    dicyclomine capsule 10 mg QID (AC & HS)    melatonin tablet 6 mg Nightly PRN    metoprolol tartrate (LOPRESSOR) tablet 50 mg Daily    mirtazapine tablet 15 mg QHS    naloxone 0.4 mg/mL injection 0.02 mg PRN    ondansetron injection 4 mg Q8H PRN    oxyCODONE-acetaminophen 5-325 mg per tablet 1 tablet Q6H PRN    pantoprazole EC tablet 40 mg Before breakfast    polyethylene glycol packet 17 g BID PRN    prochlorperazine injection Soln 5 mg Q6H PRN    senna-docusate 8.6-50 mg per tablet 1 tablet Daily PRN    sodium chloride 0.9% flush 10 mL Q6H PRN    thiamine tablet 100 mg Daily    venlafaxine 24 hr capsule 225 mg Daily     Family History       Problem Relation (Age of Onset)    Arthritis Mother    Atrial fibrillation Mother    Cancer Maternal Uncle    Cataracts Paternal Grandfather    Glaucoma Paternal Grandfather    Heart failure Mother    Macular degeneration Mother    Stroke Father          Tobacco Use    Smoking status: Former     Current packs/day: 0.00    Smokeless tobacco: Never   Substance and Sexual Activity    Alcohol use: Yes     Alcohol/week: 12.0 standard drinks of alcohol     Types: 12 Shots of liquor per week     Comment: three 12-14 oz cocktail drinks.     Drug use: No    Sexual activity: Never     Review of Systems   Constitutional:  Negative for appetite change and fatigue.   HENT:  Negative for facial swelling.    Respiratory:  Negative for cough, chest tightness and shortness of breath.    Cardiovascular:  Negative for chest pain, palpitations and leg swelling.   Gastrointestinal:  Negative for abdominal distention and  abdominal pain.   Genitourinary:  Negative for difficulty urinating.     Objective:     Vital Signs (Most Recent):  Temp: 97.8 °F (36.6 °C) (08/11/23 0721)  Pulse: 83 (08/11/23 0721)  Resp: 18 (08/11/23 0721)  BP: (!) 158/85 (08/11/23 0721)  SpO2: (!) 93 % (08/11/23 0721) Vital Signs (24h Range):  Temp:  [97.8 °F (36.6 °C)-98.6 °F (37 °C)] 97.8 °F (36.6 °C)  Pulse:  [72-90] 83  Resp:  [17-18] 18  SpO2:  [86 %-98 %] 93 %  BP: (119-174)/(61-85) 158/85     Weight: 96.2 kg (212 lb) (08/10/23 1514)  Body mass index is 36.39 kg/m².  Body surface area is 2.08 meters squared.    I/O last 3 completed shifts:  In: 740 [P.O.:740]  Out: 1050 [Urine:1050]     Physical Exam  Constitutional:       General: She is not in acute distress.     Appearance: She is obese.      Interventions: Nasal cannula in place.   HENT:      Head: Normocephalic and atraumatic.      Mouth/Throat:      Mouth: Mucous membranes are moist.   Eyes:      General: No scleral icterus.     Extraocular Movements: Extraocular movements intact.      Pupils: Pupils are equal, round, and reactive to light.   Cardiovascular:      Rate and Rhythm: Normal rate.      Pulses: Normal pulses.   Pulmonary:      Effort: Pulmonary effort is normal. No respiratory distress.      Breath sounds: No wheezing.      Comments: 3L NC  Abdominal:      General: There is no distension.      Palpations: Abdomen is soft.   Musculoskeletal:      Right lower leg: No edema.      Left lower leg: No edema.   Skin:     General: Skin is warm and dry.   Neurological:      General: No focal deficit present.      Mental Status: She is alert.   Psychiatric:         Mood and Affect: Mood normal.         Behavior: Behavior normal.          Significant Labs:  CBC:   Recent Labs   Lab 08/11/23  0639   WBC 8.05   RBC 4.58   HGB 9.6*   HCT 35.9*      MCV 78*   MCH 21.0*   MCHC 26.7*     CMP:   Recent Labs   Lab 08/11/23  0639 08/11/23  0753   GLU 77  77 70   CALCIUM 9.3  9.3 9.2   ALBUMIN 3.1*   3.1* 3.1*   PROT 6.6  --      142 142   K 4.4  4.4 3.9   CO2 37*  37* 38*   CL 94*  94* 94*   BUN 35*  35* 34*   CREATININE 1.4  1.4 1.1   ALKPHOS 123  --    ALT 29  --    AST 20  --    BILITOT 0.2  --      All labs within the past 24 hours have been reviewed.    Significant Imaging:  Labs: Reviewed

## 2023-08-11 NOTE — HPI
Ms. Gutierrez is a 62 year old woman with HFpEF (68%), Afib, HTN, obesity, chronic back pain who presented to the ED for shortness of breath and was admitted for AHRF 2/2 COPD vs decompensated heart failure with elevated . Patient reports that she does not have a history of COPD but is a current smoker. Patient was admitted at baseline Cr (0.9). Two days after admission her Cr increased from 0.9 to 1.3. She was admitted with potential volume overload in the setting of decompensated heart failure but also received iohexol contrast while inpatient. Cr trend: 0.9 -> 1.3 -> 1.9 -> 2.0 -> 1.8 -> now 1.4. Leif 18, Ucr 37. Nephrology consulted for further workup.

## 2023-08-12 NOTE — DISCHARGE SUMMARY
[facility] - [facility] Medicine  Discharge Summary      Patient Name: Vale Gutierrez  MRN: [id number]  HERACLIO: [id number]  Patient Class: IP- Inpatient  Admission Date: 2023  Hospital Length of Stay: 3 days  Discharge Date and Time:  2023 7:27 AM  Attending Physician: Tio Lopez MD   Discharging Provider: Tio Lopez MD  Primary Care Provider: Estela Mcdaniel MD  [facility] Medicine Team: [facility]OS[facility] MED R Tio Lopez MD  Primary Care Team: [facility]OS[facility] MED R    HPI:   61 y/o woman hx of HFpEF, Afib, HTN, Obesity, Chronic back pain, presents to the ED with complaints of shortness of breath.     She has had shortness of breath for 2 weeks. And dyspnea on exertion.  She has chronic debility related to her chronic back pain and as of late has had decreased ADl capability - she requires assistance to toilet, using a bedside commode more.  Sister helps her to bathe/shower, ambulates with a walker in the home, can feed herself, manages her meds, sister preps food.  She is not driving.     She does not track her weight at home, no LE edema but does note some increased abdominal girth.  She is smoking 1 pack cigarettes per week, trying to quit.  No alcohol use in the last 1 years. Adherent to home medications.       * No surgery found *      Hospital Course:    - Breathing is near baseline, PT consult recomends home health, qualified for home . May dc home in am  8/10  LYLE Cr 0.9 -->13 COPD/CHF admit,  6 min walk test- Patient's O2 Sat on room air while exercisin. not on home 02.  Qualified or home 02. CTA chest -Allowing for exam limitations above, no convincing pulmonary thromboembolism to the level of the distal lobar branches.  Distal embolism cannot be excluded, particularly within the segmental and subsegmental branches to the bilateral lower lobes.  Correlation of these findings with D-dimer and/or lower extremity ultrasound as warranted.  Interlobular septal thickening  may reflect edema. Echo - Left Ventricle: The left ventricle is normal in size. Ventricular mass is normal. Normal wall thickness. Normal wall motion. There is normal systolic function. Ejection fraction by visual approximation is 65%. Grade I diastolic dysfunction.Right Ventricle: Systolic function is normal. IVC/SVC: Normal venous pressure at 3 mmHg.   FeNa of 0.1. started on NS gentle hydration at 60ml/h . LYLE improving. Encourage PO intake 1500ml/24h .   previously received lasix IV x 3 doses. sats 98% on 2LNC. tylenol and lidocaine patch for low back ache  . completed steroids. Cr at 1.4 . encourage PO hydration. sats 93% on 1LNC . needs outpaient pulm f/u and PFTs. feels weak with ambulation. discussed PT recs for outpatient PT.     discharge today with outpatient PT       Goals of Care Treatment Preferences:  Code Status: Full Code      Consults:   Consults (From admission, onward)          Status Ordering Provider     IP consult to case management  Once        Provider:  (Not yet assigned)    Acknowledged EDDIE LARSON          Assessment/Plan:      * Acute hypoxemic respiratory failure  Acute hypoxic respiratory failure, not on home oxygen, etiology suspected potential congestive heart failure related or lung disease (cOPD/emphysema)   -give lasix now and schedule 2 daily doses  -continue scheduled nebulizers, schedule glucocorticoids  -wean oxygen as tolerated.   -Prior admission last year with similar presentation  8/10 6 min walk test- Patient's O2 Sat on room air while exercisin. not on home 02.  Qualified or home 02. CTA chest -Allowing for exam limitations above, no convincing pulmonary thromboembolism to the level of the distal lobar branches.  Distal embolism cannot be excluded, particularly within the segmental and subsegmental branches to the bilateral lower lobes.  Correlation of these findings with D-dimer and/or lower extremity ultrasound as warranted.  Interlobular septal  thickening may reflect edema. Echo ordered   8/11 sats 98% on 2LNC. previously received lasix IV x 3 doses   8/12. completed steroids. Cr at 1.4 . encourage PO hydration. sats 93% on 2LNC . needs outpaient pulm f/u and PFTs. likely discharge with home oxygen      Hyperkalemia     resolved     LYLE (acute kidney injury)     Patient with  acute kidney injury.Serum BUN/Cr uptrending.  Strict I/O monitor . Obtain urine lytes , renal sonogram  and bladder scans q 6h        Recent Labs   Lab 08/11/23 2026 08/11/23  2322 08/12/23  0246   BUN 43* 42* 40*  40*   CREATININE 1.9* 1.7* 1.4  1.4       encourage PO hydration   8/11 FeNa of 0.1. started on NS gentle hydration at 60ml/h . Encourage PO intake 1500ml/24h      Iron deficiency anemia secondary to inadequate dietary iron intake  - stable, at baseline        Paroxysmal atrial fibrillation  Patient with Persistent (7 days or more) atrial fibrillation which is controlled currently with Beta Blocker and Amiodarone. Patient is currently in sinus rhythm. Anticoagulation indicated. Anticoagulation done with apixaban.     Chronic diastolic congestive heart failure  Patient is identified as having Diastolic (HFpEF) heart failure that is Acute on chronic. CHF is currently uncontrolled due to Rales/crackles on pulmonary exam and Pulmonary edema/pleural effusion on CXR. Latest ECHO performed and demonstrates- Results for orders placed during the hospital encounter of 08/01/22     Echo Saline Bubble? Yes     Interpretation Summary  · The left ventricle is normal in size with concentric remodeling and normal systolic function.  · The estimated ejection fraction is 68%.  · Indeterminate left ventricular diastolic function.  · Normal right ventricular size with normal right ventricular systolic function.  · Mild tricuspid regurgitation.  · Mild pulmonic regurgitation.  · Elevated central venous pressure (15 mmHg).  · The estimated PA systolic pressure is 49 mmHg.  · There is  pulmonary hypertension.  · Small posterior pericardial effusion. Trivial lateral and under the atria.  · There is a small left pleural effusion.  · No shunt by Air Contrast Bubble.  . Continue Furosemide and monitor clinical status closely. Monitor on telemetry. Patient is off CHF pathway.  Monitor strict Is&Os and daily weights.  Place on fluid restriction of 1.5 L. Cardiology has not been any consulted. Continue to stress to patient importance of self efficacy and  on diet for CHF. Last BNP reviewed- and noted below       Recent Labs   Lab 08/07/23  1454   *   .     Chronic pain disorder  Continue multimodal pain meds and home percocet        Impaired functional mobility, balance, gait, and endurance  Consult PT/OT   Fall precautions.   8/10 PT recs outpatient PT      Essential hypertension     Chronic, controlled.  Latest blood pressure and vitals reviewed-   Temp:  [97.2 °F (36.2 °C)-98.6 °F (37 °C)]   Pulse:  [65-82]   Resp:  [17-18]   BP: (116-162)/(67-90)   SpO2:  [91 %-94 %] .   Home meds for hypertension were reviewed and noted below. Hospital anti-hypertensive changes were made as shown below.  Hypertension Medications                    metoprolol tartrate (LOPRESSOR) 50 MG tablet Take 1 tablet (50 mg total) by mouth once daily.             PRN meds if BP> 180/110 mm HG     Major depressive disorder, recurrent, moderate  Chronic stable - continue buspirone and venlafaxine home medications.            Alcohol use disorder, severe, dependence  Reports sobriety x 1 year, continue thiamine               Final Active Diagnoses:    Diagnosis Date Noted POA    PRINCIPAL PROBLEM:  Acute hypoxemic respiratory failure [J96.01] 08/02/2022 Yes    LYLE (acute kidney injury) [N17.9] 08/10/2023 Yes    Hyperkalemia [E87.5] 08/10/2023 Yes    Iron deficiency anemia secondary to inadequate dietary iron intake [D50.8] 07/07/2023 Yes    Paroxysmal atrial fibrillation [I48.0] 08/14/2022 Yes    Chronic diastolic  congestive heart failure [I50.32] 08/02/2022 Yes    Chronic pain disorder [G89.4] 07/28/2020 Yes     Chronic    Impaired functional mobility, balance, gait, and endurance [Z74.09] 05/22/2020 Yes    Alcohol use disorder, severe, dependence [F10.20] 08/29/2019 Yes     Chronic    Essential hypertension [I10] 08/29/2019 Yes     Chronic    Major depressive disorder, recurrent, moderate [F33.1] 08/29/2019 Yes      Problems Resolved During this Admission:       Medications:  Reconciled Home Medications:      Medication List        Start taking these medications      folic acid 1 MG tablet  Commonly known as: FOLVITE  Take 1 tablet (1 mg total) by mouth once daily.     furosemide 40 MG tablet  Commonly known as: LASIX  Take 1 tablet (40 mg total) by mouth as needed. for weight gain of > 2lb or lower extremity edema     LIDOcaine 5 %  Commonly known as: LIDODERM  Place 1 patch onto the skin once daily. Remove & Discard patch within 12 hours or as directed by MD     polyethylene glycol 17 gram Pwpk  Commonly known as: GLYCOLAX  Take 17 g by mouth 2 (two) times daily as needed.            Change how you take these medications      albuterol-ipratropium 2.5 mg-0.5 mg/3 mL nebulizer solution  Commonly known as: DUO-NEB  Take 3 mLs by nebulization every 8 (eight) hours as needed for Wheezing. Rescue  What changed:   when to take this  reasons to take this            Continue taking these medications      amiodarone 200 MG Tab  Commonly known as: PACERONE  Take 1 tablet (200 mg total) by mouth once daily.     BC HEADACHE POWDER ORAL  Take 1 packet by mouth 2 (two) times daily as needed (Headache).     busPIRone 15 MG tablet  Commonly known as: BUSPAR  Take 15 mg by mouth once daily.     dicyclomine 10 MG capsule  Commonly known as: BENTYL  Take 1 capsule (10 mg total) by mouth 4 (four) times daily before meals and nightly.     ELIQUIS 5 mg Tab  Generic drug: apixaban  Take 1 tablet (5 mg total) by mouth 2 (two) times daily.      metoprolol tartrate 50 MG tablet  Commonly known as: LOPRESSOR  Take 1 tablet (50 mg total) by mouth once daily.     mirtazapine 15 MG tablet  Commonly known as: REMERON  Take 1 tablet (15 mg total) by mouth every evening. For insomnia, mood     NO DRIP 0.05 % nasal spray  Generic drug: oxymetazoline  1 spray by Nasal route daily as needed for Congestion.     omeprazole 20 MG capsule  Commonly known as: PRILOSEC  Take 1 capsule (20 mg total) by mouth daily as needed.     ondansetron 4 MG Tbdl  Commonly known as: ZOFRAN-ODT  Take 1 tablet (4 mg total) by mouth every 8 (eight) hours as needed (nausea).     oxyCODONE-acetaminophen 5-325 mg per tablet  Commonly known as: PERCOCET  Take 1 tablet by mouth every 6 (six) hours as needed for Pain.     thiamine 100 MG tablet  Take 100 mg by mouth once daily.     TUMS ORAL  Take 2 tablets by mouth daily as needed (Heartburn).     venlafaxine 75 MG 24 hr capsule  Commonly known as: EFFEXOR-XR  Take 3 capsules (225 mg total) by mouth once daily.            Stop taking these medications      COLACE ORAL     MAGNESIUM ORAL                Discharged Condition: fair    Disposition: Home or Self Care                Follow Up:     Patient Instructions:   No discharge procedures on file.    Laboratory/Diagnostic Data:    CBC/Anemia Labs: Coags:    Recent Labs   Lab 08/11/23  0639 08/12/23 0246 08/13/23 0258   WBC 8.05 9.08 8.83   HGB 9.6* 9.2* 8.9*   HCT 35.9* 34.0* 33.6*    354 336   MCV 78* 78* 79*   RDW 17.8* 17.7* 17.8*    Recent Labs   Lab 08/07/23  1454   INR 1.0        Chemistries: ABG:   Recent Labs   Lab 08/11/23  0639 08/11/23  0753 08/12/23  0246 08/12/23  0853 08/12/23 2024 08/13/23  0002 08/13/23  0258     142   < > 140  140   < > 138 142 141  141   K 4.4  4.4   < > 4.4  4.4   < > 4.0 4.2 5.0  5.0   CL 94*  94*   < > 95  95   < > 94* 95 98  98   CO2 37*  37*   < > 33*  33*   < > 34* 35* 32*  32*   BUN 35*  35*   < > 40*  40*   < > 35* 33*  "32*  32*   CREATININE 1.4  1.4   < > 1.4  1.4   < > 1.6* 1.3 1.3  1.3   CALCIUM 9.3  9.3   < > 9.2  9.2   < > 8.8 8.9 9.0  9.0   PROT 6.6  --  6.6  --   --   --  6.0   BILITOT 0.2  --  0.1  --   --   --  0.2   ALKPHOS 123  --  113  --   --   --  109   ALT 29  --  23  --   --   --  40   AST 20  --  15  --   --   --  36   MG 2.2  --  2.1  --   --   --  1.9   PHOS 3.5   < > 4.0   < > 3.2 3.1 3.5    < > = values in this interval not displayed.    No results for input(s): "PH", "PCO2", "PO2", "HCO3", "POCSATURATED", "BE" in the last 168 hours.     POCT Glucose: HbA1c:    No results for input(s): "POCTGLUCOSE" in the last 168 hours. Hemoglobin A1C   Date Value Ref Range Status   06/30/2023 5.3 4.0 - 5.6 % Final     Comment:     ADA Screening Guidelines:  5.7-6.4%  Consistent with prediabetes  >or=6.5%  Consistent with diabetes    High levels of fetal hemoglobin interfere with the HbA1C  assay. Heterozygous hemoglobin variants (HbS, HgC, etc)do  not significantly interfere with this assay.   However, presence of multiple variants may affect accuracy.     01/13/2022 5.3 4.0 - 5.6 % Final     Comment:     ADA Screening Guidelines:  5.7-6.4%  Consistent with prediabetes  >or=6.5%  Consistent with diabetes    High levels of fetal hemoglobin interfere with the HbA1C  assay. Heterozygous hemoglobin variants (HbS, HgC, etc)do  not significantly interfere with this assay.   However, presence of multiple variants may affect accuracy.     10/23/2020 5.1 4.0 - 5.6 % Final     Comment:     ADA Screening Guidelines:  5.7-6.4%  Consistent with prediabetes  >or=6.5%  Consistent with diabetes  High levels of fetal hemoglobin interfere with the HbA1C  assay. Heterozygous hemoglobin variants (HbS, HgC, etc)do  not significantly interfere with this assay.   However, presence of multiple variants may affect accuracy.          Cardiac Enzymes: Ejection Fractions:    No results for input(s): "CPK", "CPKMB", "MB", "TROPONINI" in the last " "72 hours. EF   Date Value Ref Range Status   08/10/2023 65 % Final   08/12/2022 68 % Final          No results for input(s): "COLORU", "APPEARANCEUA", "PHUR", "SPECGRAV", "PROTEINUA", "GLUCUA", "KETONESU", "BILIRUBINUA", "OCCULTUA", "NITRITE", "UROBILINOGEN", "LEUKOCYTESUR", "RBCUA", "WBCUA", "BACTERIA", "SQUAMEPITHEL", "HYALINECASTS" in the last 48 hours.    Invalid input(s): "WRIGHTSUR"    Procalcitonin (ng/mL)   Date Value   08/02/2022 0.10     Lactate (Lactic Acid) (mmol/L)   Date Value   08/07/2023 0.7   08/16/2022 2.6 (H)   08/01/2022 1.3     BNP (pg/mL)   Date Value   08/07/2023 706 (H)   08/08/2022 90   08/01/2022 504 (H)     CRP (mg/L)   Date Value   08/13/2022 311.4 (H)   05/18/2020 4.0     Sed Rate (mm/Hr)   Date Value   08/13/2022 >120 (H)     No results found for: "DDIMER"  Ferritin (ng/mL)   Date Value   08/13/2022 218     No results found for: "LDH"  Troponin I (ng/mL)   Date Value   08/07/2023 0.014   08/10/2022 <0.006   08/02/2022 0.020   08/01/2022 0.027 (H)   02/18/2020 <0.012     Results for orders placed or performed in visit on 06/30/23   Vitamin D   Result Value Ref Range    Vit D, 25-Hydroxy 48 30 - 96 ng/mL   Results for orders placed or performed in visit on 09/20/22   Vitamin D   Result Value Ref Range    Vit D, 25-Hydroxy 74 30 - 96 ng/mL   Results for orders placed or performed in visit on 01/13/22   Vitamin D   Result Value Ref Range    Vit D, 25-Hydroxy 109 (H) 30 - 96 ng/mL     SARS-CoV2 (COVID-19) Qualitative PCR (no units)   Date Value   08/07/2022 Not Detected   09/28/2020 Not Detected   09/01/2020 Not Detected   06/01/2020 Not Detected     SARS-CoV-2 RNA, Amplification, Qual (no units)   Date Value   08/07/2023 Negative   08/01/2022 Negative     POC Rapid COVID (no units)   Date Value   10/21/2021 Negative   12/06/2020 Negative       Microbiology labs for the last week  Microbiology Results (last 7 days)       ** No results found for the last 168 hours. **              Reviewed " and noted in plan where applicable- Please see chart for full lab data.    Lines/Drains:       Peripheral IV - Single Lumen 08/10/23 0809 20 G Anterior;Distal;Left Forearm (Active)   Site Assessment Clean;Dry;Intact;No redness;No swelling 08/11/23 2030   Extremity Assessment Distal to IV No warmth;No swelling;No redness;No abnormal discoloration 08/11/23 2030   Line Status Saline locked 08/11/23 2030   Dressing Status Clean;Dry;Intact 08/11/23 2030   Dressing Intervention Integrity maintained 08/11/23 2030   Number of days: 1       Imaging  ECG Results              EKG 12-lead (Final result)  Result time 08/07/23 15:34:48      Final result by Interface, Lab [facility] (08/07/23 15:34:48)               Narrative:    Test Reason : R06.02,    Vent. Rate : 064 BPM     Atrial Rate : 064 BPM     P-R Int : 148 ms          QRS Dur : 092 ms      QT Int : 446 ms       P-R-T Axes : 064 002 069 degrees     QTc Int : 460 ms    Normal sinus rhythm  Nonspecific ST and T wave abnormality  Abnormal ECG  When compared with ECG of 19-AUG-2022 12:51,  Incomplete right bundle branch block is no longer Present    Confirmed by CARMENCITA GUSMAN MD ([id number]) on 8/7/2023 3:34:33 PM    Referred By: System System           Confirmed By:CARMENCITA GUSMAN MD                                  Results for orders placed during the hospital encounter of 08/07/23    Echo    Interpretation Summary    Left Ventricle: The left ventricle is normal in size. Ventricular mass is normal. Normal wall thickness. Normal wall motion. There is normal systolic function. Ejection fraction by visual approximation is 65%. Grade I diastolic dysfunction.    Right Ventricle: Systolic function is normal.    IVC/SVC: Normal venous pressure at 3 mmHg.      US Retroperitoneal Complete  Narrative: EXAMINATION:  US RETROPERITONEAL COMPLETE    CLINICAL HISTORY:  LYLE;    TECHNIQUE:  Ultrasound of the kidneys and urinary bladder was performed including color flow and Doppler  evaluation of the kidneys.    COMPARISON:  CT chest 08/07/2020, ultrasound retroperitoneal 04/16/2022, MRI lumbar spine 04/07/2022    FINDINGS:  Right kidney: The right kidney measures 9.3 cm. No cortical thinning. No loss of corticomedullary distinction. Resistive index measures 0.74.  No renal stone. No hydronephrosis.    Left kidney: The left kidney measures 10.5 cm. No cortical thinning. No loss of corticomedullary distinction. Resistive index measures 0.68.  Inferior pole 2.6 x 2.1 x 2.2 cm simple cyst increased in size compared to prior, previously 1.8 x 1.9 x 1.8 cm.  No renal stone. No hydronephrosis.    Splenic resistive index is 0.69.    The bladder is partially distended at the time of scanning and has an unremarkable appearance.  Impression: No hydronephrosis.    Left renal cyst.    Electronically signed by resident: Charlie Archuleta  Date:    08/11/2023  Time:    07:37    Electronically signed by: Jaswant Avitia MD  Date:    08/11/2023  Time:    08:13      Imaging:  Imaging Results              CTA Chest Non-Coronary (PE Studies) (Final result)  Result time 08/07/23 20:00:40      Final result by Bart Silva MD (08/07/23 20:00:40)               Impression:      1. Allowing for exam limitations above, no convincing pulmonary thromboembolism to the level of the distal lobar branches.  Distal embolism cannot be excluded, particularly within the segmental and subsegmental branches to the bilateral lower lobes.  Correlation of these findings with D-dimer and/or lower extremity ultrasound as warranted.  2. Interlobular septal thickening may reflect edema.  3. Please see above for several additional findings.      Electronically signed by: Bart Silva MD  Date:    08/07/2023  Time:    20:00             Narrative:    EXAMINATION:  CTA CHEST NON CORONARY (PE STUDIES)    CLINICAL HISTORY:  Pulmonary embolism (PE) suspected, high prob;    TECHNIQUE:  Low dose axial images, sagittal and coronal reformations  were obtained from the thoracic inlet to the lung bases following the IV administration of 100 mL of Omnipaque 350.  Contrast timing was optimized to evaluate the pulmonary arteries.  MIP images were performed.    COMPARISON:  Radiograph 08/07/2023, CT chest 08/16/2022    FINDINGS:  The structures at the base of the neck are grossly unremarkable.  No significant mediastinal lymphadenopathy.  The heart is not enlarged noting prominent epicardial fat.  The thoracic aorta tapers normally.  Allowing for motion artifact and contrast phase, the visualized portion is of the left kidney, adrenal glands, spleen, pancreas and liver are grossly unremarkable.  There is surgical change of cholecystectomy noting stable appearance of the common duct.  There is right nonobstructive nephrolithiasis.  The stomach is distended with ingested content noting small hiatal hernia.  There are a few scattered shotty abdominal lymph nodes.    Motion artifact limits evaluation of the airways and pulmonary parenchyma.  Allowing for this, the central airways are patent.  The distal airways are grossly patent noting the distal airways to the bilateral lower lobes are suboptimally evaluated.    There is biapical atelectasis or scarring.  There is scattered interlobular septal thickening suggesting edema.  There is bilateral basilar dependent atelectasis.  No large focal consolidation.  No pneumothorax.  No pleural effusion.  There are a few scattered regions of bandlike atelectasis or scarring.    Bolus timing is adequate for evaluation of pulmonary thromboembolism however evaluation is limited secondary to artifact from patient motion.  Allowing for this, no convincing pulmonary arterial filling defect to the level of the proximal segmental branches bilaterally.  Distal embolus cannot be excluded on the basis of this exam, particularly within the segmental branches of the bilateral lower lobes.  Correlation of these findings with D-dimer and/or  lower extremity ultrasound as warranted.    There are degenerative changes of the spine.  No significant axillary lymphadenopathy.                                     X-Ray Chest AP Portable (Final result)  Result time 08/07/23 16:10:06      Final result by Ricky Velasquez MD (08/07/23 16:10:06)               Impression:      Decreasing compression atelectasis right lung base, chest otherwise stable.      Electronically signed by: Ricky Velasquez MD  Date:    08/07/2023  Time:    16:10             Narrative:    EXAMINATION:  XR CHEST AP PORTABLE    CLINICAL HISTORY:  shortness of breath;    TECHNIQUE:  Single frontal view of the chest was performed.    COMPARISON:  08/25/2022    FINDINGS:  Incomplete inspiration, large body size, apical lordotic positioning, partial rotation of chest degrades exam.  Moderate elevation right hemidiaphragm, decrease adjacent compression atelectasis basilar segment right lower lobe, high positioning hepatic flexure superolateral beneath hemidiaphragm.  Some compression atelectasis vague infiltrates again question left lung base without additional obscuring of dome left hemidiaphragm.  Pulmonary vascular tree appears intact and no new infiltrate or effusion confirmed.                                      Follow Up Instructions:    Follow-up Information       Estela Mcdaniel MD Follow up.    Specialty: Internal Medicine  Contact information:  [address]  [place] [address]  [phone]                           Future Appointments   Date Time Provider Department Center   8/29/2023  9:00 AM Estela Mcdaniel MD [facility] IM [place]   10/4/2023  3:15 PM Patrick Rivas MD [facility] SPINE [address]   10/6/2023 10:40 AM Vicente Trejo OD [facility] OPTOMTY [place]   10/6/2023 11:00 AM Vicente Trejo OD [facility] OPTOMTY [place]   10/9/2023  9:00 AM SARAY DIAS [facility] LAB [place]       Discharge Instructions:  Discharge Procedure Orders   NEBULIZER FOR HOME USE  "    Order Specific Question Answer Comments   Height: 5' 4" (1.626 m)    Weight: 96.2 kg (212 lb)    Does patient have medical equipment at home? rollator    Does patient have medical equipment at home? bath bench    Does patient have medical equipment at home? bedside commode    Does patient have medical equipment at home? wheelchair    Does patient have medical equipment at home? walker rolling    Length of need (1-99 months): 99      NEBULIZER KIT (SUPPLIES) FOR HOME USE     Order Specific Question Answer Comments   Height: 5' 4" (1.626 m)    Weight: 96.2 kg (212 lb)    Does patient have medical equipment at home? rollator    Does patient have medical equipment at home? bath bench    Does patient have medical equipment at home? bedside commode    Does patient have medical equipment at home? wheelchair    Does patient have medical equipment at home? walker, rolling    Length of need (1-99 months): 99    Mask or Mouthpiece? Mask      OXYGEN FOR HOME USE     Order Specific Question Answer Comments   Liter Flow 2    Duration Continuous    Qualifying Test Performed at: Rest    Oxygen saturation: 84    Portable mode: continuous    Route nasal cannula    Device: home concentrator with portable tanks    Length of need (in months): 99 mos    Patient condition with qualifying saturation CHF    Height: 5' 4" (1.626 m)    Weight: 96.2 kg (212 lb)    Alternative treatment measures have been tried or considered and deemed clinically ineffective. Yes      Ambulatory referral/consult to Pulmonology   Standing Status: Future   Referral Priority: Routine Referral Type: Consultation   Referral Reason: Specialty Services Required   Requested Specialty: Pulmonary Disease   Number of Visits Requested: 1     Ambulatory referral/consult to Cardiology   Standing Status: Future   Referral Priority: Routine Referral Type: Consultation   Referral Reason: Specialty Services Required   Requested Specialty: Cardiology   Number of Visits " Requested: 1     Ambulatory referral/consult to Physical/Occupational Therapy   Standing Status: Future   Referral Priority: Routine Referral Type: Physical Medicine   Referral Reason: Specialty Services Required   Number of Visits Requested: 1         Total time spent on discharge 40  minutes     Tio Lopez MD  Attending Staff Physician  [facility]

## 2023-08-12 NOTE — PROGRESS NOTES
[facility] Medicine  Progress Note    Patient Name: Vale Gutierrez  MRN: [id number]  Patient Class: IP- Inpatient   Admission Date: 2023  Length of Stay: 2 days  Attending Physician: Tio Lopez MD  Primary Care Provider: Estela Mcdaniel MD        Subjective:     Principal Problem:Acute hypoxemic respiratory failure        HPI:  63 y/o woman hx of HFpEF, Afib, HTN, Obesity, Chronic back pain, presents to the ED with complaints of shortness of breath.     She has had shortness of breath for 2 weeks. And dyspnea on exertion.  She has chronic debility related to her chronic back pain and as of late has had decreased ADl capability - she requires assistance to toilet, using a bedside commode more.  Sister helps her to bathe/shower, ambulates with a walker in the home, can feed herself, manages her meds, sister preps food.  She is not driving.     She does not track her weight at home, no LE edema but does note some increased abdominal girth.  She is smoking 1 pack cigarettes per week, trying to quit.  No alcohol use in the last 1 years. Adherent to home medications.       Overview/Hospital Course:   - Breathing is near baseline, PT consult recomends home health, qualified for home 02. May dc home in am  10  LYLE Cr 0.9 -->13 COPD/CHF admit,  6 min walk test- Patient's O2 Sat on room air while exercisin. not on home 02.  Qualified or home 02. CTA chest -Allowing for exam limitations above, no convincing pulmonary thromboembolism to the level of the distal lobar branches.  Distal embolism cannot be excluded, particularly within the segmental and subsegmental branches to the bilateral lower lobes.  Correlation of these findings with D-dimer and/or lower extremity ultrasound as warranted.  Interlobular septal thickening may reflect edema. Echo - Left Ventricle: The left ventricle is normal in size. Ventricular mass is normal. Normal wall thickness. Normal wall motion. There is  normal systolic function. Ejection fraction by visual approximation is 65%. Grade I diastolic dysfunction.Right Ventricle: Systolic function is normal. IVC/SVC: Normal venous pressure at 3 mmHg.  8/11 FeNa of 0.1. started on NS gentle hydration at 60ml/h . LYLE improving. Encourage PO intake 1500ml/24h .   previously received lasix IV x 3 doses. sats 98% on 2LNC. tylenol and lidocaine patch for low back ache  8/12. completed steroids. Cr at 1.4 . encourage PO hydration. sats 93% on 1LNC . needs outpaient pulm f/u and PFTs. feels weak with ambulation. discussed PT recs for outpatient PT. wants to discharge in AM.                  Review of Systems:   Pain scale:  Constitutional:  fever,  chills, headache, vision loss, hearing loss, weight loss, Generalized weakness, falls, loss of smell, loss of taste, poor appetite,  sore throat  Respiratory: cough, shortness of breath.   Cardiovascular: chest pain, dizziness, palpitations, orthopnea, swelling of feet, syncope  Gastrointestinal: nausea, vomiting, abdominal pain, diarrhea, black stool,  blood in stool, change in bowel habits  Genitourinary: hematuria, dysuria, urgency, frequency  Integument/Breast: rash,  pruritis  Hematologic/Lymphatic: easy bruising, lymphadenopathy  Musculoskeletal: arthralgias , myalgias, back pain, neck pain, knee pain  Neurological: confusion, seizures, tremors, slurred speech  Behavioral/Psych:  depression, anxiety, auditory or visual hallucinations     OBJECTIVE:     Physical Exam:  Body mass index is 36.39 kg/m².    Constitutional: Appears well-developed and well-nourished. obesity   Head: Normocephalic and atraumatic.   Neck: Normal range of motion. Neck supple.   Cardiovascular: Normal heart rate.  Regular heart rhythm.  Pulmonary/Chest: Effort normal.   Abdominal: No distension.  No tenderness  Musculoskeletal: Normal range of motion. No edema.   Neurological: Alert and oriented to person, place, and time.   Skin: Skin is warm and dry.    Psychiatric: Normal mood and affect. Behavior is normal.                  Vital Signs  Temp: 98.6 °F (37 °C) (08/12/23 1049)  Pulse: 64 (08/12/23 1049)  Resp: 19 (08/12/23 1049)  BP: 115/65 (08/12/23 1049)  SpO2: (Abnormal) 93 % (08/12/23 1049)     24 Hour VS Range    Temp:  [97 °F (36.1 °C)-98.6 °F (37 °C)]   Pulse:  [64-87]   Resp:  [16-19]   BP: (115-175)/(56-95)   SpO2:  [89 %-93 %]     Intake/Output Summary (Last 24 hours) at 8/12/2023 1339  Last data filed at 8/12/2023 0531  Gross per 24 hour   Intake 200 ml   Output no documentation   Net 200 ml         I/O This Shift:  No intake/output data recorded.    Wt Readings from Last 3 Encounters:   08/10/23 96.2 kg (212 lb)   08/11/23 96.2 kg (212 lb 1.3 oz)   06/30/23 91.5 kg (201 lb 11.5 oz)       I have personally reviewed the vitals and recorded Intake/Output     Laboratory/Diagnostic Data:    CBC/Anemia Labs: Coags:    Recent Labs   Lab 08/10/23  0531 08/11/23  0639 08/12/23  0246   WBC 7.79 8.05 9.08   HGB 10.0* 9.6* 9.2*   HCT 36.8* 35.9* 34.0*    335 354   MCV 78* 78* 78*   RDW 17.8* 17.8* 17.7*    Recent Labs   Lab 08/07/23  1454   INR 1.0        Chemistries: ABG:   Recent Labs   Lab 08/10/23  0531 08/10/23  1733 08/11/23  0639 08/11/23  0753 08/12/23  0246 08/12/23  0853 08/12/23  1149      < > 142  142   < > 140  140 140 140   K 5.0   < > 4.4  4.4   < > 4.4  4.4 3.7 4.0   CL 89*   < > 94*  94*   < > 95  95 96 95   CO2 32*   < > 37*  37*   < > 33*  33* 29 33*   BUN 30*   < > 35*  35*   < > 40*  40* 33* 33*   CREATININE 1.3   < > 1.4  1.4   < > 1.4  1.4 1.3 1.2   CALCIUM 9.0   < > 9.3  9.3   < > 9.2  9.2 9.0 9.0   PROT 6.8  --  6.6  --  6.6  --   --    BILITOT 0.3  --  0.2  --  0.1  --   --    ALKPHOS 146*  --  123  --  113  --   --    ALT 52*  --  29  --  23  --   --    *  --  20  --  15  --   --    MG 2.0  --  2.2  --  2.1  --   --    PHOS 4.5   < > 3.5   < > 4.0 2.5* 3.0    < > = values in this interval not displayed.  "   No results for input(s): "PH", "PCO2", "PO2", "HCO3", "POCSATURATED", "BE" in the last 168 hours.     POCT Glucose: HbA1c:    No results for input(s): "POCTGLUCOSE" in the last 168 hours. Hemoglobin A1C   Date Value Ref Range Status   06/30/2023 5.3 4.0 - 5.6 % Final     Comment:     ADA Screening Guidelines:  5.7-6.4%  Consistent with prediabetes  >or=6.5%  Consistent with diabetes    High levels of fetal hemoglobin interfere with the HbA1C  assay. Heterozygous hemoglobin variants (HbS, HgC, etc)do  not significantly interfere with this assay.   However, presence of multiple variants may affect accuracy.     01/13/2022 5.3 4.0 - 5.6 % Final     Comment:     ADA Screening Guidelines:  5.7-6.4%  Consistent with prediabetes  >or=6.5%  Consistent with diabetes    High levels of fetal hemoglobin interfere with the HbA1C  assay. Heterozygous hemoglobin variants (HbS, HgC, etc)do  not significantly interfere with this assay.   However, presence of multiple variants may affect accuracy.     10/23/2020 5.1 4.0 - 5.6 % Final     Comment:     ADA Screening Guidelines:  5.7-6.4%  Consistent with prediabetes  >or=6.5%  Consistent with diabetes  High levels of fetal hemoglobin interfere with the HbA1C  assay. Heterozygous hemoglobin variants (HbS, HgC, etc)do  not significantly interfere with this assay.   However, presence of multiple variants may affect accuracy.          Cardiac Enzymes: Ejection Fractions:    No results for input(s): "CPK", "CPKMB", "MB", "TROPONINI" in the last 72 hours.   EF   Date Value Ref Range Status   08/10/2023 65 % Final   08/12/2022 68 % Final          No results for input(s): "COLORU", "APPEARANCEUA", "PHUR", "SPECGRAV", "PROTEINUA", "GLUCUA", "KETONESU", "BILIRUBINUA", "OCCULTUA", "NITRITE", "UROBILINOGEN", "LEUKOCYTESUR", "RBCUA", "WBCUA", "BACTERIA", "SQUAMEPITHEL", "HYALINECASTS" in the last 48 hours.    Invalid input(s): "WRIGHTSUR"    Procalcitonin (ng/mL)   Date Value   08/02/2022 0.10 " "    Lactate (Lactic Acid) (mmol/L)   Date Value   08/07/2023 0.7   08/16/2022 2.6 (H)   08/01/2022 1.3     BNP (pg/mL)   Date Value   08/07/2023 706 (H)   08/08/2022 90   08/01/2022 504 (H)     CRP (mg/L)   Date Value   08/13/2022 311.4 (H)   05/18/2020 4.0     Sed Rate (mm/Hr)   Date Value   08/13/2022 >120 (H)     No results found for: "DDIMER"  Ferritin (ng/mL)   Date Value   08/13/2022 218     No results found for: "LDH"  Troponin I (ng/mL)   Date Value   08/07/2023 0.014   08/10/2022 <0.006   08/02/2022 0.020   08/01/2022 0.027 (H)   02/18/2020 <0.012     Results for orders placed or performed in visit on 06/30/23   Vitamin D   Result Value Ref Range    Vit D, 25-Hydroxy 48 30 - 96 ng/mL   Results for orders placed or performed in visit on 09/20/22   Vitamin D   Result Value Ref Range    Vit D, 25-Hydroxy 74 30 - 96 ng/mL   Results for orders placed or performed in visit on 01/13/22   Vitamin D   Result Value Ref Range    Vit D, 25-Hydroxy 109 (H) 30 - 96 ng/mL     SARS-CoV2 (COVID-19) Qualitative PCR (no units)   Date Value   08/07/2022 Not Detected   09/28/2020 Not Detected   09/01/2020 Not Detected   06/01/2020 Not Detected     SARS-CoV-2 RNA, Amplification, Qual (no units)   Date Value   08/07/2023 Negative   08/01/2022 Negative     POC Rapid COVID (no units)   Date Value   10/21/2021 Negative   12/06/2020 Negative       Microbiology labs for the last week  Microbiology Results (last 7 days)       ** No results found for the last 168 hours. **            Reviewed and noted in plan where applicable- Please see chart for full lab data.    Lines/Drains:       Peripheral IV - Single Lumen 08/10/23 0809 20 G Anterior;Distal;Left Forearm (Active)   Number of days: 0       Imaging  ECG Results              EKG 12-lead (Final result)  Result time 08/07/23 15:34:48      Final result by Interface, Lab In [facility] (08/07/23 15:34:48)               Narrative:    Test Reason : R06.02,    Vent. Rate : 064 BPM     Atrial " Rate : 064 BPM     P-R Int : 148 ms          QRS Dur : 092 ms      QT Int : 446 ms       P-R-T Axes : 064 002 069 degrees     QTc Int : 460 ms    Normal sinus rhythm  Nonspecific ST and T wave abnormality  Abnormal ECG  When compared with ECG of 19-AUG-2022 12:51,  Incomplete right bundle branch block is no longer Present    Confirmed by CARMENCITA GUSMAN MD ([id number]) on 8/7/2023 3:34:33 PM    Referred By: System System           Confirmed By:CARMENCITA GUSMAN MD                                  Results for orders placed during the hospital encounter of 08/01/22    Echo Saline Bubble? Yes    Interpretation Summary  · The left ventricle is normal in size with concentric remodeling and normal systolic function.  · The estimated ejection fraction is 68%.  · Indeterminate left ventricular diastolic function.  · Normal right ventricular size with normal right ventricular systolic function.  · Mild tricuspid regurgitation.  · Mild pulmonic regurgitation.  · Elevated central venous pressure (15 mmHg).  · The estimated PA systolic pressure is 49 mmHg.  · There is pulmonary hypertension.  · Small posterior pericardial effusion. Trivial lateral and under the atria.  · There is a small left pleural effusion.  · No shunt by Air Contrast Bubble.      US Retroperitoneal Complete  Narrative: EXAMINATION:  US RETROPERITONEAL COMPLETE    CLINICAL HISTORY:  LYLE;    TECHNIQUE:  Ultrasound of the kidneys and urinary bladder was performed including color flow and Doppler evaluation of the kidneys.    COMPARISON:  CT chest 08/07/2020, ultrasound retroperitoneal 04/16/2022, MRI lumbar spine 04/07/2022    FINDINGS:  Right kidney: The right kidney measures 9.3 cm. No cortical thinning. No loss of corticomedullary distinction. Resistive index measures 0.74.  No renal stone. No hydronephrosis.    Left kidney: The left kidney measures 10.5 cm. No cortical thinning. No loss of corticomedullary distinction. Resistive index measures 0.68.  Inferior  pole 2.6 x 2.1 x 2.2 cm simple cyst increased in size compared to prior, previously 1.8 x 1.9 x 1.8 cm.  No renal stone. No hydronephrosis.    Splenic resistive index is 0.69.    The bladder is partially distended at the time of scanning and has an unremarkable appearance.  Impression: No hydronephrosis.    Left renal cyst.    Electronically signed by resident: Charlie Archuleta  Date:    08/11/2023  Time:    07:37    Electronically signed by: Jaswant Avitia MD  Date:    08/11/2023  Time:    08:13      Labs, Imaging, EKG and Diagnostic results from 8/12/2023 were reviewed.    Medications:  Medication list was reviewed and changes noted under Assessment/Plan.  No current facility-administered medications on file prior to encounter.     Current Outpatient Medications on File Prior to Encounter   Medication Sig Dispense Refill    amiodarone (PACERONE) 200 MG Tab Take 1 tablet (200 mg total) by mouth once daily. 30 tablet 11    apixaban (ELIQUIS) 5 mg Tab Take 1 tablet (5 mg total) by mouth 2 (two) times daily. 60 tablet 5    aspirin/salicylamide/caffeine (BC HEADACHE POWDER ORAL) Take 1 packet by mouth 2 (two) times daily as needed (Headache).      busPIRone (BUSPAR) 15 MG tablet Take 15 mg by mouth once daily.      calcium carbonate (TUMS ORAL) Take 2 tablets by mouth daily as needed (Heartburn).      dicyclomine (BENTYL) 10 MG capsule Take 1 capsule (10 mg total) by mouth 4 (four) times daily before meals and nightly. 120 capsule 2    metoprolol tartrate (LOPRESSOR) 50 MG tablet Take 1 tablet (50 mg total) by mouth once daily. 30 tablet 11    mirtazapine (REMERON) 15 MG tablet Take 1 tablet (15 mg total) by mouth every evening. For insomnia, mood 30 tablet 11    omeprazole (PRILOSEC) 20 MG capsule Take 1 capsule (20 mg total) by mouth daily as needed. 90 capsule 2    ondansetron (ZOFRAN-ODT) 4 MG TbDL Take 1 tablet (4 mg total) by mouth every 8 (eight) hours as needed (nausea). 30 tablet 0    oxymetazoline (NO DRIP) 0.05  % nasal spray 1 spray by Nasal route daily as needed for Congestion.      thiamine 100 MG tablet Take 100 mg by mouth once daily.      venlafaxine (EFFEXOR-XR) 75 MG 24 hr capsule Take 3 capsules (225 mg total) by mouth once daily. 90 capsule 11    [DISCONTINUED] docusate sodium (COLACE ORAL) Take 1 capsule by mouth daily as needed (Constipation).      [DISCONTINUED] MAGNESIUM ORAL Take 1 capsule by mouth once daily.      [DISCONTINUED] oxyCODONE-acetaminophen (PERCOCET) 5-325 mg per tablet Take 1 tablet by mouth every 6 (six) hours as needed for Pain. 120 each 0     Scheduled Medications:  acetaminophen, 1,000 mg, Oral, Q12H  amiodarone, 200 mg, Oral, Daily  apixaban, 5 mg, Oral, BID  busPIRone, 5 mg, Oral, Daily  dicyclomine, 10 mg, Oral, QID (AC & HS)  folic acid, 1 mg, Oral, Daily  LIDOcaine, 1 patch, Transdermal, Q24H  metoprolol tartrate, 50 mg, Oral, Daily  mirtazapine, 15 mg, Oral, QHS  pantoprazole, 40 mg, Oral, Before breakfast  thiamine, 100 mg, Oral, Daily  venlafaxine, 225 mg, Oral, Daily      PRN: albuterol-ipratropium, melatonin, naloxone, ondansetron, oxyCODONE, polyethylene glycol, prochlorperazine, senna-docusate 8.6-50 mg, sodium chloride 0.9%  Infusions:       Estimated Creatinine Clearance: 53.3 mL/min (based on SCr of 1.2 mg/dL).    Assessment/Plan:      * Acute hypoxemic respiratory failure  Acute hypoxic respiratory failure, not on home oxygen, etiology suspected potential congestive heart failure related or lung disease (cOPD/emphysema)   -give lasix now and schedule 2 daily doses  -continue scheduled nebulizers, schedule glucocorticoids  -wean oxygen as tolerated.   -Prior admission last year with similar presentation  8/10 6 min walk test- Patient's O2 Sat on room air while exercisin. not on home 02.  Qualified or home 02. CTA chest -Allowing for exam limitations above, no convincing pulmonary thromboembolism to the level of the distal lobar branches.  Distal embolism cannot be  excluded, particularly within the segmental and subsegmental branches to the bilateral lower lobes.  Correlation of these findings with D-dimer and/or lower extremity ultrasound as warranted.  Interlobular septal thickening may reflect edema. Echo ordered   8/11 sats 98% on 2LNC. previously received lasix IV x 3 doses   8/12. completed steroids. Cr at 1.4 . encourage PO hydration. sats 93% on 2LNC . needs outpaient pulm f/u and PFTs. likely discharge with home oxygen     Hyperkalemia     resolved    LYLE (acute kidney injury)    Patient with  acute kidney injury.Serum BUN/Cr uptrending.  Strict I/O monitor . Obtain urine lytes , renal sonogram  and bladder scans q 6h  Recent Labs   Lab 08/11/23  2026 08/11/23  2322 08/12/23  0246   BUN 43* 42* 40*  40*   CREATININE 1.9* 1.7* 1.4  1.4      encourage PO hydration   8/11 FeNa of 0.1. started on NS gentle hydration at 60ml/h . Encourage PO intake 1500ml/24h     Iron deficiency anemia secondary to inadequate dietary iron intake  - stable, at baseline      Paroxysmal atrial fibrillation  Patient with Persistent (7 days or more) atrial fibrillation which is controlled currently with Beta Blocker and Amiodarone. Patient is currently in sinus rhythm. Anticoagulation indicated. Anticoagulation done with apixaban.    Chronic diastolic congestive heart failure  Patient is identified as having Diastolic (HFpEF) heart failure that is Acute on chronic. CHF is currently uncontrolled due to Rales/crackles on pulmonary exam and Pulmonary edema/pleural effusion on CXR. Latest ECHO performed and demonstrates- Results for orders placed during the hospital encounter of 08/01/22    Echo Saline Bubble? Yes    Interpretation Summary  · The left ventricle is normal in size with concentric remodeling and normal systolic function.  · The estimated ejection fraction is 68%.  · Indeterminate left ventricular diastolic function.  · Normal right ventricular size with normal right ventricular  systolic function.  · Mild tricuspid regurgitation.  · Mild pulmonic regurgitation.  · Elevated central venous pressure (15 mmHg).  · The estimated PA systolic pressure is 49 mmHg.  · There is pulmonary hypertension.  · Small posterior pericardial effusion. Trivial lateral and under the atria.  · There is a small left pleural effusion.  · No shunt by Air Contrast Bubble.  . Continue Furosemide and monitor clinical status closely. Monitor on telemetry. Patient is off CHF pathway.  Monitor strict Is&Os and daily weights.  Place on fluid restriction of 1.5 L. Cardiology has not been any consulted. Continue to stress to patient importance of self efficacy and  on diet for CHF. Last BNP reviewed- and noted below   Recent Labs   Lab 08/07/23  1454   *   .    Chronic pain disorder  Continue multimodal pain meds and home percocet      Impaired functional mobility, balance, gait, and endurance  Consult PT/OT   Fall precautions.   8/10 PT recs outpatient PT     Essential hypertension    Chronic, controlled.  Latest blood pressure and vitals reviewed-   Temp:  [97.2 °F (36.2 °C)-98.6 °F (37 °C)]   Pulse:  [65-82]   Resp:  [17-18]   BP: (116-162)/(67-90)   SpO2:  [91 %-94 %] .   Home meds for hypertension were reviewed and noted below. Hospital anti-hypertensive changes were made as shown below.  Hypertension Medications               metoprolol tartrate (LOPRESSOR) 50 MG tablet Take 1 tablet (50 mg total) by mouth once daily.          PRN meds if BP> 180/110 mm HG    Major depressive disorder, recurrent, moderate  Chronic stable - continue buspirone and venlafaxine home medications.         Alcohol use disorder, severe, dependence  Reports sobriety x 1 year, continue thiamine        VTE Risk Mitigation (From admission, onward)           Ordered     apixaban tablet 5 mg  2 times daily         08/07/23 0024     Reason for No Pharmacological VTE Prophylaxis  Once        Question:  Reasons:  Answer:  Already  adequately anticoagulated on oral Anticoagulants    08/07/23 2335     IP VTE HIGH RISK PATIENT  Once         08/07/23 2335     Place sequential compression device  Until discontinued         08/07/23 2335                    Discharge Planning   DIXON: 8/13/2023     Code Status: Full Code   Is the patient medically ready for discharge?: No    Reason for patient still in hospital (select all that apply): Treatment  Discharge Plan A: Home   Discharge Delays: None known at this time              Tio Lopez MD  Department of Hospital Medicine   [facility]

## 2023-08-12 NOTE — PLAN OF CARE
Problem: Adult Inpatient Plan of Care  Goal: Plan of Care Review  Outcome: Ongoing, Progressing  Goal: Patient-Specific Goal (Individualized)  Outcome: Ongoing, Progressing  Goal: Absence of Hospital-Acquired Illness or Injury  Outcome: Ongoing, Progressing  Goal: Optimal Comfort and Wellbeing  Outcome: Ongoing, Progressing     Problem: Infection  Goal: Absence of Infection Signs and Symptoms  Outcome: Ongoing, Progressing     Problem: Fall Injury Risk  Goal: Absence of Fall and Fall-Related Injury  Outcome: Ongoing, Progressing     Problem: Fatigue  Goal: Improved Activity Tolerance  Outcome: Ongoing, Progressing      Received patient, not in any form of distress. Fall prevention protocol maintained. Call button kept within patient's reach.

## 2023-08-13 NOTE — PLAN OF CARE
[facility]  Discharge Final Note    Primary Care Provider: Estela Mcdaniel MD    Expected Discharge Date: 8/13/2023    Final Discharge Note (most recent)       Final Note - 08/13/23 0854          Final Note    Assessment Type Final Discharge Note     Anticipated Discharge Disposition Home or Self Care     Hospital Resources/Appts/Education Provided Post-Acute resouces added to AVS;Appointments scheduled and added to AVS        Post-Acute Status    Post-Acute Authorization HME     E Status Set-up Complete/Auth obtained     Discharge Delays None known at this time                     Important Message from Medicare  Important Message from Medicare regarding Discharge Appeal Rights: Given to patient/caregiver, Explained to patient/caregiver, Signed/date by patient/caregiver     Date IMM was signed: 08/11/23  Time IMM was signed: 1022    Contact Info       Estela Mcadniel MD   Specialty: Internal Medicine   Relationship: PCP - General    [address]  [place] [id number]   Phone: [phone]       Next Steps: Follow up

## 2023-08-13 NOTE — NURSING
Home Oxygen Evaluation    Date Performed: 8/13/2023    1) Patient's Home O2 Sat on room air, while at rest: 83%        If O2 sats on room air at rest are 88% or below, patient qualifies. No additional testing needed. Document N/A in steps 2 and 3. If 89% or above, complete steps 2.      2) Patient's O2 Sat on room air while exercising: na        If O2 sats on room air while exercising remain 89% or above patient does not qualify, no further testing needed Document N/A in step 3. If O2 sats on room air while exercising are 88% or below, continue to step 3.      3) Patient's O2 Sat while exercising on O2: na at na LPM         (Must show improvement from #2 for patients to qualify)    If O2 sats improve on oxygen, patient qualifies for portable oxygen. If not, the patient does not qualify.

## 2023-08-13 NOTE — PLAN OF CARE
Pt to dc home today, will need home o2 at dc. [employer] rep Shelly Mireles reviewing. Will follow with acceptance and portable tank to the room. Home o2 and nebulizer approved, will be delivered to the room prior to dc.

## 2023-08-13 NOTE — PROGRESS NOTES
[facility] - [facility] Medicine  Progress Note    Patient Name: Vale Gutierrez  MRN: [id number]  Patient Class: IP- Inpatient   Admission Date: 2023  Length of Stay: 3 days  Attending Physician: Tio Lopez MD  Primary Care Provider: Estela Mcdaniel MD        Subjective:     Principal Problem:Acute hypoxemic respiratory failure        HPI:  61 y/o woman hx of HFpEF, Afib, HTN, Obesity, Chronic back pain, presents to the ED with complaints of shortness of breath.     She has had shortness of breath for 2 weeks. And dyspnea on exertion.  She has chronic debility related to her chronic back pain and as of late has had decreased ADl capability - she requires assistance to toilet, using a bedside commode more.  Sister helps her to bathe/shower, ambulates with a walker in the home, can feed herself, manages her meds, sister preps food.  She is not driving.     She does not track her weight at home, no LE edema but does note some increased abdominal girth.  She is smoking 1 pack cigarettes per week, trying to quit.  No alcohol use in the last 1 years. Adherent to home medications.       Overview/Hospital Course:   - Breathing is near baseline, PT consult recomends home health, qualified for home 02. May dc home in am  10  LYLE Cr 0.9 -->13 COPD/CHF admit,  6 min walk test- Patient's O2 Sat on room air while exercisin. not on home 02.  Qualified or home 02. CTA chest -Allowing for exam limitations above, no convincing pulmonary thromboembolism to the level of the distal lobar branches.  Distal embolism cannot be excluded, particularly within the segmental and subsegmental branches to the bilateral lower lobes.  Correlation of these findings with D-dimer and/or lower extremity ultrasound as warranted.  Interlobular septal thickening may reflect edema. Echo - Left Ventricle: The left ventricle is normal in size. Ventricular mass is normal. Normal wall thickness. Normal wall motion. There is  normal systolic function. Ejection fraction by visual approximation is 65%. Grade I diastolic dysfunction.Right Ventricle: Systolic function is normal. IVC/SVC: Normal venous pressure at 3 mmHg.  8/11 FeNa of 0.1. started on NS gentle hydration at 60ml/h . LYLE improving. Encourage PO intake 1500ml/24h .   previously received lasix IV x 3 doses. sats 98% on 2LNC. tylenol and lidocaine patch for low back ache  8/12. completed steroids. Cr at 1.4 . encourage PO hydration. sats 93% on 1LNC . needs outpaient pulm f/u and PFTs. feels weak with ambulation. discussed PT recs for outpatient PT.   8/13  discharge today with outpatient PT               Review of Systems:   Pain scale:  Constitutional:  fever,  chills, headache, vision loss, hearing loss, weight loss, Generalized weakness, falls, loss of smell, loss of taste, poor appetite,  sore throat  Respiratory: cough, shortness of breath.   Cardiovascular: chest pain, dizziness, palpitations, orthopnea, swelling of feet, syncope  Gastrointestinal: nausea, vomiting, abdominal pain, diarrhea, black stool,  blood in stool, change in bowel habits  Genitourinary: hematuria, dysuria, urgency, frequency  Integument/Breast: rash,  pruritis  Hematologic/Lymphatic: easy bruising, lymphadenopathy  Musculoskeletal: arthralgias , myalgias, back pain, neck pain, knee pain  Neurological: confusion, seizures, tremors, slurred speech  Behavioral/Psych:  depression, anxiety, auditory or visual hallucinations     OBJECTIVE:     Physical Exam:  Body mass index is 36.39 kg/m².    Constitutional: Appears well-developed and well-nourished. obesity   Head: Normocephalic and atraumatic.   Neck: Normal range of motion. Neck supple.   Cardiovascular: Normal heart rate.  Regular heart rhythm.  Pulmonary/Chest: Effort normal.   Abdominal: No distension.  No tenderness  Musculoskeletal: Normal range of motion. No edema.   Neurological: Alert and oriented to person, place, and time.   Skin: Skin is  "warm and dry.   Psychiatric: Normal mood and affect. Behavior is normal.                  Vital Signs  Temp: 97.5 °F (36.4 °C) (08/13/23 0417)  Pulse: 92 (08/13/23 0417)  Resp: 18 (08/13/23 0423)  BP: (Abnormal) 144/69 (08/13/23 0417)  SpO2: (Abnormal) 94 % (08/13/23 0417)     24 Hour VS Range    Temp:  [97.4 °F (36.3 °C)-98.6 °F (37 °C)]   Pulse:  [64-92]   Resp:  [17-20]   BP: (115-144)/(62-80)   SpO2:  [90 %-94 %]   No intake or output data in the 24 hours ending 08/13/23 0749        I/O This Shift:  No intake/output data recorded.    Wt Readings from Last 3 Encounters:   08/10/23 96.2 kg (212 lb)   08/11/23 96.2 kg (212 lb 1.3 oz)   06/30/23 91.5 kg (201 lb 11.5 oz)       I have personally reviewed the vitals and recorded Intake/Output     Laboratory/Diagnostic Data:    CBC/Anemia Labs: Coags:    Recent Labs   Lab 08/11/23  0639 08/12/23  0246 08/13/23  0258   WBC 8.05 9.08 8.83   HGB 9.6* 9.2* 8.9*   HCT 35.9* 34.0* 33.6*    354 336   MCV 78* 78* 79*   RDW 17.8* 17.7* 17.8*    Recent Labs   Lab 08/07/23  1454   INR 1.0        Chemistries: ABG:   Recent Labs   Lab 08/11/23  0639 08/11/23  0753 08/12/23  0246 08/12/23  0853 08/12/23 2024 08/13/23  0002 08/13/23  0258     142   < > 140  140   < > 138 142 141  141   K 4.4  4.4   < > 4.4  4.4   < > 4.0 4.2 5.0  5.0   CL 94*  94*   < > 95  95   < > 94* 95 98  98   CO2 37*  37*   < > 33*  33*   < > 34* 35* 32*  32*   BUN 35*  35*   < > 40*  40*   < > 35* 33* 32*  32*   CREATININE 1.4  1.4   < > 1.4  1.4   < > 1.6* 1.3 1.3  1.3   CALCIUM 9.3  9.3   < > 9.2  9.2   < > 8.8 8.9 9.0  9.0   PROT 6.6  --  6.6  --   --   --  6.0   BILITOT 0.2  --  0.1  --   --   --  0.2   ALKPHOS 123  --  113  --   --   --  109   ALT 29  --  23  --   --   --  40   AST 20  --  15  --   --   --  36   MG 2.2  --  2.1  --   --   --  1.9   PHOS 3.5   < > 4.0   < > 3.2 3.1 3.5    < > = values in this interval not displayed.    No results for input(s): "PH", " ""PCO2", "PO2", "HCO3", "POCSATURATED", "BE" in the last 168 hours.     POCT Glucose: HbA1c:    No results for input(s): "POCTGLUCOSE" in the last 168 hours. Hemoglobin A1C   Date Value Ref Range Status   06/30/2023 5.3 4.0 - 5.6 % Final     Comment:     ADA Screening Guidelines:  5.7-6.4%  Consistent with prediabetes  >or=6.5%  Consistent with diabetes    High levels of fetal hemoglobin interfere with the HbA1C  assay. Heterozygous hemoglobin variants (HbS, HgC, etc)do  not significantly interfere with this assay.   However, presence of multiple variants may affect accuracy.     01/13/2022 5.3 4.0 - 5.6 % Final     Comment:     ADA Screening Guidelines:  5.7-6.4%  Consistent with prediabetes  >or=6.5%  Consistent with diabetes    High levels of fetal hemoglobin interfere with the HbA1C  assay. Heterozygous hemoglobin variants (HbS, HgC, etc)do  not significantly interfere with this assay.   However, presence of multiple variants may affect accuracy.     10/23/2020 5.1 4.0 - 5.6 % Final     Comment:     ADA Screening Guidelines:  5.7-6.4%  Consistent with prediabetes  >or=6.5%  Consistent with diabetes  High levels of fetal hemoglobin interfere with the HbA1C  assay. Heterozygous hemoglobin variants (HbS, HgC, etc)do  not significantly interfere with this assay.   However, presence of multiple variants may affect accuracy.          Cardiac Enzymes: Ejection Fractions:    No results for input(s): "CPK", "CPKMB", "MB", "TROPONINI" in the last 72 hours.   EF   Date Value Ref Range Status   08/10/2023 65 % Final   08/12/2022 68 % Final          No results for input(s): "COLORU", "APPEARANCEUA", "PHUR", "SPECGRAV", "PROTEINUA", "GLUCUA", "KETONESU", "BILIRUBINUA", "OCCULTUA", "NITRITE", "UROBILINOGEN", "LEUKOCYTESUR", "RBCUA", "WBCUA", "BACTERIA", "SQUAMEPITHEL", "HYALINECASTS" in the last 48 hours.    Invalid input(s): "WRIGHTSUR"    Procalcitonin (ng/mL)   Date Value   08/02/2022 0.10     Lactate (Lactic Acid) (mmol/L) " "  Date Value   08/07/2023 0.7   08/16/2022 2.6 (H)   08/01/2022 1.3     BNP (pg/mL)   Date Value   08/07/2023 706 (H)   08/08/2022 90   08/01/2022 504 (H)     CRP (mg/L)   Date Value   08/13/2022 311.4 (H)   05/18/2020 4.0     Sed Rate (mm/Hr)   Date Value   08/13/2022 >120 (H)     No results found for: "DDIMER"  Ferritin (ng/mL)   Date Value   08/13/2022 218     No results found for: "LDH"  Troponin I (ng/mL)   Date Value   08/07/2023 0.014   08/10/2022 <0.006   08/02/2022 0.020   08/01/2022 0.027 (H)   02/18/2020 <0.012     Results for orders placed or performed in visit on 06/30/23   Vitamin D   Result Value Ref Range    Vit D, 25-Hydroxy 48 30 - 96 ng/mL   Results for orders placed or performed in visit on 09/20/22   Vitamin D   Result Value Ref Range    Vit D, 25-Hydroxy 74 30 - 96 ng/mL   Results for orders placed or performed in visit on 01/13/22   Vitamin D   Result Value Ref Range    Vit D, 25-Hydroxy 109 (H) 30 - 96 ng/mL     SARS-CoV2 (COVID-19) Qualitative PCR (no units)   Date Value   08/07/2022 Not Detected   09/28/2020 Not Detected   09/01/2020 Not Detected   06/01/2020 Not Detected     SARS-CoV-2 RNA, Amplification, Qual (no units)   Date Value   08/07/2023 Negative   08/01/2022 Negative     POC Rapid COVID (no units)   Date Value   10/21/2021 Negative   12/06/2020 Negative       Microbiology labs for the last week  Microbiology Results (last 7 days)       ** No results found for the last 168 hours. **            Reviewed and noted in plan where applicable- Please see chart for full lab data.    Lines/Drains:       Peripheral IV - Single Lumen 08/10/23 0809 20 G Anterior;Distal;Left Forearm (Active)   Number of days: 0       Imaging  ECG Results              EKG 12-lead (Final result)  Result time 08/07/23 15:34:48      Final result by Interface, Lab In [facility] (08/07/23 15:34:48)               Narrative:    Test Reason : R06.02,    Vent. Rate : 064 BPM     Atrial Rate : 064 BPM     P-R Int : 148 ms  "         QRS Dur : 092 ms      QT Int : 446 ms       P-R-T Axes : 064 002 069 degrees     QTc Int : 460 ms    Normal sinus rhythm  Nonspecific ST and T wave abnormality  Abnormal ECG  When compared with ECG of 19-AUG-2022 12:51,  Incomplete right bundle branch block is no longer Present    Confirmed by CARMENCITA GUSMAN MD ([id number]) on 8/7/2023 3:34:33 PM    Referred By: System System           Confirmed By:CARMENCITA GUSMAN MD                                  Results for orders placed during the hospital encounter of 08/01/22    Echo Saline Bubble? Yes    Interpretation Summary  · The left ventricle is normal in size with concentric remodeling and normal systolic function.  · The estimated ejection fraction is 68%.  · Indeterminate left ventricular diastolic function.  · Normal right ventricular size with normal right ventricular systolic function.  · Mild tricuspid regurgitation.  · Mild pulmonic regurgitation.  · Elevated central venous pressure (15 mmHg).  · The estimated PA systolic pressure is 49 mmHg.  · There is pulmonary hypertension.  · Small posterior pericardial effusion. Trivial lateral and under the atria.  · There is a small left pleural effusion.  · No shunt by Air Contrast Bubble.      US Retroperitoneal Complete  Narrative: EXAMINATION:  US RETROPERITONEAL COMPLETE    CLINICAL HISTORY:  LYLE;    TECHNIQUE:  Ultrasound of the kidneys and urinary bladder was performed including color flow and Doppler evaluation of the kidneys.    COMPARISON:  CT chest 08/07/2020, ultrasound retroperitoneal 04/16/2022, MRI lumbar spine 04/07/2022    FINDINGS:  Right kidney: The right kidney measures 9.3 cm. No cortical thinning. No loss of corticomedullary distinction. Resistive index measures 0.74.  No renal stone. No hydronephrosis.    Left kidney: The left kidney measures 10.5 cm. No cortical thinning. No loss of corticomedullary distinction. Resistive index measures 0.68.  Inferior pole 2.6 x 2.1 x 2.2 cm simple cyst  increased in size compared to prior, previously 1.8 x 1.9 x 1.8 cm.  No renal stone. No hydronephrosis.    Splenic resistive index is 0.69.    The bladder is partially distended at the time of scanning and has an unremarkable appearance.  Impression: No hydronephrosis.    Left renal cyst.    Electronically signed by resident: Charlie Archuleta  Date:    08/11/2023  Time:    07:37    Electronically signed by: Jaswant Avitia MD  Date:    08/11/2023  Time:    08:13      Labs, Imaging, EKG and Diagnostic results from 8/13/2023 were reviewed.    Medications:  Medication list was reviewed and changes noted under Assessment/Plan.  No current facility-administered medications on file prior to encounter.     Current Outpatient Medications on File Prior to Encounter   Medication Sig Dispense Refill    amiodarone (PACERONE) 200 MG Tab Take 1 tablet (200 mg total) by mouth once daily. 30 tablet 11    apixaban (ELIQUIS) 5 mg Tab Take 1 tablet (5 mg total) by mouth 2 (two) times daily. 60 tablet 5    aspirin/salicylamide/caffeine (BC HEADACHE POWDER ORAL) Take 1 packet by mouth 2 (two) times daily as needed (Headache).      busPIRone (BUSPAR) 15 MG tablet Take 15 mg by mouth once daily.      calcium carbonate (TUMS ORAL) Take 2 tablets by mouth daily as needed (Heartburn).      dicyclomine (BENTYL) 10 MG capsule Take 1 capsule (10 mg total) by mouth 4 (four) times daily before meals and nightly. 120 capsule 2    metoprolol tartrate (LOPRESSOR) 50 MG tablet Take 1 tablet (50 mg total) by mouth once daily. 30 tablet 11    mirtazapine (REMERON) 15 MG tablet Take 1 tablet (15 mg total) by mouth every evening. For insomnia, mood 30 tablet 11    omeprazole (PRILOSEC) 20 MG capsule Take 1 capsule (20 mg total) by mouth daily as needed. 90 capsule 2    ondansetron (ZOFRAN-ODT) 4 MG TbDL Take 1 tablet (4 mg total) by mouth every 8 (eight) hours as needed (nausea). 30 tablet 0    oxymetazoline (NO DRIP) 0.05 % nasal spray 1 spray by Nasal  route daily as needed for Congestion.      thiamine 100 MG tablet Take 100 mg by mouth once daily.      venlafaxine (EFFEXOR-XR) 75 MG 24 hr capsule Take 3 capsules (225 mg total) by mouth once daily. 90 capsule 11     Scheduled Medications:  acetaminophen, 1,000 mg, Oral, Q12H  amiodarone, 200 mg, Oral, Daily  apixaban, 5 mg, Oral, BID  busPIRone, 5 mg, Oral, Daily  dicyclomine, 10 mg, Oral, QID (AC & HS)  folic acid, 1 mg, Oral, Daily  LIDOcaine, 1 patch, Transdermal, Q24H  metoprolol tartrate, 50 mg, Oral, Daily  mirtazapine, 15 mg, Oral, QHS  pantoprazole, 40 mg, Oral, Before breakfast  thiamine, 100 mg, Oral, Daily  venlafaxine, 225 mg, Oral, Daily      PRN: albuterol-ipratropium, melatonin, naloxone, ondansetron, oxyCODONE, polyethylene glycol, prochlorperazine, senna-docusate 8.6-50 mg, sodium chloride 0.9%  Infusions:       Estimated Creatinine Clearance: 49.2 mL/min (based on SCr of 1.3 mg/dL).    Assessment/Plan:      * Acute hypoxemic respiratory failure  Acute hypoxic respiratory failure, not on home oxygen, etiology suspected potential congestive heart failure related or lung disease (cOPD/emphysema)   -give lasix now and schedule 2 daily doses  -continue scheduled nebulizers, schedule glucocorticoids  -wean oxygen as tolerated.   -Prior admission last year with similar presentation  8/10 6 min walk test- Patient's O2 Sat on room air while exercisin. not on home .  Qualified or home . CTA chest -Allowing for exam limitations above, no convincing pulmonary thromboembolism to the level of the distal lobar branches.  Distal embolism cannot be excluded, particularly within the segmental and subsegmental branches to the bilateral lower lobes.  Correlation of these findings with D-dimer and/or lower extremity ultrasound as warranted.  Interlobular septal thickening may reflect edema. Echo ordered    sats 98% on 2LNC. previously received lasix IV x 3 doses   . completed steroids. Cr at 1.4 .  encourage PO hydration. sats 93% on 2LNC . needs outpaient pulm f/u and PFTs. likely discharge with home oxygen     Hyperkalemia     resolved    LYLE (acute kidney injury)    Patient with  acute kidney injury.Serum BUN/Cr uptrending.  Strict I/O monitor . Obtain urine lytes , renal sonogram  and bladder scans q 6h  Recent Labs   Lab 08/11/23  2026 08/11/23  2322 08/12/23  0246   BUN 43* 42* 40*  40*   CREATININE 1.9* 1.7* 1.4  1.4      encourage PO hydration   8/11 FeNa of 0.1. started on NS gentle hydration at 60ml/h . Encourage PO intake 1500ml/24h     Iron deficiency anemia secondary to inadequate dietary iron intake  - stable, at baseline      Paroxysmal atrial fibrillation  Patient with Persistent (7 days or more) atrial fibrillation which is controlled currently with Beta Blocker and Amiodarone. Patient is currently in sinus rhythm. Anticoagulation indicated. Anticoagulation done with apixaban.    Chronic diastolic congestive heart failure  Patient is identified as having Diastolic (HFpEF) heart failure that is Acute on chronic. CHF is currently uncontrolled due to Rales/crackles on pulmonary exam and Pulmonary edema/pleural effusion on CXR. Latest ECHO performed and demonstrates- Results for orders placed during the hospital encounter of 08/01/22    Echo Saline Bubble? Yes    Interpretation Summary  · The left ventricle is normal in size with concentric remodeling and normal systolic function.  · The estimated ejection fraction is 68%.  · Indeterminate left ventricular diastolic function.  · Normal right ventricular size with normal right ventricular systolic function.  · Mild tricuspid regurgitation.  · Mild pulmonic regurgitation.  · Elevated central venous pressure (15 mmHg).  · The estimated PA systolic pressure is 49 mmHg.  · There is pulmonary hypertension.  · Small posterior pericardial effusion. Trivial lateral and under the atria.  · There is a small left pleural effusion.  · No shunt by Air  Contrast Bubble.  . Continue Furosemide and monitor clinical status closely. Monitor on telemetry. Patient is off CHF pathway.  Monitor strict Is&Os and daily weights.  Place on fluid restriction of 1.5 L. Cardiology has not been any consulted. Continue to stress to patient importance of self efficacy and  on diet for CHF. Last BNP reviewed- and noted below   Recent Labs   Lab 08/07/23  1454   *   .    Chronic pain disorder  Continue multimodal pain meds and home percocet      Impaired functional mobility, balance, gait, and endurance  Consult PT/OT   Fall precautions.   8/10 PT recs outpatient PT     Essential hypertension    Chronic, controlled.  Latest blood pressure and vitals reviewed-   Temp:  [97.2 °F (36.2 °C)-98.6 °F (37 °C)]   Pulse:  [65-82]   Resp:  [17-18]   BP: (116-162)/(67-90)   SpO2:  [91 %-94 %] .   Home meds for hypertension were reviewed and noted below. Hospital anti-hypertensive changes were made as shown below.  Hypertension Medications               metoprolol tartrate (LOPRESSOR) 50 MG tablet Take 1 tablet (50 mg total) by mouth once daily.          PRN meds if BP> 180/110 mm HG    Major depressive disorder, recurrent, moderate  Chronic stable - continue buspirone and venlafaxine home medications.         Alcohol use disorder, severe, dependence  Reports sobriety x 1 year, continue thiamine        VTE Risk Mitigation (From admission, onward)           Ordered     apixaban tablet 5 mg  2 times daily         08/07/23 2335     Reason for No Pharmacological VTE Prophylaxis  Once        Question:  Reasons:  Answer:  Already adequately anticoagulated on oral Anticoagulants    08/07/23 2335     IP VTE HIGH RISK PATIENT  Once         08/07/23 2335     Place sequential compression device  Until discontinued         08/07/23 2335                    Discharge Planning   DIXON: 8/13/2023     Code Status: Full Code   Is the patient medically ready for discharge?: No    Reason for patient  still in hospital (select all that apply): Treatment  Discharge Plan A: Home   Discharge Delays: None known at this time              Tio Lopez MD  Department of Hospital Medicine   [facility]

## 2023-08-13 NOTE — NURSING
Patient discharged from unit via self. Discharge instructions given and reviewed and IV removed. Patient's equipment was delivered bedside. Patient verbalized no concerns.

## 2023-08-14 NOTE — PROGRESS NOTES
C3 nurse spoke with Vale Gutierrez   for a TCC post hospital discharge follow up call. The patient has a scheduled HOSFU appointment with Estela Mcdaniel MD   on 08/29/2023 @ 9 am.

## 2023-08-16 NOTE — TELEPHONE ENCOUNTER
----- Message from Sheyla Marroquin sent at 8/16/2023  9:01 AM CDT -----  Contact: [phone]  Per pt, she would like to speak with someone as soon as possible from the office.  Pt is calling in regards to Home care for herself. Per pt, she just got out of the hospital a couple of days ago. Pt was put outpatient PT but she has no way to get there. Pt also states she wants to start right away. Please call.                              Thank you

## 2023-08-16 NOTE — TELEPHONE ENCOUNTER
The patient is to have physical therapy. However the first appointment is not until next month.    She is asking if you can place orders for home health physical therapy.

## 2023-08-20 NOTE — PROGRESS NOTES
CHW - Case Closure    This Community Health Worker spoke to patient via telephone today.   Pt reported: Updated and verified SDOH with patient via telephone, pt does not have needs, nor request assistance.   Pt denied any additional needs at this time and agrees with episode closure at this time.  Provided patient with Community Health Worker's contact information and encouraged her to contact this Community Health Worker if additional needs arise.

## 2023-08-21 NOTE — TELEPHONE ENCOUNTER
Pt states she is having a lot of anxiety bc she is still weak and can't only walk a few steps   She is on oxygen at home now and she said when she takes it off to walk down the [place] she feels really bad. I told her to get a longer tubing and to not take it off    She has the shakes as well and is wondering if it is a side effect of ay of her medications    She wants to know if she can do a virtual apt instead of coming in due to her feeling so bad and may not have a ride    She is also asking sinus congestion and wants to know what otc meds she can take with her Rx's     After a long talk it boils down to her having high anxiety bc she she doesn't know what her outcome is going to be. She feels like she really didn't get any answers while in the hospital and has to wait weeks to months for her cardiology and pulmonology apt's     Please advise

## 2023-08-21 NOTE — TELEPHONE ENCOUNTER
Spoke with pt and informed ok to hold eliquis 2 days prior procedure with Dr. Neil. Verbal understanding

## 2023-08-29 NOTE — TELEPHONE ENCOUNTER
----- Message from Estela Mcdaniel MD sent at 8/29/2023  9:26 AM CDT -----  Regarding: Needs Neurology appt  Needs neurology appt. Please send to referral coordinator to schedule

## 2023-08-29 NOTE — PROGRESS NOTES
Virtual Visit  The patient location is: [place]   The chief complaint leading to consultation is: hospital discharge  Visit type: Virtual visit with synchronous audio and video  Total time spent with patient: 20 min  Each patient to whom he or she provides medical services by telemedicine is:  (1) informed of the relationship between the physician and patient and the respective role of any other health care provider with respect to management of the patient; and (2) notified that he or she may decline to receive medical services by telemedicine and may withdraw from such care at any time.      Transitional Care Note    Family and/or Caretaker present at visit?  No.  Diagnostic tests reviewed/disposition: No diagnosic tests pending after this hospitalization.  Disease/illness education: yes  Home health/community services discussion/referrals: Patient does not have home health established from hospital visit.  They do not need home health.  If needed, we will set up home health for the patient.   Establishment or re-establishment of referral orders for community resources: No other necessary community resources.   Discussion with other health care providers: No discussion with other health care providers necessary.   Discharge Medication Review:    Medication reconciliation performed Yes   If no, state reason why not performed: N/A           Subjective:         @Patient ID: Vale Gutierrez is a 62 y.o. female.    Chief Complaint: Hospital Follow Up    HPI  61 yo F with HTN, AFib, CHF, anxiety, depression, etoh abuse, memory deficits, idiopathic progressive polyneuropathy, lumbar spondylosis, chronic pain, aortic aneurysm presents for hospital f/u.  Patient was admitted from 8/7-8/13  or worsening shortness of breath.  Patient found to have acute on chronic diastolic heart failure.  Also concern of COPD plantar will as patient was hypoxic.  She was placed on supplemental O2.  Additionally she underwent CTA  chest that was negative for pulmonary embolism the unable to rule out distal embolism.  Additionally 2D echo showed diastolic dysfunction.  During hospitalization patient was also noted to have YLLE.  Her renal function improved with gentle hydration.  The shoe wear her discharge home to follow-up with outpatient physical therapy, Cardiology, Pulmonary.  She is also discharged home with home O2    Patient reports that she still feels weak.  She has outpatient physical therapy appointment 3-4 weeks from now.  States that soon if she could get in.  She also has upcoming appointments with Cardiology and Pulmonary.  Reports that she is taking her Lasix as needed.  States that she is unable to weigh herself but will have her sister monitor her leg swelling.      She also reports having tremors and shakes.  States that she is concerned why she has been having this.  She has reported that she continues to abstain from alcohol use.       Review of Systems   Constitutional:  Negative for chills and fever.   HENT:  Positive for congestion.    Respiratory:  Positive for cough.    Cardiovascular:  Positive for leg swelling.   Musculoskeletal:  Positive for gait problem.   Neurological:  Positive for tremors and weakness.   Psychiatric/Behavioral:  Negative for agitation and confusion.      Past medical history, surgical history, and family medical history reviewed and updated as appropriate.    Medications and allergies reviewed.     Objective:     There were no vitals filed for this visit.  There is no height or weight on file to calculate BMI.  Physical Exam  Constitutional:       Appearance: Normal appearance.      Comments: Wearing nasal cannula   HENT:      Head: Normocephalic and atraumatic.   Pulmonary:      Effort: Pulmonary effort is normal.   Neurological:      Mental Status: She is alert and oriented to person, place, and time.   Psychiatric:         Mood and Affect: Mood normal.         Behavior: Behavior normal.          Lab Results   Component Value Date    WBC 8.83 08/13/2023    HGB 8.9 (L) 08/13/2023    HCT 33.6 (L) 08/13/2023     08/13/2023    CHOL 212 (H) 06/30/2023    TRIG 155 (H) 06/30/2023    HDL 61 06/30/2023    ALT 40 08/13/2023    AST 36 08/13/2023     08/13/2023    K 4.1 08/13/2023    CL 96 08/13/2023    CREATININE 1.1 08/13/2023    BUN 27 (H) 08/13/2023    CO2 32 (H) 08/13/2023    TSH 1.730 06/30/2023    INR 1.0 08/07/2023    HGBA1C 5.3 06/30/2023       Assessment:     1. Hospital discharge follow-up    2. Acute hypoxemic respiratory failure    3. Paroxysmal atrial fibrillation    4. Chronic diastolic congestive heart failure    5. Tremor      Plan:   Vale was seen today for hospital follow up.    Diagnoses and all orders for this visit:    Hospital discharge follow-up  - stable from hospitalization    Acute hypoxemic respiratory failure  - stable. Counseled to wear home o2. Will f/u with pulmonary    Paroxysmal atrial fibrillation  Chronic diastolic congestive heart failure  - chronic. Continue current home meds. Pt has upcoming appt with cardiology     Tremor  - chronic issue. Likely multifactorial etiology. Referral placed to neurology  -     Ambulatory referral/consult to Neurology; Future         Estela Mcdaniel MD  Internal Medicine    8/29/2023

## 2023-08-30 NOTE — TELEPHONE ENCOUNTER
----- Message from Belkys Caballero sent at 8/29/2023  2:50 PM CDT -----  Contact: [phone]  Patient called, requested a courtesy from Dr Mcdaniel or the nurse about prescription (did not want to specify), only stated that she want medication before thing get worse. Thank you    
I spoke to the patient and she is requesting an increase on her anxiety medications, Buspar and Remeron.  
The patient informed of the medication changes and verbalized understanding.  
121

## 2023-09-05 NOTE — TELEPHONE ENCOUNTER
----- Message from Adenike Rae sent at 9/1/2023  4:07 PM CDT -----  Regarding: med refill  Can the clinic reply in [facility]:       Please refill the medication(s) listed below.       Please call the patient when the prescription(s) is ready for  at this phone number: [phone]           Medication #1 oxyCODONE-acetaminophen (PERCOCET) 5-325 mg per tablet    Medication #2     Preferred Pharmacy:   [facility] - [place], [place] - [address] [place] [address]  Phone: [phone] Fax: [phone]

## 2023-09-05 NOTE — TELEPHONE ENCOUNTER
Patient requesting refill on Oxycodone -acetaminophen(percocet) 5-325mg  Last office visit 04/04/2023   shows last refill on 08/02/23  Patient does have a pain contract on file with [facility]  Patient last UDS None on file  was not consistent with current therapy

## 2023-09-06 NOTE — TELEPHONE ENCOUNTER
Refill Routing Note     Refill Routing Note   Medication(s) are not appropriate for processing by [facility] for the following reason(s):      Medication outside of protocol    [facility] action(s):  Route Care Due:  None identified            Appointments  past 12m or future 3m with PCP    Date Provider   Last Visit   8/29/2023 Estela Mcdaniel MD   Next Visit   Visit date not found Estela Mcdaniel MD   ED visits in past 90 days: 0        Note composed:5:02 PM 09/06/2023

## 2023-09-07 NOTE — TELEPHONE ENCOUNTER
No care due was identified.  [facility] Embedded Care Due Messages. Reference number: [id number].   9/07/2023 3:43:01 PM CDT

## 2023-09-07 NOTE — TELEPHONE ENCOUNTER
Refill Routing Note   Medication(s) are not appropriate for processing by [facility] for the following reason(s):      Medication outside of protocol  Required vitals abnormal    [facility] action(s):  Defer  Route Care Due:  None identified            Appointments  past 12m or future 3m with PCP    Date Provider   Last Visit   8/29/2023 Estela Mcdaniel MD   Next Visit   Visit date not found Estela Mcdaniel MD   ED visits in past 90 days: 0        Note composed:4:16 PM 09/07/2023

## 2023-09-12 NOTE — TELEPHONE ENCOUNTER
----- Message from Brie Donald sent at 9/11/2023  4:29 PM CDT -----  Contact: Self/[phone]  1MEDICALADVICE     Patient is calling for Medical Advice regarding:pt calling to speak with the nurse       Would like response via R&Vhart: No, patient would like a return call     Comments: patient is asking for a return call from the nurse patient stated that she has been sick for awhile needs to discuss somethings       
.Weekly Wound Education Note    Teaching Provided To: Patient  Training Topics: Discharge instructions;Dressing;Off-loading;Cleasing and general instructions  Training Method: Explain/Verbal;Written  Training Response: Patient responds and understands          Silver alginate applied to wound in clinic. Patient to apply Gentamicin ointment daily and return in 2 weeks.
The patient is getting weaker and has been lying in bed.  The patient encouraged to get out of the bed and sit up through out the day.  She states that she has been feeling bad.  She will have her sister Magda call me to further explain situation.  
Home

## 2023-09-13 NOTE — TELEPHONE ENCOUNTER
Spoke with patient about arrival time @ 1215.   EGD    NPO status reviewed: Patient must have nothing to eat after midnight.  Pt may have CLEAR liquids ONLY until completely NPO 3 hrs @ 0900.    Medications: Do not take Insulin or oral diabetic medications the day of the procedure.  Take as prescribed: heart, seizure and blood pressure medication in the morning with a sip of water (less than an ounce).  Take any breathing medications and bring inhalers to hospital with you Leave all valuables and jewelry at home.     Wear comfortable clothes to procedure to change into hospital gown You cannot drive for 24 hours after your procedure because you will receive sedation for your procedure to make you comfortable.  A ride must be provided at discharge.

## 2023-09-15 NOTE — TELEPHONE ENCOUNTER
----- Message from Cierra Jackson sent at 9/12/2023  4:18 PM CDT -----  Contact: [phone]  1MEDICALADVICE     Patient is calling for Medical Advice regarding: questions     How long has patient had these symptoms:    Pharmacy name and phone#:    Would like response via [employer]:  call back     Comments:    Pt sister ronald said she has questions about her sister and would like a call back.

## 2023-09-19 NOTE — TELEPHONE ENCOUNTER
----- Message from Nereyda Aden sent at 9/15/2023 12:14 PM CDT -----  Contact: [phone]  Type: Returning a call    Who left a message?Vale Campos LPN       When did the practice call? 11:30 am     Comments:

## 2023-09-26 PROBLEM — E66.01 SEVERE OBESITY (BMI 35.0-39.9) WITH COMORBIDITY: Status: ACTIVE | Noted: 2023-01-01

## 2023-09-29 NOTE — TELEPHONE ENCOUNTER
Good morning,   Thank you for the referral to [facility]. However, we are unable to accept/ out of network with [employer]. Please fax the referral/order to [employer] and they will further assist in setting the patient up with HH services.     Thanks,   Yazmin Ibanez LPN   [facility]       [employer] form completed and faxed.

## 2023-10-03 NOTE — PROGRESS NOTES
Chronic Pain-Tele-Medicine-Established Note (Follow up visit)        The patient location is: Her home in [place]  The chief complaint leading to consultation is: Back Pain  Visit type: Virtual visit with synchronous audio and video  Total time spent with patient:  25 minutes  Each patient to whom he or she provides medical services by telemedicine is:  (1) informed of the relationship between the physician and patient and the respective role of any other health care provider with respect to management of the patient; and (2) notified that he or she may decline to receive medical services by telemedicine and may withdraw from such care at any time.        SUBJECTIVE:    History Today 10/3/23:  Vale Gutierrez returns via telemedicine appt with continued complaints of chronic pain. She reports that pain has been persistent. Endorses chronic low back pain, similar to that of previous evaluations. She continues medication management with Percocet 5/325 QID prn, taking four times daily consistently--very rarely using less than 4 daily. Denies any adverse effects from opioid management. She also uses BC powder daily to BID with decent benefits. Since last visit, pt has started supplemental O2, currently undergoing workup for COPD. Pain rated 7-8/10.       History Today 4/5/23:  Vale Gutierrez for telemedicine follow up and consideration of SCS trial.  She did Psychiatry evaluation and therapy was suggested.   She continues to report low back pain and reports no adverse effect from her current pain regimen.     History Today 12/12/22  Vale Gutierrez for telemedicine follow up and consideration of SCS trial.  She is waiting on an appointment with Psychiatry.  Once she has an appointment scheduled with Psychiatry for SCS evaluation, she will schedule an in-person appointment in the Pain clinic.  She continues to have low back pain and she feels as though it is not well controlled on her current medications.   She desires to increase her Oxycodone to 7.5mg tabs.  She continues to take Oxycodone 5/325 QID.    History Today 9/12/22  Vale Gutierrez for telemedicine follow up and consideration of SCI. Since last visit, she was admitted to [facility] for 38 days with acute on chronic respiratory failure 2/2 CAP. She expressed concern over opioids as this can affect her breathing and would like to try other alternatives. Her pain continues to be in her low back with radiation down the side of her legs. Pain limiting ambulation. She has not been to PT for sometime. She reports that aquatherapy had no availability for a few months. She would like physical therapy, but she says they would need home health as she does not drive. Has not completed psyche eval. She has no hx of cardiac disease, denies blood in your stool and black tarry stool.     History Today 6/20/22  Patient presents for tele-medicine appointment for follow up s/p caudal WERO on 5/27/22. She reports good relief (70-80%) for about 3 weeks. Her pain is now back. She does find relief with Percocet 5/325 but she finds that she is requiring extra doses. She has not started aquatic therapy yet due to scheduling. She did participate in home PT in the past that she enjoyed. She is interested in discussing a spinal cord stimulator today.     History today 5/02/2022:  Vale Gutierrez presents tele-medicine appointment for a follow-up appointment for back pain. Since the last visit, Vale Gutierrez states the pain has been stable. Current pain intensity is 6/10. She is still hasnt made her mind completely about SCS.     History today 4/11:  Vale Gutierrez presents tele-medicine appointment for a follow-up appointment for back pain. Since the last visit, Vale Gutierrez states the pain has been worsening. Had episode where she knocked over a drink and tried to clean it up. Worsened the pain. Current pain intensity is 7/10.  Was considering SCS  at last visit. Has not fully decided. Pain is increasing, norco no longer helps, taking it TID Interested in switching to Percoset. Laying on the side and rest helps.   Has not started aqua therapy, 2 month waiting list.      + pain raditing back of legs  No bowel or bowels  No numbness or tingling of LE     Interval History 3/28/22:  Patient with a history of L4-5 fusion presents in follow-up of low back pain s/p bilateral L3/4 TFESI on 3/4/22 with 2 days of relief then bilateral L2,3,4,5 MBB on 3/24/22 with 20% relief. Today, she reports persistent chronic low back pain with occasional radiation down the posterior legs to the knees. Back pain is more bothersome than the legs. Pain is worse with any activity including prolonged standing, sitting, bending, and coughing/sneezing. Pain is improved with lying down.  Takes Norco 5-325 bid, last filled 3/15/22. Takes tizanidine every 6 hours.     Interval History 3/15/2022:  Vale Gutierrez presents tele-medicine appointment for a follow-up appointment for low back pain radiating to lower extremitiy R>L.  She is S/P Bilateral  L3/4 Lumbar Transforaminal Epidural Steroid Injection under Fluoroscopic Guidance on 3/4/2022 with only 2 days of relief.      Interval History 07/28/2020:  Vale Gutierrez presents tele-medicine appointment for a follow-up appointment for low back pain radiating to lower extremitiy R>L. Since the last visit, Vale Gutierrez states the pain has been persistant. Current pain intensity is 0/10.  Pain is worst when standing and moving and relieved with sitting, laying, and stretched out.  At the worst she says it is a 10/10.  The pain radiates to back of thighs and calves.  She describes the pain as sharp, burning in nature.  She states that the flexeril 5mg prn helps somewhat but not much. She started diclofenac yesterday (prescribed by her PCP) which helps with the pain, decreases frequency and intensity. She recently had an MRI  "lumbar spine done and would like to review the results.     Interval History 07/13/2020  Vale Gutierrez presents to the clinic for the evaluation of low back pain and bilateral calf pain. The calf pain started 5-6 weeks ago following non related injury and symptoms have been worsening.The pain is located in the low back area and radiates to the legs. She has suffered from lower back pain "for years."  The pain is described as aching and burning with intermittent numbness and tingling in the lower extremities. The pain is rated as 9/10. The pain is rated with a score of  3/10 on the BEST day and a score of 10/10 on the WORST day.  Symptoms interfere with daily activity. The pain is exacerbated by Standing, Walking, and Lifting.  The pain is mitigated by massage, medications and rest. She reports spending 8 hours per day reclining. The patient reports 3-4 hours of uninterrupted sleep per night. She presents for referral from Urgent Care where she was evaluated and believed to have intermittent neurogenic claudication.        Pain Medications:  - Opioids: Oxycodone 5/325 QID  - NSAIDs: BC powder BID  - Anti-Depressants: Venlafaxine 150 mg + 75 mg in AM  - Anti-Convulsants: Zanaflex BID  - Others: NA     Physical Therapy/Home Exercise: Not currently but in the past, finished in January 2022. Still on waiting list for aquatherapy      report:  Reviewed and consistent with medication use as prescribed.        Pain Procedures:  Patient says she has had 9 ESIs 15-20 years ago with minimal pain relief  3/4/22 Bilateral L3/4 TFESI 3/4 - 2 days of relief  3/24/22 Bl L2,3,4,5 MBB - 20% relief  5/27/22 Caudal WERO - 70-80% relief        Imaging:      MRI LUMBAR SPINE WITHOUT CONTRAST 3/28  FINDINGS:  Postoperative change of L4-L5 decompressive laminectomy, discectomy and posterior fusion with bilateral pedicular screws and an intervertebral disc spacer.     Lumbar spine alignment demonstrates mild levoscoliosis with " stable mild grade 1 anterolisthesis of L4 on L5 and L5 on S1.  Vertebral body heights are well maintained without evidence for fracture.  No marrow signal abnormality to suggest an infiltrative process.     There is advanced degenerative disc space narrowing and desiccation at T11-T12, L3-L4 and L5-S1 with chronic associated endplate changes, similar when compared to the previous MRI.  There is disc desiccation at L2-L3 with a posterior annular fissure.     Distal spinal cord demonstrates normal contour and signal intensity.  Cauda equina appears normal without findings to suggest arachnoiditis.  Conus medullaris terminates at T12-L1.  Fatty filum terminale.     Coronal T1 weighted images demonstrate a partially exophytic left renal cortical lesion, incompletely evaluated on this exam.  Limited evaluation of the remaining visualized abdominal organs appears normal.  There is mild to moderate fatty atrophy of the posterior paraspinal musculature.  SI joints are symmetric.     T12-L1: No spinal canal stenosis or neural foraminal narrowing.     L1-L2: No spinal canal stenosis or neural foraminal narrowing.     L2-L3: Bilateral facet arthropathy and bilateral ligamentum flavum buckling.  No spinal canal stenosis or neural foraminal narrowing.     L3-L4: Circumferential disc bulge slightly encroaches into the bilateral foraminal zones, right greater than left.  Bilateral facet arthropathy and bilateral ligamentum flavum buckling.  Findings contribute to mild right neural foraminal narrowing.  No spinal canal stenosis.     L4-L5: Grade 1 anterolisthesis.  Bilateral facet arthropathy.  No spinal canal stenosis or neural foraminal narrowing.     L5-S1: Grade 1 anterolisthesis.  Circumferential disc bulge.  Bilateral facet arthropathy.  No spinal canal stenosis or neural foraminal narrowing.     Impression:     1. Remote postoperative change of L4-L5 decompressive laminectomy, discectomy and posterior fusion.  2. Multilevel  lumbar degenerative changes contributing to mild neural foraminal narrowing at L3-L4.  No spinal canal stenosis.  3. Left renal cortical lesion, incompletely evaluated on this exam.  Recommend further characterization with a dedicated renal ultrasound.       Allergies: Review of patient's allergies indicates:  No Known Allergies    Current Medications:   Current Outpatient Medications   Medication Sig Dispense Refill    albuterol-ipratropium (DUO-NEB) 2.5 mg-0.5 mg/3 mL nebulizer solution Take 3 mLs by nebulization every 8 (eight) hours as needed for Wheezing. Rescue 75 mL 0    amiodarone (PACERONE) 200 MG Tab Take 1 tablet (200 mg total) by mouth once daily. 30 tablet 11    aspirin/salicylamide/caffeine (BC HEADACHE POWDER ORAL) Take 1 packet by mouth 2 (two) times daily as needed (Headache).      busPIRone (BUSPAR) 15 MG tablet Take 1 tablet (15 mg total) by mouth 2 (two) times daily. For anxiety 60 tablet 11    calcium carbonate (TUMS ORAL) Take 2 tablets by mouth daily as needed (Heartburn).      ELIQUIS 5 mg Tab Take 1 tablet (5 mg total) by mouth 2 (two) times daily. 60 tablet 5    folic acid (FOLVITE) 1 MG tablet Take 1 tablet (1 mg total) by mouth once daily. (Patient not taking: Reported on 8/14/2023) 30 tablet 2    furosemide (LASIX) 40 MG tablet Take 1 tablet (40 mg total) by mouth as needed. for weight gain of > 2lb or lower extremity edema (Patient not taking: Reported on 8/14/2023) 60 tablet 2    LIDOcaine (LIDODERM) 5 % Place 1 patch onto the skin once daily. Remove & Discard patch within 12 hours or as directed by MD 30 patch 2    metoprolol tartrate (LOPRESSOR) 50 MG tablet Take 1 tablet (50 mg total) by mouth once daily. 30 tablet 11    mirtazapine (REMERON) 30 MG tablet Take 1 tablet (30 mg total) by mouth every evening. For insomnia, mood 30 tablet 11    omeprazole (PRILOSEC) 20 MG capsule Take 1 capsule (20 mg total) by mouth daily as needed. 90 capsule 2    ondansetron (ZOFRAN-ODT) 4 MG TbDL  Take 1 tablet (4 mg total) by mouth every 8 (eight) hours as needed (nausea). 30 tablet 0    oxyCODONE-acetaminophen (PERCOCET) 5-325 mg per tablet Take 1 tablet by mouth every 6 (six) hours as needed for Pain. 120 tablet 0    oxymetazoline (NO DRIP) 0.05 % nasal spray 1 spray by Nasal route daily as needed for Congestion.      polyethylene glycol (GLYCOLAX) 17 gram PwPk Take 17 g by mouth 2 (two) times daily as needed. (Patient not taking: Reported on 8/14/2023) 30 packet 1    thiamine 100 MG tablet Take 100 mg by mouth once daily.      venlafaxine (EFFEXOR-XR) 75 MG 24 hr capsule Take 3 capsules (225 mg total) by mouth once daily. 90 capsule 11     No current facility-administered medications for this visit.       REVIEW OF SYSTEMS:    GENERAL:  No weight loss, malaise or fevers.  HEENT:  Negative for frequent or significant headaches.  NECK:  Negative for lumps, goiter, pain and significant neck swelling.  RESPIRATORY:  + SOB, coughing  CARDIOVASCULAR:  Negative for chest pain, leg swelling or palpitations.  GI:  Negative for abdominal discomfort, blood in stools or black stools or change in bowel habits.  MUSCULOSKELETAL:  See HPI.  SKIN:  Negative for lesions, rash, and itching.  PSYCH:  Negative for sleep disturbance, mood disorder and recent psychosocial stressors.  HEMATOLOGY/LYMPHOLOGY:  Negative for prolonged bleeding, bruising easily or swollen nodes.  NEURO:   No history of headaches, syncope, paralysis, seizures or tremors.  All other reviewed and negative other than HPI.    Past Medical History:  Past Medical History:   Diagnosis Date    Alcohol abuse     Anxiety     Benign paroxysmal positional vertigo 1/17/2020    Depression     Hyperlipidemia     Hypertension        Past Surgical History:  Past Surgical History:   Procedure Laterality Date    BREAST CYST ASPIRATION      CHOLECYSTECTOMY      complicated with bile leak, sepsis    COLONOSCOPY N/A 6/3/2020    Procedure: COLONOSCOPY [place];  Surgeon:  Tino Neil MD;  Location: [facility] ENDO;  Service: Colon and Rectal;  Laterality: N/A;    EPIDURAL STEROID INJECTION N/A 5/27/2022    Procedure: INJECTION, STEROID, EPIDURAL, CAUDAL CONTRAST;  Surgeon: Rubén Rico MD;  Location: [facility] PAIN MGT;  Service: Pain Management;  Laterality: N/A;  5/6 RESCHEDULE    ESOPHAGOGASTRODUODENOSCOPY N/A 6/3/2020    Procedure: EGD (ESOPHAGOGASTRODUODENOSCOPY);  Surgeon: Tino Neil MD;  Location: [facility] ENDO;  Service: Colon and Rectal;  Laterality: N/A;    INJECTION OF ANESTHETIC AGENT AROUND NERVE Bilateral 3/24/2022    Procedure: BLOCK, NERVE MEDIAL BRANCH BLOCK BL L2, L3, L4 and L5  1st, needs consent;  Surgeon: Rubén Rico MD;  Location: [facility] PAIN MGT;  Service: Pain Management;  Laterality: Bilateral;    TRANSFORAMINAL EPIDURAL INJECTION OF STEROID Bilateral 8/7/2020    Procedure: INJECTION, STEROID, EPIDURAL, TRANSFORAMINAL APPROACH, L4-L5 need consent;  Surgeon: Rubén Rico MD;  Location: [facility] PAIN MGT;  Service: Pain Management;  Laterality: Bilateral;    TRANSFORAMINAL EPIDURAL INJECTION OF STEROID Bilateral 9/4/2020    Procedure: LUMBAR TRANSFORAMINAL BILATERAL L4/5 DIRECT REFERRAL;  Surgeon: Rubén Rico MD;  Location: [facility] PAIN MGT;  Service: Pain Management;  Laterality: Bilateral;  NEEDS CONSENT    TRANSFORAMINAL EPIDURAL INJECTION OF STEROID Bilateral 3/4/2022    Procedure: Injection,steroid,epidural,transforaminal approach BILATERAL L3/4 DIRECT REFERRAL;  Surgeon: Rubén Rico MD;  Location: [facility] PAIN MGT;  Service: Pain Management;  Laterality: Bilateral;       Family History:  Family History   Problem Relation Age of Onset    Atrial fibrillation Mother     Heart failure Mother     Arthritis Mother     Macular degeneration Mother     Stroke Father     Cancer Maternal Uncle         lung    Cataracts Paternal Grandfather     Glaucoma Paternal Grandfather        Social History:  Social History     Socioeconomic History    Marital status:  Single   Tobacco Use    Smoking status: Former    Smokeless tobacco: Never   Substance and Sexual Activity    Alcohol use: Yes     Alcohol/week: 12.0 standard drinks of alcohol     Types: 12 Shots of liquor per week     Comment: three 12-14 oz cocktail drinks.     Drug use: No    Sexual activity: Never     Social Determinants of Health     Financial Resource Strain: Medium Risk (9/15/2023)    Overall Financial Resource Strain (CARDIA)     Difficulty of Paying Living Expenses: Somewhat hard   Food Insecurity: No Food Insecurity (9/15/2023)    Hunger Vital Sign     Worried About Running Out of Food in the Last Year: Never true     Ran Out of Food in the Last Year: Never true   Transportation Needs: Unmet Transportation Needs (9/15/2023)    PRAPARE - Transportation     Lack of Transportation (Medical): Yes     Lack of Transportation (Non-Medical): Yes   Physical Activity: Inactive (9/15/2023)    Exercise Vital Sign     Days of Exercise per Week: 0 days     Minutes of Exercise per Session: 0 min   Stress: Stress Concern Present (9/15/2023)    [employer] - Occupational Stress Questionnaire     Feeling of Stress : Rather much   Social Connections: Socially Isolated (9/15/2023)    Social Connection and Isolation Panel [NHANES]     Frequency of Communication with Friends and Family: More than three times a week     Frequency of Social Gatherings with Friends and Family: Once a week     Attends Hindu Services: Never     Active Member of Clubs or Organizations: No     Attends Club or Organization Meetings: Never     Marital Status: Never    Housing Stability: Low Risk  (9/15/2023)    Housing Stability Vital Sign     Unable to Pay for Housing in the Last Year: No     Number of Places Lived in the Last Year: 1     Unstable Housing in the Last Year: No   Recent Concern: Housing Stability - High Risk (8/1/2023)    Housing Stability Vital Sign     Unable to Pay for Housing in the Last Year:  Yes     Number of Places Lived in the Last Year: 1     Unstable Housing in the Last Year: No       OBJECTIVE:  General appearance: Supplemental O2 via NC. Well appearing, in no acute distress, alert and oriented x3.  Psych:  Mood and affect appropriate.    ASSESSMENT: 62 y.o. year old female with low back pain, consistent with      1. Chronic pain disorder        2. Chronic, continuous use of opioids        3. Postlaminectomy syndrome of lumbar region        4. Spinal stenosis of lumbar region with neurogenic claudication        5. Major depressive disorder, recurrent, moderate        6. S/P lumbar fusion        7. Idiopathic progressive polyneuropathy                    PLAN:     - I have continued to stress the importance of physical activity and a home exercise plan to help with pain and improve health.  - Patient currently in the process of getting home health therapy setup. We can consider referral to [facility] in the future if pt's functional mobility improved with smoking cessation.  - Order MRI L spine to assess for any progression of lumbar stenosis or Degenerative changes.   - Patient can continue with medications for now since they are providing benefits, using them appropriately, and without side effects.  - Educated on the safety of taking medications as prescribed.   - Continue Percocet 5/325 mg QID. Refill written for one month, #120..  We will not increase dosing of Percocet.   - Obtain UDS at next visit.   - Pt can continue workup with pulmonology for COPD. Once workup is completed, we can consider interventional management after full examination in clinic.   - RTC IN PERSON in 1 month.     The above plan and management options were discussed at length with patient. Patient is in agreement with the above and verbalized understanding.     I performed a history, review of systems, and physical exam with the patient. The assessment and plan were discussed and agreed upon with   Jacky, the attending of record, before sharing with the patient. The face to face encounter time, including answering all patient questions, was 30 minutes.     Rudy Martinez M.D.  PGY-4  [employer] [facility]      I have personally reviewed the encounter with resident/fellow/NP and personally spoke with patient and addressed all questions and concerns. The encounter was initiated by patient who consented and verbalized understanding to the type of encounter not related to any office visit or other encounter in the past 7 days.    Rubén Rico MD   10/3/2023

## 2023-10-05 NOTE — TELEPHONE ENCOUNTER
Spoke to Pt regarding Home health and Oxygen. Pt paper work is getting filled out by .  stated Pt. Needs to call [facility] for HH.Will notified Pt once Form is Faxed form [facility].

## 2023-10-05 NOTE — TELEPHONE ENCOUNTER
----- Message from Nereyda Aden sent at 10/4/2023  4:49 PM CDT -----  Contact: P [phone]  Patient is requesting a phone call from the staff:  Regarding Home Health and Oxygen .      Please call and advise.

## 2023-10-06 NOTE — TELEPHONE ENCOUNTER
Spoke to pt. Stated that HH paper work was faxed to [employer] and [facility]xygen would be fax to [employer] as well. Pt Vu

## 2023-10-06 NOTE — TELEPHONE ENCOUNTER
----- Message from Sally Beltran sent at 10/6/2023  8:08 AM CDT -----  Regarding: Appt R/s  Patient called about today appt and if there is a later time today due to having issues with oxygen tank?    Please call back to further [phone]

## 2023-10-06 NOTE — TELEPHONE ENCOUNTER
----- Message from Ana Hernandez sent at 10/6/2023 10:45 AM CDT -----  Contact: [phone] Patient  Pt is calling in regards to Home Health. Pt gave no other information. Please call and advise. Thank you.

## 2023-10-09 NOTE — TELEPHONE ENCOUNTER
No care due was identified.  [facility] Embedded Care Due Messages. Reference number: [id number].   10/09/2023 8:48:34 AM CDT

## 2023-10-09 NOTE — PROGRESS NOTES
The patient location is: [place] (in bed)  The chief complaint leading to consultation is: neuropathy, shaking, feeling bad    Visit type: audiovisual    Face to Face time with patient: 50 minutes of total time spent on the encounter, which includes face to face time and non-face to face time preparing to see the patient (eg, review of tests), Obtaining and/or reviewing separately obtained history, Documenting clinical information in the electronic or other health record, Independently interpreting results (not separately reported) and communicating results to the patient/family/caregiver, or Care coordination (not separately reported).         Each patient to whom he or she provides medical services by telemedicine is:  (1) informed of the relationship between the physician and patient and the respective role of any other health care provider with respect to management of the patient; and (2) notified that he or she may decline to receive medical services by telemedicine and may withdraw from such care at any time.    Notes:   61 yo presents for initial evaluation. She is alone and lying in bed. She asks how long this visit will take as she is sick and may have to go to the restroom and may not return.     She provides the following:    Years ago (5-10 years), I was having trouble walking and fell. My doctor told me something about neuropathy. Did not look into it. From there til now, apaprently I have a nerve problem. I was a bad drinker. They are telling me this is from drinking.   Not drank in a year and 3 mos. Won't do that again. Was drinking incredible amounts. Pretty much all day.   Afraid of taking a shower because afraid I will fall.  Not fallen in couple years.  Not stable.  Got new bike to ride- stationary since I can barely walk. Haven't gotten on it.    I'm telling you it is my stomach that feels terrible. May have to get up to the bathroom. May not come back.      Never fully healed since back surgery 3  "years ago.     Feel like the neuropathy was coming years before the drinking, something in my brain tell me this.    Little tingling in feet not much. No  burning or numbess.     Feel like somone blew me up with a pump. Thought maybe I needed Lactaid. Ate little ice cream today. Did not take Lactaid. Feel like I'm dying (laughs) .Denies diarrhea or vomiting.     When asked about shaking (which is why she was apparently on my schedule), she says she had shaking a little in the AM only for 10-20 years. More now because I'm in pain. Worry about Parkinson's disease when get older. Need to quit reading too much.  Never made connection that alcohol helped tremor.   She has been on amiodarone for a year.     Cousin used to shake a lot. No other fmh of tremor. No fmh pd.      Told I need oxygen tank when I was in the hospital. Need the little ones so I can leave the house for appointments. [facility]ch hasnt given to me yet. .     As soon as I get oxygen tanks, I can come to appointments.      I have to go.       LIMITED EXAM:  Awake, alert, pleasant and cooperative. Laughs periodically.   RR equal and unlabored. NAD. Room was somewhat dark but did not appreciate her using O2.   Face symmetric.   Hearing intact to conversation.   Animated facial expression.   No hypophonia.   Do not appreciate tremor from what I can see.   Lying on her side for entirety of visit.       IMPRESSION/PLAN:  "Shaking"- presumed tremor for 10-20 years in AM, suspect this was from alcohol withdrawal in AM  Now reports shaking from pain (not clear if this is longstanding back pain or more generalized). However, she is now taking amiodarone so could be SE. Stressed she should not stop amiodarone.   She does not seem overly bothered by tremor. She was more worried she had Parkinson's disease. Although limited exam, I do not see anything that is concerning for Parkinson's disease.     "Neuropathy"- very possible she does have neuropathy. Reports a little " "tingling in feet at times and years of heavy drinking. She is on thiamine replacement. She has not had recent falls. She is interested to see if this is something she may have.   I will refer her to resident clinic for further investigation.     Schedule also says she was reporting memory problems. She never mentioned this. Neuropathy was not listed on reason for wanting to be seen but this is where she spent the majority of the visit.     "Feeling sick"- presume this is from the ice cream she ate today without Lactaid.     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 Due to her concerns about neuropathy, I will have her seen in resident clinic to assess if she needs further work-up for this or other neuro issue.      Follow up with me PRN.     ..  ..Digna Molina, KWAME, NP-C               "

## 2023-10-10 NOTE — TELEPHONE ENCOUNTER
----- Message from Luiza Xie sent at 10/10/2023  2:44 PM CDT -----  Contact: [phone]  Pt's sister called to advise the pt would like to speak to the nurse or provider. She says she did not say why. Please Advise

## 2023-10-10 NOTE — TELEPHONE ENCOUNTER
The patient called to check on orders to [facility] [employer] for oxygen.    Please advise if the patient needs appointment for 6 minute walk?  Given O2 in hospital per stated by patient.

## 2023-10-11 NOTE — TELEPHONE ENCOUNTER
Patient discharged from hospital late August with home O2, has been waiting to get portable O2 tank, EGD tentative  for Friday's schedule. Will recheck with patient on tomorrow.

## 2023-10-12 NOTE — TELEPHONE ENCOUNTER
Patient has not received delivery of portable O2 at home and cannot travel with large tank she currently has; will reschedule EGD exam when she has obtained O2 delivery. Message sent to Dr. Neil and staff.

## 2023-10-17 NOTE — TELEPHONE ENCOUNTER
Message pt. Stating to keep Cardiology appt due to prescription Amiodarone. & prescriptions were filled.

## 2023-10-17 NOTE — TELEPHONE ENCOUNTER
No care due was identified.  [facility] Embedded Care Due Messages. Reference number: [id number].   10/16/2023 11:08:36 PM CDT

## 2023-10-17 NOTE — TELEPHONE ENCOUNTER
Refill Routing Note   Medication(s) are not appropriate for processing by [facility] for the following reason(s):      Medication outside of protocol  Required vitals abnormal: BP (!) 145/69    [facility] action(s):  Defer  Route Care Due:  None identified   Medication Therapy Plan:         Appointments  past 12m or future 3m with PCP    Date Provider   Last Visit   8/29/2023 Estela Mcdaniel MD   Next Visit   Visit date not found Estela Mcdaniel MD   ED visits in past 90 days: 0        Note composed:12:14 AM 10/17/2023

## 2023-10-20 PROBLEM — G93.6 VASOGENIC CEREBRAL EDEMA: Status: ACTIVE | Noted: 2023-01-01

## 2023-10-20 PROBLEM — Z51.5 COMFORT MEASURES ONLY STATUS: Status: ACTIVE | Noted: 2023-01-01

## 2023-10-20 PROBLEM — I61.3: Status: ACTIVE | Noted: 2023-01-01

## 2023-10-20 NOTE — PROGRESS NOTES
Comfort care measures were continued emergency department.  The patient lost pulses and agonal respirations ceased.  Examination consistent with time of death 6:22 p.m. 10/20/2023

## 2023-10-20 NOTE — HPI
"Vale Gutierrez is a 62 y.o. female with PMH of HTN, HLD, CHF, aortic aneurysm, afib on eliquis, anxiety/depression, BPPV, alcohol abuse that presents to the ED via EMS for unresponsiveness. History obtained from chart review as well as patient's sister at bedside. LKN at 9pm the night prior, ~12hrs PTA. Patient woke up this morning and called for her sister because she thought she was "having a stroke". Patient's sister reports patient initially had R hemiplegia as well as speech difficulty, then shortly after became unresponsive. On arrival to ED patient with GCS 3, fixed/dilated pupils, and SBP in 200s. She was intubated for airway protection.  Exam prior to intubation significant for no spontaneous/reflexive movement to extremities x 4, absent corneal reflex, but + gag. CTA stroke multiphase obtained once patient stabilized and intubated, which revealed acute hemorrhage in basal cisterns and brain stem (ICHs 3). NSGY and NCC consulted, Kcentra ordered for eliquis reversal and cardene started for BP control.  "

## 2023-10-20 NOTE — ASSESSMENT & PLAN NOTE
Vale Gutierrez is a 62 y.o. female with an acute brainstem hemorrhage (ICH score 3) who presents with fixed dilated pupils, no motor exam, and negative brain stem reflexes except for weak cough. Patient with history of afib on eliquis     - Admit to NCC  - Reverse anticoagulation   - Patient with very poor exam prior to intubation as well as post intubation. Imaging with significant brainstem hemorrhage.   --An indepth discussion was had with the family to discuss goals of care in the patient's present condition. It was explained that chance of meaningful recovery (without the need of PEG/Trach and/or being able to care for hersel) was very low given the patient's presentation and location of the hemorrhage.  All questions were answered.  --No neurosurgical intervention  --We will sign off at this time, thank you for your consult    Please reach out with any questions or concerns     Discussed with Dr. Ruano

## 2023-10-20 NOTE — CONSULTS
[facility] - Emergency Dept  Neurocritical Care  Consult Note    Admit Date: 10/20/2023  Service Date: 10/20/2023  Length of Stay: 0    Inpatient consult to Neuro ICU  Consult performed by: Shakir Alicea MD  Consult ordered by: Arvind Martinez MD        Subjective:     Chief Complaint: Nontraumatic brainstem hemorrhage    History of Present Illness: Vale Gutierrez is a 62 year old female with a medical history significant for HTN, HLD, CHF, Afib on chronic anticoagulation with Eliquis, anxiety/depression, BPPV and alcohol abuse who presented to [facility] with large brainstem ICH. Patient reportedly woke with R hemiplegia and dysarthria. Her sister came to help her and reports that she quickly became unresponsive. On arrival, patient was intubated due to GCS 3. CTH showed acute ICH withint he brainstem and adjacent basal cisterns/prepontine region. Eliquis was reversed with Kcentra.    NCC consulted for evaluation and possible admission.    On initial evaluation, patient with GCS 3. Pupils 4mm and fixed. Absent corneal reflexes bilaterally. Absent OCR. Weak cough, absent gag. No movement in any extremity.      No new subjective & objective note has been filed under this hospital service since the last note was generated.    Assessment/Plan:     Neuro  * Nontraumatic brainstem hemorrhage  62 year old female with a medical history significant for HTN, HLD, CHF, Afib on chronic anticoagulation with Eliquis, anxiety/depression, BPPV and alcohol abuse who presented to [facility] with large brainstem ICH.    Long discussion held at bedside with the patients two sisters. They report that she never  and had no children. They report that there is no POA or living will. They report that they as her siblings are next of kin. We discussed exam findings and went over recent imaging. Discussed the patients poor prognosis and likely need for tracheostomy and PEG tube placement. They report that the patient has been ill  recently and previously was very active. They report that she would not want to be bed bound and on life support. I advised on options including transition to comfort care which family agreed was their preferred course of action.     NCC will sign off.     Vasogenic cerebral edema  See Nontraumatic brainstem hemorrhage    Cardiac/Vascular  Paroxysmal atrial fibrillation  Eliquis reversed with Kcentra  Large ICH on CT    Essential hypertension  SBP <140  Cardene PRN          The patient is being Prophylaxed for:  Venous Thromboembolism with: Not Applicable   Stress Ulcer with: Not Applicable   Ventilator Pneumonia with: not applicable    Activity Orders          Diet NPO: NPO starting at 10/20 7043        Prior    Shakir Alicea MD  Neurocritical Care  [facility] - Emergency Dept

## 2023-10-20 NOTE — HPI
Vale Gutierrez is a 62 year old female with a medical history significant for HTN, HLD, CHF, Afib on chronic anticoagulation with Eliquis, anxiety/depression, BPPV and alcohol abuse who presented to [facility] [place] large brainstem ICH. Patient reportedly woke with R hemiplegia and dysarthria. Her sister came to help her and reports that she quickly became unresponsive. On arrival, patient was intubated due to GCS 3. CTH showed acute ICH withint he brainstem and adjacent basal cisterns/prepontine region. Eliquis was reversed with Kcentra.    NCC consulted for evaluation and possible admission.    On initial evaluation, patient with GCS 3. Pupils 4mm and fixed. Absent corneal reflexes bilaterally. Absent OCR. Weak cough, absent gag. No movement in any extremity.

## 2023-10-20 NOTE — ED NOTES
Asystole per monitor, no palpable pulse, chest rise fall observed. Mds brought to bedside,TOD 1822. Spiritual care contacted for services.

## 2023-10-20 NOTE — ASSESSMENT & PLAN NOTE
-Area of cerebral edema identified when reviewing brain imaging in the Basilar artery territory associated with slight mass effect onto the 4th ventricle   -NSGY consulted, no surgical intervention at this time due to poor exam with poor prognosis  -Admit to [facility] for frequent q1hr neuro checks as any acute changes or worsening neuro status may signify expansion of the insult and/or area of the edema, which may require acute interventions to prevent loss of function and/or death.  -Obtain stat CTH for any new or worsening neuro changes and notify primary team immediately

## 2023-10-20 NOTE — ASSESSMENT & PLAN NOTE
62 year old female with a medical history significant for HTN, HLD, CHF, Afib on chronic anticoagulation with Eliquis, anxiety/depression, BPPV and alcohol abuse who presented to [facility] with large brainstem ICH.    Long discussion held at bedside with the patients two sisters. They report that she never  and had no children. They report that there is no POA or living will. They report that they as her siblings are next of kin. We discussed exam findings and went over recent imaging. Discussed the patients poor prognosis and likely need for tracheostomy and PEG tube placement. They report that the patient has been ill recently and previously was very active. They report that she would not want to be bed bound and on life support. I advised on options including transition to comfort care which family agreed was their preferred course of action.     NCC will sign off.

## 2023-10-20 NOTE — ASSESSMENT & PLAN NOTE
Vale Gutierrez is a 62 y.o. female with PMH of HTN, HLD, CHF, aortic aneurysm, afib on eliquis, anxiety/depression, BPPV, alcohol abuse that presented to the ED via EMS for unresponsiveness after waking up with R hemiplegia and dysarthria. LKN ~12hrs PTA. GCS 3 on arrival to ED with fixed/dilated pupils and SBP in 200s. She was intubated for airway protection.  Exam prior to intubation with no movement to extremities, absent corneal/oculocephalic reflex, + gag. CTA revealed acute hemorrhage in basal cisterns and brainstem (ICHs 3).     NSGY and NCC consulted. Kcentra ordered for eliquis reversal and cardene started for BP control. No neurosurgical intervention recommended. Likely poor prognosis given poor exam with absent brainstem reflexes except for weak gag.      Antithrombotics for secondary stroke prevention: Antiplatelets: None: acute ICH    Statins for secondary stroke prevention: Statins: None: Reason: ICH    Aggressive risk factor modification: HTN, HLD, Diet, Exercise, Obesity, A-Fib     Rehab efforts: The patient has been evaluated by a stroke team provider and the therapy needs have been fully considered based off the presenting complaints and exam findings. The following therapy evaluations are needed: PT evaluate and treat, OT evaluate and treat, SLP evaluate and treat when appropriate/if extubated    Diagnostics ordered/pending: MRI head without contrast to assess brain parenchyma    VTE prophylaxis: Mechanical prophylaxis: Place SCDs  None: Reason for No Pharmacological VTE Prophylaxis: ICH    BP parameters: ICH: SBP <140

## 2023-10-20 NOTE — PLAN OF CARE
[facility]  Department of Hospital Medicine  [address] [place] [address]  [phone] after hours  [phone] fax    HOSPICE  ORDERS    10/20/2023    Admit to Hospice:   Inpatient Service     Diagnoses:   Active Hospital Problems    Diagnosis  POA    *Nontraumatic brainstem hemorrhage [I61.3]  Unknown    Vasogenic cerebral edema [G93.6]  Yes    Comfort measures only status [Z51.5]  Not Applicable    Paroxysmal atrial fibrillation [I48.0]  Yes    Essential hypertension [I10]  Yes     Chronic      Resolved Hospital Problems   No resolved problems to display.       Hospice Qualifying Diagnoses:  Intracranial hemorrhage of brain stem with herniation, vasogenic cerebral edema       Patient has a life expectancy < 6 months due to:  Primary Hospice Diagnosis: Comorbid Conditions Contributing to Decline:  Hypertension, atrial fibrillation  Vital Signs: Routine per Hospice Protocol.    Code Status:  Do not resuscitate    Allergies: Review of patient's allergies indicates:  No Known Allergies    Diet:  NPO   Activities: As tolerated    Goals of Care Treatment Preferences:  Code Status: DNR      Nursing: Per Hospice Routine.            Oxygen: 2LNC for air humger    Other Miscellaneous Care:   None        Medications:   1.  Hydromorphone 2 mg IV Q 15 minutes p.r.n. pain or air hunger  2.  Lorazepam 1 mg IV q.4 hours p.r.n. anxiety, RA SS score greater than 0  3. Glycopyrrolate 0.2 mg IV t.i.d. p.r.n. secretions  4. Hydromorphone 0.2 mg per hour IV infusion       Medication List        ASK your doctor about these medications      albuterol-ipratropium 2.5 mg-0.5 mg/3 mL nebulizer solution  Commonly known as: DUO-NEB  Take 3 mLs by nebulization every 8 (eight) hours as needed for Wheezing. Rescue     amiodarone 200 MG Tab  Commonly known as: PACERONE  Take 1 tablet (200 mg total) by mouth once daily.     BC HEADACHE POWDER ORAL  Take 1 packet by mouth 2 (two) times daily as  needed (Headache).     busPIRone 15 MG tablet  Commonly known as: BUSPAR  Take 1 tablet (15 mg total) by mouth 2 (two) times daily. For anxiety     ELIQUIS 5 mg Tab  Generic drug: apixaban  Take 1 tablet (5 mg total) by mouth 2 (two) times daily.     folic acid 1 MG tablet  Commonly known as: FOLVITE  Take 1 tablet (1 mg total) by mouth once daily.     furosemide 40 MG tablet  Commonly known as: LASIX  Take 1 tablet (40 mg total) by mouth as needed. for weight gain of > 2lb or lower extremity edema     LIDOcaine 5 %  Commonly known as: LIDODERM  Place 1 patch onto the skin once daily. Remove & Discard patch within 12 hours or as directed by MD     metoprolol tartrate 50 MG tablet  Commonly known as: LOPRESSOR  Take 1 tablet (50 mg total) by mouth once daily.     mirtazapine 30 MG tablet  Commonly known as: REMERON  Take 1 tablet (30 mg total) by mouth every evening. For insomnia, mood     naloxone 4 mg/actuation Spry  Commonly known as: NARCAN  4mg by nasal route as needed for opioid overdose; may repeat every 2-3 minutes in alternating nostrils until medical help arrives. Call 911     NO DRIP 0.05 % nasal spray  Generic drug: oxymetazoline  1 spray by Nasal route daily as needed for Congestion.     omeprazole 20 MG capsule  Commonly known as: PRILOSEC  Take 1 capsule (20 mg total) by mouth daily as needed.     ondansetron 4 MG Tbdl  Commonly known as: ZOFRAN-ODT  Take 1 tablet (4 mg total) by mouth every 8 (eight) hours as needed (nausea).     oxyCODONE-acetaminophen  mg per tablet  Commonly known as: PERCOCET  Take 1 tablet by mouth every 6 (six) hours as needed for Pain.     polyethylene glycol 17 gram Pwpk  Commonly known as: GLYCOLAX  Take 17 g by mouth 2 (two) times daily as needed.     thiamine 100 MG tablet  Take 100 mg by mouth once daily.     TUMS ORAL  Take 2 tablets by mouth daily as needed (Heartburn).     venlafaxine 75 MG 24 hr capsule  Commonly known as: EFFEXOR-XR  Take 3 capsules (225 mg  total) by mouth once daily.                DIABETES CARE: NA                    Future Orders:  Hospice Medical Director may dictate new orders for comfortable care measures & sign death certificate.        _________________________________  Giorgi [facility] MD  10/20/2023

## 2023-10-20 NOTE — SUBJECTIVE & OBJECTIVE
(Not in a hospital admission)      Review of patient's allergies indicates:  No Known Allergies    Past Medical History:   Diagnosis Date    Alcohol abuse     Anxiety     Benign paroxysmal positional vertigo 1/17/2020    Depression     Hyperlipidemia     Hypertension      Past Surgical History:   Procedure Laterality Date    BREAST CYST ASPIRATION      CHOLECYSTECTOMY      complicated with bile leak, sepsis    COLONOSCOPY N/A 6/3/2020    Procedure: COLONOSCOPY Golytely;  Surgeon: Tino Neil MD;  Location: [facility];  Service: Colon and Rectal;  Laterality: N/A;    EPIDURAL STEROID INJECTION N/A 5/27/2022    Procedure: INJECTION, STEROID, EPIDURAL, CAUDAL CONTRAST;  Surgeon: Rubén Rico MD;  Location: [facility] PAIN MGT;  Service: Pain Management;  Laterality: N/A;  5/6 RESCHEDULE    ESOPHAGOGASTRODUODENOSCOPY N/A 6/3/2020    Procedure: EGD (ESOPHAGOGASTRODUODENOSCOPY);  Surgeon: Tino Neil MD;  Location: [facility];  Service: Colon and Rectal;  Laterality: N/A;    INJECTION OF ANESTHETIC AGENT AROUND NERVE Bilateral 3/24/2022    Procedure: BLOCK, NERVE MEDIAL BRANCH BLOCK BL L2, L3, L4 and L5  1st, needs consent;  Surgeon: Rubén Rico MD;  Location: [facility] PAIN [facility]GT;  Service: Pain Management;  Laterality: Bilateral;    TRANSFORAMINAL EPIDURAL INJECTION OF STEROID Bilateral 8/7/2020    Procedure: INJECTION, STEROID, EPIDURAL, TRANSFORAMINAL APPROACH, L4-L5 need consent;  Surgeon: Rubén Rico MD;  Location: [facility] PAIN [facility]GT;  Service: Pain Management;  Laterality: Bilateral;    TRANSFORAMINAL EPIDURAL INJECTION OF STEROID Bilateral 9/4/2020    Procedure: LUMBAR TRANSFORAMINAL BILATERAL L4/5 DIRECT REFERRAL;  Surgeon: Rubén Rico MD;  Location: [facility] PAIN [facility]GT;  Service: Pain Management;  Laterality: Bilateral;  NEEDS CONSENT    TRANSFORAMINAL EPIDURAL INJECTION OF STEROID Bilateral 3/4/2022    Procedure: Injection,steroid,epidural,transforaminal approach BILATERAL L3/4 DIRECT REFERRAL;  Surgeon:  Rubén Rico MD;  Location: [facility] PAIN [facility]GT;  Service: Pain Management;  Laterality: Bilateral;     Family History       Problem Relation (Age of Onset)    Arthritis Mother    Atrial fibrillation Mother    Cancer Maternal Uncle    Cataracts Paternal Grandfather    Glaucoma Paternal Grandfather    Heart failure Mother    Macular degeneration Mother    Stroke Father          Tobacco Use    Smoking status: Former    Smokeless tobacco: Never   Substance and Sexual Activity    Alcohol use: Yes     Alcohol/week: 12.0 standard drinks of alcohol     Types: 12 Shots of liquor per week     Comment: three 12-14 oz cocktail drinks.     Drug use: No    Sexual activity: Never     Review of Systems  Objective:     Weight: 96.2 kg (212 lb)  Body mass index is 36.39 kg/m².  Vital Signs (Most Recent):  Temp: 98.8 °F (37.1 °C) (10/20/23 1138)  Pulse: 98 (10/20/23 1138)  Resp: 19 (10/20/23 1138)  BP: (!) 149/84 (10/20/23 1133)  SpO2: 96 % (10/20/23 1138) Vital Signs (24h Range):  Temp:  [95.7 °F (35.4 °C)-98.8 °F (37.1 °C)] 98.8 °F (37.1 °C)  Pulse:  [] 98  Resp:  [18-35] 19  SpO2:  [90 %-99 %] 96 %  BP: (138-182)/(76-93) 149/84                Vent Mode: A/C  Oxygen Concentration (%):  [60-80] 80  Resp Rate Total:  [18 br/min-19 br/min] 19 br/min  Vt Set:  [450 mL] 450 mL  PEEP/CPAP:  [5 cmH20] 5 cmH20  Mean Airway Pressure:  [9.5 cmH20] 9.5 cmH20             NG/OG Tube 10/20/23 1032 orogastric 14 Fr. Right mouth (Active)   Placement Check placement verified by x-ray 10/20/23 1055            Urethral Catheter 10/20/23 1031 Straight-tip 16 Fr. (Active)          Physical Exam         Neurosurgery Physical Exam  General: well developed, well nourished  Head: normocephalic  GCS: Motor: 1/Verbal: T/Eyes: 1 GCS Total: 3T  Cranial nerves: positive gag, negative corneal and oculocephalic reflex  Eyes: pupils equal, dilated and fixed   Motor Strength: No spontaneous movement or response to painful stimuli  Skin: Skin is warm, dry and  intact.    Significant Labs:  Recent Labs   Lab 10/20/23  1041   *      K 4.1      CO2 27   BUN 16   CREATININE 1.0   CALCIUM 9.5     Recent Labs   Lab 10/20/23  0943 10/20/23  1041   WBC  --  9.37   HGB  --  9.4*   HCT 39 33.3*   PLT  --  376     Recent Labs   Lab 10/20/23  1002 10/20/23  1041   INR 1.2 1.0     Microbiology Results (last 7 days)       ** No results found for the last 168 hours. **          All pertinent labs from the last 24 hours have been reviewed.    Significant Diagnostics:  XR NG/OG tube placement check, non-radiologist performed    Result Date: 10/20/2023  As above Electronically signed by: Brie Ren MD Date:    10/20/2023 Time:    11:43    X-Ray Chest AP Portable    Result Date: 10/20/2023  Endotracheal tube terminates 4 cm above the jhon.  Similar to mildly worse aeration when compared with 08/07/2023. Electronically signed by: Noé Henderson MD Date:    10/20/2023 Time:    11:42    CTA STROKE MULTI-PHASE    Result Date: 10/20/2023  Abnormal attenuation in the brainstem and adjacent basal cisterns and prepontine region concerning for acute hemorrhage.  Slight mass effect onto the 4th ventricle which remains perceptible, attention at follow-up advised.  No perceptible acute extravasation of contrast.  A definite brainstem mass is not seen, consider follow-up MRI examination without and with contrast. No evidence significant intracranial or extracranial vessel narrowing or occlusion.  No intracranial arteriovenous malformation or aneurysm seen. Significant atelectasis of the left lower lobe. Electronically signed by: Brie Ren MD Date:    10/20/2023 Time:    10:45

## 2023-10-20 NOTE — ED NOTES
RN received call from [facility]; [facility] rep will be coming to site for pt assessment. Neuro CC MDs, ED MDs aware. Family remain at bedside at this time.

## 2023-10-20 NOTE — CONSULTS
[facility] - Emergency Dept  Vascular Neurology  Comprehensive Stroke Center  Consult Note    Inpatient consult to Vascular (Stroke) Neurology  Consult performed by: Gina Harris PA-C  Consult ordered by: Adali Mcghee MD        Assessment/Plan:     Patient is a 62 y.o. year old female with:    * Nontraumatic brainstem hemorrhage  Vale Gutierrez is a 62 y.o. female with PMH of HTN, HLD, CHF, aortic aneurysm, afib on eliquis, anxiety/depression, BPPV, alcohol abuse that presented to the ED via EMS for unresponsiveness after waking up with R hemiplegia and dysarthria. LKN ~12hrs PTA. GCS 3 on arrival to ED with fixed/dilated pupils and SBP in 200s. She was intubated for airway protection.  Exam prior to intubation with no movement to extremities, absent corneal/oculocephalic reflex, + gag. CTA revealed acute hemorrhage in basal cisterns and brainstem (ICHs 3).     NSGY and NCC consulted. Kcentra ordered for eliquis reversal and cardene started for BP control. No neurosurgical intervention recommended. Likely poor prognosis given poor exam with absent brainstem reflexes except for weak gag.      Antithrombotics for secondary stroke prevention: Antiplatelets: None: acute ICH    Statins for secondary stroke prevention: Statins: None: Reason: ICH    Aggressive risk factor modification: HTN, HLD, Diet, Exercise, Obesity, A-Fib     Rehab efforts: The patient has been evaluated by a stroke team provider and the therapy needs have been fully considered based off the presenting complaints and exam findings. The following therapy evaluations are needed: PT evaluate and treat, OT evaluate and treat, SLP evaluate and treat when appropriate/if extubated    Diagnostics ordered/pending: MRI head without contrast to assess brain parenchyma    VTE prophylaxis: Mechanical prophylaxis: Place SCDs  None: Reason for No Pharmacological VTE Prophylaxis: ICH    BP parameters: ICH: SBP <140        Vasogenic cerebral edema  -Area of  cerebral edema identified when reviewing brain imaging in the Basilar artery territory associated with slight mass effect onto the 4th ventricle   -NSGY consulted, no surgical intervention at this time due to poor exam with poor prognosis  -Admit to [facility] for frequent q1hr neuro checks as any acute changes or worsening neuro status may signify expansion of the insult and/or area of the edema, which may require acute interventions to prevent loss of function and/or death.  -Obtain stat CTH for any new or worsening neuro changes and notify primary team immediately    Paroxysmal atrial fibrillation  Stroke risk factor  On eliquis at home, sister unsure of last dose  Hold AC/AP due to hemorrhage  Kcentra ordered for AC reversal    Essential hypertension  Stroke risk factor  SBP <140,  maintain MAP >65  HTNive on arrival with SBP in 200s, on cardene gtt for BP control      STROKE DOCUMENTATION     Acute Stroke Times   Last Known Normal Date: 10/19/23  Last Known Normal Time: 2100  Symptom Onset Date: 10/20/23  Unknown Symptom Onset Time: Unknown Time  Stroke Team Called Date: 10/20/23  Stroke Team Called Time: 0921  Stroke Team Arrival Date: 10/20/23  Stroke Team Arrival Time: 0931  CT Interpretation Time: 1001 (delay due to intubation)  Thrombolytic Therapy Recommended: No (acute hemorrhage on CT)  CTA Interpretation Time: 1008 (delay due to intubation)  Thrombectomy Recommended: No    NIH Scale:  1a. Level of Consciousness: 3-->Responds only with reflex motor or autonomic effects or totally unresponsive, flaccid, and areflexic  1b. LOC Questions: 2-->Answers neither question correctly  1c. LOC Commands: 2-->Performs neither task correctly  2. Best Gaze: 0-->Normal  3. Visual: 0-->No visual loss  4. Facial Palsy: 0-->Normal symmetrical movements  5a. Motor Arm, Left: 4-->No movement  5b. Motor Arm, Right: 4-->No movement  6a. Motor Leg, Left: 4-->No movement  6b. Motor Leg, Right: 4-->No movement  7. Limb Ataxia:  "0-->Absent  8. Sensory: 2-->Severe to total sensory loss, patient is not aware of being touched in the face, arm, and leg  9. Best Language: 3-->Mute, global aphasia, no usable speech or auditory comprehension  10. Dysarthria: 2-->Severe dysarthria, patients speech is so slurred as to be unintelligible in the absence of or out of proportion to any dysphasia, or is mute/anarthric  11. Extinction and Inattention (formerly Neglect): 0-->No abnormality  Total (NIH Stroke Scale): 30    Modified Marbin Score: 0  Satish Coma Scale:3   ABCD2 Score:    [id number] Score:4  HAS -BLED Score:   ICH Score:3  Hunt & Bermeo Classification:       Thrombolysis Candidate? No, CT findings (ICH, SAH, Large core infarct)     Delays to Thrombolysis?  Not Applicable    Interventional Revascularization Candidate?   Is the patient eligible for mechanical endovascular reperfusion (ASPEN)?  No; No large vessel occlusion identified on imaging     Delays to Thrombectomy? Not Applicable    Hemorrhagic change of an Ischemic Stroke: Does this patient have an ischemic stroke with hemorrhagic changes? No     Subjective:     History of Present Illness:  Vale Gutierrez is a 62 y.o. female with PMH of HTN, HLD, CHF, aortic aneurysm, afib on eliquis, anxiety/depression, BPPV, alcohol abuse that presents to the ED via EMS for unresponsiveness. History obtained from chart review as well as patient's sister at bedside. LKN at 9pm the night prior, ~12hrs PTA. Patient woke up this morning and called for her sister because she thought she was "having a stroke". Patient's sister reports patient initially had R hemiplegia as well as speech difficulty, then shortly after became unresponsive. On arrival to ED patient with GCS 3, fixed/dilated pupils, and SBP in 200s. She was intubated for airway protection.  Exam prior to intubation significant for no spontaneous/reflexive movement to extremities x 4, absent corneal reflex, but + gag. CTA stroke multiphase " obtained once patient stabilized and intubated, which revealed acute hemorrhage in basal cisterns and brain stem (ICHs 3). NSGY and NCC consulted, Kcentra ordered for eliquis reversal and cardene started for BP control.            Past Medical History:   Diagnosis Date    Alcohol abuse     Anxiety     Benign paroxysmal positional vertigo 1/17/2020    Depression     Hyperlipidemia     Hypertension      Past Surgical History:   Procedure Laterality Date    BREAST CYST ASPIRATION      CHOLECYSTECTOMY      complicated with bile leak, sepsis    COLONOSCOPY N/A 6/3/2020    Procedure: COLONOSCOPY Golytely;  Surgeon: Tino Neil MD;  Location: [facility] ENDO;  Service: Colon and Rectal;  Laterality: N/A;    EPIDURAL STEROID INJECTION N/A 5/27/2022    Procedure: INJECTION, STEROID, EPIDURAL, CAUDAL CONTRAST;  Surgeon: Rubén Rico MD;  Location: [facility] PAIN MGT;  Service: Pain Management;  Laterality: N/A;  5/6 RESCHEDULE    ESOPHAGOGASTRODUODENOSCOPY N/A 6/3/2020    Procedure: EGD (ESOPHAGOGASTRODUODENOSCOPY);  Surgeon: Tino Neil MD;  Location: [facility] ENDO;  Service: Colon and Rectal;  Laterality: N/A;    INJECTION OF ANESTHETIC AGENT AROUND NERVE Bilateral 3/24/2022    Procedure: BLOCK, NERVE MEDIAL BRANCH BLOCK BL L2, L3, L4 and L5  1st, needs consent;  Surgeon: Rubén Rico MD;  Location: [facility] PAIN MGT;  Service: Pain Management;  Laterality: Bilateral;    TRANSFORAMINAL EPIDURAL INJECTION OF STEROID Bilateral 8/7/2020    Procedure: INJECTION, STEROID, EPIDURAL, TRANSFORAMINAL APPROACH, L4-L5 need consent;  Surgeon: Rubén Rico MD;  Location: [facility] PAIN MGT;  Service: Pain Management;  Laterality: Bilateral;    TRANSFORAMINAL EPIDURAL INJECTION OF STEROID Bilateral 9/4/2020    Procedure: LUMBAR TRANSFORAMINAL BILATERAL L4/5 DIRECT REFERRAL;  Surgeon: Rubén Rico MD;  Location: [facility] PAIN [facility]GT;  Service: Pain Management;  Laterality: Bilateral;  NEEDS CONSENT    TRANSFORAMINAL EPIDURAL  INJECTION OF STEROID Bilateral 3/4/2022    Procedure: Injection,steroid,epidural,transforaminal approach BILATERAL L3/4 DIRECT REFERRAL;  Surgeon: Rubén Rico MD;  Location: [facility];  Service: Pain Management;  Laterality: Bilateral;     Family History   Problem Relation Age of Onset    Atrial fibrillation Mother     Heart failure Mother     Arthritis Mother     Macular degeneration Mother     Stroke Father     Cancer Maternal Uncle         lung    Cataracts Paternal Grandfather     Glaucoma Paternal Grandfather      Social History     Tobacco Use    Smoking status: Former    Smokeless tobacco: Never   Substance Use Topics    Alcohol use: Yes     Alcohol/week: 12.0 standard drinks of alcohol     Types: 12 Shots of liquor per week     Comment: three 12-14 oz cocktail drinks.     Drug use: No     Review of patient's allergies indicates:  No Known Allergies    Medications: I have reviewed the current medication administration record.    (Not in a hospital admission)      Review of Systems   Unable to perform ROS: Patient unresponsive     Objective:     Vital Signs (Most Recent):  Temp: (!) 101.3 °F (38.5 °C) (10/20/23 1328)  Pulse: (!) 111 (10/20/23 1328)  Resp: 20 (10/20/23 1325)  BP: (!) 179/91 (10/20/23 1328)  SpO2: 95 % (10/20/23 1328)    Vital Signs Range (Last 24H):  Temp:  [95.7 °F (35.4 °C)-101.3 °F (38.5 °C)]   Pulse:  []   Resp:  [18-35]   BP: (138-189)/(76-95)   SpO2:  [90 %-99 %]        Physical Exam  Vitals and nursing note reviewed.   Constitutional:       General: She is in acute distress.      Appearance: She is ill-appearing.   HENT:      Head: Normocephalic.      Nose: Nose normal.   Eyes:      Conjunctiva/sclera: Conjunctivae normal.      Pupils:      Right eye: Pupil is not reactive.      Left eye: Pupil is not reactive.   Cardiovascular:      Rate and Rhythm: Tachycardia present.   Pulmonary:      Effort: Respiratory distress present.   Musculoskeletal:         General:  "No deformity.      Cervical back: No rigidity.   Neurological:      Mental Status: She is unresponsive.      GCS: GCS eye subscore is 1. GCS verbal subscore is 1. GCS motor subscore is 1.      Cranial Nerves: No facial asymmetry.      Motor: No seizure activity.              Neurological Exam:   LOC: comatose  Attention Span: poor  Language: Unable to test due to unresponsive  Articulation: Unable to test due to unresponsive  Orientation: Unable to test due to unresponsive  Pupils (CN II, III): fixed, dilated  Facial Movement (CN VII): Symmetric facial expression    Brainstem Reflexes: - corneal, + gag  Motor: Arm left  Plegia 0/5  Leg left  Plegia 0/5  Arm right  Plegia 0/5  Leg right Plegia 0/5  Sensation: Roscoe-anesthesia bilateral      Laboratory:  CMP:   Recent Labs   Lab 10/20/23  1041   CALCIUM 9.5   ALBUMIN 3.3*   PROT 6.9      K 4.1   CO2 27      BUN 16   CREATININE 1.0   ALKPHOS 181*   ALT 17   AST 15   BILITOT 0.2     CBC:   Recent Labs   Lab 10/20/23  1041   WBC 9.37   RBC 4.21   HGB 9.4*   HCT 33.3*      MCV 79*   MCH 22.3*   MCHC 28.2*     Lipid Panel:   Recent Labs   Lab 10/20/23  1041   CHOL 190   LDLCALC 104.6   HDL 55   TRIG 152*     Coagulation:   Recent Labs   Lab 10/20/23  1041   INR 1.0     Hgb A1C: No results for input(s): "HGBA1C" in the last 168 hours.  TSH:   Recent Labs   Lab 10/20/23  1041   TSH 0.156*       Diagnostic Results:      Brain/Vessel Imaging:  CTA stroke multiphase 10/20/2023  Impression:  Abnormal attenuation in the brainstem and adjacent basal cisterns and prepontine region concerning for acute hemorrhage.  Slight mass effect onto the 4th ventricle which remains perceptible, attention at follow-up advised.  No perceptible acute extravasation of contrast.  A definite brainstem mass is not seen, consider follow-up MRI examination without and with contrast.   No evidence significant intracranial or extracranial vessel narrowing or occlusion.  No intracranial " arteriovenous malformation or aneurysm seen.   Significant atelectasis of the left lower lobe.           Gina Harris PA-C  [facility]  Department of Vascular Neurology   [facility] - Emergency Dept

## 2023-10-20 NOTE — CONSULTS
[facility] - Emergency Dept  Neurosurgery  Consult Note    Inpatient consult to Neurosurgery  Consult performed by: Sybil Garcia PA-C  Consult ordered by: Arvind Martinez MD        Subjective:     Chief Complaint/Reason for Admission: ICH     History of Present Illness: Vale Gutierrez is a 62 y.o. female with past medical history significant for HTN, CHF, aortic aneurysm, Afib on eliquis who presents to the ED unresponsive. Patient last known normal last night. History from patient's sister at bedside. Patient awoke this AM and shouted for her sister to come to the other room around 8:30 am. Patient's sister reports patient was unable to move her right side, she began to have difficulty with speech, and eventually became unresponsive. EMS was called. Patient arrived to ED with dilated, fixed pupils, not moving any extremities. Brainstem reflexes only positive for weak cough. She was intubated and given rocuronium. CT head showed hemorrhage in basal cisterns and brain stem (ICH score 3) . NSGY and NCC consulted.         (Not in a hospital admission)      Review of patient's allergies indicates:  No Known Allergies    Past Medical History:   Diagnosis Date    Alcohol abuse     Anxiety     Benign paroxysmal positional vertigo 1/17/2020    Depression     Hyperlipidemia     Hypertension      Past Surgical History:   Procedure Laterality Date    BREAST CYST ASPIRATION      CHOLECYSTECTOMY      complicated with bile leak, sepsis    COLONOSCOPY N/A 6/3/2020    Procedure: COLONOSCOPY Siriaytely;  Surgeon: Tino Neil MD;  Location: [facility] ENDO;  Service: Colon and Rectal;  Laterality: N/A;    EPIDURAL STEROID INJECTION N/A 5/27/2022    Procedure: INJECTION, STEROID, EPIDURAL, CAUDAL CONTRAST;  Surgeon: Rubén Rico MD;  Location: [facility] PAIN MGT;  Service: Pain Management;  Laterality: N/A;  5/6 RESCHEDULE    ESOPHAGOGASTRODUODENOSCOPY N/A 6/3/2020    Procedure: EGD (ESOPHAGOGASTRODUODENOSCOPY);   Surgeon: Tino Neil MD;  Location: [facility] ENDO;  Service: Colon and Rectal;  Laterality: N/A;    INJECTION OF ANESTHETIC AGENT AROUND NERVE Bilateral 3/24/2022    Procedure: BLOCK, NERVE MEDIAL BRANCH BLOCK BL L2, L3, L4 and L5  1st, needs consent;  Surgeon: Rubén Rico MD;  Location: [facility] PAIN MGT;  Service: Pain Management;  Laterality: Bilateral;    TRANSFORAMINAL EPIDURAL INJECTION OF STEROID Bilateral 8/7/2020    Procedure: INJECTION, STEROID, EPIDURAL, TRANSFORAMINAL APPROACH, L4-L5 need consent;  Surgeon: Rubén Rico MD;  Location: [facility] PAIN MGT;  Service: Pain Management;  Laterality: Bilateral;    TRANSFORAMINAL EPIDURAL INJECTION OF STEROID Bilateral 9/4/2020    Procedure: LUMBAR TRANSFORAMINAL BILATERAL L4/5 DIRECT REFERRAL;  Surgeon: Rubén Rico MD;  Location: [facility] PAIN MGT;  Service: Pain Management;  Laterality: Bilateral;  NEEDS CONSENT    TRANSFORAMINAL EPIDURAL INJECTION OF STEROID Bilateral 3/4/2022    Procedure: Injection,steroid,epidural,transforaminal approach BILATERAL L3/4 DIRECT REFERRAL;  Surgeon: Rubén Rico MD;  Location: [facility] PAIN MGT;  Service: Pain Management;  Laterality: Bilateral;     Family History       Problem Relation (Age of Onset)    Arthritis Mother    Atrial fibrillation Mother    Cancer Maternal Uncle    Cataracts Paternal Grandfather    Glaucoma Paternal Grandfather    Heart failure Mother    Macular degeneration Mother    Stroke Father          Tobacco Use    Smoking status: Former    Smokeless tobacco: Never   Substance and Sexual Activity    Alcohol use: Yes     Alcohol/week: 12.0 standard drinks of alcohol     Types: 12 Shots of liquor per week     Comment: three 12-14 oz cocktail drinks.     Drug use: No    Sexual activity: Never     Review of Systems  Objective:     Weight: 96.2 kg (212 lb)  Body mass index is 36.39 kg/m².  Vital Signs (Most Recent):  Temp: 98.8 °F (37.1 °C) (10/20/23 1138)  Pulse: 98 (10/20/23 1138)  Resp: 19  (10/20/23 1138)  BP: (!) 149/84 (10/20/23 1133)  SpO2: 96 % (10/20/23 1138) Vital Signs (24h Range):  Temp:  [95.7 °F (35.4 °C)-98.8 °F (37.1 °C)] 98.8 °F (37.1 °C)  Pulse:  [] 98  Resp:  [18-35] 19  SpO2:  [90 %-99 %] 96 %  BP: (138-182)/(76-93) 149/84                Vent Mode: A/C  Oxygen Concentration (%):  [60-80] 80  Resp Rate Total:  [18 br/min-19 br/min] 19 br/min  Vt Set:  [450 mL] 450 mL  PEEP/CPAP:  [5 cmH20] 5 cmH20  Mean Airway Pressure:  [9.5 cmH20] 9.5 cmH20             NG/OG Tube 10/20/23 1032 orogastric 14 Fr. Right mouth (Active)   Placement Check placement verified by x-ray 10/20/23 1055            Urethral Catheter 10/20/23 1031 Straight-tip 16 Fr. (Active)          Physical Exam         Neurosurgery Physical Exam  General: well developed, well nourished  Head: normocephalic  GCS: Motor: 1/Verbal: T/Eyes: 1 GCS Total: 3T  Cranial nerves: positive gag, negative corneal and oculocephalic reflex  Eyes: pupils equal, dilated and fixed   Motor Strength: No spontaneous movement or response to painful stimuli  Skin: Skin is warm, dry and intact.    Significant Labs:  Recent Labs   Lab 10/20/23  1041   *      K 4.1      CO2 27   BUN 16   CREATININE 1.0   CALCIUM 9.5     Recent Labs   Lab 10/20/23  0943 10/20/23  1041   WBC  --  9.37   HGB  --  9.4*   HCT 39 33.3*   PLT  --  376     Recent Labs   Lab 10/20/23  1002 10/20/23  1041   INR 1.2 1.0     Microbiology Results (last 7 days)       ** No results found for the last 168 hours. **          All pertinent labs from the last 24 hours have been reviewed.    Significant Diagnostics:  XR NG/OG tube placement check, non-radiologist performed    Result Date: 10/20/2023  As above Electronically signed by: Brie Ren MD Date:    10/20/2023 Time:    11:43    X-Ray Chest AP Portable    Result Date: 10/20/2023  Endotracheal tube terminates 4 cm above the jhon.  Similar to mildly worse aeration when compared with 08/07/2023.  Electronically signed by: Noé Henderson MD Date:    10/20/2023 Time:    11:42    CTA STROKE MULTI-PHASE    Result Date: 10/20/2023  Abnormal attenuation in the brainstem and adjacent basal cisterns and prepontine region concerning for acute hemorrhage.  Slight mass effect onto the 4th ventricle which remains perceptible, attention at follow-up advised.  No perceptible acute extravasation of contrast.  A definite brainstem mass is not seen, consider follow-up MRI examination without and with contrast. No evidence significant intracranial or extracranial vessel narrowing or occlusion.  No intracranial arteriovenous malformation or aneurysm seen. Significant atelectasis of the left lower lobe. Electronically signed by: Brie Ren MD Date:    10/20/2023 Time:    10:45       Assessment/Plan:     Nontraumatic brainstem hemorrhage  Vale Gutierrez is a 62 y.o. female with an acute brainstem hemorrhage (ICH score 3) who presents with fixed dilated pupils, no motor exam, and negative brain stem reflexes except for weak cough. Patient with history of afib on eliquis     - Admit to [facility]  - Reverse anticoagulation   - Patient with very poor exam prior to intubation as well as post intubation. Imaging with significant brainstem hemorrhage.   --An indepth discussion was had with the family to discuss goals of care in the patient's present condition. It was explained that chance of meaningful recovery (without the need of PEG/Trach and/or being able to care for hersel) was very low given the patient's presentation and location of the hemorrhage.  All questions were answered.  --No neurosurgical intervention  --We will sign off at this time, thank you for your consult    Please reach out with any questions or concerns     Discussed with Dr. Ruano        Thank you for your consult. I will sign off. Please contact us if you have any additional questions.    Sybil Garcia PA-C  Neurosurgery  [facility] - Emergency  Dept

## 2023-10-20 NOTE — ASSESSMENT & PLAN NOTE
Stroke risk factor  SBP <140,  maintain MAP >65  HTNive on arrival with SBP in 200s, on cardene gtt for BP control

## 2023-10-20 NOTE — ASSESSMENT & PLAN NOTE
Stroke risk factor  On eliquis at home, sister unsure of last dose  Hold AC/AP due to hemorrhage  Kcentra ordered for AC reversal

## 2023-10-20 NOTE — CARE UPDATE
Imaging reviewed, CTH with large brainstem hemorrhage.  Per ED report on arrival pt non responsive without any motor response, pupils fixed and dilated with only weak cough.   Intubated, rocuronium used for paralytic     On exam patient [id number]     Pupils fixed, dilated. Neg corneal, doll's eye exam. Only positive for weak gag     Brainstem hemorrhage with poor exam is a very poor prognosis. No surgical intervention recommended. Comfort care measures and medical management per NCC    Discussed with family and NCC team as well Dr. Alden Garcia, PA-C   [phone]  Neurosurgery  [facility]

## 2023-10-20 NOTE — SUBJECTIVE & OBJECTIVE
Past Medical History:   Diagnosis Date    Alcohol abuse     Anxiety     Benign paroxysmal positional vertigo 1/17/2020    Depression     Hyperlipidemia     Hypertension      Past Surgical History:   Procedure Laterality Date    BREAST CYST ASPIRATION      CHOLECYSTECTOMY      complicated with bile leak, sepsis    COLONOSCOPY N/A 6/3/2020    Procedure: COLONOSCOPY Golytely;  Surgeon: Tino Neil MD;  Location: [facility];  Service: Colon and Rectal;  Laterality: N/A;    EPIDURAL STEROID INJECTION N/A 5/27/2022    Procedure: INJECTION, STEROID, EPIDURAL, CAUDAL CONTRAST;  Surgeon: Rubén Rico MD;  Location: [facility] PAIN MGT;  Service: Pain Management;  Laterality: N/A;  5/6 RESCHEDULE    ESOPHAGOGASTRODUODENOSCOPY N/A 6/3/2020    Procedure: EGD (ESOPHAGOGASTRODUODENOSCOPY);  Surgeon: Tino Neil MD;  Location: [facility];  Service: Colon and Rectal;  Laterality: N/A;    INJECTION OF ANESTHETIC AGENT AROUND NERVE Bilateral 3/24/2022    Procedure: BLOCK, NERVE MEDIAL BRANCH BLOCK BL L2, L3, L4 and L5  1st, needs consent;  Surgeon: Rubén Rico MD;  Location: [facility] PAIN MGT;  Service: Pain Management;  Laterality: Bilateral;    TRANSFORAMINAL EPIDURAL INJECTION OF STEROID Bilateral 8/7/2020    Procedure: INJECTION, STEROID, EPIDURAL, TRANSFORAMINAL APPROACH, L4-L5 need consent;  Surgeon: Rubén Rico MD;  Location: [facility] PAIN MGT;  Service: Pain Management;  Laterality: Bilateral;    TRANSFORAMINAL EPIDURAL INJECTION OF STEROID Bilateral 9/4/2020    Procedure: LUMBAR TRANSFORAMINAL BILATERAL L4/5 DIRECT REFERRAL;  Surgeon: Rubén Rico MD;  Location: [facility] PAIN MGT;  Service: Pain Management;  Laterality: Bilateral;  NEEDS CONSENT    TRANSFORAMINAL EPIDURAL INJECTION OF STEROID Bilateral 3/4/2022    Procedure: Injection,steroid,epidural,transforaminal approach BILATERAL L3/4 DIRECT REFERRAL;  Surgeon: Rubén Rico MD;  Location: [facility] PAIN MGT;  Service: Pain Management;  Laterality:  Bilateral;     Family History   Problem Relation Age of Onset    Atrial fibrillation Mother     Heart failure Mother     Arthritis Mother     Macular degeneration Mother     Stroke Father     Cancer Maternal Uncle         lung    Cataracts Paternal Grandfather     Glaucoma Paternal Grandfather      Social History     Tobacco Use    Smoking status: Former    Smokeless tobacco: Never   Substance Use Topics    Alcohol use: Yes     Alcohol/week: 12.0 standard drinks of alcohol     Types: 12 Shots of liquor per week     Comment: three 12-14 oz cocktail drinks.     Drug use: No     Review of patient's allergies indicates:  No Known Allergies    Medications: I have reviewed the current medication administration record.    (Not in a hospital admission)      Review of Systems   Unable to perform ROS: Patient unresponsive     Objective:     Vital Signs (Most Recent):  Temp: (!) 101.3 °F (38.5 °C) (10/20/23 1328)  Pulse: (!) 111 (10/20/23 1328)  Resp: 20 (10/20/23 1325)  BP: (!) 179/91 (10/20/23 1328)  SpO2: 95 % (10/20/23 1328)    Vital Signs Range (Last 24H):  Temp:  [95.7 °F (35.4 °C)-101.3 °F (38.5 °C)]   Pulse:  []   Resp:  [18-35]   BP: (138-189)/(76-95)   SpO2:  [90 %-99 %]        Physical Exam  Vitals and nursing note reviewed.   Constitutional:       General: She is in acute distress.      Appearance: She is ill-appearing.   HENT:      Head: Normocephalic.      Nose: Nose normal.   Eyes:      Conjunctiva/sclera: Conjunctivae normal.      Pupils:      Right eye: Pupil is not reactive.      Left eye: Pupil is not reactive.   Cardiovascular:      Rate and Rhythm: Tachycardia present.   Pulmonary:      Effort: Respiratory distress present.   Musculoskeletal:         General: No deformity.      Cervical back: No rigidity.   Neurological:      Mental Status: She is unresponsive.      GCS: GCS eye subscore is 1. GCS verbal subscore is 1. GCS motor subscore is 1.      Cranial Nerves: No facial asymmetry.      Motor: No  "seizure activity.              Neurological Exam:   LOC: comatose  Attention Span: poor  Language: Unable to test due to unresponsive  Articulation: Unable to test due to unresponsive  Orientation: Unable to test due to unresponsive  Pupils (CN II, III): fixed, dilated  Facial Movement (CN VII): Symmetric facial expression    Brainstem Reflexes: - corneal, + gag  Motor: Arm left  Plegia 0/5  Leg left  Plegia 0/5  Arm right  Plegia 0/5  Leg right Plegia 0/5  Sensation: Roscoe-anesthesia bilateral      Laboratory:  CMP:   Recent Labs   Lab 10/20/23  1041   CALCIUM 9.5   ALBUMIN 3.3*   PROT 6.9      K 4.1   CO2 27      BUN 16   CREATININE 1.0   ALKPHOS 181*   ALT 17   AST 15   BILITOT 0.2     CBC:   Recent Labs   Lab 10/20/23  1041   WBC 9.37   RBC 4.21   HGB 9.4*   HCT 33.3*      MCV 79*   MCH 22.3*   MCHC 28.2*     Lipid Panel:   Recent Labs   Lab 10/20/23  1041   CHOL 190   LDLCALC 104.6   HDL 55   TRIG 152*     Coagulation:   Recent Labs   Lab 10/20/23  1041   INR 1.0     Hgb A1C: No results for input(s): "HGBA1C" in the last 168 hours.  TSH:   Recent Labs   Lab 10/20/23  1041   TSH 0.156*       Diagnostic Results:      Brain/Vessel Imaging:  CTA stroke multiphase 10/20/2023  Impression:  Abnormal attenuation in the brainstem and adjacent basal cisterns and prepontine region concerning for acute hemorrhage.  Slight mass effect onto the 4th ventricle which remains perceptible, attention at follow-up advised.  No perceptible acute extravasation of contrast.  A definite brainstem mass is not seen, consider follow-up MRI examination without and with contrast.   No evidence significant intracranial or extracranial vessel narrowing or occlusion.  No intracranial arteriovenous malformation or aneurysm seen.   Significant atelectasis of the left lower lobe.       "

## 2023-10-20 NOTE — HPI
Vale Gutierrez is a 62 y.o. female with past medical history significant for HTN, CHF, aortic aneurysm, Afib on eliquis who presents to the ED unresponsive. Patient last known normal last night. History from patient's sister at bedside. Patient awoke this AM and shouted for her sister to come to the other room around 8:30 am. Patient's sister reports patient was unable to move her right side, she began to have difficulty with speech, and eventually became unresponsive. EMS was called. Patient arrived to ED with dilated, fixed pupils, not moving any extremities. Brainstem reflexes only positive for weak cough. She was intubated and given rocuronium. CT head showed hemorrhage in basal cisterns and brain stem (ICH score 3) . NSGY and NCC consulted.

## 2023-10-20 NOTE — PROVIDER PROGRESS NOTES - EMERGENCY DEPT.
Encounter Date: 10/20/2023    ED Physician Progress Notes        Physician Note:   Signout Note  I received signout from the previous providers.     Chief complaint:  No chief complaint on file.      Per sign out and chart review: Vale Gutierrez is a 62 y.o. female presented with AMS    During ED stay patient noted to have brainstem stroke and was intubated. Pt was evaluated by neurosurg who did not recommend further treatment. Patient's family requested terminal extubation.    Pt signed out to me pending:  to visit patient and terminal extubation.    Update/ Disposition:   3:55 PM  Patient extubated and morphine, glycopyrrolate, and ativan ordered for comfort measures.    6:25 PM  Called to bedside by nurse who reports that patient had no palpable pulses. Patient was certified dead.  End of life measures applied and  visited the patient and family.    Patient, caregiver and/or family understands the plan and verbalized agreement. All questions answered.     Diagnostic Impression:    Intracranial hemorrhage  (primary encounter diagnosis)  Stroke  Endotracheal tube present     ED Disposition Condition    Admit Stable

## 2023-10-20 NOTE — ED PROVIDER NOTES
"Encounter Date: 10/20/2023       History   No chief complaint on file.    Vale Gutierrez is a 62-year-old female past medical history of vertigo, hyperlipidemia, CHF, hypertension, chronic hypoxemic respiratory failure requiring oxygen presenting today by EMS unresponsive.  History provided by EMS, last known normal 9 pm. Sister got a call from her through [facility] saying she needed help.  This was around 815 this morning.  When she arrived at her bedside, the patient said "I think I am having a stroke with slurred speech"  with right-sided arm weakness.  She then went unresponsive.  She called EMS.  Per EMS, glucose normal, hypertensive in the 220s, hypoxic to 86%.  She is placed on a non-rebreather and transported to the emergency department.      Review of patient's allergies indicates:  No Known Allergies  Past Medical History:   Diagnosis Date    Alcohol abuse     Anxiety     Benign paroxysmal positional vertigo 1/17/2020    Depression     Hyperlipidemia     Hypertension      Past Surgical History:   Procedure Laterality Date    BREAST CYST ASPIRATION      CHOLECYSTECTOMY      complicated with bile leak, sepsis    COLONOSCOPY N/A 6/3/2020    Procedure: COLONOSCOPY Golytely;  Surgeon: Tino Neil MD;  Location: [facility] ENDO;  Service: Colon and Rectal;  Laterality: N/A;    EPIDURAL STEROID INJECTION N/A 5/27/2022    Procedure: INJECTION, STEROID, EPIDURAL, CAUDAL CONTRAST;  Surgeon: Rubén Rico MD;  Location: [facility] PAIN MGT;  Service: Pain Management;  Laterality: N/A;  5/6 RESCHEDULE    ESOPHAGOGASTRODUODENOSCOPY N/A 6/3/2020    Procedure: EGD (ESOPHAGOGASTRODUODENOSCOPY);  Surgeon: Tino Neil MD;  Location: [facility] ENDO;  Service: Colon and Rectal;  Laterality: N/A;    INJECTION OF ANESTHETIC AGENT AROUND NERVE Bilateral 3/24/2022    Procedure: BLOCK, NERVE MEDIAL BRANCH BLOCK BL L2, L3, L4 and L5  1st, needs consent;  Surgeon: Rubén Rico MD;  Location: [facility] PAIN MGT;  Service: Pain Management;  " Laterality: Bilateral;    TRANSFORAMINAL EPIDURAL INJECTION OF STEROID Bilateral 8/7/2020    Procedure: INJECTION, STEROID, EPIDURAL, TRANSFORAMINAL APPROACH, L4-L5 need consent;  Surgeon: Rubén Rico MD;  Location: [facility] PAIN MGT;  Service: Pain Management;  Laterality: Bilateral;    TRANSFORAMINAL EPIDURAL INJECTION OF STEROID Bilateral 9/4/2020    Procedure: LUMBAR TRANSFORAMINAL BILATERAL L4/5 DIRECT REFERRAL;  Surgeon: Rubén Rico MD;  Location: [facility] PAIN MGT;  Service: Pain Management;  Laterality: Bilateral;  NEEDS CONSENT    TRANSFORAMINAL EPIDURAL INJECTION OF STEROID Bilateral 3/4/2022    Procedure: Injection,steroid,epidural,transforaminal approach BILATERAL L3/4 DIRECT REFERRAL;  Surgeon: Rubén Rico MD;  Location: [facility] PAIN MGT;  Service: Pain Management;  Laterality: Bilateral;     Family History   Problem Relation Age of Onset    Atrial fibrillation Mother     Heart failure Mother     Arthritis Mother     Macular degeneration Mother     Stroke Father     Cancer Maternal Uncle         lung    Cataracts Paternal Grandfather     Glaucoma Paternal Grandfather      Social History     Tobacco Use    Smoking status: Former    Smokeless tobacco: Never   Substance Use Topics    Alcohol use: Yes     Alcohol/week: 12.0 standard drinks of alcohol     Types: 12 Shots of liquor per week     Comment: three 12-14 oz cocktail drinks.     Drug use: No     Review of Systems    Physical Exam     Initial Vitals   BP Pulse Resp Temp SpO2   10/20/23 0947 10/20/23 0947 10/20/23 1034 10/20/23 1032 10/20/23 0947   (!) 182/93 101 18 (!) 95.7 °F (35.4 °C) 99 %      MAP       --                Physical Exam    Nursing note and vitals reviewed.  Constitutional: She has a sickly appearance.   Chronically ill-appearing   Eyes: Conjunctivae are normal.   Dilated, nonreactive   Neck: No JVD present.   Cardiovascular:  Normal rate, regular rhythm and intact distal pulses.           Pulmonary/Chest: She has no wheezes. She  has no rales.   Abdominal: There is no abdominal tenderness.   Musculoskeletal:         General: No edema.     Neurological: She is unresponsive.   No response to painful stimuli  + gag  - corneal reflex   Skin: No rash noted.         ED Course   Intubation    Date/Time: 10/20/2023 11:17 AM  Location procedure was performed: [facility] EMERGENCY DEPARTMENT    Performed by: Arvind Martinez MD  Authorized by: Adali Mcghee MD  Consent Done: Emergent Situation  Indications: respiratory failure and airway protection  Intubation method: video-assisted  Patient status: paralyzed (RSI)  Preoxygenation: mask  Sedatives: etomidate  Paralytic: rocuronium  Laryngoscope size: Mac 4  Tube size: 7.5 mm  Tube type: cuffed  Number of attempts: 1  Cricoid pressure: no  Cords visualized: yes  Post-procedure assessment: CO2 detector  Breath sounds: equal and absent over the epigastrium  Cuff inflated: yes  ETT to teeth: 21 cm  Tube secured with: ETT barbosa  Chest x-ray interpreted by me.  Chest x-ray findings: endotracheal tube in appropriate position  Patient tolerance: Patient tolerated the procedure well with no immediate complications        Labs Reviewed   CBC W/ AUTO DIFFERENTIAL - Abnormal; Notable for the following components:       Result Value    Hemoglobin 9.4 (*)     Hematocrit 33.3 (*)     MCV 79 (*)     MCH 22.3 (*)     MCHC 28.2 (*)     RDW 17.5 (*)     Gran % 76.8 (*)     Lymph % 14.3 (*)     All other components within normal limits   COMPREHENSIVE METABOLIC PANEL - Abnormal; Notable for the following components:    Glucose 129 (*)     Albumin 3.3 (*)     Alkaline Phosphatase 181 (*)     All other components within normal limits   TSH - Abnormal; Notable for the following components:    TSH 0.156 (*)     All other components within normal limits   LIPID PANEL - Abnormal; Notable for the following components:    Triglycerides 152 (*)     All other components within normal limits   ISTAT PROCEDURE - Abnormal; Notable for the  following components:    POC Glucose 112 (*)     POC TCO2 (MEASURED) 34 (*)     All other components within normal limits   ISTAT PROCEDURE - Abnormal; Notable for the following components:    POC PCO2 47.0 (*)     POC PO2 146 (*)     POC HCO3 32.7 (*)     POC BE 9 (*)     POC TCO2 34 (*)     All other components within normal limits   ISTAT LACTATE - Abnormal; Notable for the following components:    POC Lactate 2.29 (*)     All other components within normal limits   PROTIME-INR   T4, FREE   TYPE & SCREEN   ISTAT PROCEDURE   ISTAT CREATININE     EKG Readings: (Independently Interpreted)   EKG:  Sinus rhythm at 93 with PVCs, prolonged QTC at 5:07 a.m., incomplete right bundle, no acute ischemic changes     ECG Results              ECG 12 lead (Final result)  Result time 10/20/23 12:36:09      Final result by Interface, Lab In [facility] (10/20/23 12:36:09)                   Narrative:    Test Reason : I63.9,    Vent. Rate : 093 BPM     Atrial Rate : 093 BPM     P-R Int : 154 ms          QRS Dur : 098 ms      QT Int : 408 ms       P-R-T Axes : 061 -24 061 degrees     QTc Int : 507 ms    Sinus rhythm with frequent Premature ventricular complexes  Incomplete right bundle branch block  Nonspecific ST abnormality  Prolonged QT  Abnormal ECG  When compared with ECG of 07-AUG-2023 14:54,  Premature ventricular complexes are now Present  QT has lengthened  Confirmed by Santi HENAO MD ([id number]) on 10/20/2023 12:35:56 PM    Referred By: [email or web address]   SELF           Confirmed By:Santi HENAO MD                                  Imaging Results              XR NG/OG tube placement check, non-radiologist performed (Final result)  Result time 10/20/23 11:43:45   Procedure changed from XR Gastric tube check, non-radiologist performed     Final result by Brie Ren MD (10/20/23 11:43:45)                   Impression:      As above      Electronically signed by: Brie Ren MD  Date:    10/20/2023  Time:    11:43           "     Narrative:    EXAMINATION:  XR NG/OG TUBE PLACEMENT CHECK, NON-RADIOLOGIST PERFORMED    CLINICAL HISTORY:  OGT placement;    TECHNIQUE:  Abdomen    COMPARISON:  03/08/2022    FINDINGS:  Enteric tube distal tip within the stomach.    Nonspecific bowel gas pattern.  No significant stool retention.  Mild atelectasis at the lung bases.  Postoperative changes of the lower lumbar spine.  Contrast excretion by the kidneys noted.                                       X-Ray Chest AP Portable (Final result)  Result time 10/20/23 11:42:52      Final result by Noé Hendersno MD (10/20/23 11:42:52)                   Impression:      Endotracheal tube terminates 4 cm above the jhon.  Similar to mildly worse aeration when compared with 08/07/2023.      Electronically signed by: Noé Henderson MD  Date:    10/20/2023  Time:    11:42               Narrative:    EXAMINATION:  XR CHEST AP PORTABLE    CLINICAL HISTORY:  Provided history is "  Presence of other specified devices".    TECHNIQUE:  One view of the chest.    COMPARISON:  08/07/2023.    FINDINGS:  Endotracheal tube terminates approximately 4 cm above the jhon.  Enteric tube extends below the field of view.  Cardiac silhouette is stable.  There is a probable small to moderate left pleural effusion with subsegmental opacities in the left mid and lower lung zone.  Subsegment atelectasis in the right lung base.  Right hemidiaphragm is mildly elevated.  Aeration is similar to mildly worse when compared with prior study.  No distinct pneumothorax.                                       CTA STROKE MULTI-PHASE (Final result)  Result time 10/20/23 10:45:52      Final result by Brie Ren MD (10/20/23 10:45:52)                   Impression:      Abnormal attenuation in the brainstem and adjacent basal cisterns and prepontine region concerning for acute hemorrhage.  Slight mass effect onto the 4th ventricle which remains perceptible, attention at follow-up " advised.  No perceptible acute extravasation of contrast.  A definite brainstem mass is not seen, consider follow-up MRI examination without and with contrast.    No evidence significant intracranial or extracranial vessel narrowing or occlusion.  No intracranial arteriovenous malformation or aneurysm seen.    Significant atelectasis of the left lower lobe.      Electronically signed by: Brie Ren MD  Date:    10/20/2023  Time:    10:45               Narrative:    EXAMINATION:  CTA STROKE MULTI-PHASE    CLINICAL HISTORY:  Neuro deficit, acute, stroke suspected;    TECHNIQUE:  Non contrast low dose axial images were obtained thought the head. CT angiogram was performed from the level of the jhon to the top of the head following the IV administration of 75mL of Omnipaque 350.   Sagittal and coronal reconstructions and maximum intensity projection reconstructions were performed. Arterial stenosis percentages are based on NASCET measurement criteria.  Two additional phases of immediate post-contrast CTA images were performed through the head alone.    COMPARISON:  08/01/2022 CT head is    FINDINGS:  Intracranial Compartment:    Abnormal hyperattenuation noted in the basal cisterns and prepontine region and brain 10 consistent with fairly acute hemorrhage.  The bilateral lateral ventricles are prominent, midline, not significantly changed in size from 08/01/2022 exam.  The 4th ventricle is perceptible, slightly smaller compared to 08/01/2022.    No subdural fluid collection seen.    No definite mass identified.  No prior area major territory vascular infarction.    The skull is intact.    The paranasal sinuses are clear.    The mastoid air cells are clear.    Aorta: Normal 3 vessel arch.    Extracranial carotid circulation: No hemodynamically significant stenosis, aneurysmal dilatation, or dissection.  Medial deviation of the internal carotid arteries (kissing carotids).  The bilateral internal carotid  artery are very tortuous.  Minimal plaque at the bilateral carotid bulb region.    Extracranial vertebral circulation: No hemodynamically significant stenosis, aneurysmal dilatation, or dissection.  Minimal calcific atherosclerotic plaque bilateral V4 segment.    Intracranial Arteries: No focal high-grade stenosis, occlusion, or aneurysm.    Venous structures (limited evaluation): Normal.    Significant atelectasis of the left lower lobe.    ET tube distal tip in the distal trachea.                                    X-Rays:   Independently Interpreted Readings:   Other Readings:  Chest x-ray:    Medications   succinylcholine injection 100 mg ( Intravenous Not Given 10/20/23 0945)   etomidate injection 20 mg (20 mg Intravenous Given 10/20/23 0948)   rocuronium 10 mg/mL injection (50 mg  Given 10/20/23 0939)   iohexoL (OMNIPAQUE 350) injection 75 mL (75 mLs Rectal Given 10/20/23 1003)   human prothrombin complex (PCC) (KCENTRA) 4,851 Units in sterile water for injection IVPB (0 Units Intravenous Stopped 10/20/23 1334)     Medical Decision Making  Emergent evaluation a 62-year-old female brought in by EMS unresponsive.    Severely hypertensive on arrival.  Pupils are dilated and nonreactive.  Spontaneous breathing however snoring with no response to pain.    Stroke code activated on arrival due to her concerning symptoms.    Differential diagnosis includes but not limited to intracranial hemorrhage, ischemic infarct, encephalitis, hypertensive emergency, press syndrome    Decision was made to intubate for airway protection and expected clinical course prior to emergent CTA.    Plan for CTA, labs, EKG, cardiac monitoring, BP control            Amount and/or Complexity of Data Reviewed  Labs: ordered. Decision-making details documented in ED Course.  Radiology: ordered and independent interpretation performed. Decision-making details documented in ED Course.     Details: CT head reviewed and shows acute hemorrhage in  the brainstem.  ECG/medicine tests: ordered and independent interpretation performed.  Discussion of management or test interpretation with external provider(s): Vascular Neurology, NCC, NSY    Risk  Prescription drug management.  Decision regarding hospitalization.  Decision not to resuscitate or to de-escalate care because of poor prognosis.    Critical Care  Total time providing critical care: 45 minutes               ED Course as of 10/21/23 2055   Fri Oct 20, 2023   [id number] [BP]   1119 Sister at bedside updated several times.  Questions answered. [GM]   1120 Kcentra recommended by NSY and NCC.  Ordered as patient is on Eliquis [GM]   1400 Neurosurgery has evaluated the patient, patient is not an operative candidate given location of bleed as well as poor exam on arrival.      Family has been updated with patient's poor prognosis.  They are waiting for to come to bedside [GM]   1400 NSY and NCC has evaluated patient and discussed with family the devastating nature of her stroke.  Decision was made to transition to comfort measures.  They are waiting for family to arrive.   [GM]   1500 Turned over to oncoming team pending byron arrival and plan for terminal extubation for comfort care.  [GM]      ED Course User Index  [BP] Arvind Martinez MD  [GM] Adali Mcghee MD                    Clinical Impression:   Final diagnoses:  [I63.9] Stroke  [Z97.8] Endotracheal tube present  [I62.9] Intracranial hemorrhage (Primary)        ED Disposition Condition                     Adali Mcghee MD  10/21/23 2056

## 2023-10-21 NOTE — ED NOTES
Received report from JOSEPH León and JOSEPH Prince. Care assumed by this nurse and Rylee, RN.     Pt is  per report. Family is at bedside at this time.

## 2023-10-23 ENCOUNTER — TELEPHONE (OUTPATIENT)
Dept: INTERNAL MEDICINE | Facility: CLINIC | Age: 63
End: 2023-10-23
Payer: MEDICARE

## 2023-10-23 NOTE — TELEPHONE ENCOUNTER
----- Message from Estela Mcdaniel MD sent at 10/23/2023  9:04 AM CDT -----  Regarding: please reach out to family to extend our condolences  Please reach out to patient's family to extend our condolences. Thank you

## 2025-07-03 NOTE — TELEPHONE ENCOUNTER
Detail Level: [facility] sent  patient informed    Detail Level: Simple Prescription Strength Graduated Compression Stockings Recommendations: The patient was counseled that prescription strength graduated compression stockings should be worn for all waking hours. They will follow up with a venous specialist to monitor graduated compression stocking usage and their symptoms.

## (undated) DEVICE — KIT EVACUATOR 3-SPRING 1/8 DRN

## (undated) DEVICE — DRESSING AQUACEL FOAM 3 X 3

## (undated) DEVICE — SUT 2-0 SILK 30IN BLK BRAID

## (undated) DEVICE — GAUZE SPONGE 4X4 12PLY

## (undated) DEVICE — SEE MEDLINE ITEM 146417

## (undated) DEVICE — SUT VICRYL+ 1 CT1 18IN

## (undated) DEVICE — SYS CLSR DERMABOND PRINEO 22CM

## (undated) DEVICE — DRESSING LEUKOPLAST FLEX 1X3IN

## (undated) DEVICE — ELECTRODE REM PLYHSV RETURN 9

## (undated) DEVICE — DRAPE STERI-DRAPE 1000 17X11IN

## (undated) DEVICE — PAIRED SUBDERMAL NEEDLES

## (undated) DEVICE — BLADE 4IN EDGE INSULATED

## (undated) DEVICE — KIT SURGIFLO HEMOSTATIC MATRIX

## (undated) DEVICE — NDL HYPO 27G X 1 1/2

## (undated) DEVICE — SEE MEDLINE ITEM 146347

## (undated) DEVICE — SEE MEDLINE ITEM 157150

## (undated) DEVICE — DRESSING AQUACEL FOAM 5 X 5

## (undated) DEVICE — DRAPE C ARM 42 X 120 10/BX

## (undated) DEVICE — SUTURE STRATAFIX PGA PCL 3-0

## (undated) DEVICE — BLADE MILL BONE MEDIUM

## (undated) DEVICE — APPLICATOR CHLORAPREP ORN 26ML

## (undated) DEVICE — TUBE FRAZIER 5MM 2FT SOFT TIP

## (undated) DEVICE — DRESSING TRANS 4X4 TEGADERM

## (undated) DEVICE — BUR BONE CUT MICRO TPS 3X3.8MM

## (undated) DEVICE — DRAPE ABDOMINAL TIBURON 14X11

## (undated) DEVICE — SPONGE LAP 4X18 PREWASHED

## (undated) DEVICE — KIT SPINAL PATIENT CARE JACK

## (undated) DEVICE — DRESSING AQUACEL SACRAL 9 X 9

## (undated) DEVICE — DRAPE C-ARMOR EQUIPMENT COVER

## (undated) DEVICE — NDL 20GX1-1/2IN IB

## (undated) DEVICE — MONOPOLAR STIMULATING PROBE

## (undated) DEVICE — SPONGE GAUZE 16PLY 4X4

## (undated) DEVICE — CORD BIPOLAR 12 FOOT

## (undated) DEVICE — COVER BACK TABLE 72X21

## (undated) DEVICE — DRESSING ABSRBNT ISLAND 3.6X8

## (undated) DEVICE — MARKER SKIN STND TIP BLUE BARR

## (undated) DEVICE — SEE MEDLINE ITEM 156905

## (undated) DEVICE — SUT VICRYL PLUS 2-0 CT1 18

## (undated) DEVICE — CLOSURE SKIN 1X5 STERI-STRIP

## (undated) DEVICE — NDL SPINAL 18GX3.5 SPINOCAN

## (undated) DEVICE — TRAY FOLEY 16FR INFECTION CONT